# Patient Record
Sex: FEMALE | Race: WHITE | NOT HISPANIC OR LATINO | Employment: FULL TIME | URBAN - METROPOLITAN AREA
[De-identification: names, ages, dates, MRNs, and addresses within clinical notes are randomized per-mention and may not be internally consistent; named-entity substitution may affect disease eponyms.]

---

## 2017-01-30 RX ORDER — METFORMIN HYDROCHLORIDE 500 MG/1
TABLET ORAL
Qty: 60 TABLET | Refills: 6 | Status: SHIPPED | OUTPATIENT
Start: 2017-01-30 | End: 2017-04-07 | Stop reason: DRUGHIGH

## 2017-03-24 ENCOUNTER — LAB VISIT (OUTPATIENT)
Dept: LAB | Facility: HOSPITAL | Age: 57
End: 2017-03-24
Attending: INTERNAL MEDICINE
Payer: COMMERCIAL

## 2017-03-24 DIAGNOSIS — E11.9 TYPE 2 DIABETES MELLITUS WITHOUT COMPLICATION: ICD-10-CM

## 2017-03-24 PROCEDURE — 83036 HEMOGLOBIN GLYCOSYLATED A1C: CPT

## 2017-03-24 PROCEDURE — 36415 COLL VENOUS BLD VENIPUNCTURE: CPT | Mod: PO

## 2017-03-25 LAB
ESTIMATED AVG GLUCOSE: 137 MG/DL
HBA1C MFR BLD HPLC: 6.4 %

## 2017-04-06 ENCOUNTER — TELEPHONE (OUTPATIENT)
Dept: INTERNAL MEDICINE | Facility: CLINIC | Age: 57
End: 2017-04-06

## 2017-04-06 RX ORDER — BUPROPION HYDROCHLORIDE 150 MG/1
150 TABLET, EXTENDED RELEASE ORAL 2 TIMES DAILY
Qty: 60 TABLET | Refills: 6 | Status: SHIPPED | OUTPATIENT
Start: 2017-04-06 | End: 2017-04-07 | Stop reason: SDUPTHER

## 2017-04-07 ENCOUNTER — OFFICE VISIT (OUTPATIENT)
Dept: INTERNAL MEDICINE | Facility: CLINIC | Age: 57
End: 2017-04-07
Payer: COMMERCIAL

## 2017-04-07 VITALS
TEMPERATURE: 98 F | RESPIRATION RATE: 18 BRPM | BODY MASS INDEX: 55.32 KG/M2 | HEART RATE: 63 BPM | HEIGHT: 61 IN | WEIGHT: 293 LBS | DIASTOLIC BLOOD PRESSURE: 74 MMHG | SYSTOLIC BLOOD PRESSURE: 130 MMHG

## 2017-04-07 DIAGNOSIS — R06.02 SOB (SHORTNESS OF BREATH): ICD-10-CM

## 2017-04-07 DIAGNOSIS — E66.01 MORBID OBESITY WITH BMI OF 60.0-69.9, ADULT: ICD-10-CM

## 2017-04-07 DIAGNOSIS — R60.0 BILATERAL EDEMA OF LOWER EXTREMITY: ICD-10-CM

## 2017-04-07 DIAGNOSIS — Z00.00 ANNUAL PHYSICAL EXAM: Primary | ICD-10-CM

## 2017-04-07 DIAGNOSIS — E11.9 CONTROLLED TYPE 2 DIABETES MELLITUS WITHOUT COMPLICATION, WITHOUT LONG-TERM CURRENT USE OF INSULIN: ICD-10-CM

## 2017-04-07 DIAGNOSIS — R53.83 FATIGUE, UNSPECIFIED TYPE: ICD-10-CM

## 2017-04-07 DIAGNOSIS — M77.8 RIGHT SHOULDER TENDONITIS: ICD-10-CM

## 2017-04-07 PROCEDURE — 3078F DIAST BP <80 MM HG: CPT | Mod: S$GLB,,, | Performed by: INTERNAL MEDICINE

## 2017-04-07 PROCEDURE — 99999 PR PBB SHADOW E&M-EST. PATIENT-LVL III: CPT | Mod: PBBFAC,,, | Performed by: INTERNAL MEDICINE

## 2017-04-07 PROCEDURE — 99396 PREV VISIT EST AGE 40-64: CPT | Mod: S$GLB,,, | Performed by: INTERNAL MEDICINE

## 2017-04-07 PROCEDURE — 96372 THER/PROPH/DIAG INJ SC/IM: CPT | Mod: S$GLB,,, | Performed by: INTERNAL MEDICINE

## 2017-04-07 PROCEDURE — 3075F SYST BP GE 130 - 139MM HG: CPT | Mod: S$GLB,,, | Performed by: INTERNAL MEDICINE

## 2017-04-07 RX ORDER — FUROSEMIDE 20 MG/1
20 TABLET ORAL DAILY
Qty: 30 TABLET | Refills: 6 | Status: SHIPPED | OUTPATIENT
Start: 2017-04-07 | End: 2018-04-18 | Stop reason: SDUPTHER

## 2017-04-07 RX ORDER — TRIAMCINOLONE ACETONIDE 40 MG/ML
40 INJECTION, SUSPENSION INTRA-ARTICULAR; INTRAMUSCULAR
Status: COMPLETED | OUTPATIENT
Start: 2017-04-07 | End: 2017-04-07

## 2017-04-07 RX ORDER — BUPROPION HYDROCHLORIDE 150 MG/1
150 TABLET, EXTENDED RELEASE ORAL 2 TIMES DAILY
Qty: 60 TABLET | Refills: 6 | Status: SHIPPED | OUTPATIENT
Start: 2017-04-07 | End: 2018-04-18 | Stop reason: SDUPTHER

## 2017-04-07 RX ORDER — METFORMIN HYDROCHLORIDE 750 MG/1
750 TABLET, EXTENDED RELEASE ORAL
Qty: 30 TABLET | Refills: 11 | Status: SHIPPED | OUTPATIENT
Start: 2017-04-07 | End: 2018-04-18 | Stop reason: SDUPTHER

## 2017-04-07 RX ORDER — LOSARTAN POTASSIUM 25 MG/1
25 TABLET ORAL DAILY
Qty: 30 TABLET | Refills: 3 | Status: SHIPPED | OUTPATIENT
Start: 2017-04-07 | End: 2018-03-12 | Stop reason: SDUPTHER

## 2017-04-07 RX ORDER — MELOXICAM 15 MG/1
15 TABLET ORAL DAILY
Qty: 14 TABLET | Refills: 0 | Status: SHIPPED | OUTPATIENT
Start: 2017-04-07 | End: 2017-05-07

## 2017-04-07 RX ADMIN — TRIAMCINOLONE ACETONIDE 40 MG: 40 INJECTION, SUSPENSION INTRA-ARTICULAR; INTRAMUSCULAR at 03:04

## 2017-04-07 NOTE — PROGRESS NOTES
Subjective:       Patient ID: Tawny Valerio is a 56 y.o. female.    Chief Complaint: Annual Exam (medication refills ) and Shoulder Pain (right shoulder pain about 6 wks )    Patient is a 56 y.o.female who presents today for annual. Last A1c 6.4    Labs: due now  Vaccines: Influenza (yearly); Tetanus (done)   Eye exam: due now  Mammogram: wants to do june  Gyn exam: will do in june  Colonoscopy: will do; pt declines to schedule now  Diet: atkins        Right shoulder: started 5-6 weeks ago, started exercising and then shoulder started hurting. She tried motrin, icy hot, heating pad and it started feeling better. When she went to put her bra on this morning, it starated hurting again. [place] it pop this week.     Review of Systems   Constitutional: Negative for appetite change, chills, diaphoresis, fatigue and fever.   HENT: Negative for congestion, dental problem, ear discharge, ear pain, hearing loss, postnasal drip, sinus pressure and sore throat.    Eyes: Negative for discharge, redness and itching.   Respiratory: Negative for cough, chest tightness, shortness of breath and wheezing.    Cardiovascular: Negative for chest pain, palpitations and leg swelling.   Gastrointestinal: Negative for abdominal pain, constipation, diarrhea, nausea and vomiting.   Endocrine: Negative for cold intolerance and heat intolerance.   Genitourinary: Negative for difficulty urinating, frequency, hematuria and urgency.   Musculoskeletal: Positive for arthralgias. Negative for back pain, gait problem, myalgias and neck pain.   Skin: Negative for color change and rash.   Neurological: Negative for dizziness, syncope and headaches.   Hematological: Negative for adenopathy.   Psychiatric/Behavioral: Negative for behavioral problems and sleep disturbance. The patient is not nervous/anxious.        Objective:      Physical Exam   Constitutional: She is oriented to person, place, and time. She appears well-developed and well-nourished.  No distress.   HENT:   Head: Normocephalic and atraumatic.   Right Ear: External ear normal.   Left Ear: External ear normal.   Eyes: Conjunctivae and EOM are normal. Pupils are equal, round, and reactive to light. Right eye exhibits no discharge. Left eye exhibits no discharge. No scleral icterus.   Neck: Normal range of motion. Neck supple. No JVD present. No thyromegaly present.   Cardiovascular: Normal rate, regular rhythm, normal heart sounds and intact distal pulses.  Exam reveals no gallop and no friction rub.    No murmur heard.  Pulmonary/Chest: Effort normal and breath sounds normal. No stridor. No respiratory distress. She has no wheezes. She has no rales. She exhibits no tenderness.   Abdominal: Soft. Bowel sounds are normal. She exhibits no distension. There is no tenderness. There is no rebound.   Musculoskeletal: She exhibits no edema.        Right shoulder: She exhibits decreased range of motion and tenderness.   Lymphadenopathy:     She has no cervical adenopathy.   Neurological: She is alert and oriented to person, place, and time.   Skin: Skin is warm. No rash noted. She is not diaphoretic. No erythema.   Psychiatric: She has a normal mood and affect. Her behavior is normal.   Nursing note and vitals reviewed.      Assessment and Plan:       1. Annual physical exam  - labs now  - declines to schedule mammo, gyn or colonoscopy now; will call clinic in June to schedule everything  - CBC auto differential; Future  - Comprehensive metabolic panel; Future  - TSH; Future  - T4, free; Future  - Lipid panel; Future  - Hemoglobin A1c; Future  - Urinalysis; Future  - Microalbumin/creatinine urine ratio; Future  - Vitamin B12; Future  - Vitamin D; Future  - Follicle stimulating hormone; Future  - Estradiol; Future  - Luteinizing hormone; Future    2. Fatigue, unspecified type  - CBC auto differential; Future  - Comprehensive metabolic panel; Future  - TSH; Future  - T4, free; Future  - Lipid panel; Future  -  Hemoglobin A1c; Future  - Urinalysis; Future  - Microalbumin/creatinine urine ratio; Future  - Vitamin B12; Future  - Follicle stimulating hormone; Future  - Estradiol; Future  - Luteinizing hormone; Future    3. Controlled type 2 diabetes mellitus without complication, without long-term current use of insulin  - change to metformin  mg daily  - CBC auto differential; Future  - Comprehensive metabolic panel; Future  - TSH; Future  - T4, free; Future  - Lipid panel; Future  - Hemoglobin A1c; Future  - Urinalysis; Future  - Microalbumin/creatinine urine ratio; Future  - Vitamin B12; Future  - furosemide (LASIX) 20 MG tablet; Take 1 tablet (20 mg total) by mouth once daily.  Dispense: 30 tablet; Refill: 6  - Follicle stimulating hormone; Future  - Estradiol; Future  - Luteinizing hormone; Future    4.  Morbid obesity with BMI of 60.0-69.9, adult  - Patient educated on importance of diet and exercise.  Recommended 30-45 minutes of exercise five days a week.  In addition, counseled patient on importance of low fat diet.  Limit carbohydrate intake.  Increase protein intake and vegetables.   - furosemide (LASIX) 20 MG tablet; Take 1 tablet (20 mg total) by mouth once daily.  Dispense: 30 tablet; Refill: 6  - Follicle stimulating hormone; Future  - Estradiol; Future  - Luteinizing hormone; Future    6. Bilateral edema of lower extremity  - furosemide (LASIX) 20 MG tablet; Take 1 tablet (20 mg total) by mouth once daily.  Dispense: 30 tablet; Refill: 6  - Follicle stimulating hormone; Future  - Estradiol; Future  - Luteinizing hormone; Future    7. Right shoulder tendonitis  - meloxicam (MOBIC) 15 MG tablet; Take 1 tablet (15 mg total) by mouth once daily.  Dispense: 14 tablet; Refill: 0  - triamcinolone acetonide injection 40 mg; Inject 1 mL (40 mg total) into the muscle one time.        No Follow-up on file.

## 2017-04-12 ENCOUNTER — PATIENT MESSAGE (OUTPATIENT)
Dept: INTERNAL MEDICINE | Facility: CLINIC | Age: 57
End: 2017-04-12

## 2017-04-28 ENCOUNTER — PATIENT MESSAGE (OUTPATIENT)
Dept: INTERNAL MEDICINE | Facility: CLINIC | Age: 57
End: 2017-04-28

## 2017-05-04 ENCOUNTER — LAB VISIT (OUTPATIENT)
Dept: LAB | Facility: HOSPITAL | Age: 57
End: 2017-05-04
Attending: INTERNAL MEDICINE
Payer: COMMERCIAL

## 2017-05-04 DIAGNOSIS — R06.02 SOB (SHORTNESS OF BREATH): ICD-10-CM

## 2017-05-04 DIAGNOSIS — E11.9 CONTROLLED TYPE 2 DIABETES MELLITUS WITHOUT COMPLICATION, WITHOUT LONG-TERM CURRENT USE OF INSULIN: ICD-10-CM

## 2017-05-04 DIAGNOSIS — Z00.00 ANNUAL PHYSICAL EXAM: ICD-10-CM

## 2017-05-04 DIAGNOSIS — R60.0 BILATERAL EDEMA OF LOWER EXTREMITY: ICD-10-CM

## 2017-05-04 DIAGNOSIS — M77.8 RIGHT SHOULDER TENDONITIS: ICD-10-CM

## 2017-05-04 DIAGNOSIS — E66.01 MORBID OBESITY WITH BMI OF 60.0-69.9, ADULT: ICD-10-CM

## 2017-05-04 DIAGNOSIS — R53.83 FATIGUE, UNSPECIFIED TYPE: ICD-10-CM

## 2017-05-04 LAB
25(OH)D3+25(OH)D2 SERPL-MCNC: 13 NG/ML
ALBUMIN SERPL BCP-MCNC: 3.2 G/DL
ALP SERPL-CCNC: 114 U/L
ALT SERPL W/O P-5'-P-CCNC: 19 U/L
ANION GAP SERPL CALC-SCNC: 6 MMOL/L
AST SERPL-CCNC: 12 U/L
BASOPHILS # BLD AUTO: 0.02 K/UL
BASOPHILS NFR BLD: 0.2 %
BILIRUB SERPL-MCNC: 0.3 MG/DL
BUN SERPL-MCNC: 16 MG/DL
CALCIUM SERPL-MCNC: 9.4 MG/DL
CHLORIDE SERPL-SCNC: 105 MMOL/L
CHOLEST/HDLC SERPL: 3.1 {RATIO}
CO2 SERPL-SCNC: 30 MMOL/L
CREAT SERPL-MCNC: 0.8 MG/DL
DIFFERENTIAL METHOD: ABNORMAL
EOSINOPHIL # BLD AUTO: 0.2 K/UL
EOSINOPHIL NFR BLD: 1.9 %
ERYTHROCYTE [DISTWIDTH] IN BLOOD BY AUTOMATED COUNT: 15.5 %
EST. GFR  (AFRICAN AMERICAN): >60 ML/MIN/1.73 M^2
EST. GFR  (NON AFRICAN AMERICAN): >60 ML/MIN/1.73 M^2
ESTIMATED AVG GLUCOSE: 137 MG/DL
ESTRADIOL SERPL-MCNC: 23 PG/ML
FSH SERPL-ACNC: 22.5 MIU/ML
GLUCOSE SERPL-MCNC: 105 MG/DL
HBA1C MFR BLD HPLC: 6.4 %
HCT VFR BLD AUTO: 38.8 %
HDL/CHOLESTEROL RATIO: 32.3 %
HDLC SERPL-MCNC: 201 MG/DL
HDLC SERPL-MCNC: 65 MG/DL
HGB BLD-MCNC: 12.2 G/DL
LDLC SERPL CALC-MCNC: 124.6 MG/DL
LH SERPL-ACNC: 13.3 MIU/ML
LYMPHOCYTES # BLD AUTO: 2.4 K/UL
LYMPHOCYTES NFR BLD: 25.5 %
MCH RBC QN AUTO: 26.8 PG
MCHC RBC AUTO-ENTMCNC: 31.4 %
MCV RBC AUTO: 85 FL
MONOCYTES # BLD AUTO: 0.5 K/UL
MONOCYTES NFR BLD: 4.8 %
NEUTROPHILS # BLD AUTO: 6.3 K/UL
NEUTROPHILS NFR BLD: 67.4 %
NONHDLC SERPL-MCNC: 136 MG/DL
PLATELET # BLD AUTO: 290 K/UL
PMV BLD AUTO: 9.8 FL
POTASSIUM SERPL-SCNC: 4.2 MMOL/L
PROT SERPL-MCNC: 8 G/DL
RBC # BLD AUTO: 4.55 M/UL
SODIUM SERPL-SCNC: 141 MMOL/L
T4 FREE SERPL-MCNC: 1 NG/DL
TRIGL SERPL-MCNC: 57 MG/DL
TSH SERPL DL<=0.005 MIU/L-ACNC: 1.07 UIU/ML
VIT B12 SERPL-MCNC: 1040 PG/ML
WBC # BLD AUTO: 9.35 K/UL

## 2017-05-04 PROCEDURE — 82670 ASSAY OF TOTAL ESTRADIOL: CPT

## 2017-05-04 PROCEDURE — 80053 COMPREHEN METABOLIC PANEL: CPT

## 2017-05-04 PROCEDURE — 83001 ASSAY OF GONADOTROPIN (FSH): CPT

## 2017-05-04 PROCEDURE — 82607 VITAMIN B-12: CPT

## 2017-05-04 PROCEDURE — 84439 ASSAY OF FREE THYROXINE: CPT

## 2017-05-04 PROCEDURE — 85025 COMPLETE CBC W/AUTO DIFF WBC: CPT

## 2017-05-04 PROCEDURE — 36415 COLL VENOUS BLD VENIPUNCTURE: CPT | Mod: PO

## 2017-05-04 PROCEDURE — 83036 HEMOGLOBIN GLYCOSYLATED A1C: CPT

## 2017-05-04 PROCEDURE — 84443 ASSAY THYROID STIM HORMONE: CPT

## 2017-05-04 PROCEDURE — 82306 VITAMIN D 25 HYDROXY: CPT

## 2017-05-04 PROCEDURE — 83002 ASSAY OF GONADOTROPIN (LH): CPT

## 2017-05-04 PROCEDURE — 80061 LIPID PANEL: CPT

## 2017-05-05 ENCOUNTER — PATIENT MESSAGE (OUTPATIENT)
Dept: INTERNAL MEDICINE | Facility: CLINIC | Age: 57
End: 2017-05-05

## 2017-05-05 ENCOUNTER — TELEPHONE (OUTPATIENT)
Dept: INTERNAL MEDICINE | Facility: CLINIC | Age: 57
End: 2017-05-05

## 2017-05-05 DIAGNOSIS — E55.9 VITAMIN D DEFICIENCY: Primary | ICD-10-CM

## 2017-05-05 NOTE — TELEPHONE ENCOUNTER
Please notify pt of results:  - blood counts are normal; no signs of anemia  - electrolytes, kidney, liver and glucose are normal  - thyroid is normal  - cholesterol is at goal  - diabetic marker is 6.4; stable from previous; good control  - b12 is normal but vitamin D is very low at 13; I do recommend vit D 50,000 units once a week x 3 months, if she is agreeable, I will send to her pharmacy. This is likely the cause of her fatigue  - female hormones place her in a mickey ( around) menopausal range  - urine is normal

## 2017-05-11 RX ORDER — ERGOCALCIFEROL 1.25 MG/1
50000 CAPSULE ORAL WEEKLY
Qty: 12 CAPSULE | Refills: 0 | Status: SHIPPED | OUTPATIENT
Start: 2017-05-11 | End: 2017-12-13 | Stop reason: SDUPTHER

## 2017-05-11 NOTE — TELEPHONE ENCOUNTER
----- Message from Rosa M Enriquez sent at 5/11/2017 12:16 PM CDT -----  Contact: pt [phone]  Patient is returning a phone call.  Who left a message for the patient: Rosanna  Does patient know what this is regarding:  A message was left  Comments:

## 2017-05-11 NOTE — TELEPHONE ENCOUNTER
Spoke to pt and informed of lab results, pt agreeable to vitamin D, please send to [facility] in [place].

## 2017-05-22 ENCOUNTER — OFFICE VISIT (OUTPATIENT)
Dept: SLEEP MEDICINE | Facility: CLINIC | Age: 57
End: 2017-05-22
Payer: COMMERCIAL

## 2017-05-22 VITALS
WEIGHT: 293 LBS | HEART RATE: 64 BPM | DIASTOLIC BLOOD PRESSURE: 73 MMHG | BODY MASS INDEX: 59.57 KG/M2 | SYSTOLIC BLOOD PRESSURE: 126 MMHG

## 2017-05-22 DIAGNOSIS — G47.33 OSA AND COPD OVERLAP SYNDROME: Primary | ICD-10-CM

## 2017-05-22 DIAGNOSIS — J44.9 OSA AND COPD OVERLAP SYNDROME: Primary | ICD-10-CM

## 2017-05-22 PROCEDURE — 99999 PR PBB SHADOW E&M-EST. PATIENT-LVL III: CPT | Mod: PBBFAC,,, | Performed by: PSYCHIATRY & NEUROLOGY

## 2017-05-22 PROCEDURE — 99213 OFFICE O/P EST LOW 20 MIN: CPT | Mod: S$GLB,,, | Performed by: PSYCHIATRY & NEUROLOGY

## 2017-05-22 NOTE — PROGRESS NOTES
Tawny Valerio  was seen at the request of  No ref. provider found for sleep evaluation.    06/29/2016 INITIAL HISTORY OF PRESENT ILLNESS:  Tawny Valerio is a 56 y.o. female is here to be evaluated for a sleep disorder.       CHIEF COMPLAINT:      The patient's complaints include excessive daytime sleepiness, excessive daytime fatigue, snoring,  gasping for air in sleep and interrupted sleep since  5 years.    She has to sometimes pull over and sleep at the shoulder of the road.    Recently diagnosed with DM.    Started on Furosemide for LE edema.    Echo was done recently - NL.    Reports occasional  dry mouth and sore throat  Denies nasal congestion   Reports  morning headaches  Reports  interrupted sleep  Reports occasional frequent leg movements  Denies symptoms concerning for parasomnia; reports vivid dreams.     The ESS ([place] Sleepiness Score) taken on initial visit is 20 /24    The patient never had tonsillectomy, adenoidectomy or UPPP.    INTERVAL HISTORY:    05/22/2017:  The patient has not presented any new complaints since the previous visit.   Comes to discuss PSG -HST.   Mild-moderate ASHLI. Still has severe sleepiness and gasping for air and sleepiness. ESS 21/24.        SLEEP ROUTINE AND LIFESTYLE 05/22/2017 :    [place]    Bed partner: + who has been using CPAP    Time to bed: 12-3 AM  Sleep onset latency:  30 min  Disruptions or awakenings: 0-2  Time to fall back into sleep: 0-5 min  Wakeup time: 7:30 AM   Perceived sleep quality: 2-3/5  Perceived total sleep time:  4-7  hours.  Daytime naps: weekly - 1; weekend - 1-2; better since she was diagnosed with DM and started on Metformin  Weekend sleep routine: same  Exercise routine: no  Caffeine: no coffee, diet cokes - 2-3 cans a day.     PREVIOUS SLEEP STUDIES:     PSG HST 5/27/62: Significant Obstructive sleep apnea (ASHLI) with AHI (apnea hypopnea Index) of 11 (RDI 18) and SaO2 of 84.5% (weight  324  lbs).      DME:       PAST MEDICAL HISTORY:    Active Ambulatory Problems     Diagnosis Date Noted    Diabetes type 2, controlled 2016    Vitamin D deficiency 2016    SOB (shortness of breath) 06/15/2016    Morbid obesity with BMI of 60.0-69.9, adult 06/15/2016    Edema leg 06/15/2016    ASHLI (obstructive sleep apnea)      Resolved Ambulatory Problems     Diagnosis Date Noted    No Resolved Ambulatory Problems     Past Medical History:   Diagnosis Date    Anxiety     Diabetes type 2, controlled 2016                PAST SURGICAL HISTORY:    Past Surgical History:   Procedure Laterality Date     SECTION      lipoma removed           FAMILY HISTORY:                Family History   Problem Relation Age of Onset    No Known Problems Mother     Diabetes Father     Heart disease Sister     Heart disease Brother     Heart disease Maternal Grandmother     Heart disease Maternal Grandfather     Breast cancer Paternal Grandmother     Heart disease Paternal Grandfather        SOCIAL HISTORY:          Tobacco:   History   Smoking Status    Never Smoker   Smokeless Tobacco    Not on file       alcohol use:    History   Alcohol Use No                   ALLERGIES:    Allergies   Allergen Reactions    Celebrex [Celecoxib] Anaphylaxis    Codeine Hives    Lidocaine Hives    Vicodin [Hydrocodone-Acetaminophen] Hives and Swelling       CURRENT MEDICATIONS:    Current Outpatient Prescriptions   Medication Sig Dispense Refill    blood sugar diagnostic (CONTOUR TEST STRIPS) Strp Check fasting glucose daily 100 each 3    buPROPion (WELLBUTRIN SR) 150 MG TBSR 12 hr tablet Take 1 tablet (150 mg total) by mouth 2 (two) times daily. 60 tablet 6    ergocalciferol (ERGOCALCIFEROL) 50,000 unit Cap Take 1 capsule (50,000 Units total) by mouth once a week. 12 capsule 0    lancets Misc Check fasting glucose daily 100 each 3    losartan (COZAAR) 25 MG tablet Take 1 tablet (25 mg total) by mouth  once daily. 30 tablet 3    metformin (GLUCOPHAGE-XR) 750 MG 24 hr tablet Take 1 tablet (750 mg total) by mouth daily with breakfast. 30 tablet 11    blood-glucose meter kit Use as instructed 1 each 0    furosemide (LASIX) 20 MG tablet Take 1 tablet (20 mg total) by mouth once daily. 30 tablet 6     No current facility-administered medications for this visit.                       REVIEW OF SYSTEMS:   Sleep related symptoms as per HPI    reports weight gain  Reports dyspnea daytime - even when talking sometimes, when walking a block.  Reports palpitations  Reports acid reflux   Denies polyuria 0  Reports  mood diturbance  Denies  anemia  Denies  muscle pain  Denies  Gait imbalance    Otherwise, a balance of 10 systems reviewed is negative.    PHYSICAL EXAM:  /73 (BP Location: Left arm, Patient Position: Sitting, BP Method: Automatic)   Pulse 64   Wt (!) 143 kg (315 lb 4.1 oz)   BMI 59.57 kg/m²   GENERAL: Normal development, well groomed.  HEENT:   HEENT:  Conjunctivae are non-erythematous; Pupils equal, round, and reactive to light; Nose is symmetrical; Nasal mucosa is pink and moist; Septum is midline; Inferior turbinates are normal; Nasal airflow is normal; Posterior pharynx is pink; Modified Mallampati:IV; Posterior palate is low; Tonsils not visualized; Uvula is normal and pink;Tongue is normal; Dentition is fair; No TMJ tenderness; Jaw opening and protrusion without click and without discomfort.  NECK: Supple. Neck circumference is 18 inches. No thyromegaly. No palpable nodes.     SKIN: On face and neck: No abrasions, no rashes, no lesions.  No subcutaneous nodules are palpable.  RESPIRATORY: Chest is clear to auscultation.  Normal chest expansion and non-labored breathing at rest.  CARDIOVASCULAR: Normal S1, S2.  No murmurs, gallops or rubs. No carotid bruits bilaterally.  No edema. No clubbing. No cyanosis.    NEURO: Oriented to time, place and person. Normal attention span and concentration. Gait  "normal.    PSYCH: Affect is full. Mood is normal  MUSCULOSKELETAL: Moves 4 extremities. Gait normal.         Using [facility]:       ASSESSMENT:    1. Moderate ASHLI by HST The patient symptomatically has  excessive daytime sleepiness, snoring,  witnessed breathing pauses, excessive daytime fatigue and interrupted sleep  with exam findings of "a crowded oral airway and elevated body mass index. The patient has medical co-morbidities of diabetes,  which can be worsened by ASHLI. This warrants treatment          PLAN:      Treatment: prescription  for CPAP 6-18 cm with the mask of a patient's choice was given to the patient.    Education: During our discussion today, we talked about the etiology of obstructive sleep apnea as well as the potential ramifications of untreated sleep apnea, which could include daytime sleepiness, hypertension, heart disease and/or stroke.      We discussed potential treatment options, which could include weight loss, body positioning, continuous positive airway pressure (CPAP), or referral for surgical consideration. The patient preferred CPAP option.     Discussed purpose of PAP therapy, health benefits of CPAP, as well as the potential ramifications of untreated sleep apnea, which could include daytime sleepiness, hypertension, heart disease and/or stroke. An AHI of 15 is associated with increased risk CVD.     The patient should avoid ETOH and sedatives at night, as it tends to aggravate ASHLI. Regular replacement of CPAP mask, tubing and filter was recommended.    Precautions: The patient was advised to abstain from driving should he feel sleepy or drowsy.    Follow up: MD/NP after 1 month of PAP use.    Thank you for allowing me the opportunity to participate in the care of your patient.    "

## 2017-05-24 ENCOUNTER — PATIENT MESSAGE (OUTPATIENT)
Dept: INTERNAL MEDICINE | Facility: CLINIC | Age: 57
End: 2017-05-24

## 2017-05-24 RX ORDER — MELOXICAM 15 MG/1
15 TABLET ORAL DAILY
Qty: 30 TABLET | Refills: 0 | Status: SHIPPED | OUTPATIENT
Start: 2017-05-24 | End: 2017-06-23

## 2017-06-09 DIAGNOSIS — Z12.31 OTHER SCREENING MAMMOGRAM: ICD-10-CM

## 2017-08-25 DIAGNOSIS — Z12.11 COLON CANCER SCREENING: ICD-10-CM

## 2017-10-31 DIAGNOSIS — E11.9 DIABETES TYPE 2, CONTROLLED: ICD-10-CM

## 2017-10-31 RX ORDER — BLOOD SUGAR DIAGNOSTIC
STRIP MISCELLANEOUS
Qty: 100 STRIP | Refills: 0 | Status: SHIPPED | OUTPATIENT
Start: 2017-10-31 | End: 2019-03-15

## 2017-11-17 ENCOUNTER — OFFICE VISIT (OUTPATIENT)
Dept: INTERNAL MEDICINE | Facility: CLINIC | Age: 57
End: 2017-11-17
Payer: COMMERCIAL

## 2017-11-17 ENCOUNTER — LAB VISIT (OUTPATIENT)
Dept: LAB | Facility: HOSPITAL | Age: 57
End: 2017-11-17
Attending: INTERNAL MEDICINE
Payer: COMMERCIAL

## 2017-11-17 VITALS
HEIGHT: 61 IN | WEIGHT: 293 LBS | TEMPERATURE: 98 F | DIASTOLIC BLOOD PRESSURE: 70 MMHG | SYSTOLIC BLOOD PRESSURE: 140 MMHG | RESPIRATION RATE: 16 BRPM | HEART RATE: 84 BPM | BODY MASS INDEX: 55.32 KG/M2

## 2017-11-17 DIAGNOSIS — J20.9 ACUTE BRONCHITIS, UNSPECIFIED ORGANISM: ICD-10-CM

## 2017-11-17 DIAGNOSIS — E55.9 VITAMIN D DEFICIENCY: ICD-10-CM

## 2017-11-17 DIAGNOSIS — E11.9 CONTROLLED TYPE 2 DIABETES MELLITUS WITHOUT COMPLICATION, WITHOUT LONG-TERM CURRENT USE OF INSULIN: Primary | ICD-10-CM

## 2017-11-17 DIAGNOSIS — E11.9 CONTROLLED TYPE 2 DIABETES MELLITUS WITHOUT COMPLICATION, WITHOUT LONG-TERM CURRENT USE OF INSULIN: ICD-10-CM

## 2017-11-17 LAB
ALBUMIN SERPL BCP-MCNC: 3 G/DL
ALP SERPL-CCNC: 116 U/L
ALT SERPL W/O P-5'-P-CCNC: 18 U/L
ANION GAP SERPL CALC-SCNC: 8 MMOL/L
AST SERPL-CCNC: 14 U/L
BILIRUB SERPL-MCNC: 0.4 MG/DL
BUN SERPL-MCNC: 16 MG/DL
CALCIUM SERPL-MCNC: 9.9 MG/DL
CHLORIDE SERPL-SCNC: 103 MMOL/L
CHOLEST SERPL-MCNC: 191 MG/DL
CHOLEST/HDLC SERPL: 3.2 {RATIO}
CO2 SERPL-SCNC: 30 MMOL/L
CREAT SERPL-MCNC: 0.9 MG/DL
EST. GFR  (AFRICAN AMERICAN): >60 ML/MIN/1.73 M^2
EST. GFR  (NON AFRICAN AMERICAN): >60 ML/MIN/1.73 M^2
ESTIMATED AVG GLUCOSE: 143 MG/DL
GLUCOSE SERPL-MCNC: 174 MG/DL
HBA1C MFR BLD HPLC: 6.6 %
HCV AB SERPL QL IA: NEGATIVE
HDLC SERPL-MCNC: 59 MG/DL
HDLC SERPL: 30.9 %
LDLC SERPL CALC-MCNC: 117 MG/DL
NONHDLC SERPL-MCNC: 132 MG/DL
POTASSIUM SERPL-SCNC: 4.6 MMOL/L
PROT SERPL-MCNC: 7.9 G/DL
SODIUM SERPL-SCNC: 141 MMOL/L
TRIGL SERPL-MCNC: 75 MG/DL

## 2017-11-17 PROCEDURE — 99999 PR PBB SHADOW E&M-EST. PATIENT-LVL III: CPT | Mod: PBBFAC,,, | Performed by: INTERNAL MEDICINE

## 2017-11-17 PROCEDURE — 86803 HEPATITIS C AB TEST: CPT

## 2017-11-17 PROCEDURE — 80053 COMPREHEN METABOLIC PANEL: CPT

## 2017-11-17 PROCEDURE — 99214 OFFICE O/P EST MOD 30 MIN: CPT | Mod: 25,S$GLB,, | Performed by: INTERNAL MEDICINE

## 2017-11-17 PROCEDURE — 96372 THER/PROPH/DIAG INJ SC/IM: CPT | Mod: S$GLB,,, | Performed by: INTERNAL MEDICINE

## 2017-11-17 PROCEDURE — 83036 HEMOGLOBIN GLYCOSYLATED A1C: CPT

## 2017-11-17 PROCEDURE — 80061 LIPID PANEL: CPT

## 2017-11-17 PROCEDURE — 36415 COLL VENOUS BLD VENIPUNCTURE: CPT | Mod: PO

## 2017-11-17 RX ORDER — DOXYCYCLINE HYCLATE 100 MG
100 TABLET ORAL 2 TIMES DAILY
Qty: 14 TABLET | Refills: 0 | Status: SHIPPED | OUTPATIENT
Start: 2017-11-17 | End: 2017-11-24

## 2017-11-17 RX ORDER — TRIAMCINOLONE ACETONIDE 40 MG/ML
40 INJECTION, SUSPENSION INTRA-ARTICULAR; INTRAMUSCULAR
Status: COMPLETED | OUTPATIENT
Start: 2017-11-17 | End: 2017-11-17

## 2017-11-17 RX ADMIN — TRIAMCINOLONE ACETONIDE 40 MG: 40 INJECTION, SUSPENSION INTRA-ARTICULAR; INTRAMUSCULAR at 09:11

## 2017-11-17 NOTE — PROGRESS NOTES
Subjective:       Patient ID: Tawny Valerio is a 57 y.o. female.    Chief Complaint: Follow-up    Patient is a 57 y.o.female who presents today for follow up.    Labs: due now  Vaccines: Influenza (yearly); Tetanus (done)   Eye exam: april  Mammogram: declines  Gyn exam: declines  Colonoscopy: declines  Diet: atkins      Sister has pneumonia. Pt complains of sinus congestion and cough with green mucus.  Review of Systems   Constitutional: Negative for appetite change, chills, diaphoresis, fatigue and fever.   HENT: Negative for congestion, dental problem, ear discharge, ear pain, hearing loss, postnasal drip, sinus pressure and sore throat.    Eyes: Negative for discharge, redness and itching.   Respiratory: Negative for cough, chest tightness, shortness of breath and wheezing.    Cardiovascular: Negative for chest pain, palpitations and leg swelling.   Gastrointestinal: Negative for abdominal pain, constipation, diarrhea, nausea and vomiting.   Endocrine: Negative for cold intolerance and heat intolerance.   Genitourinary: Negative for difficulty urinating, frequency, hematuria and urgency.   Musculoskeletal: Negative for arthralgias, back pain, gait problem, myalgias and neck pain.   Skin: Negative for color change and rash.   Neurological: Negative for dizziness, syncope and headaches.   Hematological: Negative for adenopathy.   Psychiatric/Behavioral: Negative for behavioral problems and sleep disturbance. The patient is not nervous/anxious.        Objective:      Physical Exam   Constitutional: She is oriented to person, place, and time. She appears well-developed and well-nourished. No distress.   HENT:   Head: Normocephalic and atraumatic.   Right Ear: Tympanic membrane and external ear normal.   Left Ear: Tympanic membrane and external ear normal.   Nose: Mucosal edema and rhinorrhea present.   Mouth/Throat: Uvula is midline, oropharynx is clear and moist and mucous membranes are normal. No  oropharyngeal exudate, posterior oropharyngeal edema, posterior oropharyngeal erythema or tonsillar abscesses.   Eyes: Conjunctivae and EOM are normal. Pupils are equal, round, and reactive to light. Right eye exhibits no discharge. Left eye exhibits no discharge. No scleral icterus.   Neck: Normal range of motion. Neck supple. No JVD present. No thyromegaly present.   Cardiovascular: Normal rate, regular rhythm, normal heart sounds and intact distal pulses.  Exam reveals no gallop and no friction rub.    No murmur heard.  Pulses:       Dorsalis pedis pulses are 2+ on the right side, and 2+ on the left side.        Posterior tibial pulses are 2+ on the right side, and 2+ on the left side.   Pulmonary/Chest: Effort normal and breath sounds normal. No stridor. No respiratory distress. She has no wheezes. She has no rales. She exhibits no tenderness.   Abdominal: Soft. Bowel sounds are normal. She exhibits no distension. There is no tenderness. There is no rebound.   Musculoskeletal: Normal range of motion. She exhibits no edema or tenderness.        Right foot: There is normal range of motion and no deformity.        Left foot: There is normal range of motion and no deformity.   Feet:   Right Foot:   Protective Sensation: 3 sites tested. 3 sites sensed.   Skin Integrity: Negative for ulcer, blister, skin breakdown, erythema, warmth, callus or dry skin.   Left Foot:   Protective Sensation: 3 sites tested. 3 sites sensed.   Skin Integrity: Negative for ulcer, blister, skin breakdown, erythema, warmth, callus or dry skin.   Lymphadenopathy:     She has no cervical adenopathy.   Neurological: She is alert and oriented to person, place, and time. No cranial nerve deficit.   Skin: Skin is warm and dry. No rash noted. She is not diaphoretic. No erythema.   Psychiatric: She has a normal mood and affect. Her behavior is normal.   Nursing note and vitals reviewed.      Assessment and Plan:       1. Controlled type 2 diabetes  mellitus without complication, without long-term current use of insulin  - Comprehensive metabolic panel; Future  - Lipid panel; Future  - Hemoglobin A1c; Future  - Hepatitis C antibody; Future    2. Vitamin D deficiency  - Comprehensive metabolic panel; Future  - Lipid panel; Future  - Hemoglobin A1c; Future    3. Acute bronchitis, unspecified organism    - triamcinolone acetonide injection 40 mg; Inject 1 mL (40 mg total) into the muscle one time.  - doxycycline (VIBRA-TABS) 100 MG tablet; Take 1 tablet (100 mg total) by mouth 2 (two) times daily.  Dispense: 14 tablet; Refill: 0          No Follow-up on file.

## 2017-12-13 DIAGNOSIS — E55.9 VITAMIN D DEFICIENCY: ICD-10-CM

## 2017-12-13 RX ORDER — ERGOCALCIFEROL 1.25 MG/1
50000 CAPSULE ORAL WEEKLY
Qty: 12 CAPSULE | Refills: 0 | Status: SHIPPED | OUTPATIENT
Start: 2017-12-13 | End: 2018-03-10 | Stop reason: SDUPTHER

## 2017-12-28 ENCOUNTER — PATIENT MESSAGE (OUTPATIENT)
Dept: INTERNAL MEDICINE | Facility: CLINIC | Age: 57
End: 2017-12-28

## 2017-12-28 RX ORDER — DOXYCYCLINE HYCLATE 100 MG
100 TABLET ORAL 2 TIMES DAILY
Qty: 14 TABLET | Refills: 0 | Status: SHIPPED | OUTPATIENT
Start: 2017-12-28 | End: 2018-01-04

## 2018-01-08 ENCOUNTER — PATIENT MESSAGE (OUTPATIENT)
Dept: INTERNAL MEDICINE | Facility: CLINIC | Age: 58
End: 2018-01-08

## 2018-01-08 RX ORDER — MELOXICAM 15 MG/1
15 TABLET ORAL DAILY
Qty: 30 TABLET | Refills: 6 | Status: SHIPPED | OUTPATIENT
Start: 2018-01-08 | End: 2019-02-18

## 2018-03-10 DIAGNOSIS — E55.9 VITAMIN D DEFICIENCY: ICD-10-CM

## 2018-03-11 RX ORDER — ERGOCALCIFEROL 1.25 MG/1
50000 CAPSULE ORAL WEEKLY
Qty: 12 CAPSULE | Refills: 0 | Status: SHIPPED | OUTPATIENT
Start: 2018-03-11 | End: 2018-05-24 | Stop reason: SDUPTHER

## 2018-03-12 RX ORDER — LOSARTAN POTASSIUM 25 MG/1
25 TABLET ORAL DAILY
Qty: 30 TABLET | Refills: 3 | Status: SHIPPED | OUTPATIENT
Start: 2018-03-12 | End: 2018-04-18 | Stop reason: SDUPTHER

## 2018-04-18 DIAGNOSIS — R60.0 BILATERAL EDEMA OF LOWER EXTREMITY: ICD-10-CM

## 2018-04-18 DIAGNOSIS — E11.9 CONTROLLED TYPE 2 DIABETES MELLITUS WITHOUT COMPLICATION, WITHOUT LONG-TERM CURRENT USE OF INSULIN: ICD-10-CM

## 2018-04-18 DIAGNOSIS — R06.02 SOB (SHORTNESS OF BREATH): ICD-10-CM

## 2018-04-18 DIAGNOSIS — E66.01 MORBID OBESITY WITH BMI OF 60.0-69.9, ADULT: ICD-10-CM

## 2018-04-19 ENCOUNTER — TELEPHONE (OUTPATIENT)
Dept: INTERNAL MEDICINE | Facility: CLINIC | Age: 58
End: 2018-04-19

## 2018-04-19 DIAGNOSIS — E11.9 CONTROLLED TYPE 2 DIABETES MELLITUS WITHOUT COMPLICATION, WITHOUT LONG-TERM CURRENT USE OF INSULIN: ICD-10-CM

## 2018-04-19 DIAGNOSIS — R60.0 BILATERAL EDEMA OF LOWER EXTREMITY: ICD-10-CM

## 2018-04-19 DIAGNOSIS — R06.02 SOB (SHORTNESS OF BREATH): ICD-10-CM

## 2018-04-19 DIAGNOSIS — E66.01 MORBID OBESITY WITH BMI OF 60.0-69.9, ADULT: ICD-10-CM

## 2018-04-19 RX ORDER — BUPROPION HYDROCHLORIDE 150 MG/1
150 TABLET, EXTENDED RELEASE ORAL 2 TIMES DAILY
Qty: 60 TABLET | Refills: 0 | Status: SHIPPED | OUTPATIENT
Start: 2018-04-19 | End: 2018-04-19 | Stop reason: SDUPTHER

## 2018-04-19 RX ORDER — LOSARTAN POTASSIUM 25 MG/1
25 TABLET ORAL DAILY
Qty: 30 TABLET | Refills: 0 | Status: SHIPPED | OUTPATIENT
Start: 2018-04-19 | End: 2018-12-02 | Stop reason: SDUPTHER

## 2018-04-19 RX ORDER — FUROSEMIDE 20 MG/1
20 TABLET ORAL DAILY
Qty: 30 TABLET | Refills: 0 | Status: SHIPPED | OUTPATIENT
Start: 2018-04-19 | End: 2018-04-19 | Stop reason: SDUPTHER

## 2018-04-19 RX ORDER — LOSARTAN POTASSIUM 25 MG/1
25 TABLET ORAL DAILY
Qty: 30 TABLET | Refills: 0 | Status: SHIPPED | OUTPATIENT
Start: 2018-04-19 | End: 2018-04-19

## 2018-04-19 RX ORDER — BUPROPION HYDROCHLORIDE 150 MG/1
150 TABLET, EXTENDED RELEASE ORAL 2 TIMES DAILY
Qty: 60 TABLET | Refills: 0 | Status: SHIPPED | OUTPATIENT
Start: 2018-04-19 | End: 2018-04-19

## 2018-04-19 RX ORDER — FUROSEMIDE 20 MG/1
20 TABLET ORAL DAILY
Qty: 30 TABLET | Refills: 0 | Status: SHIPPED | OUTPATIENT
Start: 2018-04-19 | End: 2018-05-16

## 2018-04-19 RX ORDER — METFORMIN HYDROCHLORIDE 750 MG/1
750 TABLET, EXTENDED RELEASE ORAL
Qty: 30 TABLET | Refills: 0 | Status: SHIPPED | OUTPATIENT
Start: 2018-04-19 | End: 2018-04-19 | Stop reason: SDUPTHER

## 2018-04-19 RX ORDER — BUPROPION HYDROCHLORIDE 150 MG/1
150 TABLET, EXTENDED RELEASE ORAL 2 TIMES DAILY
Qty: 60 TABLET | Refills: 0 | Status: SHIPPED | OUTPATIENT
Start: 2018-04-19 | End: 2018-05-16

## 2018-04-19 RX ORDER — LOSARTAN POTASSIUM 25 MG/1
25 TABLET ORAL DAILY
Qty: 30 TABLET | Refills: 0 | Status: SHIPPED | OUTPATIENT
Start: 2018-04-19 | End: 2018-04-19 | Stop reason: SDUPTHER

## 2018-04-19 RX ORDER — FUROSEMIDE 20 MG/1
20 TABLET ORAL DAILY
Qty: 30 TABLET | Refills: 0 | Status: SHIPPED | OUTPATIENT
Start: 2018-04-19 | End: 2018-04-19

## 2018-04-19 RX ORDER — METFORMIN HYDROCHLORIDE 750 MG/1
750 TABLET, EXTENDED RELEASE ORAL
Qty: 30 TABLET | Refills: 0 | Status: SHIPPED | OUTPATIENT
Start: 2018-04-19 | End: 2018-04-19

## 2018-04-19 RX ORDER — METFORMIN HYDROCHLORIDE 750 MG/1
750 TABLET, EXTENDED RELEASE ORAL
Qty: 30 TABLET | Refills: 0 | Status: SHIPPED | OUTPATIENT
Start: 2018-04-19 | End: 2018-05-16

## 2018-05-14 PROBLEM — I10 ESSENTIAL HYPERTENSION: Status: ACTIVE | Noted: 2018-05-14

## 2018-05-16 DIAGNOSIS — R60.0 BILATERAL EDEMA OF LOWER EXTREMITY: ICD-10-CM

## 2018-05-16 DIAGNOSIS — R06.02 SOB (SHORTNESS OF BREATH): ICD-10-CM

## 2018-05-16 DIAGNOSIS — E11.9 CONTROLLED TYPE 2 DIABETES MELLITUS WITHOUT COMPLICATION, WITHOUT LONG-TERM CURRENT USE OF INSULIN: ICD-10-CM

## 2018-05-16 DIAGNOSIS — E66.01 MORBID OBESITY WITH BMI OF 60.0-69.9, ADULT: ICD-10-CM

## 2018-05-16 RX ORDER — METFORMIN HYDROCHLORIDE 750 MG/1
750 TABLET, EXTENDED RELEASE ORAL
Qty: 30 TABLET | Refills: 0 | Status: SHIPPED | OUTPATIENT
Start: 2018-05-16 | End: 2018-05-21 | Stop reason: SDUPTHER

## 2018-05-16 RX ORDER — FUROSEMIDE 20 MG/1
20 TABLET ORAL DAILY
Qty: 30 TABLET | Refills: 0 | Status: SHIPPED | OUTPATIENT
Start: 2018-05-16 | End: 2018-08-27 | Stop reason: SDUPTHER

## 2018-05-16 RX ORDER — BUPROPION HYDROCHLORIDE 150 MG/1
150 TABLET, EXTENDED RELEASE ORAL 2 TIMES DAILY
Qty: 60 TABLET | Refills: 0 | Status: SHIPPED | OUTPATIENT
Start: 2018-05-16 | End: 2018-09-04 | Stop reason: SDUPTHER

## 2018-05-21 RX ORDER — METFORMIN HYDROCHLORIDE 750 MG/1
750 TABLET, EXTENDED RELEASE ORAL
Qty: 30 TABLET | Refills: 0 | Status: SHIPPED | OUTPATIENT
Start: 2018-05-21 | End: 2018-07-28 | Stop reason: SDUPTHER

## 2018-05-24 ENCOUNTER — LAB VISIT (OUTPATIENT)
Dept: LAB | Facility: HOSPITAL | Age: 58
End: 2018-05-24
Attending: INTERNAL MEDICINE
Payer: COMMERCIAL

## 2018-05-24 ENCOUNTER — OFFICE VISIT (OUTPATIENT)
Dept: INTERNAL MEDICINE | Facility: CLINIC | Age: 58
End: 2018-05-24
Payer: COMMERCIAL

## 2018-05-24 VITALS
HEART RATE: 68 BPM | RESPIRATION RATE: 16 BRPM | WEIGHT: 293 LBS | HEIGHT: 61 IN | DIASTOLIC BLOOD PRESSURE: 70 MMHG | TEMPERATURE: 98 F | SYSTOLIC BLOOD PRESSURE: 138 MMHG | BODY MASS INDEX: 55.32 KG/M2

## 2018-05-24 DIAGNOSIS — E55.9 VITAMIN D INSUFFICIENCY: ICD-10-CM

## 2018-05-24 DIAGNOSIS — I10 ESSENTIAL HYPERTENSION: ICD-10-CM

## 2018-05-24 DIAGNOSIS — R60.0 EDEMA LEG: ICD-10-CM

## 2018-05-24 DIAGNOSIS — E11.9 CONTROLLED TYPE 2 DIABETES MELLITUS WITHOUT COMPLICATION, WITHOUT LONG-TERM CURRENT USE OF INSULIN: Primary | ICD-10-CM

## 2018-05-24 DIAGNOSIS — N95.1 PERIMENOPAUSAL: ICD-10-CM

## 2018-05-24 DIAGNOSIS — E11.9 CONTROLLED TYPE 2 DIABETES MELLITUS WITHOUT COMPLICATION, WITHOUT LONG-TERM CURRENT USE OF INSULIN: ICD-10-CM

## 2018-05-24 LAB
25(OH)D3+25(OH)D2 SERPL-MCNC: 15 NG/ML
ALBUMIN SERPL BCP-MCNC: 3.3 G/DL
ALP SERPL-CCNC: 117 U/L
ALT SERPL W/O P-5'-P-CCNC: 17 U/L
ANION GAP SERPL CALC-SCNC: 8 MMOL/L
AST SERPL-CCNC: 13 U/L
BILIRUB SERPL-MCNC: 0.5 MG/DL
BUN SERPL-MCNC: 14 MG/DL
CALCIUM SERPL-MCNC: 10.1 MG/DL
CHLORIDE SERPL-SCNC: 104 MMOL/L
CO2 SERPL-SCNC: 29 MMOL/L
CREAT SERPL-MCNC: 0.8 MG/DL
EST. GFR  (AFRICAN AMERICAN): >60 ML/MIN/1.73 M^2
EST. GFR  (NON AFRICAN AMERICAN): >60 ML/MIN/1.73 M^2
ESTIMATED AVG GLUCOSE: 140 MG/DL
FSH SERPL-ACNC: 15 MIU/ML
GLUCOSE SERPL-MCNC: 96 MG/DL
HBA1C MFR BLD HPLC: 6.5 %
POTASSIUM SERPL-SCNC: 4.2 MMOL/L
PROT SERPL-MCNC: 8 G/DL
SODIUM SERPL-SCNC: 141 MMOL/L

## 2018-05-24 PROCEDURE — 99214 OFFICE O/P EST MOD 30 MIN: CPT | Mod: S$GLB,,, | Performed by: INTERNAL MEDICINE

## 2018-05-24 PROCEDURE — 3078F DIAST BP <80 MM HG: CPT | Mod: CPTII,S$GLB,, | Performed by: INTERNAL MEDICINE

## 2018-05-24 PROCEDURE — 83036 HEMOGLOBIN GLYCOSYLATED A1C: CPT

## 2018-05-24 PROCEDURE — 80053 COMPREHEN METABOLIC PANEL: CPT

## 2018-05-24 PROCEDURE — 3044F HG A1C LEVEL LT 7.0%: CPT | Mod: CPTII,S$GLB,, | Performed by: INTERNAL MEDICINE

## 2018-05-24 PROCEDURE — 82306 VITAMIN D 25 HYDROXY: CPT

## 2018-05-24 PROCEDURE — 3075F SYST BP GE 130 - 139MM HG: CPT | Mod: CPTII,S$GLB,, | Performed by: INTERNAL MEDICINE

## 2018-05-24 PROCEDURE — 99999 PR PBB SHADOW E&M-EST. PATIENT-LVL III: CPT | Mod: PBBFAC,,, | Performed by: INTERNAL MEDICINE

## 2018-05-24 PROCEDURE — 83001 ASSAY OF GONADOTROPIN (FSH): CPT

## 2018-05-24 PROCEDURE — 3008F BODY MASS INDEX DOCD: CPT | Mod: CPTII,S$GLB,, | Performed by: INTERNAL MEDICINE

## 2018-05-24 PROCEDURE — 36415 COLL VENOUS BLD VENIPUNCTURE: CPT | Mod: PO

## 2018-05-24 RX ORDER — HYDROCHLOROTHIAZIDE 25 MG/1
25 TABLET ORAL DAILY
Qty: 30 TABLET | Refills: 11 | Status: SHIPPED | OUTPATIENT
Start: 2018-05-24 | End: 2019-06-15 | Stop reason: SDUPTHER

## 2018-05-24 RX ORDER — ERGOCALCIFEROL 1.25 MG/1
50000 CAPSULE ORAL WEEKLY
Qty: 12 CAPSULE | Refills: 0 | Status: SHIPPED | OUTPATIENT
Start: 2018-05-24 | End: 2018-06-02 | Stop reason: SDUPTHER

## 2018-05-24 NOTE — PROGRESS NOTES
Subjective:       Patient ID: Tawny Valerio is a 57 y.o. female.    Chief Complaint: Edema (legs and ankles, sometimes better in am. , sits at work.) and wants aic testing    Diabetes   She has type 2 diabetes mellitus. No MedicAlert identification noted. The initial diagnosis of diabetes was made 2 years ago. Hypoglycemia symptoms include dizziness, headaches and sleepiness. Pertinent negatives for hypoglycemia include no confusion, hunger, mood changes, nervousness/anxiousness, pallor, seizures, speech difficulty, sweats or tremors. Associated symptoms include blurred vision, foot paresthesias and visual change. Pertinent negatives for diabetes include no chest pain, no foot ulcerations, no polydipsia, no polyphagia, no polyuria, no weakness and no weight loss. Pertinent negatives for hypoglycemia complications include no blackouts, no hospitalization, no nocturnal hypoglycemia, no required assistance and no required glucagon injection. Symptoms are stable. Diabetic complications include PVD. Pertinent negatives for diabetic complications include no autonomic neuropathy, CVA, heart disease, impotence, nephropathy, peripheral neuropathy or retinopathy. Risk factors for coronary artery disease include family history, hypertension, obesity, sedentary lifestyle and diabetes mellitus. Current diabetic treatment includes diet and oral agent (monotherapy). She is compliant with treatment most of the time. Her weight is stable. She is following a diabetic and generally healthy diet. Meal planning includes avoidance of concentrated sweets and carbohydrate counting. She has not had a previous visit with a dietitian. She participates in exercise intermittently. There is no compliance with monitoring of blood glucose. She does not see a podiatrist.Eye exam is not current.     BLE swelling - she takes lasix 20mg prn (about every other day). She bought compression socks otc recently. No dyspnea. No pain, erythema, or  "warmth in legs.     Vit d insuff: she is currently out of ergocalciferol and is requesting refill    Perimenopausal: she is requesting to have her FSH checked. Her last period was in December. She follows with outside gynecologist.     Review of Systems   Constitutional: Negative for fever, unexpected weight change and weight loss.   Eyes: Positive for blurred vision.   Respiratory: Negative for chest tightness and shortness of breath.    Cardiovascular: Positive for leg swelling. Negative for chest pain.   Gastrointestinal: Negative for abdominal pain, diarrhea, nausea and vomiting.   Endocrine: Negative for polydipsia, polyphagia and polyuria.   Genitourinary: Negative for difficulty urinating and impotence.   Musculoskeletal: Negative for arthralgias.   Skin: Negative for pallor.   Neurological: Positive for dizziness and headaches. Negative for tremors, seizures, speech difficulty and weakness.   Hematological: Negative for adenopathy.   Psychiatric/Behavioral: Negative for confusion. The patient is not nervous/anxious.        Objective:        Vitals:    05/24/18 1045   BP: 138/70   BP Location: Left arm   Patient Position: Sitting   BP Method: Large (Manual)   Pulse: 68   Resp: 16   Temp: 98.3 °F (36.8 °C)   TempSrc: Oral   Weight: (!) 147 kg (324 lb 1.2 oz)   Height: 5' 1" (1.549 m)       Body mass index is 61.23 kg/m².    Physical Exam    Assessment:     1. Controlled type 2 diabetes mellitus without complication, without long-term current use of insulin    2. Edema leg    3. Vitamin D insufficiency    4. Perimenopausal    5. Essential hypertension           Plan:         1. Controlled type 2 diabetes mellitus without complication, without long-term current use of insulin  - continue metformin  - Hemoglobin A1c; Future  - Microalbumin/creatinine urine ratio; Future  - Urinalysis; Future    2. Edema leg  - elevate legs, wear compression socks, low salt diet  - no signs or symptoms of DVT  - Urinalysis; " Future  - Comprehensive metabolic panel; Future  - hydroCHLOROthiazide (HYDRODIURIL) 25 MG tablet; Take 1 tablet (25 mg total) by mouth once daily.  Dispense: 30 tablet; Refill: 11  - COMPRESSION STOCKINGS    3. Vitamin D insufficiency  - Vitamin D; Future  - ergocalciferol (VITAMIN D2) 50,000 unit Cap; Take 1 capsule (50,000 Units total) by mouth once a week.  Dispense: 12 capsule; Refill: 0    4. Perimenopausal  - Follicle stimulating hormone; Future       5. Essential hypertension  - continue losartan  - hydroCHLOROthiazide (HYDRODIURIL) 25 MG tablet; Take 1 tablet (25 mg total) by mouth once daily.  Dispense: 30 tablet; Refill: 11

## 2018-05-25 ENCOUNTER — PATIENT MESSAGE (OUTPATIENT)
Dept: INTERNAL MEDICINE | Facility: CLINIC | Age: 58
End: 2018-05-25

## 2018-06-02 DIAGNOSIS — E55.9 VITAMIN D DEFICIENCY: ICD-10-CM

## 2018-06-02 RX ORDER — ERGOCALCIFEROL 1.25 MG/1
50000 CAPSULE ORAL WEEKLY
Qty: 12 CAPSULE | Refills: 0 | Status: SHIPPED | OUTPATIENT
Start: 2018-06-02 | End: 2018-08-19

## 2018-06-14 ENCOUNTER — PATIENT MESSAGE (OUTPATIENT)
Dept: INTERNAL MEDICINE | Facility: CLINIC | Age: 58
End: 2018-06-14

## 2018-06-15 ENCOUNTER — CLINICAL SUPPORT (OUTPATIENT)
Dept: CARDIOLOGY | Facility: CLINIC | Age: 58
End: 2018-06-15
Attending: INTERNAL MEDICINE
Payer: COMMERCIAL

## 2018-06-15 ENCOUNTER — OFFICE VISIT (OUTPATIENT)
Dept: INTERNAL MEDICINE | Facility: CLINIC | Age: 58
End: 2018-06-15
Payer: COMMERCIAL

## 2018-06-15 VITALS
TEMPERATURE: 98 F | RESPIRATION RATE: 18 BRPM | HEART RATE: 83 BPM | SYSTOLIC BLOOD PRESSURE: 142 MMHG | BODY MASS INDEX: 55.32 KG/M2 | HEIGHT: 61 IN | OXYGEN SATURATION: 94 % | DIASTOLIC BLOOD PRESSURE: 86 MMHG | WEIGHT: 293 LBS

## 2018-06-15 DIAGNOSIS — M79.89 PAIN AND SWELLING OF LEFT LOWER EXTREMITY: ICD-10-CM

## 2018-06-15 DIAGNOSIS — M79.605 PAIN AND SWELLING OF LEFT LOWER EXTREMITY: ICD-10-CM

## 2018-06-15 DIAGNOSIS — L03.116 CELLULITIS OF LEFT LOWER EXTREMITY: Primary | ICD-10-CM

## 2018-06-15 PROCEDURE — 99999 PR PBB SHADOW E&M-EST. PATIENT-LVL III: CPT | Mod: PBBFAC,,, | Performed by: INTERNAL MEDICINE

## 2018-06-15 PROCEDURE — 3008F BODY MASS INDEX DOCD: CPT | Mod: CPTII,S$GLB,, | Performed by: INTERNAL MEDICINE

## 2018-06-15 PROCEDURE — 93971 EXTREMITY STUDY: CPT | Mod: S$GLB,,, | Performed by: INTERNAL MEDICINE

## 2018-06-15 PROCEDURE — 3077F SYST BP >= 140 MM HG: CPT | Mod: CPTII,S$GLB,, | Performed by: INTERNAL MEDICINE

## 2018-06-15 PROCEDURE — 99214 OFFICE O/P EST MOD 30 MIN: CPT | Mod: S$GLB,,, | Performed by: INTERNAL MEDICINE

## 2018-06-15 PROCEDURE — 3079F DIAST BP 80-89 MM HG: CPT | Mod: CPTII,S$GLB,, | Performed by: INTERNAL MEDICINE

## 2018-06-15 RX ORDER — SULFAMETHOXAZOLE AND TRIMETHOPRIM 800; 160 MG/1; MG/1
1 TABLET ORAL 2 TIMES DAILY
Qty: 14 TABLET | Refills: 0 | Status: SHIPPED | OUTPATIENT
Start: 2018-06-15 | End: 2018-06-22

## 2018-06-15 NOTE — PROGRESS NOTES
"Subjective:       Patient ID: Tawny Valerio is a 58 y.o. female.    Chief Complaint: Recurrent Skin Infections (bottom Left Leg )    HPI    She presents for evaluation of LLE swelling and erythema. This occurred once previously, about one year ago. She first noticed this on Wednesday after wearing otc compression socks. She reports chills yesterday that resolve with motrin. No drainage from the site. It is painful.     Review of Systems   Constitutional: Positive for chills. Negative for activity change and unexpected weight change.   HENT: Negative for hearing loss, rhinorrhea and trouble swallowing.    Eyes: Negative for discharge and visual disturbance.   Respiratory: Negative for chest tightness and wheezing.    Cardiovascular: Positive for leg swelling. Negative for chest pain and palpitations.   Gastrointestinal: Negative for blood in stool, constipation, diarrhea and vomiting.   Endocrine: Negative for polydipsia and polyuria.   Genitourinary: Negative for difficulty urinating, dysuria, hematuria and menstrual problem.   Musculoskeletal: Negative for arthralgias, joint swelling and neck pain.   Skin: Positive for color change and rash.   Neurological: Negative for weakness and headaches.   Psychiatric/Behavioral: Negative for confusion and dysphoric mood.       Objective:        Vitals:    06/15/18 0900   BP: (!) 142/86   BP Location: Left arm   Patient Position: Sitting   BP Method: Medium (Manual)   Pulse: 83   Resp: 18   Temp: 98.1 °F (36.7 °C)   TempSrc: Oral   SpO2: (!) 94%   Weight: (!) 143.3 kg (315 lb 14.7 oz)   Height: 5' 1" (1.549 m)       Body mass index is 59.69 kg/m².    Physical Exam   Constitutional: She is oriented to person, place, and time. She appears well-developed and well-nourished. No distress.   HENT:   Head: Normocephalic and atraumatic.   Nose: Nose normal.   Eyes: Conjunctivae and EOM are normal. Right eye exhibits no discharge. Left eye exhibits no discharge.   Neck: Normal " range of motion. Neck supple.   Cardiovascular: Normal rate, regular rhythm, normal heart sounds and intact distal pulses.    Pulmonary/Chest: Effort normal and breath sounds normal.   Musculoskeletal: She exhibits edema.   Neurological: She is alert and oriented to person, place, and time.   Skin: Skin is warm and dry. She is not diaphoretic. There is erythema.   Distal LLE erythematous, warm. No abscess palpated. It is tender. No fissures between toes.    Psychiatric: She has a normal mood and affect. Her behavior is normal. Thought content normal.       Assessment:     1. Cellulitis of left lower extremity    2. Pain and swelling of left lower extremity           Plan:         1. Cellulitis of left lower extremity  - elevate leg, apply ice prn  - sulfamethoxazole-trimethoprim 800-160mg (BACTRIM DS) 800-160 mg Tab; Take 1 tablet by mouth 2 (two) times daily.  Dispense: 14 tablet; Refill: 0  - Patient was instructed to notify clinic if symptoms change, worsen or do not improve.    2. Pain and swelling of left lower extremity  - if no evidence of DVT, recommend prescription compression socks (sent to DME at last visit)  - Cardiology Lab US Lower Extremity Veins Left; Future

## 2018-06-15 NOTE — PATIENT INSTRUCTIONS
Cellulitis  Cellulitis is an infection of the deep layers of skin. A break in the skin, such as a cut or scratch, can let bacteria under the skin. If the bacteria get to deep layers of the skin, it can be serious. If not treated, cellulitis can get into the bloodstream and lymph nodes. The infection can then spread throughout the body. This causes serious illness.  Cellulitis causes the affected skin to become red, swollen, warm, and sore. The reddened areas have a visible border. An open sore may leak fluid (pus). You may have a fever, chills, and pain.  Cellulitis is treated with antibiotics taken for 7 to 10 days. An open sore may be cleaned and covered with cool wet gauze. Symptoms should get better 1 to 2 days after treatment is started. Make sure to take all the antibiotics for the full number of days until they are gone. Keep taking the medicine even if your symptoms go away.  Home care  Follow these tips:  · Limit the use of the part of your body with cellulitis.   · If the infection is on your leg, keep your leg raised while sitting. This will help to reduce swelling.  · Take all of the antibiotic medicine exactly as directed until it is gone. Do not miss any doses, especially during the first 7 days. Dont stop taking the medicine when your symptoms get better.  · Keep the affected area clean and dry.  · Wash your hands with soap and warm water before and after touching your skin. Anyone else who touches your skin should also wash his or her hands. Don't share towels.  Follow-up care  Follow up with your healthcare provider, or as advised. If your infection does not go away on the first antibiotic, your healthcare provider will prescribe a different one.  When to seek medical advice  Call your healthcare provider right away if any of these occur:  · Red areas that spread  · Swelling or pain that gets worse  · Fluid leaking from the skin (pus)  · Fever higher of 100.4º F (38.0º C) or higher after 2 days  on antibiotics  Date Last Reviewed: 9/1/2016  © 4483-4877 [employer]. [address] [place] [address], [place] [address]. All rights reserved. This information is not intended as a substitute for professional medical care. Always follow your healthcare professional's instructions.

## 2018-07-28 RX ORDER — METFORMIN HYDROCHLORIDE 750 MG/1
750 TABLET, EXTENDED RELEASE ORAL
Qty: 30 TABLET | Refills: 3 | Status: SHIPPED | OUTPATIENT
Start: 2018-07-28 | End: 2018-08-18 | Stop reason: SDUPTHER

## 2018-08-03 RX ORDER — METFORMIN HYDROCHLORIDE 750 MG/1
750 TABLET, EXTENDED RELEASE ORAL
Qty: 30 TABLET | Refills: 0 | OUTPATIENT
Start: 2018-08-03 | End: 2019-08-03

## 2018-08-13 DIAGNOSIS — E55.9 VITAMIN D INSUFFICIENCY: ICD-10-CM

## 2018-08-13 RX ORDER — ERGOCALCIFEROL 1.25 MG/1
CAPSULE ORAL
Qty: 12 CAPSULE | Refills: 0 | OUTPATIENT
Start: 2018-08-13

## 2018-08-18 DIAGNOSIS — E55.9 VITAMIN D INSUFFICIENCY: ICD-10-CM

## 2018-08-20 RX ORDER — ERGOCALCIFEROL 1.25 MG/1
CAPSULE ORAL
Qty: 12 CAPSULE | Refills: 0 | OUTPATIENT
Start: 2018-08-20

## 2018-08-20 RX ORDER — METFORMIN HYDROCHLORIDE 750 MG/1
750 TABLET, EXTENDED RELEASE ORAL
Qty: 30 TABLET | Refills: 4 | Status: SHIPPED | OUTPATIENT
Start: 2018-08-20 | End: 2019-08-19 | Stop reason: CLARIF

## 2018-08-27 DIAGNOSIS — E11.9 CONTROLLED TYPE 2 DIABETES MELLITUS WITHOUT COMPLICATION, WITHOUT LONG-TERM CURRENT USE OF INSULIN: ICD-10-CM

## 2018-08-27 DIAGNOSIS — E66.01 MORBID OBESITY WITH BMI OF 60.0-69.9, ADULT: ICD-10-CM

## 2018-08-27 DIAGNOSIS — R06.02 SOB (SHORTNESS OF BREATH): ICD-10-CM

## 2018-08-27 DIAGNOSIS — R60.0 BILATERAL EDEMA OF LOWER EXTREMITY: ICD-10-CM

## 2018-08-27 RX ORDER — FUROSEMIDE 20 MG/1
20 TABLET ORAL DAILY
Qty: 30 TABLET | Refills: 11 | Status: SHIPPED | OUTPATIENT
Start: 2018-08-27 | End: 2019-09-24 | Stop reason: SDUPTHER

## 2018-09-03 DIAGNOSIS — E55.9 VITAMIN D INSUFFICIENCY: ICD-10-CM

## 2018-09-04 RX ORDER — ERGOCALCIFEROL 1.25 MG/1
CAPSULE ORAL
Qty: 12 CAPSULE | Refills: 0 | Status: SHIPPED | OUTPATIENT
Start: 2018-09-04 | End: 2019-01-29 | Stop reason: SDUPTHER

## 2018-09-04 RX ORDER — BUPROPION HYDROCHLORIDE 150 MG/1
150 TABLET, EXTENDED RELEASE ORAL 2 TIMES DAILY
Qty: 60 TABLET | Refills: 0 | Status: SHIPPED | OUTPATIENT
Start: 2018-09-04 | End: 2019-01-11 | Stop reason: SDUPTHER

## 2018-09-11 RX ORDER — BUPROPION HYDROCHLORIDE 150 MG/1
150 TABLET, EXTENDED RELEASE ORAL 2 TIMES DAILY
Qty: 60 TABLET | Refills: 6 | Status: SHIPPED | OUTPATIENT
Start: 2018-09-11 | End: 2019-10-11 | Stop reason: SDUPTHER

## 2018-10-10 ENCOUNTER — TELEPHONE (OUTPATIENT)
Dept: OPTOMETRY | Facility: CLINIC | Age: 58
End: 2018-10-10

## 2018-10-10 NOTE — TELEPHONE ENCOUNTER
[employer] as of 10/12/18:    Member: SHAILESH WEBB  YOB: 1960  Subscriber ID: [id number]  Product Name: 10E2E  Plan Code: 102  Please Note: Member must be eligible at date of service to receive benefit.  In Network Coverage Frequency  Category Benefit Eligibility Frequency  Exam Available 1 every 24 month(s)   Dilated Fundus Exam: Patient History Currently Unavailable  In Network Coverage  [facility] [facility] Patient Responsibility  (includes applicable copay)  Professional Services  Exam $20.00

## 2018-12-03 RX ORDER — LOSARTAN POTASSIUM 25 MG/1
25 TABLET ORAL DAILY
Qty: 30 TABLET | Refills: 5 | Status: SHIPPED | OUTPATIENT
Start: 2018-12-03 | End: 2019-02-04 | Stop reason: SDUPTHER

## 2018-12-28 ENCOUNTER — PATIENT MESSAGE (OUTPATIENT)
Dept: INTERNAL MEDICINE | Facility: CLINIC | Age: 58
End: 2018-12-28

## 2019-01-06 ENCOUNTER — PATIENT MESSAGE (OUTPATIENT)
Dept: INTERNAL MEDICINE | Facility: CLINIC | Age: 59
End: 2019-01-06

## 2019-01-09 NOTE — PROGRESS NOTES
Subjective:       Patient ID: Tawny Valerio is a 58 y.o. female.    Chief Complaint: Diabetes; Chills; and Chest Congestion (green mucus)    Patient is a 58 y.o.female with morbid obesity and diabetes type 2  who presents today for follow up    Cough: ongoing for two weeks; started with sore throat, postnasal drip and now has chills, green sputum from cough and nose.    Labs: due now  Vaccines: Influenza (yearly); Tetanus (done)   Eye exam: april  Mammogram: declines  Gyn exam: dr cheng  Colonoscopy: declines  Diet: atkins     Review of Systems   Constitutional: Negative for appetite change, chills, diaphoresis, fatigue and fever.   HENT: Positive for congestion. Negative for dental problem, ear discharge, ear pain, hearing loss, postnasal drip, sinus pressure and sore throat.    Eyes: Negative for discharge, redness and itching.   Respiratory: Positive for cough. Negative for chest tightness, shortness of breath and wheezing.    Cardiovascular: Negative for chest pain, palpitations and leg swelling.   Gastrointestinal: Negative for abdominal pain, constipation, diarrhea, nausea and vomiting.   Endocrine: Negative for cold intolerance and heat intolerance.   Genitourinary: Negative for difficulty urinating, frequency, hematuria and urgency.   Musculoskeletal: Negative for arthralgias, back pain, gait problem, myalgias and neck pain.   Skin: Negative for color change and rash.   Neurological: Negative for dizziness, syncope and headaches.   Hematological: Negative for adenopathy.   Psychiatric/Behavioral: Negative for behavioral problems and sleep disturbance. The patient is not nervous/anxious.        Objective:      Physical Exam   Constitutional: She is oriented to person, place, and time. She appears well-developed and well-nourished. No distress.   HENT:   Head: Normocephalic and atraumatic.   Right Ear: External ear normal. There is swelling.   Left Ear: External ear normal. There is swelling.   Nose:  Mucosal edema and rhinorrhea present.   Mouth/Throat: Uvula is midline, oropharynx is clear and moist and mucous membranes are normal. No oropharyngeal exudate, posterior oropharyngeal edema, posterior oropharyngeal erythema or tonsillar abscesses.   Eyes: Conjunctivae and EOM are normal. Pupils are equal, round, and reactive to light. Right eye exhibits no discharge. Left eye exhibits no discharge. No scleral icterus.   Neck: Normal range of motion. Neck supple. No JVD present. No thyromegaly present.   Cardiovascular: Normal rate, regular rhythm, normal heart sounds and intact distal pulses. Exam reveals no gallop and no friction rub.   No murmur heard.  Pulmonary/Chest: Effort normal and breath sounds normal. No stridor. No respiratory distress. She has no wheezes. She has no rales. She exhibits no tenderness.   Abdominal: Soft. Bowel sounds are normal. She exhibits no distension. There is no tenderness. There is no rebound.   Musculoskeletal: Normal range of motion. She exhibits no edema or tenderness.   Lymphadenopathy:     She has no cervical adenopathy.   Neurological: She is alert and oriented to person, place, and time. No cranial nerve deficit.   Skin: Skin is warm and dry. No rash noted. She is not diaphoretic. No erythema.   Psychiatric: She has a normal mood and affect. Her behavior is normal.   Nursing note and vitals reviewed.      Assessment and Plan:       1. Controlled type 2 diabetes mellitus without complication, without long-term current use of insulin  - monitoring; continue meds  - CBC auto differential; Future  - Hemoglobin A1c; Future  - Lipid panel; Future  - Comprehensive metabolic panel; Future  - TSH; Future  - Urinalysis; Future  - Vitamin D; Future  - Microalbumin/creatinine urine ratio; Future    2. Essential hypertension    - CBC auto differential; Future  - Hemoglobin A1c; Future  - Lipid panel; Future  - Comprehensive metabolic panel; Future  - TSH; Future  - Urinalysis; Future  -  Vitamin D; Future  - Microalbumin/creatinine urine ratio; Future    3. Vitamin D insufficiency    - CBC auto differential; Future  - Hemoglobin A1c; Future  - Lipid panel; Future  - Comprehensive metabolic panel; Future  - TSH; Future  - Urinalysis; Future  - Vitamin D; Future  - Microalbumin/creatinine urine ratio; Future    4. Menopausal state    - Follicle stimulating hormone; Future  - ESTRADIOL; Future  - Luteinizing hormone; Future    5. Morbid obesity with BMI of 60.0-69.9, adult  - monitoring; Patient educated on importance of diet and exercise.  Recommended 30-45 minutes of exercise five days a week.  In addition, counseled patient on importance of low fat diet.  Limit carbohydrate intake.  Increase protein intake and vegetables.     6. Cough  - trial of augmentin  - kenalog 40 mg IM x 1  - tessalon perles prn          No Follow-up on file.

## 2019-01-11 ENCOUNTER — OFFICE VISIT (OUTPATIENT)
Dept: INTERNAL MEDICINE | Facility: CLINIC | Age: 59
End: 2019-01-11
Payer: COMMERCIAL

## 2019-01-11 VITALS
HEIGHT: 61 IN | DIASTOLIC BLOOD PRESSURE: 70 MMHG | SYSTOLIC BLOOD PRESSURE: 116 MMHG | TEMPERATURE: 99 F | BODY MASS INDEX: 55.32 KG/M2 | WEIGHT: 293 LBS | RESPIRATION RATE: 16 BRPM

## 2019-01-11 DIAGNOSIS — E11.9 CONTROLLED TYPE 2 DIABETES MELLITUS WITHOUT COMPLICATION, WITHOUT LONG-TERM CURRENT USE OF INSULIN: Primary | ICD-10-CM

## 2019-01-11 DIAGNOSIS — N95.1 MENOPAUSAL STATE: ICD-10-CM

## 2019-01-11 DIAGNOSIS — R05.9 COUGH: ICD-10-CM

## 2019-01-11 DIAGNOSIS — E55.9 VITAMIN D INSUFFICIENCY: ICD-10-CM

## 2019-01-11 DIAGNOSIS — I10 ESSENTIAL HYPERTENSION: ICD-10-CM

## 2019-01-11 DIAGNOSIS — E66.01 MORBID OBESITY WITH BMI OF 60.0-69.9, ADULT: ICD-10-CM

## 2019-01-11 PROCEDURE — 3078F DIAST BP <80 MM HG: CPT | Mod: CPTII,S$GLB,, | Performed by: INTERNAL MEDICINE

## 2019-01-11 PROCEDURE — 96372 PR INJECTION,THERAP/PROPH/DIAG2ST, IM OR SUBCUT: ICD-10-PCS | Mod: S$GLB,,, | Performed by: INTERNAL MEDICINE

## 2019-01-11 PROCEDURE — 99214 OFFICE O/P EST MOD 30 MIN: CPT | Mod: 25,S$GLB,, | Performed by: INTERNAL MEDICINE

## 2019-01-11 PROCEDURE — 3078F PR MOST RECENT DIASTOLIC BLOOD PRESSURE < 80 MM HG: ICD-10-PCS | Mod: CPTII,S$GLB,, | Performed by: INTERNAL MEDICINE

## 2019-01-11 PROCEDURE — 3044F HG A1C LEVEL LT 7.0%: CPT | Mod: CPTII,S$GLB,, | Performed by: INTERNAL MEDICINE

## 2019-01-11 PROCEDURE — 96372 THER/PROPH/DIAG INJ SC/IM: CPT | Mod: S$GLB,,, | Performed by: INTERNAL MEDICINE

## 2019-01-11 PROCEDURE — 3074F SYST BP LT 130 MM HG: CPT | Mod: CPTII,S$GLB,, | Performed by: INTERNAL MEDICINE

## 2019-01-11 PROCEDURE — 99999 PR PBB SHADOW E&M-EST. PATIENT-LVL III: ICD-10-PCS | Mod: PBBFAC,,, | Performed by: INTERNAL MEDICINE

## 2019-01-11 PROCEDURE — 3008F BODY MASS INDEX DOCD: CPT | Mod: CPTII,S$GLB,, | Performed by: INTERNAL MEDICINE

## 2019-01-11 PROCEDURE — 3044F PR MOST RECENT HEMOGLOBIN A1C LEVEL <7.0%: ICD-10-PCS | Mod: CPTII,S$GLB,, | Performed by: INTERNAL MEDICINE

## 2019-01-11 PROCEDURE — 3008F PR BODY MASS INDEX (BMI) DOCUMENTED: ICD-10-PCS | Mod: CPTII,S$GLB,, | Performed by: INTERNAL MEDICINE

## 2019-01-11 PROCEDURE — 99999 PR PBB SHADOW E&M-EST. PATIENT-LVL III: CPT | Mod: PBBFAC,,, | Performed by: INTERNAL MEDICINE

## 2019-01-11 PROCEDURE — 3074F PR MOST RECENT SYSTOLIC BLOOD PRESSURE < 130 MM HG: ICD-10-PCS | Mod: CPTII,S$GLB,, | Performed by: INTERNAL MEDICINE

## 2019-01-11 PROCEDURE — 99214 PR OFFICE/OUTPT VISIT, EST, LEVL IV, 30-39 MIN: ICD-10-PCS | Mod: 25,S$GLB,, | Performed by: INTERNAL MEDICINE

## 2019-01-11 RX ORDER — AZELASTINE 1 MG/ML
1 SPRAY, METERED NASAL 2 TIMES DAILY
Qty: 30 ML | Refills: 0 | Status: SHIPPED | OUTPATIENT
Start: 2019-01-11 | End: 2019-02-21 | Stop reason: SDUPTHER

## 2019-01-11 RX ORDER — AMOXICILLIN AND CLAVULANATE POTASSIUM 875; 125 MG/1; MG/1
1 TABLET, FILM COATED ORAL 2 TIMES DAILY
Qty: 14 TABLET | Refills: 0 | Status: SHIPPED | OUTPATIENT
Start: 2019-01-11 | End: 2019-01-18

## 2019-01-11 RX ORDER — TRIAMCINOLONE ACETONIDE 40 MG/ML
40 INJECTION, SUSPENSION INTRA-ARTICULAR; INTRAMUSCULAR
Status: COMPLETED | OUTPATIENT
Start: 2019-01-11 | End: 2019-01-11

## 2019-01-11 RX ORDER — BENZONATATE 200 MG/1
200 CAPSULE ORAL 2 TIMES DAILY PRN
Qty: 30 CAPSULE | Refills: 0 | Status: SHIPPED | OUTPATIENT
Start: 2019-01-11 | End: 2019-02-14 | Stop reason: SDUPTHER

## 2019-01-11 RX ADMIN — TRIAMCINOLONE ACETONIDE 40 MG: 40 INJECTION, SUSPENSION INTRA-ARTICULAR; INTRAMUSCULAR at 10:01

## 2019-01-21 DIAGNOSIS — Z12.11 COLON CANCER SCREENING: ICD-10-CM

## 2019-01-22 ENCOUNTER — LAB VISIT (OUTPATIENT)
Dept: LAB | Facility: HOSPITAL | Age: 59
End: 2019-01-22
Attending: INTERNAL MEDICINE
Payer: COMMERCIAL

## 2019-01-22 DIAGNOSIS — E55.9 VITAMIN D INSUFFICIENCY: ICD-10-CM

## 2019-01-22 DIAGNOSIS — E11.9 CONTROLLED TYPE 2 DIABETES MELLITUS WITHOUT COMPLICATION, WITHOUT LONG-TERM CURRENT USE OF INSULIN: ICD-10-CM

## 2019-01-22 DIAGNOSIS — N95.1 MENOPAUSAL STATE: ICD-10-CM

## 2019-01-22 DIAGNOSIS — I10 ESSENTIAL HYPERTENSION: ICD-10-CM

## 2019-01-22 LAB
25(OH)D3+25(OH)D2 SERPL-MCNC: 15 NG/ML
ALBUMIN SERPL BCP-MCNC: 3.4 G/DL
ALP SERPL-CCNC: 112 U/L
ALT SERPL W/O P-5'-P-CCNC: 22 U/L
ANION GAP SERPL CALC-SCNC: 11 MMOL/L
AST SERPL-CCNC: 14 U/L
BASOPHILS # BLD AUTO: 0.05 K/UL
BASOPHILS NFR BLD: 0.5 %
BILIRUB SERPL-MCNC: 0.5 MG/DL
BUN SERPL-MCNC: 21 MG/DL
CALCIUM SERPL-MCNC: 10.1 MG/DL
CHLORIDE SERPL-SCNC: 102 MMOL/L
CHOLEST SERPL-MCNC: 187 MG/DL
CHOLEST/HDLC SERPL: 3 {RATIO}
CO2 SERPL-SCNC: 28 MMOL/L
CREAT SERPL-MCNC: 0.9 MG/DL
DIFFERENTIAL METHOD: ABNORMAL
EOSINOPHIL # BLD AUTO: 0.2 K/UL
EOSINOPHIL NFR BLD: 2.5 %
ERYTHROCYTE [DISTWIDTH] IN BLOOD BY AUTOMATED COUNT: 16.2 %
EST. GFR  (AFRICAN AMERICAN): >60 ML/MIN/1.73 M^2
EST. GFR  (NON AFRICAN AMERICAN): >60 ML/MIN/1.73 M^2
ESTIMATED AVG GLUCOSE: 140 MG/DL
ESTRADIOL SERPL-MCNC: 34 PG/ML
FSH SERPL-ACNC: 19.1 MIU/ML
GLUCOSE SERPL-MCNC: 121 MG/DL
HBA1C MFR BLD HPLC: 6.5 %
HCT VFR BLD AUTO: 42.8 %
HDLC SERPL-MCNC: 62 MG/DL
HDLC SERPL: 33.2 %
HGB BLD-MCNC: 13.2 G/DL
IMM GRANULOCYTES # BLD AUTO: 0.04 K/UL
IMM GRANULOCYTES NFR BLD AUTO: 0.4 %
LDLC SERPL CALC-MCNC: 111.4 MG/DL
LH SERPL-ACNC: 12.4 MIU/ML
LYMPHOCYTES # BLD AUTO: 2.4 K/UL
LYMPHOCYTES NFR BLD: 26.2 %
MCH RBC QN AUTO: 25.7 PG
MCHC RBC AUTO-ENTMCNC: 30.8 G/DL
MCV RBC AUTO: 83 FL
MONOCYTES # BLD AUTO: 0.7 K/UL
MONOCYTES NFR BLD: 7.5 %
NEUTROPHILS # BLD AUTO: 5.8 K/UL
NEUTROPHILS NFR BLD: 62.9 %
NONHDLC SERPL-MCNC: 125 MG/DL
NRBC BLD-RTO: 0 /100 WBC
PLATELET # BLD AUTO: 337 K/UL
PMV BLD AUTO: 10.4 FL
POTASSIUM SERPL-SCNC: 3.7 MMOL/L
PROT SERPL-MCNC: 8.4 G/DL
RBC # BLD AUTO: 5.13 M/UL
SODIUM SERPL-SCNC: 141 MMOL/L
TRIGL SERPL-MCNC: 68 MG/DL
TSH SERPL DL<=0.005 MIU/L-ACNC: 1.28 UIU/ML
WBC # BLD AUTO: 9.3 K/UL

## 2019-01-22 PROCEDURE — 82306 VITAMIN D 25 HYDROXY: CPT

## 2019-01-22 PROCEDURE — 83036 HEMOGLOBIN GLYCOSYLATED A1C: CPT

## 2019-01-22 PROCEDURE — 80061 LIPID PANEL: CPT

## 2019-01-22 PROCEDURE — 83002 ASSAY OF GONADOTROPIN (LH): CPT

## 2019-01-22 PROCEDURE — 83001 ASSAY OF GONADOTROPIN (FSH): CPT

## 2019-01-22 PROCEDURE — 82670 ASSAY OF TOTAL ESTRADIOL: CPT

## 2019-01-22 PROCEDURE — 85025 COMPLETE CBC W/AUTO DIFF WBC: CPT

## 2019-01-22 PROCEDURE — 80053 COMPREHEN METABOLIC PANEL: CPT

## 2019-01-22 PROCEDURE — 36415 COLL VENOUS BLD VENIPUNCTURE: CPT | Mod: PO

## 2019-01-22 PROCEDURE — 84443 ASSAY THYROID STIM HORMONE: CPT

## 2019-01-23 ENCOUNTER — PATIENT MESSAGE (OUTPATIENT)
Dept: INTERNAL MEDICINE | Facility: CLINIC | Age: 59
End: 2019-01-23

## 2019-01-28 ENCOUNTER — PATIENT MESSAGE (OUTPATIENT)
Dept: BARIATRICS | Facility: CLINIC | Age: 59
End: 2019-01-28

## 2019-01-29 ENCOUNTER — PATIENT MESSAGE (OUTPATIENT)
Dept: INTERNAL MEDICINE | Facility: CLINIC | Age: 59
End: 2019-01-29

## 2019-01-29 DIAGNOSIS — E55.9 VITAMIN D INSUFFICIENCY: ICD-10-CM

## 2019-01-29 RX ORDER — ERGOCALCIFEROL 1.25 MG/1
50000 CAPSULE ORAL
Qty: 12 CAPSULE | Refills: 0 | Status: SHIPPED | OUTPATIENT
Start: 2019-01-29 | End: 2019-02-18

## 2019-01-31 LAB — HPV OTHER HR TYPES: NEGATIVE

## 2019-02-02 ENCOUNTER — TELEPHONE (OUTPATIENT)
Dept: INTERNAL MEDICINE | Facility: CLINIC | Age: 59
End: 2019-02-02

## 2019-02-04 RX ORDER — LOSARTAN POTASSIUM 25 MG/1
25 TABLET ORAL DAILY
Qty: 30 TABLET | Refills: 0 | Status: SHIPPED | OUTPATIENT
Start: 2019-02-04 | End: 2019-03-15 | Stop reason: SDUPTHER

## 2019-02-04 NOTE — TELEPHONE ENCOUNTER
----- Message from Melissa Boston sent at 2/4/2019  3:56 PM CST -----  Contact: Patient [phone]  Type: Returning a call    Who left a message?Rosanna Sanchez LPN     When did the practice call?Today    Comments:Please call back      Thanks

## 2019-02-14 RX ORDER — BENZONATATE 200 MG/1
CAPSULE ORAL
Qty: 30 CAPSULE | Refills: 0 | Status: SHIPPED | OUTPATIENT
Start: 2019-02-14 | End: 2019-07-05 | Stop reason: CLARIF

## 2019-02-18 ENCOUNTER — OFFICE VISIT (OUTPATIENT)
Dept: INTERNAL MEDICINE | Facility: CLINIC | Age: 59
End: 2019-02-18
Payer: COMMERCIAL

## 2019-02-18 VITALS
BODY MASS INDEX: 55.32 KG/M2 | RESPIRATION RATE: 16 BRPM | HEART RATE: 65 BPM | TEMPERATURE: 98 F | SYSTOLIC BLOOD PRESSURE: 118 MMHG | WEIGHT: 293 LBS | DIASTOLIC BLOOD PRESSURE: 68 MMHG | HEIGHT: 61 IN

## 2019-02-18 DIAGNOSIS — H66.006 RECURRENT ACUTE SUPPURATIVE OTITIS MEDIA WITHOUT SPONTANEOUS RUPTURE OF TYMPANIC MEMBRANE OF BOTH SIDES: ICD-10-CM

## 2019-02-18 DIAGNOSIS — Z01.818 PREOP EXAMINATION: Primary | ICD-10-CM

## 2019-02-18 PROCEDURE — 3078F PR MOST RECENT DIASTOLIC BLOOD PRESSURE < 80 MM HG: ICD-10-PCS | Mod: CPTII,S$GLB,, | Performed by: INTERNAL MEDICINE

## 2019-02-18 PROCEDURE — 99214 PR OFFICE/OUTPT VISIT, EST, LEVL IV, 30-39 MIN: ICD-10-PCS | Mod: S$GLB,,, | Performed by: INTERNAL MEDICINE

## 2019-02-18 PROCEDURE — 3008F BODY MASS INDEX DOCD: CPT | Mod: CPTII,S$GLB,, | Performed by: INTERNAL MEDICINE

## 2019-02-18 PROCEDURE — 99999 PR PBB SHADOW E&M-EST. PATIENT-LVL III: CPT | Mod: PBBFAC,,, | Performed by: INTERNAL MEDICINE

## 2019-02-18 PROCEDURE — 93000 ELECTROCARDIOGRAM COMPLETE: CPT | Mod: S$GLB,,, | Performed by: INTERNAL MEDICINE

## 2019-02-18 PROCEDURE — 99214 OFFICE O/P EST MOD 30 MIN: CPT | Mod: S$GLB,,, | Performed by: INTERNAL MEDICINE

## 2019-02-18 PROCEDURE — 93000 EKG 12-LEAD: ICD-10-PCS | Mod: S$GLB,,, | Performed by: INTERNAL MEDICINE

## 2019-02-18 PROCEDURE — 3074F PR MOST RECENT SYSTOLIC BLOOD PRESSURE < 130 MM HG: ICD-10-PCS | Mod: CPTII,S$GLB,, | Performed by: INTERNAL MEDICINE

## 2019-02-18 PROCEDURE — 99999 PR PBB SHADOW E&M-EST. PATIENT-LVL III: ICD-10-PCS | Mod: PBBFAC,,, | Performed by: INTERNAL MEDICINE

## 2019-02-18 PROCEDURE — 3078F DIAST BP <80 MM HG: CPT | Mod: CPTII,S$GLB,, | Performed by: INTERNAL MEDICINE

## 2019-02-18 PROCEDURE — 3074F SYST BP LT 130 MM HG: CPT | Mod: CPTII,S$GLB,, | Performed by: INTERNAL MEDICINE

## 2019-02-18 PROCEDURE — 3008F PR BODY MASS INDEX (BMI) DOCUMENTED: ICD-10-PCS | Mod: CPTII,S$GLB,, | Performed by: INTERNAL MEDICINE

## 2019-02-18 RX ORDER — AZITHROMYCIN 250 MG/1
TABLET, FILM COATED ORAL
Qty: 6 TABLET | Refills: 0 | Status: SHIPPED | OUTPATIENT
Start: 2019-02-18 | End: 2019-02-23

## 2019-02-18 NOTE — PATIENT INSTRUCTIONS
Take an antihistamine daily (allegra/ zyrtec/ xyzal/ or claritin).   Use a nasal steroid spray such as Flonase (fluticasone) or Nasacort (triamcinolone) daily. This will help with congestion, sinus pressure, and ear pressure/fluid.  Generic medications are okay.  Continue this regimen for at least 2 weeks. Expect symptoms to last 7-10 days.   Nasal saline rinse or spray (Simply Saline/ Ocean spray/ Nasal Mist) will help with congestion. Only use sterile water if using a neti-pot. If using the spray, lean head back and spray each nostril for 2-3 seconds - you should taste salt water in back of throat.   You can take a decongestant such as sudafed to help with sinus pressure and congestion. Avoid decongestant medications if you have heart disease, abnormal heart rhythm, or uncontrolled high blood pressure. If you have well controlled high blood pressure, make sure to monitor your blood pressure at home while taking this medication.  Dextromethorphan (DM, delsym) will help to suppress coughing. Do night drive if drowsy.  You can add guaifenesin (mucinex) to help breath up thick sputum- make sure to drink plenty of water in order to help thin the mucus.   Warm salt water gargles to alleviate sore throat or lozenges or chloraseptic spray.  NSAIDs (aleve, advil) and tylenol can help with pain, aches and fever.  Make sure to stay well hydrated.   To avoid spreading symptoms: wash hands or use hand  frequently. Cover face when coughing. Do not share utensils, etc.

## 2019-02-18 NOTE — PROGRESS NOTES
Subjective:       Patient ID: Tawny Valerio is a 58 y.o. female.    Chief Complaint: Pre-op Exam; Cough; and Nasal Congestion    HPI    58 y.o. female here for pre-op evaluation of D&C by Dr. Jennifer Rosales with [facility] planned for 3/1/19.       Surgical Risk Assessment     Active cardiac issues:  Active decompensated heart failure? No   Unstable angina?  No   Significant uncontrolled arrhythmias? No   Severe valvular heart disease-Aortic or Mitral Stenosis? No   Recent MI or coronary revascularization < 30 days? No     Clinical risk factors predicting perioperative major adverse cardiac events per RCRI  High risk surgery (suprainguinal vascular, intraperitoneal, or intrathoracic surgery)? No   History of CAD/ischemic heart disease? No   History of cerebrovascular disease (CVA or TIA)? No   History of compensated heart failure? No   Type 2 diabetes requiring insulin? No   Serum Creatinine > 2? No   Total cardiac risk factors 0     0 predictors = 0.4%, 1 predictor = 0.9%, 2 predictors = 6.6%, ?3 predictors = >11%    According to the revised cardiac risk index, the risk of mickey-procedural major cardiac complications (cardiac death, nonfatal MI, nonfatal cardiac arrest, postoperative cardiogenic pulmonary edema, complete heart block) is: 0.4%     If patient has a low risk of MACE (<1%), proceed to surgery. If the patient is at elevated risk of MACE, then determine functional capacity (pt reported activity or DASI model).     If the patient has moderate, good, or excellent functional capacity (?4 METs), then proceed to surgery without further evaluation. If patient has poor or unknown functional capacity, will further testing impact decision making or perioperative care? If yes, then pharmacological stress testing is appropriate. In those patients with unknown functional capacity, exercise stress testing may be reasonable to perform.     Patient's functional mets: >4 METs    < 4 METs -unable to walk > 2 blocks on level  "ground without stopping due to symptoms  - eating, dressing, toileting, walking indoors, light housework. POOR   > 4 METs -climbing > 1 flight of stairs without stopping  -walking up hill > 1-2 blocks  -scrubbing floors  -moving furniture  - golf, bowling, dancing or tennis  -running short distance MODERATE to EXCELLENT     OR   [email or web address]      Review of Systems   Constitutional: Negative for activity change, fever (now resolved) and unexpected weight change.   HENT: Positive for congestion, ear discharge, postnasal drip and rhinorrhea. Negative for hearing loss and trouble swallowing.    Eyes: Negative for discharge and visual disturbance.   Respiratory: Negative for chest tightness and wheezing.    Cardiovascular: Negative for chest pain and palpitations.   Gastrointestinal: Negative for blood in stool, constipation, diarrhea and vomiting.   Endocrine: Negative for polydipsia and polyuria.   Genitourinary: Positive for hematuria and menstrual problem. Negative for difficulty urinating and dysuria.   Musculoskeletal: Negative for arthralgias, joint swelling and neck pain.   Neurological: Negative for weakness and headaches.   Psychiatric/Behavioral: Negative for confusion and dysphoric mood.       Objective:        Vitals:    02/18/19 0954   BP: 118/68   BP Location: Right arm   Patient Position: Sitting   BP Method: X-Large (Manual)   Pulse: 65   Resp: 16   Temp: 97.5 °F (36.4 °C)   TempSrc: Oral   Weight: 134.6 kg (296 lb 11.8 oz)   Height: 5' 1" (1.549 m)       Body mass index is 56.07 kg/m².    Physical Exam   Constitutional: She is oriented to person, place, and time. She appears well-developed and well-nourished. No distress.   HENT:   Head: Normocephalic and atraumatic.   Right Ear: A middle ear effusion is present.   Left Ear: A middle ear effusion is present.   Nose: Nose normal.   Mouth/Throat: Posterior oropharyngeal erythema present.   Eyes: Conjunctivae and " EOM are normal. Right eye exhibits no discharge. Left eye exhibits no discharge.   Neck: Normal range of motion. Neck supple.   Cardiovascular: Normal rate, regular rhythm, normal heart sounds and intact distal pulses.   Pulmonary/Chest: Effort normal and breath sounds normal.   Abdominal: Soft. Bowel sounds are normal.   Neurological: She is alert and oriented to person, place, and time.   Skin: Skin is warm and dry. She is not diaphoretic. No erythema.   Psychiatric: She has a normal mood and affect. Her behavior is normal. Thought content normal.       Assessment:     1. Preop examination    2. Recurrent acute suppurative otitis media without spontaneous rupture of tympanic membrane of both sides           Plan:         1. Preop examination  - labs recently utd w/ PCP  - No contraindications to anesthesia or surgery at this time. Proceed to planned surgery.  - strongly encouraged CPAP compliance mickey-operatively  - EKG 12-lead; Future    2. Recurrent acute suppurative otitis media without spontaneous rupture of tympanic membrane of both sides  - continue astelin  - start flonase  - azithromycin (Z-PHI) 250 MG tablet; Take 2 tablets by mouth on day 1; Take 1 tablet by mouth on days 2-5  Dispense: 6 tablet; Refill: 0           Patient note was created using MModal Dictation.  Any errors in syntax or even information may not have been identified and edited on initial review prior to signing this note.

## 2019-02-21 RX ORDER — AZELASTINE 1 MG/ML
SPRAY, METERED NASAL
Qty: 30 ML | Refills: 0 | Status: SHIPPED | OUTPATIENT
Start: 2019-02-21 | End: 2020-11-13 | Stop reason: SDUPTHER

## 2019-03-07 ENCOUNTER — TELEPHONE (OUTPATIENT)
Dept: INTERNAL MEDICINE | Facility: CLINIC | Age: 59
End: 2019-03-07

## 2019-03-07 ENCOUNTER — PATIENT MESSAGE (OUTPATIENT)
Dept: INTERNAL MEDICINE | Facility: CLINIC | Age: 59
End: 2019-03-07

## 2019-03-07 DIAGNOSIS — N85.9 ENDOMETRIAL DISORDER: Primary | ICD-10-CM

## 2019-03-08 ENCOUNTER — TELEPHONE (OUTPATIENT)
Dept: GYNECOLOGIC ONCOLOGY | Facility: CLINIC | Age: 59
End: 2019-03-08

## 2019-03-08 ENCOUNTER — TELEPHONE (OUTPATIENT)
Dept: INTERNAL MEDICINE | Facility: CLINIC | Age: 59
End: 2019-03-08

## 2019-03-08 ENCOUNTER — PATIENT MESSAGE (OUTPATIENT)
Dept: INTERNAL MEDICINE | Facility: CLINIC | Age: 59
End: 2019-03-08

## 2019-03-08 DIAGNOSIS — N85.9 ENDOMETRIAL DISORDER: Primary | ICD-10-CM

## 2019-03-08 NOTE — TELEPHONE ENCOUNTER
Gyn referral entered; please schedule      Per outside gyn note: pt has had multiple endometrial biopsies that are benign ; however this is recurrent and gyn states she has 30-40% risk of endometrial cancer and is recommending hysterectomy. We will upload her biopsies to the portal.

## 2019-03-08 NOTE — TELEPHONE ENCOUNTER
----- Message from Tangela Araya RN sent at 3/8/2019 11:20 AM CST -----  Referral placed for Gyn/Onc. Will fwd to that dept.    Tangela ESTRADA  ----- Message -----  From: Rosanna Roland  Sent: 3/8/2019   9:42 AM  To: [facility]      has put in a referral for patient to be scheduled with Hematology. Please call patient to schedule.  Endometrial disorder [N94.9]

## 2019-03-08 NOTE — TELEPHONE ENCOUNTER
----- Message from Tangela Araya RN sent at 3/8/2019 11:34 AM CST -----  Good morning,    We received a referral for Gyn Onc, but they have replied that she should be seen by a gynecologist and be referred to Gyn Onc once a biopsy is confirmed that shows cancer. Let me know if you need any additional assistance.    Thanks,  Tangela ESTRADA  ----- Message -----  From: Rosanna Roland  Sent: 3/8/2019   9:42 AM  To: [facility]      has put in a referral for patient to be scheduled with Hematology. Please call patient to schedule.  Endometrial disorder [N94.9]

## 2019-03-08 NOTE — TELEPHONE ENCOUNTER
Advised nurse navigator per Dr. Almeida, for regular vaginal spotting pt may see regular GYN. TA/MA

## 2019-03-11 ENCOUNTER — TELEPHONE (OUTPATIENT)
Dept: GYNECOLOGIC ONCOLOGY | Facility: CLINIC | Age: 59
End: 2019-03-11

## 2019-03-11 NOTE — TELEPHONE ENCOUNTER
Called pt in reference to fax being received. Pt states that her doctor faxed records on Friday 03/08/19 at aprrox 4:30pm. Informed pt that Dr Almeida is in surgery and not sure if records were received and would check into it.  Pt said okay thanks.

## 2019-03-12 ENCOUNTER — DOCUMENTATION ONLY (OUTPATIENT)
Dept: GYNECOLOGIC ONCOLOGY | Facility: CLINIC | Age: 59
End: 2019-03-12

## 2019-03-12 ENCOUNTER — TELEPHONE (OUTPATIENT)
Dept: GYNECOLOGIC ONCOLOGY | Facility: CLINIC | Age: 59
End: 2019-03-12

## 2019-03-12 NOTE — PROGRESS NOTES
58yr old referred from Dr. Rosales for recurrent endometrial polyps with concern for underlying cancer.     7/2016, hysteroscopy/polypectomy w D&C with focal patterns of atypical glandular hyperplasia. She was lost to follow up.    3/1/2019 repeat hysteroscopy/D&C with numerous polyps but pathology was simple cystic hyperplasia.     Last pap smear, 1/31/2019, [facility]

## 2019-03-12 NOTE — TELEPHONE ENCOUNTER
----- Message from Kumar Johnson sent at 3/12/2019  9:23 AM CDT -----  Call back to confirm appt.  On the 15th

## 2019-03-14 ENCOUNTER — TELEPHONE (OUTPATIENT)
Dept: GYNECOLOGIC ONCOLOGY | Facility: CLINIC | Age: 59
End: 2019-03-14

## 2019-03-14 PROBLEM — N84.0 UTERINE POLYP: Status: ACTIVE | Noted: 2019-03-14

## 2019-03-15 ENCOUNTER — INITIAL CONSULT (OUTPATIENT)
Dept: GYNECOLOGIC ONCOLOGY | Facility: CLINIC | Age: 59
End: 2019-03-15
Payer: COMMERCIAL

## 2019-03-15 VITALS
BODY MASS INDEX: 55.32 KG/M2 | HEIGHT: 61 IN | WEIGHT: 293 LBS | DIASTOLIC BLOOD PRESSURE: 58 MMHG | SYSTOLIC BLOOD PRESSURE: 127 MMHG | HEART RATE: 69 BPM

## 2019-03-15 DIAGNOSIS — N85.01 SIMPLE ENDOMETRIAL HYPERPLASIA: ICD-10-CM

## 2019-03-15 DIAGNOSIS — N84.0 UTERINE POLYP: ICD-10-CM

## 2019-03-15 PROCEDURE — 3008F BODY MASS INDEX DOCD: CPT | Mod: S$GLB,,, | Performed by: OBSTETRICS & GYNECOLOGY

## 2019-03-15 PROCEDURE — 99999 PR PBB SHADOW E&M-EST. PATIENT-LVL III: ICD-10-PCS | Mod: PBBFAC,,, | Performed by: OBSTETRICS & GYNECOLOGY

## 2019-03-15 PROCEDURE — 3074F SYST BP LT 130 MM HG: CPT | Mod: S$GLB,,, | Performed by: OBSTETRICS & GYNECOLOGY

## 2019-03-15 PROCEDURE — 3008F PR BODY MASS INDEX (BMI) DOCUMENTED: ICD-10-PCS | Mod: S$GLB,,, | Performed by: OBSTETRICS & GYNECOLOGY

## 2019-03-15 PROCEDURE — 3078F DIAST BP <80 MM HG: CPT | Mod: S$GLB,,, | Performed by: OBSTETRICS & GYNECOLOGY

## 2019-03-15 PROCEDURE — 3078F PR MOST RECENT DIASTOLIC BLOOD PRESSURE < 80 MM HG: ICD-10-PCS | Mod: S$GLB,,, | Performed by: OBSTETRICS & GYNECOLOGY

## 2019-03-15 PROCEDURE — 99204 OFFICE O/P NEW MOD 45 MIN: CPT | Mod: S$GLB,,, | Performed by: OBSTETRICS & GYNECOLOGY

## 2019-03-15 PROCEDURE — 99204 PR OFFICE/OUTPT VISIT, NEW, LEVL IV, 45-59 MIN: ICD-10-PCS | Mod: S$GLB,,, | Performed by: OBSTETRICS & GYNECOLOGY

## 2019-03-15 PROCEDURE — 3074F PR MOST RECENT SYSTOLIC BLOOD PRESSURE < 130 MM HG: ICD-10-PCS | Mod: S$GLB,,, | Performed by: OBSTETRICS & GYNECOLOGY

## 2019-03-15 PROCEDURE — 99999 PR PBB SHADOW E&M-EST. PATIENT-LVL III: CPT | Mod: PBBFAC,,, | Performed by: OBSTETRICS & GYNECOLOGY

## 2019-03-15 RX ORDER — METFORMIN HYDROCHLORIDE 500 MG/1
500 TABLET ORAL
COMMUNITY
Start: 2019-02-14 | End: 2019-03-15

## 2019-03-15 RX ORDER — LOSARTAN POTASSIUM 25 MG/1
25 TABLET ORAL NIGHTLY
COMMUNITY
Start: 2019-02-14 | End: 2020-02-27

## 2019-03-15 RX ORDER — BENZONATATE 200 MG/1
CAPSULE ORAL
COMMUNITY
Start: 2019-01-11 | End: 2019-03-15 | Stop reason: SDUPTHER

## 2019-03-15 RX ORDER — MELOXICAM 15 MG/1
15 TABLET ORAL DAILY PRN
COMMUNITY
Start: 2019-02-14 | End: 2020-02-14

## 2019-03-15 RX ORDER — AZELASTINE 1 MG/ML
1 SPRAY, METERED NASAL
COMMUNITY
Start: 2019-02-14 | End: 2019-03-15 | Stop reason: SDUPTHER

## 2019-03-15 RX ORDER — FUROSEMIDE 20 MG/1
20 TABLET ORAL
COMMUNITY
Start: 2018-08-27 | End: 2019-03-15 | Stop reason: SDUPTHER

## 2019-03-15 RX ORDER — HYDROCHLOROTHIAZIDE 12.5 MG/1
TABLET ORAL
COMMUNITY
Start: 2019-02-14 | End: 2019-03-15

## 2019-03-15 RX ORDER — BUPROPION HYDROCHLORIDE 150 MG/1
150 TABLET, EXTENDED RELEASE ORAL
COMMUNITY
Start: 2019-02-14 | End: 2019-03-15 | Stop reason: SDUPTHER

## 2019-03-15 NOTE — LETTER
March 15, 2019      Zahra Kelley, DO  [address] [facility] [place] [address]           [facility] - GYN Oncology  [address]  [place] [address]  Phone: [phone]          Patient: Tawny Valerio   MR Number: [id number]   YOB: 1960   Date of Visit: 3/15/2019       Dear Dr. Zahra Kelley:    Thank you for referring Tawny Valerio to me for evaluation. Attached you will find relevant portions of my assessment and plan of care.    If you have questions, please do not hesitate to call me. I look forward to following Tawny Valerio along with you.    Sincerely,    Aneudy Almeida MD    Enclosure  CC:  No Recipients    If you would like to receive this communication electronically, please contact [email or web address] or [phone] to request more information on [email or web address] Link access.    For providers and/or their staff who would like to refer a patient to [facility], please contact us through our one-stop-shop provider referral line, [facility] , at [phone].    If you feel you have received this communication in error or would no longer like to receive these types of communications, please e-mail [email or web address]

## 2019-04-02 DIAGNOSIS — Z12.31 ENCOUNTER FOR SCREENING MAMMOGRAM FOR MALIGNANT NEOPLASM OF BREAST: ICD-10-CM

## 2019-04-02 DIAGNOSIS — E11.8 CONTROLLED DIABETES MELLITUS TYPE 2 WITH COMPLICATIONS, UNSPECIFIED WHETHER LONG TERM INSULIN USE: Primary | ICD-10-CM

## 2019-04-04 ENCOUNTER — OFFICE VISIT (OUTPATIENT)
Dept: OBSTETRICS AND GYNECOLOGY | Facility: CLINIC | Age: 59
End: 2019-04-04
Attending: OBSTETRICS & GYNECOLOGY
Payer: COMMERCIAL

## 2019-04-04 VITALS
WEIGHT: 293 LBS | HEIGHT: 60 IN | SYSTOLIC BLOOD PRESSURE: 124 MMHG | DIASTOLIC BLOOD PRESSURE: 78 MMHG | BODY MASS INDEX: 57.52 KG/M2

## 2019-04-04 DIAGNOSIS — N84.0 UTERINE POLYP: Primary | ICD-10-CM

## 2019-04-04 DIAGNOSIS — E66.01 MORBID OBESITY WITH BMI OF 60.0-69.9, ADULT: ICD-10-CM

## 2019-04-04 DIAGNOSIS — N85.01 SIMPLE ENDOMETRIAL HYPERPLASIA: ICD-10-CM

## 2019-04-04 PROCEDURE — 99999 PR PBB SHADOW E&M-EST. PATIENT-LVL III: ICD-10-PCS | Mod: PBBFAC,,, | Performed by: OBSTETRICS & GYNECOLOGY

## 2019-04-04 PROCEDURE — 3074F PR MOST RECENT SYSTOLIC BLOOD PRESSURE < 130 MM HG: ICD-10-PCS | Mod: CPTII,S$GLB,, | Performed by: OBSTETRICS & GYNECOLOGY

## 2019-04-04 PROCEDURE — 3074F SYST BP LT 130 MM HG: CPT | Mod: CPTII,S$GLB,, | Performed by: OBSTETRICS & GYNECOLOGY

## 2019-04-04 PROCEDURE — 99212 PR OFFICE/OUTPT VISIT, EST, LEVL II, 10-19 MIN: ICD-10-PCS | Mod: S$GLB,,, | Performed by: OBSTETRICS & GYNECOLOGY

## 2019-04-04 PROCEDURE — 99999 PR PBB SHADOW E&M-EST. PATIENT-LVL III: CPT | Mod: PBBFAC,,, | Performed by: OBSTETRICS & GYNECOLOGY

## 2019-04-04 PROCEDURE — 3078F DIAST BP <80 MM HG: CPT | Mod: CPTII,S$GLB,, | Performed by: OBSTETRICS & GYNECOLOGY

## 2019-04-04 PROCEDURE — 3078F PR MOST RECENT DIASTOLIC BLOOD PRESSURE < 80 MM HG: ICD-10-PCS | Mod: CPTII,S$GLB,, | Performed by: OBSTETRICS & GYNECOLOGY

## 2019-04-04 PROCEDURE — 99212 OFFICE O/P EST SF 10 MIN: CPT | Mod: S$GLB,,, | Performed by: OBSTETRICS & GYNECOLOGY

## 2019-04-04 PROCEDURE — 3008F PR BODY MASS INDEX (BMI) DOCUMENTED: ICD-10-PCS | Mod: CPTII,S$GLB,, | Performed by: OBSTETRICS & GYNECOLOGY

## 2019-04-04 PROCEDURE — 3008F BODY MASS INDEX DOCD: CPT | Mod: CPTII,S$GLB,, | Performed by: OBSTETRICS & GYNECOLOGY

## 2019-04-04 RX ORDER — ASPIRIN 325 MG
TABLET, DELAYED RELEASE (ENTERIC COATED) ORAL
COMMUNITY
End: 2019-09-19

## 2019-04-04 RX ORDER — ALPRAZOLAM 0.5 MG
TABLET ORAL
COMMUNITY
End: 2019-07-31 | Stop reason: ALTCHOICE

## 2019-04-04 NOTE — LETTER
April 4, 2019      Jennifer Rosales MD  [address]  [address]  [facility] [address]           [facility] [id number]  [address] [facility]  [address]  [facility] [address]  Phone: [phone]  Fax: [phone]          Patient: Tawny Valerio   MR Number: [id number]   YOB: 1960   Date of Visit: 4/4/2019       Dear Dr. Jennifer Rosales:    Thank you for referring Tawny Valerio to me for evaluation. Attached you will find relevant portions of my assessment and plan of care.    If you have questions, please do not hesitate to call me. I look forward to following Tawny Valerio along with you.    Sincerely,    Bobby Frazier Jr., MD    Enclosure  CC:  No Recipients    If you would like to receive this communication electronically, please contact [email or web address] or [phone] to request more information on [employer] Link access.    For providers and/or their staff who would like to refer a patient to [facility], please contact us through our one-stop-shop provider referral line, [facility], at [phone].    If you feel you have received this communication in error or would no longer like to receive these types of communications, please e-mail [email or web address]

## 2019-04-04 NOTE — PROGRESS NOTES
PT HERE REFERRED FROM DR BARCENAS FOR D&C H-SCOPE WITH POYPECTOMY.  HAD D&C H-SCOPE AT OUTSIDE FACILITY AND HAD 4 POLYPS WITH ONLY 2 REMOVED.  NO BLEEDING NOW. JUST CONCERNED ABOUT PATH.  WANTS TO HAVE PROCEDURE IN June.    DR BARCENAS  58yr old referred from Dr. Rosales for recurrent endometrial polyps with concern for underlying cancer.   7/2016, hysteroscopy/polypectomy w D&C with focal patterns of atypical glandular hyperplasia. She was lost to follow up.  Continued with intermittent spotting every 6 months.   Dec 2018: had heavier bleeding and saw Dr. Rosales.  3/1/2019 repeat hysteroscopy/D&C with numerous polyps but pathology was simple cystic hyperplasia.   Last pap smear, 1/31/2019, NILM    ROS:  GENERAL: No fever, chills, fatigability or weight loss.  VULVAR: No pain, no lesions and no itching.  VAGINAL: No relaxation, no itching, no discharge, no abnormal bleeding and no lesions.  ABDOMEN: No abdominal pain. Denies nausea. Denies vomiting. No diarrhea. No constipation  BREAST: Denies pain. No lumps. No discharge.  URINARY: No incontinence, no nocturia, no frequency and no dysuria.  CARDIOVASCULAR: No chest pain. No shortness of breath. No leg cramps.  NEUROLOGICAL: No headaches. No vision changes.  The remainder of the review of systems was negative.    PE:  General Appearance: obese And Well developed. Well nourished. In no acute distress.    PROCEDURES:    PLAN:     DIAGNOSIS:  1. Uterine polyp    2. Simple endometrial hyperplasia    3. Morbid obesity with BMI of 60.0-69.9, adult        MEDICATIONS & ORDERS:     20 MIN D/W PT ON TX OPTIONS AND SURGERY    FOLLOW-UP: With me D&C H-SCOPE WITH POLYP REMOVAL.      Answers for HPI/ROS submitted by the patient on 4/3/2019   Gynecologic exam  vaginal bleeding: Yes  Chronicity: recurrent  Onset: more than 1 month ago  Frequency: intermittently  Progression since onset: unchanged  Pain severity: no pain  Affected side: both  Pregnant now?: No  abdominal pain: No  anorexia:  No  back pain: No  chills: No  constipation: No  diarrhea: No  discolored urine: No  dysuria: No  fever: No  flank pain: No  frequency: No  headaches: No  hematuria: Yes  nausea: No  painful intercourse: No  rash: No  urgency: No  vomiting: No  Please select the characteristics of your discharge: : bloody, brown, clear, scant  Vaginal bleeding: spotting  Passing clots?: No  Passing tissue?: No  Aggravated by: activity  treatments tried: nothing  Sexual activity: sexually active  Partner with STD symptoms: no  Birth control: abstinence  Menstrual history: irregular  STD: No  abdominal surgery: No   section: Yes  Ectopic pregnancy: No  Endometriosis: No  herpes simplex: No  gynecological surgery: Yes  menorrhagia: No  metrorrhagia: No  miscarriage: No  ovarian cysts: No  perineal abscess: No  PID: No  terminated pregnancy: No  vaginosis: No

## 2019-04-05 ENCOUNTER — TELEPHONE (OUTPATIENT)
Dept: OBSTETRICS AND GYNECOLOGY | Facility: CLINIC | Age: 59
End: 2019-04-05

## 2019-04-05 NOTE — TELEPHONE ENCOUNTER
----- Message from Bobby Frazier Jr., MD sent at 4/4/2019  3:23 PM CDT -----  PT WANTS TO SCHEDULE D&C H-SCOPE AND POLYPECTOMY IN June. PLEASE CALL HER

## 2019-04-05 NOTE — TELEPHONE ENCOUNTER
Spoke with pt. Pt is unsure of the exact date she can schedule surgery. She needs to talk to her work first. Gave pt June, July and august surgery dates and she will call back to confirm a date. No further questions or concerns.

## 2019-04-15 ENCOUNTER — TELEPHONE (OUTPATIENT)
Dept: OBSTETRICS AND GYNECOLOGY | Facility: CLINIC | Age: 59
End: 2019-04-15

## 2019-04-15 DIAGNOSIS — N84.0 UTERINE POLYP: Primary | ICD-10-CM

## 2019-04-15 NOTE — TELEPHONE ENCOUNTER
----- Message from Gilma Valencia LPN sent at 4/5/2019  3:57 PM CDT -----  Will call back about surgery date

## 2019-05-28 ENCOUNTER — TELEPHONE (OUTPATIENT)
Dept: OBSTETRICS AND GYNECOLOGY | Facility: CLINIC | Age: 59
End: 2019-05-28

## 2019-05-28 NOTE — TELEPHONE ENCOUNTER
Spoke with pt. Advised pt that we need to schedule her pre op appts for surgery on 7/16/19. Pt stated she would need to call me back to schedule as she is not around her calendar at this moment. No further questions or concerns noted.

## 2019-05-31 ENCOUNTER — PATIENT MESSAGE (OUTPATIENT)
Dept: OBSTETRICS AND GYNECOLOGY | Facility: CLINIC | Age: 59
End: 2019-05-31

## 2019-06-15 DIAGNOSIS — I10 ESSENTIAL HYPERTENSION: ICD-10-CM

## 2019-06-15 DIAGNOSIS — R60.0 EDEMA LEG: ICD-10-CM

## 2019-06-16 RX ORDER — HYDROCHLOROTHIAZIDE 25 MG/1
TABLET ORAL
Qty: 30 TABLET | Refills: 6 | Status: SHIPPED | OUTPATIENT
Start: 2019-06-16 | End: 2021-11-15

## 2019-07-02 ENCOUNTER — PATIENT OUTREACH (OUTPATIENT)
Dept: ADMINISTRATIVE | Facility: OTHER | Age: 59
End: 2019-07-02

## 2019-07-03 DIAGNOSIS — Z01.00 DIABETIC EYE EXAM: Primary | ICD-10-CM

## 2019-07-03 DIAGNOSIS — E11.9 DIABETIC EYE EXAM: Primary | ICD-10-CM

## 2019-07-05 ENCOUNTER — ANESTHESIA EVENT (OUTPATIENT)
Dept: SURGERY | Facility: OTHER | Age: 59
End: 2019-07-05
Payer: COMMERCIAL

## 2019-07-05 ENCOUNTER — HOSPITAL ENCOUNTER (OUTPATIENT)
Dept: PREADMISSION TESTING | Facility: OTHER | Age: 59
Discharge: HOME | End: 2019-07-05
Attending: OBSTETRICS & GYNECOLOGY
Payer: COMMERCIAL

## 2019-07-05 ENCOUNTER — OFFICE VISIT (OUTPATIENT)
Dept: OBSTETRICS AND GYNECOLOGY | Facility: CLINIC | Age: 59
End: 2019-07-05
Payer: COMMERCIAL

## 2019-07-05 VITALS
BODY MASS INDEX: 57.52 KG/M2 | TEMPERATURE: 98 F | OXYGEN SATURATION: 96 % | HEIGHT: 60 IN | SYSTOLIC BLOOD PRESSURE: 142 MMHG | HEART RATE: 70 BPM | WEIGHT: 293 LBS | DIASTOLIC BLOOD PRESSURE: 67 MMHG

## 2019-07-05 VITALS
WEIGHT: 293 LBS | BODY MASS INDEX: 57.52 KG/M2 | SYSTOLIC BLOOD PRESSURE: 122 MMHG | DIASTOLIC BLOOD PRESSURE: 80 MMHG | HEIGHT: 60 IN

## 2019-07-05 DIAGNOSIS — E66.01 MORBID OBESITY WITH BMI OF 60.0-69.9, ADULT: ICD-10-CM

## 2019-07-05 DIAGNOSIS — N85.01 SIMPLE ENDOMETRIAL HYPERPLASIA: ICD-10-CM

## 2019-07-05 DIAGNOSIS — Z01.818 PREOPERATIVE TESTING: Primary | ICD-10-CM

## 2019-07-05 DIAGNOSIS — N84.0 UTERINE POLYP: Primary | ICD-10-CM

## 2019-07-05 LAB
ANION GAP SERPL CALC-SCNC: 9 MMOL/L (ref 8–16)
BASOPHILS # BLD AUTO: 0.03 K/UL (ref 0–0.2)
BASOPHILS NFR BLD: 0.3 % (ref 0–1.9)
BUN SERPL-MCNC: 14 MG/DL (ref 6–20)
CALCIUM SERPL-MCNC: 9.7 MG/DL (ref 8.7–10.5)
CHLORIDE SERPL-SCNC: 100 MMOL/L (ref 95–110)
CO2 SERPL-SCNC: 31 MMOL/L (ref 23–29)
CREAT SERPL-MCNC: 0.8 MG/DL (ref 0.5–1.4)
DIFFERENTIAL METHOD: ABNORMAL
EOSINOPHIL # BLD AUTO: 0.2 K/UL (ref 0–0.5)
EOSINOPHIL NFR BLD: 2.3 % (ref 0–8)
ERYTHROCYTE [DISTWIDTH] IN BLOOD BY AUTOMATED COUNT: 15.6 % (ref 11.5–14.5)
EST. GFR  (AFRICAN AMERICAN): >60 ML/MIN/1.73 M^2
EST. GFR  (NON AFRICAN AMERICAN): >60 ML/MIN/1.73 M^2
GLUCOSE SERPL-MCNC: 127 MG/DL (ref 70–110)
HCT VFR BLD AUTO: 40.3 % (ref 37–48.5)
HGB BLD-MCNC: 12.6 G/DL (ref 12–16)
LYMPHOCYTES # BLD AUTO: 2.7 K/UL (ref 1–4.8)
LYMPHOCYTES NFR BLD: 29.5 % (ref 18–48)
MCH RBC QN AUTO: 26.3 PG (ref 27–31)
MCHC RBC AUTO-ENTMCNC: 31.3 G/DL (ref 32–36)
MCV RBC AUTO: 84 FL (ref 82–98)
MONOCYTES # BLD AUTO: 0.6 K/UL (ref 0.3–1)
MONOCYTES NFR BLD: 6.2 % (ref 4–15)
NEUTROPHILS # BLD AUTO: 5.6 K/UL (ref 1.8–7.7)
NEUTROPHILS NFR BLD: 61.7 % (ref 38–73)
PLATELET # BLD AUTO: 323 K/UL (ref 150–350)
PMV BLD AUTO: 10.1 FL (ref 9.2–12.9)
POTASSIUM SERPL-SCNC: 3.6 MMOL/L (ref 3.5–5.1)
RBC # BLD AUTO: 4.79 M/UL (ref 4–5.4)
SODIUM SERPL-SCNC: 140 MMOL/L (ref 136–145)
WBC # BLD AUTO: 9.03 K/UL (ref 3.9–12.7)

## 2019-07-05 PROCEDURE — 99499 NO LOS: ICD-10-PCS | Mod: S$GLB,,, | Performed by: OBSTETRICS & GYNECOLOGY

## 2019-07-05 PROCEDURE — 99999 PR PBB SHADOW E&M-EST. PATIENT-LVL III: ICD-10-PCS | Mod: PBBFAC,,, | Performed by: OBSTETRICS & GYNECOLOGY

## 2019-07-05 PROCEDURE — 99999 PR PBB SHADOW E&M-EST. PATIENT-LVL III: CPT | Mod: PBBFAC,,, | Performed by: OBSTETRICS & GYNECOLOGY

## 2019-07-05 PROCEDURE — 99499 UNLISTED E&M SERVICE: CPT | Mod: S$GLB,,, | Performed by: OBSTETRICS & GYNECOLOGY

## 2019-07-05 PROCEDURE — 36415 COLL VENOUS BLD VENIPUNCTURE: CPT

## 2019-07-05 PROCEDURE — 85025 COMPLETE CBC W/AUTO DIFF WBC: CPT

## 2019-07-05 PROCEDURE — 80048 BASIC METABOLIC PNL TOTAL CA: CPT

## 2019-07-05 RX ORDER — PREGABALIN 75 MG/1
75 CAPSULE ORAL
Status: CANCELLED | OUTPATIENT
Start: 2019-07-05 | End: 2019-07-05

## 2019-07-05 RX ORDER — LIDOCAINE HYDROCHLORIDE 10 MG/ML
0.5 INJECTION, SOLUTION EPIDURAL; INFILTRATION; INTRACAUDAL; PERINEURAL ONCE
Status: CANCELLED | OUTPATIENT
Start: 2019-07-05 | End: 2019-07-05

## 2019-07-05 RX ORDER — SODIUM CHLORIDE, SODIUM LACTATE, POTASSIUM CHLORIDE, CALCIUM CHLORIDE 600; 310; 30; 20 MG/100ML; MG/100ML; MG/100ML; MG/100ML
INJECTION, SOLUTION INTRAVENOUS CONTINUOUS
Status: CANCELLED | OUTPATIENT
Start: 2019-07-05

## 2019-07-05 RX ORDER — MISOPROSTOL 200 UG/1
TABLET ORAL
Qty: 4 TABLET | Refills: 0 | Status: SHIPPED | OUTPATIENT
Start: 2019-07-05 | End: 2019-07-31 | Stop reason: ALTCHOICE

## 2019-07-05 RX ORDER — ACETAMINOPHEN 500 MG
1000 TABLET ORAL
Status: CANCELLED | OUTPATIENT
Start: 2019-07-05 | End: 2019-07-05

## 2019-07-05 NOTE — PROGRESS NOTES
-FINAL PATHOLOGIC DIAGNOSIS  1. Endometrium, curettage:  Endometrioid adenocarcinoma, FIGO grade 1, arising within fragments of endometrial polyp.  2. Polyp, curettage:  Endometrioid adenocarcinoma, FIGO grade 1, arising within fragments of endometrial polyp.  Comment: Well-differentiated adenocarcinoma is identified arising within an endometrial polyp in both parts . It is  unclear whether background non polypoid endometrium is involved, but no definitive stromal invasion is seen in the  sample material. There is no evidence of lymphovascular invasion.    PT HERE REFERRED FROM DR BARCENAS FOR D&C H-SCOPE WITH POYPECTOMY.  HAD D&C H-SCOPE AT OUTSIDE FACILITY AND HAD 4 POLYPS WITH ONLY 2 REMOVED.  NO BLEEDING NOW. JUST CONCERNED ABOUT PATH.  WANTS TO HAVE PROCEDURE IN June.     DR BARCENAS  58yr old referred from Dr. Rosales for recurrent endometrial polyps with concern for underlying cancer.   7/2016, hysteroscopy/polypectomy w D&C with focal patterns of atypical glandular hyperplasia. She was lost to follow up.  Continued with intermittent spotting every 6 months.   Dec 2018: had heavier bleeding and saw Dr. Rosales.  3/1/2019 repeat hysteroscopy/D&C with numerous polyps but pathology was simple cystic hyperplasia.   Last pap smear, 1/31/2019, NILM     ROS:  GENERAL: No fever, chills, fatigability or weight loss.  VULVAR: No pain, no lesions and no itching.  VAGINAL: No relaxation, no itching, no discharge, no abnormal bleeding and no lesions.  ABDOMEN: No abdominal pain. Denies nausea. Denies vomiting. No diarrhea. No constipation  BREAST: Denies pain. No lumps. No discharge.  URINARY: No incontinence, no nocturia, no frequency and no dysuria.  CARDIOVASCULAR: No chest pain. No shortness of breath. No leg cramps.  NEUROLOGICAL: No headaches. No vision changes.  The remainder of the review of systems was negative.      PE:  General Appearance: obese And Well developed. Well nourished. In no acute distress.  Vulva: Lesions:  No.  Urethral Meatus: Normal size. Normal location. No lesions. No prolapse.  Urethra: No masses. No tenderness. No prolapse. No scarring.  Bladder: No masses. No tenderness.  Vagina: Mucosa NI:yes discharge no, atrophy no, cystocele no or rectocele no.  Cervix: Lesion: no  Stenotic: no Cervical motion tenderness: no  Uterus: Uterus size: 7 weeks. Support good. Uterus size: Normal  Adnexa: Masses: No Tenderness: No CDS Nodularity: No  Abdomen: obese No masses. No tenderness.  CHEST: CTA B  HEART: RRR  EXT: NO EDEMA      PROCEDURES:    PLAN:     DIAGNOSIS:  1. Uterine polyp    2. Simple endometrial hyperplasia    3. Morbid obesity with BMI of 60.0-69.9, adult        MEDICATIONS & ORDERS:        20 MIN D/W PT ON RISKS D&C H-SCOPE WITH POLYP REMOVAL.  NEEDS POST OP PROGESTERONE

## 2019-07-05 NOTE — H&P (VIEW-ONLY)
PT HERE REFERRED FROM DR BARCENAS FOR D&C H-SCOPE WITH POYPECTOMY.  HAD D&C H-SCOPE AT OUTSIDE FACILITY AND HAD 4 POLYPS WITH ONLY 2 REMOVED.  NO BLEEDING NOW. JUST CONCERNED ABOUT PATH.  WANTS TO HAVE PROCEDURE IN June.     DR BARCENAS  58yr old referred from Dr. Rosales for recurrent endometrial polyps with concern for underlying cancer.   7/2016, hysteroscopy/polypectomy w D&C with focal patterns of atypical glandular hyperplasia. She was lost to follow up.  Continued with intermittent spotting every 6 months.   Dec 2018: had heavier bleeding and saw Dr. Rosales.  3/1/2019 repeat hysteroscopy/D&C with numerous polyps but pathology was simple cystic hyperplasia.   Last pap smear, 1/31/2019, NILM     ROS:  GENERAL: No fever, chills, fatigability or weight loss.  VULVAR: No pain, no lesions and no itching.  VAGINAL: No relaxation, no itching, no discharge, no abnormal bleeding and no lesions.  ABDOMEN: No abdominal pain. Denies nausea. Denies vomiting. No diarrhea. No constipation  BREAST: Denies pain. No lumps. No discharge.  URINARY: No incontinence, no nocturia, no frequency and no dysuria.  CARDIOVASCULAR: No chest pain. No shortness of breath. No leg cramps.  NEUROLOGICAL: No headaches. No vision changes.  The remainder of the review of systems was negative.      PE:  General Appearance: obese And Well developed. Well nourished. In no acute distress.  Vulva: Lesions: No.  Urethral Meatus: Normal size. Normal location. No lesions. No prolapse.  Urethra: No masses. No tenderness. No prolapse. No scarring.  Bladder: No masses. No tenderness.  Vagina: Mucosa NI:yes discharge no, atrophy no, cystocele no or rectocele no.  Cervix: Lesion: no  Stenotic: no Cervical motion tenderness: no  Uterus: Uterus size: 7 weeks. Support good. Uterus size: Normal  Adnexa: Masses: No Tenderness: No CDS Nodularity: No  Abdomen: obese No masses. No tenderness.  CHEST: CTA B  HEART: RRR  EXT: NO EDEMA      PROCEDURES:    PLAN:     DIAGNOSIS:  1.  Uterine polyp    2. Simple endometrial hyperplasia    3. Morbid obesity with BMI of 60.0-69.9, adult        MEDICATIONS & ORDERS:        20 MIN D/W PT ON RISKS D&C H-SCOPE WITH POLYP REMOVAL.  NEEDS POST OP PROGESTERONE

## 2019-07-05 NOTE — DISCHARGE INSTRUCTIONS
PRE-ADMIT TESTING -  [phone]    [address]  [place] [facility]          Your surgery has been scheduled at [facility]. We are pleased to have the opportunity to serve you. For Further Information please call [phone].    On the day of surgery please report to the Information Desk on the 1st floor.    · CONTACT YOUR PHYSICIAN'S OFFICE THE DAY PRIOR TO YOUR SURGERY TO OBTAIN YOUR ARRIVAL TIME.     · The evening before surgery do not eat anything after 9 p.m. ( this includes hard candy, chewing gum and mints).  You may only have GATORADE, POWERADE AND WATER  from 9 p.m. until you leave your home.   DO NOT DRINK ANY LIQUIDS ON THE WAY TO THE HOSPITAL.      SPECIAL MEDICATION INSTRUCTIONS: TAKE medications checked off by the Anesthesiologist on your Medication List.    Angiogram Patients: Take medications as instructed by your physician, including aspirin.     Surgery Patients:    If you take ASPIRIN - Your PHYSICIAN/SURGEON will need to inform you IF/OR when you need to stop taking aspirin prior to your surgery.     Do Not take any medications containing IBUPROFEN.  Do Not Wear any make-up or dark nail polish   (especially eye make-up) to surgery. If you come to surgery with makeup on you will be required to remove the makeup or nail polish.    Do not shave your surgical area at least 5 days prior to your surgery. The surgical prep will be performed at the hospital according to Infection Control regulations.    Leave all valuables at home.   Do Not wear any jewelry or watches, including any metal in body piercings. Jewelry must be removed prior to coming to the hospital.  There is a possibility that rings that are unable to be removed may be cut off if they are on the surgical extremity.    Contact Lens must be removed before surgery. Either do not wear the contact lens or bring a case and solution for storage.  Please bring a container for eyeglasses or dentures as required.  Bring  any paperwork your physician has provided, such as consent forms,  history and physicals, doctor's orders, etc.   Bring comfortable clothes that are loose fitting to wear upon discharge. Take into consideration the type of surgery being performed.  Maintain your diet as advised per your physician the day prior to surgery.      Adequate rest the night before surgery is advised.   Park in the Parking lot behind the hospital or in the [facility] across the street from the parking lot. Parking is complimentary.  If you will be discharged the same day as your procedure, please arrange for a responsible adult to drive you home or to accompany you if traveling by taxi.   YOU WILL NOT BE PERMITTED TO DRIVE OR TO LEAVE THE HOSPITAL ALONE AFTER SURGERY.   It is strongly recommended that you arrange for someone to remain with you for the first 24 hrs following your surgery.    The Surgeon will speak to your family/visitor after your surgery regarding the outcome of your surgery and post op care.  The Surgeon may speak to you after your surgery, but there is a possibility you may not remember the details.  Please check with your family members regarding the conversation with the Surgeon.      Thank you for your cooperation.  The Staff of [facility].                Bathing Instructions with Hibiclens     Shower the evening before and morning of your procedure with Hibiclens:   Wash your face with water and your regular face wash/soap   Apply Hibiclens directly on your skin or on a wet washcloth and wash gently. When showering: Move away from the shower stream when applying Hibiclens to avoid rinsing off too soon.   Rinse thoroughly with warm water   Do not dilute Hibiclens         Dry off as usual, do not use any deodorant, powder, body lotions, perfume, after shave or cologne.

## 2019-07-05 NOTE — ANESTHESIA PREPROCEDURE EVALUATION
07/05/2019  Tawny Valerio is a 59 y.o., female.    Anesthesia Evaluation    I have reviewed the Patient Summary Reports.    I have reviewed the Nursing Notes.   I have reviewed the Medications.     Review of Systems  Anesthesia Hx:  No problems with previous Anesthesia  History of prior surgery of interest to airway management or planning: Previous anesthesia: General March 2019 [facility] with general anesthesia.  Denies Family Hx of Anesthesia complications.   Denies Personal Hx of Anesthesia complications.   Social:  Non-Smoker    Hematology/Oncology:  Hematology Normal   Oncology Normal     EENT/Dental:EENT/Dental Normal   Cardiovascular:   Hypertension, well controlled    Pulmonary:   Sleep Apnea    Renal/:  Renal/ Normal     Musculoskeletal:   Arthritis     Neurological:  Neurology Normal    Endocrine:   Diabetes, type 2    Dermatological:  Skin Normal    Psych:  Psychiatric Normal           Physical Exam  General:  Morbid Obesity    Airway/Jaw/Neck:  Airway Findings: Mouth Opening: Normal Tongue: Normal  General Airway Assessment: Adult  Mallampati: II      Dental:  Dental Findings: Molar capsMany missing rear teeth, none loose          Mental Status:  Mental Status Findings:  Cooperative, Alert and Oriented         Anesthesia Plan  Type of Anesthesia, risks & benefits discussed:  Anesthesia Type:  general  Patient's Preference:   Intra-op Monitoring Plan: standard ASA monitors  Intra-op Monitoring Plan Comments:   Post Op Pain Control Plan: per primary service following discharge from PACU and multimodal analgesia  Post Op Pain Control Plan Comments:   Induction:   IV  Beta Blocker:         Informed Consent: Patient understands risks and agrees with Anesthesia plan.  Questions answered. Anesthesia consent signed with patient.  ASA Score: 3     Day of Surgery Review of History & Physical:     H&P update referred to the surgeon.     Anesthesia Plan Notes: Labs today    Day of surgery update: labs WNL        Ready For Surgery From Anesthesia Perspective.

## 2019-07-08 ENCOUNTER — PATIENT OUTREACH (OUTPATIENT)
Dept: ADMINISTRATIVE | Facility: HOSPITAL | Age: 59
End: 2019-07-08

## 2019-07-08 ENCOUNTER — TELEPHONE (OUTPATIENT)
Dept: INTERNAL MEDICINE | Facility: CLINIC | Age: 59
End: 2019-07-08

## 2019-07-08 NOTE — TELEPHONE ENCOUNTER
----- Message from Smiley Otero sent at 7/5/2019  4:31 PM CDT -----  Contact: Pt self Mobile/Home [phone]   Patient is returning a phone call.  Who left a message for the patient:   Does patient know what this is regarding:    Comments: Patient said she received a call on today and she would like a call back please.

## 2019-07-09 ENCOUNTER — PATIENT MESSAGE (OUTPATIENT)
Dept: ADMINISTRATIVE | Facility: HOSPITAL | Age: 59
End: 2019-07-09

## 2019-07-15 ENCOUNTER — TELEPHONE (OUTPATIENT)
Dept: OBSTETRICS AND GYNECOLOGY | Facility: CLINIC | Age: 59
End: 2019-07-15

## 2019-07-15 NOTE — TELEPHONE ENCOUNTER
----- Message from Cher Bartlett sent at 7/15/2019 10:01 AM CDT -----  Contact: pt  Name of Who is Calling: Tawny      What is the request in detail: requesting a call back for her surgery arrival time as well further instructions for tomorrow. Please call to advise      Can the clinic reply by [facility]: no    Phone number- [phone]

## 2019-07-15 NOTE — TELEPHONE ENCOUNTER
Attempted to call pt. No answer. Message left to call clinic back. Pt needs to arrive for surgery at 7am.

## 2019-07-16 ENCOUNTER — HOSPITAL ENCOUNTER (OUTPATIENT)
Facility: OTHER | Age: 59
Discharge: HOME | End: 2019-07-16
Attending: OBSTETRICS & GYNECOLOGY | Admitting: OBSTETRICS & GYNECOLOGY
Payer: COMMERCIAL

## 2019-07-16 ENCOUNTER — ANESTHESIA (OUTPATIENT)
Dept: SURGERY | Facility: OTHER | Age: 59
End: 2019-07-16
Payer: COMMERCIAL

## 2019-07-16 VITALS
BODY MASS INDEX: 57.52 KG/M2 | SYSTOLIC BLOOD PRESSURE: 131 MMHG | TEMPERATURE: 98 F | HEIGHT: 60 IN | HEART RATE: 80 BPM | DIASTOLIC BLOOD PRESSURE: 61 MMHG | WEIGHT: 293 LBS | OXYGEN SATURATION: 96 % | RESPIRATION RATE: 16 BRPM

## 2019-07-16 DIAGNOSIS — N84.0 UTERINE POLYP: Primary | ICD-10-CM

## 2019-07-16 LAB
B-HCG UR QL: NEGATIVE
CTP QC/QA: YES
POCT GLUCOSE: 121 MG/DL (ref 70–110)
POCT GLUCOSE: 147 MG/DL (ref 70–110)

## 2019-07-16 PROCEDURE — 88305 TISSUE SPECIMEN TO PATHOLOGY - SURGERY: ICD-10-PCS | Mod: 26,,, | Performed by: PATHOLOGY

## 2019-07-16 PROCEDURE — 63600175 PHARM REV CODE 636 W HCPCS: Performed by: ANESTHESIOLOGY

## 2019-07-16 PROCEDURE — 82962 GLUCOSE BLOOD TEST: CPT | Performed by: OBSTETRICS & GYNECOLOGY

## 2019-07-16 PROCEDURE — 71000033 HC RECOVERY, INTIAL HOUR: Performed by: OBSTETRICS & GYNECOLOGY

## 2019-07-16 PROCEDURE — 27201423 OPTIME MED/SURG SUP & DEVICES STERILE SUPPLY: Performed by: OBSTETRICS & GYNECOLOGY

## 2019-07-16 PROCEDURE — 25000003 PHARM REV CODE 250: Performed by: ANESTHESIOLOGY

## 2019-07-16 PROCEDURE — 88305 TISSUE EXAM BY PATHOLOGIST: CPT | Mod: 26,,, | Performed by: PATHOLOGY

## 2019-07-16 PROCEDURE — 88305 TISSUE EXAM BY PATHOLOGIST: CPT | Performed by: PATHOLOGY

## 2019-07-16 PROCEDURE — 37000009 HC ANESTHESIA EA ADD 15 MINS: Performed by: OBSTETRICS & GYNECOLOGY

## 2019-07-16 PROCEDURE — 58558 HYSTEROSCOPY BIOPSY: CPT | Mod: ,,, | Performed by: OBSTETRICS & GYNECOLOGY

## 2019-07-16 PROCEDURE — C1782 MORCELLATOR: HCPCS | Performed by: OBSTETRICS & GYNECOLOGY

## 2019-07-16 PROCEDURE — 71000039 HC RECOVERY, EACH ADD'L HOUR: Performed by: OBSTETRICS & GYNECOLOGY

## 2019-07-16 PROCEDURE — 25000003 PHARM REV CODE 250: Performed by: NURSE ANESTHETIST, CERTIFIED REGISTERED

## 2019-07-16 PROCEDURE — 71000015 HC POSTOP RECOV 1ST HR: Performed by: OBSTETRICS & GYNECOLOGY

## 2019-07-16 PROCEDURE — 63600175 PHARM REV CODE 636 W HCPCS: Performed by: NURSE ANESTHETIST, CERTIFIED REGISTERED

## 2019-07-16 PROCEDURE — 36000706: Performed by: OBSTETRICS & GYNECOLOGY

## 2019-07-16 PROCEDURE — 37000008 HC ANESTHESIA 1ST 15 MINUTES: Performed by: OBSTETRICS & GYNECOLOGY

## 2019-07-16 PROCEDURE — 58558 PR HYSTEROSCOPY,W/ENDO BX: ICD-10-PCS | Mod: ,,, | Performed by: OBSTETRICS & GYNECOLOGY

## 2019-07-16 PROCEDURE — 71000016 HC POSTOP RECOV ADDL HR: Performed by: OBSTETRICS & GYNECOLOGY

## 2019-07-16 PROCEDURE — 81025 URINE PREGNANCY TEST: CPT | Performed by: ANESTHESIOLOGY

## 2019-07-16 PROCEDURE — 25000003 PHARM REV CODE 250: Performed by: OBSTETRICS & GYNECOLOGY

## 2019-07-16 PROCEDURE — 36000707: Performed by: OBSTETRICS & GYNECOLOGY

## 2019-07-16 RX ORDER — SUCCINYLCHOLINE CHLORIDE 20 MG/ML
INJECTION INTRAMUSCULAR; INTRAVENOUS
Status: DISCONTINUED | OUTPATIENT
Start: 2019-07-16 | End: 2019-07-16

## 2019-07-16 RX ORDER — ONDANSETRON 2 MG/ML
INJECTION INTRAMUSCULAR; INTRAVENOUS
Status: DISCONTINUED | OUTPATIENT
Start: 2019-07-16 | End: 2019-07-16

## 2019-07-16 RX ORDER — SODIUM CHLORIDE 0.9 % (FLUSH) 0.9 %
3 SYRINGE (ML) INJECTION
Status: DISCONTINUED | OUTPATIENT
Start: 2019-07-16 | End: 2019-07-16 | Stop reason: HOSPADM

## 2019-07-16 RX ORDER — OXYCODONE HYDROCHLORIDE 5 MG/1
5 TABLET ORAL
Status: DISCONTINUED | OUTPATIENT
Start: 2019-07-16 | End: 2019-07-16 | Stop reason: HOSPADM

## 2019-07-16 RX ORDER — ROCURONIUM BROMIDE 10 MG/ML
INJECTION, SOLUTION INTRAVENOUS
Status: DISCONTINUED | OUTPATIENT
Start: 2019-07-16 | End: 2019-07-16

## 2019-07-16 RX ORDER — LIDOCAINE HCL/PF 100 MG/5ML
SYRINGE (ML) INTRAVENOUS
Status: DISCONTINUED | OUTPATIENT
Start: 2019-07-16 | End: 2019-07-16

## 2019-07-16 RX ORDER — DIPHENHYDRAMINE HYDROCHLORIDE 50 MG/ML
12.5 INJECTION INTRAMUSCULAR; INTRAVENOUS EVERY 30 MIN PRN
Status: DISCONTINUED | OUTPATIENT
Start: 2019-07-16 | End: 2019-07-16 | Stop reason: HOSPADM

## 2019-07-16 RX ORDER — ACETAMINOPHEN 500 MG
1000 TABLET ORAL
Status: COMPLETED | OUTPATIENT
Start: 2019-07-16 | End: 2019-07-16

## 2019-07-16 RX ORDER — MIDAZOLAM HYDROCHLORIDE 1 MG/ML
INJECTION INTRAMUSCULAR; INTRAVENOUS
Status: DISCONTINUED | OUTPATIENT
Start: 2019-07-16 | End: 2019-07-16

## 2019-07-16 RX ORDER — ONDANSETRON 2 MG/ML
4 INJECTION INTRAMUSCULAR; INTRAVENOUS DAILY PRN
Status: DISCONTINUED | OUTPATIENT
Start: 2019-07-16 | End: 2019-07-16 | Stop reason: HOSPADM

## 2019-07-16 RX ORDER — SODIUM CHLORIDE, SODIUM LACTATE, POTASSIUM CHLORIDE, CALCIUM CHLORIDE 600; 310; 30; 20 MG/100ML; MG/100ML; MG/100ML; MG/100ML
INJECTION, SOLUTION INTRAVENOUS CONTINUOUS
Status: DISCONTINUED | OUTPATIENT
Start: 2019-07-16 | End: 2019-07-16 | Stop reason: HOSPADM

## 2019-07-16 RX ORDER — MEPERIDINE HYDROCHLORIDE 25 MG/ML
12.5 INJECTION INTRAMUSCULAR; INTRAVENOUS; SUBCUTANEOUS ONCE AS NEEDED
Status: DISCONTINUED | OUTPATIENT
Start: 2019-07-16 | End: 2019-07-16 | Stop reason: HOSPADM

## 2019-07-16 RX ORDER — PREGABALIN 75 MG/1
75 CAPSULE ORAL
Status: COMPLETED | OUTPATIENT
Start: 2019-07-16 | End: 2019-07-16

## 2019-07-16 RX ORDER — LIDOCAINE HYDROCHLORIDE 10 MG/ML
0.5 INJECTION, SOLUTION EPIDURAL; INFILTRATION; INTRACAUDAL; PERINEURAL ONCE
Status: DISCONTINUED | OUTPATIENT
Start: 2019-07-16 | End: 2019-07-16 | Stop reason: HOSPADM

## 2019-07-16 RX ORDER — OXYCODONE AND ACETAMINOPHEN 5; 325 MG/1; MG/1
1 TABLET ORAL EVERY 6 HOURS PRN
Qty: 10 TABLET | Refills: 0 | Status: ON HOLD | OUTPATIENT
Start: 2019-07-16 | End: 2019-08-26 | Stop reason: SDUPTHER

## 2019-07-16 RX ORDER — NEOSTIGMINE METHYLSULFATE 1 MG/ML
INJECTION, SOLUTION INTRAVENOUS
Status: DISCONTINUED | OUTPATIENT
Start: 2019-07-16 | End: 2019-07-16

## 2019-07-16 RX ORDER — ACETAMINOPHEN 325 MG/1
650 TABLET ORAL EVERY 6 HOURS PRN
Qty: 30 TABLET | Refills: 1 | Status: SHIPPED | OUTPATIENT
Start: 2019-07-16

## 2019-07-16 RX ORDER — GLYCOPYRROLATE 0.2 MG/ML
INJECTION INTRAMUSCULAR; INTRAVENOUS
Status: DISCONTINUED | OUTPATIENT
Start: 2019-07-16 | End: 2019-07-16

## 2019-07-16 RX ORDER — MEDROXYPROGESTERONE ACETATE 10 MG/1
10 TABLET ORAL DAILY
Qty: 30 TABLET | Refills: 3 | Status: ON HOLD | OUTPATIENT
Start: 2019-07-16 | End: 2019-08-26

## 2019-07-16 RX ORDER — HYDROMORPHONE HYDROCHLORIDE 2 MG/ML
0.4 INJECTION, SOLUTION INTRAMUSCULAR; INTRAVENOUS; SUBCUTANEOUS EVERY 5 MIN PRN
Status: DISCONTINUED | OUTPATIENT
Start: 2019-07-16 | End: 2019-07-16 | Stop reason: HOSPADM

## 2019-07-16 RX ORDER — FENTANYL CITRATE 50 UG/ML
INJECTION, SOLUTION INTRAMUSCULAR; INTRAVENOUS
Status: DISCONTINUED | OUTPATIENT
Start: 2019-07-16 | End: 2019-07-16

## 2019-07-16 RX ORDER — PROPOFOL 10 MG/ML
VIAL (ML) INTRAVENOUS
Status: DISCONTINUED | OUTPATIENT
Start: 2019-07-16 | End: 2019-07-16

## 2019-07-16 RX ADMIN — SODIUM CHLORIDE, SODIUM LACTATE, POTASSIUM CHLORIDE, AND CALCIUM CHLORIDE: 600; 310; 30; 20 INJECTION, SOLUTION INTRAVENOUS at 08:07

## 2019-07-16 RX ADMIN — PROMETHAZINE HYDROCHLORIDE 6.25 MG: 25 INJECTION INTRAMUSCULAR; INTRAVENOUS at 10:07

## 2019-07-16 RX ADMIN — FENTANYL CITRATE 50 MCG: 50 INJECTION, SOLUTION INTRAMUSCULAR; INTRAVENOUS at 08:07

## 2019-07-16 RX ADMIN — ROCURONIUM BROMIDE 10 MG: 10 INJECTION, SOLUTION INTRAVENOUS at 09:07

## 2019-07-16 RX ADMIN — ACETAMINOPHEN 1000 MG: 500 TABLET, FILM COATED ORAL at 08:07

## 2019-07-16 RX ADMIN — FENTANYL CITRATE 50 MCG: 50 INJECTION, SOLUTION INTRAMUSCULAR; INTRAVENOUS at 09:07

## 2019-07-16 RX ADMIN — PROPOFOL 200 MG: 10 INJECTION, EMULSION INTRAVENOUS at 08:07

## 2019-07-16 RX ADMIN — ROCURONIUM BROMIDE 5 MG: 10 INJECTION, SOLUTION INTRAVENOUS at 08:07

## 2019-07-16 RX ADMIN — PREGABALIN 75 MG: 75 CAPSULE ORAL at 08:07

## 2019-07-16 RX ADMIN — MIDAZOLAM HYDROCHLORIDE 2 MG: 1 INJECTION, SOLUTION INTRAMUSCULAR; INTRAVENOUS at 08:07

## 2019-07-16 RX ADMIN — NEOSTIGMINE METHYLSULFATE 5 MG: 1 INJECTION INTRAVENOUS at 09:07

## 2019-07-16 RX ADMIN — SUCCINYLCHOLINE CHLORIDE 140 MG: 20 INJECTION, SOLUTION INTRAMUSCULAR; INTRAVENOUS at 08:07

## 2019-07-16 RX ADMIN — ONDANSETRON 4 MG: 2 INJECTION INTRAMUSCULAR; INTRAVENOUS at 09:07

## 2019-07-16 RX ADMIN — GLYCOPYRROLATE 0.6 MG: 0.2 INJECTION, SOLUTION INTRAMUSCULAR; INTRAVENOUS at 09:07

## 2019-07-16 RX ADMIN — LIDOCAINE HYDROCHLORIDE 50 MG: 20 INJECTION, SOLUTION INTRAVENOUS at 08:07

## 2019-07-16 NOTE — OR NURSING
The patient was transferred to room 20 by stretcher, the patient tolerated the transfer without any distress noted. Bedside handoff given to NIRANJAN August. The patient's spouse at the bedside.

## 2019-07-16 NOTE — OR NURSING
The patient had a bowel movement on the stretcher. I cleaned the patient and replaced her gown and sheets. The patient is lying on the stretcher in a light sleep awakens to voice, VSS, afebrile.

## 2019-07-16 NOTE — PLAN OF CARE
Tawny NATALIE Valerio has met all discharge criteria from Phase II. Vital Signs are stable, ambulating  without difficulty.Pain is now under control and tolerable for the pt. Pain score 3/10 at this time.  Discharge instructions given, patient verbalized understanding. Discharged from facility via wheelchair in stable condition.

## 2019-07-16 NOTE — INTERVAL H&P NOTE
The patient has been examined and the H&P has been reviewed:    I concur with the findings and no changes have occurred since H&P was written.    Anesthesia/Surgery risks, benefits and alternative options discussed and understood by patient/family.    Makayla Kamara M.D.   OB/GYN  PGY-1        Active Hospital Problems    Diagnosis  POA    Uterine polyp [N84.0]  Yes      Resolved Hospital Problems   No resolved problems to display.         Bobby Frazier MD

## 2019-07-16 NOTE — TRANSFER OF CARE
Anesthesia Transfer of Care Note    Patient: Tawny Valerio    Procedure(s) Performed: Procedure(s) (LRB):  HYSTEROSCOPY, WITH DILATION AND CURETTAGE OF UTERUS (N/A)  POLYPECTOMY (N/A)    Patient location: PACU    Anesthesia Type: general    Transport from OR: Transported from OR on 2-3 L/min O2 by NC with adequate spontaneous ventilation    Post pain: adequate analgesia    Post assessment: no apparent anesthetic complications    Post vital signs: stable    Level of consciousness: awake    Nausea/Vomiting: no nausea/vomiting    Complications: none          Last vitals:   Visit Vitals  /76 (BP Location: Right arm, Patient Position: Sitting)   Pulse 78   Temp 37 °C (98.6 °F) (Oral)   Resp 18   Ht 5' (1.524 m)   Wt (!) 136.5 kg (300 lb 14.9 oz)   LMP 01/15/2019 (Approximate)   SpO2 98%   Breastfeeding? No   BMI 58.77 kg/m²

## 2019-07-16 NOTE — BRIEF OP NOTE
[facility], [place], operated by [facility]  Brief Operative Note     SUMMARY     Surgery Date: 7/16/2019     Surgeon(s) and Role:     * Bobby Frazier Jr., MD - Primary    Assisting Surgeon: Makayla Kamara MD- PGY-1    Pre-op Diagnosis:  Uterine polyp [N84.0]    Post-op Diagnosis:  Post-Op Diagnosis Codes:     * Uterine polyp [N84.0]    Procedure(s) (LRB):  HYSTEROSCOPY, WITH DILATION AND CURETTAGE OF UTERUS (N/A)  POLYPECTOMY (N/A)    Anesthesia: General    Description of the findings of the procedure:   1. Normal vulva  2. Normal appearing vagina and cervix  3. Single-toothed tenaculum applied to the anterior lip of the cervix. The cervix did NOT pull.   4. Uterus sounded to 8 cm, dilated to 18mm to easily accommodate a 5 mm hysteroscope  5. Hysteroscopically, thin fluffy endometrial tissue present with polyp noted on posterior aspect of endometrium  6. Scope removed, sharp curette applied evenly to all four surfaces of the uterine cavity . Scrapings sent to pathology  7. Truclear soft tissue polyp remover used to removed remaining polyp tissue. Specimen sent to path  8. Tenaculum removed; tenaculum puncture sites hemostatic at the conclusion of the procedure    Findings/Key Components: as above    Estimated Blood Loss: minimal, <10cc       Specimens:   Specimen (12h ago, onward)    Start     Ordered    07/16/19 0926  Specimen to Pathology - Surgery  Once     Comments:  Pre-op Diagnosis: Uterine polyp [N84.0]Procedure(s):HYSTEROSCOPY, WITH DILATION AND CURETTAGE OF UTERUSPOLYPECTOMY Number of specimens: 2Name of specimens: 1. Endometrial Curretage 2. Polyp     Start Status     07/16/19 0926 Collected (07/16/19 1003) Order ID: [id number]       07/16/19 1003          Discharge Note    SUMMARY     Admit Date: 7/16/2019    Discharge Date and Time:  07/16/2019 10:21 AM    Hospital Course (synopsis of major diagnoses, care, treatment, and services provided during the course of the hospital stay): Patient presented for scheduled  procedure. Patient was passed back to OR for scheduled procedure. Please see OP note for further details. Tolerated procedure well and patient was taken to recovery in a stable condition. Prior to discharge patient was able to void, ambulate, tolerate PO and pain was well controlled with PO meds. Patient was given routine post-op instructions for which patient voiced understanding. Patient was subsequently discharged home.     Final Diagnosis: Post-Op Diagnosis Codes:     * Uterine polyp [N84.0]    Disposition: Home or Self Care    Follow Up/Patient Instructions:     Medications:  Reconciled Home Medications:      Medication List      START taking these medications    acetaminophen 325 MG tablet  Commonly known as:  TYLENOL  Take 2 tablets (650 mg total) by mouth every 6 (six) hours as needed for Pain.     medroxyPROGESTERone 10 MG tablet  Commonly known as:  PROVERA  Take 1 tablet (10 mg total) by mouth once daily.     oxyCODONE-acetaminophen 5-325 mg per tablet  Commonly known as:  PERCOCET  Take 1 tablet by mouth every 6 (six) hours as needed for Pain.        CONTINUE taking these medications    * ASTELIN NASL     * azelastine 137 mcg (0.1 %) nasal spray  Commonly known as:  ASTELIN  USE 1 SPRAY IN EACH NOSTRIL TWICE A DAY     buPROPion 150 MG TBSR 12 hr tablet  Commonly known as:  WELLBUTRIN SR  TAKE 1 TABLET (150 MG TOTAL) BY MOUTH 2 (TWO) TIMES DAILY.     cholecalciferol (vitamin D3) 50,000 unit capsule  1 cap(s)     furosemide 20 MG tablet  Commonly known as:  LASIX  TAKE 1 TABLET (20 MG TOTAL) BY MOUTH ONCE DAILY.     hydroCHLOROthiazide 25 MG tablet  Commonly known as:  HYDRODIURIL  TAKE 1 TABLET BY MOUTH EVERY DAY     lancets Misc  Check fasting glucose daily     losartan 25 MG tablet  Commonly known as:  COZAAR  Take 25 mg by mouth once daily.     meloxicam 15 MG tablet  Commonly known as:  MOBIC  Take 15 mg by mouth daily as needed.     metFORMIN 750 MG 24 hr tablet  Commonly known as:   GLUCOPHAGE-XR  TAKE 1 TABLET (750 MG TOTAL) BY MOUTH DAILY WITH BREAKFAST.     miSOPROStol 200 MCG Tab  Commonly known as:  CYTOTEC  take 12, 9, 5, 1 hour before procedure     XANAX 0.5 MG tablet  Generic drug:  ALPRAZolam  1 tab(s)         * This list has 2 medication(s) that are the same as other medications prescribed for you. Read the directions carefully, and ask your doctor or other care provider to review them with you.              Discharge Procedure Orders   Diet Adult Regular     Notify your health care provider if you experience any of the following:  increased confusion or weakness     Notify your health care provider if you experience any of the following:  persistent dizziness, light-headedness, or visual disturbances     Notify your health care provider if you experience any of the following:  worsening rash     Notify your health care provider if you experience any of the following:  severe persistent headache     Notify your health care provider if you experience any of the following:  difficulty breathing or increased cough     Notify your health care provider if you experience any of the following:  redness, tenderness, or signs of infection (pain, swelling, redness, odor or green/yellow discharge around incision site)     Notify your health care provider if you experience any of the following:  severe uncontrolled pain     Notify your health care provider if you experience any of the following:  persistent nausea and vomiting or diarrhea     Notify your health care provider if you experience any of the following:  temperature >100.4     Activity as tolerated     Follow-up Information     Bobby Frazier Jr, MD In 6 weeks.    Specialties:  Obstetrics, Obstetrics and Gynecology  Why:  For routine post-op  Contact information:  [address] [address]  [phone]                 Makayla Kamara M.D.   OB/GYN  PGY-1    Bobby Frazier MD

## 2019-07-16 NOTE — PLAN OF CARE
Patient states that she has impetigo lesions to abdomen.  4-5 open sores noted on assessment. OR desk called to make aware.  OR desk states ok to proceed as long as no vulvar involvement.

## 2019-07-16 NOTE — DISCHARGE INSTRUCTIONS

## 2019-07-16 NOTE — OP NOTE
OPERATIVE NOTE    DATE OF PROCEDURE: 07/16/2019     SURGEON: Bobby Frazier MD     ASSISTANT: Makayla Kamara MD    PREOPERATIVE DIAGNOSIS: Uterine Polyp    POSTOPERATIVE DIAGNOSIS: Uterine Polyp    PROCEDURE: Hysteroscopy, dilation & curettage, Polypectomy    ANESTHESIA: General    FINDINGS:   1. Normal vulva  2. Normal appearing vagina and cervix  3. Single-toothed tenaculum applied to the anterior lip of the cervix. The cervix did NOT pull.   4. Uterus sounded to 8 cm, dilated to 18mm to easily accommodate a 5 mm hysteroscope  5. Hysteroscopically, thin fluffy endometrial tissue present with polyp noted on posterior aspect of endometrium  6. Scope removed, sharp curette applied evenly to all four surfaces of the uterine cavity . Scrapings sent to pathology  7. Truclear soft tissue polyp remover used to removed remaining polyp tissue. Specimen sent to path  8. Tenaculum removed; tenaculum puncture sites hemostatic at the conclusion of the procedure    ESTIMATED BLOOD LOSS: minimal <10 cc    URINE OUTPUT: 30 cc    IV FLUIDS: 400 cc     INDICATIONS: Uterine Polyp     PROCEDURE IN DETAIL: After proper consents were explained and obtained, the patient was taken to the operating room where anesthesia was obtained without difficulty. She was then placed in dorsal lithotomy position in yellowfin stirrups. The patient was then prepped and draped in normal sterile fashion. A weighted speculum was placed into the vagina. Right angle used to visualize the cervix, which was grasped at the anterior lip with the single tooth tenaculum. The uterus sounded to 8 cm. The cervix was serially dilated to #18 mm dilator. A 5mm hysteroscope was introduced, and thin endometrial tissue with a posterior polyp was visualized. A sharp curette was applied to each surface of the uterine cavity until they felt uniformly gritty in texture. The curette was removed and the scrapings were collected and sent to pathology. The hysteroscope was  re-introduced and a myosure soft tissue polyp remover was inserted through the hysteroscope and used to remove remaining polypoid tissue. COMPLETE REMOVAL WAS ACHIEVED. The tenaculum was removed from the cervix and pressure was held at the puncture sites with a sponge stick until good hemostasis was noted. The patient tolerated the procedure well. All counts were correct x2. The patient was taken to Recovery area in stable condition.    Makayla Kamara M.D.   OB/GYN  PGY-1    Bobby Frazier MD

## 2019-07-16 NOTE — ANESTHESIA POSTPROCEDURE EVALUATION
Anesthesia Post Evaluation    Patient: Tawny Valerio    Procedure(s) Performed: Procedure(s) (LRB):  HYSTEROSCOPY, WITH DILATION AND CURETTAGE OF UTERUS (N/A)  POLYPECTOMY (N/A)    Final Anesthesia Type: general  Patient location during evaluation: PACU  Patient participation: Yes- Able to Participate  Level of consciousness: awake and alert  Post-procedure vital signs: reviewed and stable  Pain management: adequate  Airway patency: patent  PONV status at discharge: No PONV  Anesthetic complications: no      Cardiovascular status: blood pressure returned to baseline and stable  Respiratory status: unassisted, spontaneous ventilation and room air  Hydration status: euvolemic  Follow-up not needed.  Comments: Panic attack during pre-oxygenation, required versed and reassurance          Vitals Value Taken Time   /70 7/16/2019 10:47 AM   Temp 37.1 °C (98.8 °F) 7/16/2019 10:00 AM   Pulse 76 7/16/2019 10:53 AM   Resp 18 7/16/2019 10:45 AM   SpO2 97 % 7/16/2019 10:53 AM   Vitals shown include unvalidated device data.      No case tracking events are documented in the log.      Pain/Segun Score: Pain Rating Prior to Med Admin: 7 (7/16/2019 10:00 AM)  Pain Rating Post Med Admin: 2 (7/16/2019 10:30 AM)  Segun Score: 9 (7/16/2019 10:30 AM)

## 2019-07-18 ENCOUNTER — TELEPHONE (OUTPATIENT)
Dept: INTERNAL MEDICINE | Facility: CLINIC | Age: 59
End: 2019-07-18

## 2019-07-18 DIAGNOSIS — I10 ESSENTIAL HYPERTENSION: ICD-10-CM

## 2019-07-18 DIAGNOSIS — E11.9 CONTROLLED TYPE 2 DIABETES MELLITUS WITHOUT COMPLICATION, WITHOUT LONG-TERM CURRENT USE OF INSULIN: ICD-10-CM

## 2019-07-18 DIAGNOSIS — E55.9 VITAMIN D INSUFFICIENCY: Primary | ICD-10-CM

## 2019-07-18 NOTE — TELEPHONE ENCOUNTER
6 mos labs entered and linked to lab appt.    Left msg w/ lab appt date/time and said change if need with phone staff.

## 2019-07-19 ENCOUNTER — TELEPHONE (OUTPATIENT)
Dept: OBSTETRICS AND GYNECOLOGY | Facility: CLINIC | Age: 59
End: 2019-07-19

## 2019-07-19 NOTE — TELEPHONE ENCOUNTER
----- Message from Flor Brown sent at 7/19/2019  2:06 PM CDT -----  Contact: Patient  Patient called for pathology results following D&C. The patient can be reached at [phone].

## 2019-07-22 ENCOUNTER — PATIENT MESSAGE (OUTPATIENT)
Dept: OBSTETRICS AND GYNECOLOGY | Facility: CLINIC | Age: 59
End: 2019-07-22

## 2019-07-22 ENCOUNTER — TELEPHONE (OUTPATIENT)
Dept: OBSTETRICS AND GYNECOLOGY | Facility: CLINIC | Age: 59
End: 2019-07-22

## 2019-07-22 NOTE — TELEPHONE ENCOUNTER
----- Message from Gabyluis Jerome sent at 7/22/2019 10:12 AM CDT -----  Contact: SHAILESH WEBB [[id number]]  Type:  Patient Returning Call    Who Called: Linus Amaya MA      Who Left Message for Patient: Linus Amaya MA      Does the patient know what this is regarding? results    Best Call Back Number:[phone]    Additional Information:

## 2019-07-23 ENCOUNTER — TELEPHONE (OUTPATIENT)
Dept: OBSTETRICS AND GYNECOLOGY | Facility: CLINIC | Age: 59
End: 2019-07-23

## 2019-07-23 NOTE — TELEPHONE ENCOUNTER
Pt calling to go over results from surgery. Advised pt Dr Frazier was in surgery and will send him a message to call pt. Pt verbalized understanding

## 2019-07-23 NOTE — TELEPHONE ENCOUNTER
----- Message from Geovanna Mcfarland sent at 7/23/2019 10:02 AM CDT -----  Contact: pt  Type: Patient Call Back    Who called:pt    What is the request in detail:pt needs her test results. She has called a few times with no results. Call pt    Can the clinic reply by [facility]?    Would the patient rather a call back or a response via [facility]? Call back    Best call back number:[phone]    Additional Information:

## 2019-07-23 NOTE — TELEPHONE ENCOUNTER
----- Message from Lacy Zurita sent at 7/23/2019  1:32 PM CDT -----  Contact: SHAILESH WEBB   Name of Who is Calling: SHAILESH WEBB       What is the request in detail: Patient is requesting a call back regarding her test results.      Can the clinic reply by [facility]: no      What Number to Call Back if not in [facility]: [phone]

## 2019-07-24 ENCOUNTER — PATIENT MESSAGE (OUTPATIENT)
Dept: OBSTETRICS AND GYNECOLOGY | Facility: CLINIC | Age: 59
End: 2019-07-24

## 2019-07-24 DIAGNOSIS — C54.1 ENDOMETRIAL CANCER: Primary | ICD-10-CM

## 2019-07-24 NOTE — PROGRESS NOTES
Referring physician: Bobby Frazier MD/ Jennifer Rosales MD  Reason for referral: endometrial cancer         7/2016, hysteroscopy/polypectomy w D&C with focal patterns of atypical glandular hyperplasia. She was lost to follow up.  Continued with intermittent spotting every 6 months.      Dec 2018: had heavier bleeding and saw Dr. Rosales.     3/1/2019 repeat hysteroscopy/D&C with Dr. Rosales with numerous polyps but pathology was simple cystic hyperplasia.      March 2019:  Patient referred to me for further management.    July 2019:  Hysteroscopic D&C with Dr. Bobby Frazier.  Uterus sounded 8 cm.  Polyp noted on posterior aspect of endometrium.  Curettage was performed. Phill-Cut soft tissue polyp remover was used.  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

## 2019-07-25 NOTE — PROGRESS NOTES
Subjective:       Patient ID: Tawny Valerio is a 59 y.o. female.    Chief Complaint: Follow-up (6 month)    Patient is a 59 y.o.female who presents today for follow up    Labs: reviewed  Vaccines: Influenza (yearly); Tetanus (done)   Eye exam: april  Mammogram: declines  Gyn exam: dr cheng  Colonoscopy: declines  Diet: atkins    Of note, she recently tried lyrica for neuropathy and it was effective. She would like to start this med. She does get numbness in her fingertips and feet at times. She has shooting pains at times in her body.     Going to see dr. Almeida tomorrow; she had a d&c and was found to have low grade cancer.   Review of Systems   Constitutional: Positive for fatigue. Negative for appetite change, chills, diaphoresis and fever.   HENT: Negative for congestion, ear discharge, ear pain, postnasal drip, tinnitus, trouble swallowing and voice change.    Eyes: Negative for discharge, redness and itching.   Respiratory: Negative for cough, chest tightness, shortness of breath and wheezing.    Cardiovascular: Negative for chest pain, palpitations and leg swelling.   Gastrointestinal: Negative for abdominal pain, constipation, diarrhea, nausea and vomiting.   Endocrine: Positive for polydipsia, polyphagia and polyuria. Negative for cold intolerance and heat intolerance.   Genitourinary: Negative for difficulty urinating, flank pain, hematuria and urgency.   Musculoskeletal: Negative for arthralgias, gait problem, myalgias and neck stiffness.   Skin: Negative for color change, pallor and rash.   Neurological: Negative for dizziness, tremors, seizures, syncope, speech difficulty, weakness and headaches.   Hematological: Negative for adenopathy.   Psychiatric/Behavioral: Negative for agitation, behavioral problems, confusion and sleep disturbance. The patient is not nervous/anxious.        Objective:      Physical Exam   Constitutional: She is oriented to person, place, and time. She appears well-developed  and well-nourished. No distress.   HENT:   Head: Normocephalic and atraumatic.   Right Ear: External ear normal.   Left Ear: External ear normal.   Nose: Nose normal.   Mouth/Throat: Oropharynx is clear and moist. No oropharyngeal exudate.   Eyes: Pupils are equal, round, and reactive to light. Conjunctivae and EOM are normal. Right eye exhibits no discharge. Left eye exhibits no discharge. No scleral icterus.   Neck: Neck supple. No JVD present. No tracheal deviation present. No thyromegaly present.   Cardiovascular: Normal rate, normal heart sounds and intact distal pulses. Exam reveals no gallop and no friction rub.   No murmur heard.  Pulmonary/Chest: Effort normal and breath sounds normal. No stridor. No respiratory distress. She has no wheezes. She has no rales. She exhibits no tenderness.   Abdominal: Soft. Bowel sounds are normal. She exhibits no distension. There is no tenderness. There is no rebound.   Musculoskeletal: She exhibits no edema or tenderness.   Lymphadenopathy:     She has no cervical adenopathy.   Neurological: She is alert and oriented to person, place, and time.   Skin: Skin is warm and dry. No rash noted. She is not diaphoretic. No erythema.   Psychiatric: She has a normal mood and affect. Her behavior is normal.   Nursing note and vitals reviewed.      Assessment and Plan:       1. Essential hypertension  - stable; labs reviewed    2. Controlled type 2 diabetes mellitus without complication, without long-term current use of insulin  - stable on metformin    3. Morbid obesity with BMI of 60.0-69.9, adult  Patient educated on importance of diet and exercise.  Recommended 30-45 minutes of exercise five days a week.  In addition, counseled patient on importance of low fat diet.  Limit carbohydrate intake.  Increase protein intake and vegetables.   Pt starting premier protein  - Ambulatory Referral to Bariatric Medicine    4. Vitamin D insufficiency      5. Endometrial cancer    - ;  Future    6. Visit for screening mammogram    - Mammo Digital Screening Bilat without CA; Future    7. Screen for colon cancer  - declines colonoscopy for now  - Fecal Immunochemical Test (iFOBT); Future          No follow-ups on file.

## 2019-07-26 ENCOUNTER — LAB VISIT (OUTPATIENT)
Dept: LAB | Facility: HOSPITAL | Age: 59
End: 2019-07-26
Attending: INTERNAL MEDICINE
Payer: COMMERCIAL

## 2019-07-26 DIAGNOSIS — E11.9 CONTROLLED TYPE 2 DIABETES MELLITUS WITHOUT COMPLICATION, WITHOUT LONG-TERM CURRENT USE OF INSULIN: ICD-10-CM

## 2019-07-26 DIAGNOSIS — I10 ESSENTIAL HYPERTENSION: ICD-10-CM

## 2019-07-26 LAB
ALBUMIN SERPL BCP-MCNC: 3.1 G/DL (ref 3.5–5.2)
ALP SERPL-CCNC: 111 U/L (ref 55–135)
ALT SERPL W/O P-5'-P-CCNC: 15 U/L (ref 10–44)
ANION GAP SERPL CALC-SCNC: 8 MMOL/L (ref 8–16)
AST SERPL-CCNC: 14 U/L (ref 10–40)
BILIRUB SERPL-MCNC: 0.4 MG/DL (ref 0.1–1)
BUN SERPL-MCNC: 17 MG/DL (ref 6–20)
CALCIUM SERPL-MCNC: 9.9 MG/DL (ref 8.7–10.5)
CHLORIDE SERPL-SCNC: 100 MMOL/L (ref 95–110)
CHOLEST SERPL-MCNC: 187 MG/DL (ref 120–199)
CHOLEST/HDLC SERPL: 3.3 {RATIO} (ref 2–5)
CO2 SERPL-SCNC: 33 MMOL/L (ref 23–29)
CREAT SERPL-MCNC: 0.8 MG/DL (ref 0.5–1.4)
EST. GFR  (AFRICAN AMERICAN): >60 ML/MIN/1.73 M^2
EST. GFR  (NON AFRICAN AMERICAN): >60 ML/MIN/1.73 M^2
ESTIMATED AVG GLUCOSE: 146 MG/DL (ref 68–131)
GLUCOSE SERPL-MCNC: 131 MG/DL (ref 70–110)
HBA1C MFR BLD HPLC: 6.7 % (ref 4–5.6)
HDLC SERPL-MCNC: 57 MG/DL (ref 40–75)
HDLC SERPL: 30.5 % (ref 20–50)
LDLC SERPL CALC-MCNC: 117.4 MG/DL (ref 63–159)
NONHDLC SERPL-MCNC: 130 MG/DL
POTASSIUM SERPL-SCNC: 3.6 MMOL/L (ref 3.5–5.1)
PROT SERPL-MCNC: 7.7 G/DL (ref 6–8.4)
SODIUM SERPL-SCNC: 141 MMOL/L (ref 136–145)
TRIGL SERPL-MCNC: 63 MG/DL (ref 30–150)

## 2019-07-26 PROCEDURE — 80053 COMPREHEN METABOLIC PANEL: CPT

## 2019-07-26 PROCEDURE — 36415 COLL VENOUS BLD VENIPUNCTURE: CPT | Mod: PO

## 2019-07-26 PROCEDURE — 83036 HEMOGLOBIN GLYCOSYLATED A1C: CPT

## 2019-07-26 PROCEDURE — 80061 LIPID PANEL: CPT

## 2019-07-30 ENCOUNTER — TELEPHONE (OUTPATIENT)
Dept: GYNECOLOGIC ONCOLOGY | Facility: CLINIC | Age: 59
End: 2019-07-30

## 2019-07-30 ENCOUNTER — LAB VISIT (OUTPATIENT)
Dept: LAB | Facility: HOSPITAL | Age: 59
End: 2019-07-30
Attending: INTERNAL MEDICINE
Payer: COMMERCIAL

## 2019-07-30 ENCOUNTER — OFFICE VISIT (OUTPATIENT)
Dept: INTERNAL MEDICINE | Facility: CLINIC | Age: 59
End: 2019-07-30
Payer: COMMERCIAL

## 2019-07-30 VITALS
SYSTOLIC BLOOD PRESSURE: 130 MMHG | WEIGHT: 293 LBS | TEMPERATURE: 98 F | HEIGHT: 60 IN | OXYGEN SATURATION: 98 % | HEART RATE: 64 BPM | BODY MASS INDEX: 57.52 KG/M2 | DIASTOLIC BLOOD PRESSURE: 78 MMHG

## 2019-07-30 DIAGNOSIS — E66.01 MORBID OBESITY WITH BMI OF 60.0-69.9, ADULT: ICD-10-CM

## 2019-07-30 DIAGNOSIS — I10 ESSENTIAL HYPERTENSION: Primary | ICD-10-CM

## 2019-07-30 DIAGNOSIS — C54.1 ENDOMETRIAL CANCER: ICD-10-CM

## 2019-07-30 DIAGNOSIS — E11.9 CONTROLLED TYPE 2 DIABETES MELLITUS WITHOUT COMPLICATION, WITHOUT LONG-TERM CURRENT USE OF INSULIN: ICD-10-CM

## 2019-07-30 DIAGNOSIS — E55.9 VITAMIN D INSUFFICIENCY: ICD-10-CM

## 2019-07-30 DIAGNOSIS — Z12.31 VISIT FOR SCREENING MAMMOGRAM: ICD-10-CM

## 2019-07-30 DIAGNOSIS — Z12.11 SCREEN FOR COLON CANCER: ICD-10-CM

## 2019-07-30 LAB — CANCER AG125 SERPL-ACNC: 19 U/ML (ref 0–30)

## 2019-07-30 PROCEDURE — 36415 COLL VENOUS BLD VENIPUNCTURE: CPT | Mod: PO

## 2019-07-30 PROCEDURE — 86304 IMMUNOASSAY TUMOR CA 125: CPT

## 2019-07-30 PROCEDURE — 3044F HG A1C LEVEL LT 7.0%: CPT | Mod: CPTII,S$GLB,, | Performed by: INTERNAL MEDICINE

## 2019-07-30 PROCEDURE — 3008F BODY MASS INDEX DOCD: CPT | Mod: CPTII,S$GLB,, | Performed by: INTERNAL MEDICINE

## 2019-07-30 PROCEDURE — 99214 PR OFFICE/OUTPT VISIT, EST, LEVL IV, 30-39 MIN: ICD-10-PCS | Mod: S$GLB,,, | Performed by: INTERNAL MEDICINE

## 2019-07-30 PROCEDURE — 3078F PR MOST RECENT DIASTOLIC BLOOD PRESSURE < 80 MM HG: ICD-10-PCS | Mod: CPTII,S$GLB,, | Performed by: INTERNAL MEDICINE

## 2019-07-30 PROCEDURE — 3008F PR BODY MASS INDEX (BMI) DOCUMENTED: ICD-10-PCS | Mod: CPTII,S$GLB,, | Performed by: INTERNAL MEDICINE

## 2019-07-30 PROCEDURE — 3075F PR MOST RECENT SYSTOLIC BLOOD PRESS GE 130-139MM HG: ICD-10-PCS | Mod: CPTII,S$GLB,, | Performed by: INTERNAL MEDICINE

## 2019-07-30 PROCEDURE — 3044F PR MOST RECENT HEMOGLOBIN A1C LEVEL <7.0%: ICD-10-PCS | Mod: CPTII,S$GLB,, | Performed by: INTERNAL MEDICINE

## 2019-07-30 PROCEDURE — 99999 PR PBB SHADOW E&M-EST. PATIENT-LVL IV: CPT | Mod: PBBFAC,,, | Performed by: INTERNAL MEDICINE

## 2019-07-30 PROCEDURE — 3078F DIAST BP <80 MM HG: CPT | Mod: CPTII,S$GLB,, | Performed by: INTERNAL MEDICINE

## 2019-07-30 PROCEDURE — 3075F SYST BP GE 130 - 139MM HG: CPT | Mod: CPTII,S$GLB,, | Performed by: INTERNAL MEDICINE

## 2019-07-30 PROCEDURE — 99214 OFFICE O/P EST MOD 30 MIN: CPT | Mod: S$GLB,,, | Performed by: INTERNAL MEDICINE

## 2019-07-30 PROCEDURE — 99999 PR PBB SHADOW E&M-EST. PATIENT-LVL IV: ICD-10-PCS | Mod: PBBFAC,,, | Performed by: INTERNAL MEDICINE

## 2019-07-30 RX ORDER — PREGABALIN 75 MG/1
75 CAPSULE ORAL 2 TIMES DAILY
Qty: 60 CAPSULE | Refills: 0 | Status: SHIPPED | OUTPATIENT
Start: 2019-07-30 | End: 2019-08-02

## 2019-07-31 ENCOUNTER — HOSPITAL ENCOUNTER (OUTPATIENT)
Dept: CARDIOLOGY | Facility: CLINIC | Age: 59
Discharge: HOME | End: 2019-07-31
Payer: COMMERCIAL

## 2019-07-31 ENCOUNTER — PATIENT MESSAGE (OUTPATIENT)
Dept: INTERNAL MEDICINE | Facility: CLINIC | Age: 59
End: 2019-07-31

## 2019-07-31 ENCOUNTER — PATIENT OUTREACH (OUTPATIENT)
Dept: ADMINISTRATIVE | Facility: HOSPITAL | Age: 59
End: 2019-07-31

## 2019-07-31 ENCOUNTER — OFFICE VISIT (OUTPATIENT)
Dept: GYNECOLOGIC ONCOLOGY | Facility: CLINIC | Age: 59
End: 2019-07-31
Payer: COMMERCIAL

## 2019-07-31 VITALS
DIASTOLIC BLOOD PRESSURE: 61 MMHG | BODY MASS INDEX: 57.52 KG/M2 | HEIGHT: 60 IN | SYSTOLIC BLOOD PRESSURE: 132 MMHG | HEART RATE: 87 BPM | WEIGHT: 293 LBS

## 2019-07-31 DIAGNOSIS — C54.1 ENDOMETRIAL CA: ICD-10-CM

## 2019-07-31 DIAGNOSIS — C54.1 ENDOMETRIAL CA: Primary | ICD-10-CM

## 2019-07-31 PROCEDURE — 3008F PR BODY MASS INDEX (BMI) DOCUMENTED: ICD-10-PCS | Mod: CPTII,S$GLB,, | Performed by: OBSTETRICS & GYNECOLOGY

## 2019-07-31 PROCEDURE — 99999 PR PBB SHADOW E&M-EST. PATIENT-LVL IV: ICD-10-PCS | Mod: PBBFAC,,, | Performed by: OBSTETRICS & GYNECOLOGY

## 2019-07-31 PROCEDURE — 3078F DIAST BP <80 MM HG: CPT | Mod: CPTII,S$GLB,, | Performed by: OBSTETRICS & GYNECOLOGY

## 2019-07-31 PROCEDURE — 99215 OFFICE O/P EST HI 40 MIN: CPT | Mod: S$GLB,,, | Performed by: OBSTETRICS & GYNECOLOGY

## 2019-07-31 PROCEDURE — 3075F PR MOST RECENT SYSTOLIC BLOOD PRESS GE 130-139MM HG: ICD-10-PCS | Mod: CPTII,S$GLB,, | Performed by: OBSTETRICS & GYNECOLOGY

## 2019-07-31 PROCEDURE — 99215 PR OFFICE/OUTPT VISIT, EST, LEVL V, 40-54 MIN: ICD-10-PCS | Mod: S$GLB,,, | Performed by: OBSTETRICS & GYNECOLOGY

## 2019-07-31 PROCEDURE — 3008F BODY MASS INDEX DOCD: CPT | Mod: CPTII,S$GLB,, | Performed by: OBSTETRICS & GYNECOLOGY

## 2019-07-31 PROCEDURE — 93005 ELECTROCARDIOGRAM TRACING: CPT | Mod: S$GLB,,, | Performed by: OBSTETRICS & GYNECOLOGY

## 2019-07-31 PROCEDURE — 93010 EKG 12-LEAD: ICD-10-PCS | Mod: S$GLB,,, | Performed by: INTERNAL MEDICINE

## 2019-07-31 PROCEDURE — 3075F SYST BP GE 130 - 139MM HG: CPT | Mod: CPTII,S$GLB,, | Performed by: OBSTETRICS & GYNECOLOGY

## 2019-07-31 PROCEDURE — 93010 ELECTROCARDIOGRAM REPORT: CPT | Mod: S$GLB,,, | Performed by: INTERNAL MEDICINE

## 2019-07-31 PROCEDURE — 99999 PR PBB SHADOW E&M-EST. PATIENT-LVL IV: CPT | Mod: PBBFAC,,, | Performed by: OBSTETRICS & GYNECOLOGY

## 2019-07-31 PROCEDURE — 93005 EKG 12-LEAD: ICD-10-PCS | Mod: S$GLB,,, | Performed by: OBSTETRICS & GYNECOLOGY

## 2019-07-31 PROCEDURE — 3078F PR MOST RECENT DIASTOLIC BLOOD PRESSURE < 80 MM HG: ICD-10-PCS | Mod: CPTII,S$GLB,, | Performed by: OBSTETRICS & GYNECOLOGY

## 2019-07-31 NOTE — H&P (VIEW-ONLY)
Subjective:       Patient ID: Tawny Valerio is a 59 y.o. female.    Chief Complaint: Follow-up (D&C )    HPI     Referring physician: Bobby Frazier MD/ Jennifer Rosales MD  Reason for referral: endometrial cancer            2016, hysteroscopy/polypectomy w D&C with focal patterns of atypical glandular hyperplasia. She was lost to follow up.  Continued with intermittent spotting every 6 months.      Dec 2018: had heavier bleeding and saw Dr. Rosales.     3/1/2019 repeat hysteroscopy/D&C with Dr. Rosales with numerous polyps but pathology was simple cystic hyperplasia.      2019:  Patient referred to me for further management.     2019:  Hysteroscopic D&C with Dr. Bobby Frazier.  Uterus sounded 8 cm.  Polyp noted on posterior aspect of endometrium.  Curettage was performed. Phill-Cut soft tissue polyp remover was used.  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    Past Medical History:   Diagnosis Date    Anxiety     Diabetes type 2, controlled 2016    Endometrial ca 2019    Essential hypertension 2018    Simple endometrial hyperplasia 3/15/2019    Uterine polyp 3/14/2019     Past Surgical History:   Procedure Laterality Date     SECTION      x 1    DILATION AND CURETTAGE OF UTERUS  2019    HYSTEROSCOPY, WITH DILATION AND CURETTAGE OF UTERUS N/A 2019    [facility]    lipoma removed      POLYPECTOMY N/A 2019    Performed by [facility] at [facility]     Family History   Problem Relation Age of Onset    No Known Problems Mother     Diabetes Father     Heart disease Sister     Heart disease Brother     Heart disease Maternal Grandmother     Heart disease Maternal Grandfather     Heart disease Paternal Grandfather     Breast cancer Other     Ovarian cancer Neg Hx     Uterine cancer Neg Hx     Colon cancer Neg Hx      Social History     Tobacco Use    Smoking status: Never Smoker     Smokeless tobacco: Never Used   Substance Use Topics    Alcohol use: Yes     Frequency: Monthly or less     Drinks per session: 1 or 2     Binge frequency: Never     Comment: approx one glass wine/month    Drug use: No     Review of patient's allergies indicates:   Allergen Reactions    Celebrex [celecoxib] Anaphylaxis    Codeine Hives     Does not know what she can take -usually just takes tylenol or ibuprofen    Lidocaine Hives    Vicodin [hydrocodone-acetaminophen] Hives and Swelling       Current Outpatient Medications:     acetaminophen (TYLENOL) 325 MG tablet, Take 2 tablets (650 mg total) by mouth every 6 (six) hours as needed for Pain., Disp: 30 tablet, Rfl: 1    azelastine (ASTELIN) 137 mcg (0.1 %) nasal spray, USE 1 SPRAY IN EACH NOSTRIL TWICE A DAY, Disp: 30 mL, Rfl: 0    buPROPion (WELLBUTRIN SR) 150 MG TBSR 12 hr tablet, TAKE 1 TABLET (150 MG TOTAL) BY MOUTH 2 (TWO) TIMES DAILY., Disp: 60 tablet, Rfl: 6    cholecalciferol, vitamin D3, 50,000 unit capsule, 1 cap(s) weekly, Disp: , Rfl:     furosemide (LASIX) 20 MG tablet, TAKE 1 TABLET (20 MG TOTAL) BY MOUTH ONCE DAILY., Disp: 30 tablet, Rfl: 11    hydroCHLOROthiazide (HYDRODIURIL) 25 MG tablet, TAKE 1 TABLET BY MOUTH EVERY DAY, Disp: 30 tablet, Rfl: 6    lancets Misc, Check fasting glucose daily, Disp: 100 each, Rfl: 3    losartan (COZAAR) 25 MG tablet, Take 25 mg by mouth once daily. , Disp: , Rfl:     metFORMIN (GLUCOPHAGE-XR) 750 MG 24 hr tablet, TAKE 1 TABLET (750 MG TOTAL) BY MOUTH DAILY WITH BREAKFAST., Disp: 30 tablet, Rfl: 4    oxyCODONE-acetaminophen (PERCOCET) 5-325 mg per tablet, Take 1 tablet by mouth every 6 (six) hours as needed for Pain., Disp: 10 tablet, Rfl: 0    gabapentin (NEURONTIN) 300 MG capsule, Take 1 capsule (300 mg total) by mouth every evening., Disp: 90 capsule, Rfl: 1    medroxyPROGESTERone (PROVERA) 10 MG tablet, Take 1 tablet (10 mg total) by mouth once daily., Disp: 30 tablet, Rfl: 3    meloxicam  (MOBIC) 15 MG tablet, Take 15 mg by mouth daily as needed. , Disp: , Rfl:   No current facility-administered medications for this visit.     Review of Systems   Constitutional: Negative for chills, fatigue and fever.   Respiratory: Positive for shortness of breath (ode). Negative for cough and wheezing.    Cardiovascular: Negative for chest pain, palpitations and leg swelling.   Gastrointestinal: Negative for abdominal pain, constipation, diarrhea, nausea and vomiting.   Genitourinary: Negative for difficulty urinating, dysuria, frequency, genital sores, hematuria, urgency, vaginal bleeding, vaginal discharge and vaginal pain.   Neurological: Negative for weakness.   Hematological: Negative for adenopathy. Does not bruise/bleed easily.   Psychiatric/Behavioral: The patient is not nervous/anxious.        Objective:   /61   Pulse 87   Ht 5' (1.524 m)   Wt 135.8 kg (299 lb 6.2 oz)   BMI 58.47 kg/m²      Physical Exam   Constitutional: She is oriented to person, place, and time. She appears well-developed and well-nourished.   HENT:   Head: Normocephalic and atraumatic.   Genitourinary:   Genitourinary Comments: Pelvic exam was not performed. When I saw the patient previously and tried to do a pelvic examination it was unsatisfactory.  She had a recent exam with Dr. Frazier at time of hysteroscopic D&C.   Neurological: She is alert and oriented to person, place, and time.   Skin: Skin is warm and dry.   Psychiatric: She has a normal mood and affect. Her behavior is normal. Judgment and thought content normal.       Assessment:       1. Endometrial ca        Plan:   Endometrial ca  Consents were obtained for robotic assisted laparoscopic hysterectomy bilateral salpingo-oophorectomy.    I explained to the patient and her  that lymphadenectomy is not going to be feasible given her body habitus.    Patient may also need mini-laparotomy for removal of the uterus given narrow vaginal canal.    Preop orders  placed.     -     CT Abdomen Pelvis With Contrast; Future; Expected date: 07/31/2019  -     Basic metabolic panel; Future; Expected date: 07/31/2019  -     CBC auto differential; Future; Expected date: 07/31/2019  -     EKG 12-lead; Future

## 2019-07-31 NOTE — LETTER
August 2, 2019      Bobby Frazier Jr., MD  [address] [facility] [address] [address]  [address]  [place] [address]           [facility] GYN Oncology  [address]  [place] [address]  Phone: [phone]          Patient: Tawny Valerio   MR Number: [id number]   YOB: 1960   Date of Visit: 7/31/2019       Dear Dr. Bobby Frazier Jr.:    Thank you for referring Tawny Valerio to me for evaluation. Attached you will find relevant portions of my assessment and plan of care.    If you have questions, please do not hesitate to call me. I look forward to following Tawny Valerio along with you.    Sincerely,    Aneudy Almeida MD    Enclosure  CC:  Jennifer Rosales MD    If you would like to receive this communication electronically, please contact [email or web address] or [phone] to request more information on [employer] access.    For providers and/or their staff who would like to refer a patient to [facility], please contact us through our one-stop-shop provider referral line, [facility], at [phone].    If you feel you have received this communication in error or would no longer like to receive these types of communications, please e-mail [email or web address]

## 2019-08-01 ENCOUNTER — TELEPHONE (OUTPATIENT)
Dept: GYNECOLOGIC ONCOLOGY | Facility: CLINIC | Age: 59
End: 2019-08-01

## 2019-08-01 DIAGNOSIS — C54.1 ENDOMETRIAL CANCER: Primary | ICD-10-CM

## 2019-08-02 ENCOUNTER — PATIENT OUTREACH (OUTPATIENT)
Dept: ADMINISTRATIVE | Facility: HOSPITAL | Age: 59
End: 2019-08-02

## 2019-08-02 ENCOUNTER — PATIENT MESSAGE (OUTPATIENT)
Dept: INTERNAL MEDICINE | Facility: CLINIC | Age: 59
End: 2019-08-02

## 2019-08-02 ENCOUNTER — HOSPITAL ENCOUNTER (OUTPATIENT)
Dept: RADIOLOGY | Facility: HOSPITAL | Age: 59
Discharge: HOME | End: 2019-08-02
Attending: OBSTETRICS & GYNECOLOGY
Payer: COMMERCIAL

## 2019-08-02 DIAGNOSIS — C54.1 ENDOMETRIAL CA: ICD-10-CM

## 2019-08-02 PROCEDURE — 25500020 PHARM REV CODE 255: Performed by: OBSTETRICS & GYNECOLOGY

## 2019-08-02 PROCEDURE — 74177 CT ABDOMEN PELVIS WITH CONTRAST: ICD-10-PCS | Mod: 26,,, | Performed by: RADIOLOGY

## 2019-08-02 PROCEDURE — 74177 CT ABD & PELVIS W/CONTRAST: CPT | Mod: TC

## 2019-08-02 PROCEDURE — 74177 CT ABD & PELVIS W/CONTRAST: CPT | Mod: 26,,, | Performed by: RADIOLOGY

## 2019-08-02 RX ORDER — LIDOCAINE HYDROCHLORIDE 10 MG/ML
1 INJECTION, SOLUTION EPIDURAL; INFILTRATION; INTRACAUDAL; PERINEURAL ONCE
Status: CANCELLED | OUTPATIENT
Start: 2019-08-02 | End: 2019-08-02

## 2019-08-02 RX ORDER — SODIUM CHLORIDE 0.9 % (FLUSH) 0.9 %
10 SYRINGE (ML) INJECTION
Status: CANCELLED | OUTPATIENT
Start: 2019-08-02

## 2019-08-02 RX ORDER — MUPIROCIN 20 MG/G
OINTMENT TOPICAL
Status: CANCELLED | OUTPATIENT
Start: 2019-08-02

## 2019-08-02 RX ORDER — GABAPENTIN 300 MG/1
300 CAPSULE ORAL NIGHTLY
Qty: 90 CAPSULE | Refills: 1 | Status: SHIPPED | OUTPATIENT
Start: 2019-08-02 | End: 2020-01-30

## 2019-08-02 RX ADMIN — IOHEXOL 100 ML: 350 INJECTION, SOLUTION INTRAVENOUS at 03:08

## 2019-08-02 NOTE — LETTER
AUTHORIZATION FOR RELEASE OF   CONFIDENTIAL INFORMATION    Dear Dr. Rosales,    We are seeing Tawny Valerio, date of birth 1960, in the clinic at [facility]. Zahra Kelley DO is the patient's PCP. Tawny Valerio has an outstanding lab/procedure at the time we reviewed her chart. In order to help keep her health information updated, she has authorized us to request the following medical record(s):        (  )  MAMMOGRAM                                      (  )  COLONOSCOPY      (X)  PAP SMEAR                                          (  )  OUTSIDE LAB RESULTS     (  )  DEXA SCAN                                          (  )  EYE EXAM            (  )  FOOT EXAM                                          (  )  ENTIRE RECORD     (  )  OUTSIDE IMMUNIZATIONS                 (  )  _______________         Please fax records to [facility], Angele S Lafleur, DO, [phone]     If you have any questions, please contact STEPHANI Garrido at [phone]        Patient Name: Tawny Valerio  : 1960  Patient Phone #: [phone]

## 2019-08-07 ENCOUNTER — PATIENT MESSAGE (OUTPATIENT)
Dept: SURGERY | Facility: HOSPITAL | Age: 59
End: 2019-08-07

## 2019-08-08 NOTE — PROGRESS NOTES
Subjective:       Patient ID: Tawny Valerio is a 59 y.o. female.    Chief Complaint: Pre-op Exam (8/26/19; Hysterectomy)    Patient is a 59 y.o.female who presents today for preop    She is scheduled for robotic salpingo-oophorectomy on 8/26.     Denies heart failure, denies arrhythmias, denies valvular heart disease.    RCRI criteria: denies IDDM, denies CAD, denies CVA, denies chronic renal insufficiency, denies heart failure, denies high risk surgery    Functional status: good    Bleeding risk - history of bleeding with prior surgeries - none, family history of bleeding disorders - none    History of prior anesthetic complications none    Pt concerned about abnormal EKG findings    U/S performed 3 years ago was unremarkable and similar patterns were seen on previous EKG dating back 5 years  Review of Systems   Constitutional: Negative for activity change, appetite change, chills, diaphoresis, fever and unexpected weight change.   HENT: Negative for congestion, ear discharge, ear pain, hearing loss, postnasal drip, rhinorrhea, tinnitus, trouble swallowing and voice change.    Eyes: Negative for discharge, redness, itching and visual disturbance.   Respiratory: Negative for cough, chest tightness, shortness of breath and wheezing.    Cardiovascular: Positive for palpitations. Negative for chest pain and leg swelling.   Gastrointestinal: Negative for abdominal pain, blood in stool, constipation, diarrhea, nausea and vomiting.   Endocrine: Negative for cold intolerance, heat intolerance, polydipsia and polyuria.   Genitourinary: Negative for difficulty urinating, dysuria, flank pain, hematuria, menstrual problem and urgency.   Musculoskeletal: Negative for arthralgias, gait problem, joint swelling, myalgias, neck pain and neck stiffness.   Skin: Negative for color change and rash.   Neurological: Negative for dizziness, seizures, syncope, weakness and headaches.   Hematological: Negative for adenopathy.    Psychiatric/Behavioral: Negative for agitation, behavioral problems, confusion, dysphoric mood and sleep disturbance.       Objective:      Physical Exam   Constitutional: She is oriented to person, place, and time. She appears well-developed and well-nourished. No distress.   HENT:   Head: Normocephalic and atraumatic.   Right Ear: External ear normal.   Left Ear: External ear normal.   Nose: Nose normal.   Mouth/Throat: Oropharynx is clear and moist. No oropharyngeal exudate.   Eyes: Pupils are equal, round, and reactive to light. Conjunctivae and EOM are normal. Right eye exhibits no discharge. Left eye exhibits no discharge. No scleral icterus.   Neck: Neck supple. No JVD present. No tracheal deviation present. No thyromegaly present.   Cardiovascular: Normal rate, normal heart sounds and intact distal pulses. Exam reveals no gallop and no friction rub.   No murmur heard.  Pulmonary/Chest: Effort normal and breath sounds normal. No stridor. No respiratory distress. She has no wheezes. She has no rales. She exhibits no tenderness.   Abdominal: Soft. Bowel sounds are normal. She exhibits no distension. There is no tenderness. There is no rebound.   Musculoskeletal: She exhibits no edema or tenderness.   Lymphadenopathy:     She has no cervical adenopathy.   Neurological: She is alert and oriented to person, place, and time.   Skin: Skin is warm and dry. No rash noted. She is not diaphoretic. No erythema.   Psychiatric: She has a normal mood and affect. Her behavior is normal.   Nursing note and vitals reviewed.      Assessment and Plan:       1. Pre-op evaluation  - CBC auto differential; Future  - Comprehensive metabolic panel; Future  - EKG 12-lead  - Urine culture; Future  - X-Ray Chest PA And Lateral; Future  - Echocardiogram stress test; Future  No contraindications to anesthesia or surgery at this time.    2. Essential hypertension  - stable  - CBC auto differential; Future  - Comprehensive metabolic  panel; Future  - EKG 12-lead  - Urine culture; Future  - X-Ray Chest PA And Lateral; Future    3. Controlled type 2 diabetes mellitus without complication, without long-term current use of insulin  - stable on current meds  - CBC auto differential; Future  - Comprehensive metabolic panel; Future  - EKG 12-lead  - Urine culture; Future  - X-Ray Chest PA And Lateral; Future          No follow-ups on file.

## 2019-08-11 RX ORDER — METFORMIN HYDROCHLORIDE 750 MG/1
750 TABLET, EXTENDED RELEASE ORAL
Qty: 30 TABLET | Refills: 6 | Status: SHIPPED | OUTPATIENT
Start: 2019-08-11 | End: 2019-10-08

## 2019-08-12 ENCOUNTER — ANESTHESIA EVENT (OUTPATIENT)
Dept: SURGERY | Facility: HOSPITAL | Age: 59
End: 2019-08-12
Payer: COMMERCIAL

## 2019-08-12 ENCOUNTER — PATIENT OUTREACH (OUTPATIENT)
Dept: ADMINISTRATIVE | Facility: HOSPITAL | Age: 59
End: 2019-08-12

## 2019-08-12 ENCOUNTER — TELEPHONE (OUTPATIENT)
Dept: PREADMISSION TESTING | Facility: HOSPITAL | Age: 59
End: 2019-08-12

## 2019-08-12 DIAGNOSIS — Z01.818 PRE-OP TESTING: Primary | ICD-10-CM

## 2019-08-12 NOTE — TELEPHONE ENCOUNTER
----- Message from Ana Maria Hardin RN sent at 8/12/2019 10:44 AM CDT -----  Needs Type & Screen on 8/19

## 2019-08-12 NOTE — PRE ADMISSION SCREENING
Anesthesia Assessment: Preoperative EQUATION    Planned Procedure: Procedure(s) (LRB):  XI ROBOTIC SALPINGO-OOPHORECTOMY (Bilateral)  XI ROBOTIC HYSTERECTOMY (N/A)  Requested Anesthesia Type:General  Surgeon: Aneudy Almeida MD  Service: OB/GYN  Known or anticipated Date of Surgery:8/26/2019    Surgeon notes: reviewed    Electronic QUestionnaire Assessment completed via nurse interview with patient.        No aq        Triage considerations:         Previous anesthesia records:GETA  Airway Method of Intubation: Ji Inserted by: CRNA Airway Device: Endotracheal Tube-Hi/Lo Mask Ventilation: Easy Intubated: Postinduction Blade: Teri #3 Airway Device Size: 7.5 Style: Cuffed Cuff Inflation: Minimal occlusive pressure Inflation Amount (mL): 5 Placement Verified By: Auscultation;Capnometry Grade: Grade II Complicating Factors: Small mouth;Obesity;Short neck Findings Post-Intubation: Positive EtCO2;Bilateral breath sounds Depth of Insertion (cm): 21 Secured at: Lips Complications: -- , patient VERY VERY anxious prior to induction, says she was having a panic attack, dr crawford reassured patient all vitals were fine. patient wanted to hold mask during induction  Breath Sounds: Equal Bilateral Insertion attempts (enter comment if more than 2 attempts): 1 Removal Date: 07/16/19 Removal Time: 0957   Airway Method of Intubation: Ji Inserted by: CRNA Airway Device: Endotracheal Tube-Hi/Lo Airway Device Size: 7.5 Style: Cuffed Depth of Insertion (cm): 21 Inflation Amount (mL): 5 Placement Verified By: Auscultation;Capnometry Breath Sounds: Equal Bilateral Insertion attempts (enter comment if more than 2 attempts): 1 Removal Date: 07/16/19 Removal Time: 0957       Last PCP note: within [facility] Dr. La Fleur (appt. 8/14)  Subspecialty notes: Cardiology: General, gyn/ oncology/ sleep medicine 2017    Other important co-morbidities:   Endometrial cancer  Essential hypertension  Diabetes type 2  Anxiety  Chart reflects -  mild - moderate ASHLI (note 2017)     Tests already available:  Available tests,  within 3 months , within [facility] .            Instructions given. (See in Nurse's note)    Optimization:  Anesthesia Preop Clinic Assessment  Indicated    Medical Opinion Indicated          Plan:    Testing:  T&S   Pre-anesthesia  visit       Visit focus: acute or chronic anxiety concerns, pt requests to see anesthesia     Consultation:Patient's PCP for a statement of optimization      Patient  has previously scheduled Medical Appointment:    Navigation: Tests Scheduled.              Consults scheduled.             Results will be tracked by Preop Clinic.

## 2019-08-12 NOTE — PRE-PROCEDURE INSTRUCTIONS
Spoke to pt reviewed meds, is aware of appointment 8/19 POC - wants to see anesthesiologist. (anxious)  Pt seeing PCP Dr. Blakely 8/14-- pre-op EKG was abnormal  Ordering T&S

## 2019-08-12 NOTE — ANESTHESIA PREPROCEDURE EVALUATION
"[facility]  Anesthesia Pre-Operative Evaluation         Patient Name: Tawny Valerio  YOB: 1960  MRN: [id number]    SUBJECTIVE:     Pre-operative evaluation for Procedure(s) (LRB):  XI ROBOTIC SALPINGO-OOPHORECTOMY (Bilateral)  XI ROBOTIC HYSTERECTOMY (N/A)     08/23/2019    Tawny Valerio is a 59 y.o. female w/ a significant PMHx of DM2, morbid obesity, ASHLI, HTN, anxiety, and grade 1 endometrial cancer.    Patient had a panic attack prior to D&C on 7/16/19 and required extra Versed and reassurance. States she was also given Phenergan postop to help her "relax." D&C completed without further incident.    Patient now presents for the above procedure(s).      LDA: None documented.    Prev airway:   Method of Intubation: Ji Inserted by: CRNA Airway Device: Endotracheal Tube-Hi/Lo Mask Ventilation: Easy Intubated: Postinduction Blade: Teri #3 Airway Device Size: 7.5 Style: Cuffed Cuff Inflation: Minimal occlusive pressure Inflation Amount (mL): 5 Placement Verified By: Auscultation;Capnometry Grade: Grade II Complicating Factors: Small mouth;Obesity;Short neck Findings Post-Intubation: Positive EtCO2;Bilateral breath sounds Depth of Insertion (cm): 21 Secured at: Lips Complications: -- , patient VERY VERY anxious prior to induction, says she was having a panic attack, dr crawford reassured patient all vitals were fine. patient wanted to hold mask during induction  Breath Sounds: Equal Bilateral Insertion attempts (enter comment if more than 2 attempts): 1 Removal Date: 07/16/19 Removal Time: 0957    Drips: None documented.      Patient Active Problem List   Diagnosis    Diabetes type 2, controlled    Vitamin D insufficiency    SOB (shortness of breath)    Morbid obesity with BMI of 60.0-69.9, adult    Edema leg    ASHLI (obstructive sleep apnea)    Essential hypertension    Uterine polyp    Simple endometrial hyperplasia    Endometrial ca       Review of patient's " allergies indicates:   Allergen Reactions    Celebrex [celecoxib] Anaphylaxis    Codeine Hives     Does not know what she can take -usually just takes tylenol or ibuprofen    Lidocaine Hives    Vicodin [hydrocodone-acetaminophen] Hives and Swelling       Current Inpatient Medications:      No current facility-administered medications on file prior to encounter.      Current Outpatient Medications on File Prior to Encounter   Medication Sig Dispense Refill    acetaminophen (TYLENOL) 325 MG tablet Take 2 tablets (650 mg total) by mouth every 6 (six) hours as needed for Pain. 30 tablet 1    azelastine (ASTELIN) 137 mcg (0.1 %) nasal spray USE 1 SPRAY IN EACH NOSTRIL TWICE A DAY 30 mL 0    buPROPion (WELLBUTRIN SR) 150 MG TBSR 12 hr tablet TAKE 1 TABLET (150 MG TOTAL) BY MOUTH 2 (TWO) TIMES DAILY. 60 tablet 6    furosemide (LASIX) 20 MG tablet TAKE 1 TABLET (20 MG TOTAL) BY MOUTH ONCE DAILY. 30 tablet 11    gabapentin (NEURONTIN) 300 MG capsule Take 1 capsule (300 mg total) by mouth every evening. 90 capsule 1    hydroCHLOROthiazide (HYDRODIURIL) 25 MG tablet TAKE 1 TABLET BY MOUTH EVERY DAY 30 tablet 6    losartan (COZAAR) 25 MG tablet Take 25 mg by mouth every evening.       cholecalciferol, vitamin D3, 50,000 unit capsule 1 cap(s) weekly      lancets Misc Check fasting glucose daily 100 each 3    medroxyPROGESTERone (PROVERA) 10 MG tablet Take 1 tablet (10 mg total) by mouth once daily. 30 tablet 3    meloxicam (MOBIC) 15 MG tablet Take 15 mg by mouth daily as needed.       oxyCODONE-acetaminophen (PERCOCET) 5-325 mg per tablet Take 1 tablet by mouth every 6 (six) hours as needed for Pain. 10 tablet 0       Past Surgical History:   Procedure Laterality Date     SECTION      x 1    DILATION AND CURETTAGE OF UTERUS  2019    HYSTEROSCOPY, WITH DILATION AND CURETTAGE OF UTERUS N/A 2019    Performed by Bobby Frazier Jr., MD at [facility] [place], operated by [facility] OR    lipoma removed      POLYPECTOMY N/A  7/16/2019    Per[facility] by [facility]ier Jr[facility] at [facility] OR       Social History     Socioeconomic History    Marital status:      Spouse name: Not on file    Number of children: Not on file    Years of education: Not on file    Highest education level: Not on file   Occupational History     Employer: [employer]     Social Needs    Financial resource strain: Not hard at all    Food insecurity:     Worry: Never true     Inability: Never true    Transportation needs:     Medical: No     Non-medical: No   Tobacco Use    Smoking status: Never Smoker    Smokeless tobacco: Never Used   Substance and Sexual Activity    Alcohol use: Yes     Frequency: Monthly or less     Drinks per session: 1 or 2     Binge frequency: Never     Comment: approx one glass wine/month    Drug use: No    Sexual activity: Yes     Partners: Male   Lifestyle    Physical activity:     Days per week: 3 days     Minutes per session: 60 min    Stress: To some extent   Relationships    Social connections:     Talks on phone: More than three times a week     Gets together: Twice a week     Attends [employer] service: Not on file     Active member of club or organization: No     Attends meetings of clubs or organizations: Never     Relationship status:    Other Topics Concern    Not on file   Social History Narrative    Not on file       OBJECTIVE:     Vital Signs Range (Last 24H):         Significant Labs:  Lab Results   Component Value Date    WBC 10.82 08/14/2019    HGB 13.0 08/14/2019    HCT 41.6 08/14/2019     (H) 08/14/2019    CHOL 187 07/26/2019    TRIG 63 07/26/2019    HDL 57 07/26/2019    ALT 19 08/14/2019    AST 14 08/14/2019     08/14/2019    K 4.2 08/14/2019     08/14/2019    CREATININE 0.8 08/14/2019    BUN 18 08/14/2019    CO2 30 (H) 08/14/2019    TSH 1.280 01/22/2019    HGBA1C 6.7 (H) 07/26/2019       Diagnostic Studies: No relevant studies.    EKG:   Results for orders placed or  performed in visit on 08/14/19   EKG 12-lead    Collection Time: 08/14/19 10:38 AM    Narrative    Test Reason : Z01.818    Vent. Rate : 065 BPM     Atrial Rate : 065 BPM     P-R Int : 160 ms          QRS Dur : 116 ms      QT Int : 408 ms       P-R-T Axes : 014 -37 039 degrees     QTc Int : 424 ms    Normal sinus rhythm  Left axis deviation  LVH with QRS widening  Abnormal ECG  When compared with ECG of 31-JUL-2019 12:38,  Vent. rate has decreased BY  33 BPM  Confirmed by Ann Marie Ahuja MD ([id number]) on 8/14/2019 2:30:20 PM    Referred By: [id number] MCKINLEY DE LOS SANTOS DO           Confirmed By:Ann Marie Ahuja MD       ECHOCARDIOGRAM:  STRESS:  Results for orders placed or performed in visit on 08/15/19   Echocardiogram stress test   Result Value Ref Range    Ascending aorta 3.30 cm    STJ 2.78 cm    AV mean gradient 9 mmHg    Ao peak narendra 1.91 m/s    Ao VTI 41.69 cm    IVRT 0.09 msec    IVS 0.84 0.6 - 1.1 cm    LA size 3.97 cm    Left Atrium Major Axis 5.20 cm    Left Atrium Minor Axis 5.32 cm    LVIDD 5.28 3.5 - 6.0 cm    LVIDS 3.02 2.1 - 4.0 cm    LVOT diameter 2.05 cm    LVOT peak VTI 25.45 cm    PW 0.94 0.6 - 1.1 cm    MV Peak A Narendra 0.88 m/s    E wave decelartion time 223.42 msec    MV Peak E Narendra 0.92 m/s    PV Peak D Narendra 0.35 m/s    PV Peak S Narendra 0.47 m/s    RA Major Axis 5.05 cm    RA Width 2.82 cm    RVDD 2.82 cm    Sinus 2.92 cm    TAPSE 1.88 cm    TDI LATERAL 0.08 m/s    TDI SEPTAL 0.08 m/s    LA WIDTH 3.86 cm    LV Diastolic Volume 134.32 mL    LV Systolic Volume 35.63 mL    LVOT peak narendra 1.17 m/s    LV LATERAL E/E' RATIO 11.50 m/s    LV SEPTAL E/E' RATIO 11.50 m/s    FS 43 %    LA volume 68.51 cm3    LV mass 170.91 g    Left Ventricle Relative Wall Thickness 0.36 cm    AV valve area 2.01 cm2    AV Velocity Ratio 0.61     AV index (prosthetic) 0.61     E/A ratio 1.05     Mean e' 0.08 m/s    Pulm vein S/D ratio 1.34     LVOT area 3.3 cm2    LVOT stroke volume 83.96 cm3    AV peak gradient 15 mmHg    E/E' ratio 11.50 m/s     BSA 2.42 m2    Systolic blood pressure 131 mmHg    Diastolic blood pressure 75 mmHg    HR at rest 78 bpm    RPP 10,218     Peak  bpm    Peak Systolic  mmHg    Peak Diatolic BP 73 mmHg    Peak RPP 25,403     Estimated METs 6     Max Predicted      85% Max Predicted      % Max HR Achieved 85     1 Minute Recovery  bpm    [facility] CV CPX PATIENT IS MALE 0     [facility] CV CPX PATIENT IS FEMALE 1     Exercise duration (sec) 59 seconds    Exercise duration (min) 3 minutes    LV Systolic Volume Index 15.9 mL/m2    LV Diastolic Volume Index 59.85 mL/m2    LA Volume Index 30.5 mL/m2    LV Mass Index 76 g/m2    Right Atrial Pressure (from IVC) 3 mmHg    Narrative    · The stress echo portion of this study is negative for myocardial   ischemia. Target heart rate was achieved.  · Normal left ventricular systolic function. The estimated ejection   fraction is 60%  · No wall motion abnormalities.  · Normal LV diastolic function.  · Normal right ventricular systolic function.  · Normal central venous pressure (3 mm Hg).  · The EKG portion of this study is negative for myocardial ischemia.  · The test was stopped because the patient experienced shortness of   breath.  · There were no arrhythmias during stress.  · Overall, the patient's exercise capacity was below average. Achieved 6   METs.  · Hypertensive response to exercise with peak /73 at peak stress.             Anesthesia Assessment: Preoperative EQUATION     Planned Procedure: Procedure(s) (LRB):  XI ROBOTIC SALPINGO-OOPHORECTOMY (Bilateral)  XI ROBOTIC HYSTERECTOMY (N/A)  Requested Anesthesia Type:General  Surgeon: Aneudy Almeida MD  Service: OB/GYN  Known or anticipated Date of Surgery:8/26/2019     Surgeon notes: reviewed     Electronic QUestionnaire Assessment completed via nurse interview with patient.         No aq           Triage considerations:      Last PCP note: within [facility] Dr. La Fleur (appt. 8/14)  Subspecialty notes: Cardiology:  "General, gyn/ oncology/ sleep medicine 2017     Other important co-morbidities:   Endometrial cancer  Essential hypertension  Diabetes type 2  Anxiety  Chart reflects - mild - moderate ASHLI (note 2017)     Tests already available:  Available tests,  within 3 months , within [facility] .                            Instructions given. (See in Nurse's note)     Optimization:  Anesthesia Preop Clinic Assessment  Indicated    Medical Opinion Indicated                                        Plan:    Testing:  T&S   Pre-anesthesia  visit                                        Visit focus: acute or chronic anxiety concerns, pt requests to see anesthesia                           Consultation:Patient's PCP for a statement of optimization                            Patient  has previously scheduled Medical Appointment:     Navigation: Tests Scheduled.                         Consults scheduled.                        Results will be tracked by Preop Clinic.    Anesthesia Evaluation         Review of Systems  Anesthesia Hx:  Hx of Anesthetic complications 7/16/19 had PANIC ATTACK prior to D & C-required extra Versed and reassurance. States she was also given Phenergan postop to help her "relax". History of prior surgery of interest to airway management or planning: Previous anesthesia: General 7/16/19 D & C with general anesthesia. Procedure performed at an [facility]. Denies Family Hx of Anesthesia complications.   Denies Personal Hx of Anesthesia complications.   Social:  Non-Smoker    Hematology/Oncology:  Hematology Normal      Current/Recent Cancer. (endometrial)   Cardiovascular:   Hypertension  Denies Angina. EF 60% stress test 8/15/19 Functional Capacity 4 METS  Hypertension , Recent typical clinic B/P of 140/70    Pulmonary:   Shortness of breath Denies Recent URI. Sleep Apnea  Pulmonary Symptoms:  are shortness of breath with activity.  Obstructive Sleep Apnea (AHSLI), CPAP used.   Renal/:  Renal/ Normal   "   Hepatic/GI:  Hepatic/GI Normal    Musculoskeletal:  Spine Disorders: Degenerative Joint Disease  Cervical Spine Disorder, Cervical Disc Disease    Neurological:   Peripheral Neuropathy    Endocrine:  Diabetes, Type 2 Diabetes , controlled by oral hypoglycemics, diet. , most recent HgA1c value was 6.7 on 7/2019.  Metabolic Disorders, Morbid Obesity / BMI > 40  Psych:  Anxiety Disorder and Panic Attacks (prior to 7/16/19 D & C).          Physical Exam  General:  Well nourished, Obesity    Airway/Jaw/Neck:  Airway Findings: Mouth Opening: Small, but > 3cm Tongue: Normal  Jaw/Neck Findings:  Neck ROM: Normal ROM  Neck Findings:  Girth Increased, Short Neck      Dental:  Dental Findings: (left bottom loose/broken tooth x1)   Chest/Lungs:  Chest/Lungs Findings: Clear to auscultation     Heart/Vascular:  Heart Findings: Rate: Normal  Rhythm: Regular Rhythm  Sounds: Normal        Mental Status:  Mental Status Findings:  Cooperative, Alert and Oriented         Anesthesia Plan  Type of Anesthesia, risks & benefits discussed:  Anesthesia Type:  general  Patient's Preference:   Intra-op Monitoring Plan:   Intra-op Monitoring Plan Comments:   Post Op Pain Control Plan:   Post Op Pain Control Plan Comments:   Induction:   IV  Beta Blocker:  Patient is not currently on a Beta-Blocker (No further documentation required).       Informed Consent: Patient understands risks and agrees with Anesthesia plan.  Questions answered. Anesthesia consent signed with patient.  ASA Score: 2     Day of Surgery Review of History & Physical:    H&P update referred to the surgeon.         Ready For Surgery From Anesthesia Perspective.     8/19/19 Preop visit completed, pt very anxious and encouraged to call her PCP for possible prescription for antianxiety med prior to surgery. Lab (T & S) completed. PCP Dr Kelley clearance noted 8/14/19 in Epic.

## 2019-08-13 ENCOUNTER — TELEPHONE (OUTPATIENT)
Dept: GYNECOLOGIC ONCOLOGY | Facility: CLINIC | Age: 59
End: 2019-08-13

## 2019-08-13 NOTE — TELEPHONE ENCOUNTER
----- Message from Janki Marble sent at 8/13/2019  8:34 AM CDT -----  Contact: SHAILESH WEBB [[id number]]    Name of Who is Calling: SHAILESH WEBB [[id number]]       What is the request in detail: Patient is requesting a call from staff in regards to getting her paperwork sign for work, she's having surgery on the 26 of Aug  .....Please contact to further discuss and advise.     Can the clinic reply by [facility]: Yes     What Number to Call Back if not [facility]:  [phone]

## 2019-08-13 NOTE — TELEPHONE ENCOUNTER
Spoke with patient regarding her FMLA paperwork inform patient that Disability office handles all paperwork and it takes 5 to 7 business days for paperwork to be completed. Number to disability office given to patient. Patient voiced understanding. ARTURO

## 2019-08-14 ENCOUNTER — OFFICE VISIT (OUTPATIENT)
Dept: INTERNAL MEDICINE | Facility: CLINIC | Age: 59
End: 2019-08-14
Payer: COMMERCIAL

## 2019-08-14 ENCOUNTER — HOSPITAL ENCOUNTER (OUTPATIENT)
Dept: RADIOLOGY | Facility: HOSPITAL | Age: 59
Discharge: HOME | End: 2019-08-14
Attending: INTERNAL MEDICINE
Payer: COMMERCIAL

## 2019-08-14 VITALS
SYSTOLIC BLOOD PRESSURE: 116 MMHG | TEMPERATURE: 98 F | DIASTOLIC BLOOD PRESSURE: 62 MMHG | HEART RATE: 64 BPM | WEIGHT: 293 LBS | BODY MASS INDEX: 55.32 KG/M2 | RESPIRATION RATE: 16 BRPM | HEIGHT: 61 IN

## 2019-08-14 DIAGNOSIS — Z01.818 PRE-OP EVALUATION: Primary | ICD-10-CM

## 2019-08-14 DIAGNOSIS — Z01.818 PREOP EXAM FOR INTERNAL MEDICINE: ICD-10-CM

## 2019-08-14 DIAGNOSIS — E11.9 CONTROLLED TYPE 2 DIABETES MELLITUS WITHOUT COMPLICATION, WITHOUT LONG-TERM CURRENT USE OF INSULIN: ICD-10-CM

## 2019-08-14 DIAGNOSIS — I10 ESSENTIAL HYPERTENSION: ICD-10-CM

## 2019-08-14 DIAGNOSIS — Z01.818 PRE-OP EVALUATION: ICD-10-CM

## 2019-08-14 PROCEDURE — 3044F HG A1C LEVEL LT 7.0%: CPT | Mod: CPTII,S$GLB,, | Performed by: INTERNAL MEDICINE

## 2019-08-14 PROCEDURE — 93010 ELECTROCARDIOGRAM REPORT: CPT | Mod: S$GLB,,, | Performed by: INTERNAL MEDICINE

## 2019-08-14 PROCEDURE — 71046 XR CHEST PA AND LATERAL: ICD-10-PCS | Mod: 26,,, | Performed by: RADIOLOGY

## 2019-08-14 PROCEDURE — 93010 EKG 12-LEAD: ICD-10-PCS | Mod: S$GLB,,, | Performed by: INTERNAL MEDICINE

## 2019-08-14 PROCEDURE — 99214 PR OFFICE/OUTPT VISIT, EST, LEVL IV, 30-39 MIN: ICD-10-PCS | Mod: S$GLB,,, | Performed by: INTERNAL MEDICINE

## 2019-08-14 PROCEDURE — 3078F PR MOST RECENT DIASTOLIC BLOOD PRESSURE < 80 MM HG: ICD-10-PCS | Mod: CPTII,S$GLB,, | Performed by: INTERNAL MEDICINE

## 2019-08-14 PROCEDURE — 99999 PR PBB SHADOW E&M-EST. PATIENT-LVL III: ICD-10-PCS | Mod: PBBFAC,,, | Performed by: INTERNAL MEDICINE

## 2019-08-14 PROCEDURE — 93005 ELECTROCARDIOGRAM TRACING: CPT | Mod: S$GLB,,, | Performed by: INTERNAL MEDICINE

## 2019-08-14 PROCEDURE — 3074F SYST BP LT 130 MM HG: CPT | Mod: CPTII,S$GLB,, | Performed by: INTERNAL MEDICINE

## 2019-08-14 PROCEDURE — 71046 X-RAY EXAM CHEST 2 VIEWS: CPT | Mod: TC,PO

## 2019-08-14 PROCEDURE — 99999 PR PBB SHADOW E&M-EST. PATIENT-LVL III: CPT | Mod: PBBFAC,,, | Performed by: INTERNAL MEDICINE

## 2019-08-14 PROCEDURE — 93005 EKG 12-LEAD: ICD-10-PCS | Mod: S$GLB,,, | Performed by: INTERNAL MEDICINE

## 2019-08-14 PROCEDURE — 3044F PR MOST RECENT HEMOGLOBIN A1C LEVEL <7.0%: ICD-10-PCS | Mod: CPTII,S$GLB,, | Performed by: INTERNAL MEDICINE

## 2019-08-14 PROCEDURE — 3078F DIAST BP <80 MM HG: CPT | Mod: CPTII,S$GLB,, | Performed by: INTERNAL MEDICINE

## 2019-08-14 PROCEDURE — 3008F PR BODY MASS INDEX (BMI) DOCUMENTED: ICD-10-PCS | Mod: CPTII,S$GLB,, | Performed by: INTERNAL MEDICINE

## 2019-08-14 PROCEDURE — 3074F PR MOST RECENT SYSTOLIC BLOOD PRESSURE < 130 MM HG: ICD-10-PCS | Mod: CPTII,S$GLB,, | Performed by: INTERNAL MEDICINE

## 2019-08-14 PROCEDURE — 99214 OFFICE O/P EST MOD 30 MIN: CPT | Mod: S$GLB,,, | Performed by: INTERNAL MEDICINE

## 2019-08-14 PROCEDURE — 3008F BODY MASS INDEX DOCD: CPT | Mod: CPTII,S$GLB,, | Performed by: INTERNAL MEDICINE

## 2019-08-14 PROCEDURE — 71046 X-RAY EXAM CHEST 2 VIEWS: CPT | Mod: 26,,, | Performed by: RADIOLOGY

## 2019-08-14 NOTE — MEDICAL/APP STUDENT
Subjective:       Patient ID: Tawny Valerio is a 59 y.o. female.    Chief Complaint: Pre-op Exam (8/26/19; Hysterectomy)    HPI Ms Valerio is a 59 y.o. Lady with T2DM, anxiety, HTN and a recent diagnosis of endometrial cancer. She received an EKG in preparation for a hysterectomy scheduled for 8/26 which showed LVH that prompted her to make this appointment to review her cardiac health prior to surgery. She reports occasional chest pain that is associated with burping and acid in the back of her throat and relieved by GasX, SOB with exertion that has improved with recent weight loss and CPAP use, palpitations and occasional leg swelling. She denies any diaphoresis, light-headedness or claudication. U/S performed 3 years ago was unremarkable and similar patterns were seen on previous EKG dating back 5 years.      Review of Systems   Constitutional: Negative for chills, diaphoresis, fever and unexpected weight change.   HENT: Negative.    Eyes: Negative.    Respiratory: Positive for shortness of breath. Negative for cough and chest tightness.    Cardiovascular: Positive for chest pain, palpitations and leg swelling.   Endocrine: Negative.    Genitourinary: Negative.    Musculoskeletal: Negative.    Allergic/Immunologic: Negative.    Neurological: Negative for light-headedness.   Hematological: Negative.    Psychiatric/Behavioral: Negative.        Objective:      Physical Exam   Constitutional: She is oriented to person, place, and time. She appears well-developed and well-nourished. No distress.   HENT:   Head: Normocephalic and atraumatic.   Right Ear: External ear normal.   Left Ear: External ear normal.   Nose: Nose normal.   Mouth/Throat: Oropharynx is clear and moist. No oropharyngeal exudate.   Eyes: Pupils are equal, round, and reactive to light. Conjunctivae and EOM are normal. Right eye exhibits no discharge. Left eye exhibits no discharge. No scleral icterus.   Neck: Normal range of motion.    Cardiovascular: Normal rate, regular rhythm, normal heart sounds and intact distal pulses. Exam reveals no gallop and no friction rub.   No murmur heard.  Pulmonary/Chest: Breath sounds normal. No stridor. No respiratory distress. She has no wheezes. She has no rales.   Abdominal: Soft. Bowel sounds are normal. She exhibits no distension and no mass. There is no tenderness. There is no rebound and no guarding.   Musculoskeletal: Normal range of motion. She exhibits edema (ankle bilaterally ). She exhibits no tenderness or deformity.   Neurological: She is alert and oriented to person, place, and time. No cranial nerve deficit.   Skin: Skin is warm and dry. She is not diaphoretic.   Psychiatric: She has a normal mood and affect. Her behavior is normal.       Assessment:       1. Pre-op evaluation    2. Essential hypertension    3. Controlled type 2 diabetes mellitus without complication, without long-term current use of insulin    4. Preop exam for internal medicine        Plan:       Pre-Op Evaluation  - Echo, EKG and stress testing to rule out any cardiac abnormalities  - CBC, CXR, urinalysis and urine cultures to follow up on elevated WBC on 7/31 and rule out infection prior to hysterectomy

## 2019-08-15 ENCOUNTER — CLINICAL SUPPORT (OUTPATIENT)
Dept: CARDIOLOGY | Facility: CLINIC | Age: 59
End: 2019-08-15
Attending: INTERNAL MEDICINE
Payer: COMMERCIAL

## 2019-08-15 VITALS — BODY MASS INDEX: 55.32 KG/M2 | WEIGHT: 293 LBS | HEIGHT: 61 IN

## 2019-08-15 DIAGNOSIS — Z01.818 PRE-OP EVALUATION: ICD-10-CM

## 2019-08-15 LAB
ASCENDING AORTA: 3.3 CM
AV INDEX (PROSTH): 0.61
AV MEAN GRADIENT: 9 MMHG
AV PEAK GRADIENT: 15 MMHG
AV VALVE AREA: 2.01 CM2
AV VELOCITY RATIO: 0.61
BSA FOR ECHO PROCEDURE: 2.42 M2
CV ECHO LV RWT: 0.36 CM
CV STRESS BASE HR: 78 BPM
DIASTOLIC BLOOD PRESSURE: 75 MMHG
DOP CALC AO PEAK VEL: 1.91 M/S
DOP CALC AO VTI: 41.69 CM
DOP CALC LVOT AREA: 3.3 CM2
DOP CALC LVOT DIAMETER: 2.05 CM
DOP CALC LVOT PEAK VEL: 1.17 M/S
DOP CALC LVOT STROKE VOLUME: 83.96 CM3
DOP CALCLVOT PEAK VEL VTI: 25.45 CM
E WAVE DECELERATION TIME: 223.42 MSEC
E/A RATIO: 1.05
E/E' RATIO: 11.5 M/S
ECHO LV POSTERIOR WALL: 0.94 CM (ref 0.6–1.1)
FRACTIONAL SHORTENING: 43 % (ref 28–44)
INTERVENTRICULAR SEPTUM: 0.84 CM (ref 0.6–1.1)
IVRT: 0.09 MSEC
LA MAJOR: 5.2 CM
LA MINOR: 5.32 CM
LA WIDTH: 3.86 CM
LEFT ATRIUM SIZE: 3.97 CM
LEFT ATRIUM VOLUME INDEX: 30.5 ML/M2
LEFT ATRIUM VOLUME: 68.51 CM3
LEFT INTERNAL DIMENSION IN SYSTOLE: 3.02 CM (ref 2.1–4)
LEFT VENTRICLE DIASTOLIC VOLUME INDEX: 59.85 ML/M2
LEFT VENTRICLE DIASTOLIC VOLUME: 134.32 ML
LEFT VENTRICLE MASS INDEX: 76 G/M2
LEFT VENTRICLE SYSTOLIC VOLUME INDEX: 15.9 ML/M2
LEFT VENTRICLE SYSTOLIC VOLUME: 35.63 ML
LEFT VENTRICULAR INTERNAL DIMENSION IN DIASTOLE: 5.28 CM (ref 3.5–6)
LEFT VENTRICULAR MASS: 170.91 G
LV LATERAL E/E' RATIO: 11.5 M/S
LV SEPTAL E/E' RATIO: 11.5 M/S
MV PEAK A VEL: 0.88 M/S
MV PEAK E VEL: 0.92 M/S
OHS CV CPX 1 MINUTE RECOVERY HEART RATE: 112 BPM
OHS CV CPX 85 PERCENT MAX PREDICTED HEART RATE MALE: 132
OHS CV CPX ESTIMATED METS: 6
OHS CV CPX MAX PREDICTED HEART RATE: 156
OHS CV CPX PATIENT IS FEMALE: 1
OHS CV CPX PATIENT IS MALE: 0
OHS CV CPX PEAK DIASTOLIC BLOOD PRESSURE: 73 MMHG
OHS CV CPX PEAK HEAR RATE: 133 BPM
OHS CV CPX PEAK RATE PRESSURE PRODUCT: NORMAL
OHS CV CPX PEAK SYSTOLIC BLOOD PRESSURE: 191 MMHG
OHS CV CPX PERCENT MAX PREDICTED HEART RATE ACHIEVED: 85
OHS CV CPX RATE PRESSURE PRODUCT PRESENTING: NORMAL
PULM VEIN S/D RATIO: 1.34
PV PEAK D VEL: 0.35 M/S
PV PEAK S VEL: 0.47 M/S
RA MAJOR: 5.05 CM
RA PRESSURE: 3 MMHG
RA WIDTH: 2.82 CM
RIGHT VENTRICULAR END-DIASTOLIC DIMENSION: 2.82 CM
SINUS: 2.92 CM
STJ: 2.78 CM
STRESS ECHO POST EXERCISE DUR MIN: 3 MINUTES
STRESS ECHO POST EXERCISE DUR SEC: 59 SECONDS
SYSTOLIC BLOOD PRESSURE: 131 MMHG
TDI LATERAL: 0.08 M/S
TDI SEPTAL: 0.08 M/S
TDI: 0.08 M/S
TRICUSPID ANNULAR PLANE SYSTOLIC EXCURSION: 1.88 CM

## 2019-08-15 PROCEDURE — 93351 ECHOCARDIOGRAM STRESS TEST (CUPID ONLY): ICD-10-PCS | Mod: S$GLB,,, | Performed by: INTERNAL MEDICINE

## 2019-08-15 PROCEDURE — 93351 STRESS TTE COMPLETE: CPT | Mod: S$GLB,,, | Performed by: INTERNAL MEDICINE

## 2019-08-15 PROCEDURE — 99999 PR PBB SHADOW E&M-EST. PATIENT-LVL I: CPT | Mod: PBBFAC,,,

## 2019-08-15 PROCEDURE — 99999 PR PBB SHADOW E&M-EST. PATIENT-LVL I: ICD-10-PCS | Mod: PBBFAC,,,

## 2019-08-16 ENCOUNTER — PATIENT MESSAGE (OUTPATIENT)
Dept: INTERNAL MEDICINE | Facility: CLINIC | Age: 59
End: 2019-08-16

## 2019-08-16 NOTE — DISCHARGE INSTRUCTIONS
Your surgery has been scheduled for:__________________________________________    You should report to:  ____[facility], located on the [place] side of the first floor of the           [facility] ([phone])  ____[facility], located on the [facility] side of the            Second floor of the [facility] ([phone])  ____3rd Floor [facility] located on the [facility] side of the [facility] [phone]  Please Note   - Tell your doctor if you take Aspirin, products containing Aspirin, herbal medications  or blood thinners, such as Coumadin, Ticlid, or Plavix.  (Consult your provider regarding holding or stopping before surgery).  - Arrange for someone to drive you home following surgery.  You will not be allowed to leave the surgical facility alone or drive yourself home following sedation and anesthesia.  Before Surgery  - Stop taking all herbal medications 14days prior to surgery  - No Motrin/Advil (Ibuprofen) 7 days before surgery  - No Aleve (Naproxen) 7 days before surgery  - No Goody's/BC powder 7 days before surgery  - Stop Taking Asprin, products containing Asprin _____days before surgery  - Stop taking blood thinners_______days before surgery  - Refrain from drinking alcoholic beverages for 24hours before and after surgery  - Stop or limit smoking _________days before surgery  - You may take Tylenol for pain  Night before Surgery  - Take a shower or bath (shower is recommended).  Bathe with Hibiclens soap or an antibacterial soap from the neck down.  If not supplied by your surgeon, hibiclens soap will need to be purchased over the counter in pharmacy.  Rinse soap off thoroughly.  - Shampoo your hair with your regular shampoo                           Food and Beverage Instructions  1. Stop ALL solid food, gum, candy (including vitamins) 8 hours before surgery/procedure time.  2. The patient should be  ENCOURAGED to drink carbohydrate-rich clear liquids (sports drinks, clear juices) until 2 hours prior to surgery/procedure time.  3. CLEAR liquids include only water, black coffee NO creamer, clear oral rehydration drinks, clear sports drinks or clear fruit juices (no orange juice, no pulpy juices, no apple cider). Advise patients if they can read newsprint through the liquid, it qualifies as clear liquid.   4. IF IN DOUBT, drink water instead.   5. NOTHING  TO DRINK 2 hours before to arrival for surgery/procedure time. If you are told to take medication on the morning of surgery, it may be taken with a sip of water.     FOLLOW YOUR SURGEON'S INSTRUCTIONS REGARDING FOOD AND BEVERAGES IF DIFFERENT THAN ABOVE INSTRUCTIONS.    The Day of Surgery  - Take another bath or shower with hibiclens or any antibacterial soap, to reduce the chance of infection.  - Take heart and blood pressure medications with a small sip of water, as advised by the perioperative team.  - Do not take fluid pills  - You may brush your teeth and rinse your mouth, but do not swall any additional water.   - Do not apply perfumes, powder, body lotions or deodorant on the day of surgery.  - Nail polish should be removed.  - Do not wear makeup or moisturizer  - Wear comfortable clothes, such as a button front shirt and loose fitting pants.  - Leave all jewelry, including body piercings, and valuables at home.    - Bring any devices you will neeed after surgery such as crutches or canes.  - If you have sleep apnea, please bring your CPAP machine  In the event that your physical condition changes including the onset of a cold or respiratory illness, or if you have to delay or cancel your surgery, please notify your surgeon.    Anesthesia: General Anesthesia     You are watched continuously during your procedure by your anesthesia provider.     Youre due to have surgery. During surgery, youll be given medicine called anesthesia or anesthetic. This will  keep you comfortable and pain-free. Your anesthesia provider will use general anesthesia.  What is general anesthesia?  General anesthesia puts you into a state like deep sleep. It goes into the bloodstream (IV anesthetics), into the lungs (gas anesthetics), or both. You feel nothing during the procedure. You will not remember it. During the procedure, the anesthesia provider monitors you continuously. He or she checks your heart rate and rhythm, blood pressure, breathing, and blood oxygen.  · IV anesthetics. IV anesthetics are given through an IV line in your arm. Theyre often given first. This is so you are asleep before a gas anesthetic is started. Some kinds of IV anesthetics relieve pain. Others relax you. Your doctor will decide which kind is best in your case.  · Gas anesthetics. Gas anesthetics are breathed into the lungs. They are often used to keep you asleep. They can be given through a facemask or a tube placed in your larynx or trachea (breathing tube).  ? If you have a facemask, your anesthesia provider will most likely place it over your nose and mouth while youre still awake. Youll breathe oxygen through the mask as your IV anesthetic is started. Gas anesthetic may be added through the mask.  ? If you have a tube in the larynx or trachea, it will be inserted into your throat after youre asleep.  Anesthesia tools and medicines  You will likely have:  · IV anesthetics. These are put into an IV line into your bloodstream.  · Gas anesthetics. You breathe these anesthetics into your lungs, where they pass into your bloodstream.  · Pulse oximeter. This is a small clip that is attached to the end of your finger. This measures your blood oxygen level.  · Electrocardiography leads (electrodes). These are small sticky pads that are placed on your chest. They record your heart rate and rhythm.  · Blood pressure cuff. This reads your blood pressure.  Risks and possible complications  General anesthesia has  some risks. These include:  · Breathing problems  · Nausea and vomiting  · Sore throat or hoarseness (usually temporary)  · Allergic reaction to the anesthetic  · Irregular heartbeat (rare)  · Cardiac arrest (rare)   Anesthesia safety  · Follow all instructions you are given for how long not to eat or drink before your procedure.  · Be sure your doctor knows what medicines and drugs you take. This includes over-the-counter medicines, herbs, supplements, alcohol or other drugs. You will be asked when those were last taken.  · Have an adult family member or friend drive you home after the procedure.  · For the first 24 hours after your surgery:  ? Do not drive or use heavy equipment.  ? Do not make important decisions or sign legal documents. If important decisions or signing legal documents is necessary during the first 24 hours after surgery, have a trusted family member or spouse act on your behalf.  ? Avoid alcohol.  ? Have a responsible adult stay with you. He or she can watch for problems and help keep you safe.  Date Last Reviewed: 12/1/2016 © 2000-2017 [employer]. [address] [place] [address], [place] [address]. All rights reserved. This information is not intended as a substitute for professional medical care. Always follow your healthcare professional's instructions

## 2019-08-19 ENCOUNTER — PATIENT MESSAGE (OUTPATIENT)
Dept: INTERNAL MEDICINE | Facility: CLINIC | Age: 59
End: 2019-08-19

## 2019-08-19 ENCOUNTER — HOSPITAL ENCOUNTER (OUTPATIENT)
Dept: PREADMISSION TESTING | Facility: HOSPITAL | Age: 59
Discharge: HOME | End: 2019-08-19
Attending: ANESTHESIOLOGY
Payer: COMMERCIAL

## 2019-08-19 VITALS
TEMPERATURE: 98 F | WEIGHT: 293 LBS | SYSTOLIC BLOOD PRESSURE: 140 MMHG | HEART RATE: 73 BPM | HEIGHT: 60 IN | OXYGEN SATURATION: 98 % | RESPIRATION RATE: 18 BRPM | BODY MASS INDEX: 57.52 KG/M2 | DIASTOLIC BLOOD PRESSURE: 70 MMHG

## 2019-08-19 RX ORDER — DIAZEPAM 5 MG/1
TABLET ORAL
Qty: 1 TABLET | Refills: 0 | Status: SHIPPED | OUTPATIENT
Start: 2019-08-19 | End: 2019-09-19

## 2019-08-22 ENCOUNTER — PATIENT MESSAGE (OUTPATIENT)
Dept: SURGERY | Facility: HOSPITAL | Age: 59
End: 2019-08-22

## 2019-08-22 ENCOUNTER — TELEPHONE (OUTPATIENT)
Dept: GYNECOLOGIC ONCOLOGY | Facility: CLINIC | Age: 59
End: 2019-08-22

## 2019-08-22 NOTE — TELEPHONE ENCOUNTER
Left message the patient was on Provera to minimize bleeding.    She has an upcoming robotic assisted laparoscopic hysterectomy and BSO on August 26, 2019.    ----- Message from Ana Maria Hardin RN sent at 8/12/2019 10:39 AM CDT -----  Pt. Has provera on med profile but states was never told about it . Please call pt and let her know about it. Doesn't know what it is and why it is on med card.

## 2019-08-23 ENCOUNTER — TELEPHONE (OUTPATIENT)
Dept: GYNECOLOGIC ONCOLOGY | Facility: CLINIC | Age: 59
End: 2019-08-23

## 2019-08-23 NOTE — TELEPHONE ENCOUNTER
Reminder call to patient regarding surgery 8/26 with arrival time of 8:30 am. Patient voiced understanding. KJ

## 2019-08-25 ENCOUNTER — NURSE TRIAGE (OUTPATIENT)
Dept: ADMINISTRATIVE | Facility: CLINIC | Age: 59
End: 2019-08-25

## 2019-08-25 NOTE — TELEPHONE ENCOUNTER
Pt states she fell this past week and is scheduled for a hysterectomy, she wanted to make sure this would not stop her surgery, she denies breaking anything and does not have a fever, advised her there is nothing alarming about what she said but this would be up to her surgeon, caller agreed    Reason for Disposition   General information question, no triage required and triager able to answer question    Protocols used: INFORMATION ONLY CALL-A-AH

## 2019-08-26 ENCOUNTER — ANESTHESIA (OUTPATIENT)
Dept: SURGERY | Facility: HOSPITAL | Age: 59
End: 2019-08-26
Payer: COMMERCIAL

## 2019-08-26 ENCOUNTER — HOSPITAL ENCOUNTER (OUTPATIENT)
Facility: HOSPITAL | Age: 59
LOS: 1 days | Discharge: HOME | End: 2019-08-27
Attending: OBSTETRICS & GYNECOLOGY | Admitting: OBSTETRICS & GYNECOLOGY
Payer: COMMERCIAL

## 2019-08-26 DIAGNOSIS — C54.1 ENDOMETRIAL CA: Primary | ICD-10-CM

## 2019-08-26 DIAGNOSIS — Z90.710 HISTORY OF ROBOT-ASSISTED LAPAROSCOPIC HYSTERECTOMY: ICD-10-CM

## 2019-08-26 LAB
POCT GLUCOSE: 124 MG/DL (ref 70–110)
POCT GLUCOSE: 146 MG/DL (ref 70–110)
POCT GLUCOSE: 246 MG/DL (ref 70–110)

## 2019-08-26 PROCEDURE — 37000009 HC ANESTHESIA EA ADD 15 MINS: Performed by: OBSTETRICS & GYNECOLOGY

## 2019-08-26 PROCEDURE — 63600175 PHARM REV CODE 636 W HCPCS: Performed by: STUDENT IN AN ORGANIZED HEALTH CARE EDUCATION/TRAINING PROGRAM

## 2019-08-26 PROCEDURE — 27201423 OPTIME MED/SURG SUP & DEVICES STERILE SUPPLY: Performed by: OBSTETRICS & GYNECOLOGY

## 2019-08-26 PROCEDURE — 63600175 PHARM REV CODE 636 W HCPCS: Performed by: ANESTHESIOLOGY

## 2019-08-26 PROCEDURE — 86920 COMPATIBILITY TEST SPIN: CPT

## 2019-08-26 PROCEDURE — 25000003 PHARM REV CODE 250: Performed by: STUDENT IN AN ORGANIZED HEALTH CARE EDUCATION/TRAINING PROGRAM

## 2019-08-26 PROCEDURE — 37000008 HC ANESTHESIA 1ST 15 MINUTES: Performed by: OBSTETRICS & GYNECOLOGY

## 2019-08-26 PROCEDURE — 36000712 HC OR TIME LEV V 1ST 15 MIN: Performed by: OBSTETRICS & GYNECOLOGY

## 2019-08-26 PROCEDURE — 88309 TISSUE EXAM BY PATHOLOGIST: CPT | Mod: 26,,, | Performed by: PATHOLOGY

## 2019-08-26 PROCEDURE — 25000003 PHARM REV CODE 250: Performed by: ANESTHESIOLOGY

## 2019-08-26 PROCEDURE — 25000003 PHARM REV CODE 250: Performed by: OBSTETRICS & GYNECOLOGY

## 2019-08-26 PROCEDURE — 82962 GLUCOSE BLOOD TEST: CPT | Performed by: OBSTETRICS & GYNECOLOGY

## 2019-08-26 PROCEDURE — 88305 CYTOLOGY SPECIMEN- MEDICAL CYTOLOGY (FLUID/WASH/BRUSH): ICD-10-PCS | Mod: 26,,, | Performed by: PATHOLOGY

## 2019-08-26 PROCEDURE — 25000003 PHARM REV CODE 250

## 2019-08-26 PROCEDURE — 58571 TLH W/T/O 250 G OR LESS: CPT | Mod: AS,,, | Performed by: NURSE PRACTITIONER

## 2019-08-26 PROCEDURE — 58571 TLH W/T/O 250 G OR LESS: CPT | Mod: ,,, | Performed by: OBSTETRICS & GYNECOLOGY

## 2019-08-26 PROCEDURE — 63600175 PHARM REV CODE 636 W HCPCS: Performed by: OBSTETRICS & GYNECOLOGY

## 2019-08-26 PROCEDURE — 27201040 HC RC 50 FILTER

## 2019-08-26 PROCEDURE — 58571 PR LAPAROSCOPY W TOT HYSTERECTUTERUS <=250 GRAM  W TUBE/OVARY: ICD-10-PCS | Mod: AS,,, | Performed by: NURSE PRACTITIONER

## 2019-08-26 PROCEDURE — D9220A PRA ANESTHESIA: Mod: ,,, | Performed by: ANESTHESIOLOGY

## 2019-08-26 PROCEDURE — D9220A PRA ANESTHESIA: ICD-10-PCS | Mod: ,,, | Performed by: ANESTHESIOLOGY

## 2019-08-26 PROCEDURE — 88309 TISSUE EXAM BY PATHOLOGIST: CPT | Performed by: PATHOLOGY

## 2019-08-26 PROCEDURE — 88305 TISSUE EXAM BY PATHOLOGIST: CPT | Performed by: PATHOLOGY

## 2019-08-26 PROCEDURE — 88112 CYTOLOGY SPECIMEN- MEDICAL CYTOLOGY (FLUID/WASH/BRUSH): ICD-10-PCS | Mod: 26,,, | Performed by: PATHOLOGY

## 2019-08-26 PROCEDURE — 88112 CYTOPATH CELL ENHANCE TECH: CPT | Mod: 26,,, | Performed by: PATHOLOGY

## 2019-08-26 PROCEDURE — 88309 TISSUE SPECIMEN TO PATHOLOGY - SURGERY: ICD-10-PCS | Mod: 26,,, | Performed by: PATHOLOGY

## 2019-08-26 PROCEDURE — 94761 N-INVAS EAR/PLS OXIMETRY MLT: CPT

## 2019-08-26 PROCEDURE — 71000033 HC RECOVERY, INTIAL HOUR: Performed by: OBSTETRICS & GYNECOLOGY

## 2019-08-26 PROCEDURE — 94799 UNLISTED PULMONARY SVC/PX: CPT

## 2019-08-26 PROCEDURE — 63600175 PHARM REV CODE 636 W HCPCS

## 2019-08-26 PROCEDURE — 58571 PR LAPAROSCOPY W TOT HYSTERECTUTERUS <=250 GRAM  W TUBE/OVARY: ICD-10-PCS | Mod: ,,, | Performed by: OBSTETRICS & GYNECOLOGY

## 2019-08-26 PROCEDURE — 36000713 HC OR TIME LEV V EA ADD 15 MIN: Performed by: OBSTETRICS & GYNECOLOGY

## 2019-08-26 PROCEDURE — 71000039 HC RECOVERY, EACH ADD'L HOUR: Performed by: OBSTETRICS & GYNECOLOGY

## 2019-08-26 RX ORDER — OXYCODONE HYDROCHLORIDE 5 MG/1
TABLET ORAL
Status: COMPLETED
Start: 2019-08-26 | End: 2019-08-26

## 2019-08-26 RX ORDER — GABAPENTIN 300 MG/1
300 CAPSULE ORAL NIGHTLY
Status: DISCONTINUED | OUTPATIENT
Start: 2019-08-26 | End: 2019-08-27 | Stop reason: HOSPADM

## 2019-08-26 RX ORDER — GLUCAGON 1 MG
1 KIT INJECTION
Status: DISCONTINUED | OUTPATIENT
Start: 2019-08-26 | End: 2019-08-27 | Stop reason: HOSPADM

## 2019-08-26 RX ORDER — OXYCODONE AND ACETAMINOPHEN 5; 325 MG/1; MG/1
1 TABLET ORAL EVERY 4 HOURS PRN
Qty: 30 TABLET | Refills: 0 | Status: SHIPPED | OUTPATIENT
Start: 2019-08-26 | End: 2019-10-08

## 2019-08-26 RX ORDER — DOCUSATE SODIUM 100 MG/1
100 CAPSULE, LIQUID FILLED ORAL 2 TIMES DAILY
Status: DISCONTINUED | OUTPATIENT
Start: 2019-08-26 | End: 2019-08-27 | Stop reason: HOSPADM

## 2019-08-26 RX ORDER — DEXAMETHASONE SODIUM PHOSPHATE 4 MG/ML
INJECTION, SOLUTION INTRA-ARTICULAR; INTRALESIONAL; INTRAMUSCULAR; INTRAVENOUS; SOFT TISSUE
Status: DISCONTINUED | OUTPATIENT
Start: 2019-08-26 | End: 2019-08-26

## 2019-08-26 RX ORDER — ROCURONIUM BROMIDE 10 MG/ML
INJECTION, SOLUTION INTRAVENOUS
Status: DISCONTINUED | OUTPATIENT
Start: 2019-08-26 | End: 2019-08-26

## 2019-08-26 RX ORDER — SODIUM CHLORIDE 9 MG/ML
INJECTION, SOLUTION INTRAVENOUS CONTINUOUS
Status: DISCONTINUED | OUTPATIENT
Start: 2019-08-26 | End: 2019-08-26

## 2019-08-26 RX ORDER — FENTANYL CITRATE 50 UG/ML
INJECTION, SOLUTION INTRAMUSCULAR; INTRAVENOUS
Status: DISCONTINUED | OUTPATIENT
Start: 2019-08-26 | End: 2019-08-26

## 2019-08-26 RX ORDER — LORAZEPAM 2 MG/ML
0.25 INJECTION INTRAMUSCULAR ONCE AS NEEDED
Status: DISCONTINUED | OUTPATIENT
Start: 2019-08-26 | End: 2019-08-26 | Stop reason: HOSPADM

## 2019-08-26 RX ORDER — INSULIN ASPART 100 [IU]/ML
0-5 INJECTION, SOLUTION INTRAVENOUS; SUBCUTANEOUS
Status: DISCONTINUED | OUTPATIENT
Start: 2019-08-26 | End: 2019-08-27 | Stop reason: HOSPADM

## 2019-08-26 RX ORDER — IBUPROFEN 200 MG
24 TABLET ORAL
Status: DISCONTINUED | OUTPATIENT
Start: 2019-08-26 | End: 2019-08-27 | Stop reason: HOSPADM

## 2019-08-26 RX ORDER — IBUPROFEN 200 MG
16 TABLET ORAL
Status: DISCONTINUED | OUTPATIENT
Start: 2019-08-26 | End: 2019-08-27 | Stop reason: HOSPADM

## 2019-08-26 RX ORDER — ONDANSETRON 8 MG/1
8 TABLET, ORALLY DISINTEGRATING ORAL EVERY 8 HOURS PRN
Status: DISCONTINUED | OUTPATIENT
Start: 2019-08-26 | End: 2019-08-27 | Stop reason: HOSPADM

## 2019-08-26 RX ORDER — DIPHENHYDRAMINE HCL 25 MG
25 CAPSULE ORAL EVERY 6 HOURS PRN
Status: DISCONTINUED | OUTPATIENT
Start: 2019-08-26 | End: 2019-08-27 | Stop reason: HOSPADM

## 2019-08-26 RX ORDER — MEPERIDINE HYDROCHLORIDE 50 MG/ML
12.5 INJECTION INTRAMUSCULAR; INTRAVENOUS; SUBCUTANEOUS ONCE AS NEEDED
Status: DISCONTINUED | OUTPATIENT
Start: 2019-08-26 | End: 2019-08-26 | Stop reason: HOSPADM

## 2019-08-26 RX ORDER — MIDAZOLAM HYDROCHLORIDE 1 MG/ML
INJECTION, SOLUTION INTRAMUSCULAR; INTRAVENOUS
Status: DISCONTINUED | OUTPATIENT
Start: 2019-08-26 | End: 2019-08-26

## 2019-08-26 RX ORDER — CEFAZOLIN SODIUM 1 G/3ML
INJECTION, POWDER, FOR SOLUTION INTRAMUSCULAR; INTRAVENOUS
Status: DISCONTINUED | OUTPATIENT
Start: 2019-08-26 | End: 2019-08-26

## 2019-08-26 RX ORDER — LORAZEPAM 0.5 MG/1
1 TABLET ORAL ONCE AS NEEDED
Status: COMPLETED | OUTPATIENT
Start: 2019-08-26 | End: 2019-08-26

## 2019-08-26 RX ORDER — LIDOCAINE HYDROCHLORIDE 10 MG/ML
1 INJECTION, SOLUTION EPIDURAL; INFILTRATION; INTRACAUDAL; PERINEURAL ONCE
Status: DISCONTINUED | OUTPATIENT
Start: 2019-08-26 | End: 2019-08-26

## 2019-08-26 RX ORDER — EPHEDRINE SULFATE 50 MG/ML
INJECTION, SOLUTION INTRAVENOUS
Status: DISCONTINUED | OUTPATIENT
Start: 2019-08-26 | End: 2019-08-26

## 2019-08-26 RX ORDER — SODIUM CHLORIDE, SODIUM LACTATE, POTASSIUM CHLORIDE, CALCIUM CHLORIDE 600; 310; 30; 20 MG/100ML; MG/100ML; MG/100ML; MG/100ML
INJECTION, SOLUTION INTRAVENOUS CONTINUOUS
Status: DISCONTINUED | OUTPATIENT
Start: 2019-08-26 | End: 2019-08-27 | Stop reason: HOSPADM

## 2019-08-26 RX ORDER — GLYCOPYRROLATE 0.2 MG/ML
INJECTION INTRAMUSCULAR; INTRAVENOUS
Status: DISCONTINUED | OUTPATIENT
Start: 2019-08-26 | End: 2019-08-26

## 2019-08-26 RX ORDER — ACETAMINOPHEN 10 MG/ML
INJECTION, SOLUTION INTRAVENOUS
Status: DISPENSED
Start: 2019-08-26 | End: 2019-08-27

## 2019-08-26 RX ORDER — LIDOCAINE HCL/PF 100 MG/5ML
SYRINGE (ML) INTRAVENOUS
Status: DISCONTINUED | OUTPATIENT
Start: 2019-08-26 | End: 2019-08-26

## 2019-08-26 RX ORDER — SIMETHICONE 80 MG
1 TABLET,CHEWABLE ORAL 3 TIMES DAILY
Status: DISCONTINUED | OUTPATIENT
Start: 2019-08-26 | End: 2019-08-27 | Stop reason: HOSPADM

## 2019-08-26 RX ORDER — ACETAMINOPHEN 500 MG
1000 TABLET ORAL EVERY 8 HOURS
Status: DISCONTINUED | OUTPATIENT
Start: 2019-08-26 | End: 2019-08-27 | Stop reason: HOSPADM

## 2019-08-26 RX ORDER — SODIUM CHLORIDE 0.9 % (FLUSH) 0.9 %
10 SYRINGE (ML) INJECTION
Status: DISCONTINUED | OUTPATIENT
Start: 2019-08-26 | End: 2019-08-26

## 2019-08-26 RX ORDER — NEOSTIGMINE METHYLSULFATE 1 MG/ML
INJECTION, SOLUTION INTRAVENOUS
Status: DISCONTINUED | OUTPATIENT
Start: 2019-08-26 | End: 2019-08-26

## 2019-08-26 RX ORDER — BUPROPION HYDROCHLORIDE 150 MG/1
150 TABLET ORAL DAILY
Status: DISCONTINUED | OUTPATIENT
Start: 2019-08-26 | End: 2019-08-27

## 2019-08-26 RX ORDER — OXYCODONE HYDROCHLORIDE 10 MG/1
10 TABLET ORAL EVERY 4 HOURS PRN
Status: DISCONTINUED | OUTPATIENT
Start: 2019-08-26 | End: 2019-08-27 | Stop reason: HOSPADM

## 2019-08-26 RX ORDER — LORAZEPAM 0.5 MG/1
TABLET ORAL
Status: COMPLETED
Start: 2019-08-26 | End: 2019-08-26

## 2019-08-26 RX ORDER — PROPOFOL 10 MG/ML
VIAL (ML) INTRAVENOUS
Status: DISCONTINUED | OUTPATIENT
Start: 2019-08-26 | End: 2019-08-26

## 2019-08-26 RX ORDER — SCOLOPAMINE TRANSDERMAL SYSTEM 1 MG/1
1 PATCH, EXTENDED RELEASE TRANSDERMAL
Status: DISCONTINUED | OUTPATIENT
Start: 2019-08-26 | End: 2019-08-26

## 2019-08-26 RX ORDER — HYDROMORPHONE HYDROCHLORIDE 1 MG/ML
0.5 INJECTION, SOLUTION INTRAMUSCULAR; INTRAVENOUS; SUBCUTANEOUS
Status: DISCONTINUED | OUTPATIENT
Start: 2019-08-26 | End: 2019-08-27 | Stop reason: HOSPADM

## 2019-08-26 RX ORDER — ACETAMINOPHEN 500 MG
1000 TABLET ORAL
Status: COMPLETED | OUTPATIENT
Start: 2019-08-26 | End: 2019-08-26

## 2019-08-26 RX ORDER — PHENYLEPHRINE HYDROCHLORIDE 10 MG/ML
INJECTION INTRAVENOUS
Status: DISCONTINUED | OUTPATIENT
Start: 2019-08-26 | End: 2019-08-26

## 2019-08-26 RX ORDER — MUPIROCIN 20 MG/G
OINTMENT TOPICAL
Status: DISCONTINUED | OUTPATIENT
Start: 2019-08-26 | End: 2019-08-26

## 2019-08-26 RX ORDER — KETAMINE HYDROCHLORIDE 10 MG/ML
INJECTION, SOLUTION INTRAMUSCULAR; INTRAVENOUS
Status: DISCONTINUED | OUTPATIENT
Start: 2019-08-26 | End: 2019-08-26

## 2019-08-26 RX ORDER — ONDANSETRON 2 MG/ML
INJECTION INTRAMUSCULAR; INTRAVENOUS
Status: DISCONTINUED | OUTPATIENT
Start: 2019-08-26 | End: 2019-08-26

## 2019-08-26 RX ORDER — IBUPROFEN 600 MG/1
600 TABLET ORAL 3 TIMES DAILY
Qty: 30 TABLET | Refills: 1 | Status: SHIPPED | OUTPATIENT
Start: 2019-08-26 | End: 2023-06-12

## 2019-08-26 RX ORDER — DIPHENHYDRAMINE HYDROCHLORIDE 50 MG/ML
25 INJECTION INTRAMUSCULAR; INTRAVENOUS EVERY 6 HOURS PRN
Status: DISCONTINUED | OUTPATIENT
Start: 2019-08-26 | End: 2019-08-27 | Stop reason: HOSPADM

## 2019-08-26 RX ORDER — FENTANYL CITRATE 50 UG/ML
50 INJECTION, SOLUTION INTRAMUSCULAR; INTRAVENOUS
Status: COMPLETED | OUTPATIENT
Start: 2019-08-26 | End: 2019-08-26

## 2019-08-26 RX ORDER — OXYCODONE HYDROCHLORIDE 5 MG/1
5 TABLET ORAL EVERY 4 HOURS PRN
Status: DISCONTINUED | OUTPATIENT
Start: 2019-08-26 | End: 2019-08-27 | Stop reason: HOSPADM

## 2019-08-26 RX ADMIN — ACETAMINOPHEN 1000 MG: 500 TABLET ORAL at 09:08

## 2019-08-26 RX ADMIN — PROPOFOL 200 MG: 10 INJECTION, EMULSION INTRAVENOUS at 10:08

## 2019-08-26 RX ADMIN — PHENYLEPHRINE HYDROCHLORIDE 100 MCG: 10 INJECTION INTRAVENOUS at 11:08

## 2019-08-26 RX ADMIN — EPHEDRINE SULFATE 10 MG: 50 INJECTION INTRAVENOUS at 11:08

## 2019-08-26 RX ADMIN — MIDAZOLAM HYDROCHLORIDE 2 MG: 1 INJECTION, SOLUTION INTRAMUSCULAR; INTRAVENOUS at 10:08

## 2019-08-26 RX ADMIN — DEXAMETHASONE SODIUM PHOSPHATE 8 MG: 4 INJECTION, SOLUTION INTRAMUSCULAR; INTRAVENOUS at 10:08

## 2019-08-26 RX ADMIN — SODIUM CHLORIDE: 0.9 INJECTION, SOLUTION INTRAVENOUS at 09:08

## 2019-08-26 RX ADMIN — ROCURONIUM BROMIDE 20 MG: 10 INJECTION, SOLUTION INTRAVENOUS at 11:08

## 2019-08-26 RX ADMIN — SODIUM CHLORIDE, SODIUM LACTATE, POTASSIUM CHLORIDE, AND CALCIUM CHLORIDE: .6; .31; .03; .02 INJECTION, SOLUTION INTRAVENOUS at 01:08

## 2019-08-26 RX ADMIN — KETAMINE HYDROCHLORIDE 30 MG: 10 INJECTION, SOLUTION INTRAMUSCULAR; INTRAVENOUS at 10:08

## 2019-08-26 RX ADMIN — OXYCODONE HYDROCHLORIDE 10 MG: 10 TABLET ORAL at 01:08

## 2019-08-26 RX ADMIN — FENTANYL CITRATE 25 MCG: 50 INJECTION, SOLUTION INTRAMUSCULAR; INTRAVENOUS at 12:08

## 2019-08-26 RX ADMIN — NEOSTIGMINE METHYLSULFATE 5 MG: 1 INJECTION INTRAVENOUS at 12:08

## 2019-08-26 RX ADMIN — ACETAMINOPHEN 1000 MG: 500 TABLET ORAL at 03:08

## 2019-08-26 RX ADMIN — FENTANYL CITRATE 50 MCG: 50 INJECTION INTRAMUSCULAR; INTRAVENOUS at 01:08

## 2019-08-26 RX ADMIN — LORAZEPAM 1 MG: 0.5 TABLET ORAL at 01:08

## 2019-08-26 RX ADMIN — DOCUSATE SODIUM 100 MG: 100 CAPSULE, LIQUID FILLED ORAL at 09:08

## 2019-08-26 RX ADMIN — CEFAZOLIN SODIUM 30 ML: 2 SOLUTION INTRAVENOUS at 10:08

## 2019-08-26 RX ADMIN — KETAMINE HYDROCHLORIDE 10 MG: 10 INJECTION, SOLUTION INTRAMUSCULAR; INTRAVENOUS at 12:08

## 2019-08-26 RX ADMIN — FENTANYL CITRATE 100 MCG: 50 INJECTION, SOLUTION INTRAMUSCULAR; INTRAVENOUS at 10:08

## 2019-08-26 RX ADMIN — SODIUM CHLORIDE, SODIUM GLUCONATE, SODIUM ACETATE, POTASSIUM CHLORIDE, MAGNESIUM CHLORIDE, SODIUM PHOSPHATE, DIBASIC, AND POTASSIUM PHOSPHATE: .53; .5; .37; .037; .03; .012; .00082 INJECTION, SOLUTION INTRAVENOUS at 11:08

## 2019-08-26 RX ADMIN — ONDANSETRON 4 MG: 2 INJECTION INTRAMUSCULAR; INTRAVENOUS at 12:08

## 2019-08-26 RX ADMIN — PHENYLEPHRINE HYDROCHLORIDE 100 MCG: 10 INJECTION INTRAVENOUS at 12:08

## 2019-08-26 RX ADMIN — GLYCOPYRROLATE 0.6 MG: 0.2 INJECTION, SOLUTION INTRAMUSCULAR; INTRAVENOUS at 12:08

## 2019-08-26 RX ADMIN — ROCURONIUM BROMIDE 50 MG: 10 INJECTION, SOLUTION INTRAVENOUS at 10:08

## 2019-08-26 RX ADMIN — GABAPENTIN 300 MG: 300 CAPSULE ORAL at 09:08

## 2019-08-26 RX ADMIN — PHENYLEPHRINE HYDROCHLORIDE 200 MCG: 10 INJECTION INTRAVENOUS at 12:08

## 2019-08-26 RX ADMIN — SODIUM CHLORIDE, SODIUM LACTATE, POTASSIUM CHLORIDE, AND CALCIUM CHLORIDE: .6; .31; .03; .02 INJECTION, SOLUTION INTRAVENOUS at 10:08

## 2019-08-26 RX ADMIN — PHENYLEPHRINE HYDROCHLORIDE 100 MCG: 10 INJECTION INTRAVENOUS at 10:08

## 2019-08-26 RX ADMIN — OXYCODONE HYDROCHLORIDE 5 MG: 5 TABLET ORAL at 06:08

## 2019-08-26 RX ADMIN — OXYCODONE HYDROCHLORIDE 10 MG: 10 TABLET ORAL at 09:08

## 2019-08-26 RX ADMIN — LIDOCAINE HYDROCHLORIDE 100 MG: 20 INJECTION, SOLUTION INTRAVENOUS at 10:08

## 2019-08-26 RX ADMIN — INSULIN ASPART 1 UNITS: 100 INJECTION, SOLUTION INTRAVENOUS; SUBCUTANEOUS at 10:08

## 2019-08-26 RX ADMIN — MUPIROCIN: 20 OINTMENT TOPICAL at 09:08

## 2019-08-26 RX ADMIN — SCOPALAMINE 1 PATCH: 1 PATCH, EXTENDED RELEASE TRANSDERMAL at 10:08

## 2019-08-26 RX ADMIN — SIMETHICONE CHEW TAB 80 MG 80 MG: 80 TABLET ORAL at 09:08

## 2019-08-26 RX ADMIN — ROCURONIUM BROMIDE 30 MG: 10 INJECTION, SOLUTION INTRAVENOUS at 11:08

## 2019-08-26 RX ADMIN — OXYCODONE HYDROCHLORIDE 10 MG: 5 TABLET ORAL at 01:08

## 2019-08-26 NOTE — PLAN OF CARE
Robot time out completed with pre incision time out.  All DaVinci instruments inspected before case by Jaclyn Lucio.  All instruments appear to be intact.    All DaVinci instruments inspected after case by Mine Barnes.  All Instruments appear intact.  Lives checked by Laura Garza

## 2019-08-26 NOTE — SUBJECTIVE & OBJECTIVE
Interval History: DOS. Doing well post-operatively. Pain well controlled. Zee draining clear, yellow urine. Tolerating sips.        Medications:  Continuous Infusions:   lactated ringers 125 mL/hr at 08/26/19 1323     Scheduled Meds:   acetaminophen        acetaminophen  1,000 mg Oral Q8H    buPROPion  150 mg Oral Daily    docusate sodium  100 mg Oral BID    gabapentin  300 mg Oral QHS    simethicone  1 tablet Oral TID     PRN Meds:Dextrose 10% Bolus, Dextrose 10% Bolus, diphenhydrAMINE, diphenhydrAMINE, glucagon (human recombinant), glucose, glucose, HYDROmorphone, insulin aspart U-100, lorazepam, meperidine, ondansetron, oxyCODONE, oxyCODONE, promethazine (PHENERGAN) IVPB     Review of patient's allergies indicates:   Allergen Reactions    Celebrex [celecoxib] Anaphylaxis    Codeine Hives     Does not know what she can take -usually just takes tylenol or ibuprofen    Lidocaine Hives    Vicodin [hydrocodone-acetaminophen] Hives and Swelling     Objective:     Vital Signs (24h Range):  Temp:  [97.9 °F (36.6 °C)-98.3 °F (36.8 °C)] 98.3 °F (36.8 °C)  Pulse:  [70-92] 81  Resp:  [15-24] 23  SpO2:  [92 %-100 %] 99 %  BP: (119-148)/(55-69) 134/68     Date 08/26/19 0700 - 08/27/19 0659   Shift 2477-8796 8978-9540 3074-0213 24 Hour Total   INTAKE   I.V.(mL/kg) 1000(7.3)   1000(7.3)   Shift Total(mL/kg) 1000(7.3)   1000(7.3)   OUTPUT   Urine(mL/kg/hr) 400(0.4)   400   Blood 150   150   Shift Total(mL/kg) 550(4)   550(4)   Weight (kg) 136.1 136.1 136.1 136.1     Lines/Drains/Airways     Drain                 Urethral Catheter 08/26/19 1048 Non-latex;Straight-tip 16 Fr. less than 1 day          Peripheral Intravenous Line                 Peripheral IV - Single Lumen Right;Left Antecubital -- days         Peripheral IV - Single Lumen 08/26/19 0915 18 G Left Forearm less than 1 day              Physical Exam   Constitutional: She is oriented to person, place, and time. She appears well-developed and well-nourished.    HENT:   Head: Normocephalic and atraumatic.   Eyes: Pupils are equal, round, and reactive to light. EOM are normal.   Neck: Normal range of motion. Neck supple.   Cardiovascular: Normal rate and regular rhythm.   Pulmonary/Chest: Effort normal.   Abdominal: Soft. She exhibits distension. She exhibits no mass. There is tenderness. There is no guarding.   LSC incisions c/d/i with steri strips in place. Mild, appropriate distention and tenderness to palpation.    Genitourinary:   Genitourinary Comments: Deferred, chiang draining clear yellow urine   Musculoskeletal: Normal range of motion.   Neurological: She is alert and oriented to person, place, and time.   Skin: Skin is warm and dry.   Psychiatric: She has a normal mood and affect. Her behavior is normal. Judgment and thought content normal.   Nursing note and vitals reviewed.      Significant Labs:  Preop CBC 10.8/13/41.6/354    Significant Diagnostics:  Pathology pending

## 2019-08-26 NOTE — ANESTHESIA POSTPROCEDURE EVALUATION
Anesthesia Post Evaluation    Patient: Tawny Valerio    Procedure(s) Performed: Procedure(s) (LRB):  XI ROBOTIC SALPINGO-OOPHORECTOMY (Bilateral)  XI ROBOTIC HYSTERECTOMY (N/A)  XI ROBOTIC LYSIS, ADHESIONS (N/A)    Final Anesthesia Type: general  Patient location during evaluation: PACU  Patient participation: Yes- Able to Participate  Level of consciousness: sedated (arousable)  Post-procedure vital signs: reviewed and stable  Pain management: adequate  Airway patency: patent  PONV status at discharge: No PONV  Anesthetic complications: no      Cardiovascular status: blood pressure returned to baseline  Respiratory status: spontaneous ventilation and room air  Hydration status: euvolemic  Follow-up not needed.          Vitals Value Taken Time   /70 8/26/2019  5:04 PM   Temp 36.8 °C (98.3 °F) 8/26/2019  2:00 PM   Pulse 80 8/26/2019  5:54 PM   Resp 15 8/26/2019  5:54 PM   SpO2 98 % 8/26/2019  5:54 PM   Vitals shown include unvalidated device data.      No case tracking events are documented in the log.      Pain/Segun Score: Pain Rating Prior to Med Admin: 4 (8/26/2019  3:00 PM)  Segun Score: 9 (8/26/2019  2:00 PM)

## 2019-08-26 NOTE — OPERATIVE NOTE ADDENDUM
Certification of Assistant at Surgery       Surgery Date: 8/26/2019     Participating Surgeons:  Surgeon(s) and Role:     * Aneudy Almeida MD - Primary     * Ghada Broderick MD - Resident - Assisting    Procedures:  Procedure(s) (LRB):  XI ROBOTIC SALPINGO-OOPHORECTOMY (Bilateral)  XI ROBOTIC HYSTERECTOMY (N/A)  XI ROBOTIC LYSIS, ADHESIONS (N/A)    Assistant Surgeon's Certification of Necessity:  I understand that section 1842 (b) (6) (d) of the Social Security Act generally prohibits Medicare Part B reasonable charge payment for the services of assistants at surgery in teaching hospitals when qualified residents are available to furnish such services. I certify that the services for which payment is claimed were medically necessary, and that no qualified resident was available to perform the services. I further understand that these services are subject to post-payment review by the Medicare carrier.      Diane Polk NP    08/26/2019  1:06 PM

## 2019-08-26 NOTE — PROGRESS NOTES
DR Lafleur returned page; notified of sores on abdomen; resident at bs at 0935 assessing abdomen. No new orders.

## 2019-08-26 NOTE — BRIEF OP NOTE
[facility]  Brief Operative Note    SUMMARY     Surgery Date: 8/26/2019     Surgeon(s) and Role:     * Aneudy Almeida MD - Primary  First Assistant: SARAHI Greene (no qualified resident for her part)       * Ghada Broderick MD - Resident - Assisting on console  Cierra Muñoz MD- resident assisting.        Pre-op Diagnosis:  Endometrial cancer [C54.1]    Post-op Diagnosis:  Post-Op Diagnosis Codes:     * Endometrial cancer [C54.1]    Procedure(s) (LRB):  XI ROBOTIC SALPINGO-OOPHORECTOMY (Bilateral)  XI ROBOTIC HYSTERECTOMY (N/A)  XI ROBOTIC LYSIS, ADHESIONS (N/A)    Anesthesia: General    Description of Procedure:  Removal of uterus, cervix, bilateral tubes and ovaries.       Description of the findings of the procedure: omental adhesions to anterior abdominal wall.     Estimated Blood Loss: 150 mL         Specimens:   Specimen (12h ago, onward)    Start     Ordered    08/26/19 1218  Specimen to Pathology - Surgery  Once     Comments:  Pre-op Diagnosis: Endometrial cancer [C54.1]Procedure(s):XI ROBOTIC SALPINGO-OOPHORECTOMYXI ROBOTIC HYSTERECTOMYXI ROBOTIC LYSIS, ADHESIONS Number of specimens: 1Name of specimens: 1. Uterus, cervix, bilateral tubes and ovaries - permanent     Start Status     08/26/19 1218 Collected (08/26/19 1240) Order ID: [id number]       08/26/19 1240

## 2019-08-26 NOTE — PLAN OF CARE
Pre op assessment complete. Pt needs: update to H&P. Extension sent to blood bank.     Li with resp notified: pt has IS order.     Will also notify resident with Dr Almeida regarding sore like rash on abdomen.

## 2019-08-26 NOTE — INTERVAL H&P NOTE
The patient has been examined and the H&P has been reviewed:    I concur with the findings and no changes have occurred since H&P was written.    Anesthesia/Surgery risks, benefits and alternative options discussed and understood by patient/family.    Patient has no complaints.   Operative plan reviewed  To OR for planned procedures    Active Hospital Problems    Diagnosis  POA    Endometrial ca [C54.1]  Yes      Resolved Hospital Problems   No resolved problems to display.     Ghada Broderick MD, PhD  OBGYN, PGY-4

## 2019-08-26 NOTE — TRANSFER OF CARE
"Anesthesia Transfer of Care Note    Patient: Tawny Valerio    Procedure(s) Performed: Procedure(s) (LRB):  XI ROBOTIC SALPINGO-OOPHORECTOMY (Bilateral)  XI ROBOTIC HYSTERECTOMY (N/A)  XI ROBOTIC LYSIS, ADHESIONS (N/A)    Patient location: PACU    Anesthesia Type: general    Transport from OR: Transported from OR on 6-10 L/min O2 by face mask with adequate spontaneous ventilation    Post pain: adequate analgesia    Post assessment: no apparent anesthetic complications    Post vital signs: stable    Level of consciousness: awake and alert    Nausea/Vomiting: no nausea/vomiting    Complications: none    Transfer of care protocol was followed      Last vitals:   Visit Vitals  /62 (BP Location: Right arm, Patient Position: Lying)   Pulse 80   Temp 36.6 °C (97.9 °F) (Temporal)   Resp 18   Ht 5' 1" (1.549 m)   Wt 136.1 kg (300 lb)   SpO2 99%   Breastfeeding? No   BMI 56.68 kg/m²     "

## 2019-08-26 NOTE — ASSESSMENT & PLAN NOTE
- Procedure complications: none  - EBL: 150 cc  - IVF @ 125 cc/h; d/c when tolerating PO  - Diet: regular  - Pain control: ibuprofen 600 mg, percocet 5/10 mg, 0.5 mg dilaudid BTP  - In/Out: Zee in place draining clear yellow urine; d/c Zee in AM POD #1  - Bowel function: -flatus/-BM as expected on DOS  - PRN: simethicone, zofran, phenergan, benadryl  - Heme: Preop H/h 13/41; AM CBC ordered  - PPX: ambulation encouraged, IS encouraged, no indicated anticoagulation, TEDs/SCDs in place    Disposition: Will plan to evaluate the patient as she meets her post-operative milestones. Will tentatively plan for discharge to home POD #1.

## 2019-08-26 NOTE — HOSPITAL COURSE
08/26/2019 DOS. Doing well post-operatively. Pain well controlled. Zee draining clear, yellow urine. Tolerating sips.

## 2019-08-26 NOTE — OP NOTE
[facility]  Surgery Department  Operative Note    SUMMARY     Patient: Tawny Valerio    Medical Record: [id number]    Date of Procedure: 8/26/2019     Surgeon: Surgeon(s) and Role:     * Aneudy Almeida MD - Primary     * Ghada Broderick MD - Resident - Assisting        Pre-Operative Diagnosis: Endometrial cancer [C54.1]    Post-Operative Diagnosis: Post-Op Diagnosis Codes:     * Endometrial cancer [C54.1]    Procedure: Procedure(s) (LRB):  XI ROBOTIC SALPINGO-OOPHORECTOMY (Bilateral)  XI ROBOTIC HYSTERECTOMY (N/A)  XI ROBOTIC LYSIS, ADHESIONS (N/A)    EBL: 150 ml     Total Fluid: 1300 ml    Urine Output: 400 ml    Drains: none    Operative History: grade 1 endometrial cancer on recent D&C and polypectomy     Operative Findings: uterus sounded to 9 cm. Not enlarged.     Procedure in Detail: The patient was brought to the Operating Room after induction of general anesthesia.  The arms were secured to the patient's side. A chest strap was placed.  The patient was placed in steep Trendelenburg without any movements.     The legs were placed in Flip stirrups.  The vulva and vagina were then prepped with Betadine scrub and solution.  The abdomen was prepped with ChloraPrep and the patient was sterilely draped.     After timeout identifying patient and procedure, attention was then directed to the peritoneum.  The cervix was exposed with two right angle retractors.  Cervix was grasped with single-tooth tenaculum.  Uterus and cervix sounded 9 cm  . The cervix was easily dilated to a 12 Hanks dilator.     We then placed a 0 Vicryl stitch at 6 o'clock and 12 o'clock.  A medium VCare uterine manipulator was inserted into the endometrial cavity.  Cervical cup was advanced over cervical sutures.  Vaginal occluder was advanced and the entire system was locked in place.     We changed gloves and attention was now directed to the abdomen.     Pneumoperitoneum was obtained with second pass of the  Veress needle.  Opening  pressure was -1.  The abdomen was insufflated to 15 mmHg.  An incision was made approximately 22 cm above the pubic symphysis.  The Xi trocar was introduced followed by the camera.  We had entered into the peritoneal cavity without injury.     We then placed 2 robotic arm trocar(s) on the left.  One approximately 10 cm lateral and slightly below the camera port.  The other just above the iliac crest.  A 3rd robotic arm was then placed on the right side 10 cm lateral and slightly below the camera port.  An 8 mm AirSeal trocar was placed in the right upper quadrant.  These four trocars were placed under direct visualization.     The patient was then placed in steep Trendelenburg and the robot was docked.    Omental adhesions were taken down with the use of the robot. No bowel was involved.      I began on the left hand side.  The left round ligament was transected with monopolar scissors.  The anterior leaf of the broad ligament was opened.  The infundibulopelvic ligament was identified.  The left ureter was identified.  We then began the salpingo-oophorectomy by identifying infundibulopelvic ligament which was cauterized by 3 and cut.        The anterior cul-de-sac peritoneum was opened.  The bladder was dissected downward off of the cervix.  The uterine vessels were cauterized and cut.        Attention was now directed to the right hand side. The right round ligament was transected with monopolar scissors.  The anterior leaf of the broad ligament was opened.  The infundibulopelvic ligament was identified.  The right ureter was identified.  We then began the salpingo-oophorectomy by identifying infundibulopelvic ligament which was cauterized by 3 and cut.     Attention was now directed back anteriorly.  The bladder had been dissected downward off of the cervix.  A colpotomy was made at the cervical cup and followed circumferentially around the cervix.  Uterus, cervix, and bilateral tubes and  ovaries were removed through the vagina.     The vaginal cuff was dry.    At this point, the vagina was then closed with interrupted 0 Vicryl sutures.  A Ric stitch was placed on each side of each angle of the vagina and the intervening vagina was closed with interrupted 0 Vicryl suture.     The pelvis was irrigated.  There was no evidence of any bleeding.  The abdomen was desufflated and reinsufflated with no evidence for bleeding.     Hemoblast was now sprayed into the pelvis.     The robotic instruments were removed.  The robot was undocked.  The skin incisions were then closed with a running 4-0 Monocryl suture in a subcuticular closure.     The patient was then awakened and taken to Recovery Room in stable condition.    Lap, needle, sponge, and instrument count was correct.

## 2019-08-26 NOTE — HPI
Tawny NATALIE Valerio is a 59 y.o. female here for scheduled RALH/BSO for grade 1 endometrial adenocarcinoma.

## 2019-08-26 NOTE — PROGRESS NOTES
[facility]  Gynecologic Oncology  Progress Note    Subjective:     Post-Op Info:  Procedure(s) (LRB):  XI ROBOTIC SALPINGO-OOPHORECTOMY (Bilateral)  XI ROBOTIC HYSTERECTOMY (N/A)  XI ROBOTIC LYSIS, ADHESIONS (N/A)   Day of Surgery  Hospital Day: 1     Interval History: DOS. Doing well post-operatively. Pain well controlled. Zee draining clear, yellow urine. Tolerating sips.        Medications:  Continuous Infusions:   lactated ringers 125 mL/hr at 08/26/19 1323     Scheduled Meds:   acetaminophen        acetaminophen  1,000 mg Oral Q8H    buPROPion  150 mg Oral Daily    docusate sodium  100 mg Oral BID    gabapentin  300 mg Oral QHS    simethicone  1 tablet Oral TID     PRN Meds:Dextrose 10% Bolus, Dextrose 10% Bolus, diphenhydrAMINE, diphenhydrAMINE, glucagon (human recombinant), glucose, glucose, HYDROmorphone, insulin aspart U-100, lorazepam, meperidine, ondansetron, oxyCODONE, oxyCODONE, promethazine (PHENERGAN) IVPB     Review of patient's allergies indicates:   Allergen Reactions    Celebrex [celecoxib] Anaphylaxis    Codeine Hives     Does not know what she can take -usually just takes tylenol or ibuprofen    Lidocaine Hives    Vicodin [hydrocodone-acetaminophen] Hives and Swelling     Objective:     Vital Signs (24h Range):  Temp:  [97.9 °F (36.6 °C)-98.3 °F (36.8 °C)] 98.3 °F (36.8 °C)  Pulse:  [70-92] 81  Resp:  [15-24] 23  SpO2:  [92 %-100 %] 99 %  BP: (119-148)/(55-69) 134/68     Date 08/26/19 0700 - 08/27/19 0659   Shift 6257-1021 9652-4134 5184-0339 24 Hour Total   INTAKE   I.V.(mL/kg) 1000(7.3)   1000(7.3)   Shift Total(mL/kg) 1000(7.3)   1000(7.3)   OUTPUT   Urine(mL/kg/hr) 400(0.4)   400   Blood 150   150   Shift Total(mL/kg) 550(4)   550(4)   Weight (kg) 136.1 136.1 136.1 136.1     Lines/Drains/Airways     Drain                 Urethral Catheter 08/26/19 1048 Non-latex;Straight-tip 16 Fr. less than 1 day          Peripheral Intravenous Line                  Peripheral IV - Single Lumen Right;Left Antecubital -- days         Peripheral IV - Single Lumen 08/26/19 0915 18 G Left Forearm less than 1 day              Physical Exam   Constitutional: She is oriented to person, place, and time. She appears well-developed and well-nourished.   HENT:   Head: Normocephalic and atraumatic.   Eyes: Pupils are equal, round, and reactive to light. EOM are normal.   Neck: Normal range of motion. Neck supple.   Cardiovascular: Normal rate and regular rhythm.   Pulmonary/Chest: Effort normal.   Abdominal: Soft. She exhibits distension. She exhibits no mass. There is tenderness. There is no guarding.   LSC incisions c/d/i with steri strips in place. Mild, appropriate distention and tenderness to palpation.    Genitourinary:   Genitourinary Comments: Deferred, chiang draining clear yellow urine   Musculoskeletal: Normal range of motion.   Neurological: She is alert and oriented to person, place, and time.   Skin: Skin is warm and dry.   Psychiatric: She has a normal mood and affect. Her behavior is normal. Judgment and thought content normal.   Nursing note and vitals reviewed.      Significant Labs:  Preop CBC 10.8/13/41.6/354    Significant Diagnostics:  Pathology pending    Assessment/Plan:     * Endometrial ca  - s/p listed procedures    s/p RALH/BSO 8/26/2019  - Procedure complications: none  - EBL: 150 cc  - IVF @ 125 cc/h; d/c when tolerating PO  - Diet: regular  - Pain control: ibuprofen 600 mg, percocet 5/10 mg, 0.5 mg dilaudid BTP  - In/Out: Chiang in place draining clear yellow urine; d/c Chiang in AM POD #1  - Bowel function: -flatus/-BM as expected on DOS  - PRN: simethicone, zofran, phenergan, benadryl  - Heme: Preop H/h 13/41; AM CBC ordered  - PPX: ambulation encouraged, IS encouraged, no indicated anticoagulation, TEDs/SCDs in place    Disposition: Will plan to evaluate the patient as she meets her post-operative milestones. Will tentatively plan for discharge to home POD  #1.       Essential hypertension  - BP: (119-148)/(55-69) 134/68   - holding home HCTZ, lasix, losartan    ASHLI (obstructive sleep apnea)  - no acute needs    Morbid obesity with BMI of 60.0-69.9, adult  - BMI 58  - no acute needs    Diabetes type 2, controlled  - BG with meals  - 124 prior to procedure  - a1c 6.7  - SSI         Ghada Broderick MD  Otorhinolaryngology-Head & Neck Surgery  [facility]

## 2019-08-27 VITALS
BODY MASS INDEX: 55.32 KG/M2 | SYSTOLIC BLOOD PRESSURE: 105 MMHG | DIASTOLIC BLOOD PRESSURE: 59 MMHG | HEIGHT: 61 IN | HEART RATE: 60 BPM | OXYGEN SATURATION: 96 % | WEIGHT: 293 LBS | TEMPERATURE: 97 F | RESPIRATION RATE: 20 BRPM

## 2019-08-27 LAB
ANION GAP SERPL CALC-SCNC: 11 MMOL/L (ref 8–16)
BASOPHILS # BLD AUTO: 0.03 K/UL (ref 0–0.2)
BASOPHILS NFR BLD: 0.2 % (ref 0–1.9)
BUN SERPL-MCNC: 12 MG/DL (ref 6–20)
CALCIUM SERPL-MCNC: 8.9 MG/DL (ref 8.7–10.5)
CHLORIDE SERPL-SCNC: 104 MMOL/L (ref 95–110)
CO2 SERPL-SCNC: 25 MMOL/L (ref 23–29)
CREAT SERPL-MCNC: 0.7 MG/DL (ref 0.5–1.4)
DIFFERENTIAL METHOD: ABNORMAL
EOSINOPHIL # BLD AUTO: 0 K/UL (ref 0–0.5)
EOSINOPHIL NFR BLD: 0.1 % (ref 0–8)
ERYTHROCYTE [DISTWIDTH] IN BLOOD BY AUTOMATED COUNT: 15.8 % (ref 11.5–14.5)
EST. GFR  (AFRICAN AMERICAN): >60 ML/MIN/1.73 M^2
EST. GFR  (NON AFRICAN AMERICAN): >60 ML/MIN/1.73 M^2
GLUCOSE SERPL-MCNC: 121 MG/DL (ref 70–110)
HCT VFR BLD AUTO: 33.8 % (ref 37–48.5)
HGB BLD-MCNC: 10 G/DL (ref 12–16)
IMM GRANULOCYTES # BLD AUTO: 0.05 K/UL (ref 0–0.04)
IMM GRANULOCYTES NFR BLD AUTO: 0.4 % (ref 0–0.5)
LYMPHOCYTES # BLD AUTO: 1.8 K/UL (ref 1–4.8)
LYMPHOCYTES NFR BLD: 13.8 % (ref 18–48)
MCH RBC QN AUTO: 26 PG (ref 27–31)
MCHC RBC AUTO-ENTMCNC: 29.6 G/DL (ref 32–36)
MCV RBC AUTO: 88 FL (ref 82–98)
MONOCYTES # BLD AUTO: 0.9 K/UL (ref 0.3–1)
MONOCYTES NFR BLD: 6.5 % (ref 4–15)
NEUTROPHILS # BLD AUTO: 10.4 K/UL (ref 1.8–7.7)
NEUTROPHILS NFR BLD: 79 % (ref 38–73)
NRBC BLD-RTO: 0 /100 WBC
PLATELET # BLD AUTO: 268 K/UL (ref 150–350)
PMV BLD AUTO: 10.2 FL (ref 9.2–12.9)
POTASSIUM SERPL-SCNC: 4 MMOL/L (ref 3.5–5.1)
RBC # BLD AUTO: 3.85 M/UL (ref 4–5.4)
SODIUM SERPL-SCNC: 140 MMOL/L (ref 136–145)
WBC # BLD AUTO: 13.16 K/UL (ref 3.9–12.7)

## 2019-08-27 PROCEDURE — 80048 BASIC METABOLIC PNL TOTAL CA: CPT

## 2019-08-27 PROCEDURE — 85025 COMPLETE CBC W/AUTO DIFF WBC: CPT

## 2019-08-27 PROCEDURE — 25000003 PHARM REV CODE 250: Performed by: STUDENT IN AN ORGANIZED HEALTH CARE EDUCATION/TRAINING PROGRAM

## 2019-08-27 PROCEDURE — 36415 COLL VENOUS BLD VENIPUNCTURE: CPT

## 2019-08-27 RX ORDER — BUPROPION HYDROCHLORIDE 150 MG/1
300 TABLET ORAL DAILY
Status: DISCONTINUED | OUTPATIENT
Start: 2019-08-28 | End: 2019-08-27 | Stop reason: HOSPADM

## 2019-08-27 RX ADMIN — OXYCODONE HYDROCHLORIDE 10 MG: 10 TABLET ORAL at 02:08

## 2019-08-27 RX ADMIN — ACETAMINOPHEN 1000 MG: 500 TABLET ORAL at 05:08

## 2019-08-27 RX ADMIN — OXYCODONE HYDROCHLORIDE 10 MG: 10 TABLET ORAL at 06:08

## 2019-08-27 NOTE — HOSPITAL COURSE
8/26/2019. DOS. Doing well post-operatively. Pain well controlled. Zee draining clear, yellow urine. Tolerating sips.  08/27/2019 POD #1. Doing well. Tolerating PO. Ambulating in the room. Zee removed this AM, awaiting spontaneous void. Pain is well controlled on minimal medications. Meeting milestones appropriately. Once voiding spontaneously, will plan to d/c to home later today, POD #1. Patient to RTC in 4-6 weeks for routine postoperative care.

## 2019-08-27 NOTE — NURSING
Pt  states he will  RX medication from in house output pharmacy. Pt awake, alert, nad. PT states she is aware that MD informed her of no sexual activity/pelvic rest for 6 weeks.

## 2019-08-27 NOTE — DISCHARGE SUMMARY
[facility]  Gynecologic Oncology  Discharge Summary    Patient Name: Tawny Valerio  MRN: [id number]  Admission Date: 8/26/2019  Hospital Length of Stay: 1 days  Discharge Date and Time:  08/27/2019 10:07 AM  Attending Physician: Aneudy Almeida MD   Discharging Provider: Ghada Broderick MD  Primary Care Provider: Zahra Kelley DO    HPI:   Tawny Valerio is a 59 y.o. here for RALH/BSO for grade 1 endometrial adenocarcinoma.    Hospital Course:  8/26/2019. DOS. Doing well post-operatively. Pain well controlled. Zee draining clear, yellow urine. Tolerating sips.  08/27/2019 POD #1. Doing well. Tolerating PO. Ambulating in the room. Zee removed this AM, awaiting spontaneous void. Pain is well controlled on minimal medications. Meeting milestones appropriately. Once voiding spontaneously, will plan to d/c to home later today, POD #1. Patient to RTC in 4-6 weeks for routine postoperative care.     Procedure(s) (LRB):  XI ROBOTIC SALPINGO-OOPHORECTOMY (Bilateral)  XI ROBOTIC HYSTERECTOMY (N/A)  XI ROBOTIC LYSIS, ADHESIONS (N/A)         Significant Diagnostic Studies: Labs:   CBC   Recent Labs   Lab 08/27/19  0427   WBC 13.16*   HGB 10.0*   HCT 33.8*          Pending Diagnostic Studies:     Procedure Component Value Units Date/Time    Cytology Specimen-Medical Cytology (Fluid/Wash/Brush) [[id number]] Collected:  08/26/19 1126    Order Status:  Sent Lab Status:  In process Updated:  08/26/19 1142    Specimen:  Peritoneal/abdominal/pelvic wash         Final Active Diagnoses:    Diagnosis Date Noted POA    PRINCIPAL PROBLEM:  Endometrial ca [C54.1] 07/24/2019 Yes    s/p RALH/BSO 8/26/2019 [Z90.710] 08/26/2019 Not Applicable    Essential hypertension [I10] 05/14/2018 Yes    ASHLI (obstructive sleep apnea) [G47.33]  Yes    Morbid obesity with BMI of 60.0-69.9, adult [E66.01, Z68.44] 06/15/2016 Not Applicable    Diabetes type 2, controlled [E11.9] 04/22/2016 Yes      Problems Resolved  During this Admission:        Does this patient meet criteria for extended DVT prophylaxis? No, because not indicated    Discharged Condition: good    Disposition: Home or Self Care    Follow Up:  Follow-up Information     Aneudy Almeida MD.    Specialty:  Gynecologic Oncology  Why:  Follow-Up Appointment as scheduled by the clinic  Contact information:  [address]  [facility] [address]  [phone]                 Patient Instructions:      Diet diabetic     Notify your health care provider if you experience any of the following:  increased confusion or weakness     Notify your health care provider if you experience any of the following:  persistent dizziness, light-headedness, or visual disturbances     Notify your health care provider if you experience any of the following:  worsening rash     Notify your health care provider if you experience any of the following:  severe persistent headache     Notify your health care provider if you experience any of the following:  difficulty breathing or increased cough     Notify your health care provider if you experience any of the following:  redness, tenderness, or signs of infection (pain, swelling, redness, odor or green/yellow discharge around incision site)     Notify your health care provider if you experience any of the following:  severe uncontrolled pain     Notify your health care provider if you experience any of the following:  persistent nausea and vomiting or diarrhea      Remove dressing in 72 hours     Activity as tolerated     Medications:  Reconciled Home Medications:      Medication List      START taking these medications    ibuprofen 600 MG tablet  Commonly known as:  ADVIL,MOTRIN  Take 1 tablet (600 mg total) by mouth 3 (three) times daily.        CHANGE how you take these medications    oxyCODONE-acetaminophen 5-325 mg per tablet  Commonly known as:  PERCOCET  Take 1 tablet by mouth every 4 (four) hours as needed for Pain.  What  changed:  when to take this        CONTINUE taking these medications    acetaminophen 325 MG tablet  Commonly known as:  TYLENOL  Take 2 tablets (650 mg total) by mouth every 6 (six) hours as needed for Pain.     azelastine 137 mcg (0.1 %) nasal spray  Commonly known as:  ASTELIN  USE 1 SPRAY IN EACH NOSTRIL TWICE A DAY     buPROPion 150 MG TBSR 12 hr tablet  Commonly known as:  WELLBUTRIN SR  TAKE 1 TABLET (150 MG TOTAL) BY MOUTH 2 (TWO) TIMES DAILY.     cholecalciferol (vitamin D3) 50,000 unit capsule  1 cap(s) weekly     diazePAM 5 MG tablet  Commonly known as:  VALIUM  Take one tab po thirty minutes prior to surgery     furosemide 20 MG tablet  Commonly known as:  LASIX  TAKE 1 TABLET (20 MG TOTAL) BY MOUTH ONCE DAILY.     gabapentin 300 MG capsule  Commonly known as:  NEURONTIN  Take 1 capsule (300 mg total) by mouth every evening.     hydroCHLOROthiazide 25 MG tablet  Commonly known as:  HYDRODIURIL  TAKE 1 TABLET BY MOUTH EVERY DAY     lancets Misc  Check fasting glucose daily     losartan 25 MG tablet  Commonly known as:  COZAAR  Take 25 mg by mouth every evening.     meloxicam 15 MG tablet  Commonly known as:  MOBIC  Take 15 mg by mouth daily as needed.     metFORMIN 750 MG 24 hr tablet  Commonly known as:  GLUCOPHAGE-XR  TAKE 1 TABLET (750 MG TOTAL) BY MOUTH DAILY WITH BREAKFAST.        STOP taking these medications    medroxyPROGESTERone 10 MG tablet  Commonly known as:  PROVERA            Ghada Broderick MD  Gynecologic Oncology  [facility]

## 2019-08-27 NOTE — PROGRESS NOTES
[facility]  Gynecologic Oncology  Progress Note      Patient Name: Tawny Valerio  MRN: [id number]  Admission Date: 8/26/2019  Hospital Length of Stay: 1 days  Attending Provider: Aneudy Almeida MD  Primary Care Provider: Zahra Kelley DO  Principal Problem: Endometrial ca    Follow-up For: Procedure(s) (LRB):  XI ROBOTIC SALPINGO-OOPHORECTOMY (Bilateral)  XI ROBOTIC HYSTERECTOMY (N/A)  XI ROBOTIC LYSIS, ADHESIONS (N/A)  Post-Operative Day: 1 Day Post-Op  Subjective:      History of Present Illness:  Tawny Valerio is a 59 y.o. here for RALH/BSO for grade 1 endometrial adenocarcinoma.    Hospital Course:  8/26/2019. DOS. Doing well post-operatively. Pain well controlled. Zee draining clear, yellow urine. Tolerating sips.  08/27/2019 POD #1. Doing well. Tolerating PO. Ambulating in the room. Zee removed this AM, awaiting spontaneous void. Pain is well controlled on minimal medications. Meeting milestones appropriately. Once voiding spontaneously, will plan to d/c to home later today, POD #1. Patient to RTC in 4-6 weeks for routine postoperative care.     Interval History:   POD #1. Doing well. Tolerating PO. Ambulating in the room. Zee removed this AM, awaiting spontaneous void. Pain is well controlled on minimal medications. Meeting milestones appropriately. Once voiding spontaneously, will plan to d/c to home later today, POD #1. Patient to RTC in 4-6 weeks for routine postoperative care.     Scheduled Meds:   acetaminophen  1,000 mg Oral Q8H    buPROPion  150 mg Oral Daily    docusate sodium  100 mg Oral BID    gabapentin  300 mg Oral QHS    simethicone  1 tablet Oral TID     Continuous Infusions:   lactated ringers 125 mL/hr at 08/26/19 2203     PRN Meds:Dextrose 10% Bolus, Dextrose 10% Bolus, diphenhydrAMINE, diphenhydrAMINE, glucagon (human recombinant), glucose, glucose, HYDROmorphone, insulin aspart U-100, ondansetron, oxyCODONE, oxyCODONE, promethazine (PHENERGAN)  IVPB    Review of patient's allergies indicates:   Allergen Reactions    Celebrex [celecoxib] Anaphylaxis    Codeine Hives     Does not know what she can take -usually just takes tylenol or ibuprofen    Lidocaine Hives    Vicodin [hydrocodone-acetaminophen] Hives and Swelling       Objective:     Vital Signs (Most Recent):  Temp: 97 °F (36.1 °C) (08/27/19 0544)  Pulse: 65 (08/27/19 0544)  Resp: 18 (08/27/19 0544)  BP: 113/61 (08/27/19 0544)  SpO2: (!) 94 % (08/27/19 0544) Vital Signs (24h Range):  Temp:  [97 °F (36.1 °C)-98.7 °F (37.1 °C)] 97 °F (36.1 °C)  Pulse:  [65-92] 65  Resp:  [15-24] 18  SpO2:  [92 %-100 %] 94 %  BP: (101-167)/(54-91) 113/61     Weight: 136.1 kg (300 lb)  Body mass index is 56.68 kg/m².    Intake/Output - Last 3 Shifts       08/25 0700 - 08/26 0659 08/26 0700 - 08/27 0659    P.O.  1100    I.V. (mL/kg)  1000 (7.3)    Total Intake(mL/kg)  2100 (15.4)    Urine (mL/kg/hr)  2000    Blood  150    Total Output  2150    Net  -50              Physical Exam:   Constitutional: She is oriented to person, place, and time. She appears well-developed and well-nourished.    HENT:   Head: Normocephalic and atraumatic.    Eyes: Pupils are equal, round, and reactive to light. EOM are normal.    Neck: Normal range of motion. Neck supple.    Cardiovascular: Normal rate, regular rhythm and normal heart sounds.     Pulmonary/Chest: Effort normal and breath sounds normal. No respiratory distress.        Abdominal: Soft. Bowel sounds are normal. She exhibits distension and abdominal incision. There is tenderness (mild, appropriate). There is no rebound and no guarding.   LSC incisions are c/d/i with steri strips in place. Minimal distention, bowel sounds present throughout     Genitourinary:   Genitourinary Comments: Deferred           Musculoskeletal: Normal range of motion.       Neurological: She is alert and oriented to person, place, and time.    Skin: Skin is warm and dry. No rash noted.    Psychiatric: She  has a normal mood and affect. Her behavior is normal. Judgment and thought content normal.     Lines/Drains/Airways     Peripheral Intravenous Line                 Peripheral IV - Single Lumen Right;Left Antecubital -- days         Peripheral IV - Single Lumen 08/26/19 0915 18 G Left Forearm less than 1 day                Laboratory:  Recent Labs   Lab 08/27/19  0427   WBC 13.16*   HGB 10.0*   HCT 33.8*   MCV 88           Diagnostic Results:  Pathology pending    Assessment/Plan:     * Endometrial ca  - s/p listed procedures    s/p RALH/BSO 8/26/2019  - Procedure complications: none  - EBL: 150 cc  - Diet: regular, tolerating  - Pain control: ibuprofen 600 mg, percocet 5/10 mg, 0.5 mg dilaudid BTP  - In/Out: Chiang in place draining clear yellow urine; d/c Chiang in AM POD #1  - Bowel function: +flatus/-BM as expected on DOS  - PRN: simethicone, zofran, phenergan, benadryl  - Heme: Preop H/h 13/41; AM CBC 13.2/10/33.8/268  - PPX: ambulation encouraged, IS encouraged, no indicated anticoagulation, TEDs/SCDs in place     Disposition: Meeting post-operative milestones appropraitely. Awaiting spontaneous void. Will plan for discharge to home later today, POD #1. RTC 4-6 weeks for routine postoperative care. Discharge instructions were given this AM.     Essential hypertension  - BP: (101-167)/(54-91) 113/61   - holding home lasix, HCTZ, losartan    ASHLI (obstructive sleep apnea)  - no acute needs    Morbid obesity with BMI of 60.0-69.9, adult  - no acute needs    Diabetes type 2, controlled  - BG with meals  - 124 prior to procedure  - a1c 6.7  - SSI  - BMP normal today, glucose 121      VTE Risk Mitigation (From admission, onward)        Ordered     IP VTE HIGH RISK PATIENT  Once      08/26/19 1309     Place LOUIE hose  Until discontinued      08/26/19 1309     Place sequential compression device  Until discontinued      08/26/19 1309        Was chiang catheter removed? Yes    Ghada Broderick MD  Gynecologic  Oncology  [facility]

## 2019-08-27 NOTE — SUBJECTIVE & OBJECTIVE
Interval History:   POD #1. Doing well. Tolerating PO. Ambulating in the room. Zee removed this AM, awaiting spontaneous void. Pain is well controlled on minimal medications. Meeting milestones appropriately. Once voiding spontaneously, will plan to d/c to home later today, POD #1. Patient to RTC in 4-6 weeks for routine postoperative care.     Scheduled Meds:   acetaminophen  1,000 mg Oral Q8H    buPROPion  150 mg Oral Daily    docusate sodium  100 mg Oral BID    gabapentin  300 mg Oral QHS    simethicone  1 tablet Oral TID     Continuous Infusions:   lactated ringers 125 mL/hr at 08/26/19 2203     PRN Meds:Dextrose 10% Bolus, Dextrose 10% Bolus, diphenhydrAMINE, diphenhydrAMINE, glucagon (human recombinant), glucose, glucose, HYDROmorphone, insulin aspart U-100, ondansetron, oxyCODONE, oxyCODONE, promethazine (PHENERGAN) IVPB    Review of patient's allergies indicates:   Allergen Reactions    Celebrex [celecoxib] Anaphylaxis    Codeine Hives     Does not know what she can take -usually just takes tylenol or ibuprofen    Lidocaine Hives    Vicodin [hydrocodone-acetaminophen] Hives and Swelling       Objective:     Vital Signs (Most Recent):  Temp: 97 °F (36.1 °C) (08/27/19 0544)  Pulse: 65 (08/27/19 0544)  Resp: 18 (08/27/19 0544)  BP: 113/61 (08/27/19 0544)  SpO2: (!) 94 % (08/27/19 0544) Vital Signs (24h Range):  Temp:  [97 °F (36.1 °C)-98.7 °F (37.1 °C)] 97 °F (36.1 °C)  Pulse:  [65-92] 65  Resp:  [15-24] 18  SpO2:  [92 %-100 %] 94 %  BP: (101-167)/(54-91) 113/61     Weight: 136.1 kg (300 lb)  Body mass index is 56.68 kg/m².    Intake/Output - Last 3 Shifts       08/25 0700 - 08/26 0659 08/26 0700 - 08/27 0659    P.O.  1100    I.V. (mL/kg)  1000 (7.3)    Total Intake(mL/kg)  2100 (15.4)    Urine (mL/kg/hr)  2000    Blood  150    Total Output  2150    Net  -50              Physical Exam:   Constitutional: She is oriented to person, place, and time. She appears well-developed and well-nourished.     HENT:   Head: Normocephalic and atraumatic.    Eyes: Pupils are equal, round, and reactive to light. EOM are normal.    Neck: Normal range of motion. Neck supple.    Cardiovascular: Normal rate, regular rhythm and normal heart sounds.     Pulmonary/Chest: Effort normal and breath sounds normal. No respiratory distress.        Abdominal: Soft. Bowel sounds are normal. She exhibits distension and abdominal incision. There is tenderness (mild, appropriate). There is no rebound and no guarding.   LSC incisions are c/d/i with steri strips in place. Minimal distention, bowel sounds present throughout     Genitourinary:   Genitourinary Comments: Deferred           Musculoskeletal: Normal range of motion.       Neurological: She is alert and oriented to person, place, and time.    Skin: Skin is warm and dry. No rash noted.    Psychiatric: She has a normal mood and affect. Her behavior is normal. Judgment and thought content normal.     Lines/Drains/Airways     Peripheral Intravenous Line                 Peripheral IV - Single Lumen Right;Left Antecubital -- days         Peripheral IV - Single Lumen 08/26/19 0915 18 G Left Forearm less than 1 day                Laboratory:  Recent Labs   Lab 08/27/19  0427   WBC 13.16*   HGB 10.0*   HCT 33.8*   MCV 88           Diagnostic Results:  Pathology pending

## 2019-08-27 NOTE — ASSESSMENT & PLAN NOTE
- Procedure complications: none  - EBL: 150 cc  - Diet: regular, tolerating  - Pain control: ibuprofen 600 mg, percocet 5/10 mg, 0.5 mg dilaudid BTP  - In/Out: Zee in place draining clear yellow urine; d/c Zee in AM POD #1  - Bowel function: +flatus/-BM as expected on DOS  - PRN: simethicone, zofran, phenergan, benadryl  - Heme: Preop H/h 13/41; AM CBC 13.2/10/33.8/268  - PPX: ambulation encouraged, IS encouraged, no indicated anticoagulation, TEDs/SCDs in place     Disposition: Meeting post-operative milestones appropraitely. Awaiting spontaneous void. Will plan for discharge to home later today, POD #1. RTC 4-6 weeks for routine postoperative care. Discharge instructions were given this AM.

## 2019-08-28 LAB
BLD PROD TYP BPU: NORMAL
BLOOD UNIT EXPIRATION DATE: NORMAL
BLOOD UNIT TYPE CODE: 5100
BLOOD UNIT TYPE: NORMAL
CODING SYSTEM: NORMAL
DISPENSE STATUS: NORMAL
TRANS ERYTHROCYTES VOL PATIENT: NORMAL ML

## 2019-08-30 ENCOUNTER — PATIENT MESSAGE (OUTPATIENT)
Dept: GYNECOLOGIC ONCOLOGY | Facility: CLINIC | Age: 59
End: 2019-08-30

## 2019-09-02 ENCOUNTER — NURSE TRIAGE (OUTPATIENT)
Dept: ADMINISTRATIVE | Facility: CLINIC | Age: 59
End: 2019-09-02

## 2019-09-02 ENCOUNTER — TELEPHONE (OUTPATIENT)
Dept: GYNECOLOGIC ONCOLOGY | Facility: HOSPITAL | Age: 59
End: 2019-09-02

## 2019-09-02 RX ORDER — SULFAMETHOXAZOLE AND TRIMETHOPRIM 800; 160 MG/1; MG/1
1 TABLET ORAL 2 TIMES DAILY
Qty: 6 TABLET | Refills: 0 | Status: SHIPPED | OUTPATIENT
Start: 2019-09-02 | End: 2019-09-05

## 2019-09-02 NOTE — TELEPHONE ENCOUNTER
Patient called to report symptoms of UTI. She endorses burning on urination and frequency. States that she has had UTIs before and this feels similar. She denies abdominal pain, fever, nausea, vomiting. Rx called to patient's pharmacy. Encouraged patient to call back for worsening symptoms.    Cierra Muñoz MD  OBGYN PGY-2

## 2019-09-02 NOTE — TELEPHONE ENCOUNTER
Reason for Disposition   Caller has already spoken with the PCP and has no further questions.    Additional Information   Negative: Caller is angry or rude (e.g., hangs up, verbally abusive, yelling)   Negative: Caller hangs up    Protocols used: NO CONTACT OR DUPLICATE CONTACT CALL-A-    Patient states she has already spoken to on call provider. No other questions or concerns voiced. Declined triage at this time.

## 2019-09-04 ENCOUNTER — PATIENT MESSAGE (OUTPATIENT)
Dept: INTERNAL MEDICINE | Facility: CLINIC | Age: 59
End: 2019-09-04

## 2019-09-04 ENCOUNTER — TELEPHONE (OUTPATIENT)
Dept: GYNECOLOGIC ONCOLOGY | Facility: CLINIC | Age: 59
End: 2019-09-04

## 2019-09-04 DIAGNOSIS — N30.00 ACUTE CYSTITIS WITHOUT HEMATURIA: ICD-10-CM

## 2019-09-04 RX ORDER — HYDROCORTISONE 25 MG/G
CREAM TOPICAL 2 TIMES DAILY
Qty: 60 G | Refills: 1 | Status: SHIPPED | OUTPATIENT
Start: 2019-09-04 | End: 2020-08-12 | Stop reason: SDUPTHER

## 2019-09-04 RX ORDER — CIPROFLOXACIN 500 MG/1
500 TABLET ORAL 2 TIMES DAILY
Qty: 14 TABLET | Refills: 0 | Status: SHIPPED | OUTPATIENT
Start: 2019-09-04 | End: 2019-09-11

## 2019-09-04 NOTE — TELEPHONE ENCOUNTER
----- Message from Gifty Bhagat MA sent at 9/4/2019  9:32 AM CDT -----  Patient called stating she thinks she have an UTI. States her Dr. Muñoz gave her antibiotics  and she started taking them on Monday. States she is not getting any better would like to know if she can take something else. Patient states she is bleeding as well but bleeding is lite and spotting. Patient denies any nausea, or fever. Please advise. KJ   ----- Message -----  From: Parul Cerda  Sent: 9/4/2019   9:21 AM  To: [facility] Staff    Patient had hysterectomy last week and developed a UTI over the weekend and has blood when she urinates. Also when she's not urinating. Patient wants a call back.

## 2019-09-11 NOTE — PROGRESS NOTES
Subjective:       Patient ID: Tawny Valerio is a 59 y.o. female.    Chief Complaint: Fall (last month); Arm Pain (left); and Sciatica (since Sat. left side)    Patient is a 59 y.o.female who presents today for fall.      Before her surgery, she fell onto stairs by accident. She fell on her stomach and breast and left arm. She had the surgery. Had uti and hemorrhoids. Now, she has excruciating sciatic to left leg. Pain starts mostly in low back and radiates to her toes. Mostly occurs upon sitting, standing too long or walking. Pain is 10/10 when it occurs. She took ibuprofen 600 mg and oxycodone with no relief. She tried tylenol with no relief. She tried motrin last night and it did help. The motrin does lower the pain scale. She takes 2 motrin at once.         Review of Systems   Constitutional: Negative for appetite change, chills, diaphoresis and fever.   HENT: Negative for congestion, ear discharge, ear pain, postnasal drip, tinnitus, trouble swallowing and voice change.    Eyes: Negative for discharge, redness and itching.   Respiratory: Negative for cough, chest tightness, shortness of breath and wheezing.    Cardiovascular: Negative for chest pain, palpitations and leg swelling.   Gastrointestinal: Negative for abdominal pain, constipation, diarrhea, nausea and vomiting.   Endocrine: Negative for cold intolerance and heat intolerance.   Genitourinary: Negative for difficulty urinating, dysuria, flank pain, hematuria, pelvic pain and urgency.   Musculoskeletal: Positive for back pain. Negative for arthralgias, gait problem, myalgias and neck stiffness.   Skin: Negative for color change and rash.   Neurological: Positive for numbness. Negative for dizziness, seizures, syncope, weakness and headaches.   Hematological: Negative for adenopathy.   Psychiatric/Behavioral: Negative for agitation, behavioral problems, confusion and sleep disturbance.       Objective:      Physical Exam   Constitutional: She is  oriented to person, place, and time. She appears well-developed and well-nourished. No distress.   HENT:   Head: Normocephalic and atraumatic.   Right Ear: External ear normal.   Left Ear: External ear normal.   Nose: Nose normal.   Mouth/Throat: Oropharynx is clear and moist. No oropharyngeal exudate.   Eyes: Pupils are equal, round, and reactive to light. Conjunctivae and EOM are normal. Right eye exhibits no discharge. Left eye exhibits no discharge. No scleral icterus.   Neck: Neck supple. No JVD present. No tracheal deviation present. No thyromegaly present.   Cardiovascular: Normal rate, normal heart sounds and intact distal pulses. Exam reveals no gallop and no friction rub.   No murmur heard.  Pulmonary/Chest: Effort normal and breath sounds normal. No stridor. No respiratory distress. She has no wheezes. She has no rales. She exhibits no tenderness.   Abdominal: Soft. Bowel sounds are normal. She exhibits no distension. There is no tenderness. There is no rebound.   Musculoskeletal: She exhibits no edema or tenderness.   Lymphadenopathy:     She has no cervical adenopathy.   Neurological: She is alert and oriented to person, place, and time.   Skin: Skin is warm and dry. No rash noted. She is not diaphoretic. No erythema.   Psychiatric: She has a normal mood and affect. Her behavior is normal.   Nursing note and vitals reviewed.      Assessment and Plan:       1. Left sided sciatica  - continue motrin 2 tabs po bid with food  - heat to low back  - piriformis stretching  Notify clinic if symptoms change, worsen or do not improve  - triamcinolone acetonide injection 40 mg  - methylPREDNISolone (MEDROL DOSEPACK) 4 mg tablet; Take as directed  Dispense: 1 Package; Refill: 0    2. Left arm pain  - declines xray; see above plan     Notify clinic if symptoms change, worsen or do not improve        No follow-ups on file.

## 2019-09-19 ENCOUNTER — OFFICE VISIT (OUTPATIENT)
Dept: INTERNAL MEDICINE | Facility: CLINIC | Age: 59
End: 2019-09-19
Payer: COMMERCIAL

## 2019-09-19 VITALS
BODY MASS INDEX: 55.32 KG/M2 | RESPIRATION RATE: 16 BRPM | SYSTOLIC BLOOD PRESSURE: 116 MMHG | DIASTOLIC BLOOD PRESSURE: 76 MMHG | WEIGHT: 293 LBS | TEMPERATURE: 98 F | HEIGHT: 61 IN | HEART RATE: 89 BPM

## 2019-09-19 DIAGNOSIS — M79.602 LEFT ARM PAIN: ICD-10-CM

## 2019-09-19 DIAGNOSIS — M54.32 LEFT SIDED SCIATICA: Primary | ICD-10-CM

## 2019-09-19 PROCEDURE — 3008F BODY MASS INDEX DOCD: CPT | Mod: CPTII,S$GLB,, | Performed by: INTERNAL MEDICINE

## 2019-09-19 PROCEDURE — 3074F SYST BP LT 130 MM HG: CPT | Mod: CPTII,S$GLB,, | Performed by: INTERNAL MEDICINE

## 2019-09-19 PROCEDURE — 3008F PR BODY MASS INDEX (BMI) DOCUMENTED: ICD-10-PCS | Mod: CPTII,S$GLB,, | Performed by: INTERNAL MEDICINE

## 2019-09-19 PROCEDURE — 3074F PR MOST RECENT SYSTOLIC BLOOD PRESSURE < 130 MM HG: ICD-10-PCS | Mod: CPTII,S$GLB,, | Performed by: INTERNAL MEDICINE

## 2019-09-19 PROCEDURE — 3078F PR MOST RECENT DIASTOLIC BLOOD PRESSURE < 80 MM HG: ICD-10-PCS | Mod: CPTII,S$GLB,, | Performed by: INTERNAL MEDICINE

## 2019-09-19 PROCEDURE — 99214 PR OFFICE/OUTPT VISIT, EST, LEVL IV, 30-39 MIN: ICD-10-PCS | Mod: 25,S$GLB,, | Performed by: INTERNAL MEDICINE

## 2019-09-19 PROCEDURE — 99999 PR PBB SHADOW E&M-EST. PATIENT-LVL III: CPT | Mod: PBBFAC,,, | Performed by: INTERNAL MEDICINE

## 2019-09-19 PROCEDURE — 96372 PR INJECTION,THERAP/PROPH/DIAG2ST, IM OR SUBCUT: ICD-10-PCS | Mod: S$GLB,,, | Performed by: INTERNAL MEDICINE

## 2019-09-19 PROCEDURE — 99999 PR PBB SHADOW E&M-EST. PATIENT-LVL III: ICD-10-PCS | Mod: PBBFAC,,, | Performed by: INTERNAL MEDICINE

## 2019-09-19 PROCEDURE — 3078F DIAST BP <80 MM HG: CPT | Mod: CPTII,S$GLB,, | Performed by: INTERNAL MEDICINE

## 2019-09-19 PROCEDURE — 99214 OFFICE O/P EST MOD 30 MIN: CPT | Mod: 25,S$GLB,, | Performed by: INTERNAL MEDICINE

## 2019-09-19 PROCEDURE — 96372 THER/PROPH/DIAG INJ SC/IM: CPT | Mod: S$GLB,,, | Performed by: INTERNAL MEDICINE

## 2019-09-19 RX ORDER — TRIAMCINOLONE ACETONIDE 40 MG/ML
40 INJECTION, SUSPENSION INTRA-ARTICULAR; INTRAMUSCULAR
Status: COMPLETED | OUTPATIENT
Start: 2019-09-19 | End: 2019-09-19

## 2019-09-19 RX ORDER — METHYLPREDNISOLONE 4 MG/1
TABLET ORAL
Qty: 1 PACKAGE | Refills: 0 | Status: SHIPPED | OUTPATIENT
Start: 2019-09-19 | End: 2019-10-08

## 2019-09-19 RX ADMIN — TRIAMCINOLONE ACETONIDE 40 MG: 40 INJECTION, SUSPENSION INTRA-ARTICULAR; INTRAMUSCULAR at 11:09

## 2019-09-24 DIAGNOSIS — E66.01 MORBID OBESITY WITH BMI OF 60.0-69.9, ADULT: ICD-10-CM

## 2019-09-24 DIAGNOSIS — E11.9 CONTROLLED TYPE 2 DIABETES MELLITUS WITHOUT COMPLICATION, WITHOUT LONG-TERM CURRENT USE OF INSULIN: ICD-10-CM

## 2019-09-24 DIAGNOSIS — R60.0 BILATERAL EDEMA OF LOWER EXTREMITY: ICD-10-CM

## 2019-09-24 DIAGNOSIS — R06.02 SOB (SHORTNESS OF BREATH): ICD-10-CM

## 2019-09-24 RX ORDER — FUROSEMIDE 20 MG/1
TABLET ORAL
Qty: 30 TABLET | Refills: 11 | Status: SHIPPED | OUTPATIENT
Start: 2019-09-24 | End: 2020-11-04 | Stop reason: SDUPTHER

## 2019-09-25 ENCOUNTER — PATIENT MESSAGE (OUTPATIENT)
Dept: INTERNAL MEDICINE | Facility: CLINIC | Age: 59
End: 2019-09-25

## 2019-09-25 ENCOUNTER — TELEPHONE (OUTPATIENT)
Dept: ADMINISTRATIVE | Facility: OTHER | Age: 59
End: 2019-09-25

## 2019-09-25 DIAGNOSIS — M79.602 LEFT ARM PAIN: Primary | ICD-10-CM

## 2019-09-26 ENCOUNTER — TELEPHONE (OUTPATIENT)
Dept: GYNECOLOGIC ONCOLOGY | Facility: CLINIC | Age: 59
End: 2019-09-26

## 2019-09-26 ENCOUNTER — HOSPITAL ENCOUNTER (OUTPATIENT)
Dept: RADIOLOGY | Facility: HOSPITAL | Age: 59
Discharge: HOME | End: 2019-09-26
Attending: INTERNAL MEDICINE
Payer: COMMERCIAL

## 2019-09-26 ENCOUNTER — PATIENT MESSAGE (OUTPATIENT)
Dept: GYNECOLOGIC ONCOLOGY | Facility: CLINIC | Age: 59
End: 2019-09-26

## 2019-09-26 ENCOUNTER — OFFICE VISIT (OUTPATIENT)
Dept: GYNECOLOGIC ONCOLOGY | Facility: CLINIC | Age: 59
End: 2019-09-26
Payer: COMMERCIAL

## 2019-09-26 ENCOUNTER — DOCUMENTATION ONLY (OUTPATIENT)
Dept: GYNECOLOGIC ONCOLOGY | Facility: CLINIC | Age: 59
End: 2019-09-26

## 2019-09-26 VITALS
HEIGHT: 61 IN | DIASTOLIC BLOOD PRESSURE: 76 MMHG | WEIGHT: 287.5 LBS | BODY MASS INDEX: 54.28 KG/M2 | SYSTOLIC BLOOD PRESSURE: 134 MMHG | HEART RATE: 74 BPM

## 2019-09-26 DIAGNOSIS — C54.1 ENDOMETRIAL CA: Primary | ICD-10-CM

## 2019-09-26 DIAGNOSIS — M79.602 LEFT ARM PAIN: ICD-10-CM

## 2019-09-26 DIAGNOSIS — Z90.710 HISTORY OF ROBOT-ASSISTED LAPAROSCOPIC HYSTERECTOMY: ICD-10-CM

## 2019-09-26 DIAGNOSIS — Z12.11 COLON CANCER SCREENING: ICD-10-CM

## 2019-09-26 PROCEDURE — 73030 XR SHOULDER COMPLETE 2 OR MORE VIEWS LEFT: ICD-10-PCS | Mod: 26,LT,, | Performed by: RADIOLOGY

## 2019-09-26 PROCEDURE — 73070 X-RAY EXAM OF ELBOW: CPT | Mod: TC,PO,LT

## 2019-09-26 PROCEDURE — 99999 PR PBB SHADOW E&M-EST. PATIENT-LVL IV: CPT | Mod: PBBFAC,,, | Performed by: OBSTETRICS & GYNECOLOGY

## 2019-09-26 PROCEDURE — 73070 X-RAY EXAM OF ELBOW: CPT | Mod: 26,LT,, | Performed by: RADIOLOGY

## 2019-09-26 PROCEDURE — 73030 X-RAY EXAM OF SHOULDER: CPT | Mod: TC,PO,LT

## 2019-09-26 PROCEDURE — 99999 PR PBB SHADOW E&M-EST. PATIENT-LVL IV: ICD-10-PCS | Mod: PBBFAC,,, | Performed by: OBSTETRICS & GYNECOLOGY

## 2019-09-26 PROCEDURE — 73030 X-RAY EXAM OF SHOULDER: CPT | Mod: 26,LT,, | Performed by: RADIOLOGY

## 2019-09-26 PROCEDURE — 99024 PR POST-OP FOLLOW-UP VISIT: ICD-10-PCS | Mod: S$GLB,,, | Performed by: OBSTETRICS & GYNECOLOGY

## 2019-09-26 PROCEDURE — 99024 POSTOP FOLLOW-UP VISIT: CPT | Mod: S$GLB,,, | Performed by: OBSTETRICS & GYNECOLOGY

## 2019-09-26 PROCEDURE — 73070 XR ELBOW 2 VIEWS LEFT: ICD-10-PCS | Mod: 26,LT,, | Performed by: RADIOLOGY

## 2019-09-26 RX ORDER — ASPIRIN 325 MG
TABLET, DELAYED RELEASE (ENTERIC COATED) ORAL
COMMUNITY
End: 2020-08-18

## 2019-09-26 RX ORDER — ALPRAZOLAM 0.5 MG/1
TABLET ORAL
COMMUNITY
End: 2021-07-09

## 2019-09-26 NOTE — PROGRESS NOTES
Subjective:       Patient ID: Tawny Valerio is a 59 y.o. female.    Chief Complaint: Endometrial Cancer and Post-op Evaluation (RALH/BSO)    HPI   Patient comes in today for her postoperative visit after robotic assisted laparoscopic hysterectomy and bilateral salpingo-oophorectomy on August 26, 2019. Final pathology shows grade 1 cancer the uterus.  No invasion into the muscular wall the uterus.   FIGO stage IA.       Colonoscopy: never    Her oncologic history is:  Referring physician: Bobby Frazier MD/ Jennifer Rosales MD  Reason for referral: endometrial cancer            7/2016, hysteroscopy/polypectomy w D&C with focal patterns of atypical glandular hyperplasia. She was lost to follow up.  Continued with intermittent spotting every 6 months.      Dec 2018: had heavier bleeding and saw Dr. Rosales.     3/1/2019 repeat hysteroscopy/D&C with Dr. Rosales with numerous polyps but pathology was simple cystic hyperplasia.      March 2019:  Patient referred to me for further management.     July 2019:  Hysteroscopic D&C with Dr. Bobby Frazier.  Uterus sounded 8 cm.  Polyp noted on posterior aspect of endometrium.  Curettage was performed. Phill-Cut soft tissue polyp remover was used.  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    Aug 2019:  robotic assisted laparoscopic hysterectomy and bilateral salpingo-oophorectomy on August 26, 2019. Final pathology shows grade 1 cancer the uterus.  No invasion into the muscular wall the uterus.   FIGO stage IA.     Review of Systems   Constitutional: Negative for chills, fatigue and fever.   Respiratory: Negative for cough, shortness of breath and wheezing.    Cardiovascular: Negative for chest pain, palpitations and leg swelling.   Gastrointestinal: Negative for abdominal pain, constipation, diarrhea, nausea and vomiting.   Genitourinary: Negative for difficulty urinating, dysuria, frequency, genital sores, hematuria, urgency, vaginal bleeding, vaginal  "discharge and vaginal pain.   Musculoskeletal: Negative for gait problem.   Neurological: Negative for weakness and numbness.   Hematological: Negative for adenopathy. Does not bruise/bleed easily.   Psychiatric/Behavioral: The patient is not nervous/anxious.        Objective:   /76   Pulse 74   Ht 5' 1" (1.549 m)   Wt 130.4 kg (287 lb 7.7 oz)   LMP 01/15/2019 (Approximate)   BMI 54.32 kg/m²      Physical Exam   Constitutional: She is oriented to person, place, and time. She appears well-developed and well-nourished.   HENT:   Head: Normocephalic and atraumatic.   Eyes: No scleral icterus.   Abdominal: Soft. She exhibits no distension and no mass. There is no tenderness. There is no rebound and no guarding.   Healing trocar sites    Genitourinary:   Genitourinary Comments: Bimanual exam:  Unable to tolerate speculum exam      Musculoskeletal: She exhibits no edema or tenderness.   Neurological: She is alert and oriented to person, place, and time.   Skin: Skin is warm and dry.   Psychiatric: She has a normal mood and affect. Her behavior is normal. Judgment and thought content normal.       Assessment:       1. Endometrial ca    2. s/p DIANA/BSO 8/26/2019    3. Colon cancer screening        Plan:   Endometrial ca  FIGO Stage IA.   No indication for post op radiation.   RTC in 3 months.   RTW Oct 14, 2019     s/p DIANA/JEDO 8/26/2019    Colon cancer screening  -     Case request GI: COLONOSCOPY        "

## 2019-09-27 ENCOUNTER — PATIENT MESSAGE (OUTPATIENT)
Dept: INTERNAL MEDICINE | Facility: CLINIC | Age: 59
End: 2019-09-27

## 2019-09-27 DIAGNOSIS — M77.10 LATERAL EPICONDYLITIS, UNSPECIFIED LATERALITY: Primary | ICD-10-CM

## 2019-09-30 ENCOUNTER — PATIENT MESSAGE (OUTPATIENT)
Dept: ADMINISTRATIVE | Facility: OTHER | Age: 59
End: 2019-09-30

## 2019-09-30 ENCOUNTER — OFFICE VISIT (OUTPATIENT)
Dept: ORTHOPEDICS | Facility: CLINIC | Age: 59
End: 2019-09-30
Payer: COMMERCIAL

## 2019-09-30 VITALS
BODY MASS INDEX: 54.28 KG/M2 | DIASTOLIC BLOOD PRESSURE: 86 MMHG | HEART RATE: 71 BPM | SYSTOLIC BLOOD PRESSURE: 149 MMHG | WEIGHT: 287.5 LBS | HEIGHT: 61 IN

## 2019-09-30 DIAGNOSIS — M77.12 LATERAL EPICONDYLITIS OF LEFT ELBOW: ICD-10-CM

## 2019-09-30 PROCEDURE — 99203 PR OFFICE/OUTPT VISIT, NEW, LEVL III, 30-44 MIN: ICD-10-PCS | Mod: 25,S$GLB,, | Performed by: NURSE PRACTITIONER

## 2019-09-30 PROCEDURE — 97760 ORTHOTIC MGMT&TRAING 1ST ENC: CPT | Mod: GP,S$GLB,, | Performed by: NURSE PRACTITIONER

## 2019-09-30 PROCEDURE — 99999 PR PBB SHADOW E&M-EST. PATIENT-LVL III: ICD-10-PCS | Mod: PBBFAC,,, | Performed by: NURSE PRACTITIONER

## 2019-09-30 PROCEDURE — 99999 PR PBB SHADOW E&M-EST. PATIENT-LVL III: CPT | Mod: PBBFAC,,, | Performed by: NURSE PRACTITIONER

## 2019-09-30 PROCEDURE — 97760 PR ORTHOTIC MGMT&TRAINJ INITIAL ENC EA 15 MINS: ICD-10-PCS | Mod: GP,S$GLB,, | Performed by: NURSE PRACTITIONER

## 2019-09-30 PROCEDURE — 99203 OFFICE O/P NEW LOW 30 MIN: CPT | Mod: 25,S$GLB,, | Performed by: NURSE PRACTITIONER

## 2019-09-30 NOTE — PROGRESS NOTES
DATE: 2019  PATIENT: Tawny Valerio  REFERRING MD:   CHIEF COMPLAINT:   Chief Complaint   Patient presents with    Left Elbow - Pain       HISTORY:  Tawny Valerio is a 59 y.o. female  who presents for initial evaluation of her left elbow pain, she is right hand dominant.  She is a new patient to me referred by Dr Zahra Kelley for orthopedic evaluation.  Her pain rating today is 0 but increases with certain motions and lifting objects.  The pain began a couple weeks ago.  She had slip on stairs, she was holding the rail with her left hand, on 2019.  She reports it has gotten better but she would like to have it checked.  She recently underwent hysterectomy for endometrial cancer.        PAST MEDICAL/SURGICAL HISTORY:  Past Medical History:   Diagnosis Date    Acute cystitis without hematuria 2019    Anxiety     Colon cancer screening 2019    Diabetes type 2, controlled 2016    Endometrial ca 2019    Essential hypertension 2018    Simple endometrial hyperplasia 3/15/2019    Uterine polyp 3/14/2019     Past Surgical History:   Procedure Laterality Date     SECTION      x 1    DILATION AND CURETTAGE OF UTERUS  2019    HYSTEROSCOPY WITH DILATION AND CURETTAGE OF UTERUS N/A 2019    Procedure: HYSTEROSCOPY, WITH DILATION AND CURETTAGE OF UTERUS;  Surgeon: Bobby Frazier Jr., MD;  Location: [facility];  Service: OB/GYN;  Laterality: N/A;    lipoma removed      ROBOT-ASSISTED LAPAROSCOPIC ABDOMINAL HYSTERECTOMY USING DA AWAIS XI N/A 2019    Procedure: XI ROBOTIC HYSTERECTOMY;  Surgeon: Aneudy Almeida MD;  Location: [address];  Service: OB/GYN;  Laterality: N/A;    ROBOT-ASSISTED LAPAROSCOPIC LYSIS OF ADHESIONS USING DA AWAIS XI N/A 2019    Procedure: XI ROBOTIC LYSIS, ADHESIONS;  Surgeon: Aneudy Almeida MD;  [facility];  Service: OB/GYN;  Laterality: N/A;  Omental    ROBOT-ASSISTED LAPAROSCOPIC  SALPINGO-OOPHORECTOMY USING DA AWAIS XI Bilateral 8/26/2019    Procedure: XI ROBOTIC SALPINGO-OOPHORECTOMY;  Surgeon: Aneudy Almeida MD;  Location: [facility] OR [address] [place] [address];  Service: OB/GYN;  Laterality: Bilateral;  Wt 299lbs       Current Medications:   Current Outpatient Medications:     acetaminophen (TYLENOL) 325 MG tablet, Take 2 tablets (650 mg total) by mouth every 6 (six) hours as needed for Pain., Disp: 30 tablet, Rfl: 1    ALPRAZolam (XANAX) 0.5 MG tablet, 1 tab(s), Disp: , Rfl:     azelastine (ASTELIN) 137 mcg (0.1 %) nasal spray, USE 1 SPRAY IN EACH NOSTRIL TWICE A DAY, Disp: 30 mL, Rfl: 0    buPROPion (WELLBUTRIN SR) 150 MG TBSR 12 hr tablet, TAKE 1 TABLET (150 MG TOTAL) BY MOUTH 2 (TWO) TIMES DAILY., Disp: 60 tablet, Rfl: 6    cholecalciferol, vitamin D3, 50,000 unit capsule, 1 cap(s), Disp: , Rfl:     furosemide (LASIX) 20 MG tablet, TAKE 1 TABLET EVERY DAY, Disp: 30 tablet, Rfl: 11    gabapentin (NEURONTIN) 300 MG capsule, Take 1 capsule (300 mg total) by mouth every evening., Disp: 90 capsule, Rfl: 1    hydroCHLOROthiazide (HYDRODIURIL) 25 MG tablet, TAKE 1 TABLET BY MOUTH EVERY DAY, Disp: 30 tablet, Rfl: 6    hydrocortisone 2.5 % cream, Apply topically 2 (two) times daily. AAA bid, Disp: 60 g, Rfl: 1    ibuprofen (ADVIL,MOTRIN) 600 MG tablet, Take 1 tablet (600 mg total) by mouth 3 (three) times daily., Disp: 30 tablet, Rfl: 1    lancets Misc, Check fasting glucose daily, Disp: 100 each, Rfl: 3    losartan (COZAAR) 25 MG tablet, Take 25 mg by mouth every evening. , Disp: , Rfl:     meloxicam (MOBIC) 15 MG tablet, Take 15 mg by mouth daily as needed. , Disp: , Rfl:     metFORMIN (GLUCOPHAGE-XR) 750 MG 24 hr tablet, TAKE 1 TABLET (750 MG TOTAL) BY MOUTH DAILY WITH BREAKFAST., Disp: 30 tablet, Rfl: 6    methylPREDNISolone (MEDROL DOSEPACK) 4 mg tablet, Take as directed, Disp: 1 Package, Rfl: 0    oxyCODONE-acetaminophen (PERCOCET) 5-325 mg per tablet, Take 1 tablet by mouth every 4 (four)  hours as needed for Pain., Disp: 30 tablet, Rfl: 0    Family History: family history was reviewed and is noncontributory  Social History:   Social History     Socioeconomic History    Marital status:      Spouse name: Not on file    Number of children: Not on file    Years of education: Not on file    Highest education level: Not on file   Occupational History     Employer: [employer]     Social Needs    Financial resource strain: Not hard at all    Food insecurity:     Worry: Never true     Inability: Never true    Transportation needs:     Medical: No     Non-medical: No   Tobacco Use    Smoking status: Never Smoker    Smokeless tobacco: Never Used   Substance and Sexual Activity    Alcohol use: Yes     Frequency: Monthly or less     Drinks per session: 1 or 2     Binge frequency: Never     Comment: approx one glass wine/month    Drug use: No    Sexual activity: Yes     Partners: Male   Lifestyle    Physical activity:     Days per week: 3 days     Minutes per session: 60 min    Stress: To some extent   Relationships    Social connections:     Talks on phone: More than three times a week     Gets together: Twice a week     Attends Restoration[employer] service: Not on file     Active member of club or organization: No     Attends meetings of clubs or organizations: Never     Relationship status:    Other Topics Concern    Not on file   Social History Narrative    Not on file       ROS:  Constitution: Negative for chills, fever, and sweats. Negative for unexplained weight loss.  Eyes: no redness, no discharge  Ears: no ear pain or tinnitus  Cardiovascular: Negative for chest pain, irregular heartbeat, leg swelling and palpitations.   Respiratory: Negative for cough and shortness of breath.   Gastrointestinal: Negative for abdominal pain, nausea and vomiting.   Genitourinary: Negative for bladder incontinence and dysuria.   Neurological: Negative for numbness.   Psychiatric/Behavioral:  "Negative for behavior changes.   Endocrine: Negative for palpitations.   Hematologic/Lymphatic: Negative for bleeding disorders.  Skin: Negative for pruritis or rash.     PHYSICAL EXAM:  Right Elbow Exam   Right elbow exam is normal.      Left Elbow Exam     Tenderness   The patient is experiencing tenderness in the lateral epicondyle.     Range of Motion   Left elbow extension: pain with resisted extension of the wrist.   Flexion: normal   Pronation: normal   Left elbow supination: pain with resisted supination.     Other   Erythema: absent  Sensation: normal  Pulse: present           Constitutional:  Tawny Valerio is a well developed, well nourished female in no acute distress.   Vitals:    09/30/19 1107   BP: (!) 149/86   Pulse: 71   Weight: 130.4 kg (287 lb 7.7 oz)   Height: 5' 1" (1.549 m)   PainSc: 0-No pain   PainLoc: Elbow     Psychiatric: pleasant,normal mood and affect, behavior is normal  Neurological:  Sensation intact to light touch, normal reflexes. Coordination normal.   Skin: warm, dry, intact  Cardiovascular: capillary refill less than 3 seconds, pulses 2+      IMAGING:   X-ray obtained and personally reviewed with patient. Radiologist report as follows:  X-Ray Shoulder 2 or More Views Left  EXAMINATION:  XR SHOULDER COMPLETE 2 OR MORE VIEWS LEFT    CLINICAL HISTORY:  Pain in left arm    FINDINGS:  Three views: No fracture dislocation bone destruction seen.  There is mild DJD.    Electronically signed by: Abel Mares MD  Date:    09/26/2019  Time:    15:43  X-Ray Elbow 2 Views Left  EXAMINATION:  XR ELBOW 2 VIEWS LEFT    CLINICAL HISTORY:  Pain in left arm    FINDINGS:  No fracture dislocation bone destruction seen.  There is calcific medial and lateral epicondylitis.    Electronically signed by: Abel Mares MD  Date:    09/26/2019  Time:    15:42         ASSESSMENT:   1. Lateral epicondylitis of left elbow  Ambulatory referral/consult to Orthopedics         PLAN:  The nature of the " diagnosis, using models and diagrams when appropriate, was explained to the patient in detail. Treatment option discussed included non-operative measures of custom orthotic, rest,  modification of activities, application of ice, elevation of extremity, compression, over the counter pain/antiinflammatory relief, physica/occupational therapy and cortisone injection.  More aggressive treatment options include referal to Dr Chadwick and hand specialist.  All questions answered and the patient wishes to proceed today with a counter pressure strap as she just had a cortisone injection and medrol dose pack for her sciatica. I performed a custom sling orthotic /brace adjustment, fitting and training with the patient. The patient demonstrated understanding and proper care. This was performed for 15 minutes.  Follow up if no improvement or worsening of symptoms.  If no improvement, consider referral to Dr Chadwick for ultrasound guided injection or physical therapy.      Answers for HPI/ROS submitted by the patient on 9/30/2019   Arm pain  unexpected weight change: No  appetite change : No  sleep disturbance: No  IMMUNOCOMPROMISED: No  nervous/ anxious: No  dysphoric mood: No  rash: No  visual disturbance: No  eye redness: No  eye pain: No  ear pain: No  tinnitus: No  hearing loss: No  sinus pressure : No  nosebleeds: No  enviro allergies: No  food allergies: No  cough: No  shortness of breath: No  sweating: Yes  dysuria: Yes  frequency: Yes  difficulty urinating: No  hematuria: No  painful intercourse: No  chest pain: No  palpitations: No  nausea: No  vomiting: No  diarrhea: No  blood in stool: No  constipation: No  headaches: Yes  dizziness: No  numbness: No  seizures: No  joint swelling: No  myalgia: No  weakness: No  back pain: Yes  Pain Chronicity: new  History of trauma: Yes  Onset: 1 to 4 weeks ago  Frequency: 2 to 4 times per day  Progression since onset: unchanged  Injury mechanism: collision/contact  injury  location: at home  pain- numeric: 5/10  pain location: left elbow, other  pain quality: aching, sharp, numbing, shooting  Radiating Pain: No  If your pain is radiating, to what part of the body?: left arm  Aggravating factors: bearing weight, touching  fever: No  inability to bear weight: Yes  itching: No  joint locking: No  limited range of motion: No  stiffness: No  tingling: Yes  Treatments tried: OTC pain meds, rest, nothing  physical therapy: not tried  Improvement on treatment: no relief

## 2019-09-30 NOTE — LETTER
September 30, 2019      Zahra Kelley, DO  [address] [facility] [place] [address]           [place] [facility]  [address] [facility] [address]  Phone: [phone]          Patient: Tawny Valerio   MR Number: [id number]   YOB: 1960   Date of Visit: 9/30/2019       Dear Dr. Zahra Kelley:    Thank you for referring Tawny Valerio to me for evaluation. Attached you will find relevant portions of my assessment and plan of care.    If you have questions, please do not hesitate to call me. I look forward to following Tawny Valerio along with you.    Sincerely,    Belinda Andrea, APRN    Enclosure  CC:  No Recipients    If you would like to receive this communication electronically, please contact [email or web address] or [phone] to request more information on [employer] access.    For providers and/or their staff who would like to refer a patient to [facility], please contact us through our one-stop-shop provider referral line, Konstantin Mckeon, at [phone].    If you feel you have received this communication in error or would no longer like to receive these types of communications, please e-mail [email or web address]

## 2019-09-30 NOTE — PATIENT INSTRUCTIONS
Understanding Lateral Epicondylitis    Tendons are strong bands of tissue that connect muscles to bones. Lateral epicondylitis affects the tendons that connect muscles in the forearm to the lateral epicondyle. This is the bony knob on the outer side of the elbow. The condition occurs if the extensor tendons of the wrist become red and swollen (irritated). This can cause pain in the elbow, forearm, and wrist. Because the condition is sometimes caused by playing tennis, it is also known as tennis elbow.  How to say it  [id number] [id number]   What causes lateral epicondylitis?  The condition most often occurs because of overuse. This can be from any activity that repeatedly puts stress on the forearm extensor muscles or tendons and wrist. For instance, playing tennis, lifting weights, cutting meat, painting, and typing can all cause the condition. Wear and tear of the tendons from aging or an injury to the tendons can also cause the condition.  Symptoms of lateral epicondylitis  The most common symptom is pain. You may feel it on the outer side of the elbow and down the back of the forearm. It may be worse when moving or using the elbow, forearm, or wrist. You may also feel pain when gripping or lifting things.  Treatment for lateral epicondylitis  Treatments may include:  · Resting the elbow, forearm, and wrist. Youll need to avoid movements that can make your symptoms worse. You also may need to avoid certain sports and types of work for a time. This helps relieve symptoms and prevent further damage to the tendons.  · Changing the action that caused the problem. For instance, if the tendons were damaged from playing tennis, it may help to change your playing technique or use different equipment. This helps prevent further damage to the tendons.  · Using cold packs. Putting an ice pack on the injured area can help reduce pain and swelling.  · Taking pain medicines. Taking prescription or  over-the-counter pain medicines may help reduce pain and swelling.    · Wearing a brace. This helps reduce strain on the muscles and tendons in the forearm, which may relieve symptoms. It is very important to wear the brace properly.  · Doing exercises and physical therapy. These help improve strength and range of motion in the elbow, forearm, and wrist.  · Getting shots of medicine into the injured area. These may help relieve symptoms for a time.  · Having surgery. This may be an option if other treatments fail to relieve symptoms. In many cases, the surgeon removes the damaged tissue.  Possible complications of lateral epicondylitis  If the tendons involved dont heal properly, symptoms may return or get worse. To help prevent this, follow your treatment plan as directed.  When to call your healthcare provider  Call your healthcare provider right away if you have any of these:  · Fever of 100.4°F (38°C) or higher, or as directed  · Redness, swelling, or warmth in the elbow or forearm that gets worse  · Symptoms that dont get better with treatment, or get worse  · New symptoms   Date Last Reviewed: 3/10/2016  © 8220-1830 [employer]. [address] [place] [address]. All rights reserved. This information is not intended as a substitute for professional medical care. Always follow your healthcare professional's instructions.

## 2019-10-02 ENCOUNTER — TELEPHONE (OUTPATIENT)
Dept: GASTROENTEROLOGY | Facility: CLINIC | Age: 59
End: 2019-10-02

## 2019-10-03 DIAGNOSIS — Z12.11 SPECIAL SCREENING FOR MALIGNANT NEOPLASMS, COLON: Primary | ICD-10-CM

## 2019-10-03 RX ORDER — POLYETHYLENE GLYCOL 3350, SODIUM SULFATE ANHYDROUS, SODIUM BICARBONATE, SODIUM CHLORIDE, POTASSIUM CHLORIDE 236; 22.74; 6.74; 5.86; 2.97 G/4L; G/4L; G/4L; G/4L; G/4L
4 POWDER, FOR SOLUTION ORAL ONCE
Qty: 4000 ML | Refills: 0 | Status: SHIPPED | OUTPATIENT
Start: 2019-10-03 | End: 2019-10-03

## 2019-10-07 ENCOUNTER — PATIENT OUTREACH (OUTPATIENT)
Dept: ADMINISTRATIVE | Facility: OTHER | Age: 59
End: 2019-10-07

## 2019-10-07 ENCOUNTER — TELEPHONE (OUTPATIENT)
Dept: GASTROENTEROLOGY | Facility: CLINIC | Age: 59
End: 2019-10-07

## 2019-10-07 NOTE — TELEPHONE ENCOUNTER
----- Message from Layla Johnson sent at 10/4/2019  7:30 AM CDT -----  Patient is requesting to schedule on the [facility], please contact to advise.   ----- Message -----  From: Ashley Samuel LPN  Sent: 10/2/2019  11:21 AM CDT  To: Layla Johnson    This message was sent to [facility] in error.   ----- Message -----  From: Layla Johnson  Sent: 10/2/2019   9:38 AM CDT  To: [facility] MEADE Staff    Type: Needs Medical Advice    Who Called:  Patient   Best Call Back Number:[phone]  Additional Information: patient primary care at the [facility], order a colonoscopy for patient the first available in [place]  is a few week away and she is requesting to schedule sometime next week, please contact to advise.     Thank you

## 2019-10-07 NOTE — PROGRESS NOTES
Subjective:       Patient ID: Tawny Valerio is a 59 y.o. female.    Chief Complaint: Consult    CC: Weight.     Current attempts at weight loss: New pt to me, referred by DO Obi Fernandez , with Patient Active Problem List:     Diabetes type 2, controlled     Vitamin D insufficiency     SOB (shortness of breath)     Morbid obesity with BMI of 60.0-69.9, adult     Edema leg     ASHLI (obstructive sleep apnea)- ESS 19     Essential hypertension     Uterine polyp     Simple endometrial hyperplasia     Endometrial ca s/p RALH/BSO 8/26/2019     Acute cystitis without hematuria     Colon cancer screening     Lab Results       Component                Value               Date                       HGBA1C                   6.7 (H)             07/26/2019                 HGBA1C                   6.5 (H)             01/22/2019                 HGBA1C                   6.5 (H)             05/24/2018            Lab Results       Component                Value               Date                       LDLCALC                  117.4               07/26/2019                 CREATININE               0.7                 08/27/2019               Walking and swimming usually, but has not been able to since hyst.     Previous diet attempts: keto, meditteranean, IF, OTC supplements. Dmitriy OTC.     History of medication for loss:   checked today     Heaviest weight: 336#    Lightest weight: 230#    Goal weight:  230#      Last eye exam:   Some time. No glaucoma per pt.                                     Provider:    Typical eating patterns:  Works as  at [employer]. Lives with . Pt does cooking  Breakfast: premiere. Hard boiled eggs and fruit.  Weekends: same. Evangelista, evangelista sandwich.     Lunch: Atkins meals. [place]. Leftovers.Weekends: may skip.    Dinner: grilled chicken, salad. Grilled chicken, veg, salad. If out- seafood- boiled.     Snacks: Atkins snacks and bars. Celery and carrots. Candy and chips.  "    Beverages: premiere drinks, water, diet coke. Iced tea- unsweetened. ETOH- 1 per month.     Willingness to change: 10/10  EKG:   Normal sinus rhythm  Left axis deviation  LVH with QRS widening  Abnormal ECG  When compared with ECG of 31-JUL-2019 12:38,  Vent. rate has decreased BY  33 BPM    · The stress echo portion of this study is negative for myocardial ischemia. Target heart rate was achieved.  · Normal left ventricular systolic function. The estimated ejection fraction is 60%  · No wall motion abnormalities.  · Normal LV diastolic function.  · Normal right ventricular systolic function.  · Normal central venous pressure (3 mm Hg).  · The EKG portion of this study is negative for myocardial ischemia.  · The test was stopped because the patient experienced shortness of breath.  · There were no arrhythmias during stress.  · Overall, the patient's exercise capacity was below average. Achieved 6 METs.  · Hypertensive response to exercise with peak /73 at peak stress.    BMR: 1841    Review of Systems   Constitutional: Negative for chills and fever.   Respiratory: Negative for shortness of breath.         Does not use CPAP. States could not tolerate it. ESS 19.    Cardiovascular: Negative for chest pain and leg swelling.   Gastrointestinal: Positive for diarrhea. Negative for constipation.        Rare GERD   Genitourinary: Positive for dysuria. Negative for difficulty urinating.   Musculoskeletal: Positive for back pain. Negative for arthralgias.   Neurological: Negative for dizziness and light-headedness.   Psychiatric/Behavioral: Negative for dysphoric mood. The patient is nervous/anxious.        Objective:     /80   Pulse 77   Ht 5' 1" (1.549 m)   Wt 131.7 kg (290 lb 5.5 oz)   LMP 01/15/2019 (Approximate)   BMI 54.86 kg/m²    Physical Exam   Constitutional: She is oriented to person, place, and time. She appears well-developed. No distress.   Morbidly obese     HENT:   Head: Normocephalic and " atraumatic.   Eyes: Pupils are equal, round, and reactive to light. EOM are normal. No scleral icterus.   Neck: Normal range of motion. Neck supple. No thyromegaly present.   Cardiovascular: Normal rate and normal heart sounds. Exam reveals no gallop and no friction rub.   No murmur heard.  Pulmonary/Chest: Effort normal and breath sounds normal. No respiratory distress. She has no wheezes.   Abdominal: Soft. Bowel sounds are normal. She exhibits no distension. There is no tenderness.   Musculoskeletal: Normal range of motion. She exhibits no edema.   Neurological: She is alert and oriented to person, place, and time. No cranial nerve deficit.   Skin: Skin is warm and dry. No erythema.   Psychiatric: She has a normal mood and affect. Her behavior is normal. Judgment normal.   Vitals reviewed.      Assessment:       1. Class 3 severe obesity due to excess calories with serious comorbidity and body mass index (BMI) of 50.0 to 59.9 in adult    2. Controlled type 2 diabetes mellitus without complication, without long-term current use of insulin    3. Fatty liver    4. ASHLI (obstructive sleep apnea)    5. Endometrial ca    6. Essential hypertension        Plan:         1. Class 3 severe obesity due to excess calories with serious comorbidity and body mass index (BMI) of 50.0 to 59.9 in adult  Protein and fiber in every meal.       Intermittent fasting. NO eating from 8 pm to 12pm. Can have water, tea, coffee in that time without sweeteners.    2 meals made up of the following:  Unlimited green vegetables, tomatoes, mushrooms, spaghetti squash, cauliflower, meat, poultry, seafood, eggs and hard cheeses.   Avoid fried foods  Dressings, seasonings, condiments, etc should have less than 2 g sugars.   Beans or nuts can have 1 x a day.   1-2 servings of citrus fruits, berries, pineapple or melon a day (1/2 cup)  Milk and plain yogurt    Avoid starchy carbohydrates (bread, rice, pasta, potatoes, corn, peas, oatmeal, grits,  tortillas, crackers, chips)      No soda, sweet tea, juices or lemonade. All drinks should be 5 calories or less.      Www.dietdoctor.ALTHIA for info on intermittent fasting.     Meal ideas given    2. Controlled type 2 diabetes mellitus without complication, without long-term current use of insulin    - semaglutide (OZEMPIC) 0.25 mg or 0.5 mg(2 mg/1.5 mL) PnIj; Inject 0.5 mg into the skin every 7 days.  Dispense: 1.5 mL; Refill: 3  Start Ozempic once a week. Start with 0.25mg once a week x 4 weeks, then 0.5 mg weekly. [email or web address].ALTHIA for coupon.       Decrease portions as soon as you start Ozempic. Some nausea in the first 2 weeks is not unusual.     If you get pain across the upper abdomen and around to your back, please call the office.  3. Fatty liver  Discussed with patient that the only treatment for fatty liver is to lose weight.  Without doing so, it may progress to cirrhosis, permanent liver damage and possibly liver failure. Expect improvement with weight loss.      4. ASHLI (obstructive sleep apnea)  Discussed importance of compliance with treatment, and that ASHLI does require getting closer to normal BMI range to see improvement that some other co-morbidities. Continue with CPAP.      5. Endometrial ca  Optimize BMI      6. Essential hypertension  Expect improvement with weight loss. The current medical regimen is effective;  continue present plan and medications.

## 2019-10-08 ENCOUNTER — PATIENT MESSAGE (OUTPATIENT)
Dept: ADMINISTRATIVE | Facility: OTHER | Age: 59
End: 2019-10-08

## 2019-10-08 ENCOUNTER — LAB VISIT (OUTPATIENT)
Dept: LAB | Facility: HOSPITAL | Age: 59
End: 2019-10-08
Attending: INTERNAL MEDICINE
Payer: COMMERCIAL

## 2019-10-08 ENCOUNTER — PATIENT MESSAGE (OUTPATIENT)
Dept: INTERNAL MEDICINE | Facility: CLINIC | Age: 59
End: 2019-10-08

## 2019-10-08 ENCOUNTER — OFFICE VISIT (OUTPATIENT)
Dept: BARIATRICS | Facility: CLINIC | Age: 59
End: 2019-10-08
Payer: COMMERCIAL

## 2019-10-08 VITALS
SYSTOLIC BLOOD PRESSURE: 110 MMHG | WEIGHT: 290.38 LBS | HEART RATE: 77 BPM | HEIGHT: 61 IN | BODY MASS INDEX: 54.82 KG/M2 | DIASTOLIC BLOOD PRESSURE: 80 MMHG

## 2019-10-08 DIAGNOSIS — C54.1 ENDOMETRIAL CA: ICD-10-CM

## 2019-10-08 DIAGNOSIS — G47.33 OSA (OBSTRUCTIVE SLEEP APNEA): ICD-10-CM

## 2019-10-08 DIAGNOSIS — N39.0 URINARY TRACT INFECTION WITHOUT HEMATURIA, SITE UNSPECIFIED: ICD-10-CM

## 2019-10-08 DIAGNOSIS — I10 ESSENTIAL HYPERTENSION: ICD-10-CM

## 2019-10-08 DIAGNOSIS — E66.01 CLASS 3 SEVERE OBESITY DUE TO EXCESS CALORIES WITH SERIOUS COMORBIDITY AND BODY MASS INDEX (BMI) OF 50.0 TO 59.9 IN ADULT: ICD-10-CM

## 2019-10-08 DIAGNOSIS — K76.0 FATTY LIVER: ICD-10-CM

## 2019-10-08 DIAGNOSIS — N39.0 URINARY TRACT INFECTION WITHOUT HEMATURIA, SITE UNSPECIFIED: Primary | ICD-10-CM

## 2019-10-08 DIAGNOSIS — E11.9 CONTROLLED TYPE 2 DIABETES MELLITUS WITHOUT COMPLICATION, WITHOUT LONG-TERM CURRENT USE OF INSULIN: ICD-10-CM

## 2019-10-08 PROCEDURE — 3074F PR MOST RECENT SYSTOLIC BLOOD PRESSURE < 130 MM HG: ICD-10-PCS | Mod: CPTII,S$GLB,, | Performed by: INTERNAL MEDICINE

## 2019-10-08 PROCEDURE — 99999 PR PBB SHADOW E&M-EST. PATIENT-LVL III: CPT | Mod: PBBFAC,,, | Performed by: INTERNAL MEDICINE

## 2019-10-08 PROCEDURE — 3008F BODY MASS INDEX DOCD: CPT | Mod: CPTII,S$GLB,, | Performed by: INTERNAL MEDICINE

## 2019-10-08 PROCEDURE — 99215 PR OFFICE/OUTPT VISIT, EST, LEVL V, 40-54 MIN: ICD-10-PCS | Mod: S$GLB,,, | Performed by: INTERNAL MEDICINE

## 2019-10-08 PROCEDURE — 3008F PR BODY MASS INDEX (BMI) DOCUMENTED: ICD-10-PCS | Mod: CPTII,S$GLB,, | Performed by: INTERNAL MEDICINE

## 2019-10-08 PROCEDURE — 3044F HG A1C LEVEL LT 7.0%: CPT | Mod: CPTII,S$GLB,, | Performed by: INTERNAL MEDICINE

## 2019-10-08 PROCEDURE — 3044F PR MOST RECENT HEMOGLOBIN A1C LEVEL <7.0%: ICD-10-PCS | Mod: CPTII,S$GLB,, | Performed by: INTERNAL MEDICINE

## 2019-10-08 PROCEDURE — 3074F SYST BP LT 130 MM HG: CPT | Mod: CPTII,S$GLB,, | Performed by: INTERNAL MEDICINE

## 2019-10-08 PROCEDURE — 87086 URINE CULTURE/COLONY COUNT: CPT

## 2019-10-08 PROCEDURE — 3079F PR MOST RECENT DIASTOLIC BLOOD PRESSURE 80-89 MM HG: ICD-10-PCS | Mod: CPTII,S$GLB,, | Performed by: INTERNAL MEDICINE

## 2019-10-08 PROCEDURE — 3079F DIAST BP 80-89 MM HG: CPT | Mod: CPTII,S$GLB,, | Performed by: INTERNAL MEDICINE

## 2019-10-08 PROCEDURE — 99215 OFFICE O/P EST HI 40 MIN: CPT | Mod: S$GLB,,, | Performed by: INTERNAL MEDICINE

## 2019-10-08 PROCEDURE — 99999 PR PBB SHADOW E&M-EST. PATIENT-LVL III: ICD-10-PCS | Mod: PBBFAC,,, | Performed by: INTERNAL MEDICINE

## 2019-10-08 NOTE — LETTER
[facility] B[facility]  [address]  [facility] [address]  Phone: [phone]  Fax: [phone] October 15, 2019      Zahra Kelley,   [address] [facility] [address]    Patient: Tawny Valerio   MR Number: [id number]   YOB: 1960   Date of Visit: 10/8/2019     Dear Dr. Kelley:    Thank you for referring Tawny Valerio to me for evaluation. Below are the relevant portions of my assessment and plan of care.    Ms. Valerio's assessment is as follows:    1. Class 3 severe obesity due to excess calories with serious comorbidity and body mass index (BMI) of 50.0 to 59.9 in adult    2. Controlled type 2 diabetes mellitus without complication, without long-term current use of insulin    3. Fatty liver    4. ASHLI (obstructive sleep apnea)    5. Endometrial ca    6. Essential hypertension        PLAN:  1. Class 3 severe obesity due to excess calories with serious comorbidity and body mass index (BMI) of 50.0 to 59.9 in adult  Protein and fiber in every meal.      Intermittent fasting. NO eating from 8 pm to 12pm. Can have water, tea, coffee in that time without sweeteners.     2 meals made up of the following:  · Unlimited green vegetables, tomatoes, mushrooms, spaghetti squash, cauliflower, meat, poultry, seafood, eggs and hard cheeses.   · Avoid fried foods  · Dressings, seasonings, condiments, etc should have less than 2 g sugars.   · Beans or nuts can have 1 x a day.   · 1-2 servings of citrus fruits, berries, pineapple or melon a day (1/2 cup)  · Milk and plain yogurt     Avoid starchy carbohydrates (bread, rice, pasta, potatoes, corn, peas, oatmeal, grits, tortillas, crackers, chips)     No soda, sweet tea, juices or lemonade. All drinks should be 5 calories or less.       Www.dietdoctor.com for info on intermittent fasting.      Meal ideas given     2. Controlled type 2 diabetes mellitus without complication, without long-term current use of insulin  -  semaglutide (OZEMPIC) 0.25 mg or 0.5 mg(2 mg/1.5 mL) PnIj; Inject 0.5 mg into the skin every 7 days.  Dispense: 1.5 mL; Refill: 3  Start Ozempic once a week. Start with 0.25mg once a week x 4 weeks, then 0.5 mg weekly. Www.[email or web address].com for coupon.      Decrease portions as soon as you start Ozempic. Some nausea in the first 2 weeks is not unusual.      If you get pain across the upper abdomen and around to your back, please call the office.    3. Fatty liver  Discussed with patient that the only treatment for fatty liver is to lose weight.  Without doing so, it may progress to cirrhosis, permanent liver damage and possibly liver failure. Expect improvement with weight loss.       4. ASHLI (obstructive sleep apnea)  Discussed importance of compliance with treatment, and that ASHLI does require getting closer to normal BMI range to see improvement that some other co-morbidities. Continue with CPAP.     5. Endometrial ca  Optimize BMI     6. Essential hypertension  Expect improvement with weight loss. The current medical regimen is effective;  continue present plan and medications.      If you have questions, please do not hesitate to call me. I look forward to following Tawny along with you.    Sincerely,      Rosanna Jefferson MD   Section of Bariatric Surgery   Department of Surgery   [facility]    ELISE/lacy

## 2019-10-08 NOTE — PATIENT INSTRUCTIONS
Start Ozempic once a week. Start with 0.25mg once a week x 4 weeks, then 0.5 mg weekly. [email or web address].[email or web address] [email or web address] [email or web address] for coupon.       Decrease portions as soon as you start Ozempic. Some nausea in the first 2 weeks is not unusual.     If you get pain across the upper abdomen and around to your back, please call the office.       Protein and fiber in every meal.       Intermittent fasting. NO eating from 8 pm to 12pm. Can have water, tea, coffee in that time without sweeteners.    2 meals made up of the following:  Unlimited green vegetables, tomatoes, mushrooms, spaghetti squash, cauliflower, meat, poultry, seafood, eggs and hard cheeses.   Avoid fried foods  Dressings, seasonings, condiments, etc should have less than 2 g sugars.   Beans or nuts can have 1 x a day.   1-2 servings of citrus fruits, berries, pineapple or melon a day (1/2 cup)  Milk and plain yogurt    Avoid starchy carbohydrates (bread, rice, pasta, potatoes, corn, peas, oatmeal, grits, tortillas, crackers, chips)      No soda, sweet tea, juices or lemonade. All drinks should be 5 calories or less.      Www.dietdoctor.DeciZium for info on intermittent fasting.       Meal Ideas for Regular Bariatric Diet  *Recipes and products available at www.bariatriceating.com        - Egg white omelet: 2 egg whites or ½ cup Egg Beaters. (Optional proteins: cheese, shrimp, black beans, chicken, sliced turkey) (Optional veggies: tomatoes, salsa, spinach, mushrooms, onions, green peppers, or small slice avocado)     - Egg and sausage: 1 egg or ¼ cup Egg Beaters (any variety), with 1 gretta or 2 links of Turkey sausage or Veggie breakfast sausage ([employer] or [employer])    - Crust-less breakfast quiche: To make a glass pie dish, mix 4oz part skim Ricotta, 1 cup skim milk, and 2 eggs as your base. Add protein: shredded cheese, sliced lean ham or turkey, turkey win/sausage. Add veggies: tomato, onion, green onion, mushroom, green pepper, spinach, etc.    - Yogurt parfait:  Mix 1 - 6oz container Dannon Light N Fit vanilla yogurt, with ¼ cup Kashi Go Lean cereal    - Cottage cheese and fruit: ½ cup part-skim cottage cheese or ricotta cheese topped with fresh fruit or sugar free preserves     - Mayte Puentes's Vanilla Egg custard* (add 2 Tbsp instant coffee granules to make Cappuccino Custard*)    - Hi-Protein café latte (skim milk, decaf coffee, 1 scoop protein powder). Optional to add Sugar free syrup or extract flavoring.        - ½ cup Black bean soup (Homemade or Progresso), with ¼ cup shredded low-fat cheese. Top with chopped tomato or fresh salsa.     - Lean deli turkey breast and low-fat sliced cheese, mustard or light tucker to moisten, rolled up together, or wrapped in a Kamaljit lettuce leaf    - Chicken salad made from dinner leftovers, moisten with low-fat salad dressing or light tucker. Also try leftover salmon, shrimp, tuna or boiled eggs. Serve ½ cup over dark green salad    - Fat-free canned refried beans, topped with ¼ cup shredded low-fat cheese. Top with chopped tomato or fresh salsa.     - Greek salad: Top mixed greens with 1-2oz grilled chicken, tomatoes, red onions, 2-3 kalamata olives, and sprinkle lightly with feta cheese. Spritz with Balsamic vinegar to taste.     - Crust-less lunch quiche: To make a glass pie dish, mix 4oz part skim Ricotta, 1 cup skim milk, and 2 eggs as your base. Add protein: shredded cheese, sliced lean ham or turkey, shrimp, chicken. Add veggies: tomato, onion, green onion, mushroom, green pepper, spinach, artichoke, broccoli, etc.    - Pizza bake: tomato sauce, low-fat shredded mozzarella and turkey pepperoni or Harrison win. Add any veggies.    - Cucumber crab bites: Spread ¼ cup crab dip (lump crabmeat + light cream cheese and green onions) over sliced cucumber.     - Chicken with light spinach and artichoke dip*: Puree in : 6oz cooked and drained spinach, 2 cloves garlic, 1 can cannelloni beans, ½ cup chopped green onions, 1  can drained artichoke hearts (not marinated in oil), lemon juice and basil. Mix in 2oz chopped up chicken.      - Serve grilled fish over dark green salad tossed with low-fat dressing, served with grilled asparagus nolasco     - Rotisserie chicken salad: served with sliced strawberries, walnuts, fat-free feta cheese crumbles and 1 tbsp Sanchezs Own Light Raspberry [place] Vinaigrette    - Shrimp cocktail: Dip cold boiled shrimp in homemade low-sugar cocktail sauce (1/2 cup Gee One Carb ketchup, 2 tbsp horseradish, 1/4 tsp hot sauce, 1 tsp Worcestershire sauce, 1 tbsp freshly-squeezed lemon juice). Serve with dark green salad, walnuts, and crumbled blue cheese drizzled with olive oil and Balsamic vinegar    - Tuna Melt: Spread tuna salad onto 2 thick slices of tomato. Top with low-fat cheese and broil until cheese is melted. May also be made with chicken salad of shrimp salad. [place] with different types of cheeses.    - Homemade low-fat Chili using extra lean ground beef or ground turkey. Top with shredded cheese and salsa as desired. May add dollop fat-free sour cream if desired    - Dinner Omelet with shrimp or chicken and onion, green peppers and chives.    - No noodle lasagna: Use sliced zucchini or eggplant in place of noodles.  Layer with part skim ricotta cheese and low sugar meat sauce (use very lean ground beef or ground turkey).    - Mexican chicken bake: Bake chunks of chicken breast or thigh with taco seasoning, Pace brand enchilada sauce, green onions and low-fat cheese. Serve with ¼ cup black beans or fat free refried beans topped with chopped tomatoes or salsa.    - Martinez frozen meatballs, simmered in Classico Marinara sauce. Different flavors of salsa or spaghetti sauce create different dishes! Sprinkle with parmesan cheese. Serve with grilled or steamed veggies, or a dark green salad.    - Simmer boneless skinless chicken thigh chunks in Classico Marinara sauce or roasted salsa until tender  with chopped onion, bell pepper, garlic, mushrooms, spinach, etc.     - Hamburger, without the bun, dressed the way you like. Served with grilled or steamed veggies.    - Eggplant parmesan: Bake slices of eggplant at 350 degrees for 15 minutes. Layer tomato sauce, sliced eggplant and low-fat mozzarella cheese in a baking dish and cover with foil. Bake 30-40 more minutes or until bubbly. Uncover and bake at 400 degrees for about 15 more minutes, or until top is slightly crisp.    - Fish tacos: grilled/baked white fish, wrapped in Kamaljit lettuce leaf, topped with salsa, shredded low-fat cheese, and light coleslaw.    Snacks: (100-200 calories; >5g protein)    - 1 low-fat cheese stick with 8 cherry tomatoes or 1 serving fresh fruit  - 4 thin slices fat-free turkey breast and 1 slice low-fat cheese  - 4 thin slices fat-free honey ham with wedge of melon  - 1/4 cup unsalted nuts with ½ cup fruit  - 6-oz container Dannon Light n Fit vanilla yogurt, topped with 1oz unsalted nuts         - apple, celery or baby carrots spread with 2 Tbsp natural peanut butter or almond butter   - apple slices with 1 oz slice low-fat cheese  - celery, cucumber, bell pepper or baby carrots dipped in ¼ cup hummus bean spread or light spinach and artichoke dip (*recipe in lunch section)  - 100 calorie bag microwave light popcorn with 3 tbsp grated parmesan cheese  - Kishor Links Beef Steak - 14g protein! (similar to beef jerky)  - 2 wedges Laughing Cow - Light Herb & Garlic Cheese with sliced cucumber or green bell pepper  - 1/2 cup low-fat cottage cheese with ¼ cup fruit or ¼ cup salsa  - RTD Protein drinks: Atkins, Low Carb Slim Fast, EAS light, Muscle Milk Light, etc.  - Homemade Protein drinks: GNC Soy95, Isopure, Nectar, UNJURY, Whey Gourmet, etc. Mix 1 scoop powder with 8oz skim/1% milk or light soymilk.  - Protein bars: Atkins, EAS, Pure Protein, Think Thin, Detour, etc. Must have 0-4 grams sugar - Read the label.    Takeout Options: No  more than twice/week  Deli - Salads (no pasta or rice), meats, cheeses. Roasted chicken. Lox (salmon)    Mexican - Platters which don't include tortillas, chips, or rice. Go easy on the beans. Example: Fajitas without the tortillas. Ask the  not to bring chips to the table if they are too tempting.    Greek - Meat or fish and vegetable, but no bread or rice. Including hummus, baba ganoush, etc, is OK. Most sit-down Greek restaurants can provide you with cucumber slices for dipping instead of cece bread.    Fast Food (Avoid as much as possible) - Salads (no croutons and limit salad dressing to 2 tbsp), grilled chicken sandwich without the bun and ask for no tucker. Gavis low fat chili or Taco Bell pintos and cheese.    BBQ - The meats are fine if you ask for sauces on the side, but most of the traditional side dishes are loaded with carbs. Carlo slaw, baked beans and BBQ sauce are typically made with sugar.    Chinese - Nothing deep-fried, no rice or noodles. Many Chinese sauces have starch and sugar in them, so you'll have to use your judgement. If you find that these sauces trigger cravings, or cause Dumping, you can ask for the sauce to be made without sugar or just use soy sauce.

## 2019-10-09 ENCOUNTER — TELEPHONE (OUTPATIENT)
Dept: GASTROENTEROLOGY | Facility: CLINIC | Age: 59
End: 2019-10-09

## 2019-10-09 DIAGNOSIS — E11.9 TYPE 2 DIABETES MELLITUS: ICD-10-CM

## 2019-10-09 LAB
BACTERIA UR CULT: NORMAL
BACTERIA UR CULT: NORMAL

## 2019-10-09 NOTE — TELEPHONE ENCOUNTER
----- Message from Deborah Grimm sent at 10/9/2019  2:54 PM CDT -----  Contact: patient  Type: Needs Medical Advice    Who Called: Patient  Best Call Back Number: [phone] (home) [phone] (work)  Additional Information: Patient said she ha schedule her colonoscopy at the [facility]

## 2019-10-11 RX ORDER — BUPROPION HYDROCHLORIDE 150 MG/1
TABLET, EXTENDED RELEASE ORAL
Qty: 60 TABLET | Refills: 6 | Status: SHIPPED | OUTPATIENT
Start: 2019-10-11 | End: 2020-08-05

## 2019-10-22 ENCOUNTER — PATIENT MESSAGE (OUTPATIENT)
Dept: BARIATRICS | Facility: CLINIC | Age: 59
End: 2019-10-22

## 2019-12-26 ENCOUNTER — PATIENT MESSAGE (OUTPATIENT)
Dept: INTERNAL MEDICINE | Facility: CLINIC | Age: 59
End: 2019-12-26

## 2020-01-02 ENCOUNTER — PATIENT OUTREACH (OUTPATIENT)
Dept: ADMINISTRATIVE | Facility: HOSPITAL | Age: 60
End: 2020-01-02

## 2020-01-13 ENCOUNTER — PATIENT OUTREACH (OUTPATIENT)
Dept: ADMINISTRATIVE | Facility: HOSPITAL | Age: 60
End: 2020-01-13

## 2020-01-27 DIAGNOSIS — E11.9 CONTROLLED TYPE 2 DIABETES MELLITUS WITHOUT COMPLICATION, WITHOUT LONG-TERM CURRENT USE OF INSULIN: ICD-10-CM

## 2020-01-30 ENCOUNTER — TELEPHONE (OUTPATIENT)
Dept: ADMINISTRATIVE | Facility: OTHER | Age: 60
End: 2020-01-30

## 2020-01-30 RX ORDER — GABAPENTIN 300 MG/1
CAPSULE ORAL
Qty: 90 CAPSULE | Refills: 1 | Status: SHIPPED | OUTPATIENT
Start: 2020-01-30 | End: 2022-02-19 | Stop reason: SDUPTHER

## 2020-02-04 ENCOUNTER — PATIENT OUTREACH (OUTPATIENT)
Dept: ADMINISTRATIVE | Facility: OTHER | Age: 60
End: 2020-02-04

## 2020-02-04 DIAGNOSIS — E11.9 DIABETES MELLITUS WITHOUT COMPLICATION: Primary | ICD-10-CM

## 2020-02-05 ENCOUNTER — OFFICE VISIT (OUTPATIENT)
Dept: BARIATRICS | Facility: CLINIC | Age: 60
End: 2020-02-05
Payer: COMMERCIAL

## 2020-02-05 VITALS
SYSTOLIC BLOOD PRESSURE: 122 MMHG | HEIGHT: 61 IN | WEIGHT: 277.75 LBS | HEART RATE: 78 BPM | DIASTOLIC BLOOD PRESSURE: 84 MMHG | BODY MASS INDEX: 52.44 KG/M2

## 2020-02-05 DIAGNOSIS — E66.01 CLASS 3 SEVERE OBESITY DUE TO EXCESS CALORIES WITH SERIOUS COMORBIDITY AND BODY MASS INDEX (BMI) OF 50.0 TO 59.9 IN ADULT: ICD-10-CM

## 2020-02-05 DIAGNOSIS — E11.9 CONTROLLED TYPE 2 DIABETES MELLITUS WITHOUT COMPLICATION, WITHOUT LONG-TERM CURRENT USE OF INSULIN: Primary | ICD-10-CM

## 2020-02-05 PROCEDURE — 3008F BODY MASS INDEX DOCD: CPT | Mod: CPTII,S$GLB,, | Performed by: INTERNAL MEDICINE

## 2020-02-05 PROCEDURE — 99213 PR OFFICE/OUTPT VISIT, EST, LEVL III, 20-29 MIN: ICD-10-PCS | Mod: S$GLB,,, | Performed by: INTERNAL MEDICINE

## 2020-02-05 PROCEDURE — 99999 PR PBB SHADOW E&M-EST. PATIENT-LVL III: ICD-10-PCS | Mod: PBBFAC,,, | Performed by: INTERNAL MEDICINE

## 2020-02-05 PROCEDURE — 3044F PR MOST RECENT HEMOGLOBIN A1C LEVEL <7.0%: ICD-10-PCS | Mod: CPTII,S$GLB,, | Performed by: INTERNAL MEDICINE

## 2020-02-05 PROCEDURE — 3074F SYST BP LT 130 MM HG: CPT | Mod: CPTII,S$GLB,, | Performed by: INTERNAL MEDICINE

## 2020-02-05 PROCEDURE — 3044F HG A1C LEVEL LT 7.0%: CPT | Mod: CPTII,S$GLB,, | Performed by: INTERNAL MEDICINE

## 2020-02-05 PROCEDURE — 99999 PR PBB SHADOW E&M-EST. PATIENT-LVL III: CPT | Mod: PBBFAC,,, | Performed by: INTERNAL MEDICINE

## 2020-02-05 PROCEDURE — 3008F PR BODY MASS INDEX (BMI) DOCUMENTED: ICD-10-PCS | Mod: CPTII,S$GLB,, | Performed by: INTERNAL MEDICINE

## 2020-02-05 PROCEDURE — 99213 OFFICE O/P EST LOW 20 MIN: CPT | Mod: S$GLB,,, | Performed by: INTERNAL MEDICINE

## 2020-02-05 PROCEDURE — 3079F DIAST BP 80-89 MM HG: CPT | Mod: CPTII,S$GLB,, | Performed by: INTERNAL MEDICINE

## 2020-02-05 PROCEDURE — 3074F PR MOST RECENT SYSTOLIC BLOOD PRESSURE < 130 MM HG: ICD-10-PCS | Mod: CPTII,S$GLB,, | Performed by: INTERNAL MEDICINE

## 2020-02-05 PROCEDURE — 3079F PR MOST RECENT DIASTOLIC BLOOD PRESSURE 80-89 MM HG: ICD-10-PCS | Mod: CPTII,S$GLB,, | Performed by: INTERNAL MEDICINE

## 2020-02-05 NOTE — PATIENT INSTRUCTIONS
Start Ozempic 1mg once a week.       Decrease portions as soon as you start Ozempic. Some nausea in the first 2 weeks is not unusual.     If you get pain across the upper abdomen and around to your back, please call the office.       Protein and fiber in every meal.       Intermittent fasting. NO eating from 8 pm to 12pm. Can have water, tea, coffee in that time without sweeteners.    2 meals made up of the following:  Unlimited green vegetables, tomatoes, mushrooms, spaghetti squash, cauliflower, meat, poultry, seafood, eggs and hard cheeses.   Avoid fried foods  Dressings, seasonings, condiments, etc should have less than 2 g sugars.   Beans or nuts can have 1 x a day.   1-2 servings of citrus fruits, berries, pineapple or melon a day (1/2 cup)  Milk and plain yogurt    Avoid starchy carbohydrates (bread, rice, pasta, potatoes, corn, peas, oatmeal, grits, tortillas, crackers, chips)      No soda, sweet tea, juices or lemonade. All drinks should be 5 calories or less.      Www.dietdoctor.com for info on intermittent fasting.     Start exercise. These can be body weight exercises, light weight or elastic bands. [employer] and [employer] are great sources for free work out plans and videos.     Dinner in Under 30 minutes and 400 calories.     Baked Chicken breast with Broccoli Cheese Casserole  2-3 chicken breast (approximately 6-8 oz each)    2 tsp each garlic and herb Mrs. Dash seasoning   2 tsp your favorite creole seasoning  2 tablespoons of balsamic vinegar  2 - 10 oz packages of frozen broccoli  1 egg   ½ cup skim milk  ½ tsp paprika   ½ tsp cayenne pepper   1 can fat free cream of mushroom soup.   1 .5 cups of shredded cheddar cheese    Pre-heat oven to 450 degrees. Toss 2-3 chicken breast (approximately 6-8 oz each) in 2 tsp each garlic and herb Mrs. Dash seasoning, 2 tsp your favorite creole seasoning, and 2 tablespoons of balsamic vinegar. This can also be done up to 24 hours ahead of time, and the chicken can  be left in the refrigerator to marinate. Place on a baking sheet sprayed with non-stick cooking spray and bake on 450 degrees for 10 min, then reduce heat to 350 degrees and cook an addition 10-15 minutes until chicken is cooked through.   While chicken is baking, microwave safe dish, thaw 2 - 10 oz packages of frozen broccoli. Drain any excess water, season with salt, pepper, all-purpose seasoning of your choice. In another bowl, whisk together 1 egg, ½ cup skim milk, ½ tsp paprika, ½ tsp cayenne pepper and 1 can fat free cream of mushroom soup. Combine broccoli, soup mixture, 1 cup of shredded cheddar cheese in baking dish sprayed with cooking spray. Top with remaining cheese. Bake in the oven with chicken for about 20 min or until set cheese is melted and boone.     Makes 4 servings. 389 calories per serving.10 g fat, 13 g carbs, 54g protein     Sheet Pan Andrews Shrimp  1 pound large shrimp, shelled, peeled and deveined.   2 tablespoon olive oil  The zest and the juice of 1 lime  ½ tsp chili powder  ½-1 tsp cayenne pepper  1 tsp cumin  ½ tsp dry oregano  1 red bell pepper  1 green bell pepper  1 red onion  2 cups mushrooms, quartered  1 avocado  Whole jone lettuce leaves  Preheat oven to 375 degrees.  In a bowl combine shrimp, lime juice, lime zest, cumin, cayenne pepper, chili powder, and a pinch of salt. Set aside.   Slice peppers and onions into thin strips. Toss in 1 tablespoon of olive oil. Sprinkle with salt and pepper and arrange in a single layer over 1/3 of a large sheet pan  Toss mushrooms with remaining olive oil, oregano, salt and pepper. Arrange in single layer on sheet pan. Place sheet pan in oven for about 15-20 minutes until vegetables are softened, cooked about ¾ of the way through. Remove the sheet pan from oven.  Change setting on oven to low broil.  If needed, rearrange vegetables to make room for shrimp. Arrange the shrimp in a single layer on the sheet pan and return pan to oven. After  5 minutes, flip shrimp. Cook 3-5 additional minutes, or until  Shrimp are opaque.   Serve shrimp and cooked vegetables on lettuce leaves with sliced avocado.   Makes 4 servings. Calories per servin; 23g fat, 19 g carbohydrates, 27g protein.    Zoodles Primavera with Seasoned Ricotta  8 cups zucchini noodles. These can be purchased already prepared, or you can make them on your own with whole zucchini and a vegetable spiralizer.   1.5 cups cherry tomatoes, quartered  2 cups sliced mushrooms  1 cup chopped fresh broccoli  1 cup frozen peas and carrots  2 cloves garlic, finely chopped  16 oz part skim ricotta cheese  2 oz Neufchatel cream cream cheese, cut into small cubes  Juice and zest of 1 lemon  1 pinch red pepper flakes    2 tsp Italian seasoning  4-5 leaves fresh basil, thinly sliced  1 tablespoon of olive oil  Parmesan cheese for topping (optional)     In a bowl, combine ricotta cheese, 1 tsp Italian seasoning, ½ teaspoon lemon zest. Season with salt and pepper to taste. Mix well. Fold in half of fresh basil. Set aside.   Heat a large skillet to medium high. Add olive oil. Sautee mushrooms until starting to become tender. Season them with salt and pepper while cooking.  Add broccoli and peas and carrots. Reduce heat to medium. Cover pan and cook for 4-5 minutes until broccoli is tender and peas and carrots are warmed through. Add Neufchatel cheese, Italian seasoning, red pepper flakes, 1 tsp lemon zest and lemon juice. Stir until a smooth sauce is formed. Add zucchini noodles (zoodles) to skillet and toss in sauce, then add cherry tomatoes.  Separate zoodle and vegetables into 4 equal portions. Serve each with a ¼ cup serving of seasoned ricotta cheese. Sprinkle with grated parmesan cheese or fresh basil,  if desired.   Makes 4 servings. Calories per servin calories, 32 g carbs, 33 g protein, 18 g fat

## 2020-02-05 NOTE — PROGRESS NOTES
"Subjective:       Patient ID: Tawny Valerio is a 59 y.o. female.    Chief Complaint: Follow-up    Pt here today for follow-up. Has lost 13 lbs. Started ozempic for DM2 and IF. She has been trying to stick with fasting. Sometimes more like 12 hours. No exercise.   Has had some upset stomach, but associated with fried foods. Otherwise, no other SE. Has had some times when she felt sleepy.   Will get labs next week.       Lab Results       Component                Value               Date                       HGBA1C                   6.7 (H)             07/26/2019                 HGBA1C                   6.5 (H)             01/22/2019                 HGBA1C                   6.5 (H)             05/24/2018            Lab Results       Component                Value               Date                       LDLCALC                  117.4               07/26/2019                 CREATININE               0.7                 08/27/2019                 Follow-up   Pertinent negatives include no arthralgias, chest pain, chills or fever.     Review of Systems   Constitutional: Negative for chills and fever.   Respiratory: Negative for shortness of breath.         Does not use CPAP. States could not tolerate it. ESS 19.    Cardiovascular: Negative for chest pain and leg swelling.   Gastrointestinal: Positive for diarrhea. Negative for constipation.        Rare GERD   Genitourinary: Positive for dysuria. Negative for difficulty urinating.   Musculoskeletal: Positive for back pain. Negative for arthralgias.   Neurological: Negative for dizziness and light-headedness.   Psychiatric/Behavioral: Negative for dysphoric mood. The patient is nervous/anxious.        Objective:     /84   Pulse 78   Ht 5' 1" (1.549 m)   Wt 126 kg (277 lb 12.5 oz)   LMP 01/15/2019 (Approximate)   BMI 52.49 kg/m²    Physical Exam   Constitutional: She is oriented to person, place, and time. She appears well-developed. No distress.   Morbidly " obese     HENT:   Head: Normocephalic and atraumatic.   Eyes: Pupils are equal, round, and reactive to light. EOM are normal. No scleral icterus.   Neck: Normal range of motion. Neck supple.   Cardiovascular: Normal rate.   Pulmonary/Chest: Effort normal.   Musculoskeletal: Normal range of motion. She exhibits no edema.   Neurological: She is alert and oriented to person, place, and time. No cranial nerve deficit.   Skin: Skin is warm and dry. No erythema.   Psychiatric: She has a normal mood and affect. Her behavior is normal. Judgment normal.   Vitals reviewed.      Assessment:       1. Controlled type 2 diabetes mellitus without complication, without long-term current use of insulin    2. Class 3 severe obesity due to excess calories with serious comorbidity and body mass index (BMI) of 50.0 to 59.9 in adult        Plan:         Tawny was seen today for follow-up.    Diagnoses and all orders for this visit:    Controlled type 2 diabetes mellitus without complication, without long-term current use of insulin  -     semaglutide (OZEMPIC) 1 mg/dose (2 mg/1.5 mL) PnIj; Inject 1 mg subq weekly.    Class 3 severe obesity due to excess calories with serious comorbidity and body mass index (BMI) of 50.0 to 59.9 in adult        Start Ozempic 1mg once a week.       Decrease portions as soon as you start Ozempic. Some nausea in the first 2 weeks is not unusual.     If you get pain across the upper abdomen and around to your back, please call the office.       Protein and fiber in every meal.       Intermittent fasting. NO eating from 8 pm to 12pm. Can have water, tea, coffee in that time without sweeteners.    2 meals made up of the following:  Unlimited green vegetables, tomatoes, mushrooms, spaghetti squash, cauliflower, meat, poultry, seafood, eggs and hard cheeses.   Avoid fried foods  Dressings, seasonings, condiments, etc should have less than 2 g sugars.   Beans or nuts can have 1 x a day.   1-2 servings of citrus  fruits, berries, pineapple or melon a day (1/2 cup)  Milk and plain yogurt    Avoid starchy carbohydrates (bread, rice, pasta, potatoes, corn, peas, oatmeal, grits, tortillas, crackers, chips)      No soda, sweet tea, juices or lemonade. All drinks should be 5 calories or less.      Www.dietdoctor.[place] for info on intermittent fasting.     Meal ideas given

## 2020-02-07 ENCOUNTER — PATIENT OUTREACH (OUTPATIENT)
Dept: ADMINISTRATIVE | Facility: OTHER | Age: 60
End: 2020-02-07

## 2020-02-10 NOTE — PROGRESS NOTES
Subjective:       Patient ID: Tawny Valerio is a 59 y.o. female.    Chief Complaint: Follow-up    Patient is a 59 y.o.female who presents today for annual    Labs: due now  Vaccines: Influenza (yearly); Tetanus (done)   Eye exam: april  Mammogram: due now  Gyn exam: dr cheng  Colonoscopy: declines; will schedule soon  Diet: atkins    Pt has lost 15 pounds in last few months  Review of Systems   Constitutional: Negative for appetite change, chills, diaphoresis and fever.   HENT: Negative for congestion, ear discharge, ear pain, postnasal drip, tinnitus, trouble swallowing and voice change.    Eyes: Negative for discharge, redness and itching.   Respiratory: Negative for cough, chest tightness, shortness of breath and wheezing.    Cardiovascular: Negative for chest pain, palpitations and leg swelling.   Gastrointestinal: Negative for abdominal pain, constipation, diarrhea, nausea and vomiting.   Endocrine: Negative for cold intolerance and heat intolerance.   Genitourinary: Negative for difficulty urinating, flank pain, hematuria and urgency.   Musculoskeletal: Negative for arthralgias, gait problem, myalgias and neck stiffness.   Skin: Negative for color change and rash.   Neurological: Negative for dizziness, seizures, syncope and headaches.   Hematological: Negative for adenopathy.   Psychiatric/Behavioral: Negative for agitation, behavioral problems, confusion and sleep disturbance.       Objective:      Physical Exam   Constitutional: She is oriented to person, place, and time. She appears well-developed and well-nourished. No distress.   HENT:   Head: Normocephalic and atraumatic.   Right Ear: External ear normal.   Left Ear: External ear normal.   Nose: Nose normal.   Mouth/Throat: Oropharynx is clear and moist. No oropharyngeal exudate.   Eyes: Pupils are equal, round, and reactive to light. Conjunctivae and EOM are normal. Right eye exhibits no discharge. Left eye exhibits no discharge. No scleral  icterus.   Neck: Neck supple. No JVD present. No tracheal deviation present. No thyromegaly present.   Cardiovascular: Normal rate, normal heart sounds and intact distal pulses. Exam reveals no gallop and no friction rub.   No murmur heard.  Pulmonary/Chest: Effort normal and breath sounds normal. No stridor. No respiratory distress. She has no wheezes. She has no rales. She exhibits no tenderness.   Abdominal: Soft. Bowel sounds are normal. She exhibits no distension. There is no tenderness. There is no rebound.   Musculoskeletal: She exhibits no edema or tenderness.   Lymphadenopathy:     She has no cervical adenopathy.   Neurological: She is alert and oriented to person, place, and time.   Skin: Skin is warm and dry. No rash noted. She is not diaphoretic. No erythema.   Psychiatric: She has a normal mood and affect. Her behavior is normal.   Nursing note and vitals reviewed.      Assessment and Plan:       1. Controlled type 2 diabetes mellitus without complication, without long-term current use of insulin    - CBC auto differential; Future  - Comprehensive metabolic panel; Future  - Hemoglobin A1c; Future  - Lipid panel; Future  - Microalbumin/creatinine urine ratio; Future  - TSH; Future  - Urinalysis; Future  - Vitamin D; Future  - ESTRADIOL; Future  - Follicle stimulating hormone; Future  - Luteinizing hormone; Future  - Vitamin B12; Future    2. Vitamin D insufficiency    - CBC auto differential; Future  - Comprehensive metabolic panel; Future  - Hemoglobin A1c; Future  - Lipid panel; Future  - Microalbumin/creatinine urine ratio; Future  - TSH; Future  - Urinalysis; Future  - Vitamin D; Future  - ESTRADIOL; Future  - Follicle stimulating hormone; Future  - Luteinizing hormone; Future  - Vitamin B12; Future    3. Endometrial cancer    - CBC auto differential; Future  - Comprehensive metabolic panel; Future  - Hemoglobin A1c; Future  - Lipid panel; Future  - Microalbumin/creatinine urine ratio; Future  - TSH;  Future  - Urinalysis; Future  - Vitamin D; Future  - ESTRADIOL; Future  - Follicle stimulating hormone; Future  - Luteinizing hormone; Future  - Vitamin B12; Future    4. Morbid obesity with BMI of 60.0-69.9, adult    - CBC auto differential; Future  - Comprehensive metabolic panel; Future  - Hemoglobin A1c; Future  - Lipid panel; Future  - Microalbumin/creatinine urine ratio; Future  - TSH; Future  - Urinalysis; Future  - Vitamin D; Future  - ESTRADIOL; Future  - Follicle stimulating hormone; Future  - Luteinizing hormone; Future  - Vitamin B12; Future    5. Visit for screening mammogram    - Mammo Digital Screening Bilat without CA; Future          No follow-ups on file.

## 2020-02-11 ENCOUNTER — OFFICE VISIT (OUTPATIENT)
Dept: INTERNAL MEDICINE | Facility: CLINIC | Age: 60
End: 2020-02-11
Payer: COMMERCIAL

## 2020-02-11 VITALS
HEART RATE: 80 BPM | WEIGHT: 281.06 LBS | DIASTOLIC BLOOD PRESSURE: 66 MMHG | HEIGHT: 61 IN | RESPIRATION RATE: 16 BRPM | TEMPERATURE: 98 F | BODY MASS INDEX: 53.07 KG/M2 | SYSTOLIC BLOOD PRESSURE: 120 MMHG

## 2020-02-11 DIAGNOSIS — E55.9 VITAMIN D INSUFFICIENCY: ICD-10-CM

## 2020-02-11 DIAGNOSIS — C54.1 ENDOMETRIAL CANCER: ICD-10-CM

## 2020-02-11 DIAGNOSIS — E66.01 MORBID OBESITY WITH BMI OF 60.0-69.9, ADULT: ICD-10-CM

## 2020-02-11 DIAGNOSIS — Z12.31 VISIT FOR SCREENING MAMMOGRAM: ICD-10-CM

## 2020-02-11 DIAGNOSIS — E11.9 CONTROLLED TYPE 2 DIABETES MELLITUS WITHOUT COMPLICATION, WITHOUT LONG-TERM CURRENT USE OF INSULIN: Primary | ICD-10-CM

## 2020-02-11 PROCEDURE — 3044F PR MOST RECENT HEMOGLOBIN A1C LEVEL <7.0%: ICD-10-PCS | Mod: CPTII,S$GLB,, | Performed by: INTERNAL MEDICINE

## 2020-02-11 PROCEDURE — 99214 PR OFFICE/OUTPT VISIT, EST, LEVL IV, 30-39 MIN: ICD-10-PCS | Mod: S$GLB,,, | Performed by: INTERNAL MEDICINE

## 2020-02-11 PROCEDURE — 99214 OFFICE O/P EST MOD 30 MIN: CPT | Mod: S$GLB,,, | Performed by: INTERNAL MEDICINE

## 2020-02-11 PROCEDURE — 3008F BODY MASS INDEX DOCD: CPT | Mod: CPTII,S$GLB,, | Performed by: INTERNAL MEDICINE

## 2020-02-11 PROCEDURE — 3078F PR MOST RECENT DIASTOLIC BLOOD PRESSURE < 80 MM HG: ICD-10-PCS | Mod: CPTII,S$GLB,, | Performed by: INTERNAL MEDICINE

## 2020-02-11 PROCEDURE — 99999 PR PBB SHADOW E&M-EST. PATIENT-LVL III: CPT | Mod: PBBFAC,,, | Performed by: INTERNAL MEDICINE

## 2020-02-11 PROCEDURE — 3074F SYST BP LT 130 MM HG: CPT | Mod: CPTII,S$GLB,, | Performed by: INTERNAL MEDICINE

## 2020-02-11 PROCEDURE — 3074F PR MOST RECENT SYSTOLIC BLOOD PRESSURE < 130 MM HG: ICD-10-PCS | Mod: CPTII,S$GLB,, | Performed by: INTERNAL MEDICINE

## 2020-02-11 PROCEDURE — 3078F DIAST BP <80 MM HG: CPT | Mod: CPTII,S$GLB,, | Performed by: INTERNAL MEDICINE

## 2020-02-11 PROCEDURE — 3044F HG A1C LEVEL LT 7.0%: CPT | Mod: CPTII,S$GLB,, | Performed by: INTERNAL MEDICINE

## 2020-02-11 PROCEDURE — 3008F PR BODY MASS INDEX (BMI) DOCUMENTED: ICD-10-PCS | Mod: CPTII,S$GLB,, | Performed by: INTERNAL MEDICINE

## 2020-02-11 PROCEDURE — 99999 PR PBB SHADOW E&M-EST. PATIENT-LVL III: ICD-10-PCS | Mod: PBBFAC,,, | Performed by: INTERNAL MEDICINE

## 2020-02-12 ENCOUNTER — LAB VISIT (OUTPATIENT)
Dept: LAB | Facility: HOSPITAL | Age: 60
End: 2020-02-12
Attending: INTERNAL MEDICINE
Payer: COMMERCIAL

## 2020-02-12 DIAGNOSIS — E66.01 MORBID OBESITY WITH BMI OF 60.0-69.9, ADULT: ICD-10-CM

## 2020-02-12 DIAGNOSIS — E55.9 VITAMIN D INSUFFICIENCY: ICD-10-CM

## 2020-02-12 DIAGNOSIS — C54.1 ENDOMETRIAL CANCER: ICD-10-CM

## 2020-02-12 DIAGNOSIS — E11.9 CONTROLLED TYPE 2 DIABETES MELLITUS WITHOUT COMPLICATION, WITHOUT LONG-TERM CURRENT USE OF INSULIN: ICD-10-CM

## 2020-02-12 LAB
25(OH)D3+25(OH)D2 SERPL-MCNC: 16 NG/ML (ref 30–96)
ALBUMIN SERPL BCP-MCNC: 3 G/DL (ref 3.5–5.2)
ALP SERPL-CCNC: 109 U/L (ref 55–135)
ALT SERPL W/O P-5'-P-CCNC: 13 U/L (ref 10–44)
ANION GAP SERPL CALC-SCNC: 7 MMOL/L (ref 8–16)
AST SERPL-CCNC: 12 U/L (ref 10–40)
BILIRUB SERPL-MCNC: 0.3 MG/DL (ref 0.1–1)
BUN SERPL-MCNC: 19 MG/DL (ref 6–20)
CALCIUM SERPL-MCNC: 9.7 MG/DL (ref 8.7–10.5)
CHLORIDE SERPL-SCNC: 103 MMOL/L (ref 95–110)
CHOLEST SERPL-MCNC: 186 MG/DL (ref 120–199)
CHOLEST/HDLC SERPL: 3.4 {RATIO} (ref 2–5)
CO2 SERPL-SCNC: 30 MMOL/L (ref 23–29)
CREAT SERPL-MCNC: 0.8 MG/DL (ref 0.5–1.4)
EST. GFR  (AFRICAN AMERICAN): >60 ML/MIN/1.73 M^2
EST. GFR  (NON AFRICAN AMERICAN): >60 ML/MIN/1.73 M^2
ESTRADIOL SERPL-MCNC: 37 PG/ML
FSH SERPL-ACNC: 19.9 MIU/ML
GLUCOSE SERPL-MCNC: 93 MG/DL (ref 70–110)
HDLC SERPL-MCNC: 55 MG/DL (ref 40–75)
HDLC SERPL: 29.6 % (ref 20–50)
LDLC SERPL CALC-MCNC: 117.4 MG/DL (ref 63–159)
LH SERPL-ACNC: 12.4 MIU/ML
NONHDLC SERPL-MCNC: 131 MG/DL
POTASSIUM SERPL-SCNC: 3.7 MMOL/L (ref 3.5–5.1)
PROT SERPL-MCNC: 7.5 G/DL (ref 6–8.4)
SODIUM SERPL-SCNC: 140 MMOL/L (ref 136–145)
TRIGL SERPL-MCNC: 68 MG/DL (ref 30–150)
TSH SERPL DL<=0.005 MIU/L-ACNC: 0.63 UIU/ML (ref 0.4–4)
VIT B12 SERPL-MCNC: 525 PG/ML (ref 210–950)

## 2020-02-12 PROCEDURE — 83036 HEMOGLOBIN GLYCOSYLATED A1C: CPT

## 2020-02-12 PROCEDURE — 80053 COMPREHEN METABOLIC PANEL: CPT

## 2020-02-12 PROCEDURE — 82306 VITAMIN D 25 HYDROXY: CPT

## 2020-02-12 PROCEDURE — 82607 VITAMIN B-12: CPT

## 2020-02-12 PROCEDURE — 83001 ASSAY OF GONADOTROPIN (FSH): CPT

## 2020-02-12 PROCEDURE — 85025 COMPLETE CBC W/AUTO DIFF WBC: CPT

## 2020-02-12 PROCEDURE — 82670 ASSAY OF TOTAL ESTRADIOL: CPT

## 2020-02-12 PROCEDURE — 84443 ASSAY THYROID STIM HORMONE: CPT

## 2020-02-12 PROCEDURE — 83002 ASSAY OF GONADOTROPIN (LH): CPT

## 2020-02-12 PROCEDURE — 80061 LIPID PANEL: CPT

## 2020-02-12 PROCEDURE — 36415 COLL VENOUS BLD VENIPUNCTURE: CPT | Mod: PO

## 2020-02-13 LAB
BASOPHILS # BLD AUTO: 0.06 K/UL (ref 0–0.2)
BASOPHILS NFR BLD: 0.7 % (ref 0–1.9)
DIFFERENTIAL METHOD: ABNORMAL
EOSINOPHIL # BLD AUTO: 0.2 K/UL (ref 0–0.5)
EOSINOPHIL NFR BLD: 2 % (ref 0–8)
ERYTHROCYTE [DISTWIDTH] IN BLOOD BY AUTOMATED COUNT: 14.8 % (ref 11.5–14.5)
ESTIMATED AVG GLUCOSE: 128 MG/DL (ref 68–131)
HBA1C MFR BLD HPLC: 6.1 % (ref 4–5.6)
HCT VFR BLD AUTO: 42.9 % (ref 37–48.5)
HGB BLD-MCNC: 12.4 G/DL (ref 12–16)
IMM GRANULOCYTES # BLD AUTO: 0.02 K/UL (ref 0–0.04)
IMM GRANULOCYTES NFR BLD AUTO: 0.2 % (ref 0–0.5)
LYMPHOCYTES # BLD AUTO: 2.2 K/UL (ref 1–4.8)
LYMPHOCYTES NFR BLD: 27.9 % (ref 18–48)
MCH RBC QN AUTO: 26.4 PG (ref 27–31)
MCHC RBC AUTO-ENTMCNC: 28.9 G/DL (ref 32–36)
MCV RBC AUTO: 91 FL (ref 82–98)
MONOCYTES # BLD AUTO: 0.5 K/UL (ref 0.3–1)
MONOCYTES NFR BLD: 5.7 % (ref 4–15)
NEUTROPHILS # BLD AUTO: 5.1 K/UL (ref 1.8–7.7)
NEUTROPHILS NFR BLD: 63.5 % (ref 38–73)
NRBC BLD-RTO: 0 /100 WBC
PLATELET # BLD AUTO: 352 K/UL (ref 150–350)
PMV BLD AUTO: 10.3 FL (ref 9.2–12.9)
RBC # BLD AUTO: 4.7 M/UL (ref 4–5.4)
WBC # BLD AUTO: 8.02 K/UL (ref 3.9–12.7)

## 2020-02-17 ENCOUNTER — TELEPHONE (OUTPATIENT)
Dept: ADMINISTRATIVE | Facility: OTHER | Age: 60
End: 2020-02-17

## 2020-02-27 ENCOUNTER — TELEPHONE (OUTPATIENT)
Dept: ADMINISTRATIVE | Facility: OTHER | Age: 60
End: 2020-02-27

## 2020-02-27 RX ORDER — LOSARTAN POTASSIUM 25 MG/1
TABLET ORAL
Qty: 30 TABLET | Refills: 0 | Status: SHIPPED | OUTPATIENT
Start: 2020-02-27 | End: 2021-07-09 | Stop reason: SDUPTHER

## 2020-02-28 ENCOUNTER — PATIENT MESSAGE (OUTPATIENT)
Dept: GYNECOLOGIC ONCOLOGY | Facility: CLINIC | Age: 60
End: 2020-02-28

## 2020-03-03 ENCOUNTER — TELEPHONE (OUTPATIENT)
Dept: ENDOSCOPY | Facility: HOSPITAL | Age: 60
End: 2020-03-03

## 2020-03-19 ENCOUNTER — PATIENT MESSAGE (OUTPATIENT)
Dept: BARIATRICS | Facility: CLINIC | Age: 60
End: 2020-03-19

## 2020-04-09 DIAGNOSIS — E11.9 CONTROLLED TYPE 2 DIABETES MELLITUS WITHOUT COMPLICATION, WITHOUT LONG-TERM CURRENT USE OF INSULIN: ICD-10-CM

## 2020-04-09 RX ORDER — SEMAGLUTIDE 1.34 MG/ML
INJECTION, SOLUTION SUBCUTANEOUS
Qty: 9 ML | Refills: 0 | Status: SHIPPED | OUTPATIENT
Start: 2020-04-09 | End: 2020-04-23 | Stop reason: SDUPTHER

## 2020-04-10 NOTE — TELEPHONE ENCOUNTER
----- Message from Bobby rFazier Jr., MD sent at 4/4/2019  3:23 PM CDT -----  PT WANTS TO SCHEDULE D&C H-SCOPE AND POLYPECTOMY IN June. PLEASE CALL HER  
No answer. Message left to call clinic back.   
denies pain/discomfort

## 2020-04-13 ENCOUNTER — TELEPHONE (OUTPATIENT)
Dept: INTERNAL MEDICINE | Facility: CLINIC | Age: 60
End: 2020-04-13

## 2020-04-13 NOTE — TELEPHONE ENCOUNTER
----- Message from Mini Zapata sent at 4/13/2020 12:09 PM CDT -----  Contact: self/[phone]  .1 Patient would like to get medical advice.  Symptoms (please be specific): gout  How long has patient had these symptoms: 04/10/20  Pharmacy name and phone#: [id number] - [place] - [address] [place] [phone] (Phone)  [phone] (Fax)  Any drug allergies: onfile  Comments: Patient would like to get medical advice. Would like to get an rx but first call her back.

## 2020-04-13 NOTE — TELEPHONE ENCOUNTER
Pt states she is having a Gout flare up since Friday. She started taking Mitigare (husbands Rx) and it has helped some. She spoke to a nurse on call and they called in the same medication. She is wondering if you could possibly call in a steroid    Please advise

## 2020-04-14 NOTE — PROGRESS NOTES
Subjective:       Patient ID: Tawny Valerio is a 59 y.o. female.    Chief Complaint: No chief complaint on file.    Patient is a 59 y.o.female who presents today for gout    The patient location is: home  The chief complaint leading to consultation is: gout  Visit type: audiovisual  Total time spent with patient: 15 min  Each patient to whom he or she provides medical services by telemedicine is:  (1) informed of the relationship between the physician and patient and the respective role of any other health care provider with respect to management of the patient; and (2) notified that he or she may decline to receive medical services by telemedicine and may withdraw from such care at any time.    Notes:     - on big toe; left foot  - it was very red and swollen; slightly better now  - couldn't walk for three days  -  She started taking her husbands' medication; colchicine. Still has a flare though  - this has only occurred once prior  Review of Systems   Constitutional: Negative for appetite change, chills, diaphoresis and fever.   HENT: Negative for congestion, ear discharge, ear pain, postnasal drip, tinnitus, trouble swallowing and voice change.    Eyes: Negative for discharge, redness and itching.   Respiratory: Negative for cough, chest tightness, shortness of breath and wheezing.    Cardiovascular: Negative for chest pain, palpitations and leg swelling.   Gastrointestinal: Negative for abdominal pain, constipation, diarrhea, nausea and vomiting.   Endocrine: Negative for cold intolerance and heat intolerance.   Genitourinary: Negative for difficulty urinating, flank pain, hematuria and urgency.   Musculoskeletal: Negative for arthralgias, gait problem, myalgias and neck stiffness.        Left great toe swelling and redness   Skin: Negative for color change and rash.   Neurological: Negative for dizziness, seizures, syncope and headaches.   Hematological: Negative for adenopathy.   Psychiatric/Behavioral:  Negative for agitation, behavioral problems, confusion and sleep disturbance.       Objective:      Physical Exam   Constitutional: She is oriented to person, place, and time. She appears well-developed and well-nourished. No distress.   HENT:   Head: Normocephalic and atraumatic.   Right Ear: External ear normal.   Left Ear: External ear normal.   Eyes: Pupils are equal, round, and reactive to light. Conjunctivae are normal. Right eye exhibits no discharge. Left eye exhibits no discharge. No scleral icterus.   Neck: Normal range of motion. Neck supple.   Pulmonary/Chest: Effort normal. No stridor.   Neurological: She is alert and oriented to person, place, and time.   Skin: She is not diaphoretic.   Psychiatric: She has a normal mood and affect. Her behavior is normal. Judgment and thought content normal.       Assessment and Plan:       1. Gout, unspecified cause, unspecified chronicity, unspecified site  - slight improvement with colchicine but not totally gone; will start medrol [place]  - methylPREDNISolone (MEDROL DOSEPACK) 4 mg tablet; Take as directed  Dispense: 1 Package; Refill: 0    2. Controlled type 2 diabetes mellitus without complication, without long-term current use of insulin  Medrol may temporarily increase glucose; if so, notify clinic    Discussed food triggers for gout  Notify clinic if symptoms change, worsen or do not improve          No follow-ups on file.

## 2020-04-15 ENCOUNTER — OFFICE VISIT (OUTPATIENT)
Dept: INTERNAL MEDICINE | Facility: CLINIC | Age: 60
End: 2020-04-15
Payer: COMMERCIAL

## 2020-04-15 DIAGNOSIS — M10.9 GOUT, UNSPECIFIED CAUSE, UNSPECIFIED CHRONICITY, UNSPECIFIED SITE: Primary | ICD-10-CM

## 2020-04-15 DIAGNOSIS — E11.9 CONTROLLED TYPE 2 DIABETES MELLITUS WITHOUT COMPLICATION, WITHOUT LONG-TERM CURRENT USE OF INSULIN: ICD-10-CM

## 2020-04-15 PROCEDURE — 3044F PR MOST RECENT HEMOGLOBIN A1C LEVEL <7.0%: ICD-10-PCS | Mod: CPTII,,, | Performed by: INTERNAL MEDICINE

## 2020-04-15 PROCEDURE — 99213 PR OFFICE/OUTPT VISIT, EST, LEVL III, 20-29 MIN: ICD-10-PCS | Mod: GT,,, | Performed by: INTERNAL MEDICINE

## 2020-04-15 PROCEDURE — 99213 OFFICE O/P EST LOW 20 MIN: CPT | Mod: GT,,, | Performed by: INTERNAL MEDICINE

## 2020-04-15 PROCEDURE — 3044F HG A1C LEVEL LT 7.0%: CPT | Mod: CPTII,,, | Performed by: INTERNAL MEDICINE

## 2020-04-15 RX ORDER — METHYLPREDNISOLONE 4 MG/1
TABLET ORAL
Qty: 1 PACKAGE | Refills: 0 | Status: SHIPPED | OUTPATIENT
Start: 2020-04-15 | End: 2020-04-23

## 2020-04-21 ENCOUNTER — PATIENT OUTREACH (OUTPATIENT)
Dept: ADMINISTRATIVE | Facility: OTHER | Age: 60
End: 2020-04-21

## 2020-04-21 DIAGNOSIS — E11.9 DIABETES MELLITUS WITHOUT COMPLICATION: Primary | ICD-10-CM

## 2020-04-23 ENCOUNTER — TELEPHONE (OUTPATIENT)
Dept: BARIATRICS | Facility: CLINIC | Age: 60
End: 2020-04-23

## 2020-04-23 ENCOUNTER — OFFICE VISIT (OUTPATIENT)
Dept: BARIATRICS | Facility: CLINIC | Age: 60
End: 2020-04-23
Payer: COMMERCIAL

## 2020-04-23 DIAGNOSIS — E11.9 CONTROLLED TYPE 2 DIABETES MELLITUS WITHOUT COMPLICATION, WITHOUT LONG-TERM CURRENT USE OF INSULIN: Primary | ICD-10-CM

## 2020-04-23 DIAGNOSIS — E66.01 CLASS 3 SEVERE OBESITY DUE TO EXCESS CALORIES WITH SERIOUS COMORBIDITY AND BODY MASS INDEX (BMI) OF 50.0 TO 59.9 IN ADULT: ICD-10-CM

## 2020-04-23 PROCEDURE — 99212 OFFICE O/P EST SF 10 MIN: CPT | Mod: 95,,, | Performed by: INTERNAL MEDICINE

## 2020-04-23 PROCEDURE — 99212 PR OFFICE/OUTPT VISIT, EST, LEVL II, 10-19 MIN: ICD-10-PCS | Mod: 95,,, | Performed by: INTERNAL MEDICINE

## 2020-04-23 PROCEDURE — 3044F HG A1C LEVEL LT 7.0%: CPT | Mod: CPTII,,, | Performed by: INTERNAL MEDICINE

## 2020-04-23 PROCEDURE — 3044F PR MOST RECENT HEMOGLOBIN A1C LEVEL <7.0%: ICD-10-PCS | Mod: CPTII,,, | Performed by: INTERNAL MEDICINE

## 2020-04-23 NOTE — PATIENT INSTRUCTIONS
Continue with Ozempic.    Decrease portions as soon as you start Ozempic. Some nausea in the first 2 weeks is not unusual.     If you get pain across the upper abdomen and around to your back, please call the office.       Protein and fiber in every meal.       Intermittent fasting. NO eating from 8 pm to 12pm. Can have water, tea, coffee in that time without sweeteners.    2 meals made up of the following:  Unlimited green vegetables, tomatoes, mushrooms, spaghetti squash, cauliflower, meat, poultry, seafood, eggs and hard cheeses.   Avoid fried foods  Dressings, seasonings, condiments, etc should have less than 2 g sugars.   Beans or nuts can have 1 x a day.   1-2 servings of citrus fruits, berries, pineapple or melon a day (1/2 cup)  Milk and plain yogurt    Avoid starchy carbohydrates (bread, rice, pasta, potatoes, corn, peas, oatmeal, grits, tortillas, crackers, chips)      No soda, sweet tea, juices or lemonade. All drinks should be 5 calories or less.      [email or web address].[email or web address] for info on intermittent fasting.     [facility]  Tips to Stay on Track During COVID-19      DO DON'T   Keep up with your food log  Maintaining your caloric intake and diet quality is critical for achieving your health and weight loss goals.  Download the [employer] and use [facility] Code 06019 to track food and fluid intake Feel Discouraged - You can do this!  There are a lot of changes happening in our world but don't let them discourage you. Focus on the future and remind yourself of all the work and effort you've put in so far.     Keep protein-rich foods stocked up  buy chicken and turkey in bulk and freeze them, keep dry or canned beans on hand, get the largest quantity of eggs available. Make sure you are getting your required protein intake (between  grams EACH DAY).   Drink fluid during meals or 30 minutes before/after eating  Your regular routine may have changed which may have  caused some of your meal or snack times to change.  keep track of when you consume any liquid and time your meals accordingly. This will also help you make sure you are staying hydrated throughout the day   Continue with your protein drinks or bars  Order online or use grocery delivery service. Always make sure you order more WELL BEFORE you are running low, especially now when deliveries may take longer to arrive.  Remember to check to make sure your protein shakes or bars have 4 gms of sugar or less.     Keep table sugar around  You may be more tempted to add it to your drinks or food if it is visible and easily accessible.   Try new recipes  Use this time to experiment in the kitchen and find some different healthy dishes you enjoy - you can use the nutrition booklet to help guide you.  If you cannot find your copy, please download it from our website @ [email or web address]  Click here to download [facility]'s Nutrition Binder.   Add tough or crunchy foods back into your diet too quickly  Raw veggies are great snack foods but adding them in too quickly after surgery will cause pain and discomfort   Eat your meals slowly and intentionally  You shouldn't have to rush out to be anywhere so really take your time and aim for each meal to last around 20-30 minutes.   Drink sugary/bubbly drinks or alcohol    It may be tempting especially if you are surrounded by others without these limitations, but these beverages will prevent weight loss and cause gastric  pain/discomfort     Listen to your body - try to recognize when you feel full.  Learning your body's signals can be difficult but it is a key step in your weight loss journey. While you are is this process of working on this step, be sure portion out the recommended quantities of foods and meals/snacks to prevent overeating.   Eat your meals using electronics  This is especially difficult at home where your  use of computers, phones, and TVs are pretty much unregulated. Designate a meal spot or spots where there is limited distractions and you can focus on your food - maybe your kitchen table or on an outside porch or deck.   Continue taking vitamins and minerals  Take them at the same time each day and keep a log of when you take your supplements to make sure you don't miss any. These supplements are essential for preventing malnutrition and other health problems that will deter your progress.   'Save' your appetite   You may feel like holding out from food as long as possible so you can eat a large meal later on, but your body needs energy throughout the day in order to properly fuel itself and keep you alert.  Try eating small meals throughout the day - use these meals as mini breaks from work or projects you are doing at home.   Stay active!  It is so important for both your physical and mental health that you get regular physical activity. Even if you can no longer physically go to the gym or to workout classes there are tons of online resources to keep you moving. Incorporate a specific exercise time into your daily home routine and keep a journal of your activity.   Order food delivery or take out   Most places are now offering delivery services, but it can still be difficult to find and choose healthy options. Choose to do a grocery store  or delivery instead- cooking food at home is less expensive then eating out!   Review the resources you've been provided and keep in touch with your healthcare team.  Let them know if you are struggling or experiencing any problems - they are here to help!  Call us to schedule a telehealth visit at [phone] Isolate yourself   It may be called 'social distancing' but that does not mean we can't still connect with one another. Phone calls or group video chats are great ways to keep in touch while staying at home. Any method of getting regular social time with friends  and family will help to remind you that you're not in this alone.     Grocery List    Items to Keep Stocked in Your Kitchen  PROTEINS (Lean)    Eggs  Beans (canned and/or dried)  Skinless chicken/turkey   Tuna/Kenly (canned and/or pouch)  Tofu or Tempeh  Jj Veggie Burgers  Fish or shrimp (fresh or frozen)  Ground Beef (90% lean)  Steaks  Chobani Greek Yogurt  Cabot Cottage Cheese  Hummus  Low-fat cheeses (Laughing Cow, Baby Bell, mozzarella string cheese)  Fairlife Non-fat Milk    Vegetables (non-starchy)  *veggies can be fresh or frozen    Broccoli   Cauliflower   Carrots   Onions   Cabbage   Radishes   Zucchini   Okra   Greens   Peppers   Spinach   Turnips   Mushrooms   Tomatoes   Celery   Lettuce   Asparagus  Eggplant   Green Onions   Kale    Fruits  *Fruits can be fresh or frozen    Apples  Oranges  Pears  Kiwi  Melon  Berries  Peaches  Unsweetened Applesauce    *Avoid fruits canned In syrup  *Stick to 1-2 servings of fruit/day   Vitamins    Flintstones Complete  Chewables    Super B-Complex tablets with 50 mg Thiamine    Nature's Way liquid Calcium Citrate + Vit D     Sublingual Vitamin B12   Other    Sugar-free Popsicles    Sugar-free Jello    Crystal Lite    Low Fat Condiments     Decaf Coffee/Tea           Foods to Avoid Having Around the House  Butter/Margarine Cookies Candy Chips  Pretzels Grits  Granola Popcorn Evangelista Corn  Bread Alcohol Soda  Pasta  Rice  Cake/Pie Sausage Potatoes Ice Cream                 Tricks to Prevent Emotional Eating During Quarantine      Keep 'trigger foods' out of the house   Keep yourself distracted with work, games, music, or whatever hobby you enjoy. This may be the time to try a new activity!   Try fighting stress with breathing techniques, yoga, meditation, or prayer   Mix up your meals with a variety of dishes   Keep up with your food diary   Call or video chat with a friend or family   Plan your meals for specific time and try for smaller meals throughout  the day   Pre-portion all meals and snacks    Step outside for some fresh air or do a quick exercise activity to reset yourself    *Avoid negative thoughts about yourself - if you have a slip-up, you are not a failure. Forgive yourself and focus on learning from it so you can prevent it for the future              Ways to Stay Active While Staying Inside      Youtube Videos - free exercise and gym classes at any fitness level   Apps -tons of fitness apps are currently offering free trial periods and offer workouts that don't require equipment   Chores around the house, such as cleaning or gardening   Walk up and down the stairs   Video-chat with your regular workout shayna and do an online class together   Make a playlist of your favorite fun songs and dance around - no need to worry about knowing any serious dance moves, just jump around get your heart rate up!    While you are limited to working out at home, you may find it easier to do short, mini workouts multiple times a day instead of all at once. Try to still get at least 30 minutes of physical activity in each day!   Physical Health = Mental Health    The health of both your mind and body are equally important -be sure you are taking the time to care for both. It is likely that the current health concerns and quarantine mandates caused significant changes to your normal routine. Although this can feel overwhelming and seem difficult to manage, there are ways you can take to manage these feelings and keep your mental and physical health journey on track. Here are some tips for self-care during quarantine:     Meditate, take deep breaths - find any practice that will help you center yourself.   Move around your house throughout the day. Avoid staying in the same seat or room for too long and try to work in an area of your house that get lots of sunlight if possible.   Get fresh air for a bit every day - being outside is a great way to improve your  mood! You don't necessarily have to go far from your house. You could even just hang out on your porch for a while or take a walk around the block.    Get good sleep and maintain a regular sleep schedule   Connect with others. While we aren't able to physically be with others right now it is still so important to socialize and interact with other people, even if it is being done remotely.    Keep yourself busy. Make a list of tasks that you want to complete around the house, start a new book, do some art projects, try journaling.

## 2020-04-23 NOTE — PROGRESS NOTES
Subjective:       Patient ID: Tawny Valerio is a 59 y.o. female.    Chief Complaint: No chief complaint on file.    The patient location is: [place], [place] .[place]  The chief complaint leading to consultation is: Patient presents with:  Follow-up     Visit type: audiovisual  Total time spent with patient: 11min  Each patient to whom he or she provides medical services by telemedicine is:  (1) informed of the relationship between the physician and patient and the respective role of any other health care provider with respect to management of the patient; and (2) notified that he or she may decline to receive medical services by telemedicine and may withdraw from such care at any time.    Notes:     Pt here today for follow-up. Has lost 13 lbs. Started ozempic for DM2 and IF. She has been trying to stick with fasting. Sometimes more like 12 hours. No exercise. States she was eating a lot of sugar around [facility]. Has been doing better more recently (past week). Was eating out more, and has started to cook more now. Doing a little house work, etc.   Has had some upset stomach, but associated with fried or sugary foods. Also had a gout attack around that time. HAd steroids for that.   A1c with good improvement.   Lab Results       Component                Value               Date                       HGBA1C                   6.1 (H)             02/12/2020                 HGBA1C                   6.7 (H)             07/26/2019                 HGBA1C                   6.5 (H)             01/22/2019            Lab Results       Component                Value               Date                       LDLCALC                  117.4               02/12/2020                 CREATININE               0.8                 02/12/2020                       Prev 277#. Pt not able to weigh at home.       Follow-up   Pertinent negatives include no arthralgias, chest pain, chills or fever.     Review of Systems   Constitutional: Negative  for chills and fever.   Respiratory: Negative for shortness of breath.         Does not use CPAP. States could not tolerate it. ESS 19.    Cardiovascular: Negative for chest pain and leg swelling.   Gastrointestinal: Positive for diarrhea. Negative for constipation.        Rare GERD   Genitourinary: Positive for dysuria. Negative for difficulty urinating.   Musculoskeletal: Positive for back pain. Negative for arthralgias.   Neurological: Negative for dizziness and light-headedness.   Psychiatric/Behavioral: Negative for dysphoric mood. The patient is nervous/anxious.        Objective:     LMP 01/15/2019 (Approximate)    Physical Exam   Constitutional: She is oriented to person, place, and time. She appears well-developed. No distress.   Morbidly obese     HENT:   Head: Normocephalic and atraumatic.   Pulmonary/Chest: Effort normal.   Neurological: She is alert and oriented to person, place, and time.   Psychiatric: She has a normal mood and affect. Her behavior is normal. Judgment normal.   Vitals reviewed.      Assessment:       1. Controlled type 2 diabetes mellitus without complication, without long-term current use of insulin    2. Class 3 severe obesity due to excess calories with serious comorbidity and body mass index (BMI) of 50.0 to 59.9 in adult        Plan:         Tawny was seen today for follow-up.    Diagnoses and all orders for this visit:    Controlled type 2 diabetes mellitus without complication, without long-term current use of insulin  -     semaglutide (OZEMPIC) 1 mg/dose (2 mg/1.5 mL) PnIj; INJECT 1 MG UNDER THE SKIN WEEKLY    Class 3 severe obesity due to excess calories with serious comorbidity and body mass index (BMI) of 50.0 to 59.9 in adult         Ozempic 1mg once a week.       Decrease portions as soon as you start Ozempic. Some nausea in the first 2 weeks is not unusual.     If you get pain across the upper abdomen and around to your back, please call the office.       Protein and  fiber in every meal.       Intermittent fasting. NO eating from 8 pm to 12pm. Can have water, tea, coffee in that time without sweeteners.    2 meals made up of the following:  Unlimited green vegetables, tomatoes, mushrooms, spaghetti squash, cauliflower, meat, poultry, seafood, eggs and hard cheeses.   Avoid fried foods  Dressings, seasonings, condiments, etc should have less than 2 g sugars.   Beans or nuts can have 1 x a day.   1-2 servings of citrus fruits, berries, pineapple or melon a day (1/2 cup)  Milk and plain yogurt    Avoid starchy carbohydrates (bread, rice, pasta, potatoes, corn, peas, oatmeal, grits, tortillas, crackers, chips)      No soda, sweet tea, juices or lemonade. All drinks should be 5 calories or less.      Www.dietdoctor.com for info on intermittent fasting.     Quarantine tips given

## 2020-05-04 ENCOUNTER — PATIENT MESSAGE (OUTPATIENT)
Dept: INTERNAL MEDICINE | Facility: CLINIC | Age: 60
End: 2020-05-04

## 2020-05-05 ENCOUNTER — PATIENT MESSAGE (OUTPATIENT)
Dept: INTERNAL MEDICINE | Facility: CLINIC | Age: 60
End: 2020-05-05

## 2020-05-06 ENCOUNTER — PATIENT MESSAGE (OUTPATIENT)
Dept: INTERNAL MEDICINE | Facility: CLINIC | Age: 60
End: 2020-05-06

## 2020-05-11 ENCOUNTER — PATIENT MESSAGE (OUTPATIENT)
Dept: INTERNAL MEDICINE | Facility: CLINIC | Age: 60
End: 2020-05-11

## 2020-05-12 LAB — SARS-COV-2 IGG SERPL QL IA: NEGATIVE

## 2020-05-13 ENCOUNTER — PATIENT MESSAGE (OUTPATIENT)
Dept: BARIATRICS | Facility: CLINIC | Age: 60
End: 2020-05-13

## 2020-05-17 ENCOUNTER — PATIENT MESSAGE (OUTPATIENT)
Dept: INTERNAL MEDICINE | Facility: CLINIC | Age: 60
End: 2020-05-17

## 2020-05-17 RX ORDER — COLCHICINE 0.6 MG/1
0.6 TABLET ORAL DAILY
Qty: 30 TABLET | Refills: 1 | Status: SHIPPED | OUTPATIENT
Start: 2020-05-17 | End: 2020-08-18 | Stop reason: ALTCHOICE

## 2020-05-17 RX ORDER — METHYLPREDNISOLONE 4 MG/1
TABLET ORAL
Qty: 21 TABLET | Refills: 0 | Status: SHIPPED | OUTPATIENT
Start: 2020-05-17 | End: 2020-08-18

## 2020-07-27 ENCOUNTER — PATIENT OUTREACH (OUTPATIENT)
Dept: ADMINISTRATIVE | Facility: OTHER | Age: 60
End: 2020-07-27

## 2020-08-12 ENCOUNTER — PATIENT MESSAGE (OUTPATIENT)
Dept: INTERNAL MEDICINE | Facility: CLINIC | Age: 60
End: 2020-08-12

## 2020-08-12 DIAGNOSIS — Z12.11 COLON CANCER SCREENING: Primary | ICD-10-CM

## 2020-08-12 RX ORDER — HYDROCORTISONE 25 MG/G
CREAM TOPICAL 2 TIMES DAILY
Qty: 30 G | Refills: 1 | Status: SHIPPED | OUTPATIENT
Start: 2020-08-12 | End: 2023-06-12

## 2020-08-12 NOTE — TELEPHONE ENCOUNTER
escripted cream to try and ease the sx,  However, she may need a Consult w/ CRS she may have a thrombosis

## 2020-08-17 ENCOUNTER — PATIENT OUTREACH (OUTPATIENT)
Dept: ADMINISTRATIVE | Facility: OTHER | Age: 60
End: 2020-08-17

## 2020-08-17 NOTE — PROGRESS NOTES
"Subjective:       Patient ID: Tawny Valerio is a 60 y.o. female.    Chief Complaint: Follow-up    Pt here today for follow-up. Has gained 1 lbs, net neg 12 lbs. Started ozempic,( currently on 1 mg) for DM2 and IF. Her sister  in past 2 weeks. Several family members had covid. SHe has a new job(works in hotel industry). .She has been trying to stick with fasting. Sometimes more like 12 hours. Was swimming for a while when she was not working. She does feel like she is ready to re-focus.   Denies SE. Has had some times when she felt sleepy.     A1c much better.     Lab Results   Component Value Date    HGBA1C 6.1 (H) 2020    HGBA1C 6.7 (H) 2019    HGBA1C 6.5 (H) 2019     Lab Results   Component Value Date    LDLCALC 117.4 2020    CREATININE 0.8 2020      Follow-up   Pertinent negatives include no arthralgias, chest pain, chills or fever.     Review of Systems   Constitutional: Negative for chills and fever.   Respiratory: Negative for shortness of breath.         Does not use CPAP. States could not tolerate it. ESS 19.    Cardiovascular: Negative for chest pain and leg swelling.   Gastrointestinal: Positive for diarrhea. Negative for constipation.        Rare GERD   Genitourinary: Positive for dysuria. Negative for difficulty urinating.   Musculoskeletal: Positive for back pain. Negative for arthralgias.   Neurological: Negative for dizziness and light-headedness.   Psychiatric/Behavioral: Negative for dysphoric mood. The patient is nervous/anxious.        Objective:     /70   Pulse 78   Ht 5' 1" (1.549 m)   Wt 126.4 kg (278 lb 10.6 oz)   LMP 01/15/2019 (Approximate)   BMI 52.65 kg/m²    Physical Exam   Constitutional: She is oriented to person, place, and time. She appears well-developed. No distress.   Morbidly obese     HENT:   Head: Normocephalic and atraumatic.   Eyes: Pupils are equal, round, and reactive to light. EOM are normal. No scleral icterus.   Neck: " Normal range of motion. Neck supple.   Cardiovascular: Normal rate.   Pulmonary/Chest: Effort normal.   Musculoskeletal: Normal range of motion. She exhibits no edema.   Neurological: She is alert and oriented to person, place, and time. No cranial nerve deficit.   Skin: Skin is warm and dry. No erythema.   Psychiatric: She has a normal mood and affect. Her behavior is normal. Judgment normal.   Vitals reviewed.      Assessment:       1. Class 3 severe obesity due to excess calories with serious comorbidity and body mass index (BMI) of 50.0 to 59.9 in adult    2. Controlled type 2 diabetes mellitus without complication, without long-term current use of insulin        Plan:         Tawny was seen today for follow-up.    Diagnoses and all orders for this visit:    Class 3 severe obesity due to excess calories with serious comorbidity and body mass index (BMI) of 50.0 to 59.9 in adult  -     phentermine (ADIPEX-P) 37.5 mg tablet; 1/2 tab po qam    Controlled type 2 diabetes mellitus without complication, without long-term current use of insulin  -     semaglutide (OZEMPIC) 1 mg/dose (2 mg/1.5 mL) PnIj; INJECT 1 MG UNDER THE SKIN WEEKLY        Continue Ozempic 1mg once a week.       Decrease portions as soon as you start Ozempic. Some nausea in the first 2 weeks is not unusual.     If you get pain across the upper abdomen and around to your back, please call the office.       Protein and fiber in every meal.     Patient warned of common side effects of phentermine including anxiety, insomnia, palpitations and increased blood pressure. It was also explained that it is for short-term usage along with diet and exercise, and that stopping the medication without making lifestyle changes will result in regain of weight. Patient states understanding.     Weight loss medications are controlled substances.  They require routine follow up. Prescription or pills that are lost or destroyed will not be replaced.     Start  phentermine with 1/2 pill a day     Intermittent fasting. NO eating from 8 pm to 12pm. Can have water, tea, coffee in that time without sweeteners.    2 meals made up of the following:  Unlimited green vegetables, tomatoes, mushrooms, spaghetti squash, cauliflower, meat, poultry, seafood, eggs and hard cheeses.   Avoid fried foods  Dressings, seasonings, condiments, etc should have less than 2 g sugars.   Beans or nuts can have 1 x a day.   1-2 servings of citrus fruits, berries, pineapple or melon a day (1/2 cup)  Milk and plain yogurt    Avoid starchy carbohydrates (bread, rice, pasta, potatoes, corn, peas, oatmeal, grits, tortillas, crackers, chips)      No soda, sweet tea, juices or lemonade. All drinks should be 5 calories or less.      Www.dietdoctor.com for info on intermittent fasting.     Exercise handouts given    Advised pt to work on getting on track with 1-2 changes at a time, and once comfortable with those, she can take on another one.     25 min face to face.

## 2020-08-17 NOTE — PROGRESS NOTES
LINKS immunization registry updated  Care Everywhere updated  Health Maintenance updated  DIS/Chart reviewed for overdue Proactive [facility] ([facility]) health maintenance testing (CRS, Breast Ca, Diabetic Eye Exam)   Orders entered:N/A  Patient has open case request for colonoscopy.    Portal message sent to patient with scheduling link for mammogram

## 2020-08-18 ENCOUNTER — DOCUMENTATION ONLY (OUTPATIENT)
Dept: BARIATRICS | Facility: CLINIC | Age: 60
End: 2020-08-18

## 2020-08-18 ENCOUNTER — OFFICE VISIT (OUTPATIENT)
Dept: BARIATRICS | Facility: CLINIC | Age: 60
End: 2020-08-18
Payer: COMMERCIAL

## 2020-08-18 VITALS
WEIGHT: 278.69 LBS | HEIGHT: 61 IN | SYSTOLIC BLOOD PRESSURE: 124 MMHG | DIASTOLIC BLOOD PRESSURE: 70 MMHG | HEART RATE: 78 BPM | BODY MASS INDEX: 52.62 KG/M2

## 2020-08-18 DIAGNOSIS — E11.9 CONTROLLED TYPE 2 DIABETES MELLITUS WITHOUT COMPLICATION, WITHOUT LONG-TERM CURRENT USE OF INSULIN: ICD-10-CM

## 2020-08-18 DIAGNOSIS — E66.01 CLASS 3 SEVERE OBESITY DUE TO EXCESS CALORIES WITH SERIOUS COMORBIDITY AND BODY MASS INDEX (BMI) OF 50.0 TO 59.9 IN ADULT: Primary | ICD-10-CM

## 2020-08-18 PROCEDURE — 99999 PR PBB SHADOW E&M-EST. PATIENT-LVL IV: ICD-10-PCS | Mod: PBBFAC,,, | Performed by: INTERNAL MEDICINE

## 2020-08-18 PROCEDURE — 99214 OFFICE O/P EST MOD 30 MIN: CPT | Mod: S$GLB,,, | Performed by: INTERNAL MEDICINE

## 2020-08-18 PROCEDURE — 3078F DIAST BP <80 MM HG: CPT | Mod: CPTII,S$GLB,, | Performed by: INTERNAL MEDICINE

## 2020-08-18 PROCEDURE — 3008F PR BODY MASS INDEX (BMI) DOCUMENTED: ICD-10-PCS | Mod: CPTII,S$GLB,, | Performed by: INTERNAL MEDICINE

## 2020-08-18 PROCEDURE — 3044F PR MOST RECENT HEMOGLOBIN A1C LEVEL <7.0%: ICD-10-PCS | Mod: CPTII,S$GLB,, | Performed by: INTERNAL MEDICINE

## 2020-08-18 PROCEDURE — 3078F PR MOST RECENT DIASTOLIC BLOOD PRESSURE < 80 MM HG: ICD-10-PCS | Mod: CPTII,S$GLB,, | Performed by: INTERNAL MEDICINE

## 2020-08-18 PROCEDURE — 3008F BODY MASS INDEX DOCD: CPT | Mod: CPTII,S$GLB,, | Performed by: INTERNAL MEDICINE

## 2020-08-18 PROCEDURE — 3044F HG A1C LEVEL LT 7.0%: CPT | Mod: CPTII,S$GLB,, | Performed by: INTERNAL MEDICINE

## 2020-08-18 PROCEDURE — 3074F PR MOST RECENT SYSTOLIC BLOOD PRESSURE < 130 MM HG: ICD-10-PCS | Mod: CPTII,S$GLB,, | Performed by: INTERNAL MEDICINE

## 2020-08-18 PROCEDURE — 99214 PR OFFICE/OUTPT VISIT, EST, LEVL IV, 30-39 MIN: ICD-10-PCS | Mod: S$GLB,,, | Performed by: INTERNAL MEDICINE

## 2020-08-18 PROCEDURE — 99999 PR PBB SHADOW E&M-EST. PATIENT-LVL IV: CPT | Mod: PBBFAC,,, | Performed by: INTERNAL MEDICINE

## 2020-08-18 PROCEDURE — 3074F SYST BP LT 130 MM HG: CPT | Mod: CPTII,S$GLB,, | Performed by: INTERNAL MEDICINE

## 2020-08-18 RX ORDER — SEMAGLUTIDE 1.34 MG/ML
INJECTION, SOLUTION SUBCUTANEOUS
Qty: 9 ML | Refills: 1 | Status: SHIPPED | OUTPATIENT
Start: 2020-08-18 | End: 2020-12-01 | Stop reason: SDUPTHER

## 2020-08-18 RX ORDER — PHENTERMINE HYDROCHLORIDE 37.5 MG/1
TABLET ORAL
Qty: 30 TABLET | Refills: 1 | Status: SHIPPED | OUTPATIENT
Start: 2020-08-18 | End: 2021-03-02 | Stop reason: SDUPTHER

## 2020-08-18 NOTE — PATIENT INSTRUCTIONS
Continue Ozempic 1mg once a week.       Decrease portions as soon as you start Ozempic. Some nausea in the first 2 weeks is not unusual.     If you get pain across the upper abdomen and around to your back, please call the office.       Patient warned of common side effects of phentermine including anxiety, insomnia, palpitations and increased blood pressure. It was also explained that it is for short-term usage along with diet and exercise, and that stopping the medication without making lifestyle changes will result in regain of weight. Patient states understanding.     Weight loss medications are controlled substances.  They require routine follow up. Prescription or pills that are lost or destroyed will not be replaced.     Start phentermine with 1/2 pill a day     Protein and fiber in every meal.       Intermittent fasting. NO eating from 8 pm to 12pm. Can have water, tea, coffee in that time without sweeteners.    2 meals made up of the following:  Unlimited green vegetables, tomatoes, mushrooms, spaghetti squash, cauliflower, meat, poultry, seafood, eggs and hard cheeses.   Avoid fried foods  Dressings, seasonings, condiments, etc should have less than 2 g sugars.   Beans or nuts can have 1 x a day.   1-2 servings of citrus fruits, berries, pineapple or melon a day (1/2 cup)  Milk and plain yogurt    Avoid starchy carbohydrates (bread, rice, pasta, potatoes, corn, peas, oatmeal, grits, tortillas, crackers, chips)      No soda, sweet tea, juices or lemonade. All drinks should be 5 calories or less.      Www.dietdoctor.edelight for info on intermittent fasting.       STRETCHING  Stretching involves the ability to move joints and muscles through their full range of motion. It is a neglected area of most fitness routines. Stretching must be an integral part of a workout because it keeps the muscles and joints loose. In addition, it protects against injury, improves blood flow, and increases tendon flexibility.  Stretching can be performed at any time of the day and practically anywhere. All one needs is a padded surface or exercise mat.    Stretching should be done for about 10 to 12 minutes and should cover all the muscle groups. Patients should be encouraged to stretch to the point that they can feel some minor discomfort, hold the stretch for 15 to 20 seconds, and repeat 2 to 3 times. Stretching should be done at least 2 to 3 times per week. The following are some basic stretches targeting the major muscle groups:    · Cherry stretch (hamstrings, lower back): Sit on the floor with your legs outstretched in front of you and your feet together. Bend forward at the waist and reach toward your toes with your hands.  · Straddle stretch (groin, upper back, obliques): Sit on the floor and spread your legs apart. Reach your right hand toward your left foot. Hold for 15 to 20 seconds. Reach your left hand toward your right foot.  · Cat stretch (back, triceps, laterals): Kneel on the floor with your forearms and hands outstretched on the floor in front of you. Slide your forearms forward as far as possible while trying to keep your thighs perpendicular to the floor. Maintaining this position, while supporting yourself with your arms and shoulders, attempt to lower and press your abdomen to the floor. Try to hold this position for 15 to 20 seconds.  · Door push (chest):  front of a doorway, step slightly in, and press both hands and arms against the door frame. Lean forward.  · Shoulder stretch: In a seated position with your back upright, grab your elbow with the opposite hand. Gently pull across your body. Hold for 15 to 20 seconds. Repeat with the other arm.    STRENGTHENING  An adequate resistance training program could include the following types of exercise, focusing on the major muscle groups. One can use 5 to 10 pound dumbbells for those exercises that require the use of weight. At home, one can use a household item  that provides a comfortable weight, such as a milk carton or beverage container. These exercises can also be performed without weights.    · Chest (Bench Press): Hold the weights with your hands. Lying on a flat surface, with knees bent and feet flat, slowly bring the weights to the chest area with palms facing upward. Begin to exhale and press the weights up, fully extending the arms, and keeping them above your eyes. Inhale as you lower them to the starting position and repeat the movement.  · Back (Bent-Over Row): Start by placing your feet shoulder-width apart.  a weight with each hand just outside the knees. Keeping the back straight and the knees flexed, slightly bend forward at the waist. Let the arms hang naturally while holding the weights. From this starting position, pull the weights to the lower abdomen, keeping the elbows close to the body. Exhale as you pull. Return to the starting position, inhaling as you go.  · Arms (Bicep Curls): Hold a weight in each hand, with elbows at your sides, palms facing forward. The back should be straight, the chest flared outward. Begin to bend your right arm up first while exhaling, keeping the elbow totally stationary. Wait until the right arm goes completely down to the fully extended position, and then begin to curl the left arm. Each arm curled completes one full repetition.  · Shoulders (Lateral Raises): Place the feet a few inches apart with the knees bent slightly. Keep the back erect as you lean forward slightly. With the weights in front of your thighs and palms facing together, begin to slowly raise them up to the side until parallel with the floor. Lower the weights to your thighs, and repeat.  · Legs (Stiff Leg Dead Lift): Start by standing straight, holding the weights close to your sides, nearly touching your thighs. Keep the weights close to the body--this protects the back. The back must stay straight. Bend at the waist as far forward as you  "comfortably can while keeping your legs straight, and begin to feel the pull in your hamstrings as you lower the weights toward the ground. Slowly return to the starting position, keeping the back straight.  · Legs (Dumbbell Lunge): Hold a weight in each hand, arms hanging at your sides. Step out with one leg, keeping the back straight. It is important to step out far enough so that the knee does not extend over the toe. This puts too much stress on the knee. Go down far enough so that the opposite knee nearly touches the ground. Keep this stance and repeat the lunging motion for several repetitions.  · Abdominals: Do not perform standard sit-ups as these could hurt the lower back. Rather, focus on the following 2 exercises: (1) Obliques--Lie on your back with the knees flexed in the bent sit-up position. From this position, bring both knees down to the ground. With the back remaining flat, begin to flex your body toward your toes ("crunch"). Bring your shoulders up off the ground, but go slowly, controlling your momentum. Repeat this for 10 to 15 times. (2) Seated bench kicks/mic knives--Sit on the end of a bench or chair with the hands placed behind the buttocks. Begin to kick the legs outward with the knees bent slightly--at the same time, lean back to extend the torso. Come back to the beginning position and repeat this motion 10 to 15 times.    "

## 2020-09-23 ENCOUNTER — TELEPHONE (OUTPATIENT)
Dept: ADMINISTRATIVE | Facility: HOSPITAL | Age: 60
End: 2020-09-23

## 2020-09-29 ENCOUNTER — TELEPHONE (OUTPATIENT)
Dept: ADMINISTRATIVE | Facility: HOSPITAL | Age: 60
End: 2020-09-29

## 2020-10-05 ENCOUNTER — PATIENT MESSAGE (OUTPATIENT)
Dept: ADMINISTRATIVE | Facility: HOSPITAL | Age: 60
End: 2020-10-05

## 2020-10-30 ENCOUNTER — PATIENT MESSAGE (OUTPATIENT)
Dept: ADMINISTRATIVE | Facility: HOSPITAL | Age: 60
End: 2020-10-30

## 2020-11-04 ENCOUNTER — TELEPHONE (OUTPATIENT)
Dept: ADMINISTRATIVE | Facility: HOSPITAL | Age: 60
End: 2020-11-04

## 2020-11-04 DIAGNOSIS — E66.01 MORBID OBESITY WITH BMI OF 60.0-69.9, ADULT: ICD-10-CM

## 2020-11-04 DIAGNOSIS — R06.02 SOB (SHORTNESS OF BREATH): ICD-10-CM

## 2020-11-04 DIAGNOSIS — E11.9 CONTROLLED TYPE 2 DIABETES MELLITUS WITHOUT COMPLICATION, WITHOUT LONG-TERM CURRENT USE OF INSULIN: ICD-10-CM

## 2020-11-04 DIAGNOSIS — R60.0 BILATERAL EDEMA OF LOWER EXTREMITY: ICD-10-CM

## 2020-11-04 RX ORDER — FUROSEMIDE 20 MG/1
TABLET ORAL
Qty: 90 TABLET | Refills: 3 | Status: SHIPPED | OUTPATIENT
Start: 2020-11-04 | End: 2021-12-13

## 2020-11-13 RX ORDER — AZELASTINE 1 MG/ML
1 SPRAY, METERED NASAL 2 TIMES DAILY
Qty: 30 ML | Refills: 11 | Status: SHIPPED | OUTPATIENT
Start: 2020-11-13 | End: 2023-08-31 | Stop reason: SDUPTHER

## 2020-12-01 ENCOUNTER — PATIENT MESSAGE (OUTPATIENT)
Dept: BARIATRICS | Facility: CLINIC | Age: 60
End: 2020-12-01

## 2020-12-01 DIAGNOSIS — E11.9 CONTROLLED TYPE 2 DIABETES MELLITUS WITHOUT COMPLICATION, WITHOUT LONG-TERM CURRENT USE OF INSULIN: ICD-10-CM

## 2020-12-01 RX ORDER — SEMAGLUTIDE 1.34 MG/ML
INJECTION, SOLUTION SUBCUTANEOUS
Qty: 9 ML | Refills: 0 | Status: SHIPPED | OUTPATIENT
Start: 2020-12-01 | End: 2021-03-02 | Stop reason: SDUPTHER

## 2020-12-02 ENCOUNTER — PATIENT MESSAGE (OUTPATIENT)
Dept: INTERNAL MEDICINE | Facility: CLINIC | Age: 60
End: 2020-12-02

## 2020-12-02 RX ORDER — METHOCARBAMOL 500 MG/1
500 TABLET, FILM COATED ORAL NIGHTLY
Qty: 10 TABLET | Refills: 0 | Status: SHIPPED | OUTPATIENT
Start: 2020-12-02 | End: 2020-12-12

## 2020-12-02 RX ORDER — METHYLPREDNISOLONE 4 MG/1
TABLET ORAL
Qty: 1 PACKAGE | Refills: 0 | Status: SHIPPED | OUTPATIENT
Start: 2020-12-02 | End: 2020-12-23

## 2020-12-02 NOTE — TELEPHONE ENCOUNTER
Have sugars been good at home? If so, we can try a muscle relaxer and steroid [place] to see if that helps

## 2020-12-07 ENCOUNTER — TELEPHONE (OUTPATIENT)
Dept: ADMINISTRATIVE | Facility: HOSPITAL | Age: 60
End: 2020-12-07

## 2020-12-23 ENCOUNTER — PATIENT OUTREACH (OUTPATIENT)
Dept: ADMINISTRATIVE | Facility: OTHER | Age: 60
End: 2020-12-23

## 2020-12-23 NOTE — PROGRESS NOTES
LINKS immunization registry not responding  Care Everywhere updated  Health Maintenance updated  DIS/Chart reviewed for overdue Proactive [facility] Encounters ([facility]) health maintenance testing (CRS, Breast Ca, Diabetic Eye Exam)   Orders entered:N/A  Portal message sent to patient with scheduling link for mammogram 8/17/20. Message left by PCP staff for patient regarding mammogram on 12/7/20

## 2021-01-04 ENCOUNTER — PATIENT MESSAGE (OUTPATIENT)
Dept: ADMINISTRATIVE | Facility: HOSPITAL | Age: 61
End: 2021-01-04

## 2021-01-15 ENCOUNTER — TELEPHONE (OUTPATIENT)
Dept: DERMATOLOGY | Facility: CLINIC | Age: 61
End: 2021-01-15

## 2021-01-15 ENCOUNTER — OFFICE VISIT (OUTPATIENT)
Dept: DERMATOLOGY | Facility: CLINIC | Age: 61
End: 2021-01-15
Payer: COMMERCIAL

## 2021-01-15 DIAGNOSIS — L98.9 SKIN LESION: Primary | ICD-10-CM

## 2021-01-15 DIAGNOSIS — L01.00 IMPETIGO: ICD-10-CM

## 2021-01-15 PROCEDURE — 99203 PR OFFICE/OUTPT VISIT, NEW, LEVL III, 30-44 MIN: ICD-10-PCS | Mod: 95,,, | Performed by: STUDENT IN AN ORGANIZED HEALTH CARE EDUCATION/TRAINING PROGRAM

## 2021-01-15 PROCEDURE — 99203 OFFICE O/P NEW LOW 30 MIN: CPT | Mod: 95,,, | Performed by: STUDENT IN AN ORGANIZED HEALTH CARE EDUCATION/TRAINING PROGRAM

## 2021-01-15 RX ORDER — CLINDAMYCIN PHOSPHATE 10 UG/ML
LOTION TOPICAL 2 TIMES DAILY
Qty: 60 ML | Refills: 1 | Status: SHIPPED | OUTPATIENT
Start: 2021-01-15 | End: 2021-03-03 | Stop reason: SDUPTHER

## 2021-01-15 RX ORDER — MUPIROCIN 20 MG/G
OINTMENT TOPICAL 2 TIMES DAILY
Qty: 30 G | Refills: 0 | Status: SHIPPED | OUTPATIENT
Start: 2021-01-15 | End: 2022-02-22 | Stop reason: SDUPTHER

## 2021-01-16 ENCOUNTER — LAB VISIT (OUTPATIENT)
Dept: LAB | Facility: HOSPITAL | Age: 61
End: 2021-01-16
Attending: INTERNAL MEDICINE
Payer: COMMERCIAL

## 2021-01-16 ENCOUNTER — OFFICE VISIT (OUTPATIENT)
Dept: INTERNAL MEDICINE | Facility: CLINIC | Age: 61
End: 2021-01-16
Payer: COMMERCIAL

## 2021-01-16 VITALS
DIASTOLIC BLOOD PRESSURE: 74 MMHG | HEIGHT: 61 IN | TEMPERATURE: 98 F | HEART RATE: 94 BPM | BODY MASS INDEX: 51.65 KG/M2 | RESPIRATION RATE: 18 BRPM | SYSTOLIC BLOOD PRESSURE: 138 MMHG | WEIGHT: 273.56 LBS | OXYGEN SATURATION: 97 %

## 2021-01-16 DIAGNOSIS — E66.01 MORBID OBESITY WITH BMI OF 50.0-59.9, ADULT: ICD-10-CM

## 2021-01-16 DIAGNOSIS — R53.83 FATIGUE, UNSPECIFIED TYPE: ICD-10-CM

## 2021-01-16 DIAGNOSIS — R68.89 COLD INTOLERANCE: ICD-10-CM

## 2021-01-16 DIAGNOSIS — Z83.49 FAMILY HISTORY OF THYROID DISEASE: ICD-10-CM

## 2021-01-16 DIAGNOSIS — E11.9 CONTROLLED TYPE 2 DIABETES MELLITUS WITHOUT COMPLICATION, WITHOUT LONG-TERM CURRENT USE OF INSULIN: ICD-10-CM

## 2021-01-16 DIAGNOSIS — R79.89 ABNORMAL CBC: ICD-10-CM

## 2021-01-16 DIAGNOSIS — E55.9 VITAMIN D INSUFFICIENCY: ICD-10-CM

## 2021-01-16 DIAGNOSIS — E11.9 CONTROLLED TYPE 2 DIABETES MELLITUS WITHOUT COMPLICATION, WITHOUT LONG-TERM CURRENT USE OF INSULIN: Primary | ICD-10-CM

## 2021-01-16 LAB
25(OH)D3+25(OH)D2 SERPL-MCNC: 19 NG/ML (ref 30–96)
BASOPHILS # BLD AUTO: 0.05 K/UL (ref 0–0.2)
BASOPHILS NFR BLD: 0.5 % (ref 0–1.9)
DIFFERENTIAL METHOD: ABNORMAL
EOSINOPHIL # BLD AUTO: 0.2 K/UL (ref 0–0.5)
EOSINOPHIL NFR BLD: 2.5 % (ref 0–8)
ERYTHROCYTE [DISTWIDTH] IN BLOOD BY AUTOMATED COUNT: 14.8 % (ref 11.5–14.5)
ESTIMATED AVG GLUCOSE: 117 MG/DL (ref 68–131)
FERRITIN SERPL-MCNC: 117 NG/ML (ref 20–300)
HBA1C MFR BLD HPLC: 5.7 % (ref 4–5.6)
HCT VFR BLD AUTO: 43 % (ref 37–48.5)
HGB BLD-MCNC: 12.8 G/DL (ref 12–16)
IMM GRANULOCYTES # BLD AUTO: 0.04 K/UL (ref 0–0.04)
IMM GRANULOCYTES NFR BLD AUTO: 0.4 % (ref 0–0.5)
IRON SERPL-MCNC: 36 UG/DL (ref 30–160)
LYMPHOCYTES # BLD AUTO: 2.4 K/UL (ref 1–4.8)
LYMPHOCYTES NFR BLD: 26 % (ref 18–48)
MCH RBC QN AUTO: 27.4 PG (ref 27–31)
MCHC RBC AUTO-ENTMCNC: 29.8 G/DL (ref 32–36)
MCV RBC AUTO: 92 FL (ref 82–98)
MONOCYTES # BLD AUTO: 0.6 K/UL (ref 0.3–1)
MONOCYTES NFR BLD: 6.2 % (ref 4–15)
NEUTROPHILS # BLD AUTO: 6 K/UL (ref 1.8–7.7)
NEUTROPHILS NFR BLD: 64.4 % (ref 38–73)
NRBC BLD-RTO: 0 /100 WBC
PLATELET # BLD AUTO: 305 K/UL (ref 150–350)
PMV BLD AUTO: 10.4 FL (ref 9.2–12.9)
RBC # BLD AUTO: 4.67 M/UL (ref 4–5.4)
SATURATED IRON: 9 % (ref 20–50)
TOTAL IRON BINDING CAPACITY: 398 UG/DL (ref 250–450)
TRANSFERRIN SERPL-MCNC: 269 MG/DL (ref 200–375)
TSH SERPL DL<=0.005 MIU/L-ACNC: 1.59 UIU/ML (ref 0.4–4)
WBC # BLD AUTO: 9.38 K/UL (ref 3.9–12.7)

## 2021-01-16 PROCEDURE — 99999 PR PBB SHADOW E&M-EST. PATIENT-LVL V: CPT | Mod: PBBFAC,,, | Performed by: INTERNAL MEDICINE

## 2021-01-16 PROCEDURE — 3075F PR MOST RECENT SYSTOLIC BLOOD PRESS GE 130-139MM HG: ICD-10-PCS | Mod: CPTII,S$GLB,, | Performed by: INTERNAL MEDICINE

## 2021-01-16 PROCEDURE — 99214 PR OFFICE/OUTPT VISIT, EST, LEVL IV, 30-39 MIN: ICD-10-PCS | Mod: S$GLB,,, | Performed by: INTERNAL MEDICINE

## 2021-01-16 PROCEDURE — 85025 COMPLETE CBC W/AUTO DIFF WBC: CPT

## 2021-01-16 PROCEDURE — 82728 ASSAY OF FERRITIN: CPT

## 2021-01-16 PROCEDURE — 1126F PR PAIN SEVERITY QUANTIFIED, NO PAIN PRESENT: ICD-10-PCS | Mod: S$GLB,,, | Performed by: INTERNAL MEDICINE

## 2021-01-16 PROCEDURE — 3078F DIAST BP <80 MM HG: CPT | Mod: CPTII,S$GLB,, | Performed by: INTERNAL MEDICINE

## 2021-01-16 PROCEDURE — 36415 COLL VENOUS BLD VENIPUNCTURE: CPT | Mod: PO

## 2021-01-16 PROCEDURE — 1126F AMNT PAIN NOTED NONE PRSNT: CPT | Mod: S$GLB,,, | Performed by: INTERNAL MEDICINE

## 2021-01-16 PROCEDURE — 83036 HEMOGLOBIN GLYCOSYLATED A1C: CPT

## 2021-01-16 PROCEDURE — 82306 VITAMIN D 25 HYDROXY: CPT

## 2021-01-16 PROCEDURE — 3008F PR BODY MASS INDEX (BMI) DOCUMENTED: ICD-10-PCS | Mod: CPTII,S$GLB,, | Performed by: INTERNAL MEDICINE

## 2021-01-16 PROCEDURE — 3078F PR MOST RECENT DIASTOLIC BLOOD PRESSURE < 80 MM HG: ICD-10-PCS | Mod: CPTII,S$GLB,, | Performed by: INTERNAL MEDICINE

## 2021-01-16 PROCEDURE — 99214 OFFICE O/P EST MOD 30 MIN: CPT | Mod: S$GLB,,, | Performed by: INTERNAL MEDICINE

## 2021-01-16 PROCEDURE — 3044F HG A1C LEVEL LT 7.0%: CPT | Mod: CPTII,S$GLB,, | Performed by: INTERNAL MEDICINE

## 2021-01-16 PROCEDURE — 3075F SYST BP GE 130 - 139MM HG: CPT | Mod: CPTII,S$GLB,, | Performed by: INTERNAL MEDICINE

## 2021-01-16 PROCEDURE — 3008F BODY MASS INDEX DOCD: CPT | Mod: CPTII,S$GLB,, | Performed by: INTERNAL MEDICINE

## 2021-01-16 PROCEDURE — 84443 ASSAY THYROID STIM HORMONE: CPT

## 2021-01-16 PROCEDURE — 3044F PR MOST RECENT HEMOGLOBIN A1C LEVEL <7.0%: ICD-10-PCS | Mod: CPTII,S$GLB,, | Performed by: INTERNAL MEDICINE

## 2021-01-16 PROCEDURE — 83540 ASSAY OF IRON: CPT

## 2021-01-16 PROCEDURE — 99999 PR PBB SHADOW E&M-EST. PATIENT-LVL V: ICD-10-PCS | Mod: PBBFAC,,, | Performed by: INTERNAL MEDICINE

## 2021-01-19 ENCOUNTER — OFFICE VISIT (OUTPATIENT)
Dept: DERMATOLOGY | Facility: CLINIC | Age: 61
End: 2021-01-19
Payer: COMMERCIAL

## 2021-01-19 DIAGNOSIS — L82.1 SEBORRHEIC KERATOSES: ICD-10-CM

## 2021-01-19 DIAGNOSIS — L71.9 ROSACEA: Primary | ICD-10-CM

## 2021-01-19 DIAGNOSIS — R22.9 SUBCUTANEOUS NODULE: ICD-10-CM

## 2021-01-19 PROCEDURE — 1126F AMNT PAIN NOTED NONE PRSNT: CPT | Mod: S$GLB,,, | Performed by: STUDENT IN AN ORGANIZED HEALTH CARE EDUCATION/TRAINING PROGRAM

## 2021-01-19 PROCEDURE — 99213 PR OFFICE/OUTPT VISIT, EST, LEVL III, 20-29 MIN: ICD-10-PCS | Mod: 25,S$GLB,, | Performed by: STUDENT IN AN ORGANIZED HEALTH CARE EDUCATION/TRAINING PROGRAM

## 2021-01-19 PROCEDURE — 99999 PR PBB SHADOW E&M-EST. PATIENT-LVL III: CPT | Mod: PBBFAC,,, | Performed by: STUDENT IN AN ORGANIZED HEALTH CARE EDUCATION/TRAINING PROGRAM

## 2021-01-19 PROCEDURE — 99999 PR PBB SHADOW E&M-EST. PATIENT-LVL III: ICD-10-PCS | Mod: PBBFAC,,, | Performed by: STUDENT IN AN ORGANIZED HEALTH CARE EDUCATION/TRAINING PROGRAM

## 2021-01-19 PROCEDURE — 17110 DESTRUCTION B9 LES UP TO 14: CPT | Mod: S$GLB,,, | Performed by: STUDENT IN AN ORGANIZED HEALTH CARE EDUCATION/TRAINING PROGRAM

## 2021-01-19 PROCEDURE — 99213 OFFICE O/P EST LOW 20 MIN: CPT | Mod: 25,S$GLB,, | Performed by: STUDENT IN AN ORGANIZED HEALTH CARE EDUCATION/TRAINING PROGRAM

## 2021-01-19 PROCEDURE — 17110 PR DESTRUCTION BENIGN LESIONS UP TO 14: ICD-10-PCS | Mod: S$GLB,,, | Performed by: STUDENT IN AN ORGANIZED HEALTH CARE EDUCATION/TRAINING PROGRAM

## 2021-01-19 PROCEDURE — 1126F PR PAIN SEVERITY QUANTIFIED, NO PAIN PRESENT: ICD-10-PCS | Mod: S$GLB,,, | Performed by: STUDENT IN AN ORGANIZED HEALTH CARE EDUCATION/TRAINING PROGRAM

## 2021-01-19 RX ORDER — METRONIDAZOLE 7.5 MG/G
CREAM TOPICAL 2 TIMES DAILY
Qty: 45 G | Refills: 1 | Status: SHIPPED | OUTPATIENT
Start: 2021-01-19 | End: 2021-03-03 | Stop reason: SDUPTHER

## 2021-01-22 ENCOUNTER — OFFICE VISIT (OUTPATIENT)
Dept: OPTOMETRY | Facility: CLINIC | Age: 61
End: 2021-01-22
Payer: COMMERCIAL

## 2021-01-22 DIAGNOSIS — H52.4 HYPEROPIA WITH ASTIGMATISM AND PRESBYOPIA, BILATERAL: Primary | ICD-10-CM

## 2021-01-22 DIAGNOSIS — H52.03 HYPEROPIA WITH ASTIGMATISM AND PRESBYOPIA, BILATERAL: Primary | ICD-10-CM

## 2021-01-22 DIAGNOSIS — H52.203 HYPEROPIA WITH ASTIGMATISM AND PRESBYOPIA, BILATERAL: Primary | ICD-10-CM

## 2021-01-22 DIAGNOSIS — H25.13 SENILE NUCLEAR SCLEROSIS, BILATERAL: ICD-10-CM

## 2021-01-22 PROCEDURE — 99999 PR PBB SHADOW E&M-EST. PATIENT-LVL III: CPT | Mod: PBBFAC,,, | Performed by: OPTOMETRIST

## 2021-01-22 PROCEDURE — 99999 PR PBB SHADOW E&M-EST. PATIENT-LVL III: ICD-10-PCS | Mod: PBBFAC,,, | Performed by: OPTOMETRIST

## 2021-01-22 PROCEDURE — 92004 COMPRE OPH EXAM NEW PT 1/>: CPT | Mod: S$GLB,,, | Performed by: OPTOMETRIST

## 2021-01-22 PROCEDURE — 92015 PR REFRACTION: ICD-10-PCS | Mod: S$GLB,,, | Performed by: OPTOMETRIST

## 2021-01-22 PROCEDURE — 92004 PR EYE EXAM, NEW PATIENT,COMPREHESV: ICD-10-PCS | Mod: S$GLB,,, | Performed by: OPTOMETRIST

## 2021-01-22 PROCEDURE — 92015 DETERMINE REFRACTIVE STATE: CPT | Mod: S$GLB,,, | Performed by: OPTOMETRIST

## 2021-01-25 ENCOUNTER — PATIENT OUTREACH (OUTPATIENT)
Dept: ADMINISTRATIVE | Facility: OTHER | Age: 61
End: 2021-01-25

## 2021-01-26 ENCOUNTER — TELEPHONE (OUTPATIENT)
Dept: INTERNAL MEDICINE | Facility: CLINIC | Age: 61
End: 2021-01-26

## 2021-01-26 DIAGNOSIS — E55.9 VITAMIN D INSUFFICIENCY: ICD-10-CM

## 2021-01-26 DIAGNOSIS — I10 ESSENTIAL HYPERTENSION: ICD-10-CM

## 2021-01-26 DIAGNOSIS — E11.9 CONTROLLED TYPE 2 DIABETES MELLITUS WITHOUT COMPLICATION, WITHOUT LONG-TERM CURRENT USE OF INSULIN: Primary | ICD-10-CM

## 2021-02-01 ENCOUNTER — TELEPHONE (OUTPATIENT)
Dept: ADMINISTRATIVE | Facility: HOSPITAL | Age: 61
End: 2021-02-01

## 2021-02-04 ENCOUNTER — PATIENT MESSAGE (OUTPATIENT)
Dept: INTERNAL MEDICINE | Facility: CLINIC | Age: 61
End: 2021-02-04

## 2021-02-04 DIAGNOSIS — H91.90 HEARING LOSS, UNSPECIFIED HEARING LOSS TYPE, UNSPECIFIED LATERALITY: Primary | ICD-10-CM

## 2021-02-09 ENCOUNTER — PATIENT MESSAGE (OUTPATIENT)
Dept: INTERNAL MEDICINE | Facility: CLINIC | Age: 61
End: 2021-02-09

## 2021-02-10 ENCOUNTER — CLINICAL SUPPORT (OUTPATIENT)
Dept: AUDIOLOGY | Facility: CLINIC | Age: 61
End: 2021-02-10
Payer: COMMERCIAL

## 2021-02-10 DIAGNOSIS — H93.293 ABNORMAL AUDITORY PERCEPTION OF BOTH EARS: Primary | ICD-10-CM

## 2021-02-10 DIAGNOSIS — H91.90 HEARING LOSS, UNSPECIFIED HEARING LOSS TYPE, UNSPECIFIED LATERALITY: ICD-10-CM

## 2021-02-10 PROCEDURE — 92557 PR COMPREHENSIVE HEARING TEST: ICD-10-PCS | Mod: S$GLB,,, | Performed by: AUDIOLOGIST

## 2021-02-10 PROCEDURE — 92567 TYMPANOMETRY: CPT | Mod: S$GLB,,, | Performed by: AUDIOLOGIST

## 2021-02-10 PROCEDURE — 92567 PR TYMPA2METRY: ICD-10-PCS | Mod: S$GLB,,, | Performed by: AUDIOLOGIST

## 2021-02-10 PROCEDURE — 92557 COMPREHENSIVE HEARING TEST: CPT | Mod: S$GLB,,, | Performed by: AUDIOLOGIST

## 2021-02-10 PROCEDURE — 99999 PR PBB SHADOW E&M-EST. PATIENT-LVL I: CPT | Mod: PBBFAC,,, | Performed by: AUDIOLOGIST

## 2021-02-10 PROCEDURE — 99999 PR PBB SHADOW E&M-EST. PATIENT-LVL I: ICD-10-PCS | Mod: PBBFAC,,, | Performed by: AUDIOLOGIST

## 2021-02-26 ENCOUNTER — PATIENT OUTREACH (OUTPATIENT)
Dept: ADMINISTRATIVE | Facility: OTHER | Age: 61
End: 2021-02-26

## 2021-02-26 DIAGNOSIS — Z12.11 COLON CANCER SCREENING: Primary | ICD-10-CM

## 2021-03-01 ENCOUNTER — TELEPHONE (OUTPATIENT)
Dept: ADMINISTRATIVE | Facility: HOSPITAL | Age: 61
End: 2021-03-01

## 2021-03-02 ENCOUNTER — OFFICE VISIT (OUTPATIENT)
Dept: BARIATRICS | Facility: CLINIC | Age: 61
End: 2021-03-02
Payer: COMMERCIAL

## 2021-03-02 VITALS
HEIGHT: 61 IN | BODY MASS INDEX: 51.83 KG/M2 | WEIGHT: 274.5 LBS | HEART RATE: 72 BPM | SYSTOLIC BLOOD PRESSURE: 118 MMHG | DIASTOLIC BLOOD PRESSURE: 72 MMHG | OXYGEN SATURATION: 98 %

## 2021-03-02 DIAGNOSIS — E66.01 CLASS 3 SEVERE OBESITY DUE TO EXCESS CALORIES WITH SERIOUS COMORBIDITY AND BODY MASS INDEX (BMI) OF 50.0 TO 59.9 IN ADULT: ICD-10-CM

## 2021-03-02 DIAGNOSIS — E11.9 CONTROLLED TYPE 2 DIABETES MELLITUS WITHOUT COMPLICATION, WITHOUT LONG-TERM CURRENT USE OF INSULIN: ICD-10-CM

## 2021-03-02 PROCEDURE — 99214 PR OFFICE/OUTPT VISIT, EST, LEVL IV, 30-39 MIN: ICD-10-PCS | Mod: S$GLB,,, | Performed by: INTERNAL MEDICINE

## 2021-03-02 PROCEDURE — 99999 PR PBB SHADOW E&M-EST. PATIENT-LVL III: CPT | Mod: PBBFAC,,, | Performed by: INTERNAL MEDICINE

## 2021-03-02 PROCEDURE — 3044F PR MOST RECENT HEMOGLOBIN A1C LEVEL <7.0%: ICD-10-PCS | Mod: CPTII,S$GLB,, | Performed by: INTERNAL MEDICINE

## 2021-03-02 PROCEDURE — 3008F BODY MASS INDEX DOCD: CPT | Mod: CPTII,S$GLB,, | Performed by: INTERNAL MEDICINE

## 2021-03-02 PROCEDURE — 3074F SYST BP LT 130 MM HG: CPT | Mod: CPTII,S$GLB,, | Performed by: INTERNAL MEDICINE

## 2021-03-02 PROCEDURE — 3008F PR BODY MASS INDEX (BMI) DOCUMENTED: ICD-10-PCS | Mod: CPTII,S$GLB,, | Performed by: INTERNAL MEDICINE

## 2021-03-02 PROCEDURE — 3044F HG A1C LEVEL LT 7.0%: CPT | Mod: CPTII,S$GLB,, | Performed by: INTERNAL MEDICINE

## 2021-03-02 PROCEDURE — 1126F AMNT PAIN NOTED NONE PRSNT: CPT | Mod: S$GLB,,, | Performed by: INTERNAL MEDICINE

## 2021-03-02 PROCEDURE — 99999 PR PBB SHADOW E&M-EST. PATIENT-LVL III: ICD-10-PCS | Mod: PBBFAC,,, | Performed by: INTERNAL MEDICINE

## 2021-03-02 PROCEDURE — 1126F PR PAIN SEVERITY QUANTIFIED, NO PAIN PRESENT: ICD-10-PCS | Mod: S$GLB,,, | Performed by: INTERNAL MEDICINE

## 2021-03-02 PROCEDURE — 3078F PR MOST RECENT DIASTOLIC BLOOD PRESSURE < 80 MM HG: ICD-10-PCS | Mod: CPTII,S$GLB,, | Performed by: INTERNAL MEDICINE

## 2021-03-02 PROCEDURE — 3074F PR MOST RECENT SYSTOLIC BLOOD PRESSURE < 130 MM HG: ICD-10-PCS | Mod: CPTII,S$GLB,, | Performed by: INTERNAL MEDICINE

## 2021-03-02 PROCEDURE — 99214 OFFICE O/P EST MOD 30 MIN: CPT | Mod: S$GLB,,, | Performed by: INTERNAL MEDICINE

## 2021-03-02 PROCEDURE — 3078F DIAST BP <80 MM HG: CPT | Mod: CPTII,S$GLB,, | Performed by: INTERNAL MEDICINE

## 2021-03-02 RX ORDER — PHENTERMINE HYDROCHLORIDE 37.5 MG/1
TABLET ORAL
Qty: 30 TABLET | Refills: 1 | Status: SHIPPED | OUTPATIENT
Start: 2021-03-02 | End: 2021-08-10

## 2021-03-02 RX ORDER — SEMAGLUTIDE 1.34 MG/ML
INJECTION, SOLUTION SUBCUTANEOUS
Qty: 9 ML | Refills: 0 | Status: SHIPPED | OUTPATIENT
Start: 2021-03-02 | End: 2021-07-09

## 2021-03-03 ENCOUNTER — OFFICE VISIT (OUTPATIENT)
Dept: DERMATOLOGY | Facility: CLINIC | Age: 61
End: 2021-03-03
Payer: COMMERCIAL

## 2021-03-03 DIAGNOSIS — L71.9 ROSACEA: ICD-10-CM

## 2021-03-03 DIAGNOSIS — L82.1 SEBORRHEIC KERATOSES: Primary | ICD-10-CM

## 2021-03-03 DIAGNOSIS — L01.00 IMPETIGO: ICD-10-CM

## 2021-03-03 PROCEDURE — 1126F AMNT PAIN NOTED NONE PRSNT: CPT | Mod: S$GLB,,, | Performed by: STUDENT IN AN ORGANIZED HEALTH CARE EDUCATION/TRAINING PROGRAM

## 2021-03-03 PROCEDURE — 17110 PR DESTRUCTION BENIGN LESIONS UP TO 14: ICD-10-PCS | Mod: S$GLB,,, | Performed by: STUDENT IN AN ORGANIZED HEALTH CARE EDUCATION/TRAINING PROGRAM

## 2021-03-03 PROCEDURE — 17110 DESTRUCTION B9 LES UP TO 14: CPT | Mod: S$GLB,,, | Performed by: STUDENT IN AN ORGANIZED HEALTH CARE EDUCATION/TRAINING PROGRAM

## 2021-03-03 PROCEDURE — 99213 OFFICE O/P EST LOW 20 MIN: CPT | Mod: 25,S$GLB,, | Performed by: STUDENT IN AN ORGANIZED HEALTH CARE EDUCATION/TRAINING PROGRAM

## 2021-03-03 PROCEDURE — 99999 PR PBB SHADOW E&M-EST. PATIENT-LVL III: CPT | Mod: PBBFAC,,, | Performed by: STUDENT IN AN ORGANIZED HEALTH CARE EDUCATION/TRAINING PROGRAM

## 2021-03-03 PROCEDURE — 99213 PR OFFICE/OUTPT VISIT, EST, LEVL III, 20-29 MIN: ICD-10-PCS | Mod: 25,S$GLB,, | Performed by: STUDENT IN AN ORGANIZED HEALTH CARE EDUCATION/TRAINING PROGRAM

## 2021-03-03 PROCEDURE — 1126F PR PAIN SEVERITY QUANTIFIED, NO PAIN PRESENT: ICD-10-PCS | Mod: S$GLB,,, | Performed by: STUDENT IN AN ORGANIZED HEALTH CARE EDUCATION/TRAINING PROGRAM

## 2021-03-03 PROCEDURE — 99999 PR PBB SHADOW E&M-EST. PATIENT-LVL III: ICD-10-PCS | Mod: PBBFAC,,, | Performed by: STUDENT IN AN ORGANIZED HEALTH CARE EDUCATION/TRAINING PROGRAM

## 2021-03-03 RX ORDER — CLINDAMYCIN PHOSPHATE 10 UG/ML
LOTION TOPICAL 2 TIMES DAILY
Qty: 60 ML | Refills: 3 | Status: SHIPPED | OUTPATIENT
Start: 2021-03-03 | End: 2023-12-26 | Stop reason: SDUPTHER

## 2021-03-03 RX ORDER — METRONIDAZOLE 7.5 MG/G
CREAM TOPICAL 2 TIMES DAILY
Qty: 45 G | Refills: 3 | Status: SHIPPED | OUTPATIENT
Start: 2021-03-03 | End: 2023-12-26 | Stop reason: SDUPTHER

## 2021-04-05 ENCOUNTER — PATIENT MESSAGE (OUTPATIENT)
Dept: ADMINISTRATIVE | Facility: HOSPITAL | Age: 61
End: 2021-04-05

## 2021-04-15 DIAGNOSIS — Z12.31 OTHER SCREENING MAMMOGRAM: ICD-10-CM

## 2021-05-10 ENCOUNTER — PATIENT MESSAGE (OUTPATIENT)
Dept: DERMATOLOGY | Facility: CLINIC | Age: 61
End: 2021-05-10

## 2021-05-28 ENCOUNTER — PATIENT MESSAGE (OUTPATIENT)
Dept: DERMATOLOGY | Facility: CLINIC | Age: 61
End: 2021-05-28

## 2021-06-01 ENCOUNTER — PATIENT MESSAGE (OUTPATIENT)
Dept: DERMATOLOGY | Facility: CLINIC | Age: 61
End: 2021-06-01

## 2021-06-01 RX ORDER — DOXYCYCLINE 100 MG/1
100 CAPSULE ORAL EVERY 12 HOURS
Qty: 60 CAPSULE | Refills: 0 | Status: SHIPPED | OUTPATIENT
Start: 2021-06-01 | End: 2021-06-24

## 2021-07-06 ENCOUNTER — PATIENT MESSAGE (OUTPATIENT)
Dept: ADMINISTRATIVE | Facility: HOSPITAL | Age: 61
End: 2021-07-06

## 2021-07-09 ENCOUNTER — OFFICE VISIT (OUTPATIENT)
Dept: INTERNAL MEDICINE | Facility: CLINIC | Age: 61
End: 2021-07-09
Payer: COMMERCIAL

## 2021-07-09 ENCOUNTER — LAB VISIT (OUTPATIENT)
Dept: LAB | Facility: HOSPITAL | Age: 61
End: 2021-07-09
Attending: INTERNAL MEDICINE
Payer: COMMERCIAL

## 2021-07-09 VITALS
RESPIRATION RATE: 16 BRPM | SYSTOLIC BLOOD PRESSURE: 130 MMHG | WEIGHT: 273.81 LBS | BODY MASS INDEX: 51.7 KG/M2 | DIASTOLIC BLOOD PRESSURE: 80 MMHG | HEIGHT: 61 IN | HEART RATE: 76 BPM | TEMPERATURE: 98 F

## 2021-07-09 DIAGNOSIS — I10 ESSENTIAL HYPERTENSION: ICD-10-CM

## 2021-07-09 DIAGNOSIS — E55.9 VITAMIN D INSUFFICIENCY: ICD-10-CM

## 2021-07-09 DIAGNOSIS — E11.9 CONTROLLED TYPE 2 DIABETES MELLITUS WITHOUT COMPLICATION, WITHOUT LONG-TERM CURRENT USE OF INSULIN: ICD-10-CM

## 2021-07-09 DIAGNOSIS — C54.1 ENDOMETRIAL SARCOMA: ICD-10-CM

## 2021-07-09 DIAGNOSIS — L91.8 SKIN TAG: ICD-10-CM

## 2021-07-09 DIAGNOSIS — Z12.11 SCREEN FOR COLON CANCER: ICD-10-CM

## 2021-07-09 DIAGNOSIS — Z12.31 VISIT FOR SCREENING MAMMOGRAM: ICD-10-CM

## 2021-07-09 DIAGNOSIS — Z00.00 ANNUAL PHYSICAL EXAM: Primary | ICD-10-CM

## 2021-07-09 LAB
25(OH)D3+25(OH)D2 SERPL-MCNC: 42 NG/ML (ref 30–96)
ALBUMIN SERPL BCP-MCNC: 3 G/DL (ref 3.5–5.2)
ALP SERPL-CCNC: 108 U/L (ref 55–135)
ALT SERPL W/O P-5'-P-CCNC: 13 U/L (ref 10–44)
ANION GAP SERPL CALC-SCNC: 8 MMOL/L (ref 8–16)
AST SERPL-CCNC: 12 U/L (ref 10–40)
BASOPHILS # BLD AUTO: 0.06 K/UL (ref 0–0.2)
BASOPHILS NFR BLD: 0.6 % (ref 0–1.9)
BILIRUB SERPL-MCNC: 0.3 MG/DL (ref 0.1–1)
BUN SERPL-MCNC: 12 MG/DL (ref 6–20)
CALCIUM SERPL-MCNC: 9.7 MG/DL (ref 8.7–10.5)
CHLORIDE SERPL-SCNC: 105 MMOL/L (ref 95–110)
CHOLEST SERPL-MCNC: 176 MG/DL (ref 120–199)
CHOLEST/HDLC SERPL: 3.1 {RATIO} (ref 2–5)
CO2 SERPL-SCNC: 28 MMOL/L (ref 23–29)
CREAT SERPL-MCNC: 0.8 MG/DL (ref 0.5–1.4)
DIFFERENTIAL METHOD: ABNORMAL
EOSINOPHIL # BLD AUTO: 0.3 K/UL (ref 0–0.5)
EOSINOPHIL NFR BLD: 2.8 % (ref 0–8)
ERYTHROCYTE [DISTWIDTH] IN BLOOD BY AUTOMATED COUNT: 14.5 % (ref 11.5–14.5)
EST. GFR  (AFRICAN AMERICAN): >60 ML/MIN/1.73 M^2
EST. GFR  (NON AFRICAN AMERICAN): >60 ML/MIN/1.73 M^2
ESTIMATED AVG GLUCOSE: 108 MG/DL (ref 68–131)
GLUCOSE SERPL-MCNC: 91 MG/DL (ref 70–110)
HBA1C MFR BLD: 5.4 % (ref 4–5.6)
HCT VFR BLD AUTO: 39.4 % (ref 37–48.5)
HDLC SERPL-MCNC: 56 MG/DL (ref 40–75)
HDLC SERPL: 31.8 % (ref 20–50)
HGB BLD-MCNC: 12.3 G/DL (ref 12–16)
IMM GRANULOCYTES # BLD AUTO: 0.04 K/UL (ref 0–0.04)
IMM GRANULOCYTES NFR BLD AUTO: 0.4 % (ref 0–0.5)
LDLC SERPL CALC-MCNC: 104.2 MG/DL (ref 63–159)
LYMPHOCYTES # BLD AUTO: 2.7 K/UL (ref 1–4.8)
LYMPHOCYTES NFR BLD: 28.4 % (ref 18–48)
MCH RBC QN AUTO: 27.6 PG (ref 27–31)
MCHC RBC AUTO-ENTMCNC: 31.2 G/DL (ref 32–36)
MCV RBC AUTO: 89 FL (ref 82–98)
MONOCYTES # BLD AUTO: 0.6 K/UL (ref 0.3–1)
MONOCYTES NFR BLD: 6 % (ref 4–15)
NEUTROPHILS # BLD AUTO: 6 K/UL (ref 1.8–7.7)
NEUTROPHILS NFR BLD: 61.8 % (ref 38–73)
NONHDLC SERPL-MCNC: 120 MG/DL
NRBC BLD-RTO: 0 /100 WBC
PLATELET # BLD AUTO: 300 K/UL (ref 150–450)
PMV BLD AUTO: 10.1 FL (ref 9.2–12.9)
POTASSIUM SERPL-SCNC: 4.2 MMOL/L (ref 3.5–5.1)
PROT SERPL-MCNC: 7.6 G/DL (ref 6–8.4)
RBC # BLD AUTO: 4.45 M/UL (ref 4–5.4)
SODIUM SERPL-SCNC: 141 MMOL/L (ref 136–145)
TRIGL SERPL-MCNC: 79 MG/DL (ref 30–150)
TSH SERPL DL<=0.005 MIU/L-ACNC: 1.04 UIU/ML (ref 0.4–4)
WBC # BLD AUTO: 9.64 K/UL (ref 3.9–12.7)

## 2021-07-09 PROCEDURE — 1126F PR PAIN SEVERITY QUANTIFIED, NO PAIN PRESENT: ICD-10-PCS | Mod: S$GLB,,, | Performed by: INTERNAL MEDICINE

## 2021-07-09 PROCEDURE — 99999 PR PBB SHADOW E&M-EST. PATIENT-LVL IV: CPT | Mod: PBBFAC,,, | Performed by: INTERNAL MEDICINE

## 2021-07-09 PROCEDURE — 84443 ASSAY THYROID STIM HORMONE: CPT | Performed by: INTERNAL MEDICINE

## 2021-07-09 PROCEDURE — 36415 COLL VENOUS BLD VENIPUNCTURE: CPT | Mod: PO | Performed by: INTERNAL MEDICINE

## 2021-07-09 PROCEDURE — 82306 VITAMIN D 25 HYDROXY: CPT | Performed by: INTERNAL MEDICINE

## 2021-07-09 PROCEDURE — 3008F PR BODY MASS INDEX (BMI) DOCUMENTED: ICD-10-PCS | Mod: CPTII,S$GLB,, | Performed by: INTERNAL MEDICINE

## 2021-07-09 PROCEDURE — 83036 HEMOGLOBIN GLYCOSYLATED A1C: CPT | Performed by: INTERNAL MEDICINE

## 2021-07-09 PROCEDURE — 99396 PR PREVENTIVE VISIT,EST,40-64: ICD-10-PCS | Mod: S$GLB,,, | Performed by: INTERNAL MEDICINE

## 2021-07-09 PROCEDURE — 1126F AMNT PAIN NOTED NONE PRSNT: CPT | Mod: S$GLB,,, | Performed by: INTERNAL MEDICINE

## 2021-07-09 PROCEDURE — 85025 COMPLETE CBC W/AUTO DIFF WBC: CPT | Performed by: INTERNAL MEDICINE

## 2021-07-09 PROCEDURE — 80061 LIPID PANEL: CPT | Performed by: INTERNAL MEDICINE

## 2021-07-09 PROCEDURE — 99999 PR PBB SHADOW E&M-EST. PATIENT-LVL IV: ICD-10-PCS | Mod: PBBFAC,,, | Performed by: INTERNAL MEDICINE

## 2021-07-09 PROCEDURE — 99396 PREV VISIT EST AGE 40-64: CPT | Mod: S$GLB,,, | Performed by: INTERNAL MEDICINE

## 2021-07-09 PROCEDURE — 80053 COMPREHEN METABOLIC PANEL: CPT | Performed by: INTERNAL MEDICINE

## 2021-07-09 PROCEDURE — 3008F BODY MASS INDEX DOCD: CPT | Mod: CPTII,S$GLB,, | Performed by: INTERNAL MEDICINE

## 2021-07-09 RX ORDER — SEMAGLUTIDE 1.34 MG/ML
INJECTION, SOLUTION SUBCUTANEOUS
COMMUNITY
Start: 2021-06-29 | End: 2021-08-10 | Stop reason: SDUPTHER

## 2021-07-09 RX ORDER — LOSARTAN POTASSIUM 25 MG/1
25 TABLET ORAL DAILY
Qty: 90 TABLET | Refills: 3 | Status: SHIPPED | OUTPATIENT
Start: 2021-07-09 | End: 2022-07-18 | Stop reason: SDUPTHER

## 2021-07-09 RX ORDER — CEPHALEXIN 500 MG/1
500 CAPSULE ORAL EVERY 8 HOURS
COMMUNITY
Start: 2021-07-05 | End: 2021-11-15 | Stop reason: ALTCHOICE

## 2021-07-29 ENCOUNTER — PATIENT MESSAGE (OUTPATIENT)
Dept: INTERNAL MEDICINE | Facility: CLINIC | Age: 61
End: 2021-07-29

## 2021-07-29 ENCOUNTER — PATIENT MESSAGE (OUTPATIENT)
Dept: BARIATRICS | Facility: CLINIC | Age: 61
End: 2021-07-29

## 2021-08-02 ENCOUNTER — PATIENT MESSAGE (OUTPATIENT)
Dept: INTERNAL MEDICINE | Facility: CLINIC | Age: 61
End: 2021-08-02

## 2021-08-06 ENCOUNTER — PATIENT MESSAGE (OUTPATIENT)
Dept: INTERNAL MEDICINE | Facility: CLINIC | Age: 61
End: 2021-08-06

## 2021-08-06 DIAGNOSIS — Z82.49 FAMILY HISTORY OF CORONARY ARTERY DISEASE: Primary | ICD-10-CM

## 2021-08-07 ENCOUNTER — PATIENT MESSAGE (OUTPATIENT)
Dept: INTERNAL MEDICINE | Facility: CLINIC | Age: 61
End: 2021-08-07

## 2021-08-07 ENCOUNTER — PATIENT MESSAGE (OUTPATIENT)
Dept: BARIATRICS | Facility: CLINIC | Age: 61
End: 2021-08-07

## 2021-08-09 ENCOUNTER — PATIENT OUTREACH (OUTPATIENT)
Dept: ADMINISTRATIVE | Facility: OTHER | Age: 61
End: 2021-08-09

## 2021-08-10 ENCOUNTER — OFFICE VISIT (OUTPATIENT)
Dept: BARIATRICS | Facility: CLINIC | Age: 61
End: 2021-08-10
Payer: COMMERCIAL

## 2021-08-10 ENCOUNTER — TELEPHONE (OUTPATIENT)
Dept: BARIATRICS | Facility: CLINIC | Age: 61
End: 2021-08-10

## 2021-08-10 DIAGNOSIS — E66.01 CLASS 3 SEVERE OBESITY DUE TO EXCESS CALORIES WITH SERIOUS COMORBIDITY AND BODY MASS INDEX (BMI) OF 50.0 TO 59.9 IN ADULT: ICD-10-CM

## 2021-08-10 DIAGNOSIS — E11.9 CONTROLLED TYPE 2 DIABETES MELLITUS WITHOUT COMPLICATION, WITHOUT LONG-TERM CURRENT USE OF INSULIN: ICD-10-CM

## 2021-08-10 PROCEDURE — 1159F PR MEDICATION LIST DOCUMENTED IN MEDICAL RECORD: ICD-10-PCS | Mod: CPTII,95,, | Performed by: INTERNAL MEDICINE

## 2021-08-10 PROCEDURE — 99212 OFFICE O/P EST SF 10 MIN: CPT | Mod: 95,,, | Performed by: INTERNAL MEDICINE

## 2021-08-10 PROCEDURE — 99212 PR OFFICE/OUTPT VISIT, EST, LEVL II, 10-19 MIN: ICD-10-PCS | Mod: 95,,, | Performed by: INTERNAL MEDICINE

## 2021-08-10 PROCEDURE — 1160F PR REVIEW ALL MEDS BY PRESCRIBER/CLIN PHARMACIST DOCUMENTED: ICD-10-PCS | Mod: CPTII,95,, | Performed by: INTERNAL MEDICINE

## 2021-08-10 PROCEDURE — 1159F MED LIST DOCD IN RCRD: CPT | Mod: CPTII,95,, | Performed by: INTERNAL MEDICINE

## 2021-08-10 PROCEDURE — 1160F RVW MEDS BY RX/DR IN RCRD: CPT | Mod: CPTII,95,, | Performed by: INTERNAL MEDICINE

## 2021-08-10 PROCEDURE — 3044F HG A1C LEVEL LT 7.0%: CPT | Mod: CPTII,95,, | Performed by: INTERNAL MEDICINE

## 2021-08-10 PROCEDURE — 3044F PR MOST RECENT HEMOGLOBIN A1C LEVEL <7.0%: ICD-10-PCS | Mod: CPTII,95,, | Performed by: INTERNAL MEDICINE

## 2021-08-10 RX ORDER — DIETHYLPROPION HYDROCHLORIDE 75 MG/1
75 TABLET, EXTENDED RELEASE ORAL DAILY
Qty: 30 TABLET | Refills: 2 | Status: SHIPPED | OUTPATIENT
Start: 2021-08-10 | End: 2021-12-13

## 2021-08-10 RX ORDER — SEMAGLUTIDE 1.34 MG/ML
1 INJECTION, SOLUTION SUBCUTANEOUS
Qty: 3 PEN | Refills: 0 | Status: SHIPPED | OUTPATIENT
Start: 2021-08-10 | End: 2021-09-03 | Stop reason: SDUPTHER

## 2021-08-11 ENCOUNTER — TELEPHONE (OUTPATIENT)
Dept: BARIATRICS | Facility: CLINIC | Age: 61
End: 2021-08-11

## 2021-08-13 ENCOUNTER — TELEPHONE (OUTPATIENT)
Dept: BARIATRICS | Facility: CLINIC | Age: 61
End: 2021-08-13

## 2021-08-23 ENCOUNTER — PATIENT MESSAGE (OUTPATIENT)
Dept: INTERNAL MEDICINE | Facility: CLINIC | Age: 61
End: 2021-08-23

## 2021-08-24 ENCOUNTER — PATIENT MESSAGE (OUTPATIENT)
Dept: INTERNAL MEDICINE | Facility: CLINIC | Age: 61
End: 2021-08-24

## 2021-08-24 DIAGNOSIS — Z82.49 FAMILY HISTORY OF CORONARY ARTERY DISEASE: Primary | ICD-10-CM

## 2021-09-01 ENCOUNTER — PATIENT MESSAGE (OUTPATIENT)
Dept: BARIATRICS | Facility: CLINIC | Age: 61
End: 2021-09-01

## 2021-09-02 ENCOUNTER — PATIENT MESSAGE (OUTPATIENT)
Dept: INTERNAL MEDICINE | Facility: CLINIC | Age: 61
End: 2021-09-02

## 2021-09-02 ENCOUNTER — PATIENT MESSAGE (OUTPATIENT)
Dept: BARIATRICS | Facility: CLINIC | Age: 61
End: 2021-09-02

## 2021-09-02 DIAGNOSIS — E11.9 CONTROLLED TYPE 2 DIABETES MELLITUS WITHOUT COMPLICATION, WITHOUT LONG-TERM CURRENT USE OF INSULIN: ICD-10-CM

## 2021-09-03 RX ORDER — SEMAGLUTIDE 1.34 MG/ML
1 INJECTION, SOLUTION SUBCUTANEOUS
Qty: 3 PEN | Refills: 0 | Status: SHIPPED | OUTPATIENT
Start: 2021-09-03 | End: 2021-09-27 | Stop reason: SDUPTHER

## 2021-09-15 ENCOUNTER — TELEPHONE (OUTPATIENT)
Dept: INTERNAL MEDICINE | Facility: CLINIC | Age: 61
End: 2021-09-15

## 2021-09-15 DIAGNOSIS — E11.9 CONTROLLED TYPE 2 DIABETES MELLITUS WITHOUT COMPLICATION, WITHOUT LONG-TERM CURRENT USE OF INSULIN: Primary | ICD-10-CM

## 2021-09-15 DIAGNOSIS — I10 ESSENTIAL HYPERTENSION: ICD-10-CM

## 2021-09-23 ENCOUNTER — TELEPHONE (OUTPATIENT)
Dept: BARIATRICS | Facility: CLINIC | Age: 61
End: 2021-09-23
Payer: COMMERCIAL

## 2021-09-23 ENCOUNTER — PATIENT MESSAGE (OUTPATIENT)
Dept: BARIATRICS | Facility: CLINIC | Age: 61
End: 2021-09-23

## 2021-09-23 DIAGNOSIS — E11.9 CONTROLLED TYPE 2 DIABETES MELLITUS WITHOUT COMPLICATION, WITHOUT LONG-TERM CURRENT USE OF INSULIN: ICD-10-CM

## 2021-09-23 RX ORDER — SEMAGLUTIDE 1.34 MG/ML
1 INJECTION, SOLUTION SUBCUTANEOUS
Qty: 3 PEN | Refills: 0 | Status: CANCELLED | OUTPATIENT
Start: 2021-09-23

## 2021-09-27 ENCOUNTER — PATIENT MESSAGE (OUTPATIENT)
Dept: INTERNAL MEDICINE | Facility: CLINIC | Age: 61
End: 2021-09-27

## 2021-09-27 ENCOUNTER — PATIENT OUTREACH (OUTPATIENT)
Dept: ADMINISTRATIVE | Facility: OTHER | Age: 61
End: 2021-09-27

## 2021-09-27 DIAGNOSIS — E11.9 CONTROLLED TYPE 2 DIABETES MELLITUS WITHOUT COMPLICATION, WITHOUT LONG-TERM CURRENT USE OF INSULIN: ICD-10-CM

## 2021-09-27 RX ORDER — SEMAGLUTIDE 1.34 MG/ML
1 INJECTION, SOLUTION SUBCUTANEOUS
Qty: 3 PEN | Refills: 0 | Status: SHIPPED | OUTPATIENT
Start: 2021-09-27 | End: 2021-10-20 | Stop reason: SDUPTHER

## 2021-10-04 ENCOUNTER — PATIENT MESSAGE (OUTPATIENT)
Dept: ADMINISTRATIVE | Facility: HOSPITAL | Age: 61
End: 2021-10-04

## 2021-10-06 ENCOUNTER — PATIENT MESSAGE (OUTPATIENT)
Dept: INTERNAL MEDICINE | Facility: CLINIC | Age: 61
End: 2021-10-06

## 2021-10-11 ENCOUNTER — PATIENT OUTREACH (OUTPATIENT)
Dept: ADMINISTRATIVE | Facility: HOSPITAL | Age: 61
End: 2021-10-11

## 2021-10-20 DIAGNOSIS — E11.9 CONTROLLED TYPE 2 DIABETES MELLITUS WITHOUT COMPLICATION, WITHOUT LONG-TERM CURRENT USE OF INSULIN: ICD-10-CM

## 2021-10-20 RX ORDER — SEMAGLUTIDE 1.34 MG/ML
1 INJECTION, SOLUTION SUBCUTANEOUS
Qty: 3 PEN | Refills: 11 | Status: SHIPPED | OUTPATIENT
Start: 2021-10-20 | End: 2021-12-13 | Stop reason: SDUPTHER

## 2021-11-11 ENCOUNTER — PATIENT OUTREACH (OUTPATIENT)
Dept: ADMINISTRATIVE | Facility: OTHER | Age: 61
End: 2021-11-11
Payer: COMMERCIAL

## 2021-11-15 ENCOUNTER — PATIENT MESSAGE (OUTPATIENT)
Dept: INTERNAL MEDICINE | Facility: CLINIC | Age: 61
End: 2021-11-15
Payer: COMMERCIAL

## 2021-11-15 ENCOUNTER — OFFICE VISIT (OUTPATIENT)
Dept: CARDIOLOGY | Facility: CLINIC | Age: 61
End: 2021-11-15
Payer: COMMERCIAL

## 2021-11-15 VITALS
HEART RATE: 64 BPM | HEIGHT: 61 IN | BODY MASS INDEX: 50.28 KG/M2 | WEIGHT: 266.31 LBS | SYSTOLIC BLOOD PRESSURE: 108 MMHG | DIASTOLIC BLOOD PRESSURE: 55 MMHG

## 2021-11-15 DIAGNOSIS — E11.618 CONTROLLED TYPE 2 DIABETES MELLITUS WITH OTHER DIABETIC ARTHROPATHY, WITHOUT LONG-TERM CURRENT USE OF INSULIN: ICD-10-CM

## 2021-11-15 DIAGNOSIS — G47.33 OSA (OBSTRUCTIVE SLEEP APNEA): ICD-10-CM

## 2021-11-15 DIAGNOSIS — E66.01 MORBID OBESITY WITH BMI OF 60.0-69.9, ADULT: ICD-10-CM

## 2021-11-15 DIAGNOSIS — I10 ESSENTIAL HYPERTENSION: Primary | ICD-10-CM

## 2021-11-15 DIAGNOSIS — Z82.49 FAMILY HISTORY OF CORONARY ARTERY DISEASE: ICD-10-CM

## 2021-11-15 PROCEDURE — 3044F HG A1C LEVEL LT 7.0%: CPT | Mod: CPTII,S$GLB,, | Performed by: INTERNAL MEDICINE

## 2021-11-15 PROCEDURE — 3066F PR DOCUMENTATION OF TREATMENT FOR NEPHROPATHY: ICD-10-PCS | Mod: CPTII,S$GLB,, | Performed by: INTERNAL MEDICINE

## 2021-11-15 PROCEDURE — 1160F RVW MEDS BY RX/DR IN RCRD: CPT | Mod: CPTII,S$GLB,, | Performed by: INTERNAL MEDICINE

## 2021-11-15 PROCEDURE — 99214 OFFICE O/P EST MOD 30 MIN: CPT | Mod: S$GLB,,, | Performed by: INTERNAL MEDICINE

## 2021-11-15 PROCEDURE — 3078F PR MOST RECENT DIASTOLIC BLOOD PRESSURE < 80 MM HG: ICD-10-PCS | Mod: CPTII,S$GLB,, | Performed by: INTERNAL MEDICINE

## 2021-11-15 PROCEDURE — 1160F PR REVIEW ALL MEDS BY PRESCRIBER/CLIN PHARMACIST DOCUMENTED: ICD-10-PCS | Mod: CPTII,S$GLB,, | Performed by: INTERNAL MEDICINE

## 2021-11-15 PROCEDURE — 3074F PR MOST RECENT SYSTOLIC BLOOD PRESSURE < 130 MM HG: ICD-10-PCS | Mod: CPTII,S$GLB,, | Performed by: INTERNAL MEDICINE

## 2021-11-15 PROCEDURE — 3061F PR NEG MICROALBUMINURIA RESULT DOCUMENTED/REVIEW: ICD-10-PCS | Mod: CPTII,S$GLB,, | Performed by: INTERNAL MEDICINE

## 2021-11-15 PROCEDURE — 4010F PR ACE/ARB THEARPY RXD/TAKEN: ICD-10-PCS | Mod: CPTII,S$GLB,, | Performed by: INTERNAL MEDICINE

## 2021-11-15 PROCEDURE — 93010 ELECTROCARDIOGRAM REPORT: CPT | Mod: S$GLB,,, | Performed by: INTERNAL MEDICINE

## 2021-11-15 PROCEDURE — 93010 EKG 12-LEAD: ICD-10-PCS | Mod: S$GLB,,, | Performed by: INTERNAL MEDICINE

## 2021-11-15 PROCEDURE — 3061F NEG MICROALBUMINURIA REV: CPT | Mod: CPTII,S$GLB,, | Performed by: INTERNAL MEDICINE

## 2021-11-15 PROCEDURE — 1159F PR MEDICATION LIST DOCUMENTED IN MEDICAL RECORD: ICD-10-PCS | Mod: CPTII,S$GLB,, | Performed by: INTERNAL MEDICINE

## 2021-11-15 PROCEDURE — 3074F SYST BP LT 130 MM HG: CPT | Mod: CPTII,S$GLB,, | Performed by: INTERNAL MEDICINE

## 2021-11-15 PROCEDURE — 99999 PR PBB SHADOW E&M-EST. PATIENT-LVL IV: ICD-10-PCS | Mod: PBBFAC,,, | Performed by: INTERNAL MEDICINE

## 2021-11-15 PROCEDURE — 93005 EKG 12-LEAD: ICD-10-PCS | Mod: S$GLB,,, | Performed by: INTERNAL MEDICINE

## 2021-11-15 PROCEDURE — 99999 PR PBB SHADOW E&M-EST. PATIENT-LVL IV: CPT | Mod: PBBFAC,,, | Performed by: INTERNAL MEDICINE

## 2021-11-15 PROCEDURE — 3078F DIAST BP <80 MM HG: CPT | Mod: CPTII,S$GLB,, | Performed by: INTERNAL MEDICINE

## 2021-11-15 PROCEDURE — 1159F MED LIST DOCD IN RCRD: CPT | Mod: CPTII,S$GLB,, | Performed by: INTERNAL MEDICINE

## 2021-11-15 PROCEDURE — 4010F ACE/ARB THERAPY RXD/TAKEN: CPT | Mod: CPTII,S$GLB,, | Performed by: INTERNAL MEDICINE

## 2021-11-15 PROCEDURE — 3008F BODY MASS INDEX DOCD: CPT | Mod: CPTII,S$GLB,, | Performed by: INTERNAL MEDICINE

## 2021-11-15 PROCEDURE — 3066F NEPHROPATHY DOC TX: CPT | Mod: CPTII,S$GLB,, | Performed by: INTERNAL MEDICINE

## 2021-11-15 PROCEDURE — 3044F PR MOST RECENT HEMOGLOBIN A1C LEVEL <7.0%: ICD-10-PCS | Mod: CPTII,S$GLB,, | Performed by: INTERNAL MEDICINE

## 2021-11-15 PROCEDURE — 3008F PR BODY MASS INDEX (BMI) DOCUMENTED: ICD-10-PCS | Mod: CPTII,S$GLB,, | Performed by: INTERNAL MEDICINE

## 2021-11-15 PROCEDURE — 93005 ELECTROCARDIOGRAM TRACING: CPT | Mod: S$GLB,,, | Performed by: INTERNAL MEDICINE

## 2021-11-15 PROCEDURE — 99214 PR OFFICE/OUTPT VISIT, EST, LEVL IV, 30-39 MIN: ICD-10-PCS | Mod: S$GLB,,, | Performed by: INTERNAL MEDICINE

## 2021-11-16 ENCOUNTER — PATIENT MESSAGE (OUTPATIENT)
Dept: INTERNAL MEDICINE | Facility: CLINIC | Age: 61
End: 2021-11-16
Payer: COMMERCIAL

## 2021-11-16 ENCOUNTER — TELEPHONE (OUTPATIENT)
Dept: INTERNAL MEDICINE | Facility: CLINIC | Age: 61
End: 2021-11-16
Payer: COMMERCIAL

## 2021-11-16 DIAGNOSIS — E11.9 CONTROLLED TYPE 2 DIABETES MELLITUS WITHOUT COMPLICATION, WITHOUT LONG-TERM CURRENT USE OF INSULIN: ICD-10-CM

## 2021-11-16 RX ORDER — ERGOCALCIFEROL 1.25 MG/1
50000 CAPSULE ORAL
Qty: 12 CAPSULE | Refills: 3 | Status: SHIPPED | OUTPATIENT
Start: 2021-11-16 | End: 2022-10-27

## 2021-11-28 ENCOUNTER — PATIENT MESSAGE (OUTPATIENT)
Dept: INTERNAL MEDICINE | Facility: CLINIC | Age: 61
End: 2021-11-28
Payer: COMMERCIAL

## 2021-11-29 ENCOUNTER — PATIENT MESSAGE (OUTPATIENT)
Dept: INTERNAL MEDICINE | Facility: CLINIC | Age: 61
End: 2021-11-29
Payer: COMMERCIAL

## 2021-11-30 ENCOUNTER — OFFICE VISIT (OUTPATIENT)
Dept: FAMILY MEDICINE | Facility: CLINIC | Age: 61
End: 2021-11-30
Payer: COMMERCIAL

## 2021-11-30 VITALS
WEIGHT: 269.38 LBS | HEIGHT: 61 IN | BODY MASS INDEX: 50.86 KG/M2 | OXYGEN SATURATION: 98 % | SYSTOLIC BLOOD PRESSURE: 128 MMHG | HEART RATE: 78 BPM | DIASTOLIC BLOOD PRESSURE: 78 MMHG

## 2021-11-30 DIAGNOSIS — M54.42 ACUTE LEFT-SIDED LOW BACK PAIN WITH LEFT-SIDED SCIATICA: Primary | ICD-10-CM

## 2021-11-30 PROBLEM — Z12.11 COLON CANCER SCREENING: Status: RESOLVED | Noted: 2019-09-26 | Resolved: 2021-11-30

## 2021-11-30 PROBLEM — N30.00 ACUTE CYSTITIS WITHOUT HEMATURIA: Status: RESOLVED | Noted: 2019-09-04 | Resolved: 2021-11-30

## 2021-11-30 PROCEDURE — 4010F PR ACE/ARB THEARPY RXD/TAKEN: ICD-10-PCS | Mod: CPTII,S$GLB,, | Performed by: PHYSICIAN ASSISTANT

## 2021-11-30 PROCEDURE — 99213 OFFICE O/P EST LOW 20 MIN: CPT | Mod: S$GLB,,, | Performed by: PHYSICIAN ASSISTANT

## 2021-11-30 PROCEDURE — 3066F NEPHROPATHY DOC TX: CPT | Mod: CPTII,S$GLB,, | Performed by: PHYSICIAN ASSISTANT

## 2021-11-30 PROCEDURE — 3061F PR NEG MICROALBUMINURIA RESULT DOCUMENTED/REVIEW: ICD-10-PCS | Mod: CPTII,S$GLB,, | Performed by: PHYSICIAN ASSISTANT

## 2021-11-30 PROCEDURE — 3061F NEG MICROALBUMINURIA REV: CPT | Mod: CPTII,S$GLB,, | Performed by: PHYSICIAN ASSISTANT

## 2021-11-30 PROCEDURE — 99999 PR PBB SHADOW E&M-EST. PATIENT-LVL V: CPT | Mod: PBBFAC,,, | Performed by: PHYSICIAN ASSISTANT

## 2021-11-30 PROCEDURE — 4010F ACE/ARB THERAPY RXD/TAKEN: CPT | Mod: CPTII,S$GLB,, | Performed by: PHYSICIAN ASSISTANT

## 2021-11-30 PROCEDURE — 3066F PR DOCUMENTATION OF TREATMENT FOR NEPHROPATHY: ICD-10-PCS | Mod: CPTII,S$GLB,, | Performed by: PHYSICIAN ASSISTANT

## 2021-11-30 PROCEDURE — 99213 PR OFFICE/OUTPT VISIT, EST, LEVL III, 20-29 MIN: ICD-10-PCS | Mod: S$GLB,,, | Performed by: PHYSICIAN ASSISTANT

## 2021-11-30 PROCEDURE — 99999 PR PBB SHADOW E&M-EST. PATIENT-LVL V: ICD-10-PCS | Mod: PBBFAC,,, | Performed by: PHYSICIAN ASSISTANT

## 2021-11-30 RX ORDER — METHYLPREDNISOLONE 4 MG/1
TABLET ORAL
Qty: 21 EACH | Refills: 0 | Status: SHIPPED | OUTPATIENT
Start: 2021-11-30 | End: 2021-12-13

## 2021-12-06 DIAGNOSIS — E66.01 CLASS 3 SEVERE OBESITY DUE TO EXCESS CALORIES WITH SERIOUS COMORBIDITY AND BODY MASS INDEX (BMI) OF 50.0 TO 59.9 IN ADULT: ICD-10-CM

## 2021-12-06 RX ORDER — DIETHYLPROPION HYDROCHLORIDE 75 MG/1
75 TABLET, EXTENDED RELEASE ORAL DAILY
Qty: 30 TABLET | Refills: 2 | OUTPATIENT
Start: 2021-12-06

## 2021-12-13 ENCOUNTER — OFFICE VISIT (OUTPATIENT)
Dept: BARIATRICS | Facility: CLINIC | Age: 61
End: 2021-12-13
Payer: COMMERCIAL

## 2021-12-13 ENCOUNTER — PATIENT OUTREACH (OUTPATIENT)
Dept: ADMINISTRATIVE | Facility: OTHER | Age: 61
End: 2021-12-13
Payer: COMMERCIAL

## 2021-12-13 VITALS
SYSTOLIC BLOOD PRESSURE: 136 MMHG | WEIGHT: 267.63 LBS | DIASTOLIC BLOOD PRESSURE: 72 MMHG | OXYGEN SATURATION: 98 % | BODY MASS INDEX: 50.53 KG/M2 | HEIGHT: 61 IN | HEART RATE: 71 BPM

## 2021-12-13 DIAGNOSIS — E66.01 CLASS 3 SEVERE OBESITY DUE TO EXCESS CALORIES WITH SERIOUS COMORBIDITY AND BODY MASS INDEX (BMI) OF 50.0 TO 59.9 IN ADULT: Primary | ICD-10-CM

## 2021-12-13 DIAGNOSIS — E11.9 CONTROLLED TYPE 2 DIABETES MELLITUS WITHOUT COMPLICATION, WITHOUT LONG-TERM CURRENT USE OF INSULIN: ICD-10-CM

## 2021-12-13 PROCEDURE — 99999 PR PBB SHADOW E&M-EST. PATIENT-LVL IV: ICD-10-PCS | Mod: PBBFAC,,, | Performed by: INTERNAL MEDICINE

## 2021-12-13 PROCEDURE — 3061F PR NEG MICROALBUMINURIA RESULT DOCUMENTED/REVIEW: ICD-10-PCS | Mod: CPTII,S$GLB,, | Performed by: INTERNAL MEDICINE

## 2021-12-13 PROCEDURE — 99212 PR OFFICE/OUTPT VISIT, EST, LEVL II, 10-19 MIN: ICD-10-PCS | Mod: S$GLB,,, | Performed by: INTERNAL MEDICINE

## 2021-12-13 PROCEDURE — 3066F PR DOCUMENTATION OF TREATMENT FOR NEPHROPATHY: ICD-10-PCS | Mod: CPTII,S$GLB,, | Performed by: INTERNAL MEDICINE

## 2021-12-13 PROCEDURE — 4010F ACE/ARB THERAPY RXD/TAKEN: CPT | Mod: CPTII,S$GLB,, | Performed by: INTERNAL MEDICINE

## 2021-12-13 PROCEDURE — 3061F NEG MICROALBUMINURIA REV: CPT | Mod: CPTII,S$GLB,, | Performed by: INTERNAL MEDICINE

## 2021-12-13 PROCEDURE — 3066F NEPHROPATHY DOC TX: CPT | Mod: CPTII,S$GLB,, | Performed by: INTERNAL MEDICINE

## 2021-12-13 PROCEDURE — 99212 OFFICE O/P EST SF 10 MIN: CPT | Mod: S$GLB,,, | Performed by: INTERNAL MEDICINE

## 2021-12-13 PROCEDURE — 4010F PR ACE/ARB THEARPY RXD/TAKEN: ICD-10-PCS | Mod: CPTII,S$GLB,, | Performed by: INTERNAL MEDICINE

## 2021-12-13 PROCEDURE — 99999 PR PBB SHADOW E&M-EST. PATIENT-LVL IV: CPT | Mod: PBBFAC,,, | Performed by: INTERNAL MEDICINE

## 2021-12-13 RX ORDER — PHENTERMINE HYDROCHLORIDE 37.5 MG/1
37.5 TABLET ORAL
Qty: 30 TABLET | Refills: 2 | Status: SHIPPED | OUTPATIENT
Start: 2021-12-13 | End: 2022-01-12

## 2021-12-13 RX ORDER — SEMAGLUTIDE 1.34 MG/ML
1 INJECTION, SOLUTION SUBCUTANEOUS
Qty: 3 PEN | Refills: 11 | Status: SHIPPED | OUTPATIENT
Start: 2021-12-13 | End: 2022-05-11 | Stop reason: SDUPTHER

## 2021-12-13 RX ORDER — BUPROPION HYDROCHLORIDE 150 MG/1
150 TABLET, EXTENDED RELEASE ORAL 2 TIMES DAILY
COMMUNITY
End: 2022-05-06 | Stop reason: SDUPTHER

## 2021-12-15 ENCOUNTER — TELEPHONE (OUTPATIENT)
Dept: BARIATRICS | Facility: CLINIC | Age: 61
End: 2021-12-15
Payer: COMMERCIAL

## 2021-12-15 ENCOUNTER — TELEPHONE (OUTPATIENT)
Dept: PHARMACY | Facility: CLINIC | Age: 61
End: 2021-12-15
Payer: COMMERCIAL

## 2021-12-15 ENCOUNTER — TELEPHONE (OUTPATIENT)
Dept: CARDIOLOGY | Facility: HOSPITAL | Age: 61
End: 2021-12-15
Payer: COMMERCIAL

## 2021-12-15 ENCOUNTER — PATIENT MESSAGE (OUTPATIENT)
Dept: PHARMACY | Facility: CLINIC | Age: 61
End: 2021-12-15
Payer: COMMERCIAL

## 2021-12-17 ENCOUNTER — HOSPITAL ENCOUNTER (OUTPATIENT)
Dept: CARDIOLOGY | Facility: HOSPITAL | Age: 61
Discharge: HOME | End: 2021-12-17
Attending: INTERNAL MEDICINE
Payer: COMMERCIAL

## 2021-12-17 VITALS
SYSTOLIC BLOOD PRESSURE: 137 MMHG | HEART RATE: 80 BPM | WEIGHT: 267 LBS | HEIGHT: 61 IN | DIASTOLIC BLOOD PRESSURE: 76 MMHG | BODY MASS INDEX: 50.41 KG/M2

## 2021-12-17 DIAGNOSIS — G47.33 OSA (OBSTRUCTIVE SLEEP APNEA): ICD-10-CM

## 2021-12-17 DIAGNOSIS — E11.618 CONTROLLED TYPE 2 DIABETES MELLITUS WITH OTHER DIABETIC ARTHROPATHY, WITHOUT LONG-TERM CURRENT USE OF INSULIN: ICD-10-CM

## 2021-12-17 DIAGNOSIS — Z82.49 FAMILY HISTORY OF CORONARY ARTERY DISEASE: ICD-10-CM

## 2021-12-17 DIAGNOSIS — E66.01 MORBID OBESITY WITH BMI OF 60.0-69.9, ADULT: ICD-10-CM

## 2021-12-17 DIAGNOSIS — I10 ESSENTIAL HYPERTENSION: ICD-10-CM

## 2021-12-17 LAB
CFR FLOW - ANTERIOR: 1.65
CFR FLOW - INFERIOR: 1.67
CFR FLOW - LATERAL: 1.54
CFR FLOW - MAX: 2.64
CFR FLOW - MIN: 1.18
CFR FLOW - SEPTAL: 1.87
CFR FLOW - WHOLE HEART: 1.68
CV STRESS BASE HR: 80 BPM
DIASTOLIC BLOOD PRESSURE: 76 MMHG
EJECTION FRACTION- HIGH: 65 %
END DIASTOLIC INDEX-HIGH: 153 ML/M2
END DIASTOLIC INDEX-LOW: 93 ML/M2
END SYSTOLIC INDEX-HIGH: 71 ML/M2
END SYSTOLIC INDEX-LOW: 31 ML/M2
NUC REST DIASTOLIC VOLUME INDEX: 107
NUC REST EJECTION FRACTION: 64
NUC REST SYSTOLIC VOLUME INDEX: 38
NUC STRESS DIASTOLIC VOLUME INDEX: 118
NUC STRESS EJECTION FRACTION: 74 %
NUC STRESS SYSTOLIC VOLUME INDEX: 31
OHS CV CPX 85 PERCENT MAX PREDICTED HEART RATE MALE: 131
OHS CV CPX MAX PREDICTED HEART RATE: 154
OHS CV CPX PATIENT IS FEMALE: 1
OHS CV CPX PATIENT IS MALE: 0
OHS CV CPX PEAK DIASTOLIC BLOOD PRESSURE: 59 MMHG
OHS CV CPX PEAK HEAR RATE: 86 BPM
OHS CV CPX PEAK RATE PRESSURE PRODUCT: 9976
OHS CV CPX PEAK SYSTOLIC BLOOD PRESSURE: 116 MMHG
OHS CV CPX PERCENT MAX PREDICTED HEART RATE ACHIEVED: 56
OHS CV CPX RATE PRESSURE PRODUCT PRESENTING: NORMAL
REST FLOW - ANTERIOR: 0.8 CC/MIN/G
REST FLOW - INFERIOR: 0.86 CC/MIN/G
REST FLOW - LATERAL: 0.88 CC/MIN/G
REST FLOW - MAX: 1.23 CC/MIN/G
REST FLOW - MIN: 0.44 CC/MIN/G
REST FLOW - SEPTAL: 0.88 CC/MIN/G
REST FLOW - WHOLE HEART: 0.86 CC/MIN/G
RETIRED EF AND QEF - SEE NOTES: 53 %
STRESS FLOW - ANTERIOR: 1.32 CC/MIN/G
STRESS FLOW - INFERIOR: 1.42 CC/MIN/G
STRESS FLOW - LATERAL: 1.36 CC/MIN/G
STRESS FLOW - MAX: 2.21 CC/MIN/G
STRESS FLOW - MIN: 0.78 CC/MIN/G
STRESS FLOW - SEPTAL: 1.67 CC/MIN/G
STRESS FLOW - WHOLE HEART: 1.44 CC/MIN/G
SYSTOLIC BLOOD PRESSURE: 137 MMHG

## 2021-12-17 PROCEDURE — 63600175 PHARM REV CODE 636 W HCPCS: Performed by: INTERNAL MEDICINE

## 2021-12-17 PROCEDURE — 93018 CV STRESS TEST I&R ONLY: CPT | Mod: ,,, | Performed by: INTERNAL MEDICINE

## 2021-12-17 PROCEDURE — 93018 CARDIAC PET SCAN STRESS (CUPID ONLY): ICD-10-PCS | Mod: ,,, | Performed by: INTERNAL MEDICINE

## 2021-12-17 PROCEDURE — 78434 AQMBF PET REST & RX STRESS: CPT | Mod: 26,,, | Performed by: INTERNAL MEDICINE

## 2021-12-17 PROCEDURE — 93016 CV STRESS TEST SUPVJ ONLY: CPT | Mod: ,,, | Performed by: INTERNAL MEDICINE

## 2021-12-17 PROCEDURE — 78431 CARDIAC PET SCAN STRESS (CUPID ONLY): ICD-10-PCS | Mod: 26,,, | Performed by: INTERNAL MEDICINE

## 2021-12-17 PROCEDURE — 78434 AQMBF PET REST & RX STRESS: CPT

## 2021-12-17 PROCEDURE — 78434 CARDIAC PET SCAN STRESS (CUPID ONLY): ICD-10-PCS | Mod: 26,,, | Performed by: INTERNAL MEDICINE

## 2021-12-17 PROCEDURE — 78431 MYOCRD IMG PET RST&STRS CT: CPT | Mod: 26,,, | Performed by: INTERNAL MEDICINE

## 2021-12-17 PROCEDURE — 93016 CARDIAC PET SCAN STRESS (CUPID ONLY): ICD-10-PCS | Mod: ,,, | Performed by: INTERNAL MEDICINE

## 2021-12-17 RX ORDER — AMINOPHYLLINE 25 MG/ML
75 INJECTION, SOLUTION INTRAVENOUS ONCE
Status: COMPLETED | OUTPATIENT
Start: 2021-12-17 | End: 2021-12-17

## 2021-12-17 RX ORDER — DIPYRIDAMOLE 5 MG/ML
60 INJECTION INTRAVENOUS ONCE
Status: COMPLETED | OUTPATIENT
Start: 2021-12-17 | End: 2021-12-17

## 2021-12-17 RX ADMIN — AMINOPHYLLINE 75 MG: 25 INJECTION, SOLUTION INTRAVENOUS at 11:12

## 2021-12-17 RX ADMIN — DIPYRIDAMOLE 60 MG: 5 INJECTION INTRAVENOUS at 11:12

## 2021-12-20 ENCOUNTER — TELEPHONE (OUTPATIENT)
Dept: CARDIOLOGY | Facility: CLINIC | Age: 61
End: 2021-12-20
Payer: COMMERCIAL

## 2021-12-20 ENCOUNTER — PATIENT MESSAGE (OUTPATIENT)
Dept: CARDIOLOGY | Facility: CLINIC | Age: 61
End: 2021-12-20
Payer: COMMERCIAL

## 2021-12-20 DIAGNOSIS — I25.10 CORONARY ARTERY DISEASE, UNSPECIFIED VESSEL OR LESION TYPE, UNSPECIFIED WHETHER ANGINA PRESENT, UNSPECIFIED WHETHER NATIVE OR TRANSPLANTED HEART: Primary | ICD-10-CM

## 2021-12-20 RX ORDER — ATORVASTATIN CALCIUM 20 MG/1
20 TABLET, FILM COATED ORAL DAILY
Qty: 30 TABLET | Refills: 11 | Status: SHIPPED | OUTPATIENT
Start: 2021-12-20 | End: 2022-09-09 | Stop reason: SDUPTHER

## 2021-12-20 RX ORDER — ATORVASTATIN CALCIUM 20 MG/1
20 TABLET, FILM COATED ORAL DAILY
COMMUNITY
End: 2021-12-20 | Stop reason: SDUPTHER

## 2021-12-20 RX ORDER — NAPROXEN SODIUM 220 MG/1
81 TABLET, FILM COATED ORAL DAILY
COMMUNITY
End: 2023-06-12

## 2022-01-06 ENCOUNTER — LAB VISIT (OUTPATIENT)
Dept: LAB | Facility: HOSPITAL | Age: 62
End: 2022-01-06
Attending: INTERNAL MEDICINE
Payer: COMMERCIAL

## 2022-01-06 DIAGNOSIS — E11.9 CONTROLLED TYPE 2 DIABETES MELLITUS WITHOUT COMPLICATION, WITHOUT LONG-TERM CURRENT USE OF INSULIN: ICD-10-CM

## 2022-01-06 DIAGNOSIS — I10 ESSENTIAL HYPERTENSION: ICD-10-CM

## 2022-01-06 LAB
ALBUMIN SERPL BCP-MCNC: 3.5 G/DL (ref 3.5–5.2)
ALP SERPL-CCNC: 126 U/L (ref 55–135)
ALT SERPL W/O P-5'-P-CCNC: 17 U/L (ref 10–44)
ANION GAP SERPL CALC-SCNC: 8 MMOL/L (ref 8–16)
AST SERPL-CCNC: 13 U/L (ref 10–40)
BILIRUB SERPL-MCNC: 0.4 MG/DL (ref 0.1–1)
BUN SERPL-MCNC: 14 MG/DL (ref 6–20)
CALCIUM SERPL-MCNC: 9.5 MG/DL (ref 8.7–10.5)
CHLORIDE SERPL-SCNC: 105 MMOL/L (ref 95–110)
CHOLEST SERPL-MCNC: 144 MG/DL (ref 120–199)
CHOLEST/HDLC SERPL: 2.4 {RATIO} (ref 2–5)
CO2 SERPL-SCNC: 29 MMOL/L (ref 23–29)
CREAT SERPL-MCNC: 0.9 MG/DL (ref 0.5–1.4)
EST. GFR  (AFRICAN AMERICAN): >60 ML/MIN/1.73 M^2
EST. GFR  (NON AFRICAN AMERICAN): >60 ML/MIN/1.73 M^2
ESTIMATED AVG GLUCOSE: 108 MG/DL (ref 68–131)
GLUCOSE SERPL-MCNC: 81 MG/DL (ref 70–110)
HBA1C MFR BLD: 5.4 % (ref 4–5.6)
HDLC SERPL-MCNC: 61 MG/DL (ref 40–75)
HDLC SERPL: 42.4 % (ref 20–50)
LDLC SERPL CALC-MCNC: 72.8 MG/DL (ref 63–159)
NONHDLC SERPL-MCNC: 83 MG/DL
POTASSIUM SERPL-SCNC: 4.9 MMOL/L (ref 3.5–5.1)
PROT SERPL-MCNC: 7.2 G/DL (ref 6–8.4)
SODIUM SERPL-SCNC: 142 MMOL/L (ref 136–145)
TRIGL SERPL-MCNC: 51 MG/DL (ref 30–150)

## 2022-01-06 PROCEDURE — 80053 COMPREHEN METABOLIC PANEL: CPT | Performed by: INTERNAL MEDICINE

## 2022-01-06 PROCEDURE — 80061 LIPID PANEL: CPT | Performed by: INTERNAL MEDICINE

## 2022-01-06 PROCEDURE — 36415 COLL VENOUS BLD VENIPUNCTURE: CPT | Performed by: INTERNAL MEDICINE

## 2022-01-06 PROCEDURE — 83036 HEMOGLOBIN GLYCOSYLATED A1C: CPT | Performed by: INTERNAL MEDICINE

## 2022-01-10 ENCOUNTER — TELEPHONE (OUTPATIENT)
Dept: INTERNAL MEDICINE | Facility: CLINIC | Age: 62
End: 2022-01-10
Payer: COMMERCIAL

## 2022-01-10 NOTE — TELEPHONE ENCOUNTER
Pt needs 6 mo f/u apt for DM2. Can you do this virtually?   Will she need labs prior to the virtual  Please advise

## 2022-01-10 NOTE — TELEPHONE ENCOUNTER
----- Message from Tiffany Boston sent at 1/10/2022 11:54 AM CST -----  Contact: Self [phone]  Patient is returning a phone call.    Who left a message for the patient: nurse    Does patient know what this is regarding:  r/s appt for the next soonest.     Would you like a call back, or a response through your [facility] portal?:  portal    Comments:

## 2022-01-11 ENCOUNTER — HOSPITAL ENCOUNTER (OUTPATIENT)
Dept: RADIOLOGY | Facility: HOSPITAL | Age: 62
Discharge: HOME | End: 2022-01-11
Attending: PHYSICIAN ASSISTANT
Payer: COMMERCIAL

## 2022-01-11 ENCOUNTER — PATIENT MESSAGE (OUTPATIENT)
Dept: INTERNAL MEDICINE | Facility: CLINIC | Age: 62
End: 2022-01-11
Payer: COMMERCIAL

## 2022-01-11 ENCOUNTER — OFFICE VISIT (OUTPATIENT)
Dept: FAMILY MEDICINE | Facility: CLINIC | Age: 62
End: 2022-01-11
Payer: COMMERCIAL

## 2022-01-11 ENCOUNTER — PATIENT MESSAGE (OUTPATIENT)
Dept: FAMILY MEDICINE | Facility: CLINIC | Age: 62
End: 2022-01-11

## 2022-01-11 VITALS
SYSTOLIC BLOOD PRESSURE: 132 MMHG | DIASTOLIC BLOOD PRESSURE: 78 MMHG | HEIGHT: 61 IN | TEMPERATURE: 98 F | HEART RATE: 81 BPM | OXYGEN SATURATION: 98 % | BODY MASS INDEX: 50.78 KG/M2 | WEIGHT: 268.94 LBS

## 2022-01-11 DIAGNOSIS — M25.562 LEFT ANTERIOR KNEE PAIN: Primary | ICD-10-CM

## 2022-01-11 DIAGNOSIS — M25.562 LEFT ANTERIOR KNEE PAIN: ICD-10-CM

## 2022-01-11 PROCEDURE — 99999 PR PBB SHADOW E&M-EST. PATIENT-LVL V: CPT | Mod: PBBFAC,,, | Performed by: PHYSICIAN ASSISTANT

## 2022-01-11 PROCEDURE — 3072F LOW RISK FOR RETINOPATHY: CPT | Mod: CPTII,S$GLB,, | Performed by: PHYSICIAN ASSISTANT

## 2022-01-11 PROCEDURE — 99213 OFFICE O/P EST LOW 20 MIN: CPT | Mod: S$GLB,,, | Performed by: PHYSICIAN ASSISTANT

## 2022-01-11 PROCEDURE — 1159F MED LIST DOCD IN RCRD: CPT | Mod: CPTII,S$GLB,, | Performed by: PHYSICIAN ASSISTANT

## 2022-01-11 PROCEDURE — 3075F SYST BP GE 130 - 139MM HG: CPT | Mod: CPTII,S$GLB,, | Performed by: PHYSICIAN ASSISTANT

## 2022-01-11 PROCEDURE — 1159F PR MEDICATION LIST DOCUMENTED IN MEDICAL RECORD: ICD-10-PCS | Mod: CPTII,S$GLB,, | Performed by: PHYSICIAN ASSISTANT

## 2022-01-11 PROCEDURE — 3044F HG A1C LEVEL LT 7.0%: CPT | Mod: CPTII,S$GLB,, | Performed by: PHYSICIAN ASSISTANT

## 2022-01-11 PROCEDURE — 3008F PR BODY MASS INDEX (BMI) DOCUMENTED: ICD-10-PCS | Mod: CPTII,S$GLB,, | Performed by: PHYSICIAN ASSISTANT

## 2022-01-11 PROCEDURE — 3008F BODY MASS INDEX DOCD: CPT | Mod: CPTII,S$GLB,, | Performed by: PHYSICIAN ASSISTANT

## 2022-01-11 PROCEDURE — 3072F PR LOW RISK FOR RETINOPATHY: ICD-10-PCS | Mod: CPTII,S$GLB,, | Performed by: PHYSICIAN ASSISTANT

## 2022-01-11 PROCEDURE — 73560 X-RAY EXAM OF KNEE 1 OR 2: CPT | Mod: TC,FY,PO,LT

## 2022-01-11 PROCEDURE — 73560 X-RAY EXAM OF KNEE 1 OR 2: CPT | Mod: 26,LT,, | Performed by: RADIOLOGY

## 2022-01-11 PROCEDURE — 99213 PR OFFICE/OUTPT VISIT, EST, LEVL III, 20-29 MIN: ICD-10-PCS | Mod: S$GLB,,, | Performed by: PHYSICIAN ASSISTANT

## 2022-01-11 PROCEDURE — 3075F PR MOST RECENT SYSTOLIC BLOOD PRESS GE 130-139MM HG: ICD-10-PCS | Mod: CPTII,S$GLB,, | Performed by: PHYSICIAN ASSISTANT

## 2022-01-11 PROCEDURE — 3078F PR MOST RECENT DIASTOLIC BLOOD PRESSURE < 80 MM HG: ICD-10-PCS | Mod: CPTII,S$GLB,, | Performed by: PHYSICIAN ASSISTANT

## 2022-01-11 PROCEDURE — 73560 XR KNEE 1 OR 2 VIEW LEFT: ICD-10-PCS | Mod: 26,LT,, | Performed by: RADIOLOGY

## 2022-01-11 PROCEDURE — 3044F PR MOST RECENT HEMOGLOBIN A1C LEVEL <7.0%: ICD-10-PCS | Mod: CPTII,S$GLB,, | Performed by: PHYSICIAN ASSISTANT

## 2022-01-11 PROCEDURE — 99999 PR PBB SHADOW E&M-EST. PATIENT-LVL V: ICD-10-PCS | Mod: PBBFAC,,, | Performed by: PHYSICIAN ASSISTANT

## 2022-01-11 PROCEDURE — 3078F DIAST BP <80 MM HG: CPT | Mod: CPTII,S$GLB,, | Performed by: PHYSICIAN ASSISTANT

## 2022-01-11 NOTE — TELEPHONE ENCOUNTER
She just did labs so no need to redraw; let her know her current labs are all well controlled and in good range; if she is doing well, we can skip this appt and see her in six months for her yearly exam. She is due for her mammogram and fitkit if she wishes to have these ordered

## 2022-01-11 NOTE — PROGRESS NOTES
"Subjective:      Patient ID: Tawny Valerio is a 61 y.o. female.    Chief Complaint: lipoma (Left leg) and review lab results    HPI   Patient has PMH of HTN, Type 2 DM, ASHLI, and endometrial cancer.    Patient has had left anterior distal knee pain for three months.  Tried ice and heat, Tylenol, and ibuprofen without resolution of symptoms.  No trauma or injury noted.  Has had lipomas in the past and feels like this.    Lab Results   Component Value Date    HGBA1C 5.4 01/06/2022     Review of Systems   Constitutional: Positive for fatigue.   Cardiovascular: Negative for chest pain.   Endocrine: Positive for polydipsia and polyphagia. Negative for polyuria.   Skin: Negative for pallor.   Neurological: Negative for dizziness, tremors, seizures, speech difficulty, weakness and headaches.   Psychiatric/Behavioral: Negative for confusion. The patient is not nervous/anxious.        Objective:   /78   Pulse 81   Temp 97.9 °F (36.6 °C)   Ht 5' 1" (1.549 m)   Wt 122 kg (268 lb 15.4 oz)   LMP 01/15/2019 (Approximate)   SpO2 98%   BMI 50.82 kg/m²      Physical Exam  Vitals reviewed.   Constitutional:       General: She is not in acute distress.     Appearance: Normal appearance. She is well-developed and well-nourished.   HENT:      Head: Normocephalic and atraumatic.      Right Ear: External ear normal.      Left Ear: External ear normal.   Eyes:      Extraocular Movements: EOM normal.      Conjunctiva/sclera: Conjunctivae normal.   Cardiovascular:      Rate and Rhythm: Normal rate and regular rhythm.      Heart sounds: Normal heart sounds. No murmur heard.  No friction rub. No gallop.    Pulmonary:      Effort: Pulmonary effort is normal. No respiratory distress.      Breath sounds: Normal breath sounds. No wheezing or rales.   Musculoskeletal:         General: Normal range of motion.      Cervical back: Normal range of motion.      Left knee: Normal range of motion. Tenderness present.        " Legs:    Skin:     General: Skin is warm and dry.      Findings: No rash.   Neurological:      General: No focal deficit present.      Mental Status: She is alert and oriented to person, place, and time.   Psychiatric:         Mood and Affect: Mood and affect and mood normal.         Behavior: Behavior normal.         Judgment: Judgment normal.        Assessment:      1. Left anterior knee pain       Plan:   1. Left anterior knee pain  - US Soft Tissue, Lower Extremity, Left; Future  - X-Ray Knee 1 or 2 View Left; Future  - Ambulatory referral/consult to Orthopedics; Future    Follow up as needed.  Patient agreed with plan and expressed understanding.    Thank you for allowing me to serve you,

## 2022-01-12 ENCOUNTER — TELEPHONE (OUTPATIENT)
Dept: ORTHOPEDICS | Facility: CLINIC | Age: 62
End: 2022-01-12
Payer: COMMERCIAL

## 2022-01-25 ENCOUNTER — PATIENT MESSAGE (OUTPATIENT)
Dept: ADMINISTRATIVE | Facility: OTHER | Age: 62
End: 2022-01-25
Payer: COMMERCIAL

## 2022-01-25 ENCOUNTER — PATIENT MESSAGE (OUTPATIENT)
Dept: ADMINISTRATIVE | Facility: HOSPITAL | Age: 62
End: 2022-01-25
Payer: COMMERCIAL

## 2022-01-27 ENCOUNTER — PATIENT MESSAGE (OUTPATIENT)
Dept: BARIATRICS | Facility: CLINIC | Age: 62
End: 2022-01-27
Payer: COMMERCIAL

## 2022-01-27 NOTE — TELEPHONE ENCOUNTER
Phoned pt in regard to rescheduling fasting labs prior to f/u with . Scheduled pt at [facility] location in [place] per pt request.

## 2022-02-01 ENCOUNTER — PATIENT OUTREACH (OUTPATIENT)
Dept: ADMINISTRATIVE | Facility: HOSPITAL | Age: 62
End: 2022-02-01
Payer: COMMERCIAL

## 2022-02-19 ENCOUNTER — PATIENT MESSAGE (OUTPATIENT)
Dept: BARIATRICS | Facility: CLINIC | Age: 62
End: 2022-02-19
Payer: COMMERCIAL

## 2022-02-19 DIAGNOSIS — L01.00 IMPETIGO: ICD-10-CM

## 2022-02-19 RX ORDER — MUPIROCIN 20 MG/G
OINTMENT TOPICAL 2 TIMES DAILY
Qty: 30 G | Refills: 0 | Status: CANCELLED | OUTPATIENT
Start: 2022-02-19

## 2022-02-20 NOTE — TELEPHONE ENCOUNTER
No new care gaps identified.  Powered by [employer]. Reference number: [id number].   2/19/2022 6:54:54 PM CST

## 2022-02-21 ENCOUNTER — PATIENT OUTREACH (OUTPATIENT)
Dept: ADMINISTRATIVE | Facility: OTHER | Age: 62
End: 2022-02-21
Payer: COMMERCIAL

## 2022-02-21 ENCOUNTER — PATIENT MESSAGE (OUTPATIENT)
Dept: DERMATOLOGY | Facility: CLINIC | Age: 62
End: 2022-02-21
Payer: COMMERCIAL

## 2022-02-21 RX ORDER — PHENTERMINE HYDROCHLORIDE 37.5 MG/1
TABLET ORAL
Qty: 30 TABLET | Refills: 0 | Status: SHIPPED | OUTPATIENT
Start: 2022-02-21 | End: 2022-06-08

## 2022-02-21 RX ORDER — GABAPENTIN 300 MG/1
300 CAPSULE ORAL NIGHTLY
Qty: 30 CAPSULE | Refills: 3 | Status: SHIPPED | OUTPATIENT
Start: 2022-02-21 | End: 2022-06-27

## 2022-02-22 RX ORDER — MUPIROCIN 20 MG/G
OINTMENT TOPICAL 2 TIMES DAILY
Qty: 30 G | Refills: 0 | Status: SHIPPED | OUTPATIENT
Start: 2022-02-22 | End: 2023-06-12

## 2022-02-22 NOTE — PROGRESS NOTES
LINKS immunization registry updated  Care Everywhere updated  Health Maintenance updated  DIS/Chart reviewed for overdue [facility] Encounters ([facility]) health maintenance testing (CRS, Breast Ca, Diabetic Eye Exam)   Orders entered:N/A  Portal message sent to patient with scheduling link for mammogram 1/25/22  Patient has open case request for colonoscopy.    fair balance

## 2022-03-18 ENCOUNTER — PATIENT MESSAGE (OUTPATIENT)
Dept: BARIATRICS | Facility: CLINIC | Age: 62
End: 2022-03-18
Payer: COMMERCIAL

## 2022-03-18 ENCOUNTER — PATIENT MESSAGE (OUTPATIENT)
Dept: DERMATOLOGY | Facility: CLINIC | Age: 62
End: 2022-03-18
Payer: COMMERCIAL

## 2022-03-18 ENCOUNTER — PATIENT MESSAGE (OUTPATIENT)
Dept: CARDIOLOGY | Facility: CLINIC | Age: 62
End: 2022-03-18
Payer: COMMERCIAL

## 2022-03-18 DIAGNOSIS — E11.9 CONTROLLED TYPE 2 DIABETES MELLITUS WITHOUT COMPLICATION, WITHOUT LONG-TERM CURRENT USE OF INSULIN: ICD-10-CM

## 2022-04-04 ENCOUNTER — PATIENT OUTREACH (OUTPATIENT)
Dept: ADMINISTRATIVE | Facility: OTHER | Age: 62
End: 2022-04-04
Payer: COMMERCIAL

## 2022-04-22 ENCOUNTER — PATIENT MESSAGE (OUTPATIENT)
Dept: OPTOMETRY | Facility: CLINIC | Age: 62
End: 2022-04-22
Payer: COMMERCIAL

## 2022-05-06 ENCOUNTER — PATIENT MESSAGE (OUTPATIENT)
Dept: OPTOMETRY | Facility: CLINIC | Age: 62
End: 2022-05-06
Payer: COMMERCIAL

## 2022-05-06 ENCOUNTER — PATIENT MESSAGE (OUTPATIENT)
Dept: INTERNAL MEDICINE | Facility: CLINIC | Age: 62
End: 2022-05-06
Payer: COMMERCIAL

## 2022-05-06 RX ORDER — BUPROPION HYDROCHLORIDE 150 MG/1
TABLET, EXTENDED RELEASE ORAL
Qty: 180 TABLET | OUTPATIENT
Start: 2022-05-06

## 2022-05-06 RX ORDER — BUPROPION HYDROCHLORIDE 150 MG/1
150 TABLET, EXTENDED RELEASE ORAL 2 TIMES DAILY
Qty: 60 TABLET | Refills: 11 | Status: SHIPPED | OUTPATIENT
Start: 2022-05-06 | End: 2023-10-26 | Stop reason: SDUPTHER

## 2022-05-06 NOTE — TELEPHONE ENCOUNTER
No new care gaps identified.  [facility] Embedded Care Gaps. Reference number: [id number]. 5/06/2022   9:43:42 AM MELANIAT

## 2022-05-06 NOTE — TELEPHONE ENCOUNTER
No new care gaps identified.  [facility] Embedded Care Gaps. Reference number: [id number]. 5/06/2022   11:50:40 AM CDT

## 2022-05-09 ENCOUNTER — PATIENT MESSAGE (OUTPATIENT)
Dept: SMOKING CESSATION | Facility: CLINIC | Age: 62
End: 2022-05-09
Payer: COMMERCIAL

## 2022-05-10 ENCOUNTER — PATIENT OUTREACH (OUTPATIENT)
Dept: ADMINISTRATIVE | Facility: OTHER | Age: 62
End: 2022-05-10
Payer: COMMERCIAL

## 2022-05-10 DIAGNOSIS — Z12.11 COLON CANCER SCREENING: Primary | ICD-10-CM

## 2022-05-10 NOTE — PROGRESS NOTES
Health Maintenance Due   Topic Date Due    Pneumococcal Vaccines (Age 0-64) (1 - PCV) Never done    HIV Screening  Never done    Mammogram  Never done    Shingles Vaccine (1 of 2) Never done    Colorectal Cancer Screening  Never done    Foot Exam  11/20/2019    COVID-19 Vaccine (4 - Booster for Pfizer series) 02/01/2022    Diabetes Urine Screening  07/09/2022     Updates were requested from care everywhere.  DIS/Chart was reviewed for overdue [facility] Encounters ([facility]) topics (CRS, Breast Cancer Screening, Eye exam)  Health Maintenance has been updated.  LINKS immunization registry triggered.  Immunizations were reconciled.  Portal message sent to patient with scheduling link for mammogram 1/25/22  Orders entered:  Fit kit

## 2022-05-17 NOTE — PROGRESS NOTES
Pt cancelled CT scan and OV with Dr. Radha Peña.     Pt will need to be called to see if he wants to reschedule Subjective:       Patient ID: Tawny Valerio is a 59 y.o. female.    Chief Complaint: Follow-up (D&C )    HPI     Referring physician: Bobby Frazier MD/ Jennifer Rosaels MD  Reason for referral: endometrial cancer            2016, hysteroscopy/polypectomy w D&C with focal patterns of atypical glandular hyperplasia. She was lost to follow up.  Continued with intermittent spotting every 6 months.      Dec 2018: had heavier bleeding and saw Dr. Rosales.     3/1/2019 repeat hysteroscopy/D&C with Dr. Rosales with numerous polyps but pathology was simple cystic hyperplasia.      2019:  Patient referred to me for further management.     2019:  Hysteroscopic D&C with Dr. Bobby Frazier.  Uterus sounded 8 cm.  Polyp noted on posterior aspect of endometrium.  Curettage was performed. Phill-Cut soft tissue polyp remover was used.  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    Past Medical History:   Diagnosis Date    Anxiety     Diabetes type 2, controlled 2016    Endometrial ca 2019    Essential hypertension 2018    Simple endometrial hyperplasia 3/15/2019    Uterine polyp 3/14/2019     Past Surgical History:   Procedure Laterality Date     SECTION      x 1    DILATION AND CURETTAGE OF UTERUS  2019    HYSTEROSCOPY, WITH DILATION AND CURETTAGE OF UTERUS N/A 2019    [facility]    lipoma removed      POLYPECTOMY N/A 2019    [facility] by [facility] Jr[facility]     Family History   Problem Relation Age of Onset    No Known Problems Mother     Diabetes Father     Heart disease Sister     Heart disease Brother     Heart disease Maternal Grandmother     Heart disease Maternal Grandfather     Heart disease Paternal Grandfather     Breast cancer Other     Ovarian cancer Neg Hx     Uterine cancer Neg Hx     Colon cancer Neg Hx      Social History     Tobacco Use    Smoking status: Never Smoker     Smokeless tobacco: Never Used   Substance Use Topics    Alcohol use: Yes     Frequency: Monthly or less     Drinks per session: 1 or 2     Binge frequency: Never     Comment: approx one glass wine/month    Drug use: No     Review of patient's allergies indicates:   Allergen Reactions    Celebrex [celecoxib] Anaphylaxis    Codeine Hives     Does not know what she can take -usually just takes tylenol or ibuprofen    Lidocaine Hives    Vicodin [hydrocodone-acetaminophen] Hives and Swelling       Current Outpatient Medications:     acetaminophen (TYLENOL) 325 MG tablet, Take 2 tablets (650 mg total) by mouth every 6 (six) hours as needed for Pain., Disp: 30 tablet, Rfl: 1    azelastine (ASTELIN) 137 mcg (0.1 %) nasal spray, USE 1 SPRAY IN EACH NOSTRIL TWICE A DAY, Disp: 30 mL, Rfl: 0    buPROPion (WELLBUTRIN SR) 150 MG TBSR 12 hr tablet, TAKE 1 TABLET (150 MG TOTAL) BY MOUTH 2 (TWO) TIMES DAILY., Disp: 60 tablet, Rfl: 6    cholecalciferol, vitamin D3, 50,000 unit capsule, 1 cap(s) weekly, Disp: , Rfl:     furosemide (LASIX) 20 MG tablet, TAKE 1 TABLET (20 MG TOTAL) BY MOUTH ONCE DAILY., Disp: 30 tablet, Rfl: 11    hydroCHLOROthiazide (HYDRODIURIL) 25 MG tablet, TAKE 1 TABLET BY MOUTH EVERY DAY, Disp: 30 tablet, Rfl: 6    lancets Misc, Check fasting glucose daily, Disp: 100 each, Rfl: 3    losartan (COZAAR) 25 MG tablet, Take 25 mg by mouth once daily. , Disp: , Rfl:     metFORMIN (GLUCOPHAGE-XR) 750 MG 24 hr tablet, TAKE 1 TABLET (750 MG TOTAL) BY MOUTH DAILY WITH BREAKFAST., Disp: 30 tablet, Rfl: 4    oxyCODONE-acetaminophen (PERCOCET) 5-325 mg per tablet, Take 1 tablet by mouth every 6 (six) hours as needed for Pain., Disp: 10 tablet, Rfl: 0    gabapentin (NEURONTIN) 300 MG capsule, Take 1 capsule (300 mg total) by mouth every evening., Disp: 90 capsule, Rfl: 1    medroxyPROGESTERone (PROVERA) 10 MG tablet, Take 1 tablet (10 mg total) by mouth once daily., Disp: 30 tablet, Rfl: 3    meloxicam  (MOBIC) 15 MG tablet, Take 15 mg by mouth daily as needed. , Disp: , Rfl:   No current facility-administered medications for this visit.     Review of Systems   Constitutional: Negative for chills, fatigue and fever.   Respiratory: Positive for shortness of breath (ode). Negative for cough and wheezing.    Cardiovascular: Negative for chest pain, palpitations and leg swelling.   Gastrointestinal: Negative for abdominal pain, constipation, diarrhea, nausea and vomiting.   Genitourinary: Negative for difficulty urinating, dysuria, frequency, genital sores, hematuria, urgency, vaginal bleeding, vaginal discharge and vaginal pain.   Neurological: Negative for weakness.   Hematological: Negative for adenopathy. Does not bruise/bleed easily.   Psychiatric/Behavioral: The patient is not nervous/anxious.        Objective:   /61   Pulse 87   Ht 5' (1.524 m)   Wt 135.8 kg (299 lb 6.2 oz)   BMI 58.47 kg/m²      Physical Exam   Constitutional: She is oriented to person, place, and time. She appears well-developed and well-nourished.   HENT:   Head: Normocephalic and atraumatic.   Genitourinary:   Genitourinary Comments: Pelvic exam was not performed. When I saw the patient previously and tried to do a pelvic examination it was unsatisfactory.  She had a recent exam with Dr. Frazier at time of hysteroscopic D&C.   Neurological: She is alert and oriented to person, place, and time.   Skin: Skin is warm and dry.   Psychiatric: She has a normal mood and affect. Her behavior is normal. Judgment and thought content normal.       Assessment:       1. Endometrial ca        Plan:   Endometrial ca  Consents were obtained for robotic assisted laparoscopic hysterectomy bilateral salpingo-oophorectomy.    I explained to the patient and her  that lymphadenectomy is not going to be feasible given her body habitus.    Patient may also need mini-laparotomy for removal of the uterus given narrow vaginal canal.    Preop orders  placed.     -     CT Abdomen Pelvis With Contrast; Future; Expected date: 07/31/2019  -     Basic metabolic panel; Future; Expected date: 07/31/2019  -     CBC auto differential; Future; Expected date: 07/31/2019  -     EKG 12-lead; Future

## 2022-06-01 ENCOUNTER — PATIENT MESSAGE (OUTPATIENT)
Dept: ADMINISTRATIVE | Facility: HOSPITAL | Age: 62
End: 2022-06-01
Payer: COMMERCIAL

## 2022-06-02 ENCOUNTER — PATIENT MESSAGE (OUTPATIENT)
Dept: INTERNAL MEDICINE | Facility: CLINIC | Age: 62
End: 2022-06-02
Payer: COMMERCIAL

## 2022-06-07 ENCOUNTER — TELEPHONE (OUTPATIENT)
Dept: CARDIOLOGY | Facility: CLINIC | Age: 62
End: 2022-06-07
Payer: COMMERCIAL

## 2022-06-07 NOTE — TELEPHONE ENCOUNTER
----- Message from G[place]ito Castle sent at 6/7/2022  9:32 AM CDT -----  Regarding: Appointment  PT requested an 8 am on a Wednesday in November if possible.        Thanks

## 2022-06-08 ENCOUNTER — OFFICE VISIT (OUTPATIENT)
Dept: BARIATRICS | Facility: CLINIC | Age: 62
End: 2022-06-08
Payer: COMMERCIAL

## 2022-06-08 VITALS
BODY MASS INDEX: 51.11 KG/M2 | WEIGHT: 270.5 LBS | DIASTOLIC BLOOD PRESSURE: 66 MMHG | OXYGEN SATURATION: 98 % | SYSTOLIC BLOOD PRESSURE: 124 MMHG | TEMPERATURE: 98 F | HEART RATE: 75 BPM

## 2022-06-08 DIAGNOSIS — E11.618 CONTROLLED TYPE 2 DIABETES MELLITUS WITH OTHER DIABETIC ARTHROPATHY, WITHOUT LONG-TERM CURRENT USE OF INSULIN: ICD-10-CM

## 2022-06-08 DIAGNOSIS — E66.01 CLASS 3 SEVERE OBESITY DUE TO EXCESS CALORIES WITH SERIOUS COMORBIDITY AND BODY MASS INDEX (BMI) OF 50.0 TO 59.9 IN ADULT: ICD-10-CM

## 2022-06-08 PROCEDURE — 99999 PR PBB SHADOW E&M-EST. PATIENT-LVL V: CPT | Mod: PBBFAC,,, | Performed by: INTERNAL MEDICINE

## 2022-06-08 PROCEDURE — 3074F PR MOST RECENT SYSTOLIC BLOOD PRESSURE < 130 MM HG: ICD-10-PCS | Mod: CPTII,S$GLB,, | Performed by: INTERNAL MEDICINE

## 2022-06-08 PROCEDURE — 3008F BODY MASS INDEX DOCD: CPT | Mod: CPTII,S$GLB,, | Performed by: INTERNAL MEDICINE

## 2022-06-08 PROCEDURE — 3074F SYST BP LT 130 MM HG: CPT | Mod: CPTII,S$GLB,, | Performed by: INTERNAL MEDICINE

## 2022-06-08 PROCEDURE — 3072F PR LOW RISK FOR RETINOPATHY: ICD-10-PCS | Mod: CPTII,S$GLB,, | Performed by: INTERNAL MEDICINE

## 2022-06-08 PROCEDURE — 99214 PR OFFICE/OUTPT VISIT, EST, LEVL IV, 30-39 MIN: ICD-10-PCS | Mod: S$GLB,,, | Performed by: INTERNAL MEDICINE

## 2022-06-08 PROCEDURE — 1160F RVW MEDS BY RX/DR IN RCRD: CPT | Mod: CPTII,S$GLB,, | Performed by: INTERNAL MEDICINE

## 2022-06-08 PROCEDURE — 3078F PR MOST RECENT DIASTOLIC BLOOD PRESSURE < 80 MM HG: ICD-10-PCS | Mod: CPTII,S$GLB,, | Performed by: INTERNAL MEDICINE

## 2022-06-08 PROCEDURE — 1160F PR REVIEW ALL MEDS BY PRESCRIBER/CLIN PHARMACIST DOCUMENTED: ICD-10-PCS | Mod: CPTII,S$GLB,, | Performed by: INTERNAL MEDICINE

## 2022-06-08 PROCEDURE — 3044F PR MOST RECENT HEMOGLOBIN A1C LEVEL <7.0%: ICD-10-PCS | Mod: CPTII,S$GLB,, | Performed by: INTERNAL MEDICINE

## 2022-06-08 PROCEDURE — 1159F MED LIST DOCD IN RCRD: CPT | Mod: CPTII,S$GLB,, | Performed by: INTERNAL MEDICINE

## 2022-06-08 PROCEDURE — 1159F PR MEDICATION LIST DOCUMENTED IN MEDICAL RECORD: ICD-10-PCS | Mod: CPTII,S$GLB,, | Performed by: INTERNAL MEDICINE

## 2022-06-08 PROCEDURE — 3078F DIAST BP <80 MM HG: CPT | Mod: CPTII,S$GLB,, | Performed by: INTERNAL MEDICINE

## 2022-06-08 PROCEDURE — 3072F LOW RISK FOR RETINOPATHY: CPT | Mod: CPTII,S$GLB,, | Performed by: INTERNAL MEDICINE

## 2022-06-08 PROCEDURE — 99999 PR PBB SHADOW E&M-EST. PATIENT-LVL V: ICD-10-PCS | Mod: PBBFAC,,, | Performed by: INTERNAL MEDICINE

## 2022-06-08 PROCEDURE — 99214 OFFICE O/P EST MOD 30 MIN: CPT | Mod: S$GLB,,, | Performed by: INTERNAL MEDICINE

## 2022-06-08 PROCEDURE — 3008F PR BODY MASS INDEX (BMI) DOCUMENTED: ICD-10-PCS | Mod: CPTII,S$GLB,, | Performed by: INTERNAL MEDICINE

## 2022-06-08 PROCEDURE — 3044F HG A1C LEVEL LT 7.0%: CPT | Mod: CPTII,S$GLB,, | Performed by: INTERNAL MEDICINE

## 2022-06-08 RX ORDER — EMPAGLIFLOZIN 10 MG/1
10 TABLET, FILM COATED ORAL DAILY
Qty: 90 TABLET | Refills: 1 | Status: SHIPPED | OUTPATIENT
Start: 2022-06-08 | End: 2023-06-16 | Stop reason: SDUPTHER

## 2022-06-08 RX ORDER — SEMAGLUTIDE 2.68 MG/ML
2 INJECTION, SOLUTION SUBCUTANEOUS
Qty: 9 ML | Refills: 0 | Status: SHIPPED | OUTPATIENT
Start: 2022-06-08 | End: 2022-08-04

## 2022-06-08 NOTE — PATIENT INSTRUCTIONS
Continue Ozempic 2 mg once a week.       Decrease portions as soon as you start Ozempic. Some nausea in the first 2 weeks is not unusual.     If you get pain across the upper abdomen and around to your back, please call the office.       Continue Jardiance     Protein and fiber in every meal.       Intermittent fasting. NO eating from 8 pm to 12pm. Can have water, tea, coffee in that time without sweeteners.    2 meals made up of the following:  Unlimited green vegetables, tomatoes, mushrooms, spaghetti squash, cauliflower, meat, poultry, seafood, eggs and hard cheeses.   Avoid fried foods  Dressings, seasonings, condiments, etc should have less than 2 g sugars.   Beans or nuts can have 1 x a day.   1-2 servings of citrus fruits, berries, pineapple or melon a day (1/2 cup)  Milk and plain yogurt    Avoid starchy carbohydrates (bread, rice, pasta, potatoes, corn, peas, oatmeal, grits, tortillas, crackers, chips)      No soda, sweet tea, juices or lemonade. All drinks should be 5 calories or less.      Www.dietdoctor.com for info on intermittent fasting.       Continue Exercise.      Add some type of resistance training 2-3 days a week. These can be body weight exercises, light weight or elastic bands. [employer] and [employer] are great sources for free work out plans and videos.       Tips for preparing for hurricane season:    If you are on weight loss medications, please take your medication with you in case of evacuation. Injectable medications should be transported with an ice pack if unopened or room temperature if you have started to use them.   Hurricane supplies do not necessarily need to be junk food or high in carbs or sugar. Shelf stable and healthy choices to include in your supplies could include:  Canned/packets of tuna or chicken  Apples, oranges, banana, pears  Beef or turkey jerky  Sugar free pudding  Pickle, olives, dill relish (mix with the tuna or chicken!)  Low sodium or no salt added canned  vegetables  If you get bread, get lite bread (40 calories a slice)  0 sugar sports drinks  Water  String cheese will be okay for a few days without refrigeration or in an ice chest.   Peanut or other nut butter.   Parmesan crisps  Pork skins  Protein shakes (20-30g protein, less than 5 g sugar)  Protein bars (15 or more g protein, less than 5 g sugar)  Don't forget disposable forks, spoons, plates in your supplies  If evacuated, manage stress by taking walks, reading or meditating.   If eating out make better choices when possible.   Salads with a lean protein and limited dressing, cheese or other toppings  Grilled chicken sandwich or burger without the bun.   Skip the fries!  Order water or unsweetened tea instead of soda  Grocery stores with a deli, salad/food bar can be a good quick and affordable option to replace fast food

## 2022-06-08 NOTE — PROGRESS NOTES
Subjective:       Patient ID: Tawny Valerio is a 62 y.o. female.    Chief Complaint: Follow-up    Pt here today for follow-up. Has gained 3 lbs since last seen Dec 2021, net neg 20 lbs. Started ozempic,(currently on 1 mg) for DM2 and IF.wen youngers been doing better with her eating. Last virtual visit, started. Diethylpropion, last filled 10/11/2021. She did find less cravings on it, but feels the phentermine worked better overall. phentermine. Last filled 2/21/22.    A1c improved.     Recent EKG. PET stress pending for risk factors for CAD. [facility] did suggest stating SGLT2 and decreasing lasix. Pt w h/o cystis, but no pathogen on culture in 2019 x 2. Pt is not taking the lasix daily.     Normal sinus rhythm   Left anterior fascicular block   Abnormal ECG   When compared with ECG of 15-AUG-2019 15:08,   No significant change was found       Lab Results   Component Value Date    HGBA1C 5.4 01/06/2022    HGBA1C 5.4 07/09/2021    HGBA1C 5.7 (H) 01/16/2021     Lab Results   Component Value Date    LDLCALC 72.8 01/06/2022    CREATININE 0.9 01/06/2022      Follow-up   Pertinent negatives include no arthralgias, chest pain, chills or fever.     Review of Systems   Constitutional: Negative for chills and fever.   Respiratory: Negative for shortness of breath.         Does not use CPAP. States could not tolerate it. ESS 19.    Cardiovascular: Negative for chest pain and leg swelling.   Gastrointestinal: Positive for diarrhea. Negative for constipation.        Rare GERD   Genitourinary: Positive for dysuria. Negative for difficulty urinating.   Musculoskeletal: Positive for back pain. Negative for arthralgias.   Neurological: Negative for dizziness and light-headedness.   Psychiatric/Behavioral: Negative for dysphoric mood. The patient is nervous/anxious.        Objective:     /66   Pulse 75   Temp 98.4 °F (36.9 °C)   Wt 122.7 kg (270 lb 8.1 oz)   LMP 01/15/2019 (Approximate)   SpO2 98%   BMI 51.11 kg/m²     Physical Exam   Constitutional: She is oriented to person, place, and time. She appears well-developed. No distress.   Morbidly obese     HENT:   Head: Normocephalic and atraumatic.   Eyes: Pupils are equal, round, and reactive to light. EOM are normal. No scleral icterus.   Neck: Normal range of motion. Neck supple.   Cardiovascular: Normal rate.   Pulmonary/Chest: Effort normal.   Musculoskeletal: Normal range of motion. She exhibits no edema.   Neurological: She is alert and oriented to person, place, and time. No cranial nerve deficit.   Skin: Skin is warm and dry. No erythema.   Psychiatric: She has a normal mood and affect. Her behavior is normal. Judgment normal.   Vitals reviewed.      Assessment:       1. Class 3 severe obesity due to excess calories with serious comorbidity and body mass index (BMI) of 50.0 to 59.9 in adult    2. 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  -     empagliflozin (JARDIANCE) 10 mg tablet; Take 1 tablet (10 mg total) by mouth once daily.        Continue Ozempic 2 mg once a week.       Decrease portions as soon as you start Ozempic. Some nausea in the first 2 weeks is not unusual.     If you get pain across the upper abdomen and around to your back, please call the office.       Continue Jardiance     Protein and fiber in every meal.       Intermittent fasting. NO eating from 8 pm to 12pm. Can have water, tea, coffee in that time without sweeteners.    2 meals made up of the  following:  Unlimited green vegetables, tomatoes, mushrooms, spaghetti squash, cauliflower, meat, poultry, seafood, eggs and hard cheeses.   Avoid fried foods  Dressings, seasonings, condiments, etc should have less than 2 g sugars.   Beans or nuts can have 1 x a day.   1-2 servings of citrus fruits, berries, pineapple or melon a day (1/2 cup)  Milk and plain yogurt    Avoid starchy carbohydrates (bread, rice, pasta, potatoes, corn, peas, oatmeal, grits, tortillas, crackers, chips)      No soda, sweet tea, juices or lemonade. All drinks should be 5 calories or less.      Www.dietdoctor.com for info on intermittent fasting.       Continue Exercise.        Hurricane tips given.

## 2022-06-23 ENCOUNTER — TELEPHONE (OUTPATIENT)
Dept: INTERNAL MEDICINE | Facility: CLINIC | Age: 62
End: 2022-06-23
Payer: COMMERCIAL

## 2022-06-23 DIAGNOSIS — Z00.00 ANNUAL PHYSICAL EXAM: Primary | ICD-10-CM

## 2022-06-23 NOTE — TELEPHONE ENCOUNTER
From: [facility], System Message   Sent: 6/18/2022   8:49 AM CDT   To: [facility] Im Clinical Support   Subject: Appointment scheduled from Patient Portal          Appointment For: Tawny Valerio ([id number])   Visit Type: MYCHART ANNUAL CHECKUP/PHYS (2385)      8/16/2022    8:30 AM  30 mins.  Zahra Kelley DO      [facility]      Patient Comments:   A1C and neuropathy

## 2022-07-11 RX ORDER — GABAPENTIN 300 MG/1
300 CAPSULE ORAL NIGHTLY
Qty: 30 CAPSULE | Refills: 0 | Status: SHIPPED | OUTPATIENT
Start: 2022-07-11 | End: 2022-07-20 | Stop reason: SDUPTHER

## 2022-07-12 ENCOUNTER — PATIENT MESSAGE (OUTPATIENT)
Dept: ADMINISTRATIVE | Facility: HOSPITAL | Age: 62
End: 2022-07-12
Payer: COMMERCIAL

## 2022-08-03 ENCOUNTER — PATIENT MESSAGE (OUTPATIENT)
Dept: BARIATRICS | Facility: CLINIC | Age: 62
End: 2022-08-03
Payer: COMMERCIAL

## 2022-08-09 ENCOUNTER — LAB VISIT (OUTPATIENT)
Dept: LAB | Facility: HOSPITAL | Age: 62
End: 2022-08-09
Attending: INTERNAL MEDICINE
Payer: COMMERCIAL

## 2022-08-09 ENCOUNTER — PATIENT MESSAGE (OUTPATIENT)
Dept: BARIATRICS | Facility: CLINIC | Age: 62
End: 2022-08-09
Payer: COMMERCIAL

## 2022-08-09 DIAGNOSIS — I25.10 CORONARY ARTERY DISEASE, UNSPECIFIED VESSEL OR LESION TYPE, UNSPECIFIED WHETHER ANGINA PRESENT, UNSPECIFIED WHETHER NATIVE OR TRANSPLANTED HEART: ICD-10-CM

## 2022-08-09 DIAGNOSIS — E11.618 CONTROLLED TYPE 2 DIABETES MELLITUS WITH OTHER DIABETIC ARTHROPATHY, WITHOUT LONG-TERM CURRENT USE OF INSULIN: Primary | ICD-10-CM

## 2022-08-09 DIAGNOSIS — Z00.00 ANNUAL PHYSICAL EXAM: ICD-10-CM

## 2022-08-09 LAB
25(OH)D3+25(OH)D2 SERPL-MCNC: 29 NG/ML (ref 30–96)
ALBUMIN SERPL BCP-MCNC: 3.5 G/DL (ref 3.5–5.2)
ALP SERPL-CCNC: 118 U/L (ref 55–135)
ALT SERPL W/O P-5'-P-CCNC: 15 U/L (ref 10–44)
ANION GAP SERPL CALC-SCNC: 9 MMOL/L (ref 8–16)
AST SERPL-CCNC: 12 U/L (ref 10–40)
AST SERPL-CCNC: 12 U/L (ref 10–40)
BASOPHILS # BLD AUTO: 0.05 K/UL (ref 0–0.2)
BASOPHILS NFR BLD: 0.5 % (ref 0–1.9)
BILIRUB SERPL-MCNC: 0.4 MG/DL (ref 0.1–1)
BUN SERPL-MCNC: 16 MG/DL (ref 6–20)
CALCIUM SERPL-MCNC: 9.4 MG/DL (ref 8.7–10.5)
CHLORIDE SERPL-SCNC: 106 MMOL/L (ref 95–110)
CHOLEST SERPL-MCNC: 152 MG/DL (ref 120–199)
CHOLEST/HDLC SERPL: 2.8 {RATIO} (ref 2–5)
CO2 SERPL-SCNC: 26 MMOL/L (ref 23–29)
CREAT SERPL-MCNC: 0.8 MG/DL (ref 0.5–1.4)
DIFFERENTIAL METHOD: ABNORMAL
EOSINOPHIL # BLD AUTO: 0.3 K/UL (ref 0–0.5)
EOSINOPHIL NFR BLD: 3.1 % (ref 0–8)
ERYTHROCYTE [DISTWIDTH] IN BLOOD BY AUTOMATED COUNT: 15.1 % (ref 11.5–14.5)
EST. GFR  (NO RACE VARIABLE): >60 ML/MIN/1.73 M^2
ESTIMATED AVG GLUCOSE: 111 MG/DL (ref 68–131)
GLUCOSE SERPL-MCNC: 92 MG/DL (ref 70–110)
HBA1C MFR BLD: 5.5 % (ref 4–5.6)
HCT VFR BLD AUTO: 42.9 % (ref 37–48.5)
HDLC SERPL-MCNC: 55 MG/DL (ref 40–75)
HDLC SERPL: 36.2 % (ref 20–50)
HGB BLD-MCNC: 13.5 G/DL (ref 12–16)
IMM GRANULOCYTES # BLD AUTO: 0.03 K/UL (ref 0–0.04)
IMM GRANULOCYTES NFR BLD AUTO: 0.3 % (ref 0–0.5)
LDLC SERPL CALC-MCNC: 80 MG/DL (ref 63–159)
LYMPHOCYTES # BLD AUTO: 2.5 K/UL (ref 1–4.8)
LYMPHOCYTES NFR BLD: 25.9 % (ref 18–48)
MCH RBC QN AUTO: 27.6 PG (ref 27–31)
MCHC RBC AUTO-ENTMCNC: 31.5 G/DL (ref 32–36)
MCV RBC AUTO: 88 FL (ref 82–98)
MONOCYTES # BLD AUTO: 0.6 K/UL (ref 0.3–1)
MONOCYTES NFR BLD: 6.6 % (ref 4–15)
NEUTROPHILS # BLD AUTO: 6.1 K/UL (ref 1.8–7.7)
NEUTROPHILS NFR BLD: 63.6 % (ref 38–73)
NONHDLC SERPL-MCNC: 97 MG/DL
NRBC BLD-RTO: 0 /100 WBC
PLATELET # BLD AUTO: 278 K/UL (ref 150–450)
PMV BLD AUTO: 10.2 FL (ref 9.2–12.9)
POTASSIUM SERPL-SCNC: 4.7 MMOL/L (ref 3.5–5.1)
PROT SERPL-MCNC: 7 G/DL (ref 6–8.4)
RBC # BLD AUTO: 4.9 M/UL (ref 4–5.4)
SODIUM SERPL-SCNC: 141 MMOL/L (ref 136–145)
T4 FREE SERPL-MCNC: 0.78 NG/DL (ref 0.71–1.51)
TRIGL SERPL-MCNC: 85 MG/DL (ref 30–150)
TSH SERPL DL<=0.005 MIU/L-ACNC: 1.25 UIU/ML (ref 0.4–4)
WBC # BLD AUTO: 9.53 K/UL (ref 3.9–12.7)

## 2022-08-09 PROCEDURE — 85025 COMPLETE CBC W/AUTO DIFF WBC: CPT | Performed by: INTERNAL MEDICINE

## 2022-08-09 PROCEDURE — 83036 HEMOGLOBIN GLYCOSYLATED A1C: CPT | Performed by: INTERNAL MEDICINE

## 2022-08-09 PROCEDURE — 84439 ASSAY OF FREE THYROXINE: CPT | Performed by: INTERNAL MEDICINE

## 2022-08-09 PROCEDURE — 36415 COLL VENOUS BLD VENIPUNCTURE: CPT | Mod: PO | Performed by: INTERNAL MEDICINE

## 2022-08-09 PROCEDURE — 80053 COMPREHEN METABOLIC PANEL: CPT | Performed by: INTERNAL MEDICINE

## 2022-08-09 PROCEDURE — 82306 VITAMIN D 25 HYDROXY: CPT | Performed by: INTERNAL MEDICINE

## 2022-08-09 PROCEDURE — 80061 LIPID PANEL: CPT | Performed by: INTERNAL MEDICINE

## 2022-08-09 PROCEDURE — 84443 ASSAY THYROID STIM HORMONE: CPT | Performed by: INTERNAL MEDICINE

## 2022-08-15 RX ORDER — SEMAGLUTIDE 1.34 MG/ML
1 INJECTION, SOLUTION SUBCUTANEOUS
Qty: 3 PEN | Refills: 0 | Status: SHIPPED | OUTPATIENT
Start: 2022-08-15 | End: 2022-10-17

## 2022-08-23 ENCOUNTER — TELEPHONE (OUTPATIENT)
Dept: FAMILY MEDICINE | Facility: CLINIC | Age: 62
End: 2022-08-23
Payer: COMMERCIAL

## 2022-08-23 NOTE — TELEPHONE ENCOUNTER
Spoke to pt. Advised she needs to see her PCP for annual appointment. Pt. Verbalized understanding. Phone call ended.

## 2022-08-23 NOTE — TELEPHONE ENCOUNTER
----- Message from Jorge Luis Mcfadden sent at 8/23/2022  2:37 PM CDT -----  Contact: [phone]  Type:  Patient Returning Call    Who Called Tawny   Who Left Message for Patient:nurse   Does the patient know what this is regarding?:appt   Would the patient rather a call back or a response via [employer]?  Call back   Best Call Back Number:[phone]  Additional Information: n/a       Thanks  KB

## 2022-08-24 ENCOUNTER — PATIENT MESSAGE (OUTPATIENT)
Dept: ADMINISTRATIVE | Facility: HOSPITAL | Age: 62
End: 2022-08-24
Payer: COMMERCIAL

## 2022-09-14 DIAGNOSIS — Z12.31 OTHER SCREENING MAMMOGRAM: ICD-10-CM

## 2022-09-14 DIAGNOSIS — E11.9 TYPE 2 DIABETES MELLITUS WITHOUT COMPLICATION: ICD-10-CM

## 2022-09-20 ENCOUNTER — PATIENT MESSAGE (OUTPATIENT)
Dept: ADMINISTRATIVE | Facility: HOSPITAL | Age: 62
End: 2022-09-20
Payer: COMMERCIAL

## 2022-09-22 ENCOUNTER — OFFICE VISIT (OUTPATIENT)
Dept: DERMATOLOGY | Facility: CLINIC | Age: 62
End: 2022-09-22
Payer: COMMERCIAL

## 2022-09-22 DIAGNOSIS — L91.8 SKIN TAG: Primary | ICD-10-CM

## 2022-09-22 PROCEDURE — 3072F LOW RISK FOR RETINOPATHY: CPT | Mod: CPTII,S$GLB,, | Performed by: STUDENT IN AN ORGANIZED HEALTH CARE EDUCATION/TRAINING PROGRAM

## 2022-09-22 PROCEDURE — 1160F RVW MEDS BY RX/DR IN RCRD: CPT | Mod: CPTII,S$GLB,, | Performed by: STUDENT IN AN ORGANIZED HEALTH CARE EDUCATION/TRAINING PROGRAM

## 2022-09-22 PROCEDURE — 3044F HG A1C LEVEL LT 7.0%: CPT | Mod: CPTII,S$GLB,, | Performed by: STUDENT IN AN ORGANIZED HEALTH CARE EDUCATION/TRAINING PROGRAM

## 2022-09-22 PROCEDURE — 3044F PR MOST RECENT HEMOGLOBIN A1C LEVEL <7.0%: ICD-10-PCS | Mod: CPTII,S$GLB,, | Performed by: STUDENT IN AN ORGANIZED HEALTH CARE EDUCATION/TRAINING PROGRAM

## 2022-09-22 PROCEDURE — 4010F ACE/ARB THERAPY RXD/TAKEN: CPT | Mod: CPTII,S$GLB,, | Performed by: STUDENT IN AN ORGANIZED HEALTH CARE EDUCATION/TRAINING PROGRAM

## 2022-09-22 PROCEDURE — 4010F PR ACE/ARB THEARPY RXD/TAKEN: ICD-10-PCS | Mod: CPTII,S$GLB,, | Performed by: STUDENT IN AN ORGANIZED HEALTH CARE EDUCATION/TRAINING PROGRAM

## 2022-09-22 PROCEDURE — 1159F PR MEDICATION LIST DOCUMENTED IN MEDICAL RECORD: ICD-10-PCS | Mod: CPTII,S$GLB,, | Performed by: STUDENT IN AN ORGANIZED HEALTH CARE EDUCATION/TRAINING PROGRAM

## 2022-09-22 PROCEDURE — 99213 PR OFFICE/OUTPT VISIT, EST, LEVL III, 20-29 MIN: ICD-10-PCS | Mod: S$GLB,,, | Performed by: STUDENT IN AN ORGANIZED HEALTH CARE EDUCATION/TRAINING PROGRAM

## 2022-09-22 PROCEDURE — 99999 PR PBB SHADOW E&M-EST. PATIENT-LVL IV: ICD-10-PCS | Mod: PBBFAC,,, | Performed by: STUDENT IN AN ORGANIZED HEALTH CARE EDUCATION/TRAINING PROGRAM

## 2022-09-22 PROCEDURE — 99213 OFFICE O/P EST LOW 20 MIN: CPT | Mod: S$GLB,,, | Performed by: STUDENT IN AN ORGANIZED HEALTH CARE EDUCATION/TRAINING PROGRAM

## 2022-09-22 PROCEDURE — 3072F PR LOW RISK FOR RETINOPATHY: ICD-10-PCS | Mod: CPTII,S$GLB,, | Performed by: STUDENT IN AN ORGANIZED HEALTH CARE EDUCATION/TRAINING PROGRAM

## 2022-09-22 PROCEDURE — 1160F PR REVIEW ALL MEDS BY PRESCRIBER/CLIN PHARMACIST DOCUMENTED: ICD-10-PCS | Mod: CPTII,S$GLB,, | Performed by: STUDENT IN AN ORGANIZED HEALTH CARE EDUCATION/TRAINING PROGRAM

## 2022-09-22 PROCEDURE — 99999 PR PBB SHADOW E&M-EST. PATIENT-LVL IV: CPT | Mod: PBBFAC,,, | Performed by: STUDENT IN AN ORGANIZED HEALTH CARE EDUCATION/TRAINING PROGRAM

## 2022-09-22 PROCEDURE — 1159F MED LIST DOCD IN RCRD: CPT | Mod: CPTII,S$GLB,, | Performed by: STUDENT IN AN ORGANIZED HEALTH CARE EDUCATION/TRAINING PROGRAM

## 2022-09-23 ENCOUNTER — PATIENT MESSAGE (OUTPATIENT)
Dept: DERMATOLOGY | Facility: CLINIC | Age: 62
End: 2022-09-23
Payer: COMMERCIAL

## 2022-09-23 RX ORDER — TRETINOIN 0.25 MG/G
CREAM TOPICAL NIGHTLY
Qty: 45 G | Refills: 2 | Status: SHIPPED | OUTPATIENT
Start: 2022-09-23 | End: 2023-08-08

## 2022-09-23 NOTE — PATIENT INSTRUCTIONS

## 2022-09-23 NOTE — PROGRESS NOTES
Subjective:       Patient ID:  Tawny Valerio is a 62 y.o. female who presents for   Chief Complaint   Patient presents with    Skin Tags     Inside of thigh. Pt reports it is growing and has knobs on it.  Pt reports feeling it when she walks.       History of Present Illness: The patient presents with chief complaint of a large skin growth.  Location: left inner leg  Duration: years, has been growing with time  Signs/Symptoms: large hanging growth, gets rubbed and irritated  Prior treatments: none    Patient would also like to discuss topical retinoids.     Skin Tags      Review of Systems   Constitutional:  Negative for fever and chills.   Skin:  Negative for itching, rash and dry skin.      Objective:    Physical Exam   Constitutional: She appears well-developed and well-nourished. No distress.   Neurological: She is alert and oriented to person, place, and time. She is not disoriented.   Psychiatric: She has a normal mood and affect.   Skin:   Areas Examined (abnormalities noted in diagram):   Head / Face Inspection Performed  Neck Inspection Performed  RLE Inspected  LLE Inspection Performed                 Diagram Legend     Erythematous scaling macule/papule c/w actinic keratosis       Vascular papule c/w angioma      Pigmented verrucoid papule/plaque c/w seborrheic keratosis      Yellow umbilicated papule c/w sebaceous hyperplasia      Irregularly shaped tan macule c/w lentigo     1-2 mm smooth white papules consistent with Milia      Movable subcutaneous cyst with punctum c/w epidermal inclusion cyst      Subcutaneous movable cyst c/w pilar cyst      Firm pink to brown papule c/w dermatofibroma      Pedunculated fleshy papule(s) c/w skin tag(s)      Evenly pigmented macule c/w junctional nevus     Mildly variegated pigmented, slightly irregular-bordered macule c/w mildly atypical nevus      Flesh colored to evenly pigmented papule c/w intradermal nevus       Pink pearly papule/plaque c/w basal cell  carcinoma      Erythematous hyperkeratotic cursted plaque c/w SCC      Surgical scar with no sign of skin cancer recurrence      Open and closed comedones      Inflammatory papules and pustules      Verrucoid papule consistent consistent with wart     Erythematous eczematous patches and plaques     Dystrophic onycholytic nail with subungual debris c/w onychomycosis     Umbilicated papule    Erythematous-base heme-crusted tan verrucoid plaque consistent with inflamed seborrheic keratosis     Erythematous Silvery Scaling Plaque c/w Psoriasis     See annotation      Assessment / Plan:        Skin tag - patient with allergy to lidocaine (develops hives requiring treatment). Discussed will need to order a non lidocaine anesthetic in order to remove lesion. Will contact pharmacy and notify patient once available to schedule appointment.      Other orders  -     tretinoin (RETIN-A) 0.025 % cream; Apply topically every evening.  Dispense: 45 g; Refill: 2             Follow up if symptoms worsen or fail to improve.

## 2022-09-29 ENCOUNTER — PATIENT MESSAGE (OUTPATIENT)
Dept: INTERNAL MEDICINE | Facility: CLINIC | Age: 62
End: 2022-09-29
Payer: COMMERCIAL

## 2022-09-30 ENCOUNTER — OFFICE VISIT (OUTPATIENT)
Dept: PAIN MEDICINE | Facility: CLINIC | Age: 62
End: 2022-09-30
Payer: COMMERCIAL

## 2022-09-30 DIAGNOSIS — E66.01 MORBID OBESITY WITH BMI OF 60.0-69.9, ADULT: ICD-10-CM

## 2022-09-30 DIAGNOSIS — M54.16 LUMBAR RADICULOPATHY: ICD-10-CM

## 2022-09-30 DIAGNOSIS — M48.062 SPINAL STENOSIS OF LUMBAR REGION WITH NEUROGENIC CLAUDICATION: ICD-10-CM

## 2022-09-30 DIAGNOSIS — M51.36 DDD (DEGENERATIVE DISC DISEASE), LUMBAR: Primary | ICD-10-CM

## 2022-09-30 DIAGNOSIS — M43.16 ANTEROLISTHESIS OF LUMBAR SPINE: ICD-10-CM

## 2022-09-30 PROCEDURE — 99204 PR OFFICE/OUTPT VISIT, NEW, LEVL IV, 45-59 MIN: ICD-10-PCS | Mod: 95,,, | Performed by: PAIN MEDICINE

## 2022-09-30 PROCEDURE — 3072F PR LOW RISK FOR RETINOPATHY: ICD-10-PCS | Mod: CPTII,95,, | Performed by: PAIN MEDICINE

## 2022-09-30 PROCEDURE — 3072F LOW RISK FOR RETINOPATHY: CPT | Mod: CPTII,95,, | Performed by: PAIN MEDICINE

## 2022-09-30 PROCEDURE — 4010F ACE/ARB THERAPY RXD/TAKEN: CPT | Mod: CPTII,95,, | Performed by: PAIN MEDICINE

## 2022-09-30 PROCEDURE — 3044F PR MOST RECENT HEMOGLOBIN A1C LEVEL <7.0%: ICD-10-PCS | Mod: CPTII,95,, | Performed by: PAIN MEDICINE

## 2022-09-30 PROCEDURE — 3044F HG A1C LEVEL LT 7.0%: CPT | Mod: CPTII,95,, | Performed by: PAIN MEDICINE

## 2022-09-30 PROCEDURE — 4010F PR ACE/ARB THEARPY RXD/TAKEN: ICD-10-PCS | Mod: CPTII,95,, | Performed by: PAIN MEDICINE

## 2022-09-30 PROCEDURE — 99204 OFFICE O/P NEW MOD 45 MIN: CPT | Mod: 95,,, | Performed by: PAIN MEDICINE

## 2022-09-30 NOTE — PROGRESS NOTES
"[facility]  Telemedicine Encounter    Telemedicine Bundle:  The patient location is: patient's home  The chief complaint leading to consultation is: Sciatica  Visit type: Virtual visit with synchronous audio and video  Total time spent with patient: 21 minutes  Each patient to whom he or she provides medical services by telemedicine is:    (1) informed of the relationship between the physician and patient and the respective role of any other health care provider with respect to management of the patient  (2) notified that he or she may decline to receive medical services by telemedicine and may withdraw from such care at any time.    Referred by: Self  Reason for referral: Sciatica    CC: Sciatica     Subjective:   Tawny Valerio is a 62 y.o. female who has a past medical history of Acute cystitis without hematuria, Anxiety, Colon cancer screening, Diabetes type 2, controlled, Endometrial ca, Essential hypertension, Simple endometrial hyperplasia, and Uterine polyp. She complains of pain as described below.    Location: back of left knee  Onset: "years" ago  Radiation: behind the knee -> legs  Timing: intermittent  Current Pain Score: 2/10 while sitting  Weekly Pain Range: 2-8/10  Quality: Aching and Throbbing  Worsened by: standing and walking or lying flat  Improved by: rest and sitting    Previous Therapies:  PT/OT: None for knee or back  HEP: Was swimming this past summer 2022 but stopped 2/2 life circumstances  TENS:   Interventions: None  Surgery: None for knee or low back  Opioids:  Adjuvants:     Current Pain Medications:  Gabapentin 300 mg - no longer helping and causes sedation  Tylenol   - Excedrin ES    Assessment & Plan:  Problem List Items Addressed This Visit       Morbid obesity with BMI of 60.0-69.9, adult     Other Visit Diagnoses       DDD (degenerative disc disease), lumbar    -  Primary    Spinal stenosis of lumbar region with neurogenic claudication        " Lumbar radiculopathy        Anterolisthesis of lumbar spine              9/30/22 - Tawny Valerio is a 62 y.o. female who  has a past medical history of Acute cystitis without hematuria (9/4/2019), Anxiety, Colon cancer screening (9/26/2019), Diabetes type 2, controlled (4/22/2016), Endometrial ca (7/24/2019), Essential hypertension (5/14/2018), Simple endometrial hyperplasia (3/15/2019), and Uterine polyp (3/14/2019).  By history and chart review this patient appears to have chronic low back pain with radiculopathy but her current issues is more likely related to chronic degenerative joint disease affecting both knees.  She appears to be describing radiating neuropathic pain from the posterior aspect of the knee traveling down the back of the leg to the foot in the distribution of the tibial nerve, which is likely being compressed by a distended Baker's cyst.  Patient was advised that lumbar radiculopathy can not be excluded at this time.  I have recommended a left knee drainage and steroid injection in clinic.  If this relieves the pain, and it also helps to confirm the diagnosis.    Schedule for clinic procedure visit for left knee aspiration and steroid injection.  Patient has documented lidocaine allergy.  Unclear if allergy is to lidocaine or preservative, but we will seek to use a preservative-free bupivacaine.  Return to daily swimming  Weight loss -> continue with bariatrics    Follow Up:  In clinic for procedure    Disclaimer: This note was partly generated using dictation software which may occasionally result in transcription errors.    Imaging:  CT Abd/Pelvis 2019  Shows advanced DDD most notably at L4-5 with grade 1 anterolisthesis and posterior facet hypertrophy contributing to central and foraminal stenosis.  There are also numerous level sof vacuum disc phenomenon in the T-spine.    Review of Systems:  Review of Systems   Constitutional:  Negative for chills and fever.   HENT:  Negative for  nosebleeds.    Eyes:  Negative for blurred vision and pain.   Respiratory:  Negative for hemoptysis.    Cardiovascular:  Negative for chest pain and palpitations.   Gastrointestinal:  Negative for heartburn, nausea and vomiting.   Genitourinary:  Negative for dysuria and hematuria.   Musculoskeletal:  Positive for back pain and joint pain. Negative for falls and myalgias.   Skin:  Negative for rash.   Neurological:  Positive for tingling. Negative for seizures and loss of consciousness.   Endo/Heme/Allergies:  Does not bruise/bleed easily.   Psychiatric/Behavioral:  Negative for hallucinations.      Physical Exam:  There were no vitals filed for this visit.  General    Constitutional: She is oriented to person, place, and time. She appears well-developed. No distress.   HENT:   Head: Atraumatic.   Nose: Nose normal.   Eyes: Conjunctivae and EOM are normal.   Pulmonary/Chest: Effort normal. No respiratory distress.   Abdominal: She exhibits no distension.   Neurological: She is alert and oriented to person, place, and time.   Psychiatric: She has a normal mood and affect.

## 2022-10-06 ENCOUNTER — OFFICE VISIT (OUTPATIENT)
Dept: INTERNAL MEDICINE | Facility: CLINIC | Age: 62
End: 2022-10-06
Payer: COMMERCIAL

## 2022-10-06 ENCOUNTER — PATIENT MESSAGE (OUTPATIENT)
Dept: BARIATRICS | Facility: CLINIC | Age: 62
End: 2022-10-06
Payer: COMMERCIAL

## 2022-10-06 VITALS
OXYGEN SATURATION: 100 % | DIASTOLIC BLOOD PRESSURE: 60 MMHG | HEIGHT: 61 IN | WEIGHT: 279.56 LBS | HEART RATE: 70 BPM | TEMPERATURE: 98 F | SYSTOLIC BLOOD PRESSURE: 132 MMHG | BODY MASS INDEX: 52.78 KG/M2

## 2022-10-06 DIAGNOSIS — M54.32 SCIATICA OF LEFT SIDE: Primary | ICD-10-CM

## 2022-10-06 DIAGNOSIS — M17.12 PRIMARY OSTEOARTHRITIS OF LEFT KNEE: ICD-10-CM

## 2022-10-06 PROCEDURE — 1159F MED LIST DOCD IN RCRD: CPT | Mod: CPTII,S$GLB,, | Performed by: PHYSICIAN ASSISTANT

## 2022-10-06 PROCEDURE — 99999 PR PBB SHADOW E&M-EST. PATIENT-LVL V: ICD-10-PCS | Mod: PBBFAC,,, | Performed by: PHYSICIAN ASSISTANT

## 2022-10-06 PROCEDURE — 99214 PR OFFICE/OUTPT VISIT, EST, LEVL IV, 30-39 MIN: ICD-10-PCS | Mod: S$GLB,,, | Performed by: PHYSICIAN ASSISTANT

## 2022-10-06 PROCEDURE — 99214 OFFICE O/P EST MOD 30 MIN: CPT | Mod: S$GLB,,, | Performed by: PHYSICIAN ASSISTANT

## 2022-10-06 PROCEDURE — 3072F PR LOW RISK FOR RETINOPATHY: ICD-10-PCS | Mod: CPTII,S$GLB,, | Performed by: PHYSICIAN ASSISTANT

## 2022-10-06 PROCEDURE — 4010F PR ACE/ARB THEARPY RXD/TAKEN: ICD-10-PCS | Mod: CPTII,S$GLB,, | Performed by: PHYSICIAN ASSISTANT

## 2022-10-06 PROCEDURE — 1159F PR MEDICATION LIST DOCUMENTED IN MEDICAL RECORD: ICD-10-PCS | Mod: CPTII,S$GLB,, | Performed by: PHYSICIAN ASSISTANT

## 2022-10-06 PROCEDURE — 4010F ACE/ARB THERAPY RXD/TAKEN: CPT | Mod: CPTII,S$GLB,, | Performed by: PHYSICIAN ASSISTANT

## 2022-10-06 PROCEDURE — 3044F PR MOST RECENT HEMOGLOBIN A1C LEVEL <7.0%: ICD-10-PCS | Mod: CPTII,S$GLB,, | Performed by: PHYSICIAN ASSISTANT

## 2022-10-06 PROCEDURE — 99999 PR PBB SHADOW E&M-EST. PATIENT-LVL V: CPT | Mod: PBBFAC,,, | Performed by: PHYSICIAN ASSISTANT

## 2022-10-06 PROCEDURE — 3078F DIAST BP <80 MM HG: CPT | Mod: CPTII,S$GLB,, | Performed by: PHYSICIAN ASSISTANT

## 2022-10-06 PROCEDURE — 3044F HG A1C LEVEL LT 7.0%: CPT | Mod: CPTII,S$GLB,, | Performed by: PHYSICIAN ASSISTANT

## 2022-10-06 PROCEDURE — 3008F PR BODY MASS INDEX (BMI) DOCUMENTED: ICD-10-PCS | Mod: CPTII,S$GLB,, | Performed by: PHYSICIAN ASSISTANT

## 2022-10-06 PROCEDURE — 3075F SYST BP GE 130 - 139MM HG: CPT | Mod: CPTII,S$GLB,, | Performed by: PHYSICIAN ASSISTANT

## 2022-10-06 PROCEDURE — 3078F PR MOST RECENT DIASTOLIC BLOOD PRESSURE < 80 MM HG: ICD-10-PCS | Mod: CPTII,S$GLB,, | Performed by: PHYSICIAN ASSISTANT

## 2022-10-06 PROCEDURE — 3072F LOW RISK FOR RETINOPATHY: CPT | Mod: CPTII,S$GLB,, | Performed by: PHYSICIAN ASSISTANT

## 2022-10-06 PROCEDURE — 3008F BODY MASS INDEX DOCD: CPT | Mod: CPTII,S$GLB,, | Performed by: PHYSICIAN ASSISTANT

## 2022-10-06 PROCEDURE — 3075F PR MOST RECENT SYSTOLIC BLOOD PRESS GE 130-139MM HG: ICD-10-PCS | Mod: CPTII,S$GLB,, | Performed by: PHYSICIAN ASSISTANT

## 2022-10-06 RX ORDER — METHOCARBAMOL 500 MG/1
500 TABLET, FILM COATED ORAL 4 TIMES DAILY
Qty: 28 TABLET | Refills: 0 | Status: SHIPPED | OUTPATIENT
Start: 2022-10-06 | End: 2022-10-13

## 2022-10-06 RX ORDER — METHYLPREDNISOLONE 4 MG/1
TABLET ORAL
Qty: 1 EACH | Refills: 0 | Status: SHIPPED | OUTPATIENT
Start: 2022-10-06 | End: 2023-03-16

## 2022-10-06 NOTE — PROGRESS NOTES
Subjective:      Patient ID: Tawny Valerio is a 62 y.o. female.    Chief Complaint: Sciatica (/)    Back Pain  This is a recurrent problem. The current episode started in the past 7 days. The problem has been gradually worsening since onset. The pain is present in the lumbar spine. The pain radiates to the left knee, left foot and left thigh. The pain is moderate. Associated symptoms include leg pain, numbness and tingling. Pertinent negatives include no abdominal pain, bladder incontinence, bowel incontinence, chest pain, dysuria, fever, headaches, paresis, paresthesias, pelvic pain, perianal numbness, weakness or weight loss. Treatments tried: aspirin and gabapentin. The treatment provided no relief.   Left sciatica pain recurrent.     Also peripheral neuropathy severe. No improvement with gabapentin.     Patient Active Problem List   Diagnosis    Diabetes type 2, controlled    Vitamin D insufficiency    SOB (shortness of breath)    Morbid obesity with BMI of 60.0-69.9, adult    Edema leg    ASHLI (obstructive sleep apnea)    Essential hypertension    Uterine polyp    Simple endometrial hyperplasia    Endometrial ca    s/p RALH/BSO 8/26/2019         Current Outpatient Medications:     acetaminophen (TYLENOL) 325 MG tablet, Take 2 tablets (650 mg total) by mouth every 6 (six) hours as needed for Pain., Disp: 30 tablet, Rfl: 1    aspirin 81 MG Chew, Take 81 mg by mouth once daily., Disp: , Rfl:     atorvastatin (LIPITOR) 20 MG tablet, Take 1 tablet (20 mg total) by mouth once daily., Disp: 30 tablet, Rfl: 11    azelastine (ASTELIN) 137 mcg (0.1 %) nasal spray, 1 spray (137 mcg total) by Nasal route 2 (two) times daily., Disp: 30 mL, Rfl: 11    buPROPion (WELLBUTRIN SR) 150 MG TBSR 12 hr tablet, Take 1 tablet (150 mg total) by mouth 2 (two) times daily., Disp: 60 tablet, Rfl: 11    clindamycin (CLEOCIN T) 1 % lotion, Apply topically 2 (two) times daily., Disp: 60 mL, Rfl: 3    empagliflozin (JARDIANCE) 10 mg  tablet, Take 1 tablet (10 mg total) by mouth once daily., Disp: 90 tablet, Rfl: 1    ergocalciferol (ERGOCALCIFEROL) 50,000 unit Cap, Take 1 capsule (50,000 Units total) by mouth every 7 days., Disp: 12 capsule, Rfl: 3    gabapentin (NEURONTIN) 300 MG capsule, Take 1 capsule (300 mg total) by mouth every evening., Disp: 30 capsule, Rfl: 0    hydrocortisone 2.5 % cream, Apply topically 2 (two) times daily. AAA bid, Disp: 30 g, Rfl: 1    ibuprofen (ADVIL,MOTRIN) 600 MG tablet, Take 1 tablet (600 mg total) by mouth 3 (three) times daily., Disp: 30 tablet, Rfl: 1    lancets Misc, Check fasting glucose daily, Disp: 100 each, Rfl: 3    losartan (COZAAR) 25 MG tablet, Take 1 tablet (25 mg total) by mouth once daily., Disp: 90 tablet, Rfl: 3    mupirocin (BACTROBAN) 2 % ointment, Apply topically 2 (two) times daily., Disp: 30 g, Rfl: 0    semaglutide (OZEMPIC) 1 mg/dose (4 mg/3 mL), Inject 1 mg into the skin every 7 days., Disp: 3 pen, Rfl: 0    tretinoin (RETIN-A) 0.025 % cream, Apply topically every evening., Disp: 45 g, Rfl: 2    methocarbamoL (ROBAXIN) 500 MG Tab, Take 1 tablet (500 mg total) by mouth 4 (four) times daily. for 7 days, Disp: 28 tablet, Rfl: 0    methylPREDNISolone (MEDROL DOSEPACK) 4 mg tablet, use as directed, Disp: 1 each, Rfl: 0    metronidazole 0.75% (METROCREAM) 0.75 % Crea, Apply topically 2 (two) times daily. For rosacea on face., Disp: 45 g, Rfl: 3    Review of Systems   Constitutional:  Negative for activity change, appetite change, chills, diaphoresis, fatigue, fever, unexpected weight change and weight loss.   HENT: Negative.  Negative for congestion, hearing loss, postnasal drip, rhinorrhea, sore throat, trouble swallowing and voice change.    Eyes: Negative.  Negative for visual disturbance.   Respiratory: Negative.  Negative for cough, choking, chest tightness and shortness of breath.    Cardiovascular:  Negative for chest pain, palpitations and leg swelling.   Gastrointestinal:  Negative  "for abdominal distention, abdominal pain, blood in stool, bowel incontinence, constipation, diarrhea, nausea and vomiting.   Endocrine: Negative for cold intolerance, heat intolerance, polydipsia and polyuria.   Genitourinary: Negative.  Negative for bladder incontinence, difficulty urinating, dysuria, frequency and pelvic pain.   Musculoskeletal:  Positive for back pain. Negative for arthralgias, gait problem, joint swelling and myalgias.   Skin:  Negative for color change, pallor, rash and wound.   Neurological:  Positive for tingling and numbness. Negative for dizziness, tremors, weakness, light-headedness, headaches and paresthesias.   Hematological:  Negative for adenopathy.   Psychiatric/Behavioral:  Negative for behavioral problems, confusion, self-injury, sleep disturbance and suicidal ideas. The patient is not nervous/anxious.    Objective:   /60 (BP Location: Left arm, Patient Position: Sitting, BP Method: Large (Manual))   Pulse 70   Temp 98 °F (36.7 °C) (Tympanic)   Ht 5' 1" (1.549 m)   Wt 126.8 kg (279 lb 8.7 oz)   LMP 01/15/2019 (Approximate)   SpO2 100%   BMI 52.82 kg/m²     Physical Exam  Vitals and nursing note reviewed.   Constitutional:       General: She is not in acute distress.     Appearance: Normal appearance. She is well-developed. She is not ill-appearing, toxic-appearing or diaphoretic.   HENT:      Head: Normocephalic and atraumatic.   Cardiovascular:      Rate and Rhythm: Normal rate and regular rhythm.      Heart sounds: Normal heart sounds. No murmur heard.    No friction rub. No gallop.   Pulmonary:      Effort: Pulmonary effort is normal. No respiratory distress.      Breath sounds: Normal breath sounds. No wheezing or rales.   Musculoskeletal:         General: Normal range of motion.      Lumbar back: Spasms and tenderness present.        Back:    Skin:     General: Skin is warm.      Capillary Refill: Capillary refill takes less than 2 seconds.      Findings: No rash. "   Neurological:      Mental Status: She is alert and oriented to person, place, and time.      Motor: No weakness.      Gait: Gait normal.   Psychiatric:         Mood and Affect: Mood normal.         Behavior: Behavior normal.         Thought Content: Thought content normal.         Judgment: Judgment normal.       Assessment:     1. Sciatica of left side    2. Primary osteoarthritis of left knee      Plan:   Sciatica of left side  -     methylPREDNISolone (MEDROL DOSEPACK) 4 mg tablet; use as directed  Dispense: 1 each; Refill: 0  -     Ambulatory referral/consult to Physical/Occupational Therapy; Future; Expected date: 10/13/2022  -     methocarbamoL (ROBAXIN) 500 MG Tab; Take 1 tablet (500 mg total) by mouth 4 (four) times daily. for 7 days  Dispense: 28 tablet; Refill: 0    Primary osteoarthritis of left knee  -compression sleeve for knee  -RICE    -increase gabapentin to 600 at night    Follow up if symptoms worsen or fail to improve.

## 2022-10-12 ENCOUNTER — PATIENT MESSAGE (OUTPATIENT)
Dept: ADMINISTRATIVE | Facility: HOSPITAL | Age: 62
End: 2022-10-12
Payer: COMMERCIAL

## 2022-10-15 ENCOUNTER — PATIENT MESSAGE (OUTPATIENT)
Dept: BARIATRICS | Facility: CLINIC | Age: 62
End: 2022-10-15
Payer: COMMERCIAL

## 2022-10-17 ENCOUNTER — PATIENT MESSAGE (OUTPATIENT)
Dept: BARIATRICS | Facility: CLINIC | Age: 62
End: 2022-10-17
Payer: COMMERCIAL

## 2022-12-04 ENCOUNTER — PATIENT MESSAGE (OUTPATIENT)
Dept: INTERNAL MEDICINE | Facility: CLINIC | Age: 62
End: 2022-12-04
Payer: COMMERCIAL

## 2022-12-04 ENCOUNTER — PATIENT MESSAGE (OUTPATIENT)
Dept: BARIATRICS | Facility: CLINIC | Age: 62
End: 2022-12-04
Payer: COMMERCIAL

## 2022-12-05 NOTE — TELEPHONE ENCOUNTER
Since she already messaged dr patiño, I would recommend giving her a few days to respond and if no response is given then check back in with us.

## 2022-12-06 ENCOUNTER — PATIENT MESSAGE (OUTPATIENT)
Dept: BARIATRICS | Facility: CLINIC | Age: 62
End: 2022-12-06
Payer: COMMERCIAL

## 2022-12-06 ENCOUNTER — TELEPHONE (OUTPATIENT)
Dept: BARIATRICS | Facility: CLINIC | Age: 62
End: 2022-12-06
Payer: COMMERCIAL

## 2022-12-06 NOTE — TELEPHONE ENCOUNTER
Assist with calling in medication, spoke with jose at [facility] and she stated that she will reach out to [facility] to get medication pulled. Informed patient of information.

## 2022-12-06 NOTE — TELEPHONE ENCOUNTER
----- Message from Felisa Samuel sent at 12/6/2022  2:29 PM CST -----  Regarding: RX inquiry-returning missed call  Contact: PT @ [phone]  Pt is returning a missed call from Caterina in the office regarding her rx. Pt is asking for a return call back. Thanks.

## 2022-12-27 ENCOUNTER — TELEPHONE (OUTPATIENT)
Dept: CARDIOLOGY | Facility: CLINIC | Age: 62
End: 2022-12-27
Payer: COMMERCIAL

## 2022-12-27 DIAGNOSIS — E11.618 CONTROLLED TYPE 2 DIABETES MELLITUS WITH OTHER DIABETIC ARTHROPATHY, WITHOUT LONG-TERM CURRENT USE OF INSULIN: ICD-10-CM

## 2022-12-27 DIAGNOSIS — E66.01 MORBID OBESITY WITH BMI OF 60.0-69.9, ADULT: ICD-10-CM

## 2022-12-27 DIAGNOSIS — I10 ESSENTIAL HYPERTENSION: Primary | ICD-10-CM

## 2023-01-13 ENCOUNTER — LAB VISIT (OUTPATIENT)
Dept: LAB | Facility: HOSPITAL | Age: 63
End: 2023-01-13
Payer: COMMERCIAL

## 2023-01-13 ENCOUNTER — HOSPITAL ENCOUNTER (OUTPATIENT)
Dept: RADIOLOGY | Facility: HOSPITAL | Age: 63
Discharge: HOME | End: 2023-01-13
Attending: PHYSICIAN ASSISTANT
Payer: COMMERCIAL

## 2023-01-13 ENCOUNTER — OFFICE VISIT (OUTPATIENT)
Dept: INTERNAL MEDICINE | Facility: CLINIC | Age: 63
End: 2023-01-13
Payer: COMMERCIAL

## 2023-01-13 VITALS
WEIGHT: 285.69 LBS | TEMPERATURE: 98 F | SYSTOLIC BLOOD PRESSURE: 130 MMHG | HEART RATE: 76 BPM | HEIGHT: 61 IN | DIASTOLIC BLOOD PRESSURE: 70 MMHG | OXYGEN SATURATION: 97 % | BODY MASS INDEX: 53.94 KG/M2

## 2023-01-13 DIAGNOSIS — R10.32 LEFT LOWER QUADRANT PAIN: ICD-10-CM

## 2023-01-13 DIAGNOSIS — R10.32 LEFT LOWER QUADRANT PAIN: Primary | ICD-10-CM

## 2023-01-13 DIAGNOSIS — R19.7 DIARRHEA, UNSPECIFIED TYPE: ICD-10-CM

## 2023-01-13 LAB
BACTERIA #/AREA URNS HPF: ABNORMAL /HPF
BILIRUB UR QL STRIP: NEGATIVE
CLARITY UR: CLEAR
COLOR UR: YELLOW
GLUCOSE UR QL STRIP: ABNORMAL
HGB UR QL STRIP: ABNORMAL
KETONES UR QL STRIP: NEGATIVE
LEUKOCYTE ESTERASE UR QL STRIP: NEGATIVE
MICROSCOPIC COMMENT: ABNORMAL
NITRITE UR QL STRIP: NEGATIVE
PH UR STRIP: 6 [PH] (ref 5–8)
PROT UR QL STRIP: NEGATIVE
RBC #/AREA URNS HPF: 2 /HPF (ref 0–4)
SP GR UR STRIP: 1.02 (ref 1–1.03)
SQUAMOUS #/AREA URNS HPF: 3 /HPF
URN SPEC COLLECT METH UR: ABNORMAL
WBC #/AREA URNS HPF: 2 /HPF (ref 0–5)
YEAST URNS QL MICRO: ABNORMAL

## 2023-01-13 PROCEDURE — 87086 URINE CULTURE/COLONY COUNT: CPT | Performed by: PHYSICIAN ASSISTANT

## 2023-01-13 PROCEDURE — 3078F PR MOST RECENT DIASTOLIC BLOOD PRESSURE < 80 MM HG: ICD-10-PCS | Mod: CPTII,S$GLB,, | Performed by: PHYSICIAN ASSISTANT

## 2023-01-13 PROCEDURE — 74177 CT ABD & PELVIS W/CONTRAST: CPT | Mod: TC

## 2023-01-13 PROCEDURE — 3008F BODY MASS INDEX DOCD: CPT | Mod: CPTII,S$GLB,, | Performed by: PHYSICIAN ASSISTANT

## 2023-01-13 PROCEDURE — 4010F ACE/ARB THERAPY RXD/TAKEN: CPT | Mod: CPTII,S$GLB,, | Performed by: PHYSICIAN ASSISTANT

## 2023-01-13 PROCEDURE — 1160F PR REVIEW ALL MEDS BY PRESCRIBER/CLIN PHARMACIST DOCUMENTED: ICD-10-PCS | Mod: CPTII,S$GLB,, | Performed by: PHYSICIAN ASSISTANT

## 2023-01-13 PROCEDURE — 3075F SYST BP GE 130 - 139MM HG: CPT | Mod: CPTII,S$GLB,, | Performed by: PHYSICIAN ASSISTANT

## 2023-01-13 PROCEDURE — 1159F MED LIST DOCD IN RCRD: CPT | Mod: CPTII,S$GLB,, | Performed by: PHYSICIAN ASSISTANT

## 2023-01-13 PROCEDURE — 3078F DIAST BP <80 MM HG: CPT | Mod: CPTII,S$GLB,, | Performed by: PHYSICIAN ASSISTANT

## 2023-01-13 PROCEDURE — 81000 URINALYSIS NONAUTO W/SCOPE: CPT | Performed by: PHYSICIAN ASSISTANT

## 2023-01-13 PROCEDURE — 1159F PR MEDICATION LIST DOCUMENTED IN MEDICAL RECORD: ICD-10-PCS | Mod: CPTII,S$GLB,, | Performed by: PHYSICIAN ASSISTANT

## 2023-01-13 PROCEDURE — 99214 PR OFFICE/OUTPT VISIT, EST, LEVL IV, 30-39 MIN: ICD-10-PCS | Mod: S$GLB,,, | Performed by: PHYSICIAN ASSISTANT

## 2023-01-13 PROCEDURE — 99999 PR PBB SHADOW E&M-EST. PATIENT-LVL V: CPT | Mod: PBBFAC,,, | Performed by: PHYSICIAN ASSISTANT

## 2023-01-13 PROCEDURE — 74177 CT ABD & PELVIS W/CONTRAST: CPT | Mod: 26,,, | Performed by: STUDENT IN AN ORGANIZED HEALTH CARE EDUCATION/TRAINING PROGRAM

## 2023-01-13 PROCEDURE — 1160F RVW MEDS BY RX/DR IN RCRD: CPT | Mod: CPTII,S$GLB,, | Performed by: PHYSICIAN ASSISTANT

## 2023-01-13 PROCEDURE — 3008F PR BODY MASS INDEX (BMI) DOCUMENTED: ICD-10-PCS | Mod: CPTII,S$GLB,, | Performed by: PHYSICIAN ASSISTANT

## 2023-01-13 PROCEDURE — 74177 CT ABDOMEN PELVIS WITH CONTRAST: ICD-10-PCS | Mod: 26,,, | Performed by: STUDENT IN AN ORGANIZED HEALTH CARE EDUCATION/TRAINING PROGRAM

## 2023-01-13 PROCEDURE — 4010F PR ACE/ARB THEARPY RXD/TAKEN: ICD-10-PCS | Mod: CPTII,S$GLB,, | Performed by: PHYSICIAN ASSISTANT

## 2023-01-13 PROCEDURE — 99214 OFFICE O/P EST MOD 30 MIN: CPT | Mod: S$GLB,,, | Performed by: PHYSICIAN ASSISTANT

## 2023-01-13 PROCEDURE — 3075F PR MOST RECENT SYSTOLIC BLOOD PRESS GE 130-139MM HG: ICD-10-PCS | Mod: CPTII,S$GLB,, | Performed by: PHYSICIAN ASSISTANT

## 2023-01-13 PROCEDURE — 25500020 PHARM REV CODE 255: Performed by: PHYSICIAN ASSISTANT

## 2023-01-13 PROCEDURE — 99999 PR PBB SHADOW E&M-EST. PATIENT-LVL V: ICD-10-PCS | Mod: PBBFAC,,, | Performed by: PHYSICIAN ASSISTANT

## 2023-01-13 RX ADMIN — IOHEXOL 30 ML: 350 INJECTION, SOLUTION INTRAVENOUS at 11:01

## 2023-01-13 RX ADMIN — IOHEXOL 100 ML: 350 INJECTION, SOLUTION INTRAVENOUS at 12:01

## 2023-01-13 NOTE — PROGRESS NOTES
Subjective:      Patient ID: Tawny Valerio is a 62 y.o. female.    Chief Complaint: Diarrhea    Abdominal Pain  This is a chronic problem. The current episode started in the past 7 days. The onset quality is gradual. The problem occurs 2 to 4 times per day. The most recent episode lasted 1 day. The problem has been waxing and waning. The pain is located in the LUQ and left flank. The pain is at a severity of 5/10. The pain is moderate. The quality of the pain is aching, cramping and a sensation of fullness. Associated symptoms include anorexia, arthralgias, belching, constipation, diarrhea, a fever, flatus, hematochezia, myalgias and weight loss. Pertinent negatives include no dysuria, frequency, headaches, hematuria, melena, nausea or vomiting. The pain is aggravated by bowel movement and eating. She has tried antacids for the symptoms. The treatment provided moderate relief. Her past medical history is significant for abdominal surgery and GERD. There is no history of colon cancer, Crohn's disease, gallstones, irritable bowel syndrome, pancreatitis, PUD or ulcerative colitis. Patient's medical history includes UTI. Patient's medical history does not include kidney stones.   Irregular BM for past 6 months. Alternate diarrhea alternating with constipation.   Last week had diarrhea with some BRB.  Left abd pain.     Patient Active Problem List   Diagnosis    Diabetes type 2, controlled    Vitamin D insufficiency    SOB (shortness of breath)    Morbid obesity with BMI of 60.0-69.9, adult    Edema leg    ASHLI (obstructive sleep apnea)    Essential hypertension    Uterine polyp    Simple endometrial hyperplasia    Endometrial ca    s/p RALH/BSO 8/26/2019         Current Outpatient Medications:     acetaminophen (TYLENOL) 325 MG tablet, Take 2 tablets (650 mg total) by mouth every 6 (six) hours as needed for Pain., Disp: 30 tablet, Rfl: 1    aspirin 81 MG Chew, Take 81 mg by mouth once daily., Disp: , Rfl:      atorvastatin (LIPITOR) 20 MG tablet, Take 1 tablet (20 mg total) by mouth once daily., Disp: 30 tablet, Rfl: 11    azelastine (ASTELIN) 137 mcg (0.1 %) nasal spray, 1 spray (137 mcg total) by Nasal route 2 (two) times daily., Disp: 30 mL, Rfl: 11    buPROPion (WELLBUTRIN SR) 150 MG TBSR 12 hr tablet, Take 1 tablet (150 mg total) by mouth 2 (two) times daily., Disp: 60 tablet, Rfl: 11    clindamycin (CLEOCIN T) 1 % lotion, Apply topically 2 (two) times daily., Disp: 60 mL, Rfl: 3    empagliflozin (JARDIANCE) 10 mg tablet, Take 1 tablet (10 mg total) by mouth once daily., Disp: 90 tablet, Rfl: 1    ergocalciferol (ERGOCALCIFEROL) 50,000 unit Cap, TAKE 1 CAPSULE BY MOUTH ONE TIME PER WEEK, Disp: 12 capsule, Rfl: 3    gabapentin (NEURONTIN) 300 MG capsule, Take 1 capsule (300 mg total) by mouth every evening., Disp: 30 capsule, Rfl: 0    hydrocortisone 2.5 % cream, Apply topically 2 (two) times daily. AAA bid, Disp: 30 g, Rfl: 1    ibuprofen (ADVIL,MOTRIN) 600 MG tablet, Take 1 tablet (600 mg total) by mouth 3 (three) times daily., Disp: 30 tablet, Rfl: 1    lancets Misc, Check fasting glucose daily, Disp: 100 each, Rfl: 3    losartan (COZAAR) 25 MG tablet, Take 1 tablet (25 mg total) by mouth once daily., Disp: 90 tablet, Rfl: 3    methylPREDNISolone (MEDROL DOSEPACK) 4 mg tablet, use as directed, Disp: 1 each, Rfl: 0    metronidazole 0.75% (METROCREAM) 0.75 % Crea, Apply topically 2 (two) times daily. For rosacea on face., Disp: 45 g, Rfl: 3    mupirocin (BACTROBAN) 2 % ointment, Apply topically 2 (two) times daily., Disp: 30 g, Rfl: 0    semaglutide 2 mg/dose (8 mg/3 mL) PnIj, Inject 2 mg into the skin every 7 days., Disp: 9 mL, Rfl: 0    tretinoin (RETIN-A) 0.025 % cream, Apply topically every evening., Disp: 45 g, Rfl: 2    Review of Systems   Constitutional:  Positive for chills, fever and weight loss. Negative for activity change, appetite change, diaphoresis, fatigue and unexpected weight change.   HENT:  "Negative.  Negative for congestion, hearing loss, postnasal drip, rhinorrhea, sore throat, trouble swallowing and voice change.    Eyes: Negative.  Negative for visual disturbance.   Respiratory: Negative.  Negative for cough, choking, chest tightness and shortness of breath.    Cardiovascular:  Negative for chest pain, palpitations and leg swelling.   Gastrointestinal:  Positive for abdominal distention, abdominal pain, anorexia, blood in stool, constipation, diarrhea, flatus and hematochezia. Negative for anal bleeding, melena, nausea, rectal pain and vomiting.   Endocrine: Negative for cold intolerance, heat intolerance, polydipsia and polyuria.   Genitourinary: Negative.  Negative for difficulty urinating, dysuria, frequency and hematuria.   Musculoskeletal:  Positive for arthralgias and myalgias. Negative for back pain, gait problem, joint swelling, neck pain and neck stiffness.   Skin:  Negative for color change, pallor, rash and wound.   Neurological:  Negative for dizziness, tremors, weakness, light-headedness, numbness and headaches.   Hematological:  Negative for adenopathy.   Psychiatric/Behavioral:  Negative for behavioral problems, confusion, self-injury, sleep disturbance and suicidal ideas. The patient is not nervous/anxious.    Objective:   /70 (BP Location: Left arm, Patient Position: Sitting, BP Method: X-Large (Manual))   Pulse 76   Temp 97.8 °F (36.6 °C) (Tympanic)   Ht 5' 1" (1.549 m)   Wt 129.6 kg (285 lb 11.5 oz)   LMP 01/15/2019 (Approximate)   SpO2 97%   BMI 53.99 kg/m²     Physical Exam  Vitals reviewed.   Constitutional:       General: She is not in acute distress.     Appearance: Normal appearance. She is well-developed. She is obese. She is not ill-appearing, toxic-appearing or diaphoretic.   HENT:      Head: Normocephalic and atraumatic.      Right Ear: External ear normal.      Left Ear: External ear normal.      Nose: Nose normal.   Eyes:      Conjunctiva/sclera: " Conjunctivae normal.      Pupils: Pupils are equal, round, and reactive to light.   Cardiovascular:      Rate and Rhythm: Normal rate and regular rhythm.      Heart sounds: Normal heart sounds. No murmur heard.    No friction rub. No gallop.   Pulmonary:      Effort: Pulmonary effort is normal. No respiratory distress.      Breath sounds: Normal breath sounds. No wheezing or rales.   Chest:      Chest wall: No tenderness.   Abdominal:      General: Abdomen is protuberant. Bowel sounds are increased. There is no distension.      Palpations: Abdomen is soft.      Tenderness: There is abdominal tenderness in the left upper quadrant and left lower quadrant. There is no guarding or rebound.   Musculoskeletal:         General: Normal range of motion.      Cervical back: Normal range of motion and neck supple.   Lymphadenopathy:      Cervical: No cervical adenopathy.   Skin:     General: Skin is warm and dry.      Capillary Refill: Capillary refill takes less than 2 seconds.      Findings: No rash.   Neurological:      Mental Status: She is alert and oriented to person, place, and time.      Motor: No weakness.      Coordination: Coordination normal.      Gait: Gait normal.   Psychiatric:         Mood and Affect: Mood normal.         Behavior: Behavior normal.         Thought Content: Thought content normal.         Judgment: Judgment normal.       Assessment:     1. Left lower quadrant pain    2. Diarrhea, unspecified type      Plan:   Left lower quadrant pain  -     CT Abdomen Pelvis With Contrast; Future; Expected date: 01/13/2023  -     Creatinine, serum; Future; Expected date: 01/13/2023  -     CBC Auto Differential; Future; Expected date: 01/13/2023  -     Comprehensive Metabolic Panel; Future  -     TSH; Future; Expected date: 01/13/2023  -     Lipase; Future; Expected date: 01/13/2023  -     Urinalysis; Future; Expected date: 01/13/2023  -     Urine culture; Future    Diarrhea, unspecified type  -     CBC Auto  Differential; Future; Expected date: 01/13/2023  -     Comprehensive Metabolic Panel; Future  -     TSH; Future; Expected date: 01/13/2023  -     Lipase; Future; Expected date: 01/13/2023  -     Urinalysis; Future; Expected date: 01/13/2023  -     Urine culture; Future  -     Stool culture; Future; Expected date: 01/13/2023  -     Stool Exam-Ova,Cysts,Parasites; Future; Expected date: 01/13/2023  -     Rotavirus antigen, stool; Future; Expected date: 01/13/2023  -     Clostridium difficile EIA; Future; Expected date: 01/13/2023  -     Giardia / Cryptosporidum, EIA; Future; Expected date: 01/13/2023    -rule out diverticulitis  -bland liquid diet    Follow up if symptoms worsen or fail to improve.

## 2023-01-15 LAB
BACTERIA UR CULT: NORMAL
BACTERIA UR CULT: NORMAL

## 2023-01-17 ENCOUNTER — PATIENT MESSAGE (OUTPATIENT)
Dept: INTERNAL MEDICINE | Facility: CLINIC | Age: 63
End: 2023-01-17
Payer: COMMERCIAL

## 2023-01-18 ENCOUNTER — TELEPHONE (OUTPATIENT)
Dept: INTERNAL MEDICINE | Facility: CLINIC | Age: 63
End: 2023-01-18
Payer: COMMERCIAL

## 2023-01-18 DIAGNOSIS — N30.00 ACUTE CYSTITIS WITHOUT HEMATURIA: Primary | ICD-10-CM

## 2023-01-18 RX ORDER — CIPROFLOXACIN 500 MG/1
500 TABLET ORAL EVERY 12 HOURS
Qty: 20 TABLET | Refills: 0 | Status: SHIPPED | OUTPATIENT
Start: 2023-01-18 | End: 2023-01-28

## 2023-01-18 NOTE — TELEPHONE ENCOUNTER
----- Message from Adela Colby MA sent at 1/18/2023 11:10 AM CST -----  Stated she still having loose stools fatigue and chills

## 2023-01-26 ENCOUNTER — LAB VISIT (OUTPATIENT)
Dept: LAB | Facility: HOSPITAL | Age: 63
End: 2023-01-26
Attending: PHYSICIAN ASSISTANT
Payer: COMMERCIAL

## 2023-01-26 ENCOUNTER — OFFICE VISIT (OUTPATIENT)
Dept: INTERNAL MEDICINE | Facility: CLINIC | Age: 63
End: 2023-01-26
Payer: COMMERCIAL

## 2023-01-26 VITALS
BODY MASS INDEX: 54.11 KG/M2 | HEART RATE: 86 BPM | WEIGHT: 286.63 LBS | HEIGHT: 61 IN | DIASTOLIC BLOOD PRESSURE: 74 MMHG | SYSTOLIC BLOOD PRESSURE: 104 MMHG | OXYGEN SATURATION: 96 %

## 2023-01-26 DIAGNOSIS — R68.89 COLD INTOLERANCE: ICD-10-CM

## 2023-01-26 DIAGNOSIS — R49.0 HOARSENESS: Primary | ICD-10-CM

## 2023-01-26 PROCEDURE — 84480 ASSAY TRIIODOTHYRONINE (T3): CPT | Performed by: PHYSICIAN ASSISTANT

## 2023-01-26 PROCEDURE — 84439 ASSAY OF FREE THYROXINE: CPT | Performed by: PHYSICIAN ASSISTANT

## 2023-01-26 PROCEDURE — 99213 OFFICE O/P EST LOW 20 MIN: CPT | Mod: S$GLB,,, | Performed by: PHYSICIAN ASSISTANT

## 2023-01-26 PROCEDURE — 3008F BODY MASS INDEX DOCD: CPT | Mod: CPTII,S$GLB,, | Performed by: PHYSICIAN ASSISTANT

## 2023-01-26 PROCEDURE — 3008F PR BODY MASS INDEX (BMI) DOCUMENTED: ICD-10-PCS | Mod: CPTII,S$GLB,, | Performed by: PHYSICIAN ASSISTANT

## 2023-01-26 PROCEDURE — 99213 PR OFFICE/OUTPT VISIT, EST, LEVL III, 20-29 MIN: ICD-10-PCS | Mod: S$GLB,,, | Performed by: PHYSICIAN ASSISTANT

## 2023-01-26 PROCEDURE — 3078F DIAST BP <80 MM HG: CPT | Mod: CPTII,S$GLB,, | Performed by: PHYSICIAN ASSISTANT

## 2023-01-26 PROCEDURE — 1159F PR MEDICATION LIST DOCUMENTED IN MEDICAL RECORD: ICD-10-PCS | Mod: CPTII,S$GLB,, | Performed by: PHYSICIAN ASSISTANT

## 2023-01-26 PROCEDURE — 1160F RVW MEDS BY RX/DR IN RCRD: CPT | Mod: CPTII,S$GLB,, | Performed by: PHYSICIAN ASSISTANT

## 2023-01-26 PROCEDURE — 4010F ACE/ARB THERAPY RXD/TAKEN: CPT | Mod: CPTII,S$GLB,, | Performed by: PHYSICIAN ASSISTANT

## 2023-01-26 PROCEDURE — 3074F SYST BP LT 130 MM HG: CPT | Mod: CPTII,S$GLB,, | Performed by: PHYSICIAN ASSISTANT

## 2023-01-26 PROCEDURE — 36415 COLL VENOUS BLD VENIPUNCTURE: CPT | Performed by: PHYSICIAN ASSISTANT

## 2023-01-26 PROCEDURE — 1159F MED LIST DOCD IN RCRD: CPT | Mod: CPTII,S$GLB,, | Performed by: PHYSICIAN ASSISTANT

## 2023-01-26 PROCEDURE — 1160F PR REVIEW ALL MEDS BY PRESCRIBER/CLIN PHARMACIST DOCUMENTED: ICD-10-PCS | Mod: CPTII,S$GLB,, | Performed by: PHYSICIAN ASSISTANT

## 2023-01-26 PROCEDURE — 99999 PR PBB SHADOW E&M-EST. PATIENT-LVL V: CPT | Mod: PBBFAC,,, | Performed by: PHYSICIAN ASSISTANT

## 2023-01-26 PROCEDURE — 4010F PR ACE/ARB THEARPY RXD/TAKEN: ICD-10-PCS | Mod: CPTII,S$GLB,, | Performed by: PHYSICIAN ASSISTANT

## 2023-01-26 PROCEDURE — 99999 PR PBB SHADOW E&M-EST. PATIENT-LVL V: ICD-10-PCS | Mod: PBBFAC,,, | Performed by: PHYSICIAN ASSISTANT

## 2023-01-26 PROCEDURE — 3074F PR MOST RECENT SYSTOLIC BLOOD PRESSURE < 130 MM HG: ICD-10-PCS | Mod: CPTII,S$GLB,, | Performed by: PHYSICIAN ASSISTANT

## 2023-01-26 PROCEDURE — 3078F PR MOST RECENT DIASTOLIC BLOOD PRESSURE < 80 MM HG: ICD-10-PCS | Mod: CPTII,S$GLB,, | Performed by: PHYSICIAN ASSISTANT

## 2023-01-26 PROCEDURE — 84443 ASSAY THYROID STIM HORMONE: CPT | Performed by: PHYSICIAN ASSISTANT

## 2023-01-26 NOTE — PROGRESS NOTES
Subjective:      Patient ID: Tawny Valerio is a 62 y.o. female.    Chief Complaint: Follow-up    Thyroid Problem  Presents for initial visit. Symptoms include cold intolerance, diarrhea, hair loss, hoarse voice and leg swelling. Patient reports no anxiety, constipation, depressed mood, diaphoresis, dry skin, fatigue, heat intolerance, menstrual problem, nail problem, palpitations, tremors, visual change, weight gain or weight loss. The symptoms have been stable. Risk factors include family history of goiter.   Here to discuss ongoing issues/concerns that she has about her thyroid.     Patient Active Problem List   Diagnosis    Diabetes type 2, controlled    Vitamin D insufficiency    SOB (shortness of breath)    Morbid obesity with BMI of 60.0-69.9, adult    Edema leg    ASHLI (obstructive sleep apnea)    Essential hypertension    Uterine polyp    Simple endometrial hyperplasia    Endometrial ca    s/p RALH/BSO 8/26/2019         Current Outpatient Medications:     acetaminophen (TYLENOL) 325 MG tablet, Take 2 tablets (650 mg total) by mouth every 6 (six) hours as needed for Pain., Disp: 30 tablet, Rfl: 1    aspirin 81 MG Chew, Take 81 mg by mouth once daily., Disp: , Rfl:     atorvastatin (LIPITOR) 20 MG tablet, Take 1 tablet (20 mg total) by mouth once daily., Disp: 30 tablet, Rfl: 11    azelastine (ASTELIN) 137 mcg (0.1 %) nasal spray, 1 spray (137 mcg total) by Nasal route 2 (two) times daily., Disp: 30 mL, Rfl: 11    buPROPion (WELLBUTRIN SR) 150 MG TBSR 12 hr tablet, Take 1 tablet (150 mg total) by mouth 2 (two) times daily., Disp: 60 tablet, Rfl: 11    ciprofloxacin HCl (CIPRO) 500 MG tablet, Take 1 tablet (500 mg total) by mouth every 12 (twelve) hours. for 10 days, Disp: 20 tablet, Rfl: 0    clindamycin (CLEOCIN T) 1 % lotion, Apply topically 2 (two) times daily., Disp: 60 mL, Rfl: 3    empagliflozin (JARDIANCE) 10 mg tablet, Take 1 tablet (10 mg total) by mouth once daily., Disp: 90 tablet, Rfl: 1     "ergocalciferol (ERGOCALCIFEROL) 50,000 unit Cap, TAKE 1 CAPSULE BY MOUTH ONE TIME PER WEEK, Disp: 12 capsule, Rfl: 3    gabapentin (NEURONTIN) 300 MG capsule, Take 1 capsule (300 mg total) by mouth every evening., Disp: 30 capsule, Rfl: 0    hydrocortisone 2.5 % cream, Apply topically 2 (two) times daily. AAA bid, Disp: 30 g, Rfl: 1    ibuprofen (ADVIL,MOTRIN) 600 MG tablet, Take 1 tablet (600 mg total) by mouth 3 (three) times daily., Disp: 30 tablet, Rfl: 1    lancets Misc, Check fasting glucose daily, Disp: 100 each, Rfl: 3    losartan (COZAAR) 25 MG tablet, Take 1 tablet (25 mg total) by mouth once daily., Disp: 90 tablet, Rfl: 3    methylPREDNISolone (MEDROL DOSEPACK) 4 mg tablet, use as directed, Disp: 1 each, Rfl: 0    mupirocin (BACTROBAN) 2 % ointment, Apply topically 2 (two) times daily., Disp: 30 g, Rfl: 0    semaglutide 2 mg/dose (8 mg/3 mL) PnIj, Inject 2 mg into the skin every 7 days., Disp: 9 mL, Rfl: 0    tretinoin (RETIN-A) 0.025 % cream, Apply topically every evening., Disp: 45 g, Rfl: 2    metronidazole 0.75% (METROCREAM) 0.75 % Crea, Apply topically 2 (two) times daily. For rosacea on face., Disp: 45 g, Rfl: 3    Review of Systems   Constitutional:  Negative for diaphoresis, fatigue, weight gain and weight loss.   HENT:  Positive for hoarse voice.    Cardiovascular:  Negative for palpitations.   Gastrointestinal:  Positive for diarrhea. Negative for constipation.   Endocrine: Positive for cold intolerance. Negative for heat intolerance.   Genitourinary:  Negative for menstrual problem.   Neurological:  Negative for tremors.   Psychiatric/Behavioral:  The patient is not nervous/anxious.    Objective:   /74 (BP Location: Left arm, Patient Position: Sitting, BP Method: X-Large (Manual))   Pulse 86   Ht 5' 1" (1.549 m)   Wt 130 kg (286 lb 9.6 oz)   LMP 01/15/2019 (Approximate)   SpO2 96%   BMI 54.15 kg/m²     Physical Exam  Vitals and nursing note reviewed.   Constitutional:       " General: She is not in acute distress.     Appearance: Normal appearance. She is well-developed. She is not ill-appearing, toxic-appearing or diaphoretic.   HENT:      Head: Normocephalic and atraumatic.   Neck:      Thyroid: No thyroid mass, thyromegaly or thyroid tenderness.   Cardiovascular:      Rate and Rhythm: Normal rate and regular rhythm.      Heart sounds: Normal heart sounds. No murmur heard.    No friction rub. No gallop.   Pulmonary:      Effort: Pulmonary effort is normal. No respiratory distress.      Breath sounds: Normal breath sounds. No wheezing or rales.   Musculoskeletal:         General: Normal range of motion.   Skin:     General: Skin is warm.      Capillary Refill: Capillary refill takes less than 2 seconds.      Findings: No rash.   Neurological:      Mental Status: She is alert and oriented to person, place, and time.      Motor: No weakness.      Gait: Gait normal.   Psychiatric:         Mood and Affect: Mood normal.         Behavior: Behavior normal.         Thought Content: Thought content normal.         Judgment: Judgment normal.       Assessment:     1. Hoarseness    2. Cold intolerance      Plan:   Hoarseness  -     US Soft Tissue Head Neck Thyroid; Future; Expected date: 01/26/2023    Cold intolerance  -     TSH; Future  -     T3; Future; Expected date: 01/26/2023  -     T4, Free; Future; Expected date: 01/26/2023        Follow up if symptoms worsen or fail to improve.

## 2023-01-27 ENCOUNTER — PATIENT MESSAGE (OUTPATIENT)
Dept: INTERNAL MEDICINE | Facility: CLINIC | Age: 63
End: 2023-01-27
Payer: COMMERCIAL

## 2023-01-27 LAB
T3 SERPL-MCNC: 101 NG/DL (ref 60–180)
T4 FREE SERPL-MCNC: 0.82 NG/DL (ref 0.71–1.51)
TSH SERPL DL<=0.005 MIU/L-ACNC: 1.21 UIU/ML (ref 0.4–4)

## 2023-01-31 ENCOUNTER — HOSPITAL ENCOUNTER (OUTPATIENT)
Dept: RADIOLOGY | Facility: HOSPITAL | Age: 63
Discharge: HOME | End: 2023-01-31
Attending: PHYSICIAN ASSISTANT
Payer: COMMERCIAL

## 2023-01-31 DIAGNOSIS — R49.0 HOARSENESS: ICD-10-CM

## 2023-01-31 PROCEDURE — 76536 US SOFT TISSUE HEAD NECK THYROID: ICD-10-PCS | Mod: 26,,, | Performed by: RADIOLOGY

## 2023-01-31 PROCEDURE — 76536 US EXAM OF HEAD AND NECK: CPT | Mod: 26,,, | Performed by: RADIOLOGY

## 2023-01-31 PROCEDURE — 76536 US EXAM OF HEAD AND NECK: CPT | Mod: TC

## 2023-02-06 ENCOUNTER — CLINICAL SUPPORT (OUTPATIENT)
Dept: REHABILITATION | Facility: HOSPITAL | Age: 63
End: 2023-02-06
Payer: COMMERCIAL

## 2023-02-06 DIAGNOSIS — M54.32 SCIATICA OF LEFT SIDE: ICD-10-CM

## 2023-02-06 DIAGNOSIS — Z74.09 DECREASED STRENGTH, ENDURANCE, AND MOBILITY: ICD-10-CM

## 2023-02-06 DIAGNOSIS — R68.89 DECREASED STRENGTH, ENDURANCE, AND MOBILITY: ICD-10-CM

## 2023-02-06 DIAGNOSIS — R26.9 GAIT ABNORMALITY: ICD-10-CM

## 2023-02-06 DIAGNOSIS — R53.1 DECREASED STRENGTH, ENDURANCE, AND MOBILITY: ICD-10-CM

## 2023-02-06 DIAGNOSIS — M25.60 DECREASED RANGE OF MOTION: ICD-10-CM

## 2023-02-06 PROCEDURE — 97162 PT EVAL MOD COMPLEX 30 MIN: CPT

## 2023-02-06 NOTE — PLAN OF CARE
[facility]   Physical Therapy Initial Evaluation   Date: 2/6/2023   [id number]    Therapy Diagnosis:    Encounter Diagnoses   Name Primary?    Sciatica of left side     Decreased strength, endurance, and mobility       Physician: Maya Rivas*     Physician Orders: PT Eval and Treat  Medical Diagnosis from Referral: Sciatica of left side  Evaluation Date: 2/6/2023  Authorization Period Expiration: 10/6/23  Plan of Care Expiration: 4/6/23  Progress Note Due: 3/6/2023  Visit # / Visits authorized: 1/1  FOTO: 1/3 (last performed on 2/6/2023)    Precautions: Diabetes, cancer, and anxiety, HTN    Time In: 2:15 pm  Time Out: 3:05 pm  Total Billable Time (timed & untimed codes): 45 minutes    SUBJECTIVE   Date of onset: flare up of sciatica in late October    History of current condition - Tawny reports had a flare up of sciatica symptoms in October, had prednisone to help get through her daughters wedding.  Is on and off and now but has recently flared up.  She reports that she has pain after standing for more then 5 minutes. Static positions seem to be worse. She reports that in the summer she is in her pool a lot and has less pain overall.    Current Activity Level: Trying to do silver sneakers chair exercise class on [employer].    Falls: [x] No  [] Yes:     Imaging: [] Xray [] MRI [] CT: None for lower back    Prior Therapy:  [x] No  [] Yes:   Social History: Pt lives with their spouse [x] House [] Apartment/Condo []other  Stairs: [x] No  [] Yes:   Occupation: Patient is desk work, computer/sales.  Prior Level of Function: not a lot of change in past 6 months  Current Level of Function:  standing still for about 5 min, walking ~ 15 minutes (fatigue not pain).    Pain:  Current 0/10, worst 8/10, best 0/10   Location: left upper leg and lower back, leg feels numb along the outside of upper leg.  Description: Numb and pinching/  sting  Aggravating Factors: standing  Easing Factors:  OTC medication, gabapentin    Dominant Extremity:    [x] Right    [] Left    Pts goals: Increase standing time, to improve QOL.    Medical History:   Past Medical History:   Diagnosis Date    Acute cystitis without hematuria 2019    Anxiety     Colon cancer screening 2019    Diabetes type 2, controlled 2016    Endometrial ca 2019    Essential hypertension 2018    Simple endometrial hyperplasia 3/15/2019    Uterine polyp 3/14/2019       Surgical History:   Tawny Valerio  has a past surgical history that includes lipoma removed; Dilation and curettage of uterus (2019); Hysteroscopy with dilation and curettage of uterus (N/A, 2019);  section; Robot-assisted laparoscopic salpingo-oophorectomy using da Yaya Xi (Bilateral, 2019); Robot-assisted laparoscopic abdominal hysterectomy using da Yaya Xi (N/A, 2019); and Robot-assisted laparoscopic lysis of adhesions using da Yaya Xi (N/A, 2019).    Medications:   Tawny has a current medication list which includes the following prescription(s): acetaminophen, aspirin, atorvastatin, azelastine, bupropion, clindamycin, jardiance, ergocalciferol, gabapentin, hydrocortisone, ibuprofen, lancets, losartan, methylprednisolone, metronidazole 0.75%, mupirocin, semaglutide, and tretinoin.    Allergies:   Review of patient's allergies indicates:   Allergen Reactions    Celebrex [celecoxib] Anaphylaxis    Codeine Hives     Does not know what she can take -usually just takes tylenol or ibuprofen    Lidocaine Hives    Vicodin [hydrocodone-acetaminophen] Hives and Swelling        OBJECTIVE     Posture:  Pt presents with postural abnormalities which include:    [x] Forward Head   [x] Increased Lumbar Lordosis   [x] Rounded Shoulder   [] Flat Back Posture   [] Increased Thoracic Kyphosis [] Pes Planus   [] Increased Trunk Sway  [] Valgus knee position   [] Increased Trunk  Rotation  [] Varus knee position   [] Increased cervical lordosis [x] Other: Mesomorphic body type    Sensation:    Sensation to light touch over UE's is  [x] Intact [] Impaired [] Defer  Sensation to light touch over LE's is  [] Intact [x] Impaired [] Defer: decreased over left lateral thigh    Reflexes:  Patellar   [x] Defer [] Normal [] Hyper []  Hypo   Achilles  [x] Defer [] Normal [] Hyper []  Hypo  Tricep  [x] Defer [] Normal [] Hyper []  Hypo  Brachioradialis [x] Defer [] Normal [] Hyper []  Hypo      Gait Analysis: The patient ambulated with the following assistive device: none; the pt presents with the following gait abnormalities: unsteady gait, decreased step length, antalgic gait, and + hip drop.     Balance  Right   (seconds) Left  (seconds) Pain/dysfunction Noted   Narrow Base of Support NT ---    Tandem Stance NT NT    Single Leg Stance NT NT              Functional Tests  Outcome Norms   30 second Sit to Stand 8x Age Male Female   60-64 <14 <12   65-69 <12 <11   70-74 <12 <10   75-79 <11 <10   80-84 <10 <9   85-89 <8 <8   90-93 <7 <4      Five Time Sit to Stand 17.93 sec 60s: <11.4 sec  70s: <12.6 sec  80s: 14.8 sec        Range of Motion:    Knee AROM/PROM Right Left Pain/Dysfunction with Movement   Knee Flexion (135º) 110 80    Knee Extension (0º) sitting/supine -2/-8 -5/-15        AROM:  Motion: Movement Results:   Multi-Segmental Flexion ankles   Multi-Segmental Extension 70%   Multi-Segmental Rotation 90% bilateral    Multi-Segmental sidebending To knees bilateral      In supine decreased left side IR/ER of hip with pain reproduced in groin, anterior thigh and lateral thigh.    Strength:    L/E MMT Right  (spine) Left Pain/Dysfunction with Movement   Hip Flexion  2+/5 2+/5    Hip Extension  NT NT    Hip Abduction  NT NT    Knee Extension 5/5 5/5    Knee Flexion 5/5 5/5    Hip IR 4-/5 4-/5    Hip ER 4/5 4/5    Ankle DF 4+/5 4+/5         Muscle Length:   defer    Joint Mobility:   Decreased left  hip    Special Tests:     Right  (spine) Left    SLUMP [] Positive    [x] Negative [] Positive    [x] Negative   SHEY  [] Positive    [x] Negative [x] Positive    [] Negative   FADIR  [] Positive    [x] Negative [x] Positive    [] Negative   Scour  [] Positive    [x] Negative [x] Positive    [] Negative       Palpation:  Tender to palpation over adductors, tensor fasciae latae and ITB, also very tender at greater trochanter.      FOTO:    CMS Impairment/Limitation/Restriction for FOTO hip Survey    Therapist reviewed FOTO scores for Tawny on 2/6/2023.   FOTO documents entered into [employer] - see Media section.    Limitation Score: see below           TREATMENT     Total Treatment time (time-based codes) separate from Evaluation: (5) minutes     Tawny received the treatments listed below:   Tawny received therapeutic exercises to develop strength, endurance, ROM, and core stabilization for 5 minutes including:    Intervention 2/6/2023  Parameters   Home exercise program  x                                                             Plan for Next Visit: Hip and core strength        PATIENT EDUCATION AND HOME EXERCISES     Education provided: (during treatment session) minutes  Home exercise program, and importance of regular activity/exercise     Written Home Exercises Provided: yes.  Exercises were reviewed and Tawny was able to demonstrate them prior to the end of the session.  Tawny demonstrated good  understanding of the education provided. See EMR under Patient Instructions for exercises provided during therapy sessions.    ASSESSMENT     Tawny is a 62 y.o. female referred to outpatient Physical Therapy with a medical diagnosis of Sciatica of left side. The patient presents with signs and symptoms consistent with diagnosis along with generalized deconditioning and impairments which include weakness, impaired endurance, impaired self care skills, impaired functional mobility, gait instability,  impaired balance, decreased lower extremity function, pain, and abnormal tone.      Pt prognosis is Good, if patient is consistent and compliant with Physical Therapy and home exercise program.  Pt will benefit from skilled outpatient Physical Therapy to address the deficits stated above and in the chart below, provide pt/family education, and to maximize pt's level of independence.     Plan of care discussed with patient: Yes  Pt's spiritual, cultural and educational needs considered and patient is agreeable to the plan of care and goals as stated below:     Anticipated Barriers for therapy: sedentary lifestyle, chronicity of condition, lack of understanding of condition, adherence to treatment plan, and coping style,  Anxiety or Panic Disorders, Arthritis, Back pain, BMI over 30, Cancer, DiabetesType I or II, Prior Surgery, Sleep dysfunction    Medical Necessity is demonstrated by the following  History  Co-morbidities and personal factors that may impact the plan of care Co-morbidities:   Past Medical History:   Diagnosis Date    Acute cystitis without hematuria 9/4/2019    Anxiety     Colon cancer screening 9/26/2019    Diabetes type 2, controlled 4/22/2016    Endometrial ca 7/24/2019    Essential hypertension 5/14/2018    Simple endometrial hyperplasia 3/15/2019    Uterine polyp 3/14/2019   sedentary lifestyle, chronicity of condition, lack of understanding of condition, adherence to treatment plan, and coping style,  Anxiety or Panic Disorders, Arthritis, Back pain, BMI over 30, Cancer, DiabetesType I or II, Prior Surgery, Sleep dysfunction    Personal Factors:   []Age   []Education   [x]Coping style   []Social background   [x]Lifestyle    []Character   []Attitudes   []Other:   []No deficits      []Low   []Moderate  [x]High    Examination  Body Structures and Functions, activity limitations and participation restrictions that may impact the plan of care Body Regions:   []Head  []Neck   [x]Back   [x]Trunk   "[]Upper extremities   [x]Lower extremities   []Other:      Body Systems:    []Gross symmetry   [x]ROM   [x]Strength   []Gross coordinated movement   [x]Balance   [x]Gait  [] Muscle tone  [] Other:  [x]Transfers  [x]Transitions   []Motor control   []Motor learning   []Scar formation  [x] Joint mobility  [] Muscle length      Participation Restrictions:   See above in "Current Level of Function"     Activity limitations:   Learning and applying knowledge  [x]No deficits       General Tasks and Commands  [x]No deficits    Communication  [x]No deficits    Mobility   []No deficits  []Fine hand use  [x]Walking   []Driving  [] Other: [x]Lifting/carrying objects  []Using Transportation   []Moving around using equipment  [x]Stairs       Self care  []No deficits  [x]Washing oneself   [x]Caring for body parts   []Toileting   [x]Dressing  []Eating   []Drinking   []Looking after one's health  []Other:        Domestic Life  []No Deficits  [x]Shopping   [x]Cooking  [x]Doing housework  [x]Assisting others   []Other:      Interactions/Relationships  [x]No deficits    Life Areas  [x]No deficits  [] Employment     Community and Social Life  [x]Community life  [x]Recreation and leisure   []Orthodoxy and spirituality  []Human rights   []Political life / citizenship  []No deficits      []Low   []Moderate  [x]High    Clinical Presentation []Stable and uncomplicated   [x]Evolving presentation with changing characteristics  []Unstable presentation with unpredictable characteristics []Low   [x]Moderate  []High      Decision Making/ Complexity Score:  []Low   [x]Moderate  []High      Goals:  Reviewed:2/6/2023    Short Term Goals: In 4 weeks   1.Patient to be educated on HEP.  2.Patient to increase knee range of motion to full extension, in order to improve available range of motion for ADL's.    3.Patient to increase bilateral LE strength by 1/2 grade, in order to improve endurance and increase ability to perform all functional activities for " increased time.   4.Patient to have pain less than 5/10 at worst, to improve QOL.  5.Patient to improve score on the FOTO, to improve QOL.  6. Patient to improve score on 30 sec sit to stand, 5x sit to stand  in order to decrease fall risk.    Long Term Goals: In 8 weeks  1.Patient to improve score on the FOTO to 48% or less, to improve QOL.  2. Patient to increase bilateral LE strength to 4+/5 or greater, in order to improve endurance and increase ability to perform all functional activities for increased time.  3. Patient to have decreased pain to 2/10 at worst, to improve QOL.  4. Patient to normalize score on 30 sec sit to stand, 5x sit to stand, in order to improve endurance and decrease fall risk.  5. Patient to perform daily activities including:   Putting on your shoes or socks - A little bit of difficulty  Getting into or out of a car - A little bit of difficulty  Rolling over in bed. - A little bit of difficulty  Getting into or out of the bath - A little bit of difficulty  Sitting for 1 hour. - A little bit of difficulty  Getting up or down 10 stairs (about 1 flight of stairs) - Moderate difficulty  Standing for 1 hour - Moderate difficulty  Squatting - Quite a bit of difficulty  Walking a mile - Quite a bit of difficulty  With your usual hobbies, recreational or sporting activities - Quite a bit of difficulty.     PLAN     Plan of care Certification: 2/6/2023  to 4/6/23.    Outpatient Physical Therapy 2 times weekly for 8 weeks to include any combination of the following interventions: virtual visits, electrical stimulation (PRN), Aquatic Therapy, Gait Training, Manual Therapy, Neuromuscular Re-ed, Patient Education, Self Care, Therapeutic Exercise, Therapeutic Activites, Dry Needling, and Moist Hot Pack/Cold Pack.       Christiane Muller, PT      I CERTIFY THE NEED FOR THESE SERVICES FURNISHED UNDER THIS PLAN OF TREATMENT AND WHILE UNDER MY CARE   Physician's comments:     Physician's Signature:  ___________________________________________________

## 2023-02-08 ENCOUNTER — TELEPHONE (OUTPATIENT)
Dept: INTERNAL MEDICINE | Facility: CLINIC | Age: 63
End: 2023-02-08
Payer: COMMERCIAL

## 2023-02-08 DIAGNOSIS — M25.552 LEFT HIP PAIN: Primary | ICD-10-CM

## 2023-02-08 PROBLEM — Z74.09 DECREASED STRENGTH, ENDURANCE, AND MOBILITY: Status: ACTIVE | Noted: 2023-02-08

## 2023-02-08 PROBLEM — R68.89 DECREASED STRENGTH, ENDURANCE, AND MOBILITY: Status: ACTIVE | Noted: 2023-02-08

## 2023-02-08 PROBLEM — M25.60 DECREASED RANGE OF MOTION: Status: ACTIVE | Noted: 2023-02-08

## 2023-02-08 PROBLEM — R53.1 DECREASED STRENGTH, ENDURANCE, AND MOBILITY: Status: ACTIVE | Noted: 2023-02-08

## 2023-02-08 PROBLEM — R26.9 GAIT ABNORMALITY: Status: ACTIVE | Noted: 2023-02-08

## 2023-02-08 NOTE — TELEPHONE ENCOUNTER
----- Message from Christiane Muller PT sent at 2/8/2023  8:00 AM CST -----  Good morning,   I recently evaluated Ms Valerio for sciatica.    During evaluation her pain is reproduced more with special testing for the hip versus lower back.  Hip testing reproduced her lateral thigh pain and also caused anterior thigh and groin pain.  She may benefit from further assessment of her hip at this time, in addition to Physical Therapy.  Best,  Christiane

## 2023-02-21 ENCOUNTER — CLINICAL SUPPORT (OUTPATIENT)
Dept: REHABILITATION | Facility: HOSPITAL | Age: 63
End: 2023-02-21
Payer: COMMERCIAL

## 2023-02-21 DIAGNOSIS — Z74.09 DECREASED STRENGTH, ENDURANCE, AND MOBILITY: Primary | ICD-10-CM

## 2023-02-21 DIAGNOSIS — R68.89 DECREASED STRENGTH, ENDURANCE, AND MOBILITY: Primary | ICD-10-CM

## 2023-02-21 DIAGNOSIS — R26.9 GAIT ABNORMALITY: ICD-10-CM

## 2023-02-21 DIAGNOSIS — R53.1 DECREASED STRENGTH, ENDURANCE, AND MOBILITY: Primary | ICD-10-CM

## 2023-02-21 DIAGNOSIS — M25.60 DECREASED RANGE OF MOTION: ICD-10-CM

## 2023-02-21 PROCEDURE — 97140 MANUAL THERAPY 1/> REGIONS: CPT | Performed by: PHYSICAL THERAPIST

## 2023-02-21 PROCEDURE — 97112 NEUROMUSCULAR REEDUCATION: CPT | Performed by: PHYSICAL THERAPIST

## 2023-02-21 PROCEDURE — 97110 THERAPEUTIC EXERCISES: CPT | Performed by: PHYSICAL THERAPIST

## 2023-02-21 NOTE — PROGRESS NOTES
[facility]   Physical Therapy Treatment Note     Name: Tawny ESTRADA [facility] Number: [id number]    Therapy Diagnosis:   Encounter Diagnoses   Name Primary?    Decreased strength, endurance, and mobility Yes    Decreased range of motion     Gait abnormality      Physician: Maya Rivas*    Visit Date: 2/21/2023    Physician Orders: PT Eval and Treat  Medical Diagnosis from Referral: Sciatica of left side  Evaluation Date: 2/6/2023  Authorization Period Expiration: 10/6/23  Plan of Care Expiration: 4/6/23  Progress Note Due: 3/6/2023  Visit # / Visits authorized: 1/40 (+eval)  FOTO: 1/3 (last performed on 2/6/2023)     Precautions: Diabetes, cancer, and anxiety, HTN    PTA Visit #: 0/5     Time In: 1651  Time Out: 1731  Total Billable Time: 40 minutes (Billing reflects 1 on 1 treatment time spent with patient)    SUBJECTIVE     Patient reports: that she has been feeling a little better since her evaluation. She states that she knows a lot of it is her left knee, but she does still have some numbness into the L LE as well. Patient reports that she also wonders if she has a lipoma in her left lower leg because she has one in her shoulder, and it feels similar.     He/She was not compliant with home exercise program.  Response to previous treatment: no adverse reaction   Functional change: none noted yet    Pain: 4/10     Location: left hip and LE    OBJECTIVE     Objective Measures updated at progress report only unless specified.       TREATMENT     Tawny received the treatments listed below:       MANUAL THERAPY TECHNIQUES were applied for (10) minutes, including:    Manual Intervention Performed Today    Soft Tissue Mobilization & IASTM [x] left glutes, piriformis, and ITB   Joint Mobilizations []     []     []    Functional Dry Needling  []      Plan for Next Visit: Continue as needed     ,     THERAPEUTIC EXERCISES to develop strength, endurance, ROM, flexibility,  "posture, and core stabilization for (15) minutes including:    Intervention Performed Today    Nustep for ROM and strength x 7', level 1   Heel slides with strap x 3 x 10, 5" holds   Lower trunk rotations x 2 x 10, 5" holds                              Plan for Next Visit:      ,     NEUROMUSCULAR RE-EDUCATION ACTIVITIES to improve Balance, Coordination, Kinesthetic, Sense, Proprioception, and Posture for (15) minutes.  The following were included:    Intervention Performed Today    Quad sets x 3 x 10, 3-5" holds, L LE, tactile cues required initially   Pelvic tilts x 3 x 10, tactile cues required initially   Bridges x 2 x 10                              Plan for Next Visit:          PATIENT EDUCATION AND HOME EXERCISES     Home Exercises Provided and Patient Education Provided     Education provided:   PURPOSE: Patient educated on the impairments noted above and the effects of physical therapy intervention to improve overall condition and QOL.   EXERCISE: Patient was educated on all the above exercise prior/during/after for proper posture, positioning, and execution for safe performance with home exercise program.   STRENGTH: Patient educated on the importance of improved core and extremity strength in order to improve alignment of the spine and extremities with static positions and dynamic movement.   GAIT & BALANCE: Patient educated on the importance of strong core and lower extremity musculature in order to improve both static and dynamic balance, improve gait mechanics, reduce fall risk and improve household and community mobility.   POSTURE: Patient educated on postural awareness to reduce stress and maintain optimal alignment of the spine with static positions and dynamic movement     Written Home Exercises Provided: yes.  Exercises were reviewed and Tawny was able to demonstrate them prior to the end of the session.  Tawny demonstrated good  understanding of the education provided. See EMR under " Patient Instructions for exercises provided during therapy sessions.    ASSESSMENT     Patient tolerated treatment well, with only minimal difficulty and complaint due to decreased strength and core stabilization. Patient reported significant relief with manual therapy this visit. Soft tissue adhesions noted throughout L piriformis and gluteal musculature, which improved with manual therapy.     Tawny is progressing well towards her goals.   Pt prognosis is Good.     Pt will continue to benefit from skilled outpatient physical therapy to address the deficits listed in the problem list box on initial evaluation, provide pt/family education and to maximize pt's level of independence in the home and community environment.     Pt's spiritual, cultural and educational needs considered and pt agreeable to plan of care and goals.     Anticipated Barriers for therapy: sedentary lifestyle, chronicity of condition, lack of understanding of condition, adherence to treatment plan, and coping style,  Anxiety or Panic Disorders, Arthritis, Back pain, BMI over 30, Cancer, DiabetesType I or II, Prior Surgery, Sleep dysfunction      Goals:  Reviewed:2/6/2023    Short Term Goals: In 4 weeks   1.Patient to be educated on HEP.  2.Patient to increase knee range of motion to full extension, in order to improve available range of motion for ADL's.    3.Patient to increase bilateral LE strength by 1/2 grade, in order to improve endurance and increase ability to perform all functional activities for increased time.   4.Patient to have pain less than 5/10 at worst, to improve QOL.  5.Patient to improve score on the FOTO, to improve QOL.  6. Patient to improve score on 30 sec sit to stand, 5x sit to stand  in order to decrease fall risk.     Long Term Goals: In 8 weeks  1.Patient to improve score on the FOTO to 48% or less, to improve QOL.  2. Patient to increase bilateral LE strength to 4+/5 or greater, in order to improve endurance and  increase ability to perform all functional activities for increased time.  3. Patient to have decreased pain to 2/10 at worst, to improve QOL.  4. Patient to normalize score on 30 sec sit to stand, 5x sit to stand, in order to improve endurance and decrease fall risk.  5. Patient to perform daily activities including:   Putting on your shoes or socks - A little bit of difficulty  Getting into or out of a car - A little bit of difficulty  Rolling over in bed. - A little bit of difficulty  Getting into or out of the bath - A little bit of difficulty  Sitting for 1 hour. - A little bit of difficulty  Getting up or down 10 stairs (about 1 flight of stairs) - Moderate difficulty  Standing for 1 hour - Moderate difficulty  Squatting - Quite a bit of difficulty  Walking a mile - Quite a bit of difficulty  With your usual hobbies, recreational or sporting activities - Quite a bit of difficulty.      PLAN     Continue Plan of Care (POC) and progress per patient tolerance. See treatment section for details on planned progressions next session.    2/6/2023 (evaluation): Outpatient Physical Therapy 2 times weekly for 8 weeks to include any combination of the following interventions: virtual visits, electrical stimulation (PRN), Aquatic Therapy, Gait Training, Manual Therapy, Neuromuscular Re-ed, Patient Education, Self Care, Therapeutic Exercise, Therapeutic Activites, Dry Needling, and Moist Hot Pack/Cold Pack.     Marisabel Amaya, PT

## 2023-02-22 ENCOUNTER — PATIENT MESSAGE (OUTPATIENT)
Dept: BARIATRICS | Facility: CLINIC | Age: 63
End: 2023-02-22
Payer: COMMERCIAL

## 2023-02-24 ENCOUNTER — CLINICAL SUPPORT (OUTPATIENT)
Dept: REHABILITATION | Facility: HOSPITAL | Age: 63
End: 2023-02-24
Payer: COMMERCIAL

## 2023-02-24 DIAGNOSIS — R26.9 GAIT ABNORMALITY: ICD-10-CM

## 2023-02-24 DIAGNOSIS — R68.89 DECREASED STRENGTH, ENDURANCE, AND MOBILITY: Primary | ICD-10-CM

## 2023-02-24 DIAGNOSIS — Z74.09 DECREASED STRENGTH, ENDURANCE, AND MOBILITY: Primary | ICD-10-CM

## 2023-02-24 DIAGNOSIS — R53.1 DECREASED STRENGTH, ENDURANCE, AND MOBILITY: Primary | ICD-10-CM

## 2023-02-24 DIAGNOSIS — M25.60 DECREASED RANGE OF MOTION: ICD-10-CM

## 2023-02-24 DIAGNOSIS — E11.9 TYPE 2 DIABETES MELLITUS WITHOUT COMPLICATION: ICD-10-CM

## 2023-02-24 PROCEDURE — 97112 NEUROMUSCULAR REEDUCATION: CPT | Mod: CQ

## 2023-02-24 PROCEDURE — 97110 THERAPEUTIC EXERCISES: CPT | Mod: CQ

## 2023-02-24 PROCEDURE — 97140 MANUAL THERAPY 1/> REGIONS: CPT | Mod: CQ

## 2023-02-24 NOTE — PROGRESS NOTES
[facility]   Physical Therapy Treatment Note     Name: Tawny ESTRADA [facility] Number: [id number]    Therapy Diagnosis:   Encounter Diagnoses   Name Primary?    Decreased strength, endurance, and mobility Yes    Decreased range of motion     Gait abnormality      Physician: Maya Rivas*    Visit Date: 2/24/2023    Physician Orders: PT Eval and Treat  Medical Diagnosis from Referral: Sciatica of left side  Evaluation Date: 2/6/2023  Authorization Period Expiration: 10/6/23  Plan of Care Expiration: 4/6/23  Progress Note Due: 3/6/2023  Visit # / Visits authorized: 2/40 (+eval)  FOTO: 1/3 (last performed on 2/6/2023)     Precautions: Diabetes, cancer, and anxiety, HTN    PTA Visit #: 1/5     Time In: 1400  Time Out: 1445  Total Billable Time: 45 minutes (Billing reflects 1 on 1 treatment time spent with patient)    SUBJECTIVE     Patient reports: feels a constant numbness in the lateral aspect of her left hip.     She was compliant with home exercise program.    Response to previous treatment: no adverse reaction     Functional change: prolonged sleeping on her let side causes hip numbness, sitting for about 60 minutes causes increased symptoms,     Pain: 4/10     Location: left hip and LE    OBJECTIVE     Objective Measures updated at progress report only unless specified.       TREATMENT     Tawny received the treatments listed below:       MANUAL THERAPY TECHNIQUES were applied for (10) minutes, including:    Manual Intervention Performed Today    Soft Tissue Mobilization & IASTM [x] left left hip flexors and ITB   Joint Mobilizations []     []     []    Functional Dry Needling  []      Plan for Next Visit: Continue as needed     ,   THERAPEUTIC EXERCISES to develop strength, endurance, ROM, flexibility, posture, and core stabilization for (15) minutes including:    Intervention Performed Today    Nustep for ROM and strength x 7', level 1   Heel slides with strap x 3 x 10,  5 second holds   Lower trunk rotations x 2 x 10, 5 second holds                              Plan for Next Visit:         NEUROMUSCULAR RE-EDUCATION ACTIVITIES to improve Balance, Coordination, Kinesthetic, Sense, Proprioception, and Posture for (20) minutes.  The following were included:    Intervention Performed Today    Quad sets x 3 x 10, 3-5 second holds, Left    Pelvic tilts x 3 x 10, tactile cues required initially   Bridges x 2 x 10                              Plan for Next Visit:        PATIENT EDUCATION AND HOME EXERCISES     Home Exercises Provided and Patient Education Provided     Education provided:   PURPOSE: Patient educated on the impairments noted above and the effects of physical therapy intervention to improve overall condition and QOL.   EXERCISE: Patient was educated on all the above exercise prior/during/after for proper posture, positioning, and execution for safe performance with home exercise program.   STRENGTH: Patient educated on the importance of improved core and extremity strength in order to improve alignment of the spine and extremities with static positions and dynamic movement.   GAIT & BALANCE: Patient educated on the importance of strong core and lower extremity musculature in order to improve both static and dynamic balance, improve gait mechanics, reduce fall risk and improve household and community mobility.   POSTURE: Patient educated on postural awareness to reduce stress and maintain optimal alignment of the spine with static positions and dynamic movement     Written Home Exercises Provided: yes.  Exercises were reviewed and Tawny was able to demonstrate them prior to the end of the session.  Tawny demonstrated good  understanding of the education provided. See EMR under Patient Instructions for exercises provided during therapy sessions.    ASSESSMENT     Patient tolerated treatment well, with minimal difficulty and complaint due to decreased strength and core  stabilization. Patient reported relief with manual therapy this visit. Soft tissue adhesions noted throughout Left hip flexors and proximal quadricepts, which improved with manual therapy.     Tawny is progressing well towards her goals.   Pt prognosis is Good.     Pt will continue to benefit from skilled outpatient physical therapy to address the deficits listed in the problem list box on initial evaluation, provide pt/family education and to maximize pt's level of independence in the home and community environment.     Pt's spiritual, cultural and educational needs considered and pt agreeable to plan of care and goals.     Anticipated Barriers for therapy: sedentary lifestyle, chronicity of condition, lack of understanding of condition, adherence to treatment plan, and coping style,  Anxiety or Panic Disorders, Arthritis, Back pain, BMI over 30, Cancer, DiabetesType I or II, Prior Surgery, Sleep dysfunction      Goals:  Reviewed:2/6/2023    Short Term Goals: In 4 weeks   1.Patient to be educated on HEP.  2.Patient to increase knee range of motion to full extension, in order to improve available range of motion for ADL's.    3.Patient to increase bilateral LE strength by 1/2 grade, in order to improve endurance and increase ability to perform all functional activities for increased time.   4.Patient to have pain less than 5/10 at worst, to improve QOL.  5.Patient to improve score on the FOTO, to improve QOL.  6. Patient to improve score on 30 sec sit to stand, 5x sit to stand  in order to decrease fall risk.     Long Term Goals: In 8 weeks  1.Patient to improve score on the FOTO to 48% or less, to improve QOL.  2. Patient to increase bilateral LE strength to 4+/5 or greater, in order to improve endurance and increase ability to perform all functional activities for increased time.  3. Patient to have decreased pain to 2/10 at worst, to improve QOL.  4. Patient to normalize score on 30 sec sit to stand, 5x sit to  stand, in order to improve endurance and decrease fall risk.  5. Patient to perform daily activities including:   Putting on your shoes or socks - A little bit of difficulty  Getting into or out of a car - A little bit of difficulty  Rolling over in bed. - A little bit of difficulty  Getting into or out of the bath - A little bit of difficulty  Sitting for 1 hour. - A little bit of difficulty  Getting up or down 10 stairs (about 1 flight of stairs) - Moderate difficulty  Standing for 1 hour - Moderate difficulty  Squatting - Quite a bit of difficulty  Walking a mile - Quite a bit of difficulty  With your usual hobbies, recreational or sporting activities - Quite a bit of difficulty.      PLAN     Continue Plan of Care (POC) and progress per patient tolerance. See treatment section for details on planned progressions next session.    2/6/2023 (evaluation): Outpatient Physical Therapy 2 times weekly for 8 weeks to include any combination of the following interventions: virtual visits, electrical stimulation (PRN), Aquatic Therapy, Gait Training, Manual Therapy, Neuromuscular Re-ed, Patient Education, Self Care, Therapeutic Exercise, Therapeutic Activites, Dry Needling, and Moist Hot Pack/Cold Pack.     Adan Hernandez, PTA

## 2023-02-27 ENCOUNTER — DOCUMENTATION ONLY (OUTPATIENT)
Dept: REHABILITATION | Facility: HOSPITAL | Age: 63
End: 2023-02-27
Payer: COMMERCIAL

## 2023-02-27 NOTE — PROGRESS NOTES
PT/PTA met face to face to discuss pt's treatment plan and progress towards established goals. Pt will be seen by a physical therapist minimally every 6th visit or every 30 days.        Adan Hernandez PTA

## 2023-02-28 ENCOUNTER — PATIENT MESSAGE (OUTPATIENT)
Dept: ADMINISTRATIVE | Facility: HOSPITAL | Age: 63
End: 2023-02-28
Payer: COMMERCIAL

## 2023-02-28 ENCOUNTER — CLINICAL SUPPORT (OUTPATIENT)
Dept: REHABILITATION | Facility: HOSPITAL | Age: 63
End: 2023-02-28
Payer: COMMERCIAL

## 2023-02-28 DIAGNOSIS — Z74.09 DECREASED STRENGTH, ENDURANCE, AND MOBILITY: Primary | ICD-10-CM

## 2023-02-28 DIAGNOSIS — M25.60 DECREASED RANGE OF MOTION: ICD-10-CM

## 2023-02-28 DIAGNOSIS — R68.89 DECREASED STRENGTH, ENDURANCE, AND MOBILITY: Primary | ICD-10-CM

## 2023-02-28 DIAGNOSIS — R53.1 DECREASED STRENGTH, ENDURANCE, AND MOBILITY: Primary | ICD-10-CM

## 2023-02-28 DIAGNOSIS — R26.9 GAIT ABNORMALITY: ICD-10-CM

## 2023-02-28 PROCEDURE — 97112 NEUROMUSCULAR REEDUCATION: CPT | Mod: CQ

## 2023-02-28 PROCEDURE — 97110 THERAPEUTIC EXERCISES: CPT | Mod: CQ

## 2023-02-28 PROCEDURE — 97140 MANUAL THERAPY 1/> REGIONS: CPT | Mod: CQ

## 2023-02-28 NOTE — PROGRESS NOTES
[facility]   Physical Therapy Treatment Note     Name: Tawny ESTRADA [facility] Number: [id number]    Therapy Diagnosis:   Encounter Diagnoses   Name Primary?    Decreased strength, endurance, and mobility Yes    Decreased range of motion     Gait abnormality      Physician: Maya Rivsa*    Visit Date: 2/28/2023    Physician Orders: PT Eval and Treat  Medical Diagnosis from Referral: Sciatica of left side  Evaluation Date: 2/6/2023  Authorization Period Expiration: 10/6/23  Plan of Care Expiration: 4/6/23  Progress Note Due: 3/6/2023  Visit # / Visits authorized: 3/40 (+eval)  FOTO: 1/3 (last performed on 2/6/2023)     Precautions: Diabetes, cancer, and anxiety, HTN    PTA Visit #: 2/5     Time In: 1700  Time Out: 1745  Total Billable Time: 41 minutes (Billing reflects 1 on 1 treatment time spent with patient)    SUBJECTIVE     Patient reports: feels a slight numbness that is not a constant symptom in the lateral aspect of her left hip. Gardening on Saturday and had to rest due to fatigue.     She was compliant with home exercise program.    Response to previous treatment: feeling looser after last session after manual therapy     Functional change: prolonged sleeping on her let side causes hip numbness, sitting for about 60 minutes causes increased symptoms,     Pain: 0/10     Location: left hip and LOWER EXTREMITY (no pain today)    OBJECTIVE     Objective Measures updated at progress report only unless specified.       TREATMENT     Tawny received the treatments listed below:       MANUAL THERAPY TECHNIQUES were applied for (12) minutes, including:    Manual Intervention Performed Today    Soft Tissue Mobilization & IASTM [x] left left hip flexors and ITB   Joint Mobilizations []     []     []    Functional Dry Needling  []      Plan for Next Visit: Continue as needed     ,   THERAPEUTIC EXERCISES to develop strength, endurance, ROM, flexibility, posture, and core  stabilization for (21) minutes including:    Intervention Performed Today    Nustep for ROM and strength x 7 minutes, level 3 (increased) for endurance   Heel slides with strap (home exercise program) x 3 x 10, 5 second holds   Lower trunk rotations (home exercise program) x 2 x 10, 5 second holds   Short arch quads with posterior pelvic tilt (added)  x 2 x 10 bilateral   Hip abduction with belt (added)  x 2 minute      Hip abduction with ball (added)  x 2 minute with 3 second hold               Plan for Next Visit:         NEUROMUSCULAR RE-EDUCATION ACTIVITIES to improve Balance, Coordination, Kinesthetic, Sense, Proprioception, and Posture for (10) minutes.  The following were included:    Intervention Performed Today    Quad sets  3 x 10, 3-5 second holds, Left    Pelvic tilts (home exercise program) x 3 x 10,    Bridges (home exercise program) x 2 x 10   clams     Straight leg raise with posterior pelvic tilt (added)  x 3 x 5 bilaterally                    Plan for Next Visit:        PATIENT EDUCATION AND HOME EXERCISES     Home Exercises Provided and Patient Education Provided     Education provided:   PURPOSE: Patient educated on the impairments noted above and the effects of physical therapy intervention to improve overall condition and QOL.   EXERCISE: Patient was educated on all the above exercise prior/during/after for proper posture, positioning, and execution for safe performance with home exercise program.   STRENGTH: Patient educated on the importance of improved core and extremity strength in order to improve alignment of the spine and extremities with static positions and dynamic movement.   GAIT & BALANCE: Patient educated on the importance of strong core and lower extremity musculature in order to improve both static and dynamic balance, improve gait mechanics, reduce fall risk and improve household and community mobility.   POSTURE: Patient educated on postural awareness to reduce stress and  maintain optimal alignment of the spine with static positions and dynamic movement     Written Home Exercises Provided: yes.  Exercises were reviewed and Tawny was able to demonstrate them prior to the end of the session.  Tawny demonstrated good  understanding of the education provided. See EMR under Patient Instructions for exercises provided during therapy sessions.    ASSESSMENT     Patient tolerated treatment with resistance on nustep and was able to be progressed with her performing more exercises. She reported less discomfort with manual therapy this visit and with noted less adhesions in her Left hip IT band.      Tawny is progressing well towards her goals.   Pt prognosis is Good.     Pt will continue to benefit from skilled outpatient physical therapy to address the deficits listed in the problem list box on initial evaluation, provide pt/family education and to maximize pt's level of independence in the home and community environment.     Pt's spiritual, cultural and educational needs considered and pt agreeable to plan of care and goals.     Anticipated Barriers for therapy: sedentary lifestyle, chronicity of condition, lack of understanding of condition, adherence to treatment plan, and coping style,  Anxiety or Panic Disorders, Arthritis, Back pain, BMI over 30, Cancer, DiabetesType I or II, Prior Surgery, Sleep dysfunction      Goals:  Reviewed:2/6/2023    Short Term Goals: In 4 weeks   1.Patient to be educated on HEP.  2.Patient to increase knee range of motion to full extension, in order to improve available range of motion for ADL's.    3.Patient to increase bilateral LE strength by 1/2 grade, in order to improve endurance and increase ability to perform all functional activities for increased time.   4.Patient to have pain less than 5/10 at worst, to improve QOL.  5.Patient to improve score on the FOTO, to improve QOL.  6. Patient to improve score on 30 sec sit to stand, 5x sit to stand   in order to decrease fall risk.     Long Term Goals: In 8 weeks  1.Patient to improve score on the FOTO to 48% or less, to improve QOL.  2. Patient to increase bilateral LE strength to 4+/5 or greater, in order to improve endurance and increase ability to perform all functional activities for increased time.  3. Patient to have decreased pain to 2/10 at worst, to improve QOL.  4. Patient to normalize score on 30 sec sit to stand, 5x sit to stand, in order to improve endurance and decrease fall risk.  5. Patient to perform daily activities including:   Putting on your shoes or socks - A little bit of difficulty  Getting into or out of a car - A little bit of difficulty  Rolling over in bed. - A little bit of difficulty  Getting into or out of the bath - A little bit of difficulty  Sitting for 1 hour. - A little bit of difficulty  Getting up or down 10 stairs (about 1 flight of stairs) - Moderate difficulty  Standing for 1 hour - Moderate difficulty  Squatting - Quite a bit of difficulty  Walking a mile - Quite a bit of difficulty  With your usual hobbies, recreational or sporting activities - Quite a bit of difficulty.      PLAN     Continue Plan of Care (POC) and progress per patient tolerance. See treatment section for details on planned progressions next session.    2/6/2023 (evaluation): Outpatient Physical Therapy 2 times weekly for 8 weeks to include any combination of the following interventions: virtual visits, electrical stimulation (PRN), Aquatic Therapy, Gait Training, Manual Therapy, Neuromuscular Re-ed, Patient Education, Self Care, Therapeutic Exercise, Therapeutic Activites, Dry Needling, and Moist Hot Pack/Cold Pack.     Adan Hernandez, PTA

## 2023-03-04 ENCOUNTER — PATIENT MESSAGE (OUTPATIENT)
Dept: BARIATRICS | Facility: CLINIC | Age: 63
End: 2023-03-04
Payer: COMMERCIAL

## 2023-03-04 DIAGNOSIS — E11.618 CONTROLLED TYPE 2 DIABETES MELLITUS WITH OTHER DIABETIC ARTHROPATHY, WITHOUT LONG-TERM CURRENT USE OF INSULIN: Primary | ICD-10-CM

## 2023-03-06 ENCOUNTER — CLINICAL SUPPORT (OUTPATIENT)
Dept: REHABILITATION | Facility: HOSPITAL | Age: 63
End: 2023-03-06
Payer: COMMERCIAL

## 2023-03-06 DIAGNOSIS — M25.60 DECREASED RANGE OF MOTION: ICD-10-CM

## 2023-03-06 DIAGNOSIS — R68.89 DECREASED STRENGTH, ENDURANCE, AND MOBILITY: Primary | ICD-10-CM

## 2023-03-06 DIAGNOSIS — R53.1 DECREASED STRENGTH, ENDURANCE, AND MOBILITY: Primary | ICD-10-CM

## 2023-03-06 DIAGNOSIS — R26.9 GAIT ABNORMALITY: ICD-10-CM

## 2023-03-06 DIAGNOSIS — Z74.09 DECREASED STRENGTH, ENDURANCE, AND MOBILITY: Primary | ICD-10-CM

## 2023-03-06 PROCEDURE — 97110 THERAPEUTIC EXERCISES: CPT | Mod: CQ

## 2023-03-06 NOTE — PROGRESS NOTES
[facility]   Physical Therapy Treatment Note + Progress Report    Name[facility]  [facility] Number: [id number]    Therapy Diagnosis:   Encounter Diagnoses   Name Primary?    Decreased strength, endurance, and mobility Yes    Decreased range of motion     Gait abnormality      Physician: Maya Rivas*    Visit Date: 3/6/2023    Physician Orders: PT Eval and Treat  Medical Diagnosis from Referral: Sciatica of left side  Evaluation Date: 2/6/2023  Authorization Period Expiration: 10/6/23  Plan of Care Expiration: 4/6/23  Progress Note Due: 3/6/2023  Visit # / Visits authorized: 4/40 (+eval)  FOTO: 1/3 (last performed on 2/6/2023)     Precautions: Diabetes, cancer, and anxiety, HTN    PTA Visit #: 3/5     Time In: 1555  Time Out: 1645  Total Billable Time: 40 minutes (Billing reflects 1 on 1 treatment time spent with patient)    SUBJECTIVE     Patient reports: she has noticed some numbness in her lateral hip and anterior left thigh. This occurred over the weekend and patient reports she had these episodes of numbness while laying supine with lower extremities out stretched. She reports that overall she is able to tell that she is moving in general better and is able to perform sit to stand better. Taking more breaks while at work.     She was compliant with home exercise program.    Response to previous treatment: feeling looser after last session after manual therapy     Functional change: prolonged sleeping on her let side causes hip numbness, sitting for about 60 minutes causes increased symptoms, working in her garden, and now able to stand and cook a meal. Walking more confidently up/down community surfaces    Pain: 0/10     Location: left hip and LOWER EXTREMITY (no pain today)    OBJECTIVE     Objective Measures updated at progress report only unless specified.     Functional Tests  Outcome Norms   30 second Sit to Stand 11x  Age Male Female   60-64 <14 <12   65-69 <12  <11   70-74 <12 <10   75-79 <11 <10   80-84 <10 <9   85-89 <8 <8   90-93 <7 <4      Five Time Sit to Stand 13 sec  60s: <11.4 sec  70s: <12.6 sec  80s: 14.8 sec         Range of Motion:     Knee AROM/PROM Right Left Pain/Dysfunction with Movement Right 3/6/2023 Left 3/6/2023   Knee Flexion (135º) 110 80   110 96   Knee Extension (0º) sitting/supine -2/-8 -5/-15   NT/-5 NT/-11      Strength:     L/E MMT Right   Left Pain/Dysfunction with Movement Right 3/6/2023 Left 3/6/2023   Hip Flexion  2+/5 2+/5   4+/5 4/5   Hip Extension  NT NT   NT NT   Hip Abduction  NT NT   5/5 sitting 5/5 sitting   Knee Extension 5/5 5/5   5/5 5/5   Knee Flexion 5/5 5/5   4+/5 4/5   Hip IR 4-/5 4-/5   4/5 4-/5   Hip ER 4/5 4/5   4/5 4/5   Ankle DF 4+/5 4+/5   5/5 5/5          TREATMENT     Tawny received the treatments listed below:       MANUAL THERAPY TECHNIQUES were applied for (0) minutes, including:    Manual Intervention Performed Today    Soft Tissue Mobilization & IASTM [] left left hip flexors and ITB   Joint Mobilizations []     []     []    Functional Dry Needling  []      Plan for Next Visit: Continue as needed     ,   THERAPEUTIC EXERCISES to develop strength, endurance, ROM, flexibility, posture, and core stabilization for (38) minutes including: including objective measurements     Intervention Performed Today    Nustep for ROM and strength x 5 minutes, level 1 (decreased) for endurance   Heel slides with strap (home exercise program)  3 x 10, 5 second holds   Lower trunk rotations (home exercise program)  2 x 10, 5 second holds   Short arch quads with posterior pelvic tilt  x 3 x 10 bilateral (increased)   Hip abduction with band x 3 minute red band (increased)      Hip abduction with ball  x 3 minute with 3 second hold (increased)               Plan for Next Visit:         NEUROMUSCULAR RE-EDUCATION ACTIVITIES to improve Balance, Coordination, Kinesthetic, Sense, Proprioception, and Posture for (2) minutes.  The following  were included:    Intervention Performed Today    Quad sets  3 x 10, 3-5 second holds, Left    Pelvic tilts (home exercise program)  3 x 10,    Bridges (home exercise program)  2 x 10   clams     Straight leg raise with posterior pelvic tilt (added)   3 x 5 bilaterally   March in place x 1 minute x 2 supine               Plan for Next Visit:        PATIENT EDUCATION AND HOME EXERCISES     Home Exercises Provided and Patient Education Provided     Education provided:   PURPOSE: Patient educated on the impairments noted above and the effects of physical therapy intervention to improve overall condition and QOL.   EXERCISE: Patient was educated on all the above exercise prior/during/after for proper posture, positioning, and execution for safe performance with home exercise program.   STRENGTH: Patient educated on the importance of improved core and extremity strength in order to improve alignment of the spine and extremities with static positions and dynamic movement.   GAIT & BALANCE: Patient educated on the importance of strong core and lower extremity musculature in order to improve both static and dynamic balance, improve gait mechanics, reduce fall risk and improve household and community mobility.   POSTURE: Patient educated on postural awareness to reduce stress and maintain optimal alignment of the spine with static positions and dynamic movement     Written Home Exercises Provided: yes.  Exercises were reviewed and Tawny was able to demonstrate them prior to the end of the session.  Tawny demonstrated good  understanding of the education provided. See EMR under Patient Instructions for exercises provided during therapy sessions.    ASSESSMENT     Mrs. Hector reports she has made functional progress since starting physical therapy. She is able to work in her garden, and now able to stand and cook a meal. She is also walking more confidently up/down community surfaces. She is able to perform sit to  stand with increased reps and decreased time with 5 time sit to stand tests. She is able to show improvement her left knee flexion range of motion. She is also stronger in the following: bilateral hip flexion and ankle dorsiflexion. All objective measurements are compared to 2/6/2023. She may benefit from additional physical therapy to address her remaining deficits.        Tawny is progressing well towards her goals.   Pt prognosis is Good.     Pt will continue to benefit from skilled outpatient physical therapy to address the deficits listed in the problem list box on initial evaluation, provide pt/family education and to maximize pt's level of independence in the home and community environment.     Pt's spiritual, cultural and educational needs considered and pt agreeable to plan of care and goals.     Anticipated Barriers for therapy: sedentary lifestyle, chronicity of condition, lack of understanding of condition, adherence to treatment plan, and coping style,  Anxiety or Panic Disorders, Arthritis, Back pain, BMI over 30, Cancer, DiabetesType I or II, Prior Surgery, Sleep dysfunction      Goals:  Reviewed: 3/6/2023    Short Term Goals: In 4 weeks   1.Patient to be educated on HEP. MET 3/6/2023  2.Patient to increase knee range of motion to full extension, in order to improve available range of motion for ADL's.    3.Patient to increase bilateral LE strength by 1/2 grade, in order to improve endurance and increase ability to perform all functional activities for increased time. MET 3/6/2023  4.Patient to have pain less than 5/10 at worst, to improve QOL. MET 3/6/2023  5.Patient to improve score on the FOTO, to improve QOL. MET 3/6/2023  6. Patient to improve score on 30 sec sit to stand, 5x sit to stand  in order to decrease fall risk. MET 3/6/2023     Long Term Goals: In 8 weeks  1.Patient to improve score on the FOTO to 48% or less, to improve QOL. PROGRESSING 3/6/2023  2. Patient to increase bilateral LE  strength to 4+/5 or greater, in order to improve endurance and increase ability to perform all functional activities for increased time. PROGRESSING 3/6/2023  3. Patient to have decreased pain to 2/10 at worst, to improve QOL. PROGRESSING 3/6/2023  4. Patient to normalize score on 30 sec sit to stand, 5x sit to stand, in order to improve endurance and decrease fall risk. PROGRESSING 3/6/2023  5. Patient to perform daily activities including: PROGRESSING 3/6/2023   Putting on your shoes or socks - A little bit of difficulty  Getting into or out of a car - A little bit of difficulty  Rolling over in bed. - A little bit of difficulty  Getting into or out of the bath - A little bit of difficulty  Sitting for 1 hour. - A little bit of difficulty  Getting up or down 10 stairs (about 1 flight of stairs) - Moderate difficulty  Standing for 1 hour - Moderate difficulty  Squatting - Quite a bit of difficulty  Walking a mile - Quite a bit of difficulty  With your usual hobbies, recreational or sporting activities - Quite a bit of difficulty.      PLAN     Continue Plan of Care (POC) and progress per patient tolerance. See treatment section for details on planned progressions next session.    2/6/2023 (evaluation): Outpatient Physical Therapy 2 times weekly for 8 weeks to include any combination of the following interventions: virtual visits, electrical stimulation (PRN), Aquatic Therapy, Gait Training, Manual Therapy, Neuromuscular Re-ed, Patient Education, Self Care, Therapeutic Exercise, Therapeutic Activites, Dry Needling, and Moist Hot Pack/Cold Pack.     Adan Hernandez, PTA

## 2023-03-07 ENCOUNTER — OFFICE VISIT (OUTPATIENT)
Dept: INTERNAL MEDICINE | Facility: CLINIC | Age: 63
End: 2023-03-07
Payer: COMMERCIAL

## 2023-03-07 VITALS
HEART RATE: 96 BPM | OXYGEN SATURATION: 98 % | WEIGHT: 282.19 LBS | HEIGHT: 61 IN | SYSTOLIC BLOOD PRESSURE: 128 MMHG | DIASTOLIC BLOOD PRESSURE: 76 MMHG | TEMPERATURE: 98 F | BODY MASS INDEX: 53.28 KG/M2

## 2023-03-07 DIAGNOSIS — R22.42 LOWER LEG MASS, LEFT: Primary | ICD-10-CM

## 2023-03-07 PROCEDURE — 1160F PR REVIEW ALL MEDS BY PRESCRIBER/CLIN PHARMACIST DOCUMENTED: ICD-10-PCS | Mod: CPTII,S$GLB,, | Performed by: PHYSICIAN ASSISTANT

## 2023-03-07 PROCEDURE — 99999 PR PBB SHADOW E&M-EST. PATIENT-LVL V: ICD-10-PCS | Mod: PBBFAC,,, | Performed by: PHYSICIAN ASSISTANT

## 2023-03-07 PROCEDURE — 99999 PR PBB SHADOW E&M-EST. PATIENT-LVL V: CPT | Mod: PBBFAC,,, | Performed by: PHYSICIAN ASSISTANT

## 2023-03-07 PROCEDURE — 1160F RVW MEDS BY RX/DR IN RCRD: CPT | Mod: CPTII,S$GLB,, | Performed by: PHYSICIAN ASSISTANT

## 2023-03-07 PROCEDURE — 3008F BODY MASS INDEX DOCD: CPT | Mod: CPTII,S$GLB,, | Performed by: PHYSICIAN ASSISTANT

## 2023-03-07 PROCEDURE — 1159F PR MEDICATION LIST DOCUMENTED IN MEDICAL RECORD: ICD-10-PCS | Mod: CPTII,S$GLB,, | Performed by: PHYSICIAN ASSISTANT

## 2023-03-07 PROCEDURE — 1159F MED LIST DOCD IN RCRD: CPT | Mod: CPTII,S$GLB,, | Performed by: PHYSICIAN ASSISTANT

## 2023-03-07 PROCEDURE — 99213 PR OFFICE/OUTPT VISIT, EST, LEVL III, 20-29 MIN: ICD-10-PCS | Mod: S$GLB,,, | Performed by: PHYSICIAN ASSISTANT

## 2023-03-07 PROCEDURE — 3008F PR BODY MASS INDEX (BMI) DOCUMENTED: ICD-10-PCS | Mod: CPTII,S$GLB,, | Performed by: PHYSICIAN ASSISTANT

## 2023-03-07 PROCEDURE — 99213 OFFICE O/P EST LOW 20 MIN: CPT | Mod: S$GLB,,, | Performed by: PHYSICIAN ASSISTANT

## 2023-03-07 PROCEDURE — 3074F PR MOST RECENT SYSTOLIC BLOOD PRESSURE < 130 MM HG: ICD-10-PCS | Mod: CPTII,S$GLB,, | Performed by: PHYSICIAN ASSISTANT

## 2023-03-07 PROCEDURE — 3078F PR MOST RECENT DIASTOLIC BLOOD PRESSURE < 80 MM HG: ICD-10-PCS | Mod: CPTII,S$GLB,, | Performed by: PHYSICIAN ASSISTANT

## 2023-03-07 PROCEDURE — 3078F DIAST BP <80 MM HG: CPT | Mod: CPTII,S$GLB,, | Performed by: PHYSICIAN ASSISTANT

## 2023-03-07 PROCEDURE — 4010F PR ACE/ARB THEARPY RXD/TAKEN: ICD-10-PCS | Mod: CPTII,S$GLB,, | Performed by: PHYSICIAN ASSISTANT

## 2023-03-07 PROCEDURE — 4010F ACE/ARB THERAPY RXD/TAKEN: CPT | Mod: CPTII,S$GLB,, | Performed by: PHYSICIAN ASSISTANT

## 2023-03-07 PROCEDURE — 3074F SYST BP LT 130 MM HG: CPT | Mod: CPTII,S$GLB,, | Performed by: PHYSICIAN ASSISTANT

## 2023-03-07 NOTE — PROGRESS NOTES
Subjective:      Patient ID: Tawny Valerio is a 62 y.o. female.    Chief Complaint: Knee Pain    Knee Pain   Incident onset: 1-2 months. There was no injury mechanism. The pain is present in the left leg. The quality of the pain is described as aching. The pain is moderate. The pain has been Worsening since onset. Pertinent negatives include no inability to bear weight, loss of motion, loss of sensation, muscle weakness, numbness or tingling. The symptoms are aggravated by palpation. She has tried nothing for the symptoms.   Feels a lump in her left lower leg (anterior). Has been causing pain intermittently. Has had a lipoma in her right arm which was removed. Pt states that it feels similar.     Patient Active Problem List   Diagnosis    Diabetes type 2, controlled    Vitamin D insufficiency    SOB (shortness of breath)    Morbid obesity with BMI of 60.0-69.9, adult    Edema leg    ASHLI (obstructive sleep apnea)    Essential hypertension    Uterine polyp    Simple endometrial hyperplasia    Endometrial ca    s/p RALH/BSO 8/26/2019    Decreased strength, endurance, and mobility    Decreased range of motion    Gait abnormality         Current Outpatient Medications:     acetaminophen (TYLENOL) 325 MG tablet, Take 2 tablets (650 mg total) by mouth every 6 (six) hours as needed for Pain., Disp: 30 tablet, Rfl: 1    aspirin 81 MG Chew, Take 81 mg by mouth once daily., Disp: , Rfl:     atorvastatin (LIPITOR) 20 MG tablet, Take 1 tablet (20 mg total) by mouth once daily., Disp: 30 tablet, Rfl: 11    azelastine (ASTELIN) 137 mcg (0.1 %) nasal spray, 1 spray (137 mcg total) by Nasal route 2 (two) times daily., Disp: 30 mL, Rfl: 11    buPROPion (WELLBUTRIN SR) 150 MG TBSR 12 hr tablet, Take 1 tablet (150 mg total) by mouth 2 (two) times daily., Disp: 60 tablet, Rfl: 11    clindamycin (CLEOCIN T) 1 % lotion, Apply topically 2 (two) times daily., Disp: 60 mL, Rfl: 3    empagliflozin (JARDIANCE) 10 mg tablet, Take 1  tablet (10 mg total) by mouth once daily., Disp: 90 tablet, Rfl: 1    ergocalciferol (ERGOCALCIFEROL) 50,000 unit Cap, Take 1 capsule (50,000 Units total) by mouth every 7 days., Disp: 12 capsule, Rfl: 3    gabapentin (NEURONTIN) 300 MG capsule, Take 1 capsule (300 mg total) by mouth every evening., Disp: 30 capsule, Rfl: 0    hydrocortisone 2.5 % cream, Apply topically 2 (two) times daily. AAA bid, Disp: 30 g, Rfl: 1    ibuprofen (ADVIL,MOTRIN) 600 MG tablet, Take 1 tablet (600 mg total) by mouth 3 (three) times daily., Disp: 30 tablet, Rfl: 1    lancets Misc, Check fasting glucose daily, Disp: 100 each, Rfl: 3    losartan (COZAAR) 25 MG tablet, Take 1 tablet (25 mg total) by mouth once daily., Disp: 90 tablet, Rfl: 3    methylPREDNISolone (MEDROL DOSEPACK) 4 mg tablet, use as directed, Disp: 1 each, Rfl: 0    mupirocin (BACTROBAN) 2 % ointment, Apply topically 2 (two) times daily., Disp: 30 g, Rfl: 0    semaglutide (OZEMPIC) 2 mg/dose (8 mg/3 mL) PnIj, Inject 2 mg into the skin every 7 days., Disp: 3 pen, Rfl: 0    tretinoin (RETIN-A) 0.025 % cream, Apply topically every evening., Disp: 45 g, Rfl: 2    metronidazole 0.75% (METROCREAM) 0.75 % Crea, Apply topically 2 (two) times daily. For rosacea on face., Disp: 45 g, Rfl: 3    Review of Systems   Constitutional:  Negative for activity change, appetite change, chills, diaphoresis, fatigue, fever and unexpected weight change.   HENT: Negative.  Negative for congestion, hearing loss, postnasal drip, rhinorrhea, sore throat, trouble swallowing and voice change.    Eyes: Negative.  Negative for discharge and visual disturbance.   Respiratory: Negative.  Negative for cough, choking, chest tightness, shortness of breath and wheezing.    Cardiovascular:  Negative for chest pain, palpitations and leg swelling.   Gastrointestinal:  Negative for abdominal distention, abdominal pain, anal bleeding, blood in stool, constipation, diarrhea, nausea and vomiting.   Endocrine:  "Negative for cold intolerance, heat intolerance, polydipsia and polyuria.   Genitourinary: Negative.  Negative for difficulty urinating, dysuria, frequency, hematuria and menstrual problem.   Musculoskeletal:  Positive for arthralgias, joint swelling and neck pain. Negative for back pain, gait problem and myalgias.   Skin:  Negative for color change, pallor, rash and wound.   Neurological:  Negative for dizziness, tingling, tremors, weakness, light-headedness, numbness and headaches.   Hematological:  Negative for adenopathy.   Psychiatric/Behavioral:  Negative for behavioral problems, confusion, dysphoric mood, self-injury, sleep disturbance and suicidal ideas. The patient is not nervous/anxious.    Objective:   /76 (BP Location: Right arm, Patient Position: Sitting, BP Method: Large (Manual))   Pulse 96   Temp 98.1 °F (36.7 °C) (Tympanic)   Ht 5' 1" (1.549 m)   Wt 128 kg (282 lb 3 oz)   LMP 01/15/2019 (Approximate)   SpO2 98%   BMI 53.32 kg/m²     Physical Exam  Vitals and nursing note reviewed.   Constitutional:       General: She is not in acute distress.     Appearance: Normal appearance. She is well-developed. She is not ill-appearing, toxic-appearing or diaphoretic.   HENT:      Head: Normocephalic and atraumatic.   Cardiovascular:      Rate and Rhythm: Normal rate and regular rhythm.      Heart sounds: Normal heart sounds. No murmur heard.    No friction rub. No gallop.   Pulmonary:      Effort: Pulmonary effort is normal. No respiratory distress.      Breath sounds: Normal breath sounds. No wheezing or rales.   Musculoskeletal:         General: Normal range of motion.        Legs:    Skin:     General: Skin is warm.      Capillary Refill: Capillary refill takes less than 2 seconds.      Findings: No rash.   Neurological:      Mental Status: She is alert and oriented to person, place, and time.      Motor: No weakness.      Gait: Gait normal.   Psychiatric:         Mood and Affect: Mood normal.  "        Behavior: Behavior normal.         Thought Content: Thought content normal.         Judgment: Judgment normal.         Assessment:     1. Lower leg mass, left      Plan:   Lower leg mass, left  -     US Soft Tissue, Lower Extremity, Left; Future; Expected date: 03/07/2023  -     Ambulatory referral/consult to General Surgery; Future; Expected date: 03/14/2023      Follow up if symptoms worsen or fail to improve.

## 2023-03-08 ENCOUNTER — TELEPHONE (OUTPATIENT)
Dept: BARIATRICS | Facility: CLINIC | Age: 63
End: 2023-03-08
Payer: COMMERCIAL

## 2023-03-09 ENCOUNTER — CLINICAL SUPPORT (OUTPATIENT)
Dept: REHABILITATION | Facility: HOSPITAL | Age: 63
End: 2023-03-09
Payer: COMMERCIAL

## 2023-03-09 DIAGNOSIS — R26.9 GAIT ABNORMALITY: ICD-10-CM

## 2023-03-09 DIAGNOSIS — R68.89 DECREASED STRENGTH, ENDURANCE, AND MOBILITY: Primary | ICD-10-CM

## 2023-03-09 DIAGNOSIS — Z74.09 DECREASED STRENGTH, ENDURANCE, AND MOBILITY: Primary | ICD-10-CM

## 2023-03-09 DIAGNOSIS — R53.1 DECREASED STRENGTH, ENDURANCE, AND MOBILITY: Primary | ICD-10-CM

## 2023-03-09 DIAGNOSIS — M25.60 DECREASED RANGE OF MOTION: ICD-10-CM

## 2023-03-09 PROCEDURE — 97112 NEUROMUSCULAR REEDUCATION: CPT | Mod: CQ

## 2023-03-09 PROCEDURE — 97110 THERAPEUTIC EXERCISES: CPT | Mod: CQ

## 2023-03-09 PROCEDURE — 97140 MANUAL THERAPY 1/> REGIONS: CPT | Mod: CQ

## 2023-03-09 NOTE — PROGRESS NOTES
[facility]   Physical Therapy Treatment Note     Name: Tawny ESTRADA [facility] Number: [id number]    Therapy Diagnosis:   Encounter Diagnoses   Name Primary?    Decreased strength, endurance, and mobility Yes    Decreased range of motion     Gait abnormality      Physician: Maya Rivas*    Visit Date: 3/9/2023    Physician Orders: PT Eval and Treat  Medical Diagnosis from Referral: Sciatica of left side  Evaluation Date: 2/6/2023  Authorization Period Expiration: 10/6/23  Plan of Care Expiration: 4/6/23  Progress Note Due: 3/6/2023  Visit # / Visits authorized: 5/40 (+eval)  FOTO: 1/3 (last performed on 2/6/2023)     Precautions: Diabetes, cancer, and anxiety, HTN    PTA Visit #: 5/5     Time In: 1615  Time Out: 1700  Total Billable Time: 35 minutes (Billing reflects 1 on 1 treatment time spent with patient)    SUBJECTIVE     Patient reports: she took Gabapentin last night which usually last until the following evening. She is having less pain today. She has been performing a self massage with an instrument she found on [employer]. She reports she took less breaks today while at work garcia to her busy schedule.     She was compliant with home exercise program.    Response to previous treatment: feeling pain in her lateral noticed some numbness in her lateral hip and anterior left thigh     Functional change: prolonged sleeping on her let side causes hip numbness, sitting for about 60 minutes causes increased symptoms, working in her garden, and now able to stand and cook a meal (30 minutes). Walking more confidently up/down community surfaces    Pain: 0/10     Location: left hip and LOWER EXTREMITY (no pain today)    OBJECTIVE     Objective Measures updated at progress report only unless specified.      TREATMENT     Tawny received the treatments listed below:       MANUAL THERAPY TECHNIQUES were applied for (10) minutes, including:    Manual Intervention Performed Today    Soft  Tissue Mobilization & IASTM [x] left left hip flexors and ITBwith tiger tail   Joint Mobilizations []     []     []    Functional Dry Needling  []      Plan for Next Visit: Continue as needed     ,   THERAPEUTIC EXERCISES to develop strength, endurance, ROM, flexibility, posture, and core stabilization for (12) minutes including: including objective measurements     Intervention Performed Today    Nustep for ROM and strength x 5 minutes, level 1 (decreased) for endurance   Heel slides with strap (home exercise program)  3 x 10, 5 second holds   Lower trunk rotations (home exercise program)  2 x 10, 5 second holds   Short arch quads with posterior pelvic tilt   3 x 10 bilateral    Hip abduction with band x 3 x 10 red band (increased)     Hip abduction with ball  x 3 minute with 3 second hold (increased)               Plan for Next Visit:         NEUROMUSCULAR RE-EDUCATION ACTIVITIES to improve Balance, Coordination, Kinesthetic, Sense, Proprioception, and Posture for (13) minutes.  The following were included:    Intervention Performed Today    Quad sets x 3 x 10, 3-5 second holds, bilateral     Pelvic tilts (home exercise program)  3 x 10,    Bridges (home exercise program)  2 x 10   clams     Straight leg raise with posterior pelvic tilt  x 3 x 5 bilaterally   March in place supine x 2 x 10 bilateral (increased)               Plan for Next Visit:        PATIENT EDUCATION AND HOME EXERCISES     Home Exercises Provided and Patient Education Provided     Education provided:   PURPOSE: Patient educated on the impairments noted above and the effects of physical therapy intervention to improve overall condition and QOL.   EXERCISE: Patient was educated on all the above exercise prior/during/after for proper posture, positioning, and execution for safe performance with home exercise program.   STRENGTH: Patient educated on the importance of improved core and extremity strength in order to improve alignment of the spine  and extremities with static positions and dynamic movement.   GAIT & BALANCE: Patient educated on the importance of strong core and lower extremity musculature in order to improve both static and dynamic balance, improve gait mechanics, reduce fall risk and improve household and community mobility.   POSTURE: Patient educated on postural awareness to reduce stress and maintain optimal alignment of the spine with static positions and dynamic movement     Written Home Exercises Provided: yes.  Exercises were reviewed and Tawny was able to demonstrate them prior to the end of the session.  Tawny demonstrated good  understanding of the education provided. See EMR under Patient Instructions for exercises provided during therapy sessions.    ASSESSMENT     Mrs. Hector was able to perform all exercises with good quality and with no increased symptoms with the exception for heel slide with abdominal brace. This exercise was modified by her performing supine march in place. Manual therapy reveals trigger points in her lateral hip and vastus lateralis but was able to tolerate only light pressure due to increased tenderness with increased pressure. She has a large trigger point in her lateral hip for which patient reports that this trigger point may actually be a lipoma based on her history of having lipomas in the past. She left this session with no complaints.     Tawny is progressing well towards her goals.   Pt prognosis is Good.     Pt will continue to benefit from skilled outpatient physical therapy to address the deficits listed in the problem list box on initial evaluation, provide pt/family education and to maximize pt's level of independence in the home and community environment.     Pt's spiritual, cultural and educational needs considered and pt agreeable to plan of care and goals.     Anticipated Barriers for therapy: sedentary lifestyle, chronicity of condition, lack of understanding of condition,  adherence to treatment plan, and coping style,  Anxiety or Panic Disorders, Arthritis, Back pain, BMI over 30, Cancer, DiabetesType I or II, Prior Surgery, Sleep dysfunction      Goals:  Reviewed: 3/6/2023    Short Term Goals: In 4 weeks   1.Patient to be educated on HEP. MET 3/6/2023  2.Patient to increase knee range of motion to full extension, in order to improve available range of motion for ADL's.    3.Patient to increase bilateral LE strength by 1/2 grade, in order to improve endurance and increase ability to perform all functional activities for increased time. MET 3/6/2023  4.Patient to have pain less than 5/10 at worst, to improve QOL. MET 3/6/2023  5.Patient to improve score on the FOTO, to improve QOL. MET 3/6/2023  6. Patient to improve score on 30 sec sit to stand, 5x sit to stand  in order to decrease fall risk. MET 3/6/2023     Long Term Goals: In 8 weeks  1.Patient to improve score on the FOTO to 48% or less, to improve QOL. PROGRESSING 3/6/2023  2. Patient to increase bilateral LE strength to 4+/5 or greater, in order to improve endurance and increase ability to perform all functional activities for increased time. PROGRESSING 3/6/2023  3. Patient to have decreased pain to 2/10 at worst, to improve QOL. PROGRESSING 3/6/2023  4. Patient to normalize score on 30 sec sit to stand, 5x sit to stand, in order to improve endurance and decrease fall risk. PROGRESSING 3/6/2023  5. Patient to perform daily activities including: PROGRESSING 3/6/2023   Putting on your shoes or socks - A little bit of difficulty  Getting into or out of a car - A little bit of difficulty  Rolling over in bed. - A little bit of difficulty  Getting into or out of the bath - A little bit of difficulty  Sitting for 1 hour. - A little bit of difficulty  Getting up or down 10 stairs (about 1 flight of stairs) - Moderate difficulty  Standing for 1 hour - Moderate difficulty  Squatting - Quite a bit of difficulty  Walking a mile - Quite  a bit of difficulty  With your usual hobbies, recreational or sporting activities - Quite a bit of difficulty.      PLAN     Continue Plan of Care (POC) and progress per patient tolerance. See treatment section for details on planned progressions next session.    2/6/2023 (evaluation): Outpatient Physical Therapy 2 times weekly for 8 weeks to include any combination of the following interventions: virtual visits, electrical stimulation (PRN), Aquatic Therapy, Gait Training, Manual Therapy, Neuromuscular Re-ed, Patient Education, Self Care, Therapeutic Exercise, Therapeutic Activites, Dry Needling, and Moist Hot Pack/Cold Pack.     Adan Hernandez, PTA

## 2023-03-13 ENCOUNTER — CLINICAL SUPPORT (OUTPATIENT)
Dept: REHABILITATION | Facility: HOSPITAL | Age: 63
End: 2023-03-13
Payer: COMMERCIAL

## 2023-03-13 DIAGNOSIS — R68.89 DECREASED STRENGTH, ENDURANCE, AND MOBILITY: Primary | ICD-10-CM

## 2023-03-13 DIAGNOSIS — R26.9 GAIT ABNORMALITY: ICD-10-CM

## 2023-03-13 DIAGNOSIS — Z74.09 DECREASED STRENGTH, ENDURANCE, AND MOBILITY: Primary | ICD-10-CM

## 2023-03-13 DIAGNOSIS — M25.60 DECREASED RANGE OF MOTION: ICD-10-CM

## 2023-03-13 DIAGNOSIS — R53.1 DECREASED STRENGTH, ENDURANCE, AND MOBILITY: Primary | ICD-10-CM

## 2023-03-13 PROCEDURE — 97112 NEUROMUSCULAR REEDUCATION: CPT

## 2023-03-13 PROCEDURE — 97110 THERAPEUTIC EXERCISES: CPT

## 2023-03-13 PROCEDURE — 97140 MANUAL THERAPY 1/> REGIONS: CPT

## 2023-03-13 NOTE — PROGRESS NOTES
[facility]   Physical Therapy Treatment Note     Name: Tawny Valerio  [facility] Number: [id number]    Therapy Diagnosis:   Encounter Diagnoses   Name Primary?    Decreased strength, endurance, and mobility Yes    Decreased range of motion     Gait abnormality        Physician: Maya Rivas*    Visit Date: 3/13/2023    Physician Orders: PT Eval and Treat  Medical Diagnosis from Referral: Sciatica of left side  Evaluation Date: 2/6/2023  Authorization Period Expiration: 10/6/23  Plan of Care Expiration: 4/6/23  Progress Note Due: 4/6/2023  Visit # / Visits authorized: 6/40 (+1)  FOTO: 2/3 (last performed on 3/6/2023)     Precautions: Diabetes, cancer, and anxiety, HTN    PTA Visit #: 5/5     Time In: 4:30 pm  Time Out: 5:15 pm  Total Billable Time: 40 minutes   (Billing reflects 1 on 1 treatment time spent with patient)    SUBJECTIVE     Patient reports: she was very sore in her left knee post treatment session last visit. She thinks that she has a lipoma in the front of her knee - she has been having some issues with it for a while and will be having a ultrasound soon.  She reports that she is feeling OK today, and is not having any leg pain.  She reports pain with manual releases at left buttock muscle's but improved symptoms posterior treatment session.  During treatment session patient noted to have neck rotated and side bent to left side when performing more difficult activities. Discussed with patient and she reports long standing history of neck pain and tension/stress - she does feel that her head is too heavy to hold up at times.  Encouraged her to follow up with MD regarding neck issues as noted to be unabel to hold her neck in neutral position during more strenuous activities today.    She was compliant with home exercise program.    Response to previous treatment: feeling pain in her lateral noticed some numbness in her lateral hip and anterior left thigh      Functional change: prolonged sleeping on her let side causes hip numbness, sitting for about 60 minutes causes increased symptoms, working in her garden, and now able to stand and cook a meal (30 minutes). Walking more confidently up/down community surfaces    Pain: 0/10     Location: left hip and LOWER EXTREMITY (no pain today)    OBJECTIVE     Objective Measures updated at progress report only unless specified.      TREATMENT     Tawny received the treatments listed below:       MANUAL THERAPY TECHNIQUES were applied for (8) minutes, including:    Manual Intervention Performed Today    Soft Tissue Mobilization & IASTM [x] left left glutes and piriformis   Joint Mobilizations []     []     []    Functional Dry Needling  []      Plan for Next Visit: Continue as needed     ,   THERAPEUTIC EXERCISES to develop strength, endurance, ROM, flexibility, posture, and core stabilization for (14) minutes including: including objective measurements     Intervention Performed Today    Nustep for ROM and strength x 6 minutes, level 1 (decreased) for endurance   Heel slides with strap (home exercise program)  3 x 10, 5 second holds   Lower trunk rotations (home exercise program)  2 x 10, 5 second holds   Short arc quads with posterior pelvic tilt   3 x 10 bilateral    Hip abduction with band x 3 x 10 red band (increased)     Hip abduction with ball  x 3 minute with 5  second hold (increased)   Sciatic nerve glides x 5 pumps, 3 sets          Plan for Next Visit:         NEUROMUSCULAR RE-EDUCATION ACTIVITIES to improve Balance, Coordination, Kinesthetic, Sense, Proprioception, and Posture for (16) minutes.  The following were included:    Intervention Performed Today    Quad sets  3 x 10, 3-5 second holds, bilateral     Pelvic tilts (home exercise program)  3 x 10,    Bridges (home exercise program)  2 x 10   clamshells x 10x/ 2 sets   Straight leg raise with posterior pelvic tilt  x 3 x 5 bilaterally   March in place supine  x 2 x 10 bilateral (increased)   Sidelying hip abduction  x 5x each side   Step ups x 5x 8 inch   Sit to stand x 5x / 2 sets     Plan for Next Visit: progress as tolerated       PATIENT EDUCATION AND HOME EXERCISES     Home Exercises Provided and Patient Education Provided     Education provided:   PURPOSE: Patient educated on the impairments noted above and the effects of physical therapy intervention to improve overall condition and QOL.   EXERCISE: Patient was educated on all the above exercise prior/during/after for proper posture, positioning, and execution for safe performance with home exercise program.   STRENGTH: Patient educated on the importance of improved core and extremity strength in order to improve alignment of the spine and extremities with static positions and dynamic movement.   GAIT & BALANCE: Patient educated on the importance of strong core and lower extremity musculature in order to improve both static and dynamic balance, improve gait mechanics, reduce fall risk and improve household and community mobility.   POSTURE: Patient educated on postural awareness to reduce stress and maintain optimal alignment of the spine with static positions and dynamic movement     Written Home Exercises Provided: yes.  Exercises were reviewed and Tawny was able to demonstrate them prior to the end of the session.  Tawny demonstrated good  understanding of the education provided. See EMR under Patient Instructions for exercises provided during therapy sessions.    ASSESSMENT     Mrs. Hector tolerated all treatment well - she was able to tolerate progressions and had a + response to manual interventions today.  She was encouraged to follow up with MD regarding neck pain and inability to hold neck in neutral position.  Cues for all interventions today for correct technique.    Tawny is progressing well towards her goals.   Pt prognosis is Good.     Pt will continue to benefit from skilled outpatient  physical therapy to address the deficits listed in the problem list box on initial evaluation, provide pt/family education and to maximize pt's level of independence in the home and community environment.     Pt's spiritual, cultural and educational needs considered and pt agreeable to plan of care and goals.     Anticipated Barriers for therapy: sedentary lifestyle, chronicity of condition, lack of understanding of condition, adherence to treatment plan, and coping style,  Anxiety or Panic Disorders, Arthritis, Back pain, BMI over 30, Cancer, DiabetesType I or II, Prior Surgery, Sleep dysfunction      Goals:  Reviewed: 3/6/2023    Short Term Goals: In 4 weeks   1.Patient to be educated on HEP. MET 3/6/2023  2.Patient to increase knee range of motion to full extension, in order to improve available range of motion for ADL's.    3.Patient to increase bilateral LE strength by 1/2 grade, in order to improve endurance and increase ability to perform all functional activities for increased time. MET 3/6/2023  4.Patient to have pain less than 5/10 at worst, to improve QOL. MET 3/6/2023  5.Patient to improve score on the FOTO, to improve QOL. MET 3/6/2023  6. Patient to improve score on 30 sec sit to stand, 5x sit to stand  in order to decrease fall risk. MET 3/6/2023     Long Term Goals: In 8 weeks  1.Patient to improve score on the FOTO to 48% or less, to improve QOL. PROGRESSING 3/6/2023  2. Patient to increase bilateral LE strength to 4+/5 or greater, in order to improve endurance and increase ability to perform all functional activities for increased time. PROGRESSING 3/6/2023  3. Patient to have decreased pain to 2/10 at worst, to improve QOL. PROGRESSING 3/6/2023  4. Patient to normalize score on 30 sec sit to stand, 5x sit to stand, in order to improve endurance and decrease fall risk. PROGRESSING 3/6/2023  5. Patient to perform daily activities including: PROGRESSING 3/6/2023   Putting on your shoes or socks - A  little bit of difficulty  Getting into or out of a car - A little bit of difficulty  Rolling over in bed. - A little bit of difficulty  Getting into or out of the bath - A little bit of difficulty  Sitting for 1 hour. - A little bit of difficulty  Getting up or down 10 stairs (about 1 flight of stairs) - Moderate difficulty  Standing for 1 hour - Moderate difficulty  Squatting - Quite a bit of difficulty  Walking a mile - Quite a bit of difficulty  With your usual hobbies, recreational or sporting activities - Quite a bit of difficulty.      PLAN     Monitor response to today's treatment session and progress as indicated.    2/6/2023 (evaluation): Outpatient Physical Therapy 2 times weekly for 8 weeks to include any combination of the following interventions: virtual visits, electrical stimulation (PRN), Aquatic Therapy, Gait Training, Manual Therapy, Neuromuscular Re-ed, Patient Education, Self Care, Therapeutic Exercise, Therapeutic Activites, Dry Needling, and Moist Hot Pack/Cold Pack.     Christiane Muller, PT

## 2023-03-14 ENCOUNTER — HOSPITAL ENCOUNTER (OUTPATIENT)
Dept: RADIOLOGY | Facility: HOSPITAL | Age: 63
Discharge: HOME | End: 2023-03-14
Attending: PHYSICIAN ASSISTANT
Payer: COMMERCIAL

## 2023-03-14 DIAGNOSIS — R22.42 LOWER LEG MASS, LEFT: ICD-10-CM

## 2023-03-14 PROCEDURE — 76882 US LMTD JT/FCL EVL NVASC XTR: CPT | Mod: TC,LT

## 2023-03-14 PROCEDURE — 76882 US SOFT TISSUE, LOWER EXTREMITY, LEFT: ICD-10-PCS | Mod: 26,LT,, | Performed by: RADIOLOGY

## 2023-03-14 PROCEDURE — 76882 US LMTD JT/FCL EVL NVASC XTR: CPT | Mod: 26,LT,, | Performed by: RADIOLOGY

## 2023-03-16 ENCOUNTER — OFFICE VISIT (OUTPATIENT)
Dept: SURGERY | Facility: CLINIC | Age: 63
End: 2023-03-16
Payer: COMMERCIAL

## 2023-03-16 VITALS
HEART RATE: 82 BPM | WEIGHT: 284.38 LBS | SYSTOLIC BLOOD PRESSURE: 135 MMHG | DIASTOLIC BLOOD PRESSURE: 79 MMHG | BODY MASS INDEX: 53.74 KG/M2

## 2023-03-16 DIAGNOSIS — R22.42 LOWER LEG MASS, LEFT: ICD-10-CM

## 2023-03-16 PROCEDURE — 4010F PR ACE/ARB THEARPY RXD/TAKEN: ICD-10-PCS | Mod: CPTII,S$GLB,, | Performed by: SURGERY

## 2023-03-16 PROCEDURE — 1160F PR REVIEW ALL MEDS BY PRESCRIBER/CLIN PHARMACIST DOCUMENTED: ICD-10-PCS | Mod: CPTII,S$GLB,, | Performed by: SURGERY

## 2023-03-16 PROCEDURE — 4010F ACE/ARB THERAPY RXD/TAKEN: CPT | Mod: CPTII,S$GLB,, | Performed by: SURGERY

## 2023-03-16 PROCEDURE — 3078F PR MOST RECENT DIASTOLIC BLOOD PRESSURE < 80 MM HG: ICD-10-PCS | Mod: CPTII,S$GLB,, | Performed by: SURGERY

## 2023-03-16 PROCEDURE — 3078F DIAST BP <80 MM HG: CPT | Mod: CPTII,S$GLB,, | Performed by: SURGERY

## 2023-03-16 PROCEDURE — 99999 PR PBB SHADOW E&M-EST. PATIENT-LVL IV: CPT | Mod: PBBFAC,,, | Performed by: SURGERY

## 2023-03-16 PROCEDURE — 99999 PR PBB SHADOW E&M-EST. PATIENT-LVL IV: ICD-10-PCS | Mod: PBBFAC,,, | Performed by: SURGERY

## 2023-03-16 PROCEDURE — 3008F PR BODY MASS INDEX (BMI) DOCUMENTED: ICD-10-PCS | Mod: CPTII,S$GLB,, | Performed by: SURGERY

## 2023-03-16 PROCEDURE — 99213 OFFICE O/P EST LOW 20 MIN: CPT | Mod: S$GLB,,, | Performed by: SURGERY

## 2023-03-16 PROCEDURE — 3075F PR MOST RECENT SYSTOLIC BLOOD PRESS GE 130-139MM HG: ICD-10-PCS | Mod: CPTII,S$GLB,, | Performed by: SURGERY

## 2023-03-16 PROCEDURE — 99213 PR OFFICE/OUTPT VISIT, EST, LEVL III, 20-29 MIN: ICD-10-PCS | Mod: S$GLB,,, | Performed by: SURGERY

## 2023-03-16 PROCEDURE — 3008F BODY MASS INDEX DOCD: CPT | Mod: CPTII,S$GLB,, | Performed by: SURGERY

## 2023-03-16 PROCEDURE — 1160F RVW MEDS BY RX/DR IN RCRD: CPT | Mod: CPTII,S$GLB,, | Performed by: SURGERY

## 2023-03-16 PROCEDURE — 3075F SYST BP GE 130 - 139MM HG: CPT | Mod: CPTII,S$GLB,, | Performed by: SURGERY

## 2023-03-16 PROCEDURE — 1159F PR MEDICATION LIST DOCUMENTED IN MEDICAL RECORD: ICD-10-PCS | Mod: CPTII,S$GLB,, | Performed by: SURGERY

## 2023-03-16 PROCEDURE — 1159F MED LIST DOCD IN RCRD: CPT | Mod: CPTII,S$GLB,, | Performed by: SURGERY

## 2023-03-16 NOTE — PROGRESS NOTES
Patient ID: Tawny Valerio is a 62 y.o. female.    Lipoma of the left lower anterior leg    Chief Complaint: Consult (Lipoma leg)      HPI:  Patient reports feeling a fullness of her left anterior leg.  It has been there for some time.  It causes some occasional discomfort.  The patient underwent an ultrasound that showed no definable masses but an area of edema.      Patient now presents for surgical evaluation.      The patient is allergic to lidocaine      Review of Systems   Constitutional:  Positive for activity change. Negative for unexpected weight change.   HENT:  Negative for hearing loss, rhinorrhea and trouble swallowing.    Eyes:  Negative for discharge and visual disturbance.   Respiratory:  Negative for chest tightness and wheezing.    Cardiovascular:  Negative for chest pain and palpitations.   Gastrointestinal:  Positive for constipation and diarrhea. Negative for blood in stool and vomiting.   Endocrine: Negative for polydipsia and polyuria.   Genitourinary:  Negative for difficulty urinating, dysuria, hematuria and menstrual problem.   Musculoskeletal:  Positive for arthralgias, joint swelling and neck pain.   Neurological:  Negative for weakness and headaches.   Psychiatric/Behavioral:  Negative for confusion and dysphoric mood.      Current Outpatient Medications   Medication Sig Dispense Refill    acetaminophen (TYLENOL) 325 MG tablet Take 2 tablets (650 mg total) by mouth every 6 (six) hours as needed for Pain. 30 tablet 1    aspirin 81 MG Chew Take 81 mg by mouth once daily.      atorvastatin (LIPITOR) 20 MG tablet Take 1 tablet (20 mg total) by mouth once daily. 30 tablet 11    azelastine (ASTELIN) 137 mcg (0.1 %) nasal spray 1 spray (137 mcg total) by Nasal route 2 (two) times daily. 30 mL 11    buPROPion (WELLBUTRIN SR) 150 MG TBSR 12 hr tablet Take 1 tablet (150 mg total) by mouth 2 (two) times daily. 60 tablet 11    clindamycin (CLEOCIN T) 1 % lotion Apply topically 2 (two)  times daily. 60 mL 3    empagliflozin (JARDIANCE) 10 mg tablet Take 1 tablet (10 mg total) by mouth once daily. 90 tablet 1    ergocalciferol (ERGOCALCIFEROL) 50,000 unit Cap Take 1 capsule (50,000 Units total) by mouth every 7 days. 12 capsule 3    gabapentin (NEURONTIN) 300 MG capsule Take 1 capsule (300 mg total) by mouth every evening. 30 capsule 11    hydrocortisone 2.5 % cream Apply topically 2 (two) times daily. AAA bid 30 g 1    ibuprofen (ADVIL,MOTRIN) 600 MG tablet Take 1 tablet (600 mg total) by mouth 3 (three) times daily. 30 tablet 1    lancets Misc Check fasting glucose daily 100 each 3    losartan (COZAAR) 25 MG tablet Take 1 tablet (25 mg total) by mouth once daily. 90 tablet 3    mupirocin (BACTROBAN) 2 % ointment Apply topically 2 (two) times daily. 30 g 0    semaglutide (OZEMPIC) 2 mg/dose (8 mg/3 mL) PnIj Inject 2 mg into the skin every 7 days. 3 pen 0    tretinoin (RETIN-A) 0.025 % cream Apply topically every evening. 45 g 2    metronidazole 0.75% (METROCREAM) 0.75 % Crea Apply topically 2 (two) times daily. For rosacea on face. 45 g 3     No current facility-administered medications for this visit.       Review of patient's allergies indicates:   Allergen Reactions    Celebrex [celecoxib] Anaphylaxis    Codeine Hives     Does not know what she can take -usually just takes tylenol or ibuprofen    Lidocaine Hives    Vicodin [hydrocodone-acetaminophen] Hives and Swelling       Past Medical History:   Diagnosis Date    Acute cystitis without hematuria 2019    Anxiety     Colon cancer screening 2019    Diabetes type 2, controlled 2016    Endometrial ca 2019    Essential hypertension 2018    Simple endometrial hyperplasia 3/15/2019    Uterine polyp 3/14/2019       Past Surgical History:   Procedure Laterality Date     SECTION      x 1    DILATION AND CURETTAGE OF UTERUS  2019    HYSTEROSCOPY WITH DILATION AND CURETTAGE OF UTERUS N/A  7/16/2019    Procedure: HYSTEROSCOPY, WITH DILATION AND CURETTAGE OF UTERUS;  Surgeon: Bobby Frazier Jr., MD;  Location: [facility];  Service: OB/GYN;  Laterality: N/A;    lipoma removed      ROBOT-ASSISTED LAPAROSCOPIC ABDOMINAL HYSTERECTOMY USING DA AWAIS XI N/A 8/26/2019    Procedure: XI ROBOTIC HYSTERECTOMY;  Surgeon: Aneudy Almeida MD;  [facility];  Service: OB/GYN;  Laterality: N/A;    ROBOT-ASSISTED LAPAROSCOPIC LYSIS OF ADHESIONS USING DA AWAIS XI N/A 8/26/2019    Procedure: XI ROBOTIC LYSIS, ADHESIONS;  Surgeon: Aneudy Almeida MD;  Location: [facility] OR [facility];  Service: OB/GYN;  Laterality: N/A;  Omental    ROBOT-ASSISTED LAPAROSCOPIC SALPINGO-OOPHORECTOMY USING DA AWAIS XI Bilateral 8/26/2019    Procedure: XI ROBOTIC SALPINGO-OOPHORECTOMY;  Surgeon: Aneudy Almeida MD;  Location: [facility] OR [facility];  Service: OB/GYN;  Laterality: Bilateral;  Wt 299lbs       Family History   Problem Relation Age of Onset    No Known Problems Mother     Diabetes Father     Stroke Father     Hypertension Father     Heart disease Sister     Hypertension Sister     Heart disease Brother     Heart attack Brother     Hypertension Brother     Heart disease Maternal Grandmother     Heart disease Maternal Grandfather     Heart disease Paternal Grandfather     Breast cancer Other     Ovarian cancer Neg Hx     Uterine cancer Neg Hx     Colon cancer Neg Hx     Heart failure Neg Hx     Hyperlipidemia Neg Hx        Social History     Socioeconomic History    Marital status:    Occupational History     Employer: [employer]     Tobacco Use    Smoking status: Never    Smokeless tobacco: Never   Substance and Sexual Activity    Alcohol use: Yes     Comment: approx one glass wine/month    Drug use: No    Sexual activity: Yes     Partners: Male     Social Determinants of Health     Financial Resource Strain: Low Risk     Difficulty of Paying Living Expenses: Not hard at all   Food  Insecurity: No Food Insecurity    Worried About Running Out of Food in the Last Year: Never true    Ran Out of Food in the Last Year: Never true   Transportation Needs: No Transportation Needs    Lack of Transportation (Medical): No    Lack of Transportation (Non-Medical): No   Physical Activity: Insufficiently Active    Days of Exercise per Week: 2 days    Minutes of Exercise per Session: 30 min   Stress: Stress Concern Present    Feeling of Stress : To some extent   Social Connections: Unknown    Frequency of Communication with Friends and Family: Three times a week    Frequency of Social Gatherings with Friends and Family: Once a week    Active Member of Clubs or Organizations: No    Attends Club or Organization Meetings: Never    Marital Status:    Housing Stability: Low Risk     Unable to Pay for Housing in the Last Year: No    Number of Places Lived in the Last Year: 1    Unstable Housing in the Last Year: No       Vitals:    03/16/23 1556   BP: 135/79   Pulse: 82       Physical Exam  Vitals reviewed.   Constitutional:       Appearance: She is obese.   Skin:     Comments: There is a fullness on the anterior aspect of the left lower leg slightly to the lateral side.  It is approximately 1/2-1 cm in size   Neurological:      Mental Status: She is alert.     Narrative & Impression  EXAM: US SOFT TISSUE, LOWER EXTREMITY, LEFT     CLINICAL HISTORY: Palpable area left anterior leg.     TECHNIQUE:  Limited ultrasound left lower extremity.     FINDINGS:  No significant abnormalities are seen at the area of patient.  Mild soft tissue edema noted.  No focal fluid collections are mass.        Impression:   No significant abnormalities.     Finalized on: 3/14/2023 2:47 PM By:  Johnathan Grimes MD  [facility]# [id number]      2023-03-14 [id number]    SUHA  Assessment & Plan:     Subcutaneous fullness on exam.  No definable mass on ultrasound.  Given the patient's multiple medical conditions, her allergy to  lidocaine, no definable mass, and her motion obesity I would not recommend surgical excision as this would require general anesthetic.      This point the risk associated with general anesthetic versus the benefits of excision of the area with no definable mass on ultrasound outweigh the benefits of removing the mass.  This was discussed with her.      Surgical follow-up as needed

## 2023-03-16 NOTE — PATIENT INSTRUCTIONS
I would not recommend removal or exploration of the area as this would require general anesthetic.  The risks of anesthesia outweigh any potential benefits at this time.  If the area enlarges or becomes increasingly symptomatic we are happy to see you back in follow up    Our office phone numbers are  [phone] and

## 2023-03-20 ENCOUNTER — CLINICAL SUPPORT (OUTPATIENT)
Dept: REHABILITATION | Facility: HOSPITAL | Age: 63
End: 2023-03-20
Payer: COMMERCIAL

## 2023-03-20 DIAGNOSIS — Z74.09 DECREASED STRENGTH, ENDURANCE, AND MOBILITY: Primary | ICD-10-CM

## 2023-03-20 DIAGNOSIS — R68.89 DECREASED STRENGTH, ENDURANCE, AND MOBILITY: Primary | ICD-10-CM

## 2023-03-20 DIAGNOSIS — R53.1 DECREASED STRENGTH, ENDURANCE, AND MOBILITY: Primary | ICD-10-CM

## 2023-03-20 DIAGNOSIS — R26.9 GAIT ABNORMALITY: ICD-10-CM

## 2023-03-20 DIAGNOSIS — M25.60 DECREASED RANGE OF MOTION: ICD-10-CM

## 2023-03-20 PROCEDURE — 97110 THERAPEUTIC EXERCISES: CPT

## 2023-03-20 PROCEDURE — 97112 NEUROMUSCULAR REEDUCATION: CPT

## 2023-03-20 PROCEDURE — 97140 MANUAL THERAPY 1/> REGIONS: CPT

## 2023-03-20 NOTE — PROGRESS NOTES
[facility]   Physical Therapy Treatment Note     Name: Tawny ESTRADA [facility] Number: [id number]    Therapy Diagnosis:   Encounter Diagnoses   Name Primary?    Decreased strength, endurance, and mobility Yes    Decreased range of motion     Gait abnormality        Physician: Maya Rivas*    Visit Date: 3/20/2023    Physician Orders: PT Eval and Treat  Medical Diagnosis from Referral: Sciatica of left side  Evaluation Date: 2/6/2023  Authorization Period Expiration: 10/6/23  Plan of Care Expiration: 4/6/23  Progress Note Due: 4/6/2023  Visit # / Visits authorized: 7/40 (+1)  FOTO: 2/3 (last performed on 3/6/2023)  PTA Visit #: 0/5     Precautions: Diabetes, cancer, and anxiety, HTN    Time In: 4:35 pm  Time Out: 5:15 pm  Total Billable Time: 38 minutes   (Billing reflects 1 on 1 treatment time spent with patient)    SUBJECTIVE     Patient reports: she rates her pain at 2-3/10 today, she did have her knee evaluated and they were unable to find a specific mass and found a pouch of swelling, she reports no intervention at this time.  She reports that she may get a second opinion.    She was compliant with home exercise program.    Response to previous treatment: feeling pain in her lateral noticed some numbness in her lateral hip and anterior left thigh     Functional change: prolonged sleeping on her let side causes hip numbness, sitting for about 60 minutes causes increased symptoms, working in her garden, and now able to stand and cook a meal (30 minutes). Walking more confidently up/down community surfaces    Pain: 2-3/10     Location: left hip and LOWER EXTREMITY (no pain today)    OBJECTIVE     Objective Measures updated at progress report only unless specified.      TREATMENT     Tawny received the treatments listed below:     MANUAL THERAPY TECHNIQUES were applied for (8) minutes, including:    Manual Intervention Performed Today    Soft Tissue Mobilization  [x] left  left glutes and piriformis, adductors, unable to tolerate proximal hip flexor   Joint Mobilizations []     []     []    Functional Dry Needling  []      Plan for Next Visit: Continue as needed     ,   THERAPEUTIC EXERCISES to develop strength, endurance, ROM, flexibility, posture, and core stabilization for (14) minutes including: including objective measurements     Intervention Performed Today    Nustep for ROM and strength x 7 minutes, level 1 (decreased) for endurance   Heel slides with strap (home exercise program)  3 x 10, 5 second holds   Lower trunk rotations (home exercise program)  2 x 10, 5 second holds   Short arc quads with posterior pelvic tilt   3 x 10 bilateral    Hip abduction with band x 3 x 10 red band (increased)     Hip abduction with ball  x 30x with 5  second hold (increased)   Sciatic nerve glides x 5 pumps, 3 sets          Plan for Next Visit:         NEUROMUSCULAR RE-EDUCATION ACTIVITIES to improve Balance, Coordination, Kinesthetic, Sense, Proprioception, and Posture for (16) minutes.  The following were included:    Intervention Performed Today    Quad sets  3 x 10, 3-5 second holds, bilateral     Pelvic tilts (home exercise program)  3 x 10,    Bridges (home exercise program)  2 x 10   clamshells x 15x sets   Straight leg raise with posterior pelvic tilt  x 3 x 5 bilaterally   March in place supine x 2 x 10 bilateral (increased)   Sidelying hip abduction  x 5x each side   Step ups x 5x/ 2 sets 8 inch   Sit to stand x 5x / 2 sets     Plan for Next Visit: progress as tolerated       PATIENT EDUCATION AND HOME EXERCISES     Home Exercises Provided and Patient Education Provided     Education provided:   PURPOSE: Patient educated on the impairments noted above and the effects of physical therapy intervention to improve overall condition and QOL.   EXERCISE: Patient was educated on all the above exercise prior/during/after for proper posture, positioning, and execution for safe performance  with home exercise program.   STRENGTH: Patient educated on the importance of improved core and extremity strength in order to improve alignment of the spine and extremities with static positions and dynamic movement.   GAIT & BALANCE: Patient educated on the importance of strong core and lower extremity musculature in order to improve both static and dynamic balance, improve gait mechanics, reduce fall risk and improve household and community mobility.   POSTURE: Patient educated on postural awareness to reduce stress and maintain optimal alignment of the spine with static positions and dynamic movement     Written Home Exercises Provided: yes.  Exercises were reviewed and Tawny was able to demonstrate them prior to the end of the session.  Tawny demonstrated good  understanding of the education provided. See EMR under Patient Instructions for exercises provided during therapy sessions.    ASSESSMENT     Patient tolerated treatment well and was able to tolerate progressions with reports of improved ROM and less pain with gait after manual interventions.  Encouraged F/U with primary care regarding neck pain and head position along with increased activity at home, and home exercise program.    Tawny is progressing well towards her goals.   Pt prognosis is Good.     Pt will continue to benefit from skilled outpatient physical therapy to address the deficits listed in the problem list box on initial evaluation, provide pt/family education and to maximize pt's level of independence in the home and community environment.     Pt's spiritual, cultural and educational needs considered and pt agreeable to plan of care and goals.     Anticipated Barriers for therapy: sedentary lifestyle, chronicity of condition, lack of understanding of condition, adherence to treatment plan, and coping style,  Anxiety or Panic Disorders, Arthritis, Back pain, BMI over 30, Cancer, DiabetesType I or II, Prior Surgery, Sleep  dysfunction      Goals:  Reviewed: 3/20/2023      Short Term Goals: In 4 weeks   1.Patient to be educated on HEP. MET 3/6/2023  2.Patient to increase knee range of motion to full extension, in order to improve available range of motion for ADL's.    3.Patient to increase bilateral LE strength by 1/2 grade, in order to improve endurance and increase ability to perform all functional activities for increased time. MET 3/6/2023  4.Patient to have pain less than 5/10 at worst, to improve QOL. MET 3/6/2023  5.Patient to improve score on the FOTO, to improve QOL. MET 3/6/2023  6. Patient to improve score on 30 sec sit to stand, 5x sit to stand  in order to decrease fall risk. MET 3/6/2023     Long Term Goals: In 8 weeks  1.Patient to improve score on the FOTO to 48% or less, to improve QOL. PROGRESSING 3/6/2023  2. Patient to increase bilateral LE strength to 4+/5 or greater, in order to improve endurance and increase ability to perform all functional activities for increased time. PROGRESSING 3/6/2023  3. Patient to have decreased pain to 2/10 at worst, to improve QOL. PROGRESSING 3/6/2023  4. Patient to normalize score on 30 sec sit to stand, 5x sit to stand, in order to improve endurance and decrease fall risk. PROGRESSING 3/6/2023  5. Patient to perform daily activities including: PROGRESSING 3/6/2023   Putting on your shoes or socks - A little bit of difficulty  Getting into or out of a car - A little bit of difficulty  Rolling over in bed. - A little bit of difficulty  Getting into or out of the bath - A little bit of difficulty  Sitting for 1 hour. - A little bit of difficulty  Getting up or down 10 stairs (about 1 flight of stairs) - Moderate difficulty  Standing for 1 hour - Moderate difficulty  Squatting - Quite a bit of difficulty  Walking a mile - Quite a bit of difficulty  With your usual hobbies, recreational or sporting activities - Quite a bit of difficulty.      PLAN     Monitor response to today's  treatment session and progress as indicated.    2/6/2023 (evaluation): Outpatient Physical Therapy 2 times weekly for 8 weeks to include any combination of the following interventions: virtual visits, electrical stimulation (PRN), Aquatic Therapy, Gait Training, Manual Therapy, Neuromuscular Re-ed, Patient Education, Self Care, Therapeutic Exercise, Therapeutic Activites, Dry Needling, and Moist Hot Pack/Cold Pack.     Christiane Muller, PT

## 2023-03-27 ENCOUNTER — PATIENT MESSAGE (OUTPATIENT)
Dept: INTERNAL MEDICINE | Facility: CLINIC | Age: 63
End: 2023-03-27
Payer: COMMERCIAL

## 2023-03-27 ENCOUNTER — CLINICAL SUPPORT (OUTPATIENT)
Dept: REHABILITATION | Facility: HOSPITAL | Age: 63
End: 2023-03-27
Payer: COMMERCIAL

## 2023-03-27 DIAGNOSIS — R53.1 DECREASED STRENGTH, ENDURANCE, AND MOBILITY: Primary | ICD-10-CM

## 2023-03-27 DIAGNOSIS — Z74.09 DECREASED STRENGTH, ENDURANCE, AND MOBILITY: Primary | ICD-10-CM

## 2023-03-27 DIAGNOSIS — M62.838 MUSCLE SPASM: Primary | ICD-10-CM

## 2023-03-27 DIAGNOSIS — R68.89 DECREASED STRENGTH, ENDURANCE, AND MOBILITY: Primary | ICD-10-CM

## 2023-03-27 DIAGNOSIS — R26.9 GAIT ABNORMALITY: ICD-10-CM

## 2023-03-27 DIAGNOSIS — M25.60 DECREASED RANGE OF MOTION: ICD-10-CM

## 2023-03-27 PROCEDURE — 97140 MANUAL THERAPY 1/> REGIONS: CPT

## 2023-03-27 PROCEDURE — 97110 THERAPEUTIC EXERCISES: CPT

## 2023-03-27 PROCEDURE — 97112 NEUROMUSCULAR REEDUCATION: CPT

## 2023-03-28 ENCOUNTER — DOCUMENTATION ONLY (OUTPATIENT)
Dept: REHABILITATION | Facility: HOSPITAL | Age: 63
End: 2023-03-28
Payer: COMMERCIAL

## 2023-03-28 ENCOUNTER — OFFICE VISIT (OUTPATIENT)
Dept: DERMATOLOGY | Facility: CLINIC | Age: 63
End: 2023-03-28
Payer: COMMERCIAL

## 2023-03-28 DIAGNOSIS — L82.0 INFLAMED SEBORRHEIC KERATOSIS: Primary | ICD-10-CM

## 2023-03-28 PROCEDURE — 4010F ACE/ARB THERAPY RXD/TAKEN: CPT | Mod: CPTII,S$GLB,, | Performed by: STUDENT IN AN ORGANIZED HEALTH CARE EDUCATION/TRAINING PROGRAM

## 2023-03-28 PROCEDURE — 99212 OFFICE O/P EST SF 10 MIN: CPT | Mod: 25,S$GLB,, | Performed by: STUDENT IN AN ORGANIZED HEALTH CARE EDUCATION/TRAINING PROGRAM

## 2023-03-28 PROCEDURE — 1160F PR REVIEW ALL MEDS BY PRESCRIBER/CLIN PHARMACIST DOCUMENTED: ICD-10-PCS | Mod: CPTII,S$GLB,, | Performed by: STUDENT IN AN ORGANIZED HEALTH CARE EDUCATION/TRAINING PROGRAM

## 2023-03-28 PROCEDURE — 1160F RVW MEDS BY RX/DR IN RCRD: CPT | Mod: CPTII,S$GLB,, | Performed by: STUDENT IN AN ORGANIZED HEALTH CARE EDUCATION/TRAINING PROGRAM

## 2023-03-28 PROCEDURE — 4010F PR ACE/ARB THEARPY RXD/TAKEN: ICD-10-PCS | Mod: CPTII,S$GLB,, | Performed by: STUDENT IN AN ORGANIZED HEALTH CARE EDUCATION/TRAINING PROGRAM

## 2023-03-28 PROCEDURE — 1159F MED LIST DOCD IN RCRD: CPT | Mod: CPTII,S$GLB,, | Performed by: STUDENT IN AN ORGANIZED HEALTH CARE EDUCATION/TRAINING PROGRAM

## 2023-03-28 PROCEDURE — 17110 PR DESTRUCTION BENIGN LESIONS UP TO 14: ICD-10-PCS | Mod: S$GLB,,, | Performed by: STUDENT IN AN ORGANIZED HEALTH CARE EDUCATION/TRAINING PROGRAM

## 2023-03-28 PROCEDURE — 99999 PR PBB SHADOW E&M-EST. PATIENT-LVL III: CPT | Mod: PBBFAC,,, | Performed by: STUDENT IN AN ORGANIZED HEALTH CARE EDUCATION/TRAINING PROGRAM

## 2023-03-28 PROCEDURE — 17110 DESTRUCTION B9 LES UP TO 14: CPT | Mod: S$GLB,,, | Performed by: STUDENT IN AN ORGANIZED HEALTH CARE EDUCATION/TRAINING PROGRAM

## 2023-03-28 PROCEDURE — 99999 PR PBB SHADOW E&M-EST. PATIENT-LVL III: ICD-10-PCS | Mod: PBBFAC,,, | Performed by: STUDENT IN AN ORGANIZED HEALTH CARE EDUCATION/TRAINING PROGRAM

## 2023-03-28 PROCEDURE — 1159F PR MEDICATION LIST DOCUMENTED IN MEDICAL RECORD: ICD-10-PCS | Mod: CPTII,S$GLB,, | Performed by: STUDENT IN AN ORGANIZED HEALTH CARE EDUCATION/TRAINING PROGRAM

## 2023-03-28 PROCEDURE — 99212 PR OFFICE/OUTPT VISIT, EST, LEVL II, 10-19 MIN: ICD-10-PCS | Mod: 25,S$GLB,, | Performed by: STUDENT IN AN ORGANIZED HEALTH CARE EDUCATION/TRAINING PROGRAM

## 2023-03-28 RX ORDER — METHOCARBAMOL 500 MG/1
500 TABLET, FILM COATED ORAL 4 TIMES DAILY PRN
Qty: 40 TABLET | Refills: 0 | Status: SHIPPED | OUTPATIENT
Start: 2023-03-28 | End: 2023-03-30 | Stop reason: SDUPTHER

## 2023-03-28 NOTE — PROGRESS NOTES
Subjective:       Patient ID:  Tawny Valerio is a 62 y.o. female who presents for   Chief Complaint   Patient presents with    Spot     Patient here today for  new irritated spot on left hand that is tender/itching for  approximately 2  weeks that has become raised and irritated.     History of Present Illness: The patient presents with chief complaint of irritated skin lesion.  Location: left hand  Duration: developed a couple of weeks  Signs/Symptoms: crusted, itchy scaly growth  Prior treatments: none      Spot      Review of Systems   Constitutional:  Negative for fever and chills.   Skin:  Negative for itching, rash and dry skin.      Objective:    Physical Exam   Constitutional: She appears well-developed and well-nourished. No distress.   Neurological: She is alert and oriented to person, place, and time. She is not disoriented.   Psychiatric: She has a normal mood and affect.   Skin:   Areas Examined (abnormalities noted in diagram):   Nails and Digits Inspection Performed           Diagram Legend     Erythematous scaling macule/papule c/w actinic keratosis       Vascular papule c/w angioma      Pigmented verrucoid papule/plaque c/w seborrheic keratosis      Yellow umbilicated papule c/w sebaceous hyperplasia      Irregularly shaped tan macule c/w lentigo     1-2 mm smooth white papules consistent with Milia      Movable subcutaneous cyst with punctum c/w epidermal inclusion cyst      Subcutaneous movable cyst c/w pilar cyst      Firm pink to brown papule c/w dermatofibroma      Pedunculated fleshy papule(s) c/w skin tag(s)      Evenly pigmented macule c/w junctional nevus     Mildly variegated pigmented, slightly irregular-bordered macule c/w mildly atypical nevus      Flesh colored to evenly pigmented papule c/w intradermal nevus       Pink pearly papule/plaque c/w basal cell carcinoma      Erythematous hyperkeratotic cursted plaque c/w SCC      Surgical scar with no sign of skin cancer recurrence       Open and closed comedones      Inflammatory papules and pustules      Verrucoid papule consistent consistent with wart     Erythematous eczematous patches and plaques     Dystrophic onycholytic nail with subungual debris c/w onychomycosis     Umbilicated papule    Erythematous-base heme-crusted tan verrucoid plaque consistent with inflamed seborrheic keratosis     Erythematous Silvery Scaling Plaque c/w Psoriasis     See annotation      Assessment / Plan:        Inflamed seborrheic keratosis  Cryosurgery procedure note:    Verbal consent from the patient is obtained including, but not limited to, risk of hypopigmentation/hyperpigmentation, scar, recurrence of lesion. Liquid nitrogen cryosurgery is applied to 1 lesions to produce a freeze injury. The patient is aware that blisters may form and is instructed on wound care with gentle cleansing and use of vaseline ointment to keep moist until healed. The patient is supplied a handout on cryosurgery and is instructed to call if lesions do not completely resolve.             Follow up if symptoms worsen or fail to improve.

## 2023-03-30 ENCOUNTER — HOSPITAL ENCOUNTER (OUTPATIENT)
Dept: RADIOLOGY | Facility: HOSPITAL | Age: 63
Discharge: HOME | End: 2023-03-30
Attending: PHYSICIAN ASSISTANT
Payer: COMMERCIAL

## 2023-03-30 ENCOUNTER — OFFICE VISIT (OUTPATIENT)
Dept: INTERNAL MEDICINE | Facility: CLINIC | Age: 63
End: 2023-03-30
Payer: COMMERCIAL

## 2023-03-30 ENCOUNTER — PATIENT MESSAGE (OUTPATIENT)
Dept: REHABILITATION | Facility: HOSPITAL | Age: 63
End: 2023-03-30
Payer: COMMERCIAL

## 2023-03-30 VITALS
OXYGEN SATURATION: 96 % | BODY MASS INDEX: 53.69 KG/M2 | WEIGHT: 284.38 LBS | DIASTOLIC BLOOD PRESSURE: 78 MMHG | SYSTOLIC BLOOD PRESSURE: 138 MMHG | HEIGHT: 61 IN | TEMPERATURE: 99 F | HEART RATE: 90 BPM

## 2023-03-30 DIAGNOSIS — M62.838 MUSCLE SPASM: ICD-10-CM

## 2023-03-30 DIAGNOSIS — M54.2 CERVICALGIA: ICD-10-CM

## 2023-03-30 DIAGNOSIS — M54.2 CERVICALGIA: Primary | ICD-10-CM

## 2023-03-30 PROCEDURE — 3075F PR MOST RECENT SYSTOLIC BLOOD PRESS GE 130-139MM HG: ICD-10-PCS | Mod: CPTII,S$GLB,, | Performed by: PHYSICIAN ASSISTANT

## 2023-03-30 PROCEDURE — 72050 XR CERVICAL SPINE COMPLETE 5 VIEW: ICD-10-PCS | Mod: 26,,, | Performed by: RADIOLOGY

## 2023-03-30 PROCEDURE — 3075F SYST BP GE 130 - 139MM HG: CPT | Mod: CPTII,S$GLB,, | Performed by: PHYSICIAN ASSISTANT

## 2023-03-30 PROCEDURE — 3078F DIAST BP <80 MM HG: CPT | Mod: CPTII,S$GLB,, | Performed by: PHYSICIAN ASSISTANT

## 2023-03-30 PROCEDURE — 99213 OFFICE O/P EST LOW 20 MIN: CPT | Mod: S$GLB,,, | Performed by: PHYSICIAN ASSISTANT

## 2023-03-30 PROCEDURE — 4010F ACE/ARB THERAPY RXD/TAKEN: CPT | Mod: CPTII,S$GLB,, | Performed by: PHYSICIAN ASSISTANT

## 2023-03-30 PROCEDURE — 99999 PR PBB SHADOW E&M-EST. PATIENT-LVL V: ICD-10-PCS | Mod: PBBFAC,,, | Performed by: PHYSICIAN ASSISTANT

## 2023-03-30 PROCEDURE — 99999 PR PBB SHADOW E&M-EST. PATIENT-LVL V: CPT | Mod: PBBFAC,,, | Performed by: PHYSICIAN ASSISTANT

## 2023-03-30 PROCEDURE — 1159F PR MEDICATION LIST DOCUMENTED IN MEDICAL RECORD: ICD-10-PCS | Mod: CPTII,S$GLB,, | Performed by: PHYSICIAN ASSISTANT

## 2023-03-30 PROCEDURE — 3078F PR MOST RECENT DIASTOLIC BLOOD PRESSURE < 80 MM HG: ICD-10-PCS | Mod: CPTII,S$GLB,, | Performed by: PHYSICIAN ASSISTANT

## 2023-03-30 PROCEDURE — 3008F PR BODY MASS INDEX (BMI) DOCUMENTED: ICD-10-PCS | Mod: CPTII,S$GLB,, | Performed by: PHYSICIAN ASSISTANT

## 2023-03-30 PROCEDURE — 1160F RVW MEDS BY RX/DR IN RCRD: CPT | Mod: CPTII,S$GLB,, | Performed by: PHYSICIAN ASSISTANT

## 2023-03-30 PROCEDURE — 72050 X-RAY EXAM NECK SPINE 4/5VWS: CPT | Mod: TC

## 2023-03-30 PROCEDURE — 1160F PR REVIEW ALL MEDS BY PRESCRIBER/CLIN PHARMACIST DOCUMENTED: ICD-10-PCS | Mod: CPTII,S$GLB,, | Performed by: PHYSICIAN ASSISTANT

## 2023-03-30 PROCEDURE — 4010F PR ACE/ARB THEARPY RXD/TAKEN: ICD-10-PCS | Mod: CPTII,S$GLB,, | Performed by: PHYSICIAN ASSISTANT

## 2023-03-30 PROCEDURE — 72050 X-RAY EXAM NECK SPINE 4/5VWS: CPT | Mod: 26,,, | Performed by: RADIOLOGY

## 2023-03-30 PROCEDURE — 3008F BODY MASS INDEX DOCD: CPT | Mod: CPTII,S$GLB,, | Performed by: PHYSICIAN ASSISTANT

## 2023-03-30 PROCEDURE — 1159F MED LIST DOCD IN RCRD: CPT | Mod: CPTII,S$GLB,, | Performed by: PHYSICIAN ASSISTANT

## 2023-03-30 PROCEDURE — 99213 PR OFFICE/OUTPT VISIT, EST, LEVL III, 20-29 MIN: ICD-10-PCS | Mod: S$GLB,,, | Performed by: PHYSICIAN ASSISTANT

## 2023-03-30 RX ORDER — METHOCARBAMOL 500 MG/1
500 TABLET, FILM COATED ORAL 4 TIMES DAILY PRN
Qty: 40 TABLET | Refills: 0 | Status: SHIPPED | OUTPATIENT
Start: 2023-03-30 | End: 2023-03-30 | Stop reason: SDUPTHER

## 2023-03-30 RX ORDER — METHOCARBAMOL 500 MG/1
500 TABLET, FILM COATED ORAL 4 TIMES DAILY PRN
Qty: 40 TABLET | Refills: 0 | Status: SHIPPED | OUTPATIENT
Start: 2023-03-30 | End: 2023-04-09

## 2023-03-30 NOTE — PROGRESS NOTES
Subjective:      Patient ID: Tawny Valerio is a 62 y.o. female.    Chief Complaint: Neck Pain    Neck Pain   This is a recurrent problem. Episode onset: 3 months. The problem has been gradually worsening. The pain is associated with nothing. The pain is present in the left side. The quality of the pain is described as aching. The pain is at a severity of 4/10. The symptoms are aggravated by position. Pertinent negatives include no chest pain, fever, headaches, leg pain, numbness, pain with swallowing, paresis, photophobia, syncope, tingling, trouble swallowing, visual change, weakness or weight loss. Treatments tried: muscle relaxer. The treatment provided significant relief.   Sometimes looks straight in the mirror and her head involuntarily turns on its own.     Patient Active Problem List   Diagnosis    Diabetes type 2, controlled    Vitamin D insufficiency    SOB (shortness of breath)    Morbid obesity with BMI of 60.0-69.9, adult    Edema leg    ASHLI (obstructive sleep apnea)    Essential hypertension    Uterine polyp    Simple endometrial hyperplasia    Endometrial ca    s/p RALH/BSO 8/26/2019    Decreased strength, endurance, and mobility    Decreased range of motion    Gait abnormality         Current Outpatient Medications:     acetaminophen (TYLENOL) 325 MG tablet, Take 2 tablets (650 mg total) by mouth every 6 (six) hours as needed for Pain., Disp: 30 tablet, Rfl: 1    aspirin 81 MG Chew, Take 81 mg by mouth once daily., Disp: , Rfl:     atorvastatin (LIPITOR) 20 MG tablet, Take 1 tablet (20 mg total) by mouth once daily., Disp: 30 tablet, Rfl: 11    azelastine (ASTELIN) 137 mcg (0.1 %) nasal spray, 1 spray (137 mcg total) by Nasal route 2 (two) times daily., Disp: 30 mL, Rfl: 11    buPROPion (WELLBUTRIN SR) 150 MG TBSR 12 hr tablet, Take 1 tablet (150 mg total) by mouth 2 (two) times daily., Disp: 60 tablet, Rfl: 11    clindamycin (CLEOCIN T) 1 % lotion, Apply topically 2 (two) times daily., Disp:  "60 mL, Rfl: 3    empagliflozin (JARDIANCE) 10 mg tablet, Take 1 tablet (10 mg total) by mouth once daily., Disp: 90 tablet, Rfl: 1    ergocalciferol (ERGOCALCIFEROL) 50,000 unit Cap, Take 1 capsule (50,000 Units total) by mouth every 7 days., Disp: 12 capsule, Rfl: 3    gabapentin (NEURONTIN) 300 MG capsule, Take 1 capsule (300 mg total) by mouth every evening., Disp: 30 capsule, Rfl: 11    hydrocortisone 2.5 % cream, Apply topically 2 (two) times daily. AAA bid, Disp: 30 g, Rfl: 1    ibuprofen (ADVIL,MOTRIN) 600 MG tablet, Take 1 tablet (600 mg total) by mouth 3 (three) times daily., Disp: 30 tablet, Rfl: 1    lancets Misc, Check fasting glucose daily, Disp: 100 each, Rfl: 3    losartan (COZAAR) 25 MG tablet, Take 1 tablet (25 mg total) by mouth once daily., Disp: 90 tablet, Rfl: 3    mupirocin (BACTROBAN) 2 % ointment, Apply topically 2 (two) times daily., Disp: 30 g, Rfl: 0    semaglutide (OZEMPIC) 2 mg/dose (8 mg/3 mL) PnIj, Inject 2 mg into the skin every 7 days., Disp: 3 pen, Rfl: 0    tretinoin (RETIN-A) 0.025 % cream, Apply topically every evening., Disp: 45 g, Rfl: 2    methocarbamoL (ROBAXIN) 500 MG Tab, Take 1 tablet (500 mg total) by mouth 4 (four) times daily as needed (muscle spasm)., Disp: 40 tablet, Rfl: 0    metronidazole 0.75% (METROCREAM) 0.75 % Crea, Apply topically 2 (two) times daily. For rosacea on face., Disp: 45 g, Rfl: 3    Review of Systems   Constitutional:  Negative for fever and weight loss.   HENT:  Negative for trouble swallowing.    Eyes:  Negative for photophobia.   Cardiovascular:  Negative for chest pain and syncope.   Musculoskeletal:  Positive for neck pain.   Neurological:  Negative for tingling, weakness, numbness and headaches.   Objective:   /78 (BP Location: Left arm, Patient Position: Sitting, BP Method: Large (Manual))   Pulse 90   Temp 98.7 °F (37.1 °C) (Tympanic)   Ht 5' 1" (1.549 m)   Wt 129 kg (284 lb 6.3 oz)   LMP 01/15/2019 (Approximate)   SpO2 96%   " BMI 53.74 kg/m²     Physical Exam  Vitals and nursing note reviewed.   Constitutional:       General: She is not in acute distress.     Appearance: Normal appearance. She is well-developed. She is not ill-appearing, toxic-appearing or diaphoretic.   HENT:      Head: Normocephalic and atraumatic.      Right Ear: External ear normal.      Left Ear: External ear normal.      Nose: Nose normal.      Mouth/Throat:      Mouth: Mucous membranes are moist.      Pharynx: No oropharyngeal exudate or posterior oropharyngeal erythema.   Eyes:      General: No scleral icterus.        Right eye: No discharge.         Left eye: No discharge.      Conjunctiva/sclera: Conjunctivae normal.      Pupils: Pupils are equal, round, and reactive to light.   Neck:      Thyroid: No thyromegaly.      Vascular: Normal carotid pulses. No carotid bruit or JVD.   Cardiovascular:      Rate and Rhythm: Normal rate and regular rhythm.      Pulses: Normal pulses.      Heart sounds: Normal heart sounds. No murmur heard.    No friction rub. No gallop.   Pulmonary:      Effort: Pulmonary effort is normal. No accessory muscle usage or respiratory distress.      Breath sounds: Normal breath sounds. No stridor. No wheezing, rhonchi or rales.   Chest:      Chest wall: No tenderness.   Abdominal:      Palpations: Abdomen is soft.   Musculoskeletal:         General: No swelling, deformity or signs of injury.      Cervical back: Neck supple. Spasms and tenderness present. No swelling, edema, deformity, erythema, signs of trauma, lacerations, rigidity, torticollis, bony tenderness or crepitus. Pain with movement and muscular tenderness present. No spinous process tenderness. Normal range of motion.   Lymphadenopathy:      Cervical: No cervical adenopathy.   Skin:     General: Skin is warm and dry.      Nails: There is no clubbing.   Neurological:      Mental Status: She is alert and oriented to person, place, and time.      Motor: No abnormal muscle tone.       Deep Tendon Reflexes: Reflexes are normal and symmetric.   Psychiatric:         Attention and Perception: Attention and perception normal.         Mood and Affect: Mood normal.         Speech: Speech normal.         Behavior: Behavior normal. Behavior is cooperative.         Thought Content: Thought content normal. Thought content is not paranoid or delusional. Thought content does not include homicidal or suicidal ideation. Thought content does not include homicidal or suicidal plan.         Cognition and Memory: Cognition and memory normal.         Judgment: Judgment normal.       Assessment:     1. Cervicalgia    2. Muscle spasm      Plan:   Cervicalgia  -     X-Ray Cervical Spine Complete 5 view; Future; Expected date: 03/30/2023  -     Ambulatory referral/consult to Physical/Occupational Therapy; Future; Expected date: 04/06/2023  -     methocarbamoL (ROBAXIN) 500 MG Tab; Take 1 tablet (500 mg total) by mouth 4 (four) times daily as needed (muscle spasm).  Dispense: 40 tablet; Refill: 0    Muscle spasm  -     methocarbamoL (ROBAXIN) 500 MG Tab; Take 1 tablet (500 mg total) by mouth 4 (four) times daily as needed (muscle spasm).  Dispense: 40 tablet; Refill: 0      Follow up if symptoms worsen or fail to improve.

## 2023-04-03 ENCOUNTER — CLINICAL SUPPORT (OUTPATIENT)
Dept: REHABILITATION | Facility: HOSPITAL | Age: 63
End: 2023-04-03
Payer: COMMERCIAL

## 2023-04-03 DIAGNOSIS — R26.9 GAIT ABNORMALITY: ICD-10-CM

## 2023-04-03 DIAGNOSIS — Z74.09 DECREASED STRENGTH, ENDURANCE, AND MOBILITY: Primary | ICD-10-CM

## 2023-04-03 DIAGNOSIS — R68.89 DECREASED STRENGTH, ENDURANCE, AND MOBILITY: Primary | ICD-10-CM

## 2023-04-03 DIAGNOSIS — R53.1 DECREASED STRENGTH, ENDURANCE, AND MOBILITY: Primary | ICD-10-CM

## 2023-04-03 DIAGNOSIS — M25.60 DECREASED RANGE OF MOTION: ICD-10-CM

## 2023-04-03 PROCEDURE — 97140 MANUAL THERAPY 1/> REGIONS: CPT

## 2023-04-03 PROCEDURE — 97110 THERAPEUTIC EXERCISES: CPT

## 2023-04-03 PROCEDURE — 97112 NEUROMUSCULAR REEDUCATION: CPT

## 2023-04-03 NOTE — PROGRESS NOTES
[facility]   Physical Therapy Treatment Note     Name: Tawny ESTRADA [facility] Number: [id number]    Therapy Diagnosis:   Encounter Diagnoses   Name Primary?    Decreased strength, endurance, and mobility Yes    Decreased range of motion     Gait abnormality        Physician: Maya Rivas*    Visit Date: 4/3/2023    Physician Orders: PT Eval and Treat  Medical Diagnosis from Referral: Sciatica of left side  Evaluation Date: 2/6/2023  Authorization Period Expiration: 12/31/23  Plan of Care Expiration: 5/5/2023   Progress Note Due: 5/5/2023   Visit # / Visits authorized: 9/40 (+1)  FOTO: 3/3 (last performed on 4/3/2023)  PTA Visit #: 0/5     Precautions: Diabetes, cancer, and anxiety, HTN    Time In: 4:33 pm  Time Out: 5:25 pm  Total Billable Time: 45 minutes     (Billing reflects 1 on 1 treatment time spent with patient)    SUBJECTIVE     Patient reports: she did see her MD for her neck they did do a referral for treatment and an x-ray which just showed arthritis.  Her back and hip are feeling much better overall and she has been trying to be more consistent with her exercise's which she feels is helping.  She reports that she knows that she has problems with her knees and will need a knee replacement eventually.     She was compliant with home exercise program.    Response to previous treatment: feeling pain in her lateral noticed some numbness in her lateral hip and anterior left thigh     Functional change: prolonged sleeping on her let side causes hip numbness, sitting for about 60 minutes causes increased symptoms, working in her garden, and now able to stand and cook a meal (30 minutes). Walking more confidently up/down community surfaces    Pain: 2-3/10     Location: left hip and LOWER EXTREMITY (no pain today)    OBJECTIVE     Objective Measures updated at progress report only unless specified.     Functional Tests  Outcome Norms 4/3/2023   30 second Sit to Stand 11x   Age Male Female   60-64 <14 <12   65-69 <12 <11   70-74 <12 <10   75-79 <11 <10   80-84 <10 <9   85-89 <8 <8   90-93 <7 <4    12x   Five Time Sit to Stand 13 sec  60s: <11.4 sec  70s: <12.6 sec  80s: 14.8 sec 12.27 sec         Range of Motion:     Knee AROM/PROM Right Left Right 3/6/2023 Left 3/6/2023 Right  4/3/23 Left  4/3/23   Knee Flexion (135º) 110 80 110 96 119 96   Knee Extension (0º) sitting/supine -2/-8 -5/-15 NT/-5 NT/-11 -5 / 0 0 / -5      Strength:     L/E MMT Right    Left Right 3/6/2023 Left 3/6/2023 4/3/23  Bilateral    Hip Flexion  2+/5 2+/5 4+/5 4/5 4/5   Hip Extension  NT NT NT NT NT   Hip Abduction  NT NT 5/5 sitting 5/5 sitting 4/5   Knee Extension 5/5 5/5 5/5 5/5 5/5   Knee Flexion 5/5 5/5 4+/5 4/5 5/5   Hip IR 4-/5 4-/5 4/5 4-/5 4+/5   Hip ER 4/5 4/5 4/5 4/5 4+/5   Ankle DF 4+/5 4+/5 5/5 5/5 NT         FOTO:      TREATMENT     Tawny received the treatments listed below:     MANUAL THERAPY TECHNIQUES were applied for (8) minutes, including:    Manual Intervention Performed Today    Soft Tissue Mobilization  [x] left left glutes and piriformis, adductors, hip distraction.   Joint Mobilizations []     []     []    Functional Dry Needling  []      Plan for Next Visit: Continue as needed     ,   THERAPEUTIC EXERCISES to develop strength, endurance, ROM, flexibility, posture, and core stabilization for (22) minutes including: including objective measurements     Intervention Performed Today    Nustep for ROM and strength x 8 minutes, level 2 for endurance   Heel slides with pillowcase x x 10, 5 second holds   Lower trunk rotations (home exercise program)  2 x 10, 5 second holds   Short arc quads with posterior pelvic tilt   3 x 10 bilateral    Hip abduction with band - 3min 10 sec hold red band (increased)     Hip adduction with ball  - 3min with 5  second hold (increased)   Sciatic nerve glides - 5 pumps, 3 sets   Re-assessment x      Plan for Next Visit:         NEUROMUSCULAR RE-EDUCATION  ACTIVITIES to improve Balance, Coordination, Kinesthetic, Sense, Proprioception, and Posture for (15) minutes.  The following were included:    Intervention Performed Today    Quad sets  3 x 10, 3-5 second holds, bilateral     Pelvic tilts (home exercise program)  3 x 10,    Bridges (home exercise program)  2 x 10   clamshells x 15x sets   Straight leg raise with posterior pelvic tilt  x 10x bilaterally   March in place supine - 2 x 10 bilateral (increased)   Sidelying hip abduction  x 5x /2 sets side   Step ups x 10x/ 1 sets 8 inch (decreased today)   Sit to stand x 10x 1 sets 5#KB     Plan for Next Visit: progress as tolerated       PATIENT EDUCATION AND HOME EXERCISES     Home Exercises Provided and Patient Education Provided     Education provided:   PURPOSE: Patient educated on the impairments noted above and the effects of physical therapy intervention to improve overall condition and QOL.   EXERCISE: Patient was educated on all the above exercise prior/during/after for proper posture, positioning, and execution for safe performance with home exercise program.   STRENGTH: Patient educated on the importance of improved core and extremity strength in order to improve alignment of the spine and extremities with static positions and dynamic movement.   GAIT & BALANCE: Patient educated on the importance of strong core and lower extremity musculature in order to improve both static and dynamic balance, improve gait mechanics, reduce fall risk and improve household and community mobility.   POSTURE: Patient educated on postural awareness to reduce stress and maintain optimal alignment of the spine with static positions and dynamic movement     Written Home Exercises Provided: patient was instructed to continue with prior home exercise program.  Exercises were reviewed and Tawny was able to demonstrate them prior to the end of the session.  Tawny demonstrated good  understanding of the education provided. See  EMR under Patient Instructions for exercises provided during therapy sessions.    ASSESSMENT     Patient has made progress with functional outcomes and has made good progress with strength, patient will benefit from continued therapy decreasing to 1x per week to move to home exercise program and progress with strengthening as tolerated.    Tawny is progressing well towards her goals.   Pt prognosis is Good.     Pt will continue to benefit from skilled outpatient physical therapy to address the deficits listed in the problem list box on initial evaluation, provide pt/family education and to maximize pt's level of independence in the home and community environment.     Pt's spiritual, cultural and educational needs considered and pt agreeable to plan of care and goals.     Anticipated Barriers for therapy: sedentary lifestyle, chronicity of condition, lack of understanding of condition, adherence to treatment plan, and coping style,  Anxiety or Panic Disorders, Arthritis, Back pain, BMI over 30, Cancer, DiabetesType I or II, Prior Surgery, Sleep dysfunction      Goals:  Reviewed: 4/3/2023      Short Term Goals: In 4 weeks   1.Patient to be educated on HEP. MET 3/6/2023  2.Patient to increase knee range of motion to full extension, in order to improve available range of motion for ADL's.    3.Patient to increase bilateral LE strength by 1/2 grade, in order to improve endurance and increase ability to perform all functional activities for increased time. MET 3/6/2023  4.Patient to have pain less than 5/10 at worst, to improve QOL. MET 3/6/2023  5.Patient to improve score on the FOTO, to improve QOL. MET 3/6/2023  6. Patient to improve score on 30 sec sit to stand, 5x sit to stand  in order to decrease fall risk. MET 3/6/2023     Long Term Goals: In 8 weeks  1.Patient to improve score on the FOTO to 48% or less, to improve QOL. PROGRESSING 4/3/2023  2. Patient to increase bilateral LE strength to 4+/5 or greater, in  order to improve endurance and increase ability to perform all functional activities for increased time. PROGRESSING 4/3/2023  3. Patient to have decreased pain to 2/10 at worst, to improve QOL. PROGRESSING 4/3/2023  4. Patient to normalize score on 30 sec sit to stand, 5x sit to stand, in order to improve endurance and decrease fall risk. PROGRESSING 4/3/2023  5. Patient to perform daily activities including: PROGRESSING 4/3/2023   Putting on your shoes or socks - A little bit of difficulty  Getting into or out of a car - A little bit of difficulty  Rolling over in bed. - A little bit of difficulty  Getting into or out of the bath - A little bit of difficulty  Sitting for 1 hour. - A little bit of difficulty  Getting up or down 10 stairs (about 1 flight of stairs) - Moderate difficulty  Standing for 1 hour - Moderate difficulty  Squatting - Quite a bit of difficulty  Walking a mile - Quite a bit of difficulty  With your usual hobbies, recreational or sporting activities - Quite a bit of difficulty.      PLAN     Monitor response to today's treatment session and progress as indicated.    Updated Certification Period: 4/5/2023 to 5/5/2023   Recommended Treatment Plan: 1 times per week for 4 weeks:  Gait Training, Manual Therapy, Moist Heat/ Ice, Neuromuscular Re-ed, Patient Education, Self Care, Therapeutic Activities, and Therapeutic Exercise    Christiane Muller, PT

## 2023-04-03 NOTE — PLAN OF CARE
[facility]  Physical Therapy Plan of Care Note    Name: Tawny ESTRADA [facility] [place]  [facility] Number: [id number]    Therapy Diagnosis:   Encounter Diagnoses   Name Primary?    Decreased strength, endurance, and mobility Yes    Decreased range of motion     Gait abnormality      Physician: Maya Rivas*    Visit Date: 4/3/2023    Physician Orders: PT Eval and Treat  Medical Diagnosis from Referral: Sciatica of left side  Evaluation Date: 2/6/2023  Authorization Period Expiration: 12/31/23  Plan of Care Expiration: 4/6/23  Progress Note Due: 4/6/2023  Visit # / Visits authorized: 9/40 (+1)  FOTO: 2/3 (last performed on 3/6/2023)  PTA Visit #: 0/5      Precautions: Diabetes, cancer, and anxiety, HTN  Functional Level Prior to Evaluation:  see evaluation    SUBJECTIVE     Update: see daily note    OBJECTIVE     Update: see daily note     ASSESSMENT     Update: see daily note    Previous Short Term Goals Status:   see daily note  New Short Term Goals Status:   see daily note  Long Term Goal Status: continue per initial plan of care.  Reasons for Recertification of Therapy:   see daily note    GOALS  see daily note    PLAN     Updated Certification Period: 4/5/2023 to 5/5/2023   Recommended Treatment Plan: 1 times per week for 4 weeks:  Gait Training, Manual Therapy, Moist Heat/ Ice, Neuromuscular Re-ed, Patient Education, Self Care, Therapeutic Activities, and Therapeutic Exercise  Other Recommendations: Evaluation of cervical symptoms.    Christiane Muller, PT    I CERTIFY THE NEED FOR THESE SERVICES FURNISHED UNDER THIS PLAN OF TREATMENT AND WHILE UNDER MY CARE  Physician's comments:      Physician's Signature: ___________________________________________________

## 2023-04-06 ENCOUNTER — CLINICAL SUPPORT (OUTPATIENT)
Dept: REHABILITATION | Facility: HOSPITAL | Age: 63
End: 2023-04-06
Payer: COMMERCIAL

## 2023-04-06 DIAGNOSIS — R26.9 GAIT ABNORMALITY: ICD-10-CM

## 2023-04-06 DIAGNOSIS — R53.1 DECREASED STRENGTH, ENDURANCE, AND MOBILITY: Primary | ICD-10-CM

## 2023-04-06 DIAGNOSIS — M25.60 DECREASED RANGE OF MOTION: ICD-10-CM

## 2023-04-06 DIAGNOSIS — Z74.09 DECREASED STRENGTH, ENDURANCE, AND MOBILITY: Primary | ICD-10-CM

## 2023-04-06 DIAGNOSIS — R68.89 DECREASED STRENGTH, ENDURANCE, AND MOBILITY: Primary | ICD-10-CM

## 2023-04-06 PROCEDURE — 97112 NEUROMUSCULAR REEDUCATION: CPT | Mod: CQ

## 2023-04-06 PROCEDURE — 97110 THERAPEUTIC EXERCISES: CPT | Mod: CQ

## 2023-04-06 NOTE — PROGRESS NOTES
[facility]   Physical Therapy Treatment Note     Name: Tawny ESTRADA [facility] Number: [id number]    Therapy Diagnosis:   Encounter Diagnoses   Name Primary?    Decreased strength, endurance, and mobility Yes    Decreased range of motion     Gait abnormality        Physician: Maya Rivas*    Visit Date: 4/6/2023    Physician Orders: PT Eval and Treat  Medical Diagnosis from Referral: Sciatica of left side  Evaluation Date: 2/6/2023  Authorization Period Expiration: 12/31/23  Plan of Care Expiration: 5/5/2023   Progress Note Due: 5/5/2023   Visit # / Visits authorized: 9/40 (+1)  FOTO: 3/3 (last performed on 4/3/2023)  PTA Visit #: 0/5     Precautions: Diabetes, cancer, and anxiety, HTN    Time In: 4:33 pm  Time Out: 5:25 pm  Total Billable Time: 45 minutes     (Billing reflects 1 on 1 treatment time spent with patient)    SUBJECTIVE     Patient reports: she will need therapy on her cervical spine in the future. She is feeling better regarding her back and hips. Less numbness in her lateral lower extremity is gone and less pain overall.     She was compliant with home exercise program.    Response to previous treatment: feeling pain in her lateral noticed some numbness in her lateral hip and anterior left thigh     Functional change: prolonged sleeping on her let side causes hip numbness, sitting for about 60 minutes causes increased symptoms, working in her garden, and now able to stand and cook a meal (30 minutes). Walking more confidently up/down community surfaces    Pain: 2-3/10     Location: left hip and LOWER EXTREMITY (no pain today)    OBJECTIVE     Objective Measures updated at progress report only unless specified.      TREATMENT     Tawny received the treatments listed below:     MANUAL THERAPY TECHNIQUES were applied for (8) minutes, including:    Manual Intervention Performed Today    Soft Tissue Mobilization  [x] left left glutes and piriformis, adductors, hip  distraction.   Joint Mobilizations []     []     []    Functional Dry Needling  []      Plan for Next Visit: Continue as needed       THERAPEUTIC EXERCISES to develop strength, endurance, ROM, flexibility, posture, and core stabilization for (15) minutes including: including objective measurements     Intervention Performed Today    Nustep for ROM and strength x 8 minutes, level 2 for endurance   Heel slides with pillowcase x 3 x 10, 5 second holds   Lower trunk rotations (home exercise program) home 2 x 10, 5 second holds   Short arc quads with posterior pelvic tilt  x 3 x 10 bilateral    Hip abduction with band x 3min 10 sec hold red band (increased)     Hip adduction with ball  - 3min with 5  second hold (increased)   Sciatic nerve glides - 5 pumps, 3 sets   Re-assessment       Plan for Next Visit:         NEUROMUSCULAR RE-EDUCATION ACTIVITIES to improve Balance, Coordination, Kinesthetic, Sense, Proprioception, and Posture for (25) minutes.  The following were included:    Intervention Performed Today    Quad sets x 3 x 10, 3-5 second holds, bilateral     Pelvic tilts (home exercise program) Home  3 x 10    Bridges (home exercise program) Home  2 x 10   clamshells x 3 x 10 red band bilateral    Straight leg raise with posterior pelvic tilt  x 2 x 8  bilaterally   March in place supine - 2 x 10 bilateral (increased)   Sidelying hip abduction  x 5x /2 sets side   Step ups x 10x/ 1 sets 6 inch (decreased today)   Sit to stand  10x 1 sets 5#KB   Sitting with good posture x X 2 minutes    Hip flexion  x X 30 sitting on tan mat on blue therex disk   Shoulder flexion x X 30 sitting on tan mat on blue therex disk        Side steps x 2 minutes parallel bars 2 hand support     Plan for Next Visit: progress as tolerated       PATIENT EDUCATION AND HOME EXERCISES     Home Exercises Provided and Patient Education Provided     Education provided:   PURPOSE: Patient educated on the impairments noted above and the effects of  physical therapy intervention to improve overall condition and QOL.   EXERCISE: Patient was educated on all the above exercise prior/during/after for proper posture, positioning, and execution for safe performance with home exercise program.   STRENGTH: Patient educated on the importance of improved core and extremity strength in order to improve alignment of the spine and extremities with static positions and dynamic movement.   GAIT & BALANCE: Patient educated on the importance of strong core and lower extremity musculature in order to improve both static and dynamic balance, improve gait mechanics, reduce fall risk and improve household and community mobility.   POSTURE: Patient educated on postural awareness to reduce stress and maintain optimal alignment of the spine with static positions and dynamic movement     Written Home Exercises Provided: patient was instructed to continue with prior home exercise program.  Exercises were reviewed and Tawny was able to demonstrate them prior to the end of the session.  Tawny demonstrated good  understanding of the education provided. See EMR under Patient Instructions for exercises provided during therapy sessions.    ASSESSMENT     Patient was able to be progressed with exercises and with sitting on unstable surface while maintaining her balance indicating that her core has gotten stronger.     Tawny is progressing well towards her goals.   Pt prognosis is Good.     Pt will continue to benefit from skilled outpatient physical therapy to address the deficits listed in the problem list box on initial evaluation, provide pt/family education and to maximize pt's level of independence in the home and community environment.     Pt's spiritual, cultural and educational needs considered and pt agreeable to plan of care and goals.     Anticipated Barriers for therapy: sedentary lifestyle, chronicity of condition, lack of understanding of condition, adherence to  treatment plan, and coping style,  Anxiety or Panic Disorders, Arthritis, Back pain, BMI over 30, Cancer, DiabetesType I or II, Prior Surgery, Sleep dysfunction      Goals:  Reviewed: 4/6/2023      Short Term Goals: In 4 weeks   1.Patient to be educated on HEP. MET 3/6/2023  2.Patient to increase knee range of motion to full extension, in order to improve available range of motion for ADL's.    3.Patient to increase bilateral LE strength by 1/2 grade, in order to improve endurance and increase ability to perform all functional activities for increased time. MET 3/6/2023  4.Patient to have pain less than 5/10 at worst, to improve QOL. MET 3/6/2023  5.Patient to improve score on the FOTO, to improve QOL. MET 3/6/2023  6. Patient to improve score on 30 sec sit to stand, 5x sit to stand  in order to decrease fall risk. MET 3/6/2023     Long Term Goals: In 8 weeks  1.Patient to improve score on the FOTO to 48% or less, to improve QOL. PROGRESSING 4/3/2023  2. Patient to increase bilateral LE strength to 4+/5 or greater, in order to improve endurance and increase ability to perform all functional activities for increased time. PROGRESSING 4/3/2023  3. Patient to have decreased pain to 2/10 at worst, to improve QOL. PROGRESSING 4/3/2023  4. Patient to normalize score on 30 sec sit to stand, 5x sit to stand, in order to improve endurance and decrease fall risk. PROGRESSING 4/3/2023  5. Patient to perform daily activities including: PROGRESSING 4/3/2023   Putting on your shoes or socks - A little bit of difficulty  Getting into or out of a car - A little bit of difficulty  Rolling over in bed. - A little bit of difficulty  Getting into or out of the bath - A little bit of difficulty  Sitting for 1 hour. - A little bit of difficulty  Getting up or down 10 stairs (about 1 flight of stairs) - Moderate difficulty  Standing for 1 hour - Moderate difficulty  Squatting - Quite a bit of difficulty  Walking a mile - Quite a bit of  difficulty  With your usual hobbies, recreational or sporting activities - Quite a bit of difficulty.      PLAN     Monitor response to today's treatment session and progress as indicated.    Updated Certification Period: 4/5/2023 to 5/5/2023   Recommended Treatment Plan: 1 times per week for 4 weeks:  Gait Training, Manual Therapy, Moist Heat/ Ice, Neuromuscular Re-ed, Patient Education, Self Care, Therapeutic Activities, and Therapeutic Exercise    Adan Hernandez, PTA

## 2023-04-10 ENCOUNTER — CLINICAL SUPPORT (OUTPATIENT)
Dept: REHABILITATION | Facility: HOSPITAL | Age: 63
End: 2023-04-10
Payer: COMMERCIAL

## 2023-04-10 DIAGNOSIS — R68.89 DECREASED FUNCTIONAL ACTIVITY TOLERANCE: ICD-10-CM

## 2023-04-10 DIAGNOSIS — R68.89 DECREASED STRENGTH, ENDURANCE, AND MOBILITY: Primary | ICD-10-CM

## 2023-04-10 DIAGNOSIS — M54.2 NECK PAIN: ICD-10-CM

## 2023-04-10 DIAGNOSIS — R29.3 ABNORMAL POSTURE: ICD-10-CM

## 2023-04-10 DIAGNOSIS — R26.9 GAIT ABNORMALITY: ICD-10-CM

## 2023-04-10 DIAGNOSIS — Z74.09 DECREASED STRENGTH, ENDURANCE, AND MOBILITY: Primary | ICD-10-CM

## 2023-04-10 DIAGNOSIS — M54.2 CERVICALGIA: ICD-10-CM

## 2023-04-10 DIAGNOSIS — R53.1 DECREASED STRENGTH, ENDURANCE, AND MOBILITY: Primary | ICD-10-CM

## 2023-04-10 DIAGNOSIS — M25.60 DECREASED RANGE OF MOTION: ICD-10-CM

## 2023-04-10 PROCEDURE — 97163 PT EVAL HIGH COMPLEX 45 MIN: CPT

## 2023-04-10 PROCEDURE — 97140 MANUAL THERAPY 1/> REGIONS: CPT

## 2023-04-10 NOTE — PLAN OF CARE
[facility]   Physical Therapy Initial Evaluation   Date: 4/10/2023   Name: Tawny ESTRADA [facility] Number: [id number]    Therapy Diagnosis:    Encounter Diagnoses   Name Primary?    Cervicalgia     Decreased strength, endurance, and mobility Yes    Decreased range of motion     Gait abnormality     Neck pain     Decreased functional activity tolerance     Abnormal posture       Physician: Maya Rivas*     Physician Orders: PT Eval and Treat  Medical Diagnosis from Referral: Cervicalgia  Evaluation Date: 4/10/2023  Authorization Period Expiration: 3/29/24  Plan of Care Expiration: 6/10/23  Progress Note Due: 5/10/2023  Visit # / Visits authorized: 1/1  FOTO: 1/3 (last performed on 4/10/2023)    Precautions: Diabetes and anxiety and HTN    Time In: 3:45 pm  Time Out: 4:40  Total Billable Time (timed & untimed codes): 50 minutes    SUBJECTIVE   Date of onset: December 2022/January 2023    History of current condition - Tawny reports patient reports that she has had neck pain off and on for a while, but between December 2022/January 2023 started to get worse. She has recently been in therapy for her back and hip and has noticed that when she is performing a difficult activity her neck turns to the side for relief of neck pain, and she is unable to hold her neck in a neutral position with verbal cues.   She reports that she feels weak in her neck muscle's and feels better with support, she does on occasion wear a soft collar for about 20 min at a time. (Patient was instructed to try and stop wearing the soft collar)  Indicates pain behind ears bilateral left>right and over upper trapezial on left side.  No injury to neck that she recalls.  Feels like head is too heavy to hold up. Pain does go away if she lays down.  Does have a over the counter , which mostly seems to be helping. Have been told she grinds her teeth. Has had jaw popping and jaw has gotten stuck in  wrong place during the past year but has never been diagnosed with TMJ.    Current Activity Level: Physical Therapy exercise's at home.    Falls: [x] No  [] Yes:     Imaging: [x] Xray [] MRI [] CT:   FINDINGS:  7 views of the cervical spine. Vertebral body height and alignment are maintained.  Moderate disc space narrowing and spondylosis throughout the cervical spine.  Oblique views demonstrate no significant neuroforaminal narrowing.  No acute fracture is evident.  No prevertebral soft tissue swelling.    Prior Therapy:  [] No  [x] Yes: currently   Social History: Pt lives with their spouse [x] House [] Apartment/Condo []other  Stairs: [x] No  [] Yes:   Occupation: Patient is desk work at [employer]  School/Work Restrictions: [x] none  Prior Level of Function: was less painful with workday, able to get through day without shifting head to the side  Current Level of Function:  more pain with typing, longer that she sits up the worse it is.    Pain:  Current 5/10, worst 10/10, best 0/10   Location: bilaterally behind TMJ at ears and over upper trapezial on left   Description: Aching and Dull, constantly has bilateral fingertip numbness, sometimes has wrist pain but also does have a brace to help  Aggravating Factors: walking, holding head straight up, sitting and driving - is awful (sitting in car)  Easing Factors:  laying down, heat, icyhot, neck pillow with vibration, massager for neck with heat.    Pts goals: Decrease feeling of tightness, be able to hold neck up and straight without twisting to side with exertion.    Medical History:   Past Medical History:   Diagnosis Date    Acute cystitis without hematuria 9/4/2019    Anxiety     Colon cancer screening 9/26/2019    Diabetes type 2, controlled 4/22/2016    Endometrial ca 7/24/2019    Essential hypertension 5/14/2018    Simple endometrial hyperplasia 3/15/2019    Uterine polyp 3/14/2019       Surgical History:   Tawny Valerio  has a past surgical history that  includes lipoma removed; Dilation and curettage of uterus (2019); Hysteroscopy with dilation and curettage of uterus (N/A, 2019);  section; Robot-assisted laparoscopic salpingo-oophorectomy using da Yaya Xi (Bilateral, 2019); Robot-assisted laparoscopic abdominal hysterectomy using da Yaya Xi (N/A, 2019); and Robot-assisted laparoscopic lysis of adhesions using da Yaya Xi (N/A, 2019).    Medications:   Tawny has a current medication list which includes the following prescription(s): acetaminophen, aspirin, atorvastatin, azelastine, bupropion, clindamycin, jardiance, ergocalciferol, gabapentin, hydrocortisone, ibuprofen, lancets, losartan, metronidazole 0.75%, mupirocin, semaglutide, and tretinoin.    Allergies:   Review of patient's allergies indicates:   Allergen Reactions    Celebrex [celecoxib] Anaphylaxis    Codeine Hives     Does not know what she can take -usually just takes tylenol or ibuprofen    Lidocaine Hives    Vicodin [hydrocodone-acetaminophen] Hives and Swelling        OBJECTIVE     Posture:  Pt presents with postural abnormalities which include:    [x] Forward Head   [] Increased Lumbar Lordosis   [x] Rounded Shoulder   [] Flat Back Posture   [] Increased Thoracic Kyphosis [] Pes Planus   [] Increased Trunk Sway  [] Valgus knee position   [] Increased Trunk Rotation  [] Varus knee position   [] Increased cervical lordosis [x] Other: Mesomorphic    Sensation:    Sensation to light touch over UE's is  [x] Intact [] Impaired [] Defer lighter on left lateral upper arm  Sensation to light touch over LE's is  [] Intact [] Impaired [x] Defer    Reflexes:  Patellar   [x] Defer [] Normal [] Hyper []  Hypo   Achilles  [x] Defer [] Normal [] Hyper []  Hypo  Tricep  [] Defer [] Normal [] Hyper []  Hypo [x] Unable to elicit bilaterally  Brachioradialis [] Defer [x] Normal [] Hyper []  Hypo Left      Range of Motion:    Shoulder AROM/PROM Right Left Pain/Dysfunction with  Movement   Shoulder Flexion (180º) WNL WNL    Shoulder Abduction (180º) WNL WNL          AROM:  Motion: Movement Results:   Cervical Flexion  chin to chest   Cervical Extension 50% pain bilateral ears   Cervical Rotation 50% pain in upper trapezial and pulling    Cervical Sidebending  50% painful in left lower cervical spine, pulling bilateral    Upper Extremity IR reach (Medial Rotation) L2   Upper Extremity ER reach (External Rotation) C/T JUNCTION     Strength:    U/E MMT Right Left Pain/Dysfunction with Movement   Cervical Side Bending 4-/5 4-/5    Upper trapezial  4-/5 4-/5 Difficulty holding neck in neutral position - rotates to right, with SB to right as well   Shoulder Abduction 4-/5 4-/5 Difficulty holding neck in neutral position - rotates to right, with SB to right as well   Shoulder IR 3+/5 3+/5 Difficulty holding neck in neutral position - rotates to right, with SB to right as well   Shoulder ER   3+/5 3+/5 Difficulty holding neck in neutral position - rotates to right, with SB to right as well   Elbow Flexion  4-/5 4-/5 Difficulty holding neck in neutral position - rotates to right, with SB to right as well   Elbow Extension 4-/5 4-/5 Difficulty holding neck in neutral position - rotates to right, with SB to right as well   Wrist Extension 4/5 4/5    4th/5th Finger Intrinsics 4-/5 4-/5        Muscle Length:       Muscle Tested  Right Left  Limitation   Upper Trapezius [] Normal      [x] Limited [] Normal      [x] Limited    Levator Scapular  [] Normal      [x] Limited [] Normal      [x] Limited    Scalenes [] Normal      [x] Limited [] Normal      [x] Limited    Pectoralis Minor [] Normal      [x] Limited [] Normal      [x] Limited    Pectoralis Major [] Normal      [x] Limited [] Normal      [x] Limited        Joint Mobility:   Unable to assess cervical mobility due to adipose tissue.    Special Tests:     Right  (spine) Left    Median ULTT  [] Positive    [x] Negative [] Positive    [x] Negative    Ulnar ULTT  [] Positive    [x] Negative [] Positive    [x] Negative   Raidal ULTT  [] Positive    [x] Negative [] Positive    [x] Negative       Palpation:  Patient tender with increased tone over bilateral  upper trapezial, levator scapula, latissimus dorsi, teres minor, infraspinatus, subscapularis, anterior chest muscle's, posterior cervical muscle's, sternocleidomastoid, suboccipital muscle, medial/lateral scapular muscle's.       FOTO:    CMS Impairment/Limitation/Restriction for FOTO neck Survey    Therapist reviewed FOTO scores for Tawny on 4/10/2023.   FOTO documents entered into [employer] - see Media section.    Limitation Score: see below           TREATMENT     Total Treatment time (time-based codes) separate from Evaluation: (15) minutes     Tawny received the treatments listed below:   therapeutic exercises to develop strength, endurance, ROM, and core stabilization for 0 minutes including:    Intervention 4/10/2023  Parameters   Home exercise program issued via [employer] x                            Tawny received the following manual therapy techniques: Myofacial release and Soft tissue Mobilization were applied to the: bilateral upper quadrants for 15 minutes, including:    Manual Intervention 4/10/2023     Soft Tissue Mobilization x bilateral suboccipitals, scalenes , SCM, upper trapezius, levator scapulae , periscapular musculature, latissimus dorsi , pectorals, infraspinatus , teres major and minor , subscapularis    Joint Mobilizations x 1st rib mobilizations   Mobilization with movement x Scapular tilts        Functional Dry Needling             Plan for Next Visit: Progress/initiate with cervical strengthening, scapular and UE strengthening.        PATIENT EDUCATION AND HOME EXERCISES     Education provided: (during treatment session) minutes  Home exercise program, and importance of strengthening cervical and UE muscle's.    Written Home Exercises Provided: yes.  Exercises were reviewed  and Tawny was able to demonstrate them prior to the end of the session.  Tawny demonstrated good  understanding of the education provided. See EMR under Patient Instructions for exercises provided during therapy sessions.    ASSESSMENT     Tawny is a 62 y.o. female referred to outpatient Physical Therapy with a medical diagnosis of Cervicalgia. The patient presents with signs and symptoms consistent with diagnosis along with impairments which include weakness, impaired endurance, impaired self care skills, decreased upper extremity function, pain, abnormal tone, decreased ROM, impaired joint extensibility, and impaired muscle length.      Pt prognosis is Good, if patient is consistent and compliant with Physical Therapy and home exercise program.  Pt will benefit from skilled outpatient Physical Therapy to address the deficits stated above and in the chart below, provide pt/family education, and to maximize pt's level of independence.     Plan of care discussed with patient: Yes  Pt's spiritual, cultural and educational needs considered and patient is agreeable to the plan of care and goals as stated below:     Anticipated Barriers for therapy: co-morbidities, sedentary lifestyle, chronicity of condition, coping style, and work stress, high BMI , Prior Surgery, Sleep dysfunction.    Medical Necessity is demonstrated by the following   History  Co-morbidities and personal factors that may impact the plan of care Co-morbidities:   Past Medical History:   Diagnosis Date    Acute cystitis without hematuria 9/4/2019    Anxiety     Colon cancer screening 9/26/2019    Diabetes type 2, controlled 4/22/2016    Endometrial ca 7/24/2019    Essential hypertension 5/14/2018    Simple endometrial hyperplasia 3/15/2019    Uterine polyp 3/14/2019   Prior Surgery, Sleep dysfunction.    Personal Factors:   []Age   []Education   []Coping style   [x]Social background   [x]Lifestyle    []Character   []Attitudes   []Other:  "  []No deficits      []Low   []Moderate  [x]High    Examination  Body Structures and Functions, activity limitations and participation restrictions that may impact the plan of care Body Regions:   []Head  [x]Neck   []Back   [x]Trunk  [x]Upper extremities   []Lower extremities   []Other:      Body Systems:    []Gross symmetry   [x]ROM   [x]Strength   []Gross coordinated movement   []Balance   []Gait  [x] Muscle tone  [] Other:  []Transfers  []Transitions   []Motor control   []Motor learning   []Scar formation  [x] Joint mobility  [x] Muscle length      Participation Restrictions:   See above in "Current Level of Function"     Activity limitations:   Learning and applying knowledge  [x]No deficits       General Tasks and Commands  [x]No deficits    Communication  [x]No deficits    Mobility   []No deficits  [x]Fine hand use  []Walking   []Driving  [] Other: [x]Lifting/carrying objects  []Using Transportation   []Moving around using equipment  []Stairs       Self care  []No deficits  [x]Washing oneself   [x]Caring for body parts   []Toileting   [x]Dressing  []Eating   []Drinking   []Looking after one's health  []Other:        Domestic Life  []No Deficits  [x]Shopping   [x]Cooking  [x]Doing housework  [x]Assisting others   []Other:      Interactions/Relationships  [x]No deficits    Life Areas  []No deficits  [x] Employment     Community and Social Life  [x]Community life  [x]Recreation and leisure   []Samaritan and spirituality  []Human rights   []Political life / citizenship  []No deficits      []Low   []Moderate  [x]High    Clinical Presentation []Stable and uncomplicated   []Evolving presentation with changing characteristics  [x]Unstable presentation with unpredictable characteristics []Low   []Moderate  [x]High      Decision Making/ Complexity Score:  []Low   []Moderate  [x]High      Goals:  Reviewed:4/10/2023    Short Term Goals: In 4 weeks   1.Patient to be educated on HEP.  2.Patient to increase cervical range of " motion to WNL's, in order to improve available range of motion for ADL's.    3.Patient to increase bilateral UE strength by 1/2 grade, in order to improve endurance and increase ability to perform all functional activities for increased time.   4.Patient to have pain less than 7/10 at worst, to improve QOL.  5.Patient to improve score on the FOTO, to improve QOL.    Long Term Goals: In 8 weeks  1.Patient to improve score on the FOTO to 34% or less, to improve QOL.  2. Patient to increase bilateral UE strength to 4+/5 or greater, in order to improve endurance and increase ability to perform all functional activities for increased time.  3. Patient to have decreased pain to 4/10 at worst, to improve QOL.  4. Patient to perform daily activities while holding neck in neutral position, including step ups and LE activities.      PLAN     Plan of care Certification: 4/10/2023  to 6/10/23.    Outpatient Physical Therapy 2 times weekly for 8 weeks to include any combination of the following interventions: virtual visits, electrical stimulation (PRN), Cervical/Lumbar Traction, Manual Therapy, Neuromuscular Re-ed, Patient Education, Self Care, Therapeutic Exercise, Therapeutic Activites, Dry Needling, and Moist Hot Pack/Cold Pack       Christiane Muller, FRANCISCO      I CERTIFY THE NEED FOR THESE SERVICES FURNISHED UNDER THIS PLAN OF TREATMENT AND WHILE UNDER MY CARE   Physician's comments:     Physician's Signature: ___________________________________________________

## 2023-04-13 PROBLEM — M54.2 NECK PAIN: Status: ACTIVE | Noted: 2023-04-13

## 2023-04-13 PROBLEM — R68.89 DECREASED FUNCTIONAL ACTIVITY TOLERANCE: Status: ACTIVE | Noted: 2023-04-13

## 2023-04-13 PROBLEM — M53.9 ABNORMAL HEAD POSITION: Status: ACTIVE | Noted: 2023-04-13

## 2023-04-13 PROBLEM — R29.3 ABNORMAL POSTURE: Status: ACTIVE | Noted: 2023-04-13

## 2023-04-14 ENCOUNTER — CLINICAL SUPPORT (OUTPATIENT)
Dept: REHABILITATION | Facility: HOSPITAL | Age: 63
End: 2023-04-14
Payer: COMMERCIAL

## 2023-04-14 DIAGNOSIS — R29.3 ABNORMAL POSTURE: ICD-10-CM

## 2023-04-14 DIAGNOSIS — R26.9 GAIT ABNORMALITY: ICD-10-CM

## 2023-04-14 DIAGNOSIS — R68.89 DECREASED FUNCTIONAL ACTIVITY TOLERANCE: ICD-10-CM

## 2023-04-14 DIAGNOSIS — Z74.09 DECREASED STRENGTH, ENDURANCE, AND MOBILITY: ICD-10-CM

## 2023-04-14 DIAGNOSIS — M54.2 NECK PAIN: Primary | ICD-10-CM

## 2023-04-14 DIAGNOSIS — R68.89 DECREASED STRENGTH, ENDURANCE, AND MOBILITY: ICD-10-CM

## 2023-04-14 DIAGNOSIS — M25.60 DECREASED RANGE OF MOTION: ICD-10-CM

## 2023-04-14 DIAGNOSIS — R53.1 DECREASED STRENGTH, ENDURANCE, AND MOBILITY: ICD-10-CM

## 2023-04-14 PROBLEM — M53.9 ABNORMAL HEAD POSITION: Status: RESOLVED | Noted: 2023-04-13 | Resolved: 2023-04-14

## 2023-04-14 PROCEDURE — 97112 NEUROMUSCULAR REEDUCATION: CPT

## 2023-04-14 PROCEDURE — 97140 MANUAL THERAPY 1/> REGIONS: CPT

## 2023-04-14 PROCEDURE — 97110 THERAPEUTIC EXERCISES: CPT

## 2023-04-14 NOTE — PROGRESS NOTES
[facility]   Physical Therapy Treatment Note     Name: Tawny ESTRADA [facility] Number: [id number]    Therapy Diagnosis:   Encounter Diagnoses   Name Primary?    Neck pain Yes    Decreased functional activity tolerance     Decreased strength, endurance, and mobility     Decreased range of motion     Gait abnormality     Abnormal posture         Physician: Maya Rivas*    Visit Date: 4/14/2023    Physician Orders: PT Eval and Treat  Medical Diagnosis from Referral: Sciatica of left side  Evaluation Date: 2/6/2023  Authorization Period Expiration: 12/31/23  Plan of Care Expiration: 5/5/2023   Progress Note Due: 5/5/2023   Visit # / Visits authorized: 9/40 (+1)  FOTO: 3/3 (last performed on 4/3/2023)  PTA Visit #: 0/5     Precautions: Diabetes, cancer, and anxiety, HTN    Time In: 12:33 pm  Time Out: 1:15 pm  Total Billable Time: 43 minutes     (Billing reflects 1 on 1 treatment time spent with patient)    SUBJECTIVE     Patient reports: post manual she is able to hold her neck in neutral position without pain but does feel a little strain on right side with activity. (Patient was very happy about this!)    She was compliant with home exercise program.    Response to previous treatment: feeling pain in her lateral noticed some numbness in her lateral hip and anterior left thigh     Functional change: prolonged sleeping on her let side causes hip numbness, sitting for about 60 minutes causes increased symptoms, working in her garden, and now able to stand and cook a meal (30 minutes). Walking more confidently up/down community surfaces    Pain: 4/10   neck  Location:bilaterally behind TMJ at ears and over upper trapezial on left AND left hip and LOWER EXTREMITY (no pain today)    OBJECTIVE     Objective Measures updated at progress report only unless specified.      TREATMENT     Tawny received the treatments listed below:     MANUAL THERAPY TECHNIQUES were applied for (20)  minutes, including:    Manual Intervention Performed Today    Soft Tissue Mobilization  [x] Bilateral suboccipitals, scalenes , SCM, upper trapezius, levator scapulae , periscapular musculature, pectorals, subscapularis  extensive at subcranial and sternocleidomastoid/upper trapezial on left side   Joint Mobilizations []    Gentle manual distraction  [x] Cervical     []    Functional Dry Needling  []      Plan for Next Visit: Continue as needed       THERAPEUTIC EXERCISES to develop strength, endurance, ROM, flexibility, posture, and core stabilization for (14) minutes including: including objective measurements     Intervention 4/14/2023     Nustep for ROM and strength - 8 minutes, level 2 for endurance   Heel slides with pillowcase - 3 x 10, 5 second holds   Lower trunk rotations (home exercise program) home 2 x 10, 5 second holds   Short arc quads with posterior pelvic tilt  - 3 x 10 bilateral    Hip abduction with band - 3min 10 sec hold red band (increased)     Hip adduction with ball  - 3min with 5  second hold (increased)   Sciatic nerve glides - 5 pumps, 3 sets   Re-assessment          UBE x 2 min FW/BW   Chin tucks supine x 10x   Chin tucks with head lift supine x 10x/ 5 sec hold   Isometric pulls at cervical spine with band sitting x  x Extension 10x/5sec  Side bending 5x/5 sec hold 2 sets   AROM cervical spine x  x Rotation 10x/ 2 sets  Side bending 10x/ 2 sets     Plan for Next Visit:         NEUROMUSCULAR RE-EDUCATION ACTIVITIES to improve Balance, Coordination, Kinesthetic, Sense, Proprioception, and Posture for (9) minutes.  The following were included:    Intervention Performed Today    Shoulder retraction isometrics x 10x 5 sec holds   Bilateral shoulder ER x 10x/ 2 sets with yellow band   Sitting rows  x 10x/ yellow band   Shoulder rolls BW x 10x   Open book x 10x each side        Quad sets - 3 x 10, 3-5 second holds, bilateral     Pelvic tilts (home exercise program) Home  3 x 10    Bridges (home  exercise program) Home  2 x 10   clamshells - 3 x 10 red band bilateral    Straight leg raise with posterior pelvic tilt  - 2 x 8  bilaterally   March in place supine - 2 x 10 bilateral (increased)   Sidelying hip abduction  - 5x /2 sets side   Step ups - 10x/ 1 sets 6 inch (decreased today)   Sit to stand  10x 1 sets 5#KB   Sitting with good posture - X 2 minutes    Hip flexion  - X 30 sitting on tan mat on blue therex disk   Shoulder flexion - X 30 sitting on tan mat on blue therex disk        Side steps - 2 minutes parallel bars 2 hand support     Plan for Next Visit: progress as tolerated       PATIENT EDUCATION AND HOME EXERCISES     Home Exercises Provided and Patient Education Provided     Education provided:   PURPOSE: Patient educated on the impairments noted above and the effects of physical therapy intervention to improve overall condition and QOL.   EXERCISE: Patient was educated on all the above exercise prior/during/after for proper posture, positioning, and execution for safe performance with home exercise program.   STRENGTH: Patient educated on the importance of improved core and extremity strength in order to improve alignment of the spine and extremities with static positions and dynamic movement.   GAIT & BALANCE: Patient educated on the importance of strong core and lower extremity musculature in order to improve both static and dynamic balance, improve gait mechanics, reduce fall risk and improve household and community mobility.   POSTURE: Patient educated on postural awareness to reduce stress and maintain optimal alignment of the spine with static positions and dynamic movement     Written Home Exercises Provided: patient was instructed to continue with prior home exercise program.  Exercises were reviewed and Tawny was able to demonstrate them prior to the end of the session.  Tawny demonstrated good  understanding of the education provided. See EMR under Patient Instructions for  exercises provided during therapy sessions.    ASSESSMENT     Patient tolerated all treatment well and had + response to manual interventions, with patient being able to hold her neck in neutral position post manual, she is significantly weak with neck SB holds with band pull to left, but did tolerate all treatment well and was able to hold neck in neutral position with either verbal cues or visual cues from mirror in front of her. Encouraged home exercise program.    Tawny is progressing well towards her goals.   Pt prognosis is Good.     Pt will continue to benefit from skilled outpatient physical therapy to address the deficits listed in the problem list box on initial evaluation, provide pt/family education and to maximize pt's level of independence in the home and community environment.     Pt's spiritual, cultural and educational needs considered and pt agreeable to plan of care and goals.     Anticipated Barriers for therapy: sedentary lifestyle, chronicity of condition, lack of understanding of condition, adherence to treatment plan, and coping style,  Anxiety or Panic Disorders, Arthritis, Back pain, BMI over 30, Cancer, DiabetesType I or II, Prior Surgery, Sleep dysfunction      Goals:  Reviewed: 4/14/2023      LOWER BODY/BACK  Short Term Goals: In 4 weeks   1.Patient to be educated on HEP. MET 3/6/2023  2.Patient to increase knee range of motion to full extension, in order to improve available range of motion for ADL's.    3.Patient to increase bilateral LE strength by 1/2 grade, in order to improve endurance and increase ability to perform all functional activities for increased time. MET 3/6/2023  4.Patient to have pain less than 5/10 at worst, to improve QOL. MET 3/6/2023  5.Patient to improve score on the FOTO, to improve QOL. MET 3/6/2023  6. Patient to improve score on 30 sec sit to stand, 5x sit to stand  in order to decrease fall risk. MET 3/6/2023     Long Term Goals: In 8 weeks  1.Patient to  improve score on the FOTO to 48% or less, to improve QOL. PROGRESSING 4/3/2023  2. Patient to increase bilateral LE strength to 4+/5 or greater, in order to improve endurance and increase ability to perform all functional activities for increased time. PROGRESSING 4/3/2023  3. Patient to have decreased pain to 2/10 at worst, to improve QOL. PROGRESSING 4/3/2023  4. Patient to normalize score on 30 sec sit to stand, 5x sit to stand, in order to improve endurance and decrease fall risk. PROGRESSING 4/3/2023  5. Patient to perform daily activities including: PROGRESSING 4/3/2023   Putting on your shoes or socks - A little bit of difficulty  Getting into or out of a car - A little bit of difficulty  Rolling over in bed. - A little bit of difficulty  Getting into or out of the bath - A little bit of difficulty  Sitting for 1 hour. - A little bit of difficulty  Getting up or down 10 stairs (about 1 flight of stairs) - Moderate difficulty  Standing for 1 hour - Moderate difficulty  Squatting - Quite a bit of difficulty  Walking a mile - Quite a bit of difficulty  With your usual hobbies, recreational or sporting activities - Quite a bit of difficulty.      CERVICAL:  Short Term Goals: In 4 weeks   1.Patient to be educated on HEP.  2.Patient to increase cervical range of motion to WNL's, in order to improve available range of motion for ADL's.    3.Patient to increase bilateral UE strength by 1/2 grade, in order to improve endurance and increase ability to perform all functional activities for increased time.   4.Patient to have pain less than 7/10 at worst, to improve QOL.  5.Patient to improve score on the FOTO, to improve QOL.     Long Term Goals: In 8 weeks  1.Patient to improve score on the FOTO to 34% or less, to improve QOL.  2. Patient to increase bilateral UE strength to 4+/5 or greater, in order to improve endurance and increase ability to perform all functional activities for increased time.  3. Patient to have  decreased pain to 4/10 at worst, to improve QOL.  4. Patient to perform daily activities while holding neck in neutral position, including step ups and LE activities.    PLAN     Monitor response to today's treatment session and progress as indicated.    Updated Certification Period: 4/5/2023 to 5/5/2023   Recommended Treatment Plan: Outpatient Physical Therapy 2 times weekly for 8 weeks to include any combination of the following interventions: virtual visits, electrical stimulation (PRN), Cervical/Lumbar Traction, Manual Therapy, Neuromuscular Re-ed, Patient Education, Self Care, Therapeutic Exercise, Therapeutic Activites, Dry Needling, and Moist Hot Pack/Cold Pack     Christiane Muller, PT

## 2023-04-20 ENCOUNTER — CLINICAL SUPPORT (OUTPATIENT)
Dept: REHABILITATION | Facility: HOSPITAL | Age: 63
End: 2023-04-20
Payer: COMMERCIAL

## 2023-04-20 DIAGNOSIS — R26.9 GAIT ABNORMALITY: ICD-10-CM

## 2023-04-20 DIAGNOSIS — M25.60 DECREASED RANGE OF MOTION: ICD-10-CM

## 2023-04-20 DIAGNOSIS — R53.1 DECREASED STRENGTH, ENDURANCE, AND MOBILITY: ICD-10-CM

## 2023-04-20 DIAGNOSIS — R68.89 DECREASED STRENGTH, ENDURANCE, AND MOBILITY: ICD-10-CM

## 2023-04-20 DIAGNOSIS — R68.89 DECREASED FUNCTIONAL ACTIVITY TOLERANCE: ICD-10-CM

## 2023-04-20 DIAGNOSIS — M54.2 NECK PAIN: Primary | ICD-10-CM

## 2023-04-20 DIAGNOSIS — R29.3 ABNORMAL POSTURE: ICD-10-CM

## 2023-04-20 DIAGNOSIS — Z74.09 DECREASED STRENGTH, ENDURANCE, AND MOBILITY: ICD-10-CM

## 2023-04-20 PROCEDURE — 97112 NEUROMUSCULAR REEDUCATION: CPT | Mod: CQ

## 2023-04-20 PROCEDURE — 97140 MANUAL THERAPY 1/> REGIONS: CPT | Mod: CQ

## 2023-04-20 PROCEDURE — 97110 THERAPEUTIC EXERCISES: CPT | Mod: CQ

## 2023-04-20 NOTE — PROGRESS NOTES
[facility]   Physical Therapy Treatment Note     Name: Tawny ESTRADA [facility] Number: [id number]    Therapy Diagnosis:   Encounter Diagnoses   Name Primary?    Neck pain Yes    Decreased functional activity tolerance     Decreased strength, endurance, and mobility     Decreased range of motion     Gait abnormality     Abnormal posture         Physician: Maya Rivas*    Visit Date: 4/20/2023    Physician Orders: PT Eval and Treat  Medical Diagnosis from Referral: Sciatica of left side  Evaluation Date: 2/6/2023  Authorization Period Expiration: 12/31/23  Plan of Care Expiration: 5/5/2023   Progress Note Due: 5/5/2023   Visit # / Visits authorized: 10/40 (+1)  FOTO: 3/3 (last performed on 4/3/2023)  PTA Visit #: 1/5     Precautions: Diabetes, cancer, and anxiety, HTN    Time In: 3:30 pm  Time Out: 4:25 pm  Total Billable Time: 43 minutes     (Billing reflects 1 on 1 treatment time spent with patient)    SUBJECTIVE     Patient reports: she typically has no pain but notices she wakes feeling good but after being at work for about 2 hours, she has more difficulty with holding her head up. With her concentrating on holding her head up, she reports feelings of stretch and tightness in her cervical muscles. Upon returning to home after her work day, she rests for about 1 hour and then she is able to hold her head up in midline once again.     She was compliant with home exercise program.    Response to previous treatment: feeling pain in her lateral noticed some numbness in her lateral hip and anterior left thigh     Functional change: prolonged sleeping on her let side causes hip numbness, sitting for about 60 minutes causes increased symptoms, working in her garden, and now able to stand and cook a meal (30 minutes). Walking more confidently up/down community surfaces    Pain: 0/10   neck  Location:bilaterally behind TMJ at ears and over upper trapezial on left AND left hip and  LOWER EXTREMITY (no pain today)    OBJECTIVE     Objective Measures updated at progress report only unless specified.      TREATMENT     Tawny received the treatments listed below:     MANUAL THERAPY TECHNIQUES were applied for (10) minutes, including:    Manual Intervention Performed Today    Soft Tissue Mobilization  [x] Bilateral suboccipitals, scalenes , SCM, upper trapezius, levator scapulae , subscapularis  extensive at subcranial and sternocleidomastoid/upper trapezial on left side   Joint Mobilizations []    Gentle manual distraction  [x] Cervical     []    Functional Dry Needling  []      Plan for Next Visit: Continue as needed       THERAPEUTIC EXERCISES to develop strength, endurance, ROM, flexibility, posture, and core stabilization for (18) minutes including: including objective measurements     Intervention 4/20/2023     Nustep for ROM and strength - 8 minutes, level 2 for endurance   Heel slides with pillowcase - 3 x 10, 5 second holds   Lower trunk rotations (home exercise program) home 2 x 10, 5 second holds   Short arc quads with posterior pelvic tilt  - 3 x 10 bilateral    Hip abduction with band - 3min 10 sec hold red band (increased)     Hip adduction with ball  - 3min with 5  second hold (increased)   Sciatic nerve glides - 5 pumps, 3 sets   Re-assessment          UBE x 3 minutes forward, 2 minutes  backward    Chin tucks supine/sitting x 10x   Chin tucks with head lift supine x 10x/ 5 sec hold   Isometric pulls at cervical spine with band sitting x  x Extension 10x/5 seconds   Side bending 5x/5 seconds hold 2 sets   AROM cervical spine x  x Rotation 10 2 sets  Side bending 10x/ 2 sets     Plan for Next Visit:         NEUROMUSCULAR RE-EDUCATION ACTIVITIES to improve Balance, Coordination, Kinesthetic, Sense, Proprioception, and Posture for (15) minutes.  The following were included:    Intervention Performed Today    Shoulder retraction isometrics x 10x 5 seconds holds   Bilateral shoulder  external rotation  x 10x/ 2 sets with yellow band   Sitting rows  x 10x/ yellow band   Shoulder rolls Backward  x 10x   Open book x 10x each side 10 seconds hold         Quad sets - 3 x 10, 3-5 second holds, bilateral     Pelvic tilts (home exercise program) Home  3 x 10    Bridges (home exercise program) Home  2 x 10   clamshells - 3 x 10 red band bilateral    Straight leg raise with posterior pelvic tilt  - 2 x 8  bilaterally   March in place supine - 2 x 10 bilateral (increased)   Sidelying hip abduction  - 5x /2 sets side   Step ups - 10x/ 1 sets 6 inch (decreased today)   Sit to stand  10x 1 sets 5#KB   Sitting with good posture - X 2 minutes    Hip flexion  - X 30 sitting on tan mat on blue therex disk   Shoulder flexion - X 30 sitting on tan mat on blue therex disk        Side steps - 2 minutes parallel bars 2 hand support     Plan for Next Visit: progress as tolerated       PATIENT EDUCATION AND HOME EXERCISES     Home Exercises Provided and Patient Education Provided     Education provided:   PURPOSE: Patient educated on the impairments noted above and the effects of physical therapy intervention to improve overall condition and QOL.   EXERCISE: Patient was educated on all the above exercise prior/during/after for proper posture, positioning, and execution for safe performance with home exercise program.   STRENGTH: Patient educated on the importance of improved core and extremity strength in order to improve alignment of the spine and extremities with static positions and dynamic movement.   GAIT & BALANCE: Patient educated on the importance of strong core and lower extremity musculature in order to improve both static and dynamic balance, improve gait mechanics, reduce fall risk and improve household and community mobility.   POSTURE: Patient educated on postural awareness to reduce stress and maintain optimal alignment of the spine with static positions and dynamic movement   4/20/2023: Encouraged home  exercise program.    Written Home Exercises Provided: patient was instructed to continue with prior home exercise program.  Exercises were reviewed and Tawny was able to demonstrate them prior to the end of the session.  Tawny demonstrated good  understanding of the education provided. See EMR under Patient Instructions for exercises provided during therapy sessions.    ASSESSMENT     Patient tolerated all treatment with frequent cues for her to maintain her head in midline. Isometrics with performed with better quality with her giving herself manual resistance in comparison to her using elastic bands. Her cervical muscles fatigue quickly and without visual feedback of mirror, she is unable to correct her cervical posture due to her being unaware of her head not being in midline.      Tawny is progressing well towards her goals.   Pt prognosis is Good.     Pt will continue to benefit from skilled outpatient physical therapy to address the deficits listed in the problem list box on initial evaluation, provide pt/family education and to maximize pt's level of independence in the home and community environment.     Pt's spiritual, cultural and educational needs considered and pt agreeable to plan of care and goals.     Anticipated Barriers for therapy: sedentary lifestyle, chronicity of condition, lack of understanding of condition, adherence to treatment plan, and coping style,  Anxiety or Panic Disorders, Arthritis, Back pain, BMI over 30, Cancer, DiabetesType I or II, Prior Surgery, Sleep dysfunction      Goals:  Reviewed: 4/20/2023      LOWER BODY/BACK  Short Term Goals: In 4 weeks   1.Patient to be educated on HEP. MET 3/6/2023  2.Patient to increase knee range of motion to full extension, in order to improve available range of motion for ADL's.    3.Patient to increase bilateral LE strength by 1/2 grade, in order to improve endurance and increase ability to perform all functional activities for increased  time. MET 3/6/2023  4.Patient to have pain less than 5/10 at worst, to improve QOL. MET 3/6/2023  5.Patient to improve score on the FOTO, to improve QOL. MET 3/6/2023  6. Patient to improve score on 30 sec sit to stand, 5x sit to stand  in order to decrease fall risk. MET 3/6/2023     Long Term Goals: In 8 weeks  1.Patient to improve score on the FOTO to 48% or less, to improve QOL. PROGRESSING 4/3/2023  2. Patient to increase bilateral LE strength to 4+/5 or greater, in order to improve endurance and increase ability to perform all functional activities for increased time. PROGRESSING 4/3/2023  3. Patient to have decreased pain to 2/10 at worst, to improve QOL. PROGRESSING 4/3/2023  4. Patient to normalize score on 30 sec sit to stand, 5x sit to stand, in order to improve endurance and decrease fall risk. PROGRESSING 4/3/2023  5. Patient to perform daily activities including: PROGRESSING 4/3/2023   Putting on your shoes or socks - A little bit of difficulty  Getting into or out of a car - A little bit of difficulty  Rolling over in bed. - A little bit of difficulty  Getting into or out of the bath - A little bit of difficulty  Sitting for 1 hour. - A little bit of difficulty  Getting up or down 10 stairs (about 1 flight of stairs) - Moderate difficulty  Standing for 1 hour - Moderate difficulty  Squatting - Quite a bit of difficulty  Walking a mile - Quite a bit of difficulty  With your usual hobbies, recreational or sporting activities - Quite a bit of difficulty.      CERVICAL:  Short Term Goals: In 4 weeks   1.Patient to be educated on HEP.  2.Patient to increase cervical range of motion to WNL's, in order to improve available range of motion for ADL's.    3.Patient to increase bilateral UE strength by 1/2 grade, in order to improve endurance and increase ability to perform all functional activities for increased time.   4.Patient to have pain less than 7/10 at worst, to improve QOL.  5.Patient to improve score  on the FOTO, to improve QOL.     Long Term Goals: In 8 weeks  1.Patient to improve score on the FOTO to 34% or less, to improve QOL.  2. Patient to increase bilateral UE strength to 4+/5 or greater, in order to improve endurance and increase ability to perform all functional activities for increased time.  3. Patient to have decreased pain to 4/10 at worst, to improve QOL.  4. Patient to perform daily activities while holding neck in neutral position, including step ups and LE activities.    PLAN     Monitor response to today's treatment session and progress as indicated.    Updated Certification Period: 4/5/2023 to 5/5/2023   Recommended Treatment Plan: Outpatient Physical Therapy 2 times weekly for 8 weeks to include any combination of the following interventions: virtual visits, electrical stimulation (PRN), Cervical/Lumbar Traction, Manual Therapy, Neuromuscular Re-ed, Patient Education, Self Care, Therapeutic Exercise, Therapeutic Activites, Dry Needling, and Moist Hot Pack/Cold Pack     Adan Hernandez, PTA

## 2023-04-21 ENCOUNTER — PATIENT MESSAGE (OUTPATIENT)
Dept: ADMINISTRATIVE | Facility: HOSPITAL | Age: 63
End: 2023-04-21
Payer: COMMERCIAL

## 2023-04-25 ENCOUNTER — DOCUMENTATION ONLY (OUTPATIENT)
Dept: REHABILITATION | Facility: HOSPITAL | Age: 63
End: 2023-04-25

## 2023-04-25 ENCOUNTER — CLINICAL SUPPORT (OUTPATIENT)
Dept: REHABILITATION | Facility: HOSPITAL | Age: 63
End: 2023-04-25
Payer: COMMERCIAL

## 2023-04-25 DIAGNOSIS — R68.89 DECREASED STRENGTH, ENDURANCE, AND MOBILITY: ICD-10-CM

## 2023-04-25 DIAGNOSIS — R53.1 DECREASED STRENGTH, ENDURANCE, AND MOBILITY: ICD-10-CM

## 2023-04-25 DIAGNOSIS — M25.60 DECREASED RANGE OF MOTION: ICD-10-CM

## 2023-04-25 DIAGNOSIS — R29.3 ABNORMAL POSTURE: ICD-10-CM

## 2023-04-25 DIAGNOSIS — M54.2 NECK PAIN: Primary | ICD-10-CM

## 2023-04-25 DIAGNOSIS — R26.9 GAIT ABNORMALITY: ICD-10-CM

## 2023-04-25 DIAGNOSIS — R68.89 DECREASED FUNCTIONAL ACTIVITY TOLERANCE: ICD-10-CM

## 2023-04-25 DIAGNOSIS — Z74.09 DECREASED STRENGTH, ENDURANCE, AND MOBILITY: ICD-10-CM

## 2023-04-25 PROCEDURE — 97112 NEUROMUSCULAR REEDUCATION: CPT | Performed by: PHYSICAL THERAPIST

## 2023-04-25 PROCEDURE — 97140 MANUAL THERAPY 1/> REGIONS: CPT | Performed by: PHYSICAL THERAPIST

## 2023-04-25 PROCEDURE — 97110 THERAPEUTIC EXERCISES: CPT | Performed by: PHYSICAL THERAPIST

## 2023-04-25 NOTE — PROGRESS NOTES
[facility]   Physical Therapy Treatment Note     Name: Tawny ESTRADA [facility] Number: [id number]    Therapy Diagnosis:   Encounter Diagnoses   Name Primary?    Neck pain Yes    Decreased functional activity tolerance     Decreased strength, endurance, and mobility     Decreased range of motion     Gait abnormality     Abnormal posture         Physician: Maya Rivas*    Visit Date: 4/25/2023    Physician Orders: PT Eval and Treat  Medical Diagnosis from Referral: Sciatica of left side  Evaluation Date: 2/6/2023  Authorization Period Expiration: 12/31/23  Plan of Care Expiration: 5/5/2023   Progress Note Due: 5/5/2023   Visit # / Visits authorized: 11/40 (+1)  FOTO: 3/3 (last performed on 4/3/2023)  PTA Visit #: 0/5     Precautions: Diabetes, cancer, and anxiety, HTN    Time In: 1648  Time Out: 1730  Total Billable Time: 40 minutes     (Billing reflects 1 on 1 treatment time spent with patient)    SUBJECTIVE     Patient reports: she is tired today. Her pain typically isn't as bad in the morning, but increases throughout the day. By her drive home at the end of the day, she is typically hurting more. She does feel her neck is getting a little stronger, as she doesn't feel she has to hold her head up as much anymore.     She was compliant with home exercise program.    Response to previous treatment: feeling pain in her lateral noticed some numbness in her lateral hip and anterior left thigh     Functional change: prolonged sleeping on her let side causes hip numbness, sitting for about 60 minutes causes increased symptoms, working in her garden, and now able to stand and cook a meal (30 minutes). Walking more confidently up/down community surfaces    Pain: 4/10   neck  Location:bilaterally behind TMJ at ears and over upper trapezial on left AND left hip and LOWER EXTREMITY (no pain today)    OBJECTIVE     Objective Measures updated at progress report only unless specified.       TREATMENT     Tawny received the treatments listed below:     MANUAL THERAPY TECHNIQUES were applied for (10) minutes, including:    Manual Intervention Performed Today    Soft Tissue Mobilization  [x] Bilateral suboccipitals, scalenes , SCM, upper trapezius, levator scapulae , subscapularis  extensive at subcranial and sternocleidomastoid/upper trapezial on left side   Joint Mobilizations []    Gentle manual distraction  [x] Cervical     []    Functional Dry Needling  []      Plan for Next Visit: Continue as needed       THERAPEUTIC EXERCISES to develop strength, endurance, ROM, flexibility, posture, and core stabilization for (15) minutes including: including objective measurements     Intervention 4/25/2023     Nustep for ROM and strength - 8 minutes, level 2 for endurance   Heel slides with pillowcase - 3 x 10, 5 second holds   Lower trunk rotations (home exercise program) home 2 x 10, 5 second holds   Short arc quads with posterior pelvic tilt  - 3 x 10 bilateral    Hip abduction with band - 3min 10 sec hold red band (increased)     Hip adduction with ball  - 3min with 5  second hold (increased)   Sciatic nerve glides - 5 pumps, 3 sets   Re-assessment          UBE x 3 minutes forward, 2 minutes  backward    Chin tucks supine/sitting x 10x   Chin tucks with head lift supine x 10x/ 5 sec hold   Isometric pulls at cervical spine with band sitting x  x Extension 10x/5 seconds   Side bending 5x/5 seconds hold 2 sets   AROM cervical spine x  x Rotation 10 2 sets  Side bending 10x/ 2 sets     Plan for Next Visit:         NEUROMUSCULAR RE-EDUCATION ACTIVITIES to improve Balance, Coordination, Kinesthetic, Sense, Proprioception, and Posture for (15) minutes.  The following were included:    Intervention Performed Today    Shoulder retraction isometrics x 10x 5 seconds holds   Bilateral shoulder external rotation  x 10x/ 2 sets with yellow band   Sitting rows  x 10x/ yellow band   Shoulder rolls Backward  x 2 x 10    Open book x 10x each side 10 seconds hold         Quad sets - 3 x 10, 3-5 second holds, bilateral     Pelvic tilts (home exercise program) Home  3 x 10    Bridges (home exercise program) Home  2 x 10   clamshells - 3 x 10 red band bilateral    Straight leg raise with posterior pelvic tilt  - 2 x 8  bilaterally   March in place supine - 2 x 10 bilateral (increased)   Sidelying hip abduction  - 5x /2 sets side   Step ups - 10x/ 1 sets 6 inch (decreased today)   Sit to stand  10x 1 sets 5#KB   Sitting with good posture - X 2 minutes    Hip flexion  - X 30 sitting on tan mat on blue therex disk   Shoulder flexion - X 30 sitting on tan mat on blue therex disk        Side steps - 2 minutes parallel bars 2 hand support     Plan for Next Visit: progress as tolerated       PATIENT EDUCATION AND HOME EXERCISES     Home Exercises Provided and Patient Education Provided     Education provided:   PURPOSE: Patient educated on the impairments noted above and the effects of physical therapy intervention to improve overall condition and QOL.   EXERCISE: Patient was educated on all the above exercise prior/during/after for proper posture, positioning, and execution for safe performance with home exercise program.   STRENGTH: Patient educated on the importance of improved core and extremity strength in order to improve alignment of the spine and extremities with static positions and dynamic movement.   GAIT & BALANCE: Patient educated on the importance of strong core and lower extremity musculature in order to improve both static and dynamic balance, improve gait mechanics, reduce fall risk and improve household and community mobility.   POSTURE: Patient educated on postural awareness to reduce stress and maintain optimal alignment of the spine with static positions and dynamic movement   4/20/2023: Encouraged home exercise program.    Written Home Exercises Provided: patient was instructed to continue with prior home exercise  program.  Exercises were reviewed and Tawny was able to demonstrate them prior to the end of the session.  Tawny demonstrated good  understanding of the education provided. See EMR under Patient Instructions for exercises provided during therapy sessions.    ASSESSMENT     Patient tolerated all treatment well. She still requires frequent cues for her to maintain her head in midline. Her cervical muscles still fatigue quickly, but she does demonstrate improvement in strength with exercises. Patient also requires cues to perform exercises a little more slow and controlled and to not go too quickly through them. Soft tissue adhesions present along bilateral upper trapezius, cervical paraspinals, suboccipitals, and left sternocleidomastoid. Patient reported significant relief following manual therapy this visit.     Tawny is progressing well towards her goals.   Pt prognosis is Good.     Pt will continue to benefit from skilled outpatient physical therapy to address the deficits listed in the problem list box on initial evaluation, provide pt/family education and to maximize pt's level of independence in the home and community environment.     Pt's spiritual, cultural and educational needs considered and pt agreeable to plan of care and goals.     Anticipated Barriers for therapy: sedentary lifestyle, chronicity of condition, lack of understanding of condition, adherence to treatment plan, and coping style,  Anxiety or Panic Disorders, Arthritis, Back pain, BMI over 30, Cancer, DiabetesType I or II, Prior Surgery, Sleep dysfunction      Goals:  Reviewed: 4/25/2023      LOWER BODY/BACK  Short Term Goals: In 4 weeks   1.Patient to be educated on HEP. MET 3/6/2023  2.Patient to increase knee range of motion to full extension, in order to improve available range of motion for ADL's.    3.Patient to increase bilateral LE strength by 1/2 grade, in order to improve endurance and increase ability to perform all  functional activities for increased time. MET 3/6/2023  4.Patient to have pain less than 5/10 at worst, to improve QOL. MET 3/6/2023  5.Patient to improve score on the FOTO, to improve QOL. MET 3/6/2023  6. Patient to improve score on 30 sec sit to stand, 5x sit to stand  in order to decrease fall risk. MET 3/6/2023     Long Term Goals: In 8 weeks  1.Patient to improve score on the FOTO to 48% or less, to improve QOL. PROGRESSING 4/3/2023  2. Patient to increase bilateral LE strength to 4+/5 or greater, in order to improve endurance and increase ability to perform all functional activities for increased time. PROGRESSING 4/3/2023  3. Patient to have decreased pain to 2/10 at worst, to improve QOL. PROGRESSING 4/3/2023  4. Patient to normalize score on 30 sec sit to stand, 5x sit to stand, in order to improve endurance and decrease fall risk. PROGRESSING 4/3/2023  5. Patient to perform daily activities including: PROGRESSING 4/3/2023   Putting on your shoes or socks - A little bit of difficulty  Getting into or out of a car - A little bit of difficulty  Rolling over in bed. - A little bit of difficulty  Getting into or out of the bath - A little bit of difficulty  Sitting for 1 hour. - A little bit of difficulty  Getting up or down 10 stairs (about 1 flight of stairs) - Moderate difficulty  Standing for 1 hour - Moderate difficulty  Squatting - Quite a bit of difficulty  Walking a mile - Quite a bit of difficulty  With your usual hobbies, recreational or sporting activities - Quite a bit of difficulty.      CERVICAL:  Short Term Goals: In 4 weeks   1.Patient to be educated on HEP.  2.Patient to increase cervical range of motion to WNL's, in order to improve available range of motion for ADL's.    3.Patient to increase bilateral UE strength by 1/2 grade, in order to improve endurance and increase ability to perform all functional activities for increased time.   4.Patient to have pain less than 7/10 at worst, to  improve QOL.  5.Patient to improve score on the FOTO, to improve QOL.     Long Term Goals: In 8 weeks  1.Patient to improve score on the FOTO to 34% or less, to improve QOL.  2. Patient to increase bilateral UE strength to 4+/5 or greater, in order to improve endurance and increase ability to perform all functional activities for increased time.  3. Patient to have decreased pain to 4/10 at worst, to improve QOL.  4. Patient to perform daily activities while holding neck in neutral position, including step ups and LE activities.    PLAN     Monitor response to today's treatment session and progress as indicated.    Updated Certification Period: 4/5/2023 to 5/5/2023   Recommended Treatment Plan: Outpatient Physical Therapy 2 times weekly for 8 weeks to include any combination of the following interventions: virtual visits, electrical stimulation (PRN), Cervical/Lumbar Traction, Manual Therapy, Neuromuscular Re-ed, Patient Education, Self Care, Therapeutic Exercise, Therapeutic Activites, Dry Needling, and Moist Hot Pack/Cold Pack     Marisabel Amaya, PT

## 2023-05-02 ENCOUNTER — TELEPHONE (OUTPATIENT)
Dept: REHABILITATION | Facility: HOSPITAL | Age: 63
End: 2023-05-02
Payer: COMMERCIAL

## 2023-05-02 NOTE — TELEPHONE ENCOUNTER
Called in an attempt to get patient added to the schedule before her plan of care expires. Left message for return phone call.

## 2023-05-09 ENCOUNTER — PATIENT OUTREACH (OUTPATIENT)
Dept: ADMINISTRATIVE | Facility: HOSPITAL | Age: 63
End: 2023-05-09
Payer: COMMERCIAL

## 2023-05-09 ENCOUNTER — PATIENT MESSAGE (OUTPATIENT)
Dept: ADMINISTRATIVE | Facility: HOSPITAL | Age: 63
End: 2023-05-09
Payer: COMMERCIAL

## 2023-05-10 ENCOUNTER — OFFICE VISIT (OUTPATIENT)
Dept: BARIATRICS | Facility: CLINIC | Age: 63
End: 2023-05-10
Payer: COMMERCIAL

## 2023-05-10 VITALS
WEIGHT: 282.44 LBS | HEIGHT: 61 IN | OXYGEN SATURATION: 96 % | HEART RATE: 67 BPM | SYSTOLIC BLOOD PRESSURE: 119 MMHG | BODY MASS INDEX: 53.32 KG/M2 | DIASTOLIC BLOOD PRESSURE: 65 MMHG

## 2023-05-10 DIAGNOSIS — E66.01 CLASS 3 SEVERE OBESITY DUE TO EXCESS CALORIES WITH SERIOUS COMORBIDITY AND BODY MASS INDEX (BMI) OF 50.0 TO 59.9 IN ADULT: Primary | ICD-10-CM

## 2023-05-10 DIAGNOSIS — E11.618 CONTROLLED TYPE 2 DIABETES MELLITUS WITH OTHER DIABETIC ARTHROPATHY, WITHOUT LONG-TERM CURRENT USE OF INSULIN: ICD-10-CM

## 2023-05-10 PROCEDURE — 99215 PR OFFICE/OUTPT VISIT, EST, LEVL V, 40-54 MIN: ICD-10-PCS | Mod: S$GLB,,, | Performed by: INTERNAL MEDICINE

## 2023-05-10 PROCEDURE — 3078F PR MOST RECENT DIASTOLIC BLOOD PRESSURE < 80 MM HG: ICD-10-PCS | Mod: CPTII,S$GLB,, | Performed by: INTERNAL MEDICINE

## 2023-05-10 PROCEDURE — 99215 OFFICE O/P EST HI 40 MIN: CPT | Mod: S$GLB,,, | Performed by: INTERNAL MEDICINE

## 2023-05-10 PROCEDURE — 3078F DIAST BP <80 MM HG: CPT | Mod: CPTII,S$GLB,, | Performed by: INTERNAL MEDICINE

## 2023-05-10 PROCEDURE — 4010F PR ACE/ARB THEARPY RXD/TAKEN: ICD-10-PCS | Mod: CPTII,S$GLB,, | Performed by: INTERNAL MEDICINE

## 2023-05-10 PROCEDURE — 4010F ACE/ARB THERAPY RXD/TAKEN: CPT | Mod: CPTII,S$GLB,, | Performed by: INTERNAL MEDICINE

## 2023-05-10 PROCEDURE — 1160F PR REVIEW ALL MEDS BY PRESCRIBER/CLIN PHARMACIST DOCUMENTED: ICD-10-PCS | Mod: CPTII,S$GLB,, | Performed by: INTERNAL MEDICINE

## 2023-05-10 PROCEDURE — 99999 PR PBB SHADOW E&M-EST. PATIENT-LVL V: CPT | Mod: PBBFAC,,, | Performed by: INTERNAL MEDICINE

## 2023-05-10 PROCEDURE — 99999 PR PBB SHADOW E&M-EST. PATIENT-LVL V: ICD-10-PCS | Mod: PBBFAC,,, | Performed by: INTERNAL MEDICINE

## 2023-05-10 PROCEDURE — 1160F RVW MEDS BY RX/DR IN RCRD: CPT | Mod: CPTII,S$GLB,, | Performed by: INTERNAL MEDICINE

## 2023-05-10 PROCEDURE — 3008F PR BODY MASS INDEX (BMI) DOCUMENTED: ICD-10-PCS | Mod: CPTII,S$GLB,, | Performed by: INTERNAL MEDICINE

## 2023-05-10 PROCEDURE — 3074F SYST BP LT 130 MM HG: CPT | Mod: CPTII,S$GLB,, | Performed by: INTERNAL MEDICINE

## 2023-05-10 PROCEDURE — 1159F MED LIST DOCD IN RCRD: CPT | Mod: CPTII,S$GLB,, | Performed by: INTERNAL MEDICINE

## 2023-05-10 PROCEDURE — 1159F PR MEDICATION LIST DOCUMENTED IN MEDICAL RECORD: ICD-10-PCS | Mod: CPTII,S$GLB,, | Performed by: INTERNAL MEDICINE

## 2023-05-10 PROCEDURE — 3074F PR MOST RECENT SYSTOLIC BLOOD PRESSURE < 130 MM HG: ICD-10-PCS | Mod: CPTII,S$GLB,, | Performed by: INTERNAL MEDICINE

## 2023-05-10 PROCEDURE — 3008F BODY MASS INDEX DOCD: CPT | Mod: CPTII,S$GLB,, | Performed by: INTERNAL MEDICINE

## 2023-05-10 NOTE — PROGRESS NOTES
Subjective:       Patient ID: Tawny Valerio is a 62 y.o. female.    Chief Complaint: Follow-up    Pt here today for follow-up. Has gained 12 more lbs since last seen 1 year ago, net neg 20 lbs. Started ozempic,currently on 2 mg for DM2 and IF. States she has been trying to cut back on bread, and get more protein and veg. Ice cream once a week.   Has been going to PT. Has added some walking.   Does sometimes get indigestion or nausea.   Has been having up to 1800 ariana a day.   Thyroid labs are normal.   Prev med hx:  Diethylpropion, last filled 10/11/2021. She did find less cravings on it, but feels the phentermine worked better overall. phentermine. Last filled 2/21/22.    A1c improved.     Recent EKG. PET stress pending for risk factors for CAD. [facility] did suggest stating SGLT2 and decreasing lasix. Pt w h/o cystis, but no pathogen on culture in 2019 x 2. Pt is not taking the lasix daily.     Normal sinus rhythm   Left anterior fascicular block   Abnormal ECG   When compared with ECG of 15-AUG-2019 15:08,   No significant change was found       Lab Results   Component Value Date    HGBA1C 5.5 08/09/2022    HGBA1C 5.4 01/06/2022    HGBA1C 5.4 07/09/2021     Lab Results   Component Value Date    LDLCALC 80.0 08/09/2022    CREATININE 0.8 01/13/2023    CREATININE 0.8 01/13/2023      Follow-up   Pertinent negatives include no arthralgias, chest pain, chills or fever.     Review of Systems   Constitutional: Negative for chills and fever.   Respiratory: Negative for shortness of breath.         Does not use CPAP. States could not tolerate it. ESS 19.    Cardiovascular: Negative for chest pain and leg swelling.   Gastrointestinal: Positive for diarrhea. Negative for constipation.        Rare GERD   Genitourinary: Positive for dysuria. Negative for difficulty urinating.   Musculoskeletal: Positive for back pain. Negative for arthralgias.   Neurological: Negative for dizziness and light-headedness.  "  Psychiatric/Behavioral: Negative for dysphoric mood. The patient is nervous/anxious.        Objective:     /65   Pulse 67   Ht 5' 1" (1.549 m)   Wt 128.1 kg (282 lb 6.6 oz)   LMP 01/15/2019 (Approximate)   SpO2 96%   BMI 53.36 kg/m²    Physical Exam   Constitutional: She is oriented to person, place, and time. She appears well-developed. No distress.   Morbidly obese     HENT:   Head: Normocephalic and atraumatic.   Eyes: Pupils are equal, round, and reactive to light. EOM are normal. No scleral icterus.   Neck: Normal range of motion. Neck supple.   Cardiovascular: Normal rate.   Pulmonary/Chest: Effort normal.   Musculoskeletal: Normal range of motion. She exhibits no edema.   Neurological: She is alert and oriented to person, place, and time. No cranial nerve deficit.   Skin: Skin is warm and dry. No erythema.   Psychiatric: She has a normal mood and affect. Her behavior is normal. Judgment normal.   Vitals reviewed.      Assessment:       1. Class 3 severe obesity due to excess calories with serious comorbidity and body mass index (BMI) of 50.0 to 59.9 in adult    2. 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          Continue Ozempic 2 mg once a week.       Decrease portions as soon as you start Ozempic. Some nausea in the first 2 weeks is not unusual.     If you get pain across the upper abdomen and around to your back, please call the office.       Continue Jardiance     Protein and fiber in every " meal.         Continue Exercise.Increase low impact activity as tolerated.  Avoid high impact activity, very heavy lifting or other exercise regimens that may cause discomfort.       1200 ariana pb meal planner given.

## 2023-05-10 NOTE — PATIENT INSTRUCTIONS
"Continue Ozempic 2 mg once a week.       Decrease portions as soon as you start Ozempic. Some nausea in the first 2 weeks is not unusual.     If you get pain across the upper abdomen and around to your back, please call the office.       Continue Jardiance     Protein and fiber in every meal.       Continue Exercise. Increase low impact activity as tolerated.  Avoid high impact activity, very heavy lifting or other exercise regimens that may cause discomfort.         1200 calorie  Meal Plan  STARCHES 80 CALORIES PER SERVING 15g CARB, 3g PROTEIN, 1g FAT  Servings per day   bread   tortilla   crackers   cooked cereals   dry cereals   pasta   rice   corn   popcorn   potato (small)   potato, mashed   sweet potato   squash, winter   cooked beans, peas, lentils (add 1 meat exchange)   1 slice   1 (6")   4-6 (3/4 oz)   1/2 cup   3/4 cup   1/2 cup   1/3 cup  1/2 cup   1 small light bag  1 (3 oz)   1/2 cup   1/3 cup   1 cup   1/2 cup Most starches are a good source of B vitamins   Choose whole grain foods such as 100% whole wheat bread and flour, brown rice, tortillas, etc. for nutrients and fiber.   Combine beans (starch & meat) with grains (starch) for their complimentary proteins and fiber   Combine grains (starch) with milk (milk) or cheese (meat) to compliment proteins     3   FRUIT 60 CALORIES PER SERVING 15g CARB    fresh fruit   banana  melon (cubes)   berries  canned fruit   dried fruit    1 small   1/2  ½ cup   ¾ cup  ½ cup   ¼ cup    Choose whole fruits for fiber   No fruit juices   2   DAIRY  CALORIES PER SERVING 12g CARB, 8g PROTEIN, 0-8g FAT    milk   yogurt  Protein soy or almond milk 1 cup   1 cup   1 cup Use unsweetened almond or soy milk with added protein  Avoid chocolate or flavored milk  Avoid yogurt with more than 8 gms of sugar. Gms of protein should be higher than grams of sugar               2   MEAT AND SUBSTITUES  CALORIES PER SERVING 7g Protein, 0-13g FAT    Meat,Seafood and fish "   cheese   cottage cheese   egg   peanut butter   tofu or tempeh  cooked beans, peas, lentils (add 1 starch)  Quinoa (add 1 starch)   Nuts and seeds (½ serving protein + 1 fat)  Nutritional yeast  [employer]ning [employer] grillers 1 oz     1 oz  1/4 cup     1   1.5 Tbsp   4 oz (1/2 cup)   1/2 cup              ¼ cup            2 tablespoons    1 gretta or ½ cup      Choose fish, seafood and lower fat cheeses  Limit frying or adding fat.      9   FATS 45 CALORIES PER SERVING     oil   mayonnaise   cream cheese   salad dressing   peanuts   avocado   butter or margarine    1 tsp   1 tsp   1 Tbsp   1 Tbsp   10   1/8   1 tsp Eat less fat.   Eat less saturated fat such as animal fat found in fatter meat, cheese, and butter. Also eat less hydrogenated fat.      2-3   VEGETABLES 25 CALORIES PER SERVING 5 g CARB, 2g PROTEIN    raw vegetables   cooked vegetables   tomato or vegetable juice 1 cup   1/2 cup     1/2 cup Choose dark green leafy and deep yellow vegetables such as spinach, zuchinni, squash, mushrooms, cauliflower ,broccoli, carrots, and peppers.   Unlimited          1200 CALORIE MEAL PLAN  Eat 3 small meals per day with 1-2 small snacks to keep you full throughout the day  Aim for at least 15 gms of protein at breakfast, at least 25 grams at lunch and at least 25 grams of protein at dinner.  Snacks should contain at least 5 grams of protein  Limit starches to 1 serving per meal or snack.  Keep your carbs whole grain or whole wheat  Limit your intake of refined sugar including sugary beverages ie sweet tea, lemonade, fruit punch             Fruit Protein Dairy Starch Fats Calories   Breakfast 1 2 1 1  340   Lunch  3  1 1 290   Dinner 1 4  1 1 405   Snack   1 1  170   Total 2 9 2 4 2 1205     Sample breakfasts:  2 scrambled eggs (use spray), 1 cup Protein Silk soy milk, 1 slice whole wheat toast, 1 small orange  Omelet made with 1 egg, 1-ounce low fat cheese and non-starchy veggies, 1 low fat plain yogurt with ½ banana  Plain  yogurt with ¾ cup unsweetened cold cereal and ½ cup fresh fruit salad, 2 hardboiled eggs  Sample lunches:  ½ whole wheat bagel topped with 3 ounces of low fat cheese melted in oven with a wild green salad with 1 TBSP dressing  ¾ cup cooked beans with 6 whole grain crackers, 10 roasted peanuts  ½ medium baked potato with 3 ounces of low fat cheddar cheese and 1 TBSP  sour cream  1 small wheat tortilla brushed with 1 tsp olive oil then brushed with pizza sauce and 4 ounces low fat cheese, sliced mushrooms and green peppers broiled until cheese is melted.  ½ cup chopped melon    Sample Dinners:  4 ounces of tuna pan seared in 1 tsp olive oil, ½ baked small sweet potato and 2 small plums  ½ cup chick peas, 1 cup cauliflower sauteed with 1 tbsp chopped onion, 1 tsp oil, and 2 tsp corona powder. Add low sodium vegetable stock to desired consistency. Serve with ½ cup brown rice  Red beans seasoned with onions and bell peppers served over cauliflower rice  1 cup cooked green or brown lentils served with ½ cup cooked quinoa and chopped tomatoes and cucumbers and 1 tbsp feta cheese  Sample Snacks:  1 cup of Protein Silk Soy milk with 3 squares laney crackers  3/4 ounce Triscuits with 1 ounce cubed cheese  Chobani Triple Zero yogurt with ¾ cup unsweetened cereal  ½ cup edamame (shelled)                          Vegetarian Breakfast recipe      Delicious and Healthy Breakfast Egg Muffins. Simple recipe, great taste. Low carb and high in protein. Perfect as a full meal or filling snack.    Ready in: 25 minutes  Recipe by: Micky    Low Carb Egg Breakfast Muffins (25 Minutes, Vegetarian)  Delicious and Healthy Breakfast Egg Muffins. Simple recipe, great taste. Low carb and high in protein. Perfect as a full meal or filling snack.   Course Breakfast   Cuisine Vegetarian   Prep Time 5 minutes   Cook Time 20 minutes   Total Time 25 minutes   Servings 3 muffins   Calories 259 kcal   Author Micky  Ingredients  1 bell  pepper (your favorite color)  3 spring onions  4 little cherry tomatoes/one normal tomato  6 eggs  1 handful spinach/ green leaves  2 slices cheddar (2 slices = around 50g; you can use different cheese too)  ½-1 tsp salt  4-5 splashes hot sauce (or corona powder)  Equipment  6 slot muffin tin  Baking paper or muffin cups (only if you don't have a nonstick muffin tin)  Instructions  Preheat the oven to 400°F.  Wash and dice the pepper, onions and tomatoes. and put them in a large mixing bowl.  Wash the spinach, lightly chop it and add it to the bowl as well.  Add the eggs and salt. Mix well. Pro tip - crack the eggs separately before adding. That way if you get a dodgy one, it wont ruin the whole meal.  Optionally add some hot sauce, corona powder...whatever you like. Hot sauce is great!  Grease the muffin tin with oil and kitchen paper/baking brush and pour the egg mixture evenly into the muffin slots. (If you think they might still stick to the pan use some muffin cups or cut out some baking paper and to use as cups - definitely saves time on doing the washing up   If youre so inclined then layering some cheese over the top of each muffin before they go into the oven is a delicious addition! You can also mix in the cheese to the batter.  Pop the tray into the oven for 15-18 minutes or until the tops are firm to the touch.  Bon Appetit!!  COOKING TIME: How do you like your eggs? Rather soft? Stick to 20 minutes baking time. If you like them well done, almost crunchy, go for 25 minutes!  Nutrition Facts  Low Carb Egg Breakfast Muffins (25 Minutes, Vegetarian)  Amount Per Serving (461 g) 3 muffins  Calories 259 Calories from Fat 126  Total Fat 14g  Saturated Fat 4.2g  Cholesterol 491mg  Sodium 493mg  Potassium 948mg  Total Carbohydrates 16g  Dietary Fiber 4.8g  Sugars 10.5g  Protein 20g  1 serving = 3 muffins

## 2023-05-12 ENCOUNTER — CLINICAL SUPPORT (OUTPATIENT)
Dept: REHABILITATION | Facility: HOSPITAL | Age: 63
End: 2023-05-12
Payer: COMMERCIAL

## 2023-05-12 DIAGNOSIS — M54.2 NECK PAIN: Primary | ICD-10-CM

## 2023-05-12 DIAGNOSIS — R68.89 DECREASED STRENGTH, ENDURANCE, AND MOBILITY: ICD-10-CM

## 2023-05-12 DIAGNOSIS — R53.1 DECREASED STRENGTH, ENDURANCE, AND MOBILITY: ICD-10-CM

## 2023-05-12 DIAGNOSIS — R29.3 ABNORMAL POSTURE: ICD-10-CM

## 2023-05-12 DIAGNOSIS — Z74.09 DECREASED STRENGTH, ENDURANCE, AND MOBILITY: ICD-10-CM

## 2023-05-12 DIAGNOSIS — M25.60 DECREASED RANGE OF MOTION: ICD-10-CM

## 2023-05-12 DIAGNOSIS — R26.9 GAIT ABNORMALITY: ICD-10-CM

## 2023-05-12 DIAGNOSIS — R68.89 DECREASED FUNCTIONAL ACTIVITY TOLERANCE: ICD-10-CM

## 2023-05-12 PROCEDURE — 97110 THERAPEUTIC EXERCISES: CPT | Performed by: PHYSICAL THERAPIST

## 2023-05-12 PROCEDURE — 97140 MANUAL THERAPY 1/> REGIONS: CPT | Performed by: PHYSICAL THERAPIST

## 2023-05-12 NOTE — PROGRESS NOTES
[facility]   Physical Therapy Treatment Note + Plan of Care Update    Name: Tawny Valerio  [facility] Number: [id number]    Therapy Diagnosis:   Encounter Diagnoses   Name Primary?    Neck pain Yes    Decreased functional activity tolerance     Decreased strength, endurance, and mobility     Decreased range of motion     Gait abnormality     Abnormal posture         Physician: Maya Rivas*    Visit Date: 5/12/2023    Physician Orders: PT Eval and Treat  Medical Diagnosis from Referral: Sciatica of left side  Evaluation Date: 2/6/2023  Authorization Period Expiration: 12/31/23  Plan of Care Expiration: 8/10/2023  Progress Note Due: 6/11/2023  Visit # / Visits authorized: 12/40 (+1)  FOTO: 3/3 (last performed on 4/3/2023)  PTA Visit #: 0/5     Precautions: Diabetes, cancer, and anxiety, HTN    Time In: 1435  Time Out: 1520  Total Billable Time: 40 minutes     (Billing reflects 1 on 1 treatment time spent with patient)    SUBJECTIVE     Patient reports: she felt better after last visit, but then the pain in her neck soon returned like usual. Patient states that she takes gabapentin for her neuropathy, so she states that her doctor told her she could try increasing the dosage. This has helped some, but the pain continues to return throughout the day. She states that she does not feel like she has to hold her neck up anymore like she was having to do, but the nagging pain is still there. She feels it in the left side of her head, down behind her left ear, and around to the front of her neck. This pain is constant. It is either a nagging aching, or a sharp pain when she turns her head.     She was compliant with home exercise program.    Response to previous treatment: feeling pain in her lateral noticed some numbness in her lateral hip and anterior left thigh     Functional change: prolonged sleeping on her let side causes hip numbness, sitting for about 60 minutes causes increased  symptoms, working in her garden, and now able to stand and cook a meal (30 minutes). Walking more confidently up/down community surfaces    Pain: 7/10   neck  Location:bilaterally behind TMJ at ears and over upper trapezial on left AND left hip and LOWER EXTREMITY (no pain today)    OBJECTIVE     Objective Measures updated at progress report only unless specified.     Posture:  Pt presents with postural abnormalities which include: Patient rests with head/neck in right side bending with L rotation               [x] Forward Head                                [] Increased Lumbar Lordosis              [x] Rounded Shoulder                         [] Flat Back Posture              [] Increased Thoracic Kyphosis        [] Pes Planus              [] Increased Trunk Sway                   [] Valgus knee position              [] Increased Trunk Rotation              [] Varus knee position              [] Increased cervical lordosis            [x] Other: Mesomorphic     Sensation:    Sensation to light touch over UE's is  [x] Intact [] Impaired [] Defer lighter on left lateral upper arm  Sensation to light touch over LE's is  [] Intact [] Impaired [x] Defer        Range of Motion:     Shoulder AROM/PROM Right Left Pain/Dysfunction with Movement   Shoulder Flexion (180º) WNL WNL     Shoulder Abduction (180º) WNL WNL            AROM:  Motion: Movement Results:   Cervical Flexion  chin to chest (pulling in L anterior neck)   Cervical Extension 50% pain in right posterior neck   Cervical Rotation 50% pain in upper trapezial and pulling with L Rot   Cervical Sidebending  50% painful in left lower cervical spine, pulling bilateral, pain behind L ear   Upper Extremity IR reach (Medial Rotation) L2   Upper Extremity ER reach (External Rotation) C/T JUNCTION      Strength:     U/E MMT Right Left Pain/Dysfunction with Movement   Cervical Side Bending 4-/5 4-/5 NT - 5/12/2023    Upper trapezial  4-/5 4-/5 Difficulty holding neck in  neutral position - rotates to right, with SB to right as well   Shoulder Abduction 4-/5 4-/5 Difficulty holding neck in neutral position - rotates to right, with SB to right as well   Shoulder IR 4-/5 4-/5 Difficulty holding neck in neutral position - rotates to right, with SB to right as well   Shoulder ER    4-/5 4-/5 Difficulty holding neck in neutral position - rotates to right, with SB to right as well   Elbow Flexion  4/5 4/5 Difficulty holding neck in neutral position - rotates to right, with SB to right as well   Elbow Extension 4-/5 4-/5 Difficulty holding neck in neutral position - rotates to right, with SB to right as well   Wrist Extension 4/5 4/5     4th/5th Finger Intrinsics 4-/5 4-/5           Muscle Length:         Muscle Tested  Right Left  Limitation   Upper Trapezius [] Normal      [x] Limited [] Normal      [x] Limited     Levator Scapular  [] Normal      [x] Limited [] Normal      [x] Limited     Scalenes [] Normal      [x] Limited [] Normal      [x] Limited     Pectoralis Minor [] Normal      [x] Limited [] Normal      [x] Limited     Pectoralis Major [] Normal      [x] Limited [] Normal      [x] Limited            Palpation:  Patient tender with increased tone over bilateral  upper trapezius, levator scapula, latissimus dorsi, teres minor, infraspinatus, subscapularis, anterior chest muscle's, posterior cervical muscle's, sternocleidomastoid, suboccipital muscle, medial/lateral scapular muscle's.         FOTO:       TREATMENT     Tawny received the treatments listed below:     MANUAL THERAPY TECHNIQUES were applied for (10) minutes, including:    Manual Intervention Performed Today    Soft Tissue Mobilization  [x] Bilateral suboccipitals, scalenes , SCM, upper trapezius, levator scapulae , subscapularis  extensive at subcranial and sternocleidomastoid/upper trapezial on left side   Joint Mobilizations []    Gentle manual distraction  [x] Cervical     []    Functional Dry Needling  []       Plan for Next Visit: Continue as needed       THERAPEUTIC EXERCISES to develop strength, endurance, ROM, flexibility, posture, and core stabilization for (30) minutes including: including objective measurements     Intervention 5/12/2023     Nustep for ROM and strength - 8 minutes, level 2 for endurance   Heel slides with pillowcase - 3 x 10, 5 second holds   Lower trunk rotations (home exercise program) home 2 x 10, 5 second holds   Short arc quads with posterior pelvic tilt  - 3 x 10 bilateral    Hip abduction with band - 3min 10 sec hold red band (increased)     Hip adduction with ball  - 3min with 5  second hold (increased)   Sciatic nerve glides - 5 pumps, 3 sets   Re-assessment          UBE x 3 minutes forward, 2 minutes  backward    Chin tucks supine/sitting  10x   Chin tucks with head lift supine  10x/ 5 sec hold   Isometric pulls at cervical spine with band sitting  Extension 10x/5 seconds   Side bending 5x/5 seconds hold 2 sets   AROM cervical spine  Rotation 10 2 sets  Side bending 10x/ 2 sets   Progress Note/Plan of Care Update x See Objective section for details     Plan for Next Visit:         NEUROMUSCULAR RE-EDUCATION ACTIVITIES to improve Balance, Coordination, Kinesthetic, Sense, Proprioception, and Posture for (0) minutes.  The following were included:    Intervention Performed Today    Shoulder retraction isometrics  10x 5 seconds holds   Bilateral shoulder external rotation   10x/ 2 sets with yellow band   Sitting rows   10x/ yellow band   Shoulder rolls Backward   2 x 10   Open book  10x each side 10 seconds hold         Quad sets - 3 x 10, 3-5 second holds, bilateral     Pelvic tilts (home exercise program) Home  3 x 10    Bridges (home exercise program) Home  2 x 10   clamshells - 3 x 10 red band bilateral    Straight leg raise with posterior pelvic tilt  - 2 x 8  bilaterally   March in place supine - 2 x 10 bilateral (increased)   Sidelying hip abduction  - 5x /2 sets side   Step ups -  10x/ 1 sets 6 inch (decreased today)   Sit to stand  10x 1 sets 5#KB   Sitting with good posture - X 2 minutes    Hip flexion  - X 30 sitting on tan mat on blue therex disk   Shoulder flexion - X 30 sitting on tan mat on blue therex disk        Side steps - 2 minutes parallel bars 2 hand support     Plan for Next Visit: progress as tolerated       PATIENT EDUCATION AND HOME EXERCISES     Home Exercises Provided and Patient Education Provided     Education provided:   PURPOSE: Patient educated on the impairments noted above and the effects of physical therapy intervention to improve overall condition and QOL.   EXERCISE: Patient was educated on all the above exercise prior/during/after for proper posture, positioning, and execution for safe performance with home exercise program.   STRENGTH: Patient educated on the importance of improved core and extremity strength in order to improve alignment of the spine and extremities with static positions and dynamic movement.   GAIT & BALANCE: Patient educated on the importance of strong core and lower extremity musculature in order to improve both static and dynamic balance, improve gait mechanics, reduce fall risk and improve household and community mobility.   POSTURE: Patient educated on postural awareness to reduce stress and maintain optimal alignment of the spine with static positions and dynamic movement   4/20/2023: Encouraged home exercise program.    Written Home Exercises Provided: patient was instructed to continue with prior home exercise program.  Exercises were reviewed and Tawny was able to demonstrate them prior to the end of the session.  Tawny demonstrated good  understanding of the education provided. See EMR under Patient Instructions for exercises provided during therapy sessions.    ASSESSMENT     Patient tolerated all treatment well. She still requires frequent cues for her to maintain her head in midline, reporting that she feels her head is in  midline when it is not. Her cervical muscles still fatigue quickly, but she does demonstrate improvement in strength with exercises. Objective measurements show mild improvement in overall upper extremity strength as well. Decreased soft tissue adhesions noted following manual therapy this visit, with patient reporting good relief following. Patient would benefit from continue PT in order to address remaining limitations in cervical spine and shoulder range of motion, upper extremity strength, and postural stability, in order to achieve maximum functional potential.     Tawny is progressing well towards her goals.   Pt prognosis is Good.     Pt will continue to benefit from skilled outpatient physical therapy to address the deficits listed in the problem list box on initial evaluation, provide pt/family education and to maximize pt's level of independence in the home and community environment.     Pt's spiritual, cultural and educational needs considered and pt agreeable to plan of care and goals.     Anticipated Barriers for therapy: sedentary lifestyle, chronicity of condition, lack of understanding of condition, adherence to treatment plan, and coping style,  Anxiety or Panic Disorders, Arthritis, Back pain, BMI over 30, Cancer, DiabetesType I or II, Prior Surgery, Sleep dysfunction      Goals:  Reviewed: 5/12/2023      LOWER BODY/BACK  Short Term Goals: In 4 weeks   1.Patient to be educated on HEP. MET 3/6/2023  2.Patient to increase knee range of motion to full extension, in order to improve available range of motion for ADL's.    3.Patient to increase bilateral LE strength by 1/2 grade, in order to improve endurance and increase ability to perform all functional activities for increased time. MET 3/6/2023  4.Patient to have pain less than 5/10 at worst, to improve QOL. MET 3/6/2023  5.Patient to improve score on the FOTO, to improve QOL. MET 3/6/2023  6. Patient to improve score on 30 sec sit to stand, 5x  sit to stand  in order to decrease fall risk. MET 3/6/2023     Long Term Goals: In 8 weeks  1.Patient to improve score on the FOTO to 48% or less, to improve QOL. PROGRESSING   2. Patient to increase bilateral LE strength to 4+/5 or greater, in order to improve endurance and increase ability to perform all functional activities for increased time. PROGRESSING   3. Patient to have decreased pain to 2/10 at worst, to improve QOL. PROGRESSING   4. Patient to normalize score on 30 sec sit to stand, 5x sit to stand, in order to improve endurance and decrease fall risk. PROGRESSING   5. Patient to perform daily activities including: PROGRESSING    Putting on your shoes or socks - A little bit of difficulty  Getting into or out of a car - A little bit of difficulty  Rolling over in bed. - A little bit of difficulty  Getting into or out of the bath - A little bit of difficulty  Sitting for 1 hour. - A little bit of difficulty  Getting up or down 10 stairs (about 1 flight of stairs) - Moderate difficulty  Standing for 1 hour - Moderate difficulty  Squatting - Quite a bit of difficulty  Walking a mile - Quite a bit of difficulty  With your usual hobbies, recreational or sporting activities - Quite a bit of difficulty.      CERVICAL:  Reviewed: 5/12/2023   Short Term Goals: In 4 weeks   1.Patient to be educated on HEP. - Met 5/12/2023  2.Patient to increase cervical range of motion to WNL's, in order to improve available range of motion for ADL's.    3.Patient to increase bilateral UE strength by 1/2 grade, in order to improve endurance and increase ability to perform all functional activities for increased time. - MET 5/12/2023  4.Patient to have pain less than 7/10 at worst, to improve QOL.  5.Patient to improve score on the FOTO, to improve QOL. - MET 5/12/2023     Long Term Goals: In 8 weeks  1.Patient to improve score on the FOTO to 34% or less, to improve QOL.  2. Patient to increase bilateral UE strength to 4+/5 or  greater, in order to improve endurance and increase ability to perform all functional activities for increased time.  3. Patient to have decreased pain to 4/10 at worst, to improve QOL.  4. Patient to perform daily activities while holding neck in neutral position, including step ups and LE activities.    PLAN     Monitor response to today's treatment session and progress as indicated.    Updated Certification Period: 5/12/2023 to 8/10/2023   Recommended Treatment Plan: Outpatient Physical Therapy 2 times weekly for 12 more visits: to include any combination of the following interventions: virtual visits, electrical stimulation (PRN), Cervical/Lumbar Traction, Manual Therapy, Neuromuscular Re-ed, Patient Education, Self Care, Therapeutic Exercise, Therapeutic Activites, Dry Needling, and Moist Hot Pack/Cold Pack     Marisabel Amaya, PT

## 2023-05-12 NOTE — PLAN OF CARE
[facility]   Physical Therapy Treatment Note + Plan of Care Update    Name: Tawny Valerio  [facility] Number: [id number]    Therapy Diagnosis:   Encounter Diagnoses   Name Primary?    Neck pain Yes    Decreased functional activity tolerance     Decreased strength, endurance, and mobility     Decreased range of motion     Gait abnormality     Abnormal posture         Physician: Maya Rivas*    Visit Date: 5/12/2023    Physician Orders: PT Eval and Treat  Medical Diagnosis from Referral: Sciatica of left side  Evaluation Date: 2/6/2023  Authorization Period Expiration: 12/31/23  Plan of Care Expiration: 8/10/2023  Progress Note Due: 6/11/2023  Visit # / Visits authorized: 12/40 (+1)  FOTO: 3/3 (last performed on 4/3/2023)  PTA Visit #: 0/5     Precautions: Diabetes, cancer, and anxiety, HTN    Time In: 1435  Time Out: 1520  Total Billable Time: 40 minutes     (Billing reflects 1 on 1 treatment time spent with patient)    SUBJECTIVE     Patient reports: she felt better after last visit, but then the pain in her neck soon returned like usual. Patient states that she takes gabapentin for her neuropathy, so she states that her doctor told her she could try increasing the dosage. This has helped some, but the pain continues to return throughout the day. She states that she does not feel like she has to hold her neck up anymore like she was having to do, but the nagging pain is still there. She feels it in the left side of her head, down behind her left ear, and around to the front of her neck. This pain is constant. It is either a nagging aching, or a sharp pain when she turns her head.     She was compliant with home exercise program.    Response to previous treatment: feeling pain in her lateral noticed some numbness in her lateral hip and anterior left thigh     Functional change: prolonged sleeping on her let side causes hip numbness, sitting for about 60 minutes causes increased  symptoms, working in her garden, and now able to stand and cook a meal (30 minutes). Walking more confidently up/down community surfaces    Pain: 7/10   neck  Location:bilaterally behind TMJ at ears and over upper trapezial on left AND left hip and LOWER EXTREMITY (no pain today)    OBJECTIVE     Objective Measures updated at progress report only unless specified.     Posture:  Pt presents with postural abnormalities which include: Patient rests with head/neck in right side bending with L rotation               [x] Forward Head                                [] Increased Lumbar Lordosis              [x] Rounded Shoulder                         [] Flat Back Posture              [] Increased Thoracic Kyphosis        [] Pes Planus              [] Increased Trunk Sway                   [] Valgus knee position              [] Increased Trunk Rotation              [] Varus knee position              [] Increased cervical lordosis            [x] Other: Mesomorphic     Sensation:    Sensation to light touch over UE's is  [x] Intact [] Impaired [] Defer lighter on left lateral upper arm  Sensation to light touch over LE's is  [] Intact [] Impaired [x] Defer        Range of Motion:     Shoulder AROM/PROM Right Left Pain/Dysfunction with Movement   Shoulder Flexion (180º) WNL WNL     Shoulder Abduction (180º) WNL WNL            AROM:  Motion: Movement Results:   Cervical Flexion  chin to chest (pulling in L anterior neck)   Cervical Extension 50% pain in right posterior neck   Cervical Rotation 50% pain in upper trapezial and pulling with L Rot   Cervical Sidebending  50% painful in left lower cervical spine, pulling bilateral, pain behind L ear   Upper Extremity IR reach (Medial Rotation) L2   Upper Extremity ER reach (External Rotation) C/T JUNCTION      Strength:     U/E MMT Right Left Pain/Dysfunction with Movement   Cervical Side Bending 4-/5 4-/5 NT - 5/12/2023    Upper trapezial  4-/5 4-/5 Difficulty holding neck in  neutral position - rotates to right, with SB to right as well   Shoulder Abduction 4-/5 4-/5 Difficulty holding neck in neutral position - rotates to right, with SB to right as well   Shoulder IR 4-/5 4-/5 Difficulty holding neck in neutral position - rotates to right, with SB to right as well   Shoulder ER    4-/5 4-/5 Difficulty holding neck in neutral position - rotates to right, with SB to right as well   Elbow Flexion  4/5 4/5 Difficulty holding neck in neutral position - rotates to right, with SB to right as well   Elbow Extension 4-/5 4-/5 Difficulty holding neck in neutral position - rotates to right, with SB to right as well   Wrist Extension 4/5 4/5     4th/5th Finger Intrinsics 4-/5 4-/5           Muscle Length:         Muscle Tested  Right Left  Limitation   Upper Trapezius [] Normal      [x] Limited [] Normal      [x] Limited     Levator Scapular  [] Normal      [x] Limited [] Normal      [x] Limited     Scalenes [] Normal      [x] Limited [] Normal      [x] Limited     Pectoralis Minor [] Normal      [x] Limited [] Normal      [x] Limited     Pectoralis Major [] Normal      [x] Limited [] Normal      [x] Limited            Palpation:  Patient tender with increased tone over bilateral  upper trapezius, levator scapula, latissimus dorsi, teres minor, infraspinatus, subscapularis, anterior chest muscle's, posterior cervical muscle's, sternocleidomastoid, suboccipital muscle, medial/lateral scapular muscle's.         FOTO:       TREATMENT     Tawny received the treatments listed below:     MANUAL THERAPY TECHNIQUES were applied for (10) minutes, including:    Manual Intervention Performed Today    Soft Tissue Mobilization  [x] Bilateral suboccipitals, scalenes , SCM, upper trapezius, levator scapulae , subscapularis  extensive at subcranial and sternocleidomastoid/upper trapezial on left side   Joint Mobilizations []    Gentle manual distraction  [x] Cervical     []    Functional Dry Needling  []       Plan for Next Visit: Continue as needed       THERAPEUTIC EXERCISES to develop strength, endurance, ROM, flexibility, posture, and core stabilization for (30) minutes including: including objective measurements     Intervention 5/12/2023     Nustep for ROM and strength - 8 minutes, level 2 for endurance   Heel slides with pillowcase - 3 x 10, 5 second holds   Lower trunk rotations (home exercise program) home 2 x 10, 5 second holds   Short arc quads with posterior pelvic tilt  - 3 x 10 bilateral    Hip abduction with band - 3min 10 sec hold red band (increased)     Hip adduction with ball  - 3min with 5  second hold (increased)   Sciatic nerve glides - 5 pumps, 3 sets   Re-assessment          UBE x 3 minutes forward, 2 minutes  backward    Chin tucks supine/sitting  10x   Chin tucks with head lift supine  10x/ 5 sec hold   Isometric pulls at cervical spine with band sitting  Extension 10x/5 seconds   Side bending 5x/5 seconds hold 2 sets   AROM cervical spine  Rotation 10 2 sets  Side bending 10x/ 2 sets   Progress Note/Plan of Care Update x See Objective section for details     Plan for Next Visit:         NEUROMUSCULAR RE-EDUCATION ACTIVITIES to improve Balance, Coordination, Kinesthetic, Sense, Proprioception, and Posture for (0) minutes.  The following were included:    Intervention Performed Today    Shoulder retraction isometrics  10x 5 seconds holds   Bilateral shoulder external rotation   10x/ 2 sets with yellow band   Sitting rows   10x/ yellow band   Shoulder rolls Backward   2 x 10   Open book  10x each side 10 seconds hold         Quad sets - 3 x 10, 3-5 second holds, bilateral     Pelvic tilts (home exercise program) Home  3 x 10    Bridges (home exercise program) Home  2 x 10   clamshells - 3 x 10 red band bilateral    Straight leg raise with posterior pelvic tilt  - 2 x 8  bilaterally   March in place supine - 2 x 10 bilateral (increased)   Sidelying hip abduction  - 5x /2 sets side   Step ups -  10x/ 1 sets 6 inch (decreased today)   Sit to stand  10x 1 sets 5#KB   Sitting with good posture - X 2 minutes    Hip flexion  - X 30 sitting on tan mat on blue therex disk   Shoulder flexion - X 30 sitting on tan mat on blue therex disk        Side steps - 2 minutes parallel bars 2 hand support     Plan for Next Visit: progress as tolerated       PATIENT EDUCATION AND HOME EXERCISES     Home Exercises Provided and Patient Education Provided     Education provided:   PURPOSE: Patient educated on the impairments noted above and the effects of physical therapy intervention to improve overall condition and QOL.   EXERCISE: Patient was educated on all the above exercise prior/during/after for proper posture, positioning, and execution for safe performance with home exercise program.   STRENGTH: Patient educated on the importance of improved core and extremity strength in order to improve alignment of the spine and extremities with static positions and dynamic movement.   GAIT & BALANCE: Patient educated on the importance of strong core and lower extremity musculature in order to improve both static and dynamic balance, improve gait mechanics, reduce fall risk and improve household and community mobility.   POSTURE: Patient educated on postural awareness to reduce stress and maintain optimal alignment of the spine with static positions and dynamic movement   4/20/2023: Encouraged home exercise program.    Written Home Exercises Provided: patient was instructed to continue with prior home exercise program.  Exercises were reviewed and Tawny was able to demonstrate them prior to the end of the session.  Tawny demonstrated good  understanding of the education provided. See EMR under Patient Instructions for exercises provided during therapy sessions.    ASSESSMENT     Patient tolerated all treatment well. She still requires frequent cues for her to maintain her head in midline, reporting that she feels her head is in  midline when it is not. Her cervical muscles still fatigue quickly, but she does demonstrate improvement in strength with exercises. Objective measurements show mild improvement in overall upper extremity strength as well. Decreased soft tissue adhesions noted following manual therapy this visit, with patient reporting good relief following. Patient would benefit from continue PT in order to address remaining limitations in cervical spine and shoulder range of motion, upper extremity strength, and postural stability, in order to achieve maximum functional potential.     Tawny is progressing well towards her goals.   Pt prognosis is Good.     Pt will continue to benefit from skilled outpatient physical therapy to address the deficits listed in the problem list box on initial evaluation, provide pt/family education and to maximize pt's level of independence in the home and community environment.     Pt's spiritual, cultural and educational needs considered and pt agreeable to plan of care and goals.     Anticipated Barriers for therapy: sedentary lifestyle, chronicity of condition, lack of understanding of condition, adherence to treatment plan, and coping style,  Anxiety or Panic Disorders, Arthritis, Back pain, BMI over 30, Cancer, DiabetesType I or II, Prior Surgery, Sleep dysfunction      Goals:  Reviewed: 5/12/2023      LOWER BODY/BACK  Short Term Goals: In 4 weeks   1.Patient to be educated on HEP. MET 3/6/2023  2.Patient to increase knee range of motion to full extension, in order to improve available range of motion for ADL's.    3.Patient to increase bilateral LE strength by 1/2 grade, in order to improve endurance and increase ability to perform all functional activities for increased time. MET 3/6/2023  4.Patient to have pain less than 5/10 at worst, to improve QOL. MET 3/6/2023  5.Patient to improve score on the FOTO, to improve QOL. MET 3/6/2023  6. Patient to improve score on 30 sec sit to stand, 5x  sit to stand  in order to decrease fall risk. MET 3/6/2023     Long Term Goals: In 8 weeks  1.Patient to improve score on the FOTO to 48% or less, to improve QOL. PROGRESSING   2. Patient to increase bilateral LE strength to 4+/5 or greater, in order to improve endurance and increase ability to perform all functional activities for increased time. PROGRESSING   3. Patient to have decreased pain to 2/10 at worst, to improve QOL. PROGRESSING   4. Patient to normalize score on 30 sec sit to stand, 5x sit to stand, in order to improve endurance and decrease fall risk. PROGRESSING   5. Patient to perform daily activities including: PROGRESSING    Putting on your shoes or socks - A little bit of difficulty  Getting into or out of a car - A little bit of difficulty  Rolling over in bed. - A little bit of difficulty  Getting into or out of the bath - A little bit of difficulty  Sitting for 1 hour. - A little bit of difficulty  Getting up or down 10 stairs (about 1 flight of stairs) - Moderate difficulty  Standing for 1 hour - Moderate difficulty  Squatting - Quite a bit of difficulty  Walking a mile - Quite a bit of difficulty  With your usual hobbies, recreational or sporting activities - Quite a bit of difficulty.      CERVICAL:  Reviewed: 5/12/2023   Short Term Goals: In 4 weeks   1.Patient to be educated on HEP. - Met 5/12/2023  2.Patient to increase cervical range of motion to WNL's, in order to improve available range of motion for ADL's.    3.Patient to increase bilateral UE strength by 1/2 grade, in order to improve endurance and increase ability to perform all functional activities for increased time. - MET 5/12/2023  4.Patient to have pain less than 7/10 at worst, to improve QOL.  5.Patient to improve score on the FOTO, to improve QOL. - MET 5/12/2023     Long Term Goals: In 8 weeks  1.Patient to improve score on the FOTO to 34% or less, to improve QOL.  2. Patient to increase bilateral UE strength to 4+/5 or  greater, in order to improve endurance and increase ability to perform all functional activities for increased time.  3. Patient to have decreased pain to 4/10 at worst, to improve QOL.  4. Patient to perform daily activities while holding neck in neutral position, including step ups and LE activities.    PLAN     Monitor response to today's treatment session and progress as indicated.    Updated Certification Period: 5/12/2023 to 8/10/2023   Recommended Treatment Plan: Outpatient Physical Therapy 2 times weekly for 12 more visits: to include any combination of the following interventions: virtual visits, electrical stimulation (PRN), Cervical/Lumbar Traction, Manual Therapy, Neuromuscular Re-ed, Patient Education, Self Care, Therapeutic Exercise, Therapeutic Activites, Dry Needling, and Moist Hot Pack/Cold Pack     Marisabel Amaya, PT

## 2023-05-23 ENCOUNTER — CLINICAL SUPPORT (OUTPATIENT)
Dept: REHABILITATION | Facility: HOSPITAL | Age: 63
End: 2023-05-23
Payer: COMMERCIAL

## 2023-05-23 DIAGNOSIS — M54.2 NECK PAIN: Primary | ICD-10-CM

## 2023-05-23 DIAGNOSIS — R68.89 DECREASED STRENGTH, ENDURANCE, AND MOBILITY: ICD-10-CM

## 2023-05-23 DIAGNOSIS — R29.3 ABNORMAL POSTURE: ICD-10-CM

## 2023-05-23 DIAGNOSIS — R53.1 DECREASED STRENGTH, ENDURANCE, AND MOBILITY: ICD-10-CM

## 2023-05-23 DIAGNOSIS — Z74.09 DECREASED STRENGTH, ENDURANCE, AND MOBILITY: ICD-10-CM

## 2023-05-23 DIAGNOSIS — R26.9 GAIT ABNORMALITY: ICD-10-CM

## 2023-05-23 DIAGNOSIS — R68.89 DECREASED FUNCTIONAL ACTIVITY TOLERANCE: ICD-10-CM

## 2023-05-23 DIAGNOSIS — M25.60 DECREASED RANGE OF MOTION: ICD-10-CM

## 2023-05-23 PROCEDURE — 97112 NEUROMUSCULAR REEDUCATION: CPT | Performed by: PHYSICAL THERAPIST

## 2023-05-23 PROCEDURE — 97110 THERAPEUTIC EXERCISES: CPT | Performed by: PHYSICAL THERAPIST

## 2023-05-23 PROCEDURE — 97140 MANUAL THERAPY 1/> REGIONS: CPT | Performed by: PHYSICAL THERAPIST

## 2023-05-23 NOTE — PROGRESS NOTES
[facility]   Physical Therapy Treatment Note     Name: Tawny [facility] Number: [id number]    Therapy Diagnosis:   Encounter Diagnoses   Name Primary?    Neck pain Yes    Decreased functional activity tolerance     Decreased strength, endurance, and mobility     Decreased range of motion     Gait abnormality     Abnormal posture         Physician: Maya Rivas*    Visit Date: 5/23/2023    Physician Orders: PT Eval and Treat  Medical Diagnosis from Referral: Sciatica of left side  Evaluation Date: 2/6/2023  Authorization Period Expiration: 12/31/23  Plan of Care Expiration: 8/10/2023  Progress Note Due: 6/11/2023  Visit # / Visits authorized: 13/40 (+1)  FOTO: 3/3 (last performed on 4/3/2023)  PTA Visit #: 0/5     Precautions: Diabetes, cancer, and anxiety, HTN    Time In: 1734  Time Out: 1816  Total Billable Time: 40 minutes     (Billing reflects 1 on 1 treatment time spent with patient)    SUBJECTIVE     Patient reports: she feels about the same. If she takes her medication, it helps a little, but the pain is still there.    She was compliant with home exercise program.    Response to previous treatment: feeling pain in her lateral noticed some numbness in her lateral hip and anterior left thigh     Functional change: prolonged sleeping on her let side causes hip numbness, sitting for about 60 minutes causes increased symptoms, working in her garden, and now able to stand and cook a meal (30 minutes). Walking more confidently up/down community surfaces    Pain: 5/10   neck  Location:bilaterally behind TMJ at ears and over upper trapezial on left AND left hip and LOWER EXTREMITY (no pain today)    OBJECTIVE     Objective Measures updated at progress report only unless specified.        TREATMENT     Tawny received the treatments listed below:     MANUAL THERAPY TECHNIQUES were applied for (10) minutes, including:    Manual Intervention Performed Today    Soft Tissue  Mobilization  [x] Bilateral suboccipitals, scalenes , SCM, upper trapezius, levator scapulae , subscapularis  extensive at subcranial and sternocleidomastoid/upper trapezial on left side   Joint Mobilizations []    Gentle manual distraction  [x] Cervical     []    Functional Dry Needling  []      Plan for Next Visit: Continue as needed       THERAPEUTIC EXERCISES to develop strength, endurance, ROM, flexibility, posture, and core stabilization for (18) minutes including: including objective measurements     Intervention 5/23/2023     Nustep for ROM and strength - 8 minutes, level 2 for endurance   Heel slides with pillowcase - 3 x 10, 5 second holds   Lower trunk rotations (home exercise program) home 2 x 10, 5 second holds   Short arc quads with posterior pelvic tilt  - 3 x 10 bilateral    Hip abduction with band - 3min 10 sec hold red band (increased)     Hip adduction with ball  - 3min with 5  second hold (increased)   Sciatic nerve glides - 5 pumps, 3 sets   Re-assessment          UBE x 3 minutes forward, 2 minutes  backward    Chin tucks supine/sitting x 10x   Chin tucks with head lift supine x 10x/ 5 sec hold   Isometric pulls at cervical spine with band sitting x  x Extension 10x/5 seconds   Side bending 5x/5 seconds hold 2 sets   AROM cervical spine x  x Rotation 10 2 sets  Side bending 10x/ 2 sets   Progress Note/Plan of Care Update  See Objective section for details     Plan for Next Visit:         NEUROMUSCULAR RE-EDUCATION ACTIVITIES to improve Balance, Coordination, Kinesthetic, Sense, Proprioception, and Posture for (12) minutes.  The following were included:    Intervention Performed Today    Shoulder retraction isometrics  10x 5 seconds holds   Bilateral shoulder external rotation  x 10x/ 2 sets with yellow band   Standing rows  x 10x/ 2 sets yellow band   Shoulder rolls Backward  x 2 x 10   Open book x 10x each side 10 seconds hold         Quad sets - 3 x 10, 3-5 second holds, bilateral     Pelvic  tilts (home exercise program) Home  3 x 10    Bridges (home exercise program) Home  2 x 10   clamshells - 3 x 10 red band bilateral    Straight leg raise with posterior pelvic tilt  - 2 x 8  bilaterally   March in place supine - 2 x 10 bilateral (increased)   Sidelying hip abduction  - 5x /2 sets side   Step ups - 10x/ 1 sets 6 inch (decreased today)   Sit to stand  10x 1 sets 5#KB   Sitting with good posture - X 2 minutes    Hip flexion  - X 30 sitting on tan mat on blue therex disk   Shoulder flexion - X 30 sitting on tan mat on blue therex disk        Side steps - 2 minutes parallel bars 2 hand support     Plan for Next Visit: progress as tolerated       PATIENT EDUCATION AND HOME EXERCISES     Home Exercises Provided and Patient Education Provided     Education provided:   PURPOSE: Patient educated on the impairments noted above and the effects of physical therapy intervention to improve overall condition and QOL.   EXERCISE: Patient was educated on all the above exercise prior/during/after for proper posture, positioning, and execution for safe performance with home exercise program.   STRENGTH: Patient educated on the importance of improved core and extremity strength in order to improve alignment of the spine and extremities with static positions and dynamic movement.   GAIT & BALANCE: Patient educated on the importance of strong core and lower extremity musculature in order to improve both static and dynamic balance, improve gait mechanics, reduce fall risk and improve household and community mobility.   POSTURE: Patient educated on postural awareness to reduce stress and maintain optimal alignment of the spine with static positions and dynamic movement   4/20/2023: Encouraged home exercise program.    Written Home Exercises Provided: patient was instructed to continue with prior home exercise program.  Exercises were reviewed and Tawny was able to demonstrate them prior to the end of the session.   Tawny demonstrated good  understanding of the education provided. See EMR under Patient Instructions for exercises provided during therapy sessions.    ASSESSMENT     Patient did well with treatment today. She completed exercises with less difficulty and without increased complaint. She continues to respond well to manual therapy, but the relief has been mostly short lived thus far. She will be evaluated next visit for TMD to see if this may be contributing to s/s.     Tawny is progressing well towards her goals.   Pt prognosis is Good.     Pt will continue to benefit from skilled outpatient physical therapy to address the deficits listed in the problem list box on initial evaluation, provide pt/family education and to maximize pt's level of independence in the home and community environment.     Pt's spiritual, cultural and educational needs considered and pt agreeable to plan of care and goals.     Anticipated Barriers for therapy: sedentary lifestyle, chronicity of condition, lack of understanding of condition, adherence to treatment plan, and coping style,  Anxiety or Panic Disorders, Arthritis, Back pain, BMI over 30, Cancer, DiabetesType I or II, Prior Surgery, Sleep dysfunction      Goals:  Reviewed: 5/23/2023      LOWER BODY/BACK  Short Term Goals: In 4 weeks   1.Patient to be educated on HEP. MET 3/6/2023  2.Patient to increase knee range of motion to full extension, in order to improve available range of motion for ADL's.    3.Patient to increase bilateral LE strength by 1/2 grade, in order to improve endurance and increase ability to perform all functional activities for increased time. MET 3/6/2023  4.Patient to have pain less than 5/10 at worst, to improve QOL. MET 3/6/2023  5.Patient to improve score on the FOTO, to improve QOL. MET 3/6/2023  6. Patient to improve score on 30 sec sit to stand, 5x sit to stand  in order to decrease fall risk. MET 3/6/2023     Long Term Goals: In 8 weeks  1.Patient  to improve score on the FOTO to 48% or less, to improve QOL. PROGRESSING   2. Patient to increase bilateral LE strength to 4+/5 or greater, in order to improve endurance and increase ability to perform all functional activities for increased time. PROGRESSING   3. Patient to have decreased pain to 2/10 at worst, to improve QOL. PROGRESSING   4. Patient to normalize score on 30 sec sit to stand, 5x sit to stand, in order to improve endurance and decrease fall risk. PROGRESSING   5. Patient to perform daily activities including: PROGRESSING    Putting on your shoes or socks - A little bit of difficulty  Getting into or out of a car - A little bit of difficulty  Rolling over in bed. - A little bit of difficulty  Getting into or out of the bath - A little bit of difficulty  Sitting for 1 hour. - A little bit of difficulty  Getting up or down 10 stairs (about 1 flight of stairs) - Moderate difficulty  Standing for 1 hour - Moderate difficulty  Squatting - Quite a bit of difficulty  Walking a mile - Quite a bit of difficulty  With your usual hobbies, recreational or sporting activities - Quite a bit of difficulty.      CERVICAL:  Reviewed: 5/23/2023   Short Term Goals: In 4 weeks   1.Patient to be educated on HEP. - Met 5/12/2023  2.Patient to increase cervical range of motion to WNL's, in order to improve available range of motion for ADL's.    3.Patient to increase bilateral UE strength by 1/2 grade, in order to improve endurance and increase ability to perform all functional activities for increased time. - MET 5/12/2023  4.Patient to have pain less than 7/10 at worst, to improve QOL.  5.Patient to improve score on the FOTO, to improve QOL. - MET 5/12/2023     Long Term Goals: In 8 weeks  1.Patient to improve score on the FOTO to 34% or less, to improve QOL.  2. Patient to increase bilateral UE strength to 4+/5 or greater, in order to improve endurance and increase ability to perform all functional activities for  increased time.  3. Patient to have decreased pain to 4/10 at worst, to improve QOL.  4. Patient to perform daily activities while holding neck in neutral position, including step ups and LE activities.    PLAN     Monitor response to today's treatment session and progress as indicated.    Updated Certification Period: 5/12/2023 to 8/10/2023   Recommended Treatment Plan: Outpatient Physical Therapy 2 times weekly for 12 more visits: to include any combination of the following interventions: virtual visits, electrical stimulation (PRN), Cervical/Lumbar Traction, Manual Therapy, Neuromuscular Re-ed, Patient Education, Self Care, Therapeutic Exercise, Therapeutic Activites, Dry Needling, and Moist Hot Pack/Cold Pack     Marisabel Amaya, PT

## 2023-05-29 ENCOUNTER — OFFICE VISIT (OUTPATIENT)
Dept: INTERNAL MEDICINE | Facility: CLINIC | Age: 63
End: 2023-05-29
Payer: COMMERCIAL

## 2023-05-29 VITALS
HEIGHT: 61 IN | HEART RATE: 93 BPM | SYSTOLIC BLOOD PRESSURE: 118 MMHG | BODY MASS INDEX: 52.44 KG/M2 | TEMPERATURE: 98 F | OXYGEN SATURATION: 98 % | WEIGHT: 277.75 LBS | DIASTOLIC BLOOD PRESSURE: 72 MMHG

## 2023-05-29 DIAGNOSIS — M54.12 CERVICAL RADICULOPATHY: ICD-10-CM

## 2023-05-29 DIAGNOSIS — J02.9 ACUTE PHARYNGITIS, UNSPECIFIED ETIOLOGY: Primary | ICD-10-CM

## 2023-05-29 DIAGNOSIS — M54.2 CERVICALGIA: ICD-10-CM

## 2023-05-29 LAB
CTP QC/QA: YES
MOLECULAR STREP A: NEGATIVE

## 2023-05-29 PROCEDURE — 3008F PR BODY MASS INDEX (BMI) DOCUMENTED: ICD-10-PCS | Mod: CPTII,S$GLB,, | Performed by: PHYSICIAN ASSISTANT

## 2023-05-29 PROCEDURE — 1160F RVW MEDS BY RX/DR IN RCRD: CPT | Mod: CPTII,S$GLB,, | Performed by: PHYSICIAN ASSISTANT

## 2023-05-29 PROCEDURE — 1159F MED LIST DOCD IN RCRD: CPT | Mod: CPTII,S$GLB,, | Performed by: PHYSICIAN ASSISTANT

## 2023-05-29 PROCEDURE — 99213 OFFICE O/P EST LOW 20 MIN: CPT | Mod: S$GLB,,, | Performed by: PHYSICIAN ASSISTANT

## 2023-05-29 PROCEDURE — 99213 PR OFFICE/OUTPT VISIT, EST, LEVL III, 20-29 MIN: ICD-10-PCS | Mod: S$GLB,,, | Performed by: PHYSICIAN ASSISTANT

## 2023-05-29 PROCEDURE — 1159F PR MEDICATION LIST DOCUMENTED IN MEDICAL RECORD: ICD-10-PCS | Mod: CPTII,S$GLB,, | Performed by: PHYSICIAN ASSISTANT

## 2023-05-29 PROCEDURE — 3008F BODY MASS INDEX DOCD: CPT | Mod: CPTII,S$GLB,, | Performed by: PHYSICIAN ASSISTANT

## 2023-05-29 PROCEDURE — 87651 POCT STREP A MOLECULAR: ICD-10-PCS | Mod: QW,S$GLB,, | Performed by: PHYSICIAN ASSISTANT

## 2023-05-29 PROCEDURE — 4010F PR ACE/ARB THEARPY RXD/TAKEN: ICD-10-PCS | Mod: CPTII,S$GLB,, | Performed by: PHYSICIAN ASSISTANT

## 2023-05-29 PROCEDURE — 3078F PR MOST RECENT DIASTOLIC BLOOD PRESSURE < 80 MM HG: ICD-10-PCS | Mod: CPTII,S$GLB,, | Performed by: PHYSICIAN ASSISTANT

## 2023-05-29 PROCEDURE — 87651 STREP A DNA AMP PROBE: CPT | Mod: QW,S$GLB,, | Performed by: PHYSICIAN ASSISTANT

## 2023-05-29 PROCEDURE — 4010F ACE/ARB THERAPY RXD/TAKEN: CPT | Mod: CPTII,S$GLB,, | Performed by: PHYSICIAN ASSISTANT

## 2023-05-29 PROCEDURE — 99999 PR PBB SHADOW E&M-EST. PATIENT-LVL V: CPT | Mod: PBBFAC,,, | Performed by: PHYSICIAN ASSISTANT

## 2023-05-29 PROCEDURE — 1160F PR REVIEW ALL MEDS BY PRESCRIBER/CLIN PHARMACIST DOCUMENTED: ICD-10-PCS | Mod: CPTII,S$GLB,, | Performed by: PHYSICIAN ASSISTANT

## 2023-05-29 PROCEDURE — 3078F DIAST BP <80 MM HG: CPT | Mod: CPTII,S$GLB,, | Performed by: PHYSICIAN ASSISTANT

## 2023-05-29 PROCEDURE — 99999 PR PBB SHADOW E&M-EST. PATIENT-LVL V: ICD-10-PCS | Mod: PBBFAC,,, | Performed by: PHYSICIAN ASSISTANT

## 2023-05-29 PROCEDURE — 3074F PR MOST RECENT SYSTOLIC BLOOD PRESSURE < 130 MM HG: ICD-10-PCS | Mod: CPTII,S$GLB,, | Performed by: PHYSICIAN ASSISTANT

## 2023-05-29 PROCEDURE — 3074F SYST BP LT 130 MM HG: CPT | Mod: CPTII,S$GLB,, | Performed by: PHYSICIAN ASSISTANT

## 2023-05-29 NOTE — PROGRESS NOTES
Subjective:      Patient ID: Tawny Valerio is a 63 y.o. female.    Chief Complaint: Sore Throat    Sore Throat   This is a recurrent problem. The current episode started in the past 7 days. The problem has been waxing and waning. The pain is worse on the left side. The maximum temperature recorded prior to her arrival was 101 - 101.9 F. The fever has been present for 5 days or more. The pain is at a severity of 9/10. The pain is moderate. Associated symptoms include ear pain, headaches and neck pain. Pertinent negatives include no abdominal pain, congestion, coughing, diarrhea, drooling, ear discharge, hoarse voice, plugged ear sensation, shortness of breath, stridor, swollen glands, trouble swallowing or vomiting. She has had no exposure to strep or mono. She has tried NSAIDs, acetaminophen, cool liquids and gargles for the symptoms. The treatment provided moderate relief.   Neck Pain   This is a chronic problem. The current episode started more than 1 year ago. The problem occurs constantly. The problem has been unchanged. The pain is Same all the time. Stiffness is present In the morning. Associated symptoms include a fever, headaches, numbness and weakness. Pertinent negatives include no chest pain, leg pain, pain with swallowing, paresis, photophobia, syncope, tingling, trouble swallowing, visual change or weight loss. Treatments tried: pt/ot,gabapentin. The treatment provided mild relief.   THis past weekend gargled with salt water and then felt something release/pop in the left side of the back of her throat and she smelled and tasted puss. After that she had relief. Sore throat has since then improved.   Fever on last Wednesday only 101.9.     Patient Active Problem List   Diagnosis    Diabetes type 2, controlled    Vitamin D insufficiency    SOB (shortness of breath)    Morbid obesity with BMI of 60.0-69.9, adult    Edema leg    ASHLI (obstructive sleep apnea)    Essential hypertension    Uterine polyp     Simple endometrial hyperplasia    Endometrial ca    s/p RALH/BSO 8/26/2019    Decreased strength, endurance, and mobility    Decreased range of motion    Gait abnormality    Neck pain    Decreased functional activity tolerance    Abnormal posture         Current Outpatient Medications:     acetaminophen (TYLENOL) 325 MG tablet, Take 2 tablets (650 mg total) by mouth every 6 (six) hours as needed for Pain., Disp: 30 tablet, Rfl: 1    aspirin 81 MG Chew, Take 81 mg by mouth once daily., Disp: , Rfl:     atorvastatin (LIPITOR) 20 MG tablet, Take 1 tablet (20 mg total) by mouth once daily., Disp: 30 tablet, Rfl: 11    azelastine (ASTELIN) 137 mcg (0.1 %) nasal spray, 1 spray (137 mcg total) by Nasal route 2 (two) times daily., Disp: 30 mL, Rfl: 11    buPROPion (WELLBUTRIN SR) 150 MG TBSR 12 hr tablet, Take 1 tablet (150 mg total) by mouth 2 (two) times daily., Disp: 60 tablet, Rfl: 11    clindamycin (CLEOCIN T) 1 % lotion, Apply topically 2 (two) times daily., Disp: 60 mL, Rfl: 3    empagliflozin (JARDIANCE) 10 mg tablet, Take 1 tablet (10 mg total) by mouth once daily., Disp: 90 tablet, Rfl: 1    ergocalciferol (ERGOCALCIFEROL) 50,000 unit Cap, Take 1 capsule (50,000 Units total) by mouth every 7 days., Disp: 12 capsule, Rfl: 3    gabapentin (NEURONTIN) 300 MG capsule, Take 1 capsule (300 mg total) by mouth every evening., Disp: 30 capsule, Rfl: 11    hydrocortisone 2.5 % cream, Apply topically 2 (two) times daily. AAA bid, Disp: 30 g, Rfl: 1    ibuprofen (ADVIL,MOTRIN) 600 MG tablet, Take 1 tablet (600 mg total) by mouth 3 (three) times daily., Disp: 30 tablet, Rfl: 1    lancets Misc, Check fasting glucose daily, Disp: 100 each, Rfl: 3    losartan (COZAAR) 25 MG tablet, Take 1 tablet (25 mg total) by mouth once daily., Disp: 90 tablet, Rfl: 3    mupirocin (BACTROBAN) 2 % ointment, Apply topically 2 (two) times daily., Disp: 30 g, Rfl: 0    semaglutide (OZEMPIC) 2 mg/dose (8 mg/3 mL) [place], Inject 2 mg into the skin  "every 7 days., Disp: 9 mL, Rfl: 1    tretinoin (RETIN-A) 0.025 % cream, Apply topically every evening., Disp: 45 g, Rfl: 2    metronidazole 0.75% (METROCREAM) 0.75 % Crea, Apply topically 2 (two) times daily. For rosacea on face., Disp: 45 g, Rfl: 3    Review of Systems   Constitutional:  Positive for fever. Negative for activity change, appetite change, chills, diaphoresis, fatigue, unexpected weight change and weight loss.   HENT:  Positive for ear pain and sore throat. Negative for congestion, drooling, ear discharge, hearing loss, hoarse voice, postnasal drip, rhinorrhea, trouble swallowing and voice change.    Eyes: Negative.  Negative for photophobia and visual disturbance.   Respiratory: Negative.  Negative for cough, choking, chest tightness, shortness of breath and stridor.    Cardiovascular:  Negative for chest pain, palpitations, leg swelling and syncope.   Gastrointestinal:  Negative for abdominal distention, abdominal pain, blood in stool, constipation, diarrhea, nausea and vomiting.   Endocrine: Negative for cold intolerance, heat intolerance, polydipsia and polyuria.   Genitourinary: Negative.  Negative for difficulty urinating and frequency.   Musculoskeletal:  Positive for neck pain and neck stiffness. Negative for arthralgias, back pain, gait problem, joint swelling and myalgias.   Skin:  Negative for color change, pallor, rash and wound.   Neurological:  Positive for weakness, numbness and headaches. Negative for dizziness, tingling, tremors and light-headedness.   Hematological:  Negative for adenopathy.   Psychiatric/Behavioral:  Negative for behavioral problems, confusion, self-injury, sleep disturbance and suicidal ideas. The patient is not nervous/anxious.    Objective:   /72 (BP Location: Left arm, Patient Position: Sitting, BP Method: Large (Manual))   Pulse 93   Temp 98.2 °F (36.8 °C) (Tympanic)   Ht 5' 1" (1.549 m)   Wt 126 kg (277 lb 12.5 oz)   LMP 01/15/2019 (Approximate)   " SpO2 98%   BMI 52.49 kg/m²     Physical Exam  Vitals and nursing note reviewed.   Constitutional:       General: She is not in acute distress.     Appearance: Normal appearance. She is well-developed. She is not ill-appearing, toxic-appearing or diaphoretic.   HENT:      Head: Normocephalic and atraumatic.      Right Ear: Hearing, tympanic membrane, ear canal and external ear normal.      Left Ear: Hearing, tympanic membrane, ear canal and external ear normal.      Nose: Nose normal. No rhinorrhea.      Mouth/Throat:      Mouth: Mucous membranes are moist. No oral lesions or angioedema.      Pharynx: Uvula midline. Posterior oropharyngeal erythema present. No pharyngeal swelling, oropharyngeal exudate or uvula swelling.      Tonsils: No tonsillar exudate or tonsillar abscesses.     Eyes:      Conjunctiva/sclera: Conjunctivae normal.      Pupils: Pupils are equal, round, and reactive to light.   Cardiovascular:      Rate and Rhythm: Normal rate and regular rhythm.      Heart sounds: Normal heart sounds. No murmur heard.    No friction rub. No gallop.   Pulmonary:      Effort: Pulmonary effort is normal. No respiratory distress.      Breath sounds: Normal breath sounds. No wheezing or rales.   Chest:      Chest wall: No tenderness.   Abdominal:      General: There is no distension.      Palpations: Abdomen is soft.      Tenderness: There is no abdominal tenderness.   Musculoskeletal:         General: Normal range of motion.      Cervical back: Normal range of motion and neck supple. Tenderness present.   Lymphadenopathy:      Cervical: No cervical adenopathy.   Skin:     General: Skin is warm and dry.      Capillary Refill: Capillary refill takes less than 2 seconds.      Findings: No rash.   Neurological:      Mental Status: She is alert and oriented to person, place, and time.      Motor: No weakness.      Coordination: Coordination normal.      Gait: Gait normal.   Psychiatric:         Mood and Affect: Mood  normal.         Behavior: Behavior normal.         Thought Content: Thought content normal.         Judgment: Judgment normal.       Assessment:     1. Acute pharyngitis, unspecified etiology    2. Cervicalgia    3. Cervical radiculopathy      Plan:   Acute pharyngitis, unspecified etiology  -     POCT Strep A, Molecular    Cervicalgia  -     MRI Cervical Spine Without Contrast; Future; Expected date: 05/29/2023    Cervical radiculopathy  -     MRI Cervical Spine Without Contrast; Future; Expected date: 05/29/2023      -possible PTA or tonsil lith but resolved now    Follow up if symptoms worsen or fail to improve.

## 2023-05-30 ENCOUNTER — CLINICAL SUPPORT (OUTPATIENT)
Dept: REHABILITATION | Facility: HOSPITAL | Age: 63
End: 2023-05-30
Payer: COMMERCIAL

## 2023-05-30 ENCOUNTER — PATIENT MESSAGE (OUTPATIENT)
Dept: INTERNAL MEDICINE | Facility: CLINIC | Age: 63
End: 2023-05-30
Payer: COMMERCIAL

## 2023-05-30 DIAGNOSIS — R26.9 GAIT ABNORMALITY: ICD-10-CM

## 2023-05-30 DIAGNOSIS — R68.89 DECREASED STRENGTH, ENDURANCE, AND MOBILITY: ICD-10-CM

## 2023-05-30 DIAGNOSIS — M25.60 DECREASED RANGE OF MOTION: ICD-10-CM

## 2023-05-30 DIAGNOSIS — R68.89 DECREASED FUNCTIONAL ACTIVITY TOLERANCE: ICD-10-CM

## 2023-05-30 DIAGNOSIS — R29.3 ABNORMAL POSTURE: ICD-10-CM

## 2023-05-30 DIAGNOSIS — M54.2 NECK PAIN: Primary | ICD-10-CM

## 2023-05-30 DIAGNOSIS — Z74.09 DECREASED STRENGTH, ENDURANCE, AND MOBILITY: ICD-10-CM

## 2023-05-30 DIAGNOSIS — R53.1 DECREASED STRENGTH, ENDURANCE, AND MOBILITY: ICD-10-CM

## 2023-05-30 PROCEDURE — 97110 THERAPEUTIC EXERCISES: CPT

## 2023-05-30 PROCEDURE — 97140 MANUAL THERAPY 1/> REGIONS: CPT

## 2023-05-30 PROCEDURE — 97112 NEUROMUSCULAR REEDUCATION: CPT

## 2023-05-30 NOTE — PROGRESS NOTES
[facility]   Physical Therapy Treatment Note     Name: Tawny ESTRADA [facility] Number: [id number]    Therapy Diagnosis:   Encounter Diagnoses   Name Primary?    Neck pain Yes    Decreased functional activity tolerance     Decreased strength, endurance, and mobility     Decreased range of motion     Gait abnormality     Abnormal posture           Physician: Maya Rivas*    Visit Date: 5/30/2023    Physician Orders: PT Eval and Treat  Medical Diagnosis from Referral: Sciatica of left side  Evaluation Date: 2/6/2023  Authorization Period Expiration: 12/31/23  Plan of Care Expiration: 8/10/2023  Progress Note Due: 6/11/2023  Visit # / Visits authorized: 13/40 (+1)  FOTO: 3/3 (last performed on 4/3/2023)  PTA Visit #: 0/5     Precautions: Diabetes, cancer, and anxiety, HTN    Time In: 1734  Time Out: 1816  Total Billable Time: 40 minutes     (Billing reflects 1 on 1 treatment time spent with patient)    SUBJECTIVE     Patient reports: she feels about the same. If she takes her medication, it helps a little, but the pain is still there.    She was compliant with home exercise program.    Response to previous treatment: feeling pain in her lateral noticed some numbness in her lateral hip and anterior left thigh     Functional change: prolonged sleeping on her let side causes hip numbness, sitting for about 60 minutes causes increased symptoms, working in her garden, and now able to stand and cook a meal (30 minutes). Walking more confidently up/down community surfaces    Pain: 5/10   neck  Location:bilaterally behind TMJ at ears and over upper trapezial on left AND left hip and LOWER EXTREMITY (no pain today)    OBJECTIVE     Objective Measures updated at progress report only unless specified.        TREATMENT     Tawny received the treatments listed below:     MANUAL THERAPY TECHNIQUES were applied for (10) minutes, including:    Manual Intervention Performed Today    Soft Tissue  Mobilization  [x] Bilateral suboccipitals, scalenes , SCM, upper trapezius, levator scapulae , subscapularis  extensive at subcranial and sternocleidomastoid/upper trapezial on left side   Joint Mobilizations []    Gentle manual distraction  [x] Cervical     []    Functional Dry Needling  []      Plan for Next Visit: Continue as needed       THERAPEUTIC EXERCISES to develop strength, endurance, ROM, flexibility, posture, and core stabilization for (18) minutes including: including objective measurements     Intervention 5/30/2023     Nustep for ROM and strength - 8 minutes, level 2 for endurance   Heel slides with pillowcase - 3 x 10, 5 second holds   Lower trunk rotations (home exercise program) home 2 x 10, 5 second holds   Short arc quads with posterior pelvic tilt  - 3 x 10 bilateral    Hip abduction with band - 3min 10 sec hold red band (increased)     Hip adduction with ball  - 3min with 5  second hold (increased)   Sciatic nerve glides - 5 pumps, 3 sets   Re-assessment          UBE x 3 minutes forward, 2 minutes  backward    Chin tucks supine/sitting x 10x   Chin tucks with head lift supine x 10x/ 5 sec hold   Isometric pulls at cervical spine with band sitting x  x Extension 10x/5 seconds   Side bending 5x/5 seconds hold 2 sets   AROM cervical spine x  x Rotation 10 2 sets  Side bending 10x/ 2 sets   Progress Note/Plan of Care Update  See Objective section for details     Plan for Next Visit:         NEUROMUSCULAR RE-EDUCATION ACTIVITIES to improve Balance, Coordination, Kinesthetic, Sense, Proprioception, and Posture for (12) minutes.  The following were included:    Intervention Performed Today    Shoulder retraction isometrics  10x 5 seconds holds   Bilateral shoulder external rotation  x 10x/ 2 sets with yellow band   Standing rows  x 10x/ 2 sets yellow band   Shoulder rolls Backward  x 2 x 10   Open book x 10x each side 10 seconds hold         Quad sets - 3 x 10, 3-5 second holds, bilateral     Pelvic  tilts (home exercise program) Home  3 x 10    Bridges (home exercise program) Home  2 x 10   clamshells - 3 x 10 red band bilateral    Straight leg raise with posterior pelvic tilt  - 2 x 8  bilaterally   March in place supine - 2 x 10 bilateral (increased)   Sidelying hip abduction  - 5x /2 sets side   Step ups - 10x/ 1 sets 6 inch (decreased today)   Sit to stand  10x 1 sets 5#KB   Sitting with good posture - X 2 minutes    Hip flexion  - X 30 sitting on tan mat on blue therex disk   Shoulder flexion - X 30 sitting on tan mat on blue therex disk        Side steps - 2 minutes parallel bars 2 hand support     Plan for Next Visit: progress as tolerated       PATIENT EDUCATION AND HOME EXERCISES     Home Exercises Provided and Patient Education Provided     Education provided:   PURPOSE: Patient educated on the impairments noted above and the effects of physical therapy intervention to improve overall condition and QOL.   EXERCISE: Patient was educated on all the above exercise prior/during/after for proper posture, positioning, and execution for safe performance with home exercise program.   STRENGTH: Patient educated on the importance of improved core and extremity strength in order to improve alignment of the spine and extremities with static positions and dynamic movement.   GAIT & BALANCE: Patient educated on the importance of strong core and lower extremity musculature in order to improve both static and dynamic balance, improve gait mechanics, reduce fall risk and improve household and community mobility.   POSTURE: Patient educated on postural awareness to reduce stress and maintain optimal alignment of the spine with static positions and dynamic movement   4/20/2023: Encouraged home exercise program.    Written Home Exercises Provided: patient was instructed to continue with prior home exercise program.  Exercises were reviewed and Tawny was able to demonstrate them prior to the end of the session.   Tawny demonstrated good  understanding of the education provided. See EMR under Patient Instructions for exercises provided during therapy sessions.    ASSESSMENT     Patient did well with treatment today. She completed exercises with less difficulty and without increased complaint. She continues to respond well to manual therapy, but the relief has been mostly short lived thus far. She will be evaluated next visit for TMD to see if this may be contributing to s/s.     Tawny is progressing well towards her goals.   Pt prognosis is Good.     Pt will continue to benefit from skilled outpatient physical therapy to address the deficits listed in the problem list box on initial evaluation, provide pt/family education and to maximize pt's level of independence in the home and community environment.     Pt's spiritual, cultural and educational needs considered and pt agreeable to plan of care and goals.     Anticipated Barriers for therapy: sedentary lifestyle, chronicity of condition, lack of understanding of condition, adherence to treatment plan, and coping style,  Anxiety or Panic Disorders, Arthritis, Back pain, BMI over 30, Cancer, DiabetesType I or II, Prior Surgery, Sleep dysfunction      Goals:  Reviewed: 5/30/2023      LOWER BODY/BACK  Short Term Goals: In 4 weeks   1.Patient to be educated on HEP. MET 3/6/2023  2.Patient to increase knee range of motion to full extension, in order to improve available range of motion for ADL's.    3.Patient to increase bilateral LE strength by 1/2 grade, in order to improve endurance and increase ability to perform all functional activities for increased time. MET 3/6/2023  4.Patient to have pain less than 5/10 at worst, to improve QOL. MET 3/6/2023  5.Patient to improve score on the FOTO, to improve QOL. MET 3/6/2023  6. Patient to improve score on 30 sec sit to stand, 5x sit to stand  in order to decrease fall risk. MET 3/6/2023     Long Term Goals: In 8 weeks  1.Patient  to improve score on the FOTO to 48% or less, to improve QOL. PROGRESSING   2. Patient to increase bilateral LE strength to 4+/5 or greater, in order to improve endurance and increase ability to perform all functional activities for increased time. PROGRESSING   3. Patient to have decreased pain to 2/10 at worst, to improve QOL. PROGRESSING   4. Patient to normalize score on 30 sec sit to stand, 5x sit to stand, in order to improve endurance and decrease fall risk. PROGRESSING   5. Patient to perform daily activities including: PROGRESSING    Putting on your shoes or socks - A little bit of difficulty  Getting into or out of a car - A little bit of difficulty  Rolling over in bed. - A little bit of difficulty  Getting into or out of the bath - A little bit of difficulty  Sitting for 1 hour. - A little bit of difficulty  Getting up or down 10 stairs (about 1 flight of stairs) - Moderate difficulty  Standing for 1 hour - Moderate difficulty  Squatting - Quite a bit of difficulty  Walking a mile - Quite a bit of difficulty  With your usual hobbies, recreational or sporting activities - Quite a bit of difficulty.      CERVICAL:  Reviewed: 5/30/2023   Short Term Goals: In 4 weeks   1.Patient to be educated on HEP. - Met 5/12/2023  2.Patient to increase cervical range of motion to WNL's, in order to improve available range of motion for ADL's.    3.Patient to increase bilateral UE strength by 1/2 grade, in order to improve endurance and increase ability to perform all functional activities for increased time. - MET 5/12/2023  4.Patient to have pain less than 7/10 at worst, to improve QOL.  5.Patient to improve score on the FOTO, to improve QOL. - MET 5/12/2023     Long Term Goals: In 8 weeks  1.Patient to improve score on the FOTO to 34% or less, to improve QOL.  2. Patient to increase bilateral UE strength to 4+/5 or greater, in order to improve endurance and increase ability to perform all functional activities for  increased time.  3. Patient to have decreased pain to 4/10 at worst, to improve QOL.  4. Patient to perform daily activities while holding neck in neutral position, including step ups and LE activities.    PLAN     Monitor response to today's treatment session and progress as indicated.    Updated Certification Period: 5/12/2023 to 8/10/2023   Recommended Treatment Plan: Outpatient Physical Therapy 2 times weekly for 12 more visits: to include any combination of the following interventions: virtual visits, electrical stimulation (PRN), Cervical/Lumbar Traction, Manual Therapy, Neuromuscular Re-ed, Patient Education, Self Care, Therapeutic Exercise, Therapeutic Activites, Dry Needling, and Moist Hot Pack/Cold Pack     Marlene Boston, PT

## 2023-05-31 ENCOUNTER — PATIENT MESSAGE (OUTPATIENT)
Dept: INTERNAL MEDICINE | Facility: CLINIC | Age: 63
End: 2023-05-31
Payer: COMMERCIAL

## 2023-05-31 DIAGNOSIS — J03.90 ACUTE TONSILLITIS, UNSPECIFIED ETIOLOGY: Primary | ICD-10-CM

## 2023-05-31 RX ORDER — GABAPENTIN 300 MG/1
300 CAPSULE ORAL 3 TIMES DAILY
Qty: 90 CAPSULE | Refills: 3 | Status: SHIPPED | OUTPATIENT
Start: 2023-05-31 | End: 2023-08-26 | Stop reason: SDUPTHER

## 2023-06-01 ENCOUNTER — PATIENT MESSAGE (OUTPATIENT)
Dept: INTERNAL MEDICINE | Facility: CLINIC | Age: 63
End: 2023-06-01
Payer: COMMERCIAL

## 2023-06-01 ENCOUNTER — NURSE TRIAGE (OUTPATIENT)
Dept: ADMINISTRATIVE | Facility: CLINIC | Age: 63
End: 2023-06-01
Payer: COMMERCIAL

## 2023-06-01 ENCOUNTER — PATIENT MESSAGE (OUTPATIENT)
Dept: BARIATRICS | Facility: CLINIC | Age: 63
End: 2023-06-01
Payer: COMMERCIAL

## 2023-06-01 RX ORDER — AMOXICILLIN AND CLAVULANATE POTASSIUM 875; 125 MG/1; MG/1
1 TABLET, FILM COATED ORAL 2 TIMES DAILY
Qty: 20 TABLET | Refills: 0 | Status: SHIPPED | OUTPATIENT
Start: 2023-06-01 | End: 2023-06-12

## 2023-06-01 NOTE — TELEPHONE ENCOUNTER
"Pt calls c/o high fever and chills since yesterday. Pt's PCP prescribed a round of antibiotics which she took earlier. Pt vomited x1. Pt takes Ozempic. She notes that every time she injects herself on Wednesdays, she gets these fevers. Pt reports temp of 100.0 F currently. Pt is experiencing SOB and jitteriness whenever she tries to fall asleep. She rates her headache as the worst of all of her symptoms and describes it as severe.    Care Advice recommends that pt be seen in ED now. Pt verbalizes understanding and is instructed to call back with any new/worsening sxs, questions, or concerns.   Reason for Disposition   [1] SEVERE headache (e.g., excruciating) AND [2] "worst headache" of life    Additional Information   Negative: Shock suspected (e.g., cold/pale/clammy skin, too weak to stand, low BP, rapid pulse)   Negative: Difficult to awaken or acting confused (e.g., disoriented, slurred speech)   Negative: Sounds like a life-threatening emergency to the triager   Headache is main symptom   Negative: Difficult to awaken or acting confused (e.g., disoriented, slurred speech)   Negative: [1] Weakness of the face, arm or leg on one side of the body AND [2] new-onset   Negative: [1] Numbness of the face, arm or leg on one side of the body AND [2] new-onset   Negative: [1] Loss of speech or garbled speech AND [2] new-onset   Negative: Passed out (i.e., lost consciousness, collapsed and was not responding)   Negative: Sounds like a life-threatening emergency to the triager   Negative: Unable to walk, or can only walk with assistance (e.g., requires support)   Negative: Stiff neck (can't touch chin to chest)   Negative: Severe pain in one eye     Pt reports feeling "neuralgia" in face   Negative: [1] Other family members (or people in same household) with headaches AND [2] possibility of carbon monoxide exposure    Protocols used: Vomiting-A-AH, Headache-A-AH    "

## 2023-06-01 NOTE — TELEPHONE ENCOUNTER
Opened in error  Reason for Disposition   Caller has cancelled the call before the first contact    Protocols used: No Contact or Duplicate Contact Call-A-AH

## 2023-06-05 ENCOUNTER — HOSPITAL ENCOUNTER (OUTPATIENT)
Dept: RADIOLOGY | Facility: HOSPITAL | Age: 63
Discharge: HOME | End: 2023-06-05
Attending: PHYSICIAN ASSISTANT
Payer: COMMERCIAL

## 2023-06-05 ENCOUNTER — CLINICAL SUPPORT (OUTPATIENT)
Dept: REHABILITATION | Facility: HOSPITAL | Age: 63
End: 2023-06-05
Payer: COMMERCIAL

## 2023-06-05 DIAGNOSIS — M25.60 DECREASED RANGE OF MOTION: ICD-10-CM

## 2023-06-05 DIAGNOSIS — R68.89 DECREASED FUNCTIONAL ACTIVITY TOLERANCE: ICD-10-CM

## 2023-06-05 DIAGNOSIS — R68.89 DECREASED STRENGTH, ENDURANCE, AND MOBILITY: ICD-10-CM

## 2023-06-05 DIAGNOSIS — Z74.09 DECREASED STRENGTH, ENDURANCE, AND MOBILITY: ICD-10-CM

## 2023-06-05 DIAGNOSIS — M54.2 NECK PAIN: Primary | ICD-10-CM

## 2023-06-05 DIAGNOSIS — R53.1 DECREASED STRENGTH, ENDURANCE, AND MOBILITY: ICD-10-CM

## 2023-06-05 DIAGNOSIS — R29.3 ABNORMAL POSTURE: ICD-10-CM

## 2023-06-05 DIAGNOSIS — M54.2 CERVICALGIA: ICD-10-CM

## 2023-06-05 DIAGNOSIS — M54.12 CERVICAL RADICULOPATHY: ICD-10-CM

## 2023-06-05 DIAGNOSIS — R26.9 GAIT ABNORMALITY: ICD-10-CM

## 2023-06-05 PROCEDURE — 72141 MRI CERVICAL SPINE WITHOUT CONTRAST: ICD-10-PCS | Mod: 26,,, | Performed by: RADIOLOGY

## 2023-06-05 PROCEDURE — 72141 MRI NECK SPINE W/O DYE: CPT | Mod: TC

## 2023-06-05 PROCEDURE — 72141 MRI NECK SPINE W/O DYE: CPT | Mod: 26,,, | Performed by: RADIOLOGY

## 2023-06-05 PROCEDURE — 97110 THERAPEUTIC EXERCISES: CPT

## 2023-06-05 PROCEDURE — 97140 MANUAL THERAPY 1/> REGIONS: CPT

## 2023-06-05 NOTE — PROGRESS NOTES
[facility]   Physical Therapy Treatment Note     Name: Tawny ESTRADA [facility] Number: [id number]    Therapy Diagnosis:   Encounter Diagnoses   Name Primary?    Neck pain Yes    Decreased functional activity tolerance     Decreased strength, endurance, and mobility     Decreased range of motion     Gait abnormality     Abnormal posture           Physician: Maya Rivas*    Visit Date: 6/5/2023    Physician Orders: PT Eval and Treat  Medical Diagnosis from Referral: Sciatica of left side  Evaluation Date: 2/6/2023  Authorization Period Expiration: 12/31/23  Plan of Care Expiration: 8/10/2023  Progress Note Due: 6/11/2023  Visit # / Visits authorized: 18/40 (+1)  FOTO: 3/3 (last performed on 4/3/2023)  PTA Visit #: 0/5     Precautions: Diabetes, cancer, and anxiety, HTN    Time In: 3:45 pm  Time Out: 4:38 pm  Total Billable Time: 42 minutes     (Billing reflects 1 on 1 treatment time spent with patient)    SUBJECTIVE     Patient reports: she feels about the same. She does feel like she can hold her head straight but she has to constantly think about it, she reports that she does have a MRI tonight at 7pm. When she lays down she does find it easy to move her neck with no pain, but as soon as she sits up it is difficult for her to hold head up and move her head through ROM.    She was compliant with home exercise program.    Response to previous treatment: feeling easier to move neck.     Functional change: has to think about holding     Pain: 4-5/10   neck  Location:bilaterally behind TMJ at ears and over upper trapezial on left AND left hip and LOWER EXTREMITY (no pain today)    OBJECTIVE     Objective Measures updated at progress report only unless specified.     TREATMENT     Tawny received the treatments listed below:     MANUAL THERAPY TECHNIQUES were applied for (15) minutes, including:    Manual Intervention Performed Today    Soft Tissue Mobilization  [x] Bilateral  suboccipitals, scalenes , SCM, upper trapezius, levator scapulae , subscapularis  extensive at subcranial and sternocleidomastoid/upper trapezial on left side   Joint Mobilizations [x] First rib on left    Gentle manual distraction  [x] Cervical distraction    []    Functional Dry Needling  []      Plan for Next Visit: Continue as needed       THERAPEUTIC EXERCISES to develop strength, endurance, ROM, flexibility, posture, and core stabilization for (23) minutes including: including objective measurements     Intervention 6/5/2023     Nustep for ROM and strength - 8 minutes, level 2 for endurance   Heel slides with pillowcase - 3 x 10, 5 second holds   Lower trunk rotations (home exercise program) home 2 x 10, 5 second holds   Short arc quads with posterior pelvic tilt  - 3 x 10 bilateral    Hip abduction with band - 3min 10 sec hold red band (increased)     Hip adduction with ball  - 3min with 5  second hold (increased)   Sciatic nerve glides - 5 pumps, 3 sets   Re-assessment          UBE x 3 minutes forward, 3 minutes  backward    Chin tucks supine/sitting x 10x   Chin tucks with head lift supine x 10x/ 5 sec hold   Isometric pulls at cervical spine with band sitting x  x Extension 10x/5 seconds 2 sets  Side bending 5x/5 seconds hold 2 sets   AROM cervical spine x  x Rotation 10x 2 sets  Side bending 10x/ 2 sets   Progress Note/Plan of Care Update  See Objective section for details     Plan for Next Visit:         NEUROMUSCULAR RE-EDUCATION ACTIVITIES to improve Balance, Coordination, Kinesthetic, Sense, Proprioception, and Posture for (4) minutes.  The following were included:    Intervention Performed Today    Shoulder retraction isometrics  10x 5 seconds holds   Bilateral shoulder external rotation  x 10x/ 2 sets with yellow band   Standing rows  x 10x/ 2 sets red band   Shoulder rolls Backward  - 2 x 10   Open book x 10x each side 10 seconds hold         Quad sets - 3 x 10, 3-5 second holds, bilateral      Pelvic tilts (home exercise program) Home  3 x 10    Bridges (home exercise program) Home  2 x 10   clamshells - 3 x 10 red band bilateral    Straight leg raise with posterior pelvic tilt  - 2 x 8  bilaterally   March in place supine - 2 x 10 bilateral (increased)   Sidelying hip abduction  - 5x /2 sets side   Step ups - 10x/ 1 sets 6 inch (decreased today)   Sit to stand  10x 1 sets 5#KB   Sitting with good posture - X 2 minutes    Hip flexion  - X 30 sitting on tan mat on blue therex disk   Shoulder flexion - X 30 sitting on tan mat on blue therex disk        Side steps - 2 minutes parallel bars 2 hand support     Plan for Next Visit: progress as tolerated       PATIENT EDUCATION AND HOME EXERCISES     Home Exercises Provided and Patient Education Provided     Education provided:   PURPOSE: Patient educated on the impairments noted above and the effects of physical therapy intervention to improve overall condition and QOL.   EXERCISE: Patient was educated on all the above exercise prior/during/after for proper posture, positioning, and execution for safe performance with home exercise program.   STRENGTH: Patient educated on the importance of improved core and extremity strength in order to improve alignment of the spine and extremities with static positions and dynamic movement.   GAIT & BALANCE: Patient educated on the importance of strong core and lower extremity musculature in order to improve both static and dynamic balance, improve gait mechanics, reduce fall risk and improve household and community mobility.   POSTURE: Patient educated on postural awareness to reduce stress and maintain optimal alignment of the spine with static positions and dynamic movement   4/20/2023: Encouraged home exercise program.    Written Home Exercises Provided: patient was instructed to continue with prior home exercise program.  Exercises were reviewed and Tawny was able to demonstrate them prior to the end of the  session.  Tawny demonstrated good  understanding of the education provided. See EMR under Patient Instructions for exercises provided during therapy sessions.    ASSESSMENT     Patient did well with treatment today, she has      Tawny is progressing well towards her goals.   Pt prognosis is Good.     Pt will continue to benefit from skilled outpatient physical therapy to address the deficits listed in the problem list box on initial evaluation, provide pt/family education and to maximize pt's level of independence in the home and community environment.     Pt's spiritual, cultural and educational needs considered and pt agreeable to plan of care and goals.     Anticipated Barriers for therapy: sedentary lifestyle, chronicity of condition, lack of understanding of condition, adherence to treatment plan, and coping style,  Anxiety or Panic Disorders, Arthritis, Back pain, BMI over 30, Cancer, DiabetesType I or II, Prior Surgery, Sleep dysfunction      Goals:  Reviewed: 6/5/2023      LOWER BODY/BACK  Short Term Goals: In 4 weeks   1.Patient to be educated on HEP. MET 3/6/2023  2.Patient to increase knee range of motion to full extension, in order to improve available range of motion for ADL's.    3.Patient to increase bilateral LE strength by 1/2 grade, in order to improve endurance and increase ability to perform all functional activities for increased time. MET 3/6/2023  4.Patient to have pain less than 5/10 at worst, to improve QOL. MET 3/6/2023  5.Patient to improve score on the FOTO, to improve QOL. MET 3/6/2023  6. Patient to improve score on 30 sec sit to stand, 5x sit to stand  in order to decrease fall risk. MET 3/6/2023     Long Term Goals: In 8 weeks  1.Patient to improve score on the FOTO to 48% or less, to improve QOL. PROGRESSING   2. Patient to increase bilateral LE strength to 4+/5 or greater, in order to improve endurance and increase ability to perform all functional activities for increased  time. PROGRESSING   3. Patient to have decreased pain to 2/10 at worst, to improve QOL. PROGRESSING   4. Patient to normalize score on 30 sec sit to stand, 5x sit to stand, in order to improve endurance and decrease fall risk. PROGRESSING   5. Patient to perform daily activities including: PROGRESSING    Putting on your shoes or socks - A little bit of difficulty  Getting into or out of a car - A little bit of difficulty  Rolling over in bed. - A little bit of difficulty  Getting into or out of the bath - A little bit of difficulty  Sitting for 1 hour. - A little bit of difficulty  Getting up or down 10 stairs (about 1 flight of stairs) - Moderate difficulty  Standing for 1 hour - Moderate difficulty  Squatting - Quite a bit of difficulty  Walking a mile - Quite a bit of difficulty  With your usual hobbies, recreational or sporting activities - Quite a bit of difficulty.      CERVICAL:  Reviewed: 6/5/2023   Short Term Goals: In 4 weeks   1.Patient to be educated on HEP. - Met 5/12/2023  2.Patient to increase cervical range of motion to WNL's, in order to improve available range of motion for ADL's.    3.Patient to increase bilateral UE strength by 1/2 grade, in order to improve endurance and increase ability to perform all functional activities for increased time. - MET 5/12/2023  4.Patient to have pain less than 7/10 at worst, to improve QOL.  5.Patient to improve score on the FOTO, to improve QOL. - MET 5/12/2023     Long Term Goals: In 8 weeks  1.Patient to improve score on the FOTO to 34% or less, to improve QOL.  2. Patient to increase bilateral UE strength to 4+/5 or greater, in order to improve endurance and increase ability to perform all functional activities for increased time.  3. Patient to have decreased pain to 4/10 at worst, to improve QOL.  4. Patient to perform daily activities while holding neck in neutral position, including step ups and LE activities.    PLAN     Monitor response to today's  treatment session and progress as indicated.    Updated Certification Period: 5/12/2023 to 8/10/2023   Recommended Treatment Plan: Outpatient Physical Therapy 2 times weekly for 12 more visits: to include any combination of the following interventions: virtual visits, electrical stimulation (PRN), Cervical/Lumbar Traction, Manual Therapy, Neuromuscular Re-ed, Patient Education, Self Care, Therapeutic Exercise, Therapeutic Activites, Dry Needling, and Moist Hot Pack/Cold Pack     Christiane Muller, PT

## 2023-06-06 ENCOUNTER — PATIENT MESSAGE (OUTPATIENT)
Dept: INTERNAL MEDICINE | Facility: CLINIC | Age: 63
End: 2023-06-06
Payer: COMMERCIAL

## 2023-06-06 ENCOUNTER — TELEPHONE (OUTPATIENT)
Dept: INTERNAL MEDICINE | Facility: CLINIC | Age: 63
End: 2023-06-06
Payer: COMMERCIAL

## 2023-06-06 ENCOUNTER — PATIENT MESSAGE (OUTPATIENT)
Dept: REHABILITATION | Facility: HOSPITAL | Age: 63
End: 2023-06-06
Payer: COMMERCIAL

## 2023-06-06 DIAGNOSIS — M48.02 CERVICAL STENOSIS OF SPINE: ICD-10-CM

## 2023-06-06 DIAGNOSIS — M54.2 CERVICALGIA: Primary | ICD-10-CM

## 2023-06-06 NOTE — TELEPHONE ENCOUNTER
----- Message from Jessica Kaufman MA sent at 6/6/2023 10:16 AM CDT -----  Good morning Mrs. Evelyn Valerio said yes please send in a referral for pain management thanks,./ [facility]  ----- Message -----  From: Maya Rivas PA-C  Sent: 6/6/2023   9:01 AM CDT  To: Evelyn FUENTES Staff (Int Med)    Moderate arthritis seen on the MRI. Do you have a specialist yet for your neck pain? I can put in a referral?

## 2023-06-06 NOTE — TELEPHONE ENCOUNTER
----- Message from Maya Rivas PA-C sent at 6/6/2023 11:48 AM CDT -----  Referral placed for pain management. Please schedule  ----- Message -----  From: Jessica Kaufman MA  Sent: 6/6/2023  10:18 AM CDT  To: Maya Rivas PA-C    Good morning Mrs. Evelyn Valerio said yes please send in a referral for pain management thanks,./ [facility]  ----- Message -----  From: Maya Rivas PA-C  Sent: 6/6/2023   9:01 AM CDT  To: Evelyn FUENTES Staff (Int Med)    Moderate arthritis seen on the MRI. Do you have a specialist yet for your neck pain? I can put in a referral?

## 2023-06-06 NOTE — TELEPHONE ENCOUNTER
Called pt left msg about her schedule appt for July 27, 2020 @ 7:30 pm at the [place]

## 2023-06-07 ENCOUNTER — PATIENT MESSAGE (OUTPATIENT)
Dept: INTERNAL MEDICINE | Facility: CLINIC | Age: 63
End: 2023-06-07
Payer: COMMERCIAL

## 2023-06-12 ENCOUNTER — CLINICAL SUPPORT (OUTPATIENT)
Dept: REHABILITATION | Facility: HOSPITAL | Age: 63
End: 2023-06-12
Payer: COMMERCIAL

## 2023-06-12 ENCOUNTER — OFFICE VISIT (OUTPATIENT)
Dept: INTERNAL MEDICINE | Facility: CLINIC | Age: 63
End: 2023-06-12
Payer: COMMERCIAL

## 2023-06-12 VITALS
DIASTOLIC BLOOD PRESSURE: 70 MMHG | RESPIRATION RATE: 18 BRPM | OXYGEN SATURATION: 97 % | BODY MASS INDEX: 52.36 KG/M2 | WEIGHT: 277.13 LBS | SYSTOLIC BLOOD PRESSURE: 110 MMHG | HEART RATE: 94 BPM | TEMPERATURE: 98 F

## 2023-06-12 DIAGNOSIS — R53.1 DECREASED STRENGTH, ENDURANCE, AND MOBILITY: ICD-10-CM

## 2023-06-12 DIAGNOSIS — E66.01 MORBID OBESITY DUE TO EXCESS CALORIES: Primary | ICD-10-CM

## 2023-06-12 DIAGNOSIS — G47.33 OSA (OBSTRUCTIVE SLEEP APNEA): ICD-10-CM

## 2023-06-12 DIAGNOSIS — I10 ESSENTIAL HYPERTENSION: ICD-10-CM

## 2023-06-12 DIAGNOSIS — Z74.09 DECREASED STRENGTH, ENDURANCE, AND MOBILITY: ICD-10-CM

## 2023-06-12 DIAGNOSIS — M54.2 NECK PAIN: ICD-10-CM

## 2023-06-12 DIAGNOSIS — M54.2 NECK PAIN: Primary | ICD-10-CM

## 2023-06-12 DIAGNOSIS — E11.9 CONTROLLED TYPE 2 DIABETES MELLITUS WITHOUT COMPLICATION, WITHOUT LONG-TERM CURRENT USE OF INSULIN: ICD-10-CM

## 2023-06-12 DIAGNOSIS — R26.9 GAIT ABNORMALITY: ICD-10-CM

## 2023-06-12 DIAGNOSIS — M25.60 DECREASED RANGE OF MOTION: ICD-10-CM

## 2023-06-12 DIAGNOSIS — R68.89 DECREASED STRENGTH, ENDURANCE, AND MOBILITY: ICD-10-CM

## 2023-06-12 DIAGNOSIS — R68.89 DECREASED FUNCTIONAL ACTIVITY TOLERANCE: ICD-10-CM

## 2023-06-12 DIAGNOSIS — R29.3 ABNORMAL POSTURE: ICD-10-CM

## 2023-06-12 PROCEDURE — 3078F PR MOST RECENT DIASTOLIC BLOOD PRESSURE < 80 MM HG: ICD-10-PCS | Mod: CPTII,S$GLB,, | Performed by: FAMILY MEDICINE

## 2023-06-12 PROCEDURE — 3074F SYST BP LT 130 MM HG: CPT | Mod: CPTII,S$GLB,, | Performed by: FAMILY MEDICINE

## 2023-06-12 PROCEDURE — 1159F PR MEDICATION LIST DOCUMENTED IN MEDICAL RECORD: ICD-10-PCS | Mod: CPTII,S$GLB,, | Performed by: FAMILY MEDICINE

## 2023-06-12 PROCEDURE — 3074F PR MOST RECENT SYSTOLIC BLOOD PRESSURE < 130 MM HG: ICD-10-PCS | Mod: CPTII,S$GLB,, | Performed by: FAMILY MEDICINE

## 2023-06-12 PROCEDURE — 4010F PR ACE/ARB THEARPY RXD/TAKEN: ICD-10-PCS | Mod: CPTII,S$GLB,, | Performed by: FAMILY MEDICINE

## 2023-06-12 PROCEDURE — 99214 PR OFFICE/OUTPT VISIT, EST, LEVL IV, 30-39 MIN: ICD-10-PCS | Mod: S$GLB,,, | Performed by: FAMILY MEDICINE

## 2023-06-12 PROCEDURE — 99999 PR PBB SHADOW E&M-EST. PATIENT-LVL IV: CPT | Mod: PBBFAC,,, | Performed by: FAMILY MEDICINE

## 2023-06-12 PROCEDURE — 4010F ACE/ARB THERAPY RXD/TAKEN: CPT | Mod: CPTII,S$GLB,, | Performed by: FAMILY MEDICINE

## 2023-06-12 PROCEDURE — 1159F MED LIST DOCD IN RCRD: CPT | Mod: CPTII,S$GLB,, | Performed by: FAMILY MEDICINE

## 2023-06-12 PROCEDURE — 97140 MANUAL THERAPY 1/> REGIONS: CPT

## 2023-06-12 PROCEDURE — 99214 OFFICE O/P EST MOD 30 MIN: CPT | Mod: S$GLB,,, | Performed by: FAMILY MEDICINE

## 2023-06-12 PROCEDURE — 3078F DIAST BP <80 MM HG: CPT | Mod: CPTII,S$GLB,, | Performed by: FAMILY MEDICINE

## 2023-06-12 PROCEDURE — 97110 THERAPEUTIC EXERCISES: CPT

## 2023-06-12 PROCEDURE — 3008F BODY MASS INDEX DOCD: CPT | Mod: CPTII,S$GLB,, | Performed by: FAMILY MEDICINE

## 2023-06-12 PROCEDURE — 99999 PR PBB SHADOW E&M-EST. PATIENT-LVL IV: ICD-10-PCS | Mod: PBBFAC,,, | Performed by: FAMILY MEDICINE

## 2023-06-12 PROCEDURE — 3008F PR BODY MASS INDEX (BMI) DOCUMENTED: ICD-10-PCS | Mod: CPTII,S$GLB,, | Performed by: FAMILY MEDICINE

## 2023-06-12 NOTE — PROGRESS NOTES
[facility]   Physical Therapy Treatment Note     Name: Tawny ESTRADA [facility] [place] Number: [id number]    Therapy Diagnosis:   Encounter Diagnoses   Name Primary?    Neck pain Yes    Decreased functional activity tolerance     Decreased strength, endurance, and mobility     Decreased range of motion     Gait abnormality     Abnormal posture           Physician: Maya Rivas*    Visit Date: 6/12/2023    Physician Orders: PT Eval and Treat  Medical Diagnosis from Referral: Sciatica of left side  Evaluation Date: 2/6/2023  Authorization Period Expiration: 12/31/23  Plan of Care Expiration: 8/10/2023  Progress Note Due: 6/11/2023  Visit # / Visits authorized: 19/40 (+1)  FOTO: 3/3 (last performed on 4/3/2023)  PTA Visit #: 0/5     Precautions: Diabetes, cancer, and anxiety, HTN    Time In: 3:45 pm  Time Out: 4:38 pm  Total Billable Time: 43 minutes     (Billing reflects 1 on 1 treatment time spent with patient)    SUBJECTIVE     Patient reports: she feels about the same. She does feel like she can hold her head straight but she has to constantly think about it, she reports that she does have a MRI tonight at 7pm. When she lays down she does find it easy to move her neck with no pain, but as soon as she sits up it is difficult for her to hold head up and move her head through ROM.    She was compliant with home exercise program.    Response to previous treatment: feeling easier to move neck.     Functional change: has to think about holding     Pain: 4-5/10   neck  Location:bilaterally behind TMJ at ears and over upper trapezial on left AND left hip and LOWER EXTREMITY (no pain today)    OBJECTIVE     Objective Measures updated at progress report only unless specified.     TREATMENT     Tawny received the treatments listed below:     MANUAL THERAPY TECHNIQUES were applied for (20) minutes, including:    Manual Intervention Performed Today    Soft Tissue Mobilization  [x] Bilateral  suboccipitals, scalenes , SCM, upper trapezius, levator scapulae , subscapularis  extensive at subcranial and sternocleidomastoid/upper trapezial on left side   Joint Mobilizations [x] First rib on left    Gentle manual distraction  [x] Cervical distraction    []    Functional Dry Needling  []      Plan for Next Visit: Continue as needed       THERAPEUTIC EXERCISES to develop strength, endurance, ROM, flexibility, posture, and core stabilization for (23) minutes including: including objective measurements     Intervention 6/12/2023     Nustep for ROM and strength - 8 minutes, level 2 for endurance   Heel slides with pillowcase - 3 x 10, 5 second holds   Lower trunk rotations (home exercise program) home 2 x 10, 5 second holds   Short arc quads with posterior pelvic tilt  - 3 x 10 bilateral    Hip abduction with band - 3min 10 sec hold red band (increased)     Hip adduction with ball  - 3min with 5  second hold (increased)   Sciatic nerve glides - 5 pumps, 3 sets   Re-assessment          UBE x 3 minutes forward, 3 minutes  backward    Chin tucks sitting x 10x/ 2 sets   Chin tucks with head lift supine x 10x/ 5 sec hold   Isometric pulls at cervical spine with band sitting x  x Extension 10x/5 seconds 2 sets  Side bending 5x/5 seconds hold 2 sets   AROM cervical spine x  x Rotation 10x 2 sets  Side bending 10x/ 2 sets   Progress Note/Plan of Care Update  See Objective section for details     Plan for Next Visit:         NEUROMUSCULAR RE-EDUCATION ACTIVITIES to improve Balance, Coordination, Kinesthetic, Sense, Proprioception, and Posture for (2) minutes.  The following were included:    Intervention Performed Today    Shoulder retraction isometrics  10x 5 seconds holds   Bilateral shoulder external rotation * x 10x/ 2 sets with yellow band   Standing rows * - 10x/ 2 sets red band   Shoulder rolls Backward * - 2 x 10   Open book* - 10x each side 10 seconds hold         Quad sets - 3 x 10, 3-5 second holds, bilateral      Pelvic tilts (home exercise program) Home  3 x 10    Bridges (home exercise program) Home  2 x 10   clamshells - 3 x 10 red band bilateral    Straight leg raise with posterior pelvic tilt  - 2 x 8  bilaterally   March in place supine - 2 x 10 bilateral (increased)   Sidelying hip abduction  - 5x /2 sets side   Step ups - 10x/ 1 sets 6 inch (decreased today)   Sit to stand  10x 1 sets 5#KB   Sitting with good posture - X 2 minutes    Hip flexion  - X 30 sitting on tan mat on blue therex disk   Shoulder flexion - X 30 sitting on tan mat on blue therex disk        Side steps - 2 minutes parallel bars 2 hand support     Plan for Next Visit: progress as tolerated       PATIENT EDUCATION AND HOME EXERCISES     Home Exercises Provided and Patient Education Provided     Education provided:   PURPOSE: Patient educated on the impairments noted above and the effects of physical therapy intervention to improve overall condition and QOL.   EXERCISE: Patient was educated on all the above exercise prior/during/after for proper posture, positioning, and execution for safe performance with home exercise program.   STRENGTH: Patient educated on the importance of improved core and extremity strength in order to improve alignment of the spine and extremities with static positions and dynamic movement.   GAIT & BALANCE: Patient educated on the importance of strong core and lower extremity musculature in order to improve both static and dynamic balance, improve gait mechanics, reduce fall risk and improve household and community mobility.   POSTURE: Patient educated on postural awareness to reduce stress and maintain optimal alignment of the spine with static positions and dynamic movement   4/20/2023: Encouraged home exercise program.    Written Home Exercises Provided: patient was instructed to continue with prior home exercise program.  Exercises were reviewed and Twany was able to demonstrate them prior to the end of the  session.  Tawny demonstrated good  understanding of the education provided. See EMR under Patient Instructions for exercises provided during therapy sessions.    ASSESSMENT     Patient did well with treatment today, she expresses some frustration regarding lack of ability to hold neck upright without pain unless lying down. She does require mirror feedback to maintain head in neutral position, and was encouraged to participate with home exercise program but also to take rest breaks with leaning head back against head support or using UE's to support head for a short time to alleviate symptoms during her day.    Tawny is progressing well towards her goals.   Pt prognosis is Good.     Pt will continue to benefit from skilled outpatient physical therapy to address the deficits listed in the problem list box on initial evaluation, provide pt/family education and to maximize pt's level of independence in the home and community environment.     Pt's spiritual, cultural and educational needs considered and pt agreeable to plan of care and goals.     Anticipated Barriers for therapy: sedentary lifestyle, chronicity of condition, lack of understanding of condition, adherence to treatment plan, and coping style,  Anxiety or Panic Disorders, Arthritis, Back pain, BMI over 30, Cancer, DiabetesType I or II, Prior Surgery, Sleep dysfunction      Goals:  Reviewed: 6/12/2023      LOWER BODY/BACK  Short Term Goals: In 4 weeks   1.Patient to be educated on HEP. MET 3/6/2023  2.Patient to increase knee range of motion to full extension, in order to improve available range of motion for ADL's.    3.Patient to increase bilateral LE strength by 1/2 grade, in order to improve endurance and increase ability to perform all functional activities for increased time. MET 3/6/2023  4.Patient to have pain less than 5/10 at worst, to improve QOL. MET 3/6/2023  5.Patient to improve score on the FOTO, to improve QOL. MET 3/6/2023  6. Patient  to improve score on 30 sec sit to stand, 5x sit to stand  in order to decrease fall risk. MET 3/6/2023     Long Term Goals: In 8 weeks  1.Patient to improve score on the FOTO to 48% or less, to improve QOL. PROGRESSING   2. Patient to increase bilateral LE strength to 4+/5 or greater, in order to improve endurance and increase ability to perform all functional activities for increased time. PROGRESSING   3. Patient to have decreased pain to 2/10 at worst, to improve QOL. PROGRESSING   4. Patient to normalize score on 30 sec sit to stand, 5x sit to stand, in order to improve endurance and decrease fall risk. PROGRESSING   5. Patient to perform daily activities including: PROGRESSING    Putting on your shoes or socks - A little bit of difficulty  Getting into or out of a car - A little bit of difficulty  Rolling over in bed. - A little bit of difficulty  Getting into or out of the bath - A little bit of difficulty  Sitting for 1 hour. - A little bit of difficulty  Getting up or down 10 stairs (about 1 flight of stairs) - Moderate difficulty  Standing for 1 hour - Moderate difficulty  Squatting - Quite a bit of difficulty  Walking a mile - Quite a bit of difficulty  With your usual hobbies, recreational or sporting activities - Quite a bit of difficulty.      CERVICAL:  Reviewed: 6/12/2023   Short Term Goals: In 4 weeks   1.Patient to be educated on HEP. - Met 5/12/2023  2.Patient to increase cervical range of motion to WNL's, in order to improve available range of motion for ADL's.    3.Patient to increase bilateral UE strength by 1/2 grade, in order to improve endurance and increase ability to perform all functional activities for increased time. - MET 5/12/2023  4.Patient to have pain less than 7/10 at worst, to improve QOL.  5.Patient to improve score on the FOTO, to improve QOL. - MET 5/12/2023     Long Term Goals: In 8 weeks  1.Patient to improve score on the FOTO to 34% or less, to improve QOL.  2. Patient to  increase bilateral UE strength to 4+/5 or greater, in order to improve endurance and increase ability to perform all functional activities for increased time.  3. Patient to have decreased pain to 4/10 at worst, to improve QOL.  4. Patient to perform daily activities while holding neck in neutral position, including step ups and LE activities.    PLAN     Monitor response to today's treatment session and progress as indicated.    Updated Certification Period: 5/12/2023 to 8/10/2023   Recommended Treatment Plan: Outpatient Physical Therapy 2 times weekly for 12 more visits: to include any combination of the following interventions: virtual visits, electrical stimulation (PRN), Cervical/Lumbar Traction, Manual Therapy, Neuromuscular Re-ed, Patient Education, Self Care, Therapeutic Exercise, Therapeutic Activites, Dry Needling, and Moist Hot Pack/Cold Pack     Christiane Muller PT

## 2023-06-12 NOTE — PROGRESS NOTES
"Subjective:      Patient ID: Tawny Valerio is a 63 y.o. female.    Chief Complaint: Establish Care (Pt presents to clinic to [facility] care and also discuss her neck issues. )    Tawny is requesting that I assume the role of their primary care provider.    Pt has had MRI of neck which showed spinal canal stenosis and foraminal stenosis.   Has upcoming appointment with Dr. Butts  Doing physical therapy but no improvement    DM--on ozempic for 2 years; receiving from bariatric doctor in [facility].  -not interested in having bariatric surgery   -has not had HgA1c done in a recent time    On atorvastatin but does not take all the time because it makes her nauseated    Having chills and high fever "randomly" for the past two weeks.   Went to the ER.   Skipped ozempic and feels much better. Not having the nausea or fever.     Has ASHLI but does not use machine.  She states she has tried numerous different masks without any improvement of tolerance  Will wake up at night feeling like she can't breath.     The patient's Health Maintenance was reviewed and the following appears to be due at this time:   Health Maintenance Due   Topic Date Due    Pneumococcal Vaccines (Age 0-64) (1 - PCV) Never done    HIV Screening  Never done    Mammogram  Never done    Shingles Vaccine (1 of 2) Never done    Colorectal Cancer Screening  Never done    Foot Exam  2019    Eye Exam  2022    Diabetes Urine Screening  2022    COVID-19 Vaccine (5 - Pfizer series) 2022    Hemoglobin A1c  2023        Past Medical History:  Past Medical History:   Diagnosis Date    Acute cystitis without hematuria 2019    Anxiety     Colon cancer screening 2019    Diabetes type 2, controlled 2016    Endometrial ca 2019    Essential hypertension 2018    Simple endometrial hyperplasia 3/15/2019    Uterine polyp 3/14/2019     Past Surgical History:   Procedure Laterality Date     SECTION      x 1    DILATION " AND CURETTAGE OF UTERUS  03/01/2019    HYSTEROSCOPY WITH DILATION AND CURETTAGE OF UTERUS N/A 7/16/2019    Procedure: HYSTEROSCOPY, WITH DILATION AND CURETTAGE OF UTERUS;  Surgeon: Bobby Frazier Jr., MD;  Location: [facility];  Service: OB/GYN;  Laterality: N/A;    lipoma removed      ROBOT-ASSISTED LAPAROSCOPIC ABDOMINAL HYSTERECTOMY USING DA AWAIS XI N/A 8/26/2019    Procedure: XI ROBOTIC HYSTERECTOMY;  Surgeon: Aneudy Almeida MD;  Location: [address];  Service: OB/GYN;  Laterality: N/A;    ROBOT-ASSISTED LAPAROSCOPIC LYSIS OF ADHESIONS USING DA AWASI XI N/A 8/26/2019    Procedure: XI ROBOTIC LYSIS, ADHESIONS;  Surgeon: Aneudy Almeida MD;  Location: [facility] OR [address] [place] [address];  Service: OB/GYN;  Laterality: N/A;  Omental    ROBOT-ASSISTED LAPAROSCOPIC SALPINGO-OOPHORECTOMY USING DA AWAIS XI Bilateral 8/26/2019    Procedure: XI ROBOTIC SALPINGO-OOPHORECTOMY;  Surgeon: Aneudy Almeida MD;  Location: [address];  Service: OB/GYN;  Laterality: Bilateral;  Wt 299lbs     Review of patient's allergies indicates:   Allergen Reactions    Celebrex [celecoxib] Anaphylaxis    Codeine Hives     Does not know what she can take -usually just takes tylenol or ibuprofen    Lidocaine Hives    Vicodin [hydrocodone-acetaminophen] Hives and Swelling     Social History     Socioeconomic History    Marital status:    Occupational History     Employer: [employer]     Tobacco Use    Smoking status: Never    Smokeless tobacco: Never   Substance and Sexual Activity    Alcohol use: Yes     Comment: approx one glass wine/month    Drug use: No    Sexual activity: Yes     Partners: Male     Social Determinants of Health     Financial Resource Strain: Low Risk     Difficulty of Paying Living Expenses: Not hard at all   Food Insecurity: No Food Insecurity    Worried About Running Out of Food in the Last Year: Never true    Ran Out of Food in the Last Year: Never true   Transportation Needs: No Transportation Needs    Lack of  Transportation (Medical): No    Lack of Transportation (Non-Medical): No   Physical Activity: Insufficiently Active    Days of Exercise per Week: 2 days    Minutes of Exercise per Session: 40 min   Stress: No Stress Concern Present    Feeling of Stress : Only a little   Social Connections: Unknown    Frequency of Communication with Friends and Family: More than three times a week    Frequency of Social Gatherings with Friends and Family: Once a week    Active Member of Clubs or Organizations: No    Attends Club or Organization Meetings: Never    Marital Status:    Housing Stability: Low Risk     Unable to Pay for Housing in the Last Year: No    Number of Places Lived in the Last Year: 1    Unstable Housing in the Last Year: No     Family History   Problem Relation Age of Onset    No Known Problems Mother     Diabetes Father     Stroke Father     Hypertension Father     Heart disease Sister     Hypertension Sister     Heart disease Brother     Heart attack Brother     Hypertension Brother     Heart disease Maternal Grandmother     Heart disease Maternal Grandfather     Heart disease Paternal Grandfather     Breast cancer Other     Ovarian cancer Neg Hx     Uterine cancer Neg Hx     Colon cancer Neg Hx     Heart failure Neg Hx     Hyperlipidemia Neg Hx        Review of Systems   Constitutional:  Negative for chills and fever.   Respiratory:  Negative for cough and shortness of breath.    Cardiovascular:  Negative for chest pain.   Gastrointestinal:  Positive for constipation (when ozempic) and nausea (when ozempic). Negative for diarrhea.   Musculoskeletal:  Positive for neck pain.   Psychiatric/Behavioral:  Negative for dysphoric mood and sleep disturbance. The patient is nervous/anxious (will have occasional panic attacks).       Objective:   /70   Pulse 94   Temp 98.1 °F (36.7 °C)   Resp 18   Wt 125.7 kg (277 lb 1.9 oz)   LMP 01/15/2019 (Approximate)   SpO2 97%   BMI 52.36 kg/m²     Physical  Exam  Vitals and nursing note reviewed.   Constitutional:       Appearance: Normal appearance. She is obese.   HENT:      Head: Normocephalic.   Eyes:      Conjunctiva/sclera: Conjunctivae normal.   Neck:      Comments: Holding the right side of her neck during exam due to discomfort, very limited range of motion  Cardiovascular:      Rate and Rhythm: Normal rate and regular rhythm.   Pulmonary:      Effort: Pulmonary effort is normal.      Breath sounds: Normal breath sounds.   Musculoskeletal:      Cervical back: Rigidity present.      Right lower leg: No edema.      Left lower leg: No edema.   Skin:     General: Skin is warm and dry.      Findings: No rash.   Neurological:      Mental Status: She is alert and oriented to person, place, and time. Mental status is at baseline.   Psychiatric:         Mood and Affect: Mood normal.         Behavior: Behavior normal.         Thought Content: Thought content normal.     Assessment:     1. Morbid obesity due to excess calories    2. Essential hypertension    3. Controlled type 2 diabetes mellitus without complication, without long-term current use of insulin    4. Neck pain    5. ASHLI (obstructive sleep apnea)      Plan:       1. Morbid obesity due to excess calories  Overview:  Highest recent weight was 286    Assessment & Plan:  Currently weighing 277  We will refer to lifestyle and wellness clinic for evaluation  Patient does not want to pursue bariatric surgery at this time   Can consider starting monjaro    Orders:  -     Ambulatory referral/consult to [facility]; Future; Expected date: 06/19/2023    2. Essential hypertension  Overview:  On losartan 25 mg    Assessment & Plan:  Controlled in office today, we will continue current regimen      3. Controlled type 2 diabetes mellitus without complication, without long-term current use of insulin  Overview:  On Ozempic 2 mg and Jardiance 10 mg    Assessment & Plan:  Was having significant GI symptoms as  well as fever/chills with the 2 mg of Ozempic, she stopped on her own and since stopping symptoms have resolved   We will continue to hold Ozempic  Consider starting on monjaro after patient is evaluated at [facility] and wellness  Continue Jardiance  We will check hemoglobin A1c at next visit      4. Neck pain  Overview:  Significant abnormalities of cervical stenosis and foraminal stenosis on recent MRI    Assessment & Plan:  Keep upcoming appointment with pain management Dr. Butts       5. ASHLI (obstructive sleep apnea)  Overview:  Not currently using CPAP machine    Assessment & Plan:  Discussed with patient how using CPAP might help with anxiety feelings as well as with weight maintenance  Patient does not think she would be able to tolerate any new masks so does not want to pursue at this time           Medication List with Changes/Refills   Current Medications    ACETAMINOPHEN (TYLENOL) 325 MG TABLET    Take 2 tablets (650 mg total) by mouth every 6 (six) hours as needed for Pain.    ATORVASTATIN (LIPITOR) 20 MG TABLET    Take 1 tablet (20 mg total) by mouth once daily.    AZELASTINE (ASTELIN) 137 MCG (0.1 %) NASAL SPRAY    1 spray (137 mcg total) by Nasal route 2 (two) times daily.    BUPROPION (WELLBUTRIN SR) 150 MG TBSR 12 HR TABLET    Take 1 tablet (150 mg total) by mouth 2 (two) times daily.    CLINDAMYCIN (CLEOCIN T) 1 % LOTION    Apply topically 2 (two) times daily.    EMPAGLIFLOZIN (JARDIANCE) 10 MG TABLET    Take 1 tablet (10 mg total) by mouth once daily.    ERGOCALCIFEROL (ERGOCALCIFEROL) 50,000 UNIT CAP    Take 1 capsule (50,000 Units total) by mouth every 7 days.    GABAPENTIN (NEURONTIN) 300 MG CAPSULE    Take 1 capsule (300 mg total) by mouth 3 (three) times daily.    LANCETS MISC    Check fasting glucose daily    LOSARTAN (COZAAR) 25 MG TABLET    Take 1 tablet (25 mg total) by mouth once daily.    METRONIDAZOLE 0.75% (METROCREAM) 0.75 % CREA    Apply topically 2 (two) times daily. For  rosacea on face.    SEMAGLUTIDE (OZEMPIC) 2 MG/DOSE (8 MG/3 ML) PNIJ    Inject 2 mg into the skin every 7 days.    TRETINOIN (RETIN-A) 0.025 % CREAM    Apply topically every evening.   Discontinued Medications    AMOXICILLIN-CLAVULANATE 875-125MG (AUGMENTIN) 875-125 MG PER TABLET    Take 1 tablet by mouth 2 (two) times daily. for 10 days    ASPIRIN 81 MG CHEW    Take 81 mg by mouth once daily.    HYDROCORTISONE 2.5 % CREAM    Apply topically 2 (two) times daily. AAA bid    IBUPROFEN (ADVIL,MOTRIN) 600 MG TABLET    Take 1 tablet (600 mg total) by mouth 3 (three) times daily.    MUPIROCIN (BACTROBAN) 2 % OINTMENT    Apply topically 2 (two) times daily.              Follow up in about 3 months (around 9/12/2023) for F/U medical problems .

## 2023-06-13 ENCOUNTER — PATIENT MESSAGE (OUTPATIENT)
Dept: INTERNAL MEDICINE | Facility: CLINIC | Age: 63
End: 2023-06-13
Payer: COMMERCIAL

## 2023-06-13 NOTE — ASSESSMENT & PLAN NOTE
Was having significant GI symptoms as well as fever/chills with the 2 mg of Ozempic, she stopped on her own and since stopping symptoms have resolved   We will continue to hold Ozempic  Consider starting on monjaro after patient is evaluated at lifestyle and wellness  Continue Jardiance  We will check hemoglobin A1c at next visit

## 2023-06-13 NOTE — ASSESSMENT & PLAN NOTE
Discussed with patient how using CPAP might help with anxiety feelings as well as with weight maintenance  Patient does not think she would be able to tolerate any new masks so does not want to pursue at this time

## 2023-06-13 NOTE — ASSESSMENT & PLAN NOTE
Currently weighing 277  We will refer to lifestyle and wellness clinic for evaluation  Patient does not want to pursue bariatric surgery at this time   Can consider starting anil

## 2023-06-14 ENCOUNTER — LAB VISIT (OUTPATIENT)
Dept: LAB | Facility: HOSPITAL | Age: 63
End: 2023-06-14
Attending: INTERNAL MEDICINE
Payer: COMMERCIAL

## 2023-06-14 DIAGNOSIS — E11.618 CONTROLLED TYPE 2 DIABETES MELLITUS WITH OTHER DIABETIC ARTHROPATHY, WITHOUT LONG-TERM CURRENT USE OF INSULIN: ICD-10-CM

## 2023-06-14 DIAGNOSIS — I10 ESSENTIAL HYPERTENSION: ICD-10-CM

## 2023-06-14 DIAGNOSIS — E66.01 MORBID OBESITY WITH BMI OF 60.0-69.9, ADULT: ICD-10-CM

## 2023-06-14 LAB
ALBUMIN SERPL BCP-MCNC: 3.2 G/DL (ref 3.5–5.2)
ALP SERPL-CCNC: 100 U/L (ref 55–135)
ALT SERPL W/O P-5'-P-CCNC: 14 U/L (ref 10–44)
ANION GAP SERPL CALC-SCNC: 9 MMOL/L (ref 8–16)
AST SERPL-CCNC: 11 U/L (ref 10–40)
BILIRUB SERPL-MCNC: 0.5 MG/DL (ref 0.1–1)
BUN SERPL-MCNC: 14 MG/DL (ref 8–23)
CALCIUM SERPL-MCNC: 9.4 MG/DL (ref 8.7–10.5)
CHLORIDE SERPL-SCNC: 105 MMOL/L (ref 95–110)
CHOLEST SERPL-MCNC: 179 MG/DL (ref 120–199)
CHOLEST/HDLC SERPL: 3.1 {RATIO} (ref 2–5)
CO2 SERPL-SCNC: 27 MMOL/L (ref 23–29)
CREAT SERPL-MCNC: 0.8 MG/DL (ref 0.5–1.4)
EST. GFR  (NO RACE VARIABLE): >60 ML/MIN/1.73 M^2
GLUCOSE SERPL-MCNC: 88 MG/DL (ref 70–110)
HDLC SERPL-MCNC: 57 MG/DL (ref 40–75)
HDLC SERPL: 31.8 % (ref 20–50)
LDLC SERPL CALC-MCNC: 109.8 MG/DL (ref 63–159)
NONHDLC SERPL-MCNC: 122 MG/DL
POTASSIUM SERPL-SCNC: 4.3 MMOL/L (ref 3.5–5.1)
PROT SERPL-MCNC: 7.4 G/DL (ref 6–8.4)
SODIUM SERPL-SCNC: 141 MMOL/L (ref 136–145)
TRIGL SERPL-MCNC: 61 MG/DL (ref 30–150)
TSH SERPL DL<=0.005 MIU/L-ACNC: 0.64 UIU/ML (ref 0.4–4)

## 2023-06-14 PROCEDURE — 80053 COMPREHEN METABOLIC PANEL: CPT | Performed by: INTERNAL MEDICINE

## 2023-06-14 PROCEDURE — 84443 ASSAY THYROID STIM HORMONE: CPT | Performed by: INTERNAL MEDICINE

## 2023-06-14 PROCEDURE — 80061 LIPID PANEL: CPT | Performed by: INTERNAL MEDICINE

## 2023-06-14 PROCEDURE — 36415 COLL VENOUS BLD VENIPUNCTURE: CPT | Mod: PO | Performed by: INTERNAL MEDICINE

## 2023-06-15 DIAGNOSIS — E11.618 CONTROLLED TYPE 2 DIABETES MELLITUS WITH OTHER DIABETIC ARTHROPATHY, WITHOUT LONG-TERM CURRENT USE OF INSULIN: ICD-10-CM

## 2023-06-16 ENCOUNTER — PATIENT MESSAGE (OUTPATIENT)
Dept: BARIATRICS | Facility: CLINIC | Age: 63
End: 2023-06-16
Payer: COMMERCIAL

## 2023-06-16 ENCOUNTER — PATIENT MESSAGE (OUTPATIENT)
Dept: INTERNAL MEDICINE | Facility: CLINIC | Age: 63
End: 2023-06-16
Payer: COMMERCIAL

## 2023-06-16 DIAGNOSIS — E11.618 CONTROLLED TYPE 2 DIABETES MELLITUS WITH OTHER DIABETIC ARTHROPATHY, WITHOUT LONG-TERM CURRENT USE OF INSULIN: ICD-10-CM

## 2023-06-19 ENCOUNTER — PATIENT MESSAGE (OUTPATIENT)
Dept: BARIATRICS | Facility: CLINIC | Age: 63
End: 2023-06-19
Payer: COMMERCIAL

## 2023-06-19 DIAGNOSIS — E11.618 CONTROLLED TYPE 2 DIABETES MELLITUS WITH OTHER DIABETIC ARTHROPATHY, WITHOUT LONG-TERM CURRENT USE OF INSULIN: ICD-10-CM

## 2023-06-20 ENCOUNTER — PATIENT MESSAGE (OUTPATIENT)
Dept: INTERNAL MEDICINE | Facility: CLINIC | Age: 63
End: 2023-06-20
Payer: COMMERCIAL

## 2023-06-20 ENCOUNTER — PATIENT MESSAGE (OUTPATIENT)
Dept: REHABILITATION | Facility: HOSPITAL | Age: 63
End: 2023-06-20
Payer: COMMERCIAL

## 2023-06-21 ENCOUNTER — OFFICE VISIT (OUTPATIENT)
Dept: CARDIOLOGY | Facility: CLINIC | Age: 63
End: 2023-06-21
Payer: COMMERCIAL

## 2023-06-21 VITALS
SYSTOLIC BLOOD PRESSURE: 101 MMHG | WEIGHT: 277.69 LBS | BODY MASS INDEX: 52.43 KG/M2 | HEART RATE: 68 BPM | HEIGHT: 61 IN | DIASTOLIC BLOOD PRESSURE: 59 MMHG

## 2023-06-21 DIAGNOSIS — R09.89 LEFT CAROTID BRUIT: ICD-10-CM

## 2023-06-21 DIAGNOSIS — E55.9 VITAMIN D INSUFFICIENCY: ICD-10-CM

## 2023-06-21 DIAGNOSIS — R68.89 DECREASED FUNCTIONAL ACTIVITY TOLERANCE: ICD-10-CM

## 2023-06-21 DIAGNOSIS — E11.9 CONTROLLED TYPE 2 DIABETES MELLITUS WITHOUT COMPLICATION, WITHOUT LONG-TERM CURRENT USE OF INSULIN: ICD-10-CM

## 2023-06-21 DIAGNOSIS — G47.33 OSA (OBSTRUCTIVE SLEEP APNEA): ICD-10-CM

## 2023-06-21 DIAGNOSIS — M25.60 DECREASED RANGE OF MOTION: ICD-10-CM

## 2023-06-21 DIAGNOSIS — R06.02 SOB (SHORTNESS OF BREATH): ICD-10-CM

## 2023-06-21 DIAGNOSIS — I10 ESSENTIAL HYPERTENSION: Primary | ICD-10-CM

## 2023-06-21 DIAGNOSIS — E66.01 MORBID OBESITY DUE TO EXCESS CALORIES: ICD-10-CM

## 2023-06-21 DIAGNOSIS — M54.2 NECK PAIN: ICD-10-CM

## 2023-06-21 PROCEDURE — 3008F BODY MASS INDEX DOCD: CPT | Mod: CPTII,S$GLB,, | Performed by: INTERNAL MEDICINE

## 2023-06-21 PROCEDURE — 3074F PR MOST RECENT SYSTOLIC BLOOD PRESSURE < 130 MM HG: ICD-10-PCS | Mod: CPTII,S$GLB,, | Performed by: INTERNAL MEDICINE

## 2023-06-21 PROCEDURE — 3074F SYST BP LT 130 MM HG: CPT | Mod: CPTII,S$GLB,, | Performed by: INTERNAL MEDICINE

## 2023-06-21 PROCEDURE — 99214 PR OFFICE/OUTPT VISIT, EST, LEVL IV, 30-39 MIN: ICD-10-PCS | Mod: S$GLB,,, | Performed by: INTERNAL MEDICINE

## 2023-06-21 PROCEDURE — 1160F PR REVIEW ALL MEDS BY PRESCRIBER/CLIN PHARMACIST DOCUMENTED: ICD-10-PCS | Mod: CPTII,S$GLB,, | Performed by: INTERNAL MEDICINE

## 2023-06-21 PROCEDURE — 3008F PR BODY MASS INDEX (BMI) DOCUMENTED: ICD-10-PCS | Mod: CPTII,S$GLB,, | Performed by: INTERNAL MEDICINE

## 2023-06-21 PROCEDURE — 1159F PR MEDICATION LIST DOCUMENTED IN MEDICAL RECORD: ICD-10-PCS | Mod: CPTII,S$GLB,, | Performed by: INTERNAL MEDICINE

## 2023-06-21 PROCEDURE — 99214 OFFICE O/P EST MOD 30 MIN: CPT | Mod: S$GLB,,, | Performed by: INTERNAL MEDICINE

## 2023-06-21 PROCEDURE — 3078F PR MOST RECENT DIASTOLIC BLOOD PRESSURE < 80 MM HG: ICD-10-PCS | Mod: CPTII,S$GLB,, | Performed by: INTERNAL MEDICINE

## 2023-06-21 PROCEDURE — 1160F RVW MEDS BY RX/DR IN RCRD: CPT | Mod: CPTII,S$GLB,, | Performed by: INTERNAL MEDICINE

## 2023-06-21 PROCEDURE — 99999 PR PBB SHADOW E&M-EST. PATIENT-LVL IV: ICD-10-PCS | Mod: PBBFAC,,, | Performed by: INTERNAL MEDICINE

## 2023-06-21 PROCEDURE — 4010F PR ACE/ARB THEARPY RXD/TAKEN: ICD-10-PCS | Mod: CPTII,S$GLB,, | Performed by: INTERNAL MEDICINE

## 2023-06-21 PROCEDURE — 3078F DIAST BP <80 MM HG: CPT | Mod: CPTII,S$GLB,, | Performed by: INTERNAL MEDICINE

## 2023-06-21 PROCEDURE — 1159F MED LIST DOCD IN RCRD: CPT | Mod: CPTII,S$GLB,, | Performed by: INTERNAL MEDICINE

## 2023-06-21 PROCEDURE — 99999 PR PBB SHADOW E&M-EST. PATIENT-LVL IV: CPT | Mod: PBBFAC,,, | Performed by: INTERNAL MEDICINE

## 2023-06-21 PROCEDURE — 4010F ACE/ARB THERAPY RXD/TAKEN: CPT | Mod: CPTII,S$GLB,, | Performed by: INTERNAL MEDICINE

## 2023-06-21 RX ORDER — ONDANSETRON 4 MG/1
4 TABLET, ORALLY DISINTEGRATING ORAL EVERY 6 HOURS PRN
COMMUNITY
Start: 2023-06-02

## 2023-06-21 RX ORDER — EZETIMIBE 10 MG/1
10 TABLET ORAL DAILY
Qty: 90 TABLET | Refills: 3 | Status: SHIPPED | OUTPATIENT
Start: 2023-06-21 | End: 2024-06-20

## 2023-06-21 RX ORDER — PROMETHAZINE HYDROCHLORIDE 25 MG/1
25 TABLET ORAL EVERY 6 HOURS PRN
COMMUNITY
Start: 2023-06-02

## 2023-06-21 NOTE — PROGRESS NOTES
Subjective:   Patient ID:  Tawny Valerio is a 63 y.o. female who presents for follow-up of Hypertension      HPI: Patient was last seen in November 2022. She is most concerned about DJD of her neck and inability to lose weight. She is not able to tolerate C-PAP. She stopped Statin due to leg pains and nausea.  She also discontinued Ozempic at the same time due to the same symptoms.  She is able to tolerate Jardiance.    Blood work is fine.  Lipids slightly worse. She has low ASCVD risk, but known atherosclerotic disease.      The 10-year ASCVD risk score (Anneliese MAC, et al., 2019) is: 5.3%    Values used to calculate the score:      Age: 63 years      Sex: Female      Is Non- : No      Diabetic: Yes      Tobacco smoker: No      Systolic Blood Pressure: 101 mmHg      Is BP treated: Yes      HDL Cholesterol: 57 mg/dL      Total Cholesterol: 179 mg/dL      Lab Results   Component Value Date     06/14/2023    K 4.3 06/14/2023     06/14/2023    CO2 27 06/14/2023    BUN 14 06/14/2023    CREATININE 0.8 06/14/2023    GLU 88 06/14/2023    HGBA1C 5.5 08/09/2022    AST 11 06/14/2023    ALT 14 06/14/2023    ALBUMIN 3.2 (L) 06/14/2023    PROT 7.4 06/14/2023    BILITOT 0.5 06/14/2023    WBC 9.55 01/13/2023    HGB 14.0 01/13/2023    HCT 44.2 01/13/2023    MCV 88 01/13/2023     01/13/2023    TSH 0.636 06/14/2023    CHOL 179 06/14/2023    HDL 57 06/14/2023    LDLCALC 109.8 06/14/2023    TRIG 61 06/14/2023       No results found in the last 24 hours.     Review of Systems   Constitutional: Positive for malaise/fatigue and weight gain.   HENT: Negative.     Eyes: Negative.    Cardiovascular: Negative.  Negative for chest pain, claudication, dyspnea on exertion, irregular heartbeat, leg swelling, near-syncope, palpitations and syncope.   Respiratory: Negative.  Negative for cough, shortness of breath, snoring and wheezing.    Endocrine: Negative.  Negative for cold intolerance, heat  "intolerance, polydipsia, polyphagia and polyuria.   Skin: Negative.    Musculoskeletal:  Positive for arthritis, joint pain and neck pain.   Gastrointestinal:  Positive for constipation, diarrhea and heartburn.   Genitourinary: Negative.    Neurological:  Positive for headaches, numbness and paresthesias.   Psychiatric/Behavioral: Negative.       Objective:   Physical Exam  Vitals and nursing note reviewed.   Constitutional:       Appearance: Normal appearance. She is well-developed. She is obese.      Comments: BP (!) 101/59   Pulse 68   Ht 5' 1" (1.549 m)   Wt 126 kg (277 lb 10.7 oz)   LMP 01/15/2019 (Approximate)   BMI 52.47 kg/m²      HENT:      Head: Normocephalic.   Eyes:      Pupils: Pupils are equal, round, and reactive to light.   Neck:      Thyroid: No thyromegaly.      Vascular: No carotid bruit.   Cardiovascular:      Rate and Rhythm: Normal rate and regular rhythm.      Pulses: Intact distal pulses.           Carotid pulses are 2+ on the right side and 2+ on the left side.       Radial pulses are 2+ on the right side and 2+ on the left side.        Femoral pulses are 2+ on the right side and 2+ on the left side.       Popliteal pulses are 2+ on the right side and 2+ on the left side.        Dorsalis pedis pulses are 2+ on the right side and 2+ on the left side.        Posterior tibial pulses are 2+ on the right side and 2+ on the left side.      Heart sounds: Normal heart sounds. No murmur heard.    No friction rub. No gallop.   Pulmonary:      Effort: Pulmonary effort is normal. No respiratory distress.      Breath sounds: Normal breath sounds. No wheezing or rales.   Chest:      Chest wall: No tenderness.   Abdominal:      Palpations: Abdomen is soft.   Musculoskeletal:         General: Normal range of motion.      Cervical back: Normal range of motion and neck supple.   Skin:     General: Skin is warm and dry.   Neurological:      Mental Status: She is alert and oriented to person, place, and " time.         Assessment and Plan:     Problem List Items Addressed This Visit          Cardiology Problems    Essential hypertension - Primary    Relevant Medications    ezetimibe (ZETIA) 10 mg tablet       Other    Controlled type 2 diabetes mellitus without complication, without long-term current use of insulin    Relevant Medications    ezetimibe (ZETIA) 10 mg tablet    Vitamin D insufficiency    Relevant Medications    ezetimibe (ZETIA) 10 mg tablet    SOB (shortness of breath)    Relevant Medications    ezetimibe (ZETIA) 10 mg tablet    Morbid obesity due to excess calories    Relevant Medications    ezetimibe (ZETIA) 10 mg tablet    ASHLI (obstructive sleep apnea)    Relevant Medications    ezetimibe (ZETIA) 10 mg tablet    Decreased range of motion    Relevant Medications    ezetimibe (ZETIA) 10 mg tablet    Neck pain    Relevant Medications    ezetimibe (ZETIA) 10 mg tablet    Decreased functional activity tolerance    Relevant Medications    ezetimibe (ZETIA) 10 mg tablet     Other Visit Diagnoses       Left carotid bruit        Relevant Orders    CV Ultrasound Bilateral Doppler Carotid            Patient's Medications   New Prescriptions    EZETIMIBE (ZETIA) 10 MG TABLET    Take 1 tablet (10 mg total) by mouth once daily.   Previous Medications    ACETAMINOPHEN (TYLENOL) 325 MG TABLET    Take 2 tablets (650 mg total) by mouth every 6 (six) hours as needed for Pain.    ATORVASTATIN (LIPITOR) 20 MG TABLET    Take 1 tablet (20 mg total) by mouth once daily.    AZELASTINE (ASTELIN) 137 MCG (0.1 %) NASAL SPRAY    1 spray (137 mcg total) by Nasal route 2 (two) times daily.    BUPROPION (WELLBUTRIN SR) 150 MG TBSR 12 HR TABLET    Take 1 tablet (150 mg total) by mouth 2 (two) times daily.    CLINDAMYCIN (CLEOCIN T) 1 % LOTION    Apply topically 2 (two) times daily.    EMPAGLIFLOZIN (JARDIANCE) 10 MG TABLET    Take 1 tablet (10 mg total) by mouth once daily.    ERGOCALCIFEROL (ERGOCALCIFEROL) 50,000 UNIT CAP    Take  1 capsule (50,000 Units total) by mouth every 7 days.    GABAPENTIN (NEURONTIN) 300 MG CAPSULE    Take 1 capsule (300 mg total) by mouth 3 (three) times daily.    LANCETS MISC    Check fasting glucose daily    LOSARTAN (COZAAR) 25 MG TABLET    Take 1 tablet (25 mg total) by mouth once daily.    METRONIDAZOLE 0.75% (METROCREAM) 0.75 % CREA    Apply topically 2 (two) times daily. For rosacea on face.    ONDANSETRON (ZOFRAN-ODT) 4 MG TBDL    Take 4 mg by mouth every 6 (six) hours as needed.    PROMETHAZINE (PHENERGAN) 25 MG TABLET    Take 25 mg by mouth every 6 (six) hours as needed.    TRETINOIN (RETIN-A) 0.025 % CREAM    Apply topically every evening.   Modified Medications    No medications on file   Discontinued Medications    SEMAGLUTIDE (OZEMPIC) 2 MG/DOSE (8 MG/3 ML) PNIJ    Inject 2 mg into the skin every 7 days.     Will try monotherapy with Zetia 10 mg daily and repeat blood work in 2 months.  Recommended to revisit with sleep center to discuss other option than C-PAP  Primary prevention, weight loss, exercise and balanced diet encouraged.  Follow up in about 1 year (around 6/21/2024).

## 2023-06-25 ENCOUNTER — PATIENT MESSAGE (OUTPATIENT)
Dept: REHABILITATION | Facility: HOSPITAL | Age: 63
End: 2023-06-25
Payer: COMMERCIAL

## 2023-06-29 ENCOUNTER — HOSPITAL ENCOUNTER (OUTPATIENT)
Dept: CARDIOLOGY | Facility: HOSPITAL | Age: 63
Discharge: HOME | End: 2023-06-29
Attending: INTERNAL MEDICINE
Payer: COMMERCIAL

## 2023-06-29 DIAGNOSIS — R09.89 LEFT CAROTID BRUIT: ICD-10-CM

## 2023-06-29 LAB
LEFT ARM DIASTOLIC BLOOD PRESSURE: 51 MMHG
LEFT ARM SYSTOLIC BLOOD PRESSURE: 109 MMHG
LEFT CBA DIAS: 16 CM/S
LEFT CBA SYS: 93 CM/S
LEFT CCA DIST DIAS: 18 CM/S
LEFT CCA DIST SYS: 108 CM/S
LEFT CCA MID DIAS: 16 CM/S
LEFT CCA MID SYS: 122 CM/S
LEFT CCA PROX DIAS: 15 CM/S
LEFT CCA PROX SYS: 125 CM/S
LEFT ECA DIAS: 8 CM/S
LEFT ECA SYS: 110 CM/S
LEFT ICA DIST DIAS: 31 CM/S
LEFT ICA DIST SYS: 104 CM/S
LEFT ICA MID DIAS: 26 CM/S
LEFT ICA MID SYS: 97 CM/S
LEFT ICA PROX DIAS: 17 CM/S
LEFT ICA PROX SYS: 113 CM/S
LEFT VERTEBRAL DIAS: 10 CM/S
LEFT VERTEBRAL SYS: 43 CM/S
OHS CV CAROTID RIGHT ICA EDV HIGHEST: 25
OHS CV CAROTID ULTRASOUND LEFT ICA/CCA RATIO: 1.05
OHS CV CAROTID ULTRASOUND RIGHT ICA/CCA RATIO: 1.05
OHS CV PV CAROTID LEFT HIGHEST CCA: 125
OHS CV PV CAROTID LEFT HIGHEST ICA: 113
OHS CV PV CAROTID RIGHT HIGHEST CCA: 129
OHS CV PV CAROTID RIGHT HIGHEST ICA: 85
OHS CV US CAROTID LEFT HIGHEST EDV: 31
RIGHT ARM DIASTOLIC BLOOD PRESSURE: 57 MMHG
RIGHT ARM SYSTOLIC BLOOD PRESSURE: 113 MMHG
RIGHT CBA DIAS: 14 CM/S
RIGHT CBA SYS: 84 CM/S
RIGHT CCA DIST DIAS: 15 CM/S
RIGHT CCA DIST SYS: 81 CM/S
RIGHT CCA MID DIAS: 10 CM/S
RIGHT CCA MID SYS: 99 CM/S
RIGHT CCA PROX DIAS: 10 CM/S
RIGHT CCA PROX SYS: 129 CM/S
RIGHT ECA DIAS: 11 CM/S
RIGHT ECA SYS: 142 CM/S
RIGHT ICA MID DIAS: 25 CM/S
RIGHT ICA MID SYS: 85 CM/S
RIGHT ICA PROX DIAS: 19 CM/S
RIGHT ICA PROX SYS: 68 CM/S
RIGHT VERTEBRAL DIAS: 5 CM/S
RIGHT VERTEBRAL SYS: 39 CM/S

## 2023-06-29 PROCEDURE — 93880 EXTRACRANIAL BILAT STUDY: CPT | Mod: 26,,, | Performed by: INTERNAL MEDICINE

## 2023-06-29 PROCEDURE — 93880 CV US DOPPLER CAROTID (CUPID ONLY): ICD-10-PCS | Mod: 26,,, | Performed by: INTERNAL MEDICINE

## 2023-06-29 PROCEDURE — 93880 EXTRACRANIAL BILAT STUDY: CPT

## 2023-06-30 ENCOUNTER — TELEPHONE (OUTPATIENT)
Dept: CARDIOLOGY | Facility: CLINIC | Age: 63
End: 2023-06-30
Payer: COMMERCIAL

## 2023-06-30 RX ORDER — NAPROXEN SODIUM 220 MG/1
81 TABLET, FILM COATED ORAL DAILY
COMMUNITY

## 2023-07-03 ENCOUNTER — CLINICAL SUPPORT (OUTPATIENT)
Dept: REHABILITATION | Facility: HOSPITAL | Age: 63
End: 2023-07-03
Payer: COMMERCIAL

## 2023-07-03 DIAGNOSIS — M25.60 DECREASED RANGE OF MOTION: ICD-10-CM

## 2023-07-03 DIAGNOSIS — R29.3 ABNORMAL POSTURE: ICD-10-CM

## 2023-07-03 DIAGNOSIS — R68.89 DECREASED STRENGTH, ENDURANCE, AND MOBILITY: ICD-10-CM

## 2023-07-03 DIAGNOSIS — M54.2 NECK PAIN: Primary | ICD-10-CM

## 2023-07-03 DIAGNOSIS — R68.89 DECREASED FUNCTIONAL ACTIVITY TOLERANCE: ICD-10-CM

## 2023-07-03 DIAGNOSIS — R53.1 DECREASED STRENGTH, ENDURANCE, AND MOBILITY: ICD-10-CM

## 2023-07-03 DIAGNOSIS — Z74.09 DECREASED STRENGTH, ENDURANCE, AND MOBILITY: ICD-10-CM

## 2023-07-03 DIAGNOSIS — R26.9 GAIT ABNORMALITY: ICD-10-CM

## 2023-07-03 PROCEDURE — 97140 MANUAL THERAPY 1/> REGIONS: CPT

## 2023-07-03 PROCEDURE — 97110 THERAPEUTIC EXERCISES: CPT

## 2023-07-03 NOTE — PROGRESS NOTES
[facility]   Physical Therapy Treatment Note & PROGRESS NOTE    Name: Tawny Valerio  [facility] Number: [id number]    Therapy Diagnosis:   Encounter Diagnoses   Name Primary?    Neck pain Yes    Decreased functional activity tolerance     Decreased strength, endurance, and mobility     Decreased range of motion     Gait abnormality     Abnormal posture           Physician: Maya Rivas*    Visit Date: 7/3/2023    Physician Orders: PT Eval and Treat  Medical Diagnosis from Referral: Sciatica of left side  Evaluation Date: 2/6/2023  Authorization Period Expiration: 12/31/23  Plan of Care Expiration: 8/10/2023  Progress Note Due: 8/3/2023  Visit # / Visits authorized: 20/40 (+1)  FOTO: 3/3 (last performed on 4/3/2023)  PTA Visit #: 0/5     Precautions: Diabetes, cancer, and anxiety, HTN    Time In: 3:45 pm  Time Out: 4:30 pm  Total Billable Time: 43 minutes     (Billing reflects 1 on 1 treatment time spent with patient)    SUBJECTIVE     Patient reports: She is managing her neck pain much better she is taking 2-3 gabapentin, OTC ibuprofen, baby aspirin, and using lidocaine patches. She is taking more rest breaks from holding her neck up during the day. She rates her pain at 3/10 today. She has been referred to pain management, and also reports that one of her friends suggested that she see a neurologist.   Discussed with patient that when she see's either MD to let them know that her original presentation was like a torticollis type position with any position other then laying supine - with worsening with exertion.    She was compliant with home exercise program.    Response to previous treatment: feeling easier to move neck.     Functional change: has to think about holding     Pain: 2-3/10   neck  Location:bilaterally behind TMJ at ears and over upper trapezial on left AND left hip and LOWER EXTREMITY (no pain today)    OBJECTIVE     Objective Measures updated at progress report  only unless specified.        AROM:  Motion: Movement Results: 7/3/23   Cervical Flexion  chin to chest Chin to chest    Cervical Extension 50% pain bilateral ears 90% decreased pain   Cervical Rotation 50% pain in upper trapezial and pulling  80%   Cervical Sidebending  50% painful in left lower cervical spine, pulling bilateral  75% with verbal cues   Upper Extremity IR reach (Medial Rotation) L2 NT   Upper Extremity ER reach (External Rotation) C/T JUNCTION NT      Strength:     U/E MMT Right  eval Left  eval Pain/Dysfunction with Movement  eval 7/3/23   Cervical Side Bending 4-/5 4-/5   4+/5   Upper trapezial  4-/5 4-/5 Difficulty holding neck in neutral position - rotates to right, with SB to right as well 4+/5   Shoulder Abduction 4-/5 4-/5 Difficulty holding neck in neutral position - rotates to right, with SB to right as well 4+/5   Shoulder IR 3+/5 3+/5 Difficulty holding neck in neutral position - rotates to right, with SB to right as well 4+/5   Shoulder ER    3+/5 3+/5 Difficulty holding neck in neutral position - rotates to right, with SB to right as well 4+/5   Elbow Flexion  4-/5 4-/5 Difficulty holding neck in neutral position - rotates to right, with SB to right as well 4+/5   Elbow Extension 4-/5 4-/5 Difficulty holding neck in neutral position - rotates to right, with SB to right as well 4+/5   Wrist Extension 4/5 4/5   NT   4th/5th Finger Intrinsics 4-/5 4-/5   NT       FOTO:      TREATMENT     Tawny received the treatments listed below:     MANUAL THERAPY TECHNIQUES were applied for (15) minutes, including:    Manual Intervention Performed Today    Soft Tissue Mobilization  [x] Bilateral suboccipitals, scalenes , SCM, upper trapezius, levator scapulae , subscapularis  extensive at subcranial and sternocleidomastoid/upper trapezial on left side   Joint Mobilizations [x] Gentle lateral press    Gentle manual distraction  [x] Cervical distraction    []    Functional Dry Needling  []      Plan  for Next Visit: Continue as needed       THERAPEUTIC EXERCISES to develop strength, endurance, ROM, flexibility, posture, and core stabilization for (30) minutes including: including objective measurements     Intervention 7/3/2023     Nustep for ROM and strength - 8 minutes, level 2 for endurance   Heel slides with pillowcase - 3 x 10, 5 second holds   Lower trunk rotations (home exercise program) home 2 x 10, 5 second holds   Short arc quads with posterior pelvic tilt  - 3 x 10 bilateral    Hip abduction with band - 3min 10 sec hold red band (increased)     Hip adduction with ball  - 3min with 5  second hold (increased)   Sciatic nerve glides - 5 pumps, 3 sets   Re-assessment          UBE x 3 minutes forward, 3 minutes  backward    Chin tucks sitting x 10x/ 3 sets finger on sternum - lining up sternum to chin   Chin tucks with head lift supine x 5x/ 5 sec hold, 6 sets   Isometric pulls at cervical spine with band sitting -  - Extension 10x/5 seconds 2 sets  Side bending 5x/5 seconds hold 2 sets   AROM cervical spine -  - Rotation 10x 2 sets  Side bending 10x/ 2 sets   Progress Note/Plan of Care Update  See Objective section for details     Plan for Next Visit:         NEUROMUSCULAR RE-EDUCATION ACTIVITIES to improve Balance, Coordination, Kinesthetic, Sense, Proprioception, and Posture for (0) minutes.  The following were included:    Intervention Performed Today    Shoulder retraction isometrics  10x 5 seconds holds   Bilateral shoulder external rotation * - 10x/ 2 sets with yellow band   Standing rows * - 10x/ 2 sets red band   Shoulder rolls Backward * - 2 x 10   Open book* - 10x each side 10 seconds hold         Quad sets - 3 x 10, 3-5 second holds, bilateral     Pelvic tilts (home exercise program) Home  3 x 10    Bridges (home exercise program) Home  2 x 10   clamshells - 3 x 10 red band bilateral    Straight leg raise with posterior pelvic tilt  - 2 x 8  bilaterally   March in place supine - 2 x 10  bilateral (increased)   Sidelying hip abduction  - 5x /2 sets side   Step ups - 10x/ 1 sets 6 inch (decreased today)   Sit to stand  10x 1 sets 5#KB   Sitting with good posture - X 2 minutes    Hip flexion  - X 30 sitting on tan mat on blue therex disk   Shoulder flexion - X 30 sitting on tan mat on blue therex disk        Side steps - 2 minutes parallel bars 2 hand support     Plan for Next Visit: progress as tolerated       PATIENT EDUCATION AND HOME EXERCISES     Home Exercises Provided and Patient Education Provided     Education provided:   PURPOSE: Patient educated on the impairments noted above and the effects of physical therapy intervention to improve overall condition and QOL.   EXERCISE: Patient was educated on all the above exercise prior/during/after for proper posture, positioning, and execution for safe performance with home exercise program.   STRENGTH: Patient educated on the importance of improved core and extremity strength in order to improve alignment of the spine and extremities with static positions and dynamic movement.   GAIT & BALANCE: Patient educated on the importance of strong core and lower extremity musculature in order to improve both static and dynamic balance, improve gait mechanics, reduce fall risk and improve household and community mobility.   POSTURE: Patient educated on postural awareness to reduce stress and maintain optimal alignment of the spine with static positions and dynamic movement   4/20/2023: Encouraged home exercise program.    Written Home Exercises Provided: patient was instructed to continue with prior home exercise program.  Exercises were reviewed and Tawny was able to demonstrate them prior to the end of the session.  Tawny demonstrated good  understanding of the education provided. See EMR under Patient Instructions for exercises provided during therapy sessions.    ASSESSMENT     Patient presented with head in more neutral position and reports of  decreased overall pain and improved objective AROM, strength and less pain. She is reporting improved management of her symptoms along with improved functional outcomes per the FOTO.    Tawny is progressing well towards her goals.   Pt prognosis is Good.     Pt will continue to benefit from skilled outpatient physical therapy to address the deficits listed in the problem list box on initial evaluation, provide pt/family education and to maximize pt's level of independence in the home and community environment.     Pt's spiritual, cultural and educational needs considered and pt agreeable to plan of care and goals.     Anticipated Barriers for therapy: sedentary lifestyle, chronicity of condition, lack of understanding of condition, adherence to treatment plan, and coping style,  Anxiety or Panic Disorders, Arthritis, Back pain, BMI over 30, Cancer, DiabetesType I or II, Prior Surgery, Sleep dysfunction      Goals:  Reviewed: 7/3/2023      LOWER BODY/BACK  Short Term Goals: In 4 weeks   1.Patient to be educated on HEP. MET 3/6/2023  2.Patient to increase knee range of motion to full extension, in order to improve available range of motion for ADL's.    3.Patient to increase bilateral LE strength by 1/2 grade, in order to improve endurance and increase ability to perform all functional activities for increased time. MET 3/6/2023  4.Patient to have pain less than 5/10 at worst, to improve QOL. MET 3/6/2023  5.Patient to improve score on the FOTO, to improve QOL. MET 3/6/2023  6. Patient to improve score on 30 sec sit to stand, 5x sit to stand  in order to decrease fall risk. MET 3/6/2023     Long Term Goals: In 8 weeks  1.Patient to improve score on the FOTO to 48% or less, to improve QOL. PROGRESSING   2. Patient to increase bilateral LE strength to 4+/5 or greater, in order to improve endurance and increase ability to perform all functional activities for increased time. PROGRESSING   3. Patient to have decreased  pain to 2/10 at worst, to improve QOL. PROGRESSING   4. Patient to normalize score on 30 sec sit to stand, 5x sit to stand, in order to improve endurance and decrease fall risk. PROGRESSING   5. Patient to perform daily activities including: PROGRESSING    Putting on your shoes or socks - A little bit of difficulty  Getting into or out of a car - A little bit of difficulty  Rolling over in bed. - A little bit of difficulty  Getting into or out of the bath - A little bit of difficulty  Sitting for 1 hour. - A little bit of difficulty  Getting up or down 10 stairs (about 1 flight of stairs) - Moderate difficulty  Standing for 1 hour - Moderate difficulty  Squatting - Quite a bit of difficulty  Walking a mile - Quite a bit of difficulty  With your usual hobbies, recreational or sporting activities - Quite a bit of difficulty.      CERVICAL:  Reviewed: 7/3/2023   Short Term Goals: In 4 weeks   1.Patient to be educated on HEP. - Met 5/12/2023  2.Patient to increase cervical range of motion to WNL's, in order to improve available range of motion for ADL's.  7/3/2023 progressing  3.Patient to increase bilateral UE strength by 1/2 grade, in order to improve endurance and increase ability to perform all functional activities for increased time. - MET 5/12/2023  4.Patient to have pain less than 7/10 at worst, to improve QOL. 7/3/2023 MET  5.Patient to improve score on the FOTO, to improve QOL. - MET 5/12/2023     Long Term Goals: In 8 weeks  1.Patient to improve score on the FOTO to 34% or less, to improve QOL. 7/3/2023 progressing  2. Patient to increase bilateral UE strength to 4+/5 or greater, in order to improve endurance and increase ability to perform all functional activities for increased time. 7/3/2023 MET  3. Patient to have decreased pain to 4/10 at worst, to improve QOL. 7/3/2023 progressing  4. Patient to perform daily activities while holding neck in neutral position, including step ups and LE activities.  7/3/2023 progressing     PLAN     Monitor response to today's treatment session and progress as indicated.    Updated Certification Period: 5/12/2023 to 8/10/2023   Recommended Treatment Plan: Outpatient Physical Therapy 2 times weekly for 12 more visits: to include any combination of the following interventions: virtual visits, electrical stimulation (PRN), Cervical/Lumbar Traction, Manual Therapy, Neuromuscular Re-ed, Patient Education, Self Care, Therapeutic Exercise, Therapeutic Activites, Dry Needling, and Moist Hot Pack/Cold Pack     Christiane Muller, PT

## 2023-07-11 ENCOUNTER — CLINICAL SUPPORT (OUTPATIENT)
Dept: REHABILITATION | Facility: HOSPITAL | Age: 63
End: 2023-07-11
Payer: COMMERCIAL

## 2023-07-11 DIAGNOSIS — R68.89 DECREASED STRENGTH, ENDURANCE, AND MOBILITY: ICD-10-CM

## 2023-07-11 DIAGNOSIS — R53.1 DECREASED STRENGTH, ENDURANCE, AND MOBILITY: ICD-10-CM

## 2023-07-11 DIAGNOSIS — Z74.09 DECREASED STRENGTH, ENDURANCE, AND MOBILITY: ICD-10-CM

## 2023-07-11 DIAGNOSIS — M25.60 DECREASED RANGE OF MOTION: ICD-10-CM

## 2023-07-11 DIAGNOSIS — R26.9 GAIT ABNORMALITY: ICD-10-CM

## 2023-07-11 DIAGNOSIS — M54.2 NECK PAIN: Primary | ICD-10-CM

## 2023-07-11 DIAGNOSIS — R68.89 DECREASED FUNCTIONAL ACTIVITY TOLERANCE: ICD-10-CM

## 2023-07-11 DIAGNOSIS — R29.3 ABNORMAL POSTURE: ICD-10-CM

## 2023-07-11 PROCEDURE — 97112 NEUROMUSCULAR REEDUCATION: CPT | Performed by: PHYSICAL THERAPIST

## 2023-07-11 PROCEDURE — 97140 MANUAL THERAPY 1/> REGIONS: CPT | Performed by: PHYSICAL THERAPIST

## 2023-07-11 PROCEDURE — 97110 THERAPEUTIC EXERCISES: CPT | Performed by: PHYSICAL THERAPIST

## 2023-07-11 NOTE — PLAN OF CARE
[facility]   Physical Therapy Treatment Note + Discharge Summary    Name: Tawny Valerio  [facility] Number: [id number]    Therapy Diagnosis:   Encounter Diagnoses   Name Primary?    Neck pain Yes    Decreased functional activity tolerance     Decreased strength, endurance, and mobility     Decreased range of motion     Gait abnormality     Abnormal posture         Physician: Maya Rivas*    Visit Date: 7/11/2023    Physician Orders: PT Eval and Treat  Medical Diagnosis from Referral: Sciatica of left side  Evaluation Date: 2/6/2023  Authorization Period Expiration: 12/31/23  Plan of Care Expiration: 8/10/2023  Progress Note Due: 8/3/2023  Visit # / Visits authorized: 21/40 (+1)  FOTO: 3/3 (last performed on 4/3/2023)  PTA Visit #: 0/5     Precautions: Diabetes, cancer, and anxiety, HTN    Time In: 1605  Time Out: 1650  Total Billable Time: 42 minutes     (Billing reflects 1 on 1 treatment time spent with patient)    SUBJECTIVE     Patient reports: that she is feeling much better today than she was yesterday. She states that night before last she slept without her cervical pillow, and she ended up waking up on her stomach with her head turned to one side. This caused her to hurt most of the day yesterday. She slept with the cervical pillow last night and is feeling better. Patient reports that she found a neurologist who in [place] who specializes in occipital neuralgia, and she is hopeful that he can help her with her s/s. She was able to get an appointment scheduled with him in mid-August. She also sees pain management here at [facility] for the first time on 7/27/23. Patient would like to make today her last day with PT until she follows up with these doctors, and she will let us know if additional PT is necessary in the future following the appointments.     She was compliant with home exercise program.    Response to previous treatment: feeling easier to move neck.      Functional change: has to think about holding     Pain: 4/10   neck  Location:bilaterally behind TMJ at ears and over upper trapezial on left AND left hip and LOWER EXTREMITY (no pain today)    OBJECTIVE     Objective Measures updated at progress report only unless specified.     AROM:  Motion: Movement Results: 7/3/23   Cervical Flexion  chin to chest Chin to chest    Cervical Extension 50% pain bilateral ears 90% decreased pain   Cervical Rotation 50% pain in upper trapezial and pulling  80%   Cervical Sidebending  50% painful in left lower cervical spine, pulling bilateral  75% with verbal cues   Upper Extremity IR reach (Medial Rotation) L2 NT   Upper Extremity ER reach (External Rotation) C/T JUNCTION NT      Strength:     U/E MMT Right  eval Left  eval Pain/Dysfunction with Movement  eval 7/3/23   Cervical Side Bending 4-/5 4-/5   4+/5   Upper trapezial  4-/5 4-/5 Difficulty holding neck in neutral position - rotates to right, with SB to right as well 4+/5   Shoulder Abduction 4-/5 4-/5 Difficulty holding neck in neutral position - rotates to right, with SB to right as well 4+/5   Shoulder IR 3+/5 3+/5 Difficulty holding neck in neutral position - rotates to right, with SB to right as well 4+/5   Shoulder ER    3+/5 3+/5 Difficulty holding neck in neutral position - rotates to right, with SB to right as well 4+/5   Elbow Flexion  4-/5 4-/5 Difficulty holding neck in neutral position - rotates to right, with SB to right as well 4+/5   Elbow Extension 4-/5 4-/5 Difficulty holding neck in neutral position - rotates to right, with SB to right as well 4+/5   Wrist Extension 4/5 4/5   NT   4th/5th Finger Intrinsics 4-/5 4-/5   NT        TREATMENT     Tawny received the treatments listed below:     MANUAL THERAPY TECHNIQUES were applied for (15) minutes, including:    Manual Intervention Performed Today    Soft Tissue Mobilization  [x] Bilateral suboccipitals, scalenes , SCM, upper trapezius, levator scapulae  , subscapularis  extensive at subcranial and sternocleidomastoid/upper trapezial on left side   Joint Mobilizations [x] Gentle upglides/downslides   Gentle manual distraction  [x] Cervical distraction    []    Functional Dry Needling  []      Plan for Next Visit: Continue as needed       THERAPEUTIC EXERCISES to develop strength, endurance, ROM, flexibility, posture, and core stabilization for (17) minutes including: including objective measurements     Intervention 7/11/2023     Re-assessment          UBE x 3 minutes forward, 3 minutes  backward    Chin tucks sitting x 10x/ 3 sets finger on sternum - lining up sternum to chin   Chin tucks with head lift supine x 5x/ 5 sec hold, 6 sets   Isometric pulls at cervical spine with band sitting x  x Extension 10x/5 seconds 2 sets  Side bending 5x/5 seconds hold 2 sets   AROM cervical spine -  - Rotation 10x 2 sets  Side bending 10x/ 2 sets   Progress Note/Plan of Care Update  See Objective section for details     Plan for Next Visit:         NEUROMUSCULAR RE-EDUCATION ACTIVITIES to improve Balance, Coordination, Kinesthetic, Sense, Proprioception, and Posture for (10) minutes.  The following were included:    Intervention Performed Today    Shoulder retraction isometrics  10x 5 seconds holds   Bilateral shoulder external rotation  x 10x/ 2 sets with yellow band   Standing rows  x 10x/ 2 sets yellow band   Shoulder rolls Backward  - 2 x 10   Open book - 10x each side 10 seconds hold    Shoulder flexion - X 30 sitting on tan mat on blue therex disk          Plan for Next Visit: progress as tolerated       PATIENT EDUCATION AND HOME EXERCISES     Home Exercises Provided and Patient Education Provided     Education provided:   PURPOSE: Patient educated on the impairments noted above and the effects of physical therapy intervention to improve overall condition and QOL.   EXERCISE: Patient was educated on all the above exercise prior/during/after for proper posture,  positioning, and execution for safe performance with home exercise program.   STRENGTH: Patient educated on the importance of improved core and extremity strength in order to improve alignment of the spine and extremities with static positions and dynamic movement.   GAIT & BALANCE: Patient educated on the importance of strong core and lower extremity musculature in order to improve both static and dynamic balance, improve gait mechanics, reduce fall risk and improve household and community mobility.   POSTURE: Patient educated on postural awareness to reduce stress and maintain optimal alignment of the spine with static positions and dynamic movement   4/20/2023: Encouraged home exercise program.    Written Home Exercises Provided: patient was instructed to continue with prior home exercise program.  Exercises were reviewed and Tawny was able to demonstrate them prior to the end of the session.  Tawny demonstrated good  understanding of the education provided. See EMR under Patient Instructions for exercises provided during therapy sessions.    ASSESSMENT     Patient tolerated treatment well. She presented with her head in a more neutral position than compared to previous visits and with reports of decreased overall pain as compared to yesterday. However, patient did report upon arrival that she was starting to feel more of a pulling in the left side of her neck and shoulder and felt like she was giving back in to the head tilt/rotation. An increase in head tilt and rotation was noted as she continued with exercises. Mirror provided for feed back with chin tucks to help keep head straight. Although she demonstrates overall improvements in UE strength, postural stability, and cervical spine AROM, she is still limited functionally by pain and tension along left cervical and subcranial musculature. Patient is independent with exercises and appropriate to be DC with [facility] as she follows up with MD regarding  remaining s/s. She will contact us in the future if additional PT is necessary.     Tawny is progressing well towards her goals.   Pt prognosis is Good.     Pt will continue to benefit from skilled outpatient physical therapy to address the deficits listed in the problem list box on initial evaluation, provide pt/family education and to maximize pt's level of independence in the home and community environment.     Pt's spiritual, cultural and educational needs considered and pt agreeable to plan of care and goals.     Anticipated Barriers for therapy: sedentary lifestyle, chronicity of condition, lack of understanding of condition, adherence to treatment plan, and coping style,  Anxiety or Panic Disorders, Arthritis, Back pain, BMI over 30, Cancer, DiabetesType I or II, Prior Surgery, Sleep dysfunction      Goals:  Reviewed: 7/11/2023      LOWER BODY/BACK  Short Term Goals: In 4 weeks   1.Patient to be educated on HEP. MET 3/6/2023  2.Patient to increase knee range of motion to full extension, in order to improve available range of motion for ADL's.    3.Patient to increase bilateral LE strength by 1/2 grade, in order to improve endurance and increase ability to perform all functional activities for increased time. MET 3/6/2023  4.Patient to have pain less than 5/10 at worst, to improve QOL. MET 3/6/2023  5.Patient to improve score on the FOTO, to improve QOL. MET 3/6/2023  6. Patient to improve score on 30 sec sit to stand, 5x sit to stand  in order to decrease fall risk. MET 3/6/2023     Long Term Goals: In 8 weeks  1.Patient to improve score on the FOTO to 48% or less, to improve QOL. PROGRESSING   2. Patient to increase bilateral LE strength to 4+/5 or greater, in order to improve endurance and increase ability to perform all functional activities for increased time. PROGRESSING   3. Patient to have decreased pain to 2/10 at worst, to improve QOL. PROGRESSING   4. Patient to normalize score on 30 sec sit to  stand, 5x sit to stand, in order to improve endurance and decrease fall risk. PROGRESSING   5. Patient to perform daily activities including: PROGRESSING    Putting on your shoes or socks - A little bit of difficulty  Getting into or out of a car - A little bit of difficulty  Rolling over in bed. - A little bit of difficulty  Getting into or out of the bath - A little bit of difficulty  Sitting for 1 hour. - A little bit of difficulty  Getting up or down 10 stairs (about 1 flight of stairs) - Moderate difficulty  Standing for 1 hour - Moderate difficulty  Squatting - Quite a bit of difficulty  Walking a mile - Quite a bit of difficulty  With your usual hobbies, recreational or sporting activities - Quite a bit of difficulty.      CERVICAL:  Reviewed: 7/11/2023   Short Term Goals: In 4 weeks   1.Patient to be educated on HEP. - Met 5/12/2023  2.Patient to increase cervical range of motion to WNL's, in order to improve available range of motion for ADL's.  7/3/2023 progressing  3.Patient to increase bilateral UE strength by 1/2 grade, in order to improve endurance and increase ability to perform all functional activities for increased time. - MET 5/12/2023  4.Patient to have pain less than 7/10 at worst, to improve QOL. 7/3/2023 MET  5.Patient to improve score on the FOTO, to improve QOL. - MET 5/12/2023     Long Term Goals: In 8 weeks  1.Patient to improve score on the FOTO to 34% or less, to improve QOL. 7/11/2023 progressing  2. Patient to increase bilateral UE strength to 4+/5 or greater, in order to improve endurance and increase ability to perform all functional activities for increased time. 7/3/2023 MET  3. Patient to have decreased pain to 4/10 at worst, to improve QOL. 7/11/2023 progressing  4. Patient to perform daily activities while holding neck in neutral position, including step ups and LE activities. 7/11/2023 progressing     PLAN     7/11/2023: Patient is considered DC from PT with HEP at this time.      Updated Certification Period: 5/12/2023 to 8/10/2023   Recommended Treatment Plan: Outpatient Physical Therapy 2 times weekly for 12 more visits: to include any combination of the following interventions: virtual visits, electrical stimulation (PRN), Cervical/Lumbar Traction, Manual Therapy, Neuromuscular Re-ed, Patient Education, Self Care, Therapeutic Exercise, Therapeutic Activites, Dry Needling, and Moist Hot Pack/Cold Pack     Marisabel Amaya PT

## 2023-07-11 NOTE — PROGRESS NOTES
[facility]   Physical Therapy Treatment Note + Discharge Summary    Name: Tawny Valerio  [facility] Number: [id number]    Therapy Diagnosis:   Encounter Diagnoses   Name Primary?    Neck pain Yes    Decreased functional activity tolerance     Decreased strength, endurance, and mobility     Decreased range of motion     Gait abnormality     Abnormal posture           Physician: Maya Rivas*    Visit Date: 7/11/2023    Physician Orders: PT Eval and Treat  Medical Diagnosis from Referral: Sciatica of left side  Evaluation Date: 2/6/2023  Authorization Period Expiration: 12/31/23  Plan of Care Expiration: 8/10/2023  Progress Note Due: 8/3/2023  Visit # / Visits authorized: 21/40 (+1)  FOTO: 3/3 (last performed on 4/3/2023)  PTA Visit #: 0/5     Precautions: Diabetes, cancer, and anxiety, HTN    Time In: 1605  Time Out: 1650  Total Billable Time: 42 minutes     (Billing reflects 1 on 1 treatment time spent with patient)    SUBJECTIVE     Patient reports: that she is feeling much better today than she was yesterday. She states that night before last she slept without her cervical pillow, and she ended up waking up on her stomach with her head turned to one side. This caused her to hurt most of the day yesterday. She slept with the cervical pillow last night and is feeling better. Patient reports that she found a neurologist who in [place] who specializes in occipital neuralgia, and she is hopeful that he can help her with her s/s. She was able to get an appointment scheduled with him in mid-August. She also sees pain management here at [facility] for the first time on 7/27/23. Patient would like to make today her last day with PT until she follows up with these doctors, and she will let us know if additional PT is necessary in the future following the appointments.     She was compliant with home exercise program.    Response to previous treatment: feeling easier to move neck.      Functional change: has to think about holding     Pain: 4/10   neck  Location:bilaterally behind TMJ at ears and over upper trapezial on left AND left hip and LOWER EXTREMITY (no pain today)    OBJECTIVE     Objective Measures updated at progress report only unless specified.     AROM:  Motion: Movement Results: 7/3/23   Cervical Flexion  chin to chest Chin to chest    Cervical Extension 50% pain bilateral ears 90% decreased pain   Cervical Rotation 50% pain in upper trapezial and pulling  80%   Cervical Sidebending  50% painful in left lower cervical spine, pulling bilateral  75% with verbal cues   Upper Extremity IR reach (Medial Rotation) L2 NT   Upper Extremity ER reach (External Rotation) C/T JUNCTION NT      Strength:     U/E MMT Right  eval Left  eval Pain/Dysfunction with Movement  eval 7/3/23   Cervical Side Bending 4-/5 4-/5   4+/5   Upper trapezial  4-/5 4-/5 Difficulty holding neck in neutral position - rotates to right, with SB to right as well 4+/5   Shoulder Abduction 4-/5 4-/5 Difficulty holding neck in neutral position - rotates to right, with SB to right as well 4+/5   Shoulder IR 3+/5 3+/5 Difficulty holding neck in neutral position - rotates to right, with SB to right as well 4+/5   Shoulder ER    3+/5 3+/5 Difficulty holding neck in neutral position - rotates to right, with SB to right as well 4+/5   Elbow Flexion  4-/5 4-/5 Difficulty holding neck in neutral position - rotates to right, with SB to right as well 4+/5   Elbow Extension 4-/5 4-/5 Difficulty holding neck in neutral position - rotates to right, with SB to right as well 4+/5   Wrist Extension 4/5 4/5   NT   4th/5th Finger Intrinsics 4-/5 4-/5   NT        TREATMENT     Tawny received the treatments listed below:     MANUAL THERAPY TECHNIQUES were applied for (15) minutes, including:    Manual Intervention Performed Today    Soft Tissue Mobilization  [x] Bilateral suboccipitals, scalenes , SCM, upper trapezius, levator scapulae  , subscapularis  extensive at subcranial and sternocleidomastoid/upper trapezial on left side   Joint Mobilizations [x] Gentle upglides/downslides   Gentle manual distraction  [x] Cervical distraction    []    Functional Dry Needling  []      Plan for Next Visit: Continue as needed       THERAPEUTIC EXERCISES to develop strength, endurance, ROM, flexibility, posture, and core stabilization for (17) minutes including: including objective measurements     Intervention 7/11/2023     Re-assessment          UBE x 3 minutes forward, 3 minutes  backward    Chin tucks sitting x 10x/ 3 sets finger on sternum - lining up sternum to chin   Chin tucks with head lift supine x 5x/ 5 sec hold, 6 sets   Isometric pulls at cervical spine with band sitting x  x Extension 10x/5 seconds 2 sets  Side bending 5x/5 seconds hold 2 sets   AROM cervical spine -  - Rotation 10x 2 sets  Side bending 10x/ 2 sets   Progress Note/Plan of Care Update  See Objective section for details     Plan for Next Visit:         NEUROMUSCULAR RE-EDUCATION ACTIVITIES to improve Balance, Coordination, Kinesthetic, Sense, Proprioception, and Posture for (10) minutes.  The following were included:    Intervention Performed Today    Shoulder retraction isometrics  10x 5 seconds holds   Bilateral shoulder external rotation  x 10x/ 2 sets with yellow band   Standing rows  x 10x/ 2 sets yellow band   Shoulder rolls Backward  - 2 x 10   Open book - 10x each side 10 seconds hold    Shoulder flexion - X 30 sitting on tan mat on blue therex disk          Plan for Next Visit: progress as tolerated       PATIENT EDUCATION AND HOME EXERCISES     Home Exercises Provided and Patient Education Provided     Education provided:   PURPOSE: Patient educated on the impairments noted above and the effects of physical therapy intervention to improve overall condition and QOL.   EXERCISE: Patient was educated on all the above exercise prior/during/after for proper posture,  positioning, and execution for safe performance with home exercise program.   STRENGTH: Patient educated on the importance of improved core and extremity strength in order to improve alignment of the spine and extremities with static positions and dynamic movement.   GAIT & BALANCE: Patient educated on the importance of strong core and lower extremity musculature in order to improve both static and dynamic balance, improve gait mechanics, reduce fall risk and improve household and community mobility.   POSTURE: Patient educated on postural awareness to reduce stress and maintain optimal alignment of the spine with static positions and dynamic movement   4/20/2023: Encouraged home exercise program.    Written Home Exercises Provided: patient was instructed to continue with prior home exercise program.  Exercises were reviewed and Tawny was able to demonstrate them prior to the end of the session.  Tawny demonstrated good  understanding of the education provided. See EMR under Patient Instructions for exercises provided during therapy sessions.    ASSESSMENT     Patient tolerated treatment well. She presented with her head in a more neutral position than compared to previous visits and with reports of decreased overall pain as compared to yesterday. However, patient did report upon arrival that she was starting to feel more of a pulling in the left side of her neck and shoulder and felt like she was giving back in to the head tilt/rotation. An increase in head tilt and rotation was noted as she continued with exercises. Mirror provided for feed back with chin tucks to help keep head straight. Although she demonstrates overall improvements in UE strength, postural stability, and cervical spine AROM, she is still limited functionally by pain and tension along left cervical and subcranial musculature. Patient is independent with exercises and appropriate to be DC with [facility] as she follows up with MD regarding  remaining s/s. She will contact us in the future if additional PT is necessary.     Tawny is progressing well towards her goals.   Pt prognosis is Good.     Pt will continue to benefit from skilled outpatient physical therapy to address the deficits listed in the problem list box on initial evaluation, provide pt/family education and to maximize pt's level of independence in the home and community environment.     Pt's spiritual, cultural and educational needs considered and pt agreeable to plan of care and goals.     Anticipated Barriers for therapy: sedentary lifestyle, chronicity of condition, lack of understanding of condition, adherence to treatment plan, and coping style,  Anxiety or Panic Disorders, Arthritis, Back pain, BMI over 30, Cancer, DiabetesType I or II, Prior Surgery, Sleep dysfunction      Goals:  Reviewed: 7/11/2023      LOWER BODY/BACK  Short Term Goals: In 4 weeks   1.Patient to be educated on HEP. MET 3/6/2023  2.Patient to increase knee range of motion to full extension, in order to improve available range of motion for ADL's.    3.Patient to increase bilateral LE strength by 1/2 grade, in order to improve endurance and increase ability to perform all functional activities for increased time. MET 3/6/2023  4.Patient to have pain less than 5/10 at worst, to improve QOL. MET 3/6/2023  5.Patient to improve score on the FOTO, to improve QOL. MET 3/6/2023  6. Patient to improve score on 30 sec sit to stand, 5x sit to stand  in order to decrease fall risk. MET 3/6/2023     Long Term Goals: In 8 weeks  1.Patient to improve score on the FOTO to 48% or less, to improve QOL. PROGRESSING   2. Patient to increase bilateral LE strength to 4+/5 or greater, in order to improve endurance and increase ability to perform all functional activities for increased time. PROGRESSING   3. Patient to have decreased pain to 2/10 at worst, to improve QOL. PROGRESSING   4. Patient to normalize score on 30 sec sit to  stand, 5x sit to stand, in order to improve endurance and decrease fall risk. PROGRESSING   5. Patient to perform daily activities including: PROGRESSING    Putting on your shoes or socks - A little bit of difficulty  Getting into or out of a car - A little bit of difficulty  Rolling over in bed. - A little bit of difficulty  Getting into or out of the bath - A little bit of difficulty  Sitting for 1 hour. - A little bit of difficulty  Getting up or down 10 stairs (about 1 flight of stairs) - Moderate difficulty  Standing for 1 hour - Moderate difficulty  Squatting - Quite a bit of difficulty  Walking a mile - Quite a bit of difficulty  With your usual hobbies, recreational or sporting activities - Quite a bit of difficulty.      CERVICAL:  Reviewed: 7/11/2023   Short Term Goals: In 4 weeks   1.Patient to be educated on HEP. - Met 5/12/2023  2.Patient to increase cervical range of motion to WNL's, in order to improve available range of motion for ADL's.  7/3/2023 progressing  3.Patient to increase bilateral UE strength by 1/2 grade, in order to improve endurance and increase ability to perform all functional activities for increased time. - MET 5/12/2023  4.Patient to have pain less than 7/10 at worst, to improve QOL. 7/3/2023 MET  5.Patient to improve score on the FOTO, to improve QOL. - MET 5/12/2023     Long Term Goals: In 8 weeks  1.Patient to improve score on the FOTO to 34% or less, to improve QOL. 7/11/2023 progressing  2. Patient to increase bilateral UE strength to 4+/5 or greater, in order to improve endurance and increase ability to perform all functional activities for increased time. 7/3/2023 MET  3. Patient to have decreased pain to 4/10 at worst, to improve QOL. 7/11/2023 progressing  4. Patient to perform daily activities while holding neck in neutral position, including step ups and LE activities. 7/11/2023 progressing     PLAN     7/11/2023: Patient is considered DC from PT with HEP at this time.      Updated Certification Period: 5/12/2023 to 8/10/2023   Recommended Treatment Plan: Outpatient Physical Therapy 2 times weekly for 12 more visits: to include any combination of the following interventions: virtual visits, electrical stimulation (PRN), Cervical/Lumbar Traction, Manual Therapy, Neuromuscular Re-ed, Patient Education, Self Care, Therapeutic Exercise, Therapeutic Activites, Dry Needling, and Moist Hot Pack/Cold Pack     Marisabel Amaya PT

## 2023-07-17 ENCOUNTER — OFFICE VISIT (OUTPATIENT)
Dept: NEUROLOGY | Facility: CLINIC | Age: 63
End: 2023-07-17
Payer: COMMERCIAL

## 2023-07-17 VITALS
SYSTOLIC BLOOD PRESSURE: 166 MMHG | DIASTOLIC BLOOD PRESSURE: 69 MMHG | BODY MASS INDEX: 52.34 KG/M2 | WEIGHT: 277 LBS | HEART RATE: 89 BPM

## 2023-07-17 DIAGNOSIS — M54.2 CERVICALGIA OF OCCIPITO-ATLANTO-AXIAL REGION: Primary | ICD-10-CM

## 2023-07-17 DIAGNOSIS — G44.86 CERVICOGENIC HEADACHE: ICD-10-CM

## 2023-07-17 DIAGNOSIS — E11.9 CONTROLLED TYPE 2 DIABETES MELLITUS WITHOUT COMPLICATION, WITHOUT LONG-TERM CURRENT USE OF INSULIN: ICD-10-CM

## 2023-07-17 DIAGNOSIS — M54.81 OCCIPITAL NEURITIS: ICD-10-CM

## 2023-07-17 PROCEDURE — 3078F DIAST BP <80 MM HG: CPT | Mod: CPTII,S$GLB,, | Performed by: PSYCHIATRY & NEUROLOGY

## 2023-07-17 PROCEDURE — 99204 OFFICE O/P NEW MOD 45 MIN: CPT | Mod: S$GLB,,, | Performed by: PSYCHIATRY & NEUROLOGY

## 2023-07-17 PROCEDURE — 3008F PR BODY MASS INDEX (BMI) DOCUMENTED: ICD-10-PCS | Mod: CPTII,S$GLB,, | Performed by: PSYCHIATRY & NEUROLOGY

## 2023-07-17 PROCEDURE — 1160F PR REVIEW ALL MEDS BY PRESCRIBER/CLIN PHARMACIST DOCUMENTED: ICD-10-PCS | Mod: CPTII,S$GLB,, | Performed by: PSYCHIATRY & NEUROLOGY

## 2023-07-17 PROCEDURE — 3077F SYST BP >= 140 MM HG: CPT | Mod: CPTII,S$GLB,, | Performed by: PSYCHIATRY & NEUROLOGY

## 2023-07-17 PROCEDURE — 99999 PR PBB SHADOW E&M-EST. PATIENT-LVL III: CPT | Mod: PBBFAC,,, | Performed by: PSYCHIATRY & NEUROLOGY

## 2023-07-17 PROCEDURE — 1160F RVW MEDS BY RX/DR IN RCRD: CPT | Mod: CPTII,S$GLB,, | Performed by: PSYCHIATRY & NEUROLOGY

## 2023-07-17 PROCEDURE — 3008F BODY MASS INDEX DOCD: CPT | Mod: CPTII,S$GLB,, | Performed by: PSYCHIATRY & NEUROLOGY

## 2023-07-17 PROCEDURE — 3078F PR MOST RECENT DIASTOLIC BLOOD PRESSURE < 80 MM HG: ICD-10-PCS | Mod: CPTII,S$GLB,, | Performed by: PSYCHIATRY & NEUROLOGY

## 2023-07-17 PROCEDURE — 3077F PR MOST RECENT SYSTOLIC BLOOD PRESSURE >= 140 MM HG: ICD-10-PCS | Mod: CPTII,S$GLB,, | Performed by: PSYCHIATRY & NEUROLOGY

## 2023-07-17 PROCEDURE — 4010F ACE/ARB THERAPY RXD/TAKEN: CPT | Mod: CPTII,S$GLB,, | Performed by: PSYCHIATRY & NEUROLOGY

## 2023-07-17 PROCEDURE — 99999 PR PBB SHADOW E&M-EST. PATIENT-LVL III: ICD-10-PCS | Mod: PBBFAC,,, | Performed by: PSYCHIATRY & NEUROLOGY

## 2023-07-17 PROCEDURE — 4010F PR ACE/ARB THEARPY RXD/TAKEN: ICD-10-PCS | Mod: CPTII,S$GLB,, | Performed by: PSYCHIATRY & NEUROLOGY

## 2023-07-17 PROCEDURE — 1159F MED LIST DOCD IN RCRD: CPT | Mod: CPTII,S$GLB,, | Performed by: PSYCHIATRY & NEUROLOGY

## 2023-07-17 PROCEDURE — 99204 PR OFFICE/OUTPT VISIT, NEW, LEVL IV, 45-59 MIN: ICD-10-PCS | Mod: S$GLB,,, | Performed by: PSYCHIATRY & NEUROLOGY

## 2023-07-17 PROCEDURE — 1159F PR MEDICATION LIST DOCUMENTED IN MEDICAL RECORD: ICD-10-PCS | Mod: CPTII,S$GLB,, | Performed by: PSYCHIATRY & NEUROLOGY

## 2023-07-17 RX ORDER — METHYLPREDNISOLONE 4 MG/1
TABLET ORAL
Qty: 1 EACH | Refills: 0 | Status: SHIPPED | OUTPATIENT
Start: 2023-07-17 | End: 2023-08-09

## 2023-07-17 RX ORDER — INSULIN PUMP SYRINGE, 3 ML
EACH MISCELLANEOUS
Qty: 1 EACH | Refills: 0 | Status: SHIPPED | OUTPATIENT
Start: 2023-07-17 | End: 2024-07-16

## 2023-07-17 NOTE — PATIENT INSTRUCTIONS
Medrol dose pack prescribed. Take as instructed on package insert.  Monitor sugar while on Medrol and if goes above 200, stop the Medrol  Blood-glucose meter kit ordered  The patient was instructed to ice the occipital region for no more than 20 minutes at least once a day but may repeat this as many times as they would like.   Discussed ergonomic accommodations for occipital neuritis/neuralgia. Mainly perform all work at eye level to minimize continued neck flexion which will aggravate the nerve.  Patient was encouraged to do daily light cardio exercise but instructed to limit physical activities to walking, walking in water up to the waist only or riding a stationary bike, recumbent preferred. No weight lifting in upper body, no neck massage, no acupuncture of neck, and no dry needling of upper neck. No neck PT unless otherwise stated. Lower body strengthening with resistant bands, leg machines, and strapping weights to legs okay. No core body workouts. No running or use of cardio equipment other than stationary bike. No swimming or body surfing. No amusement park rides. No lifting more than 5-10 lbs and bend at the knees, not the waist.  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 To be referred for consultation for occipital nerve release procedure if initially clinically responsive to injections but always with a return of symptoms.  No suboccipital neck PT

## 2023-07-17 NOTE — PROGRESS NOTES
Chief Complaint: Neck Pain    Subjective:     History of Present Illness    Referring Provider: Self Referral  Accompanied by: No one    07/17/2023: Tawny Valerio is a 63 y.o. female with h/o HTN, DM, anxiety, endometrial cancer s/p resection, ASHLI not on CPAP who presents for headache evaluation. Neck pain started 5 years ago and in 4/2022 she bought a neck massager and in 12/2022 she started getting left occipital pain and in 2/2023 she felt a pop in neck that she hear in head and neck spasmed and turned her head to the right and start neck PT in 2/2023 with massage and exercise and improved and had to increase gabapentin that she was already on for neuropathy but increased due to neck pain. She feels that she needs to hold neck up and she will have episodes of involuntary right head turning. Headaches are constant with varying intensity, pressure, aching, throbbing sometimes, burning sometimes, sharp sometimes, left side, left occipital. She has left lower neck pain. She states that her eyes will start to close when pain is bad and hard to keep them open. She denies associated photophobia, phonophobia, weakness, numbness, tingling, dizziness, N/V, change in vision. She has tingling in hands that she states is from neuropathy. She denies triggers. She denies an inciting etiology including no injury, health change, or medication change when above pain symptoms started. She has tried a cervical pillow and is sleeping well and wakes up rested. She states she cannot tolerate CPAP mask and has not been told recently if snores or has apnea but she has woken up at night feeling that she cannot breath. Headache improves lying down improves headache and vasal vagal maneuvers do not significantly worsen headaches. She denies headaches waking her up and will wake up with headache. Mood is good. Gabapentin and naprosyn helps a little. She has tried heating and icing neck. Neck PT only provides temporary benefit. She has  tried neck collar, massager device. She states that she had steroids while used to be on Ozempic and tolerated them okay but does not have a home test kit.    Current Outpatient Medications on File Prior to Visit   Medication Sig Dispense Refill    acetaminophen (TYLENOL) 325 MG tablet Take 2 tablets (650 mg total) by mouth every 6 (six) hours as needed for Pain. 30 tablet 1    aspirin 81 MG Chew Take 81 mg by mouth once daily.      azelastine (ASTELIN) 137 mcg (0.1 %) nasal spray 1 spray (137 mcg total) by Nasal route 2 (two) times daily. 30 mL 11    buPROPion (WELLBUTRIN SR) 150 MG TBSR 12 hr tablet Take 1 tablet (150 mg total) by mouth 2 (two) times daily. 60 tablet 11    clindamycin (CLEOCIN T) 1 % lotion Apply topically 2 (two) times daily. 60 mL 3    empagliflozin (JARDIANCE) 10 mg tablet Take 1 tablet (10 mg total) by mouth once daily. 90 tablet 1    ergocalciferol (ERGOCALCIFEROL) 50,000 unit Cap Take 1 capsule (50,000 Units total) by mouth every 7 days. 12 capsule 3    ezetimibe (ZETIA) 10 mg tablet Take 1 tablet (10 mg total) by mouth once daily. 90 tablet 3    gabapentin (NEURONTIN) 300 MG capsule Take 1 capsule (300 mg total) by mouth 3 (three) times daily. 90 capsule 3    losartan (COZAAR) 25 MG tablet Take 1 tablet (25 mg total) by mouth once daily. 90 tablet 3    metronidazole 0.75% (METROCREAM) 0.75 % Crea Apply topically 2 (two) times daily. For rosacea on face. 45 g 3    ondansetron (ZOFRAN-ODT) 4 MG TbDL Take 4 mg by mouth every 6 (six) hours as needed.      promethazine (PHENERGAN) 25 MG tablet Take 25 mg by mouth every 6 (six) hours as needed.      tretinoin (RETIN-A) 0.025 % cream Apply topically every evening. 45 g 2    lancets Misc Check fasting glucose daily 100 each 3     No current facility-administered medications on file prior to visit.       Review of patient's allergies indicates:   Allergen Reactions    Celebrex [celecoxib] Anaphylaxis    Codeine Hives     Does not know what she can  take -usually just takes tylenol or ibuprofen    Lidocaine Hives    Vicodin [hydrocodone-acetaminophen] Hives and Swelling       Family History   Problem Relation Age of Onset    No Known Problems Mother     Diabetes Father     Stroke Father     Hypertension Father     Heart disease Sister     Hypertension Sister     Heart disease Brother     Heart attack Brother     Hypertension Brother     Heart disease Maternal Grandmother     Heart disease Maternal Grandfather     Heart disease Paternal Grandfather     Breast cancer Other     Ovarian cancer Neg Hx     Uterine cancer Neg Hx     Colon cancer Neg Hx     Heart failure Neg Hx     Hyperlipidemia Neg Hx        Social History     Tobacco Use    Smoking status: Never    Smokeless tobacco: Never   Substance Use Topics    Alcohol use: Not Currently    Drug use: No       Review of Systems  Constitutional: No fevers, no chills, no change in weight  Eye/Vision: See HPI  Ear/Nose/Mouth/Throat: See HPI; no cough, no runny nose, no sore throat  Respiratory: No shortness of breath, no problems breathing  Cardiovascular: No chest pain  Gastrointestinal: See HPI, no diarrhea, no constipation  Genitourinary: No dysuria  Musculoskeletal: See HPI  Integumentary: No skin changes  Neurologic: See HPI  Psychiatric: Denies depression, anxiety, denies SI and HI.    Objective:     Vitals:    07/17/23 1046   BP: (!) 166/69   Pulse: 89       General: Alert and awake, Well nourished, Well groomed, No acute distress, no photophobia with 60 Hz hypersensitivity.  Eyes: Pupils are equal, round and reactive to light; Extraocular movements are intact; Normal conjunctiva; no nystagmus; Visual fields are intact bilaterally in all cardinal directions; Head thrust negative bilaterally. VOR cancellation WNL  HENT: Normocephalic, Rinne test positive bilaterally, Oral mucosa is moist, No pharyngeal erythema.  Neck: Supple  No Stiffness, Patient has occipital point tenderness over the greater and lesser  "occipital nerve left side without induction of headaches: trace left side only  No high, medial cervical pain with lateral movement of C1 over C2 and with isometric neck flexion and extension  Fluid patient turnaround with concurrent neck movement in direction of torso movement.  Head turned to left and tilted with right ear towards left shoulder  Cardiovascular: Normal rate, Regular rhythm, No murmur, No edema; no carotid bruits noted.  Musculoskeletal: No swelling.  Spine/torso exam: Spine/ torso exam is within normal limits   Integumentary: Warm, Dry, Intact, No pallor, No rash.    Neurologic Exam  Mental Status: orientated to time, person, and place; good recent and remote memory; attention and concentration WNL; naming intact; adequate fund of knowledge. No aphasia or dysarthria. Repetition intact. Follows complex commands    Cranial Nerves: as above, V1-V3 temperature sensation WNL bilaterally, face symmetric, symmetrical palatal rise, SCM 5/5 bilaterally, tongue protrusion midline and movements WNL  saccadic intrusions of volitional ocular smooth pursuits  no saccadic dysmetria  no pain with sustained upgaze and convergence  no visual motion sensitivity/dizziness produced with rapid eye movements or neck movements  non-lateralizing Schilling tuning fork exam  no convergence insufficiency with no diplopia developed > 5 " accommodation    Muscle Tone/Motor Function: Normal bulk and tone throughout. No drift. Normal rapidly alternating movements. No tremors. No abnormal movements                                                                                                          Right                   Left                                  Deltoid          5/5                      5/5                                  Biceps          5/5                      5/5                                  Triceps         5/5                      5/5                                  Iliopsoas       5/5                     " 5/5                                  Quadriceps   5/5                     5/5                                  Hamstring     5/5                     5/5                                  Dorsiflexion   5/5                     5/5    Sensory: Vibration sensation WNL x4, Temperature sensation WNL x4, Negative Romberg, no falls on tandem stance    Reflexes: Symmetrical DTR's, Biceps 1+, Brachioradialis 1+, Patellar 1+, No Wartenberg or Lhermitte    Coordination: No truncal ataxia. Finger to nose WNL bilaterally    Gait: Gait WNL, Heel to toe walking WNL    Labs:    Hospital Outpatient Visit on 06/29/2023   Component Date Value Ref Range Status    Left ICA/CCA ratio 06/29/2023 1.05   Final    Right ICA/CCA ratio 06/29/2023 1.05   Final    Left Highest ICA 06/29/2023 113.00   Final    Left Highest CCA 06/29/2023 125   Final    Right Highest ICA 06/29/2023 85.00   Final    Right Highest CCA 06/29/2023 129   Final    Right Highest EDV 06/29/2023 25.00   Final    LT Highest EDV 06/29/2023 31.00   Final    Right CCA prox sys 06/29/2023 129  cm/s Final    Right CCA prox marshall 06/29/2023 10  cm/s Final    Right CCA dist sys 06/29/2023 81  cm/s Final    Right CCA dist marshall 06/29/2023 15  cm/s Final    Right ICA prox sys 06/29/2023 68  cm/s Final    Right ICA prox marshall 06/29/2023 19  cm/s Final    Right ICA mid sys 06/29/2023 85  cm/s Final    Right ICA mid marshall 06/29/2023 25  cm/s Final    Right ECA sys 06/29/2023 142  cm/s Final    Right vertebral sys 06/29/2023 39  cm/s Final    Left CCA prox sys 06/29/2023 125  cm/s Final    Left CCA prox marshall 06/29/2023 15  cm/s Final    Left CCA dist sys 06/29/2023 108  cm/s Final    Left CCA dist marshall 06/29/2023 18  cm/s Final    Left ICA prox sys 06/29/2023 113  cm/s Final    Left ICA prox marshall 06/29/2023 17  cm/s Final    Left ICA mid sys 06/29/2023 97  cm/s Final    Left ICA mid marshall 06/29/2023 26  cm/s Final    Left ICA dist sys 06/29/2023 104  cm/s Final    Left ICA dist marshall  06/29/2023 31  cm/s Final    Left ECA sys 06/29/2023 110  cm/s Final    Left vertebral sys 06/29/2023 43  cm/s Final    Right arm systolic blood pressure 06/29/2023 113  mmHg Final    Right arm diastolic blood pressure 06/29/2023 57  mmHg Final    Left arm systolic blood pressure 06/29/2023 109  mmHg Final    Left arm diastolic blood pressure 06/29/2023 51  mmHg Final    Left CCA mid sys 06/29/2023 122  cm/s Final    Left CCA mid marshall 06/29/2023 16  cm/s Final    Right CCA mid sys 06/29/2023 99  cm/s Final    Right CCA mid marshall 06/29/2023 10  cm/s Final    Right vertebral marshall 06/29/2023 5  cm/s Final    Right ECA marshall 06/29/2023 11  cm/s Final    Left vertebral marshall 06/29/2023 10  cm/s Final    Left ECA marshall 06/29/2023 8  cm/s Final    Left CBA sys 06/29/2023 93  cm/s Final    Left CBA marshall 06/29/2023 16  cm/s Final    Rigth CBA sys 06/29/2023 84  cm/s Final    Right CBA marshall 06/29/2023 14  cm/s Final   Lab Visit on 06/14/2023   Component Date Value Ref Range Status    Cholesterol 06/14/2023 179  120 - 199 mg/dL Final    Comment: The National Cholesterol Education Program (NCEP) has set the  following guidelines (reference ranges) for Cholesterol:  Optimal.....................<200 mg/dL  Borderline High.............200-239 mg/dL  High........................> or = 240 mg/dL      Triglycerides 06/14/2023 61  30 - 150 mg/dL Final    Comment: The National Cholesterol Education Program (NCEP) has set the  following guidelines (reference values) for triglycerides:  Normal......................<150 mg/dL  Borderline High.............150-199 mg/dL  High........................200-499 mg/dL      HDL 06/14/2023 57  40 - 75 mg/dL Final    Comment: The National Cholesterol Education Program (NCEP) has set the  following guidelines (reference values) for HDL Cholesterol:  Low...............<40 mg/dL  Optimal...........>60 mg/dL      LDL Cholesterol 06/14/2023 109.8  63.0 - 159.0 mg/dL Final    Comment: The National Cholesterol  Education Program (NCEP) has set the  following guidelines (reference values) for LDL Cholesterol:  Optimal.......................<130 mg/dL  Borderline High...............130-159 mg/dL  High..........................160-189 mg/dL  Very High.....................>190 mg/dL      HDL/Cholesterol Ratio 06/14/2023 31.8  20.0 - 50.0 % Final    Total Cholesterol/HDL Ratio 06/14/2023 3.1  2.0 - 5.0 Final    Non-HDL Cholesterol 06/14/2023 122  mg/dL Final    Comment: Risk category and Non-HDL cholesterol goals:  Coronary heart disease (CHD)or equivalent (10-year risk of CHD >20%):  Non-HDL cholesterol goal     <130 mg/dL  Two or more CHD risk factors and 10-year risk of CHD <= 20%:  Non-HDL cholesterol goal     <160 mg/dL  0 to 1 CHD risk factor:  Non-HDL cholesterol goal     <190 mg/dL      TSH 06/14/2023 0.636  0.400 - 4.000 uIU/mL Final    Sodium 06/14/2023 141  136 - 145 mmol/L Final    Potassium 06/14/2023 4.3  3.5 - 5.1 mmol/L Final    Chloride 06/14/2023 105  95 - 110 mmol/L Final    CO2 06/14/2023 27  23 - 29 mmol/L Final    Glucose 06/14/2023 88  70 - 110 mg/dL Final    BUN 06/14/2023 14  8 - 23 mg/dL Final    Creatinine 06/14/2023 0.8  0.5 - 1.4 mg/dL Final    Calcium 06/14/2023 9.4  8.7 - 10.5 mg/dL Final    Total Protein 06/14/2023 7.4  6.0 - 8.4 g/dL Final    Albumin 06/14/2023 3.2 (L)  3.5 - 5.2 g/dL Final    Total Bilirubin 06/14/2023 0.5  0.1 - 1.0 mg/dL Final    Comment: For infants and newborns, interpretation of results should be based  on gestational age, weight and in agreement with clinical  observations.    Premature Infant recommended reference ranges:  Up to 24 hours.............<8.0 mg/dL  Up to 48 hours............<12.0 mg/dL  3-5 days..................<15.0 mg/dL  6-29 days.................<15.0 mg/dL      Alkaline Phosphatase 06/14/2023 100  55 - 135 U/L Final    AST 06/14/2023 11  10 - 40 U/L Final    ALT 06/14/2023 14  10 - 44 U/L Final    Anion Gap 06/14/2023 9  8 - 16 mmol/L Final    eGFR  06/14/2023 >60.0  >60 mL/min/1.73 m^2 Final   Office Visit on 05/29/2023   Component Date Value Ref Range Status    Molecular Strep A, POC 05/29/2023 Negative  Negative Final     Acceptable 05/29/2023 Yes   Final   Lab Visit on 01/26/2023   Component Date Value Ref Range Status    TSH 01/26/2023 1.208  0.400 - 4.000 uIU/mL Final    T3, Total 01/26/2023 101  60 - 180 ng/dL Final    Free T4 01/26/2023 0.82  0.71 - 1.51 ng/dL Final      I personally reviewed all current labs noted in today's chart.    Imaging:    I personally reviewed all diagnostic images and reports and agree with findings in the radiographical report as noted below unless otherwise specified.    MRI C-Spine 6/5/23:  FINDINGS:  Images are mildly degraded by patient motion artifact.     Alignment: Straightening of the normal cervical lordosis.     Vertebrae: Cervical vertebral body heights are maintained.  There are mild multilevel endplate degenerative changes throughout the cervical spine with endplate edema at C4-C5, C5-C6 and C7-T1.  No evidence of an acute fracture.  Marrow signal is otherwise within normal limits.     Discs: Disc desiccation throughout the cervical spine with height loss most pronounced at C5-C6, C6-C7 and C7-T1.     Cord: Multilevel ventral cord deformity without definite cord signal abnormality.     Skull base and craniocervical junction: Within normal limits.     Degenerative findings:     C2-C3: Mild broad-based disc osteophyte complex contacts the ventral cord without significant deformity contributing to minor spinal canal stenosis.  Prominent right-sided facet arthropathy with moderate right-sided neural foraminal stenosis.     C3-C4: Asymmetric right disc osteophyte complex contacts and flattens the right ventral cord contributing to mild spinal canal stenosis.  Right-sided facet arthropathy and uncovertebral joint spurring contribute to moderate right-sided neural foraminal stenosis.     C4-C5:  Broad-based disc osteophyte complex contacts and flattens the ventral cord.  Mild facet arthropathy, ligamentum flavum hypertrophy and right greater than left uncovertebral joint spurring contribute to mild-to-moderate spinal canal stenosis with moderate right and mild left-sided neural foraminal stenosis.     C5-C6: Broad-based disc osteophyte complex contacts and flattens the ventral cord.  Ligamentum flavum hypertrophy and uncovertebral joint spurring contribute to mild-to-moderate spinal canal stenosis with mild-to-moderate bilateral neural foraminal stenosis.     C6-C7: Minimal broad-based disc osteophyte complex is asymmetric to the right.  No ventral cord contact or spinal canal stenosis.  Right-sided uncovertebral joint spurring contributes to moderate to severe right-sided neural foraminal stenosis.     C7-T1: No significant disc osteophyte complex.  Left-sided facet arthropathy with mild left-sided neural foraminal stenosis.  No spinal canal stenosis.     T1-T2: Minor asymmetric left disc osteophyte complex.  No spinal canal stenosis or significant neural foraminal stenosis.     Paraspinal muscles & soft tissues: Within normal limits.     Impression:     Multilevel degenerative changes of the cervical spine are most pronounced at C4-C5 and C5-C6 with mild-to-moderate spinal canal stenosis and mild-to-moderate neural foraminal stenosis.     Mild C3-C4 spinal canal stenosis with moderate right-sided neural foraminal stenosis.     Minor C2-C3 spinal canal stenosis with moderate right-sided neural foraminal stenosis.    My read: No acute spinal cord abnormalities. Significant stenisos at right foraminal C2-C3, right C3-C4, central C4-C5, right foraminal C4-C5, central C5-C6, right foraminal C5-C6, right foraminal C6-C7    Assessment:       ICD-10-CM ICD-9-CM    1. Cervicalgia of [id number] region  M54.2 723.1       2. Cervicogenic headache  G44.86 784.0       3. Occipital neuritis  M54.81 723.8        4. Controlled type 2 diabetes mellitus without complication, without long-term current use of insulin  E11.9 250.00          63 y.o. female with h/o HTN, DM, anxiety, endometrial cancer s/p resection, ASHLI not on CPAP who presents for headache evaluation. On exam, she has occipital point tenderness over the greater and lesser occipital nerve left side without induction of headaches and head turned to the left with tilt towards right shoulder. We discussed her description is of occipital neuralgia and of dystonia. We discussed if occipital neuralgia treatment does not help will have her see dystonia specialist. We discussed at length about the anatomy, symptomology, and exam findings of occipital neuritis. We discussed the risks vs benefits of waiting vs acute therapy for occipital neuritis in that there is a risk of waiting for treatment in that permanent nerve damage could result as the nerve is chronically scarred into the muscle but there is no way to predict whether damage has already occurred until we start the treatment plan as described below. Counseled the patient that steroids suppress the immune response, which means that they are at increased risk for tab bacterial or viral infections including COVID19 and if they were to become infected, they may have a more severe disease course. We discussed that any form of steroids including oral or injectable should not be taken within 2 weeks of COVID19 vaccination/booster. The patient has elected to take steroids. The patient's last COVID19 vaccination/booster was more than 2 weeks ago.    Plan:     Medrol dose pack prescribed. Take as instructed on package insert.  Monitor sugar while on Medrol and if goes above 200, stop the Medrol  Blood-glucose meter kit ordered  The patient was instructed to ice the occipital region for no more than 20 minutes at least once a day but may repeat this as many times as they would like.   Discussed ergonomic  accommodations for occipital neuritis/neuralgia. Mainly perform all work at eye level to minimize continued neck flexion which will aggravate the nerve.  Patient was encouraged to do daily light cardio exercise but instructed to limit physical activities to walking, walking in water up to the waist only or riding a stationary bike, recumbent preferred. No weight lifting in upper body, no neck massage, no acupuncture of neck, and no dry needling of upper neck. No neck PT unless otherwise stated. Lower body strengthening with resistant bands, leg machines, and strapping weights to legs okay. No core body workouts. No running or use of cardio equipment other than stationary bike. No swimming or body surfing. No amusement park rides. No lifting more than 5-10 lbs and bend at the knees, not the waist.  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 To be referred for consultation for occipital nerve release procedure if initially clinically responsive to injections but always with a return of symptoms.  No suboccipital neck PT    Kirill Andrade MD  Sports Neurology

## 2023-08-08 ENCOUNTER — OFFICE VISIT (OUTPATIENT)
Dept: PRIMARY CARE CLINIC | Facility: CLINIC | Age: 63
End: 2023-08-08
Payer: COMMERCIAL

## 2023-08-08 VITALS
TEMPERATURE: 98 F | RESPIRATION RATE: 18 BRPM | HEART RATE: 103 BPM | OXYGEN SATURATION: 97 % | BODY MASS INDEX: 54.31 KG/M2 | DIASTOLIC BLOOD PRESSURE: 80 MMHG | WEIGHT: 287.69 LBS | HEIGHT: 61 IN | SYSTOLIC BLOOD PRESSURE: 132 MMHG

## 2023-08-08 DIAGNOSIS — G47.33 OSA (OBSTRUCTIVE SLEEP APNEA): ICD-10-CM

## 2023-08-08 DIAGNOSIS — E11.9 CONTROLLED TYPE 2 DIABETES MELLITUS WITHOUT COMPLICATION, WITHOUT LONG-TERM CURRENT USE OF INSULIN: ICD-10-CM

## 2023-08-08 DIAGNOSIS — E66.01 CLASS 3 SEVERE OBESITY WITH SERIOUS COMORBIDITY AND BODY MASS INDEX (BMI) OF 50.0 TO 59.9 IN ADULT, UNSPECIFIED OBESITY TYPE: ICD-10-CM

## 2023-08-08 DIAGNOSIS — I10 ESSENTIAL HYPERTENSION: ICD-10-CM

## 2023-08-08 PROCEDURE — 99214 PR OFFICE/OUTPT VISIT, EST, LEVL IV, 30-39 MIN: ICD-10-PCS | Mod: S$GLB,,, | Performed by: FAMILY MEDICINE

## 2023-08-08 PROCEDURE — 3075F SYST BP GE 130 - 139MM HG: CPT | Mod: CPTII,S$GLB,, | Performed by: FAMILY MEDICINE

## 2023-08-08 PROCEDURE — 3079F PR MOST RECENT DIASTOLIC BLOOD PRESSURE 80-89 MM HG: ICD-10-PCS | Mod: CPTII,S$GLB,, | Performed by: FAMILY MEDICINE

## 2023-08-08 PROCEDURE — 3079F DIAST BP 80-89 MM HG: CPT | Mod: CPTII,S$GLB,, | Performed by: FAMILY MEDICINE

## 2023-08-08 PROCEDURE — 99999 PR PBB SHADOW E&M-EST. PATIENT-LVL V: ICD-10-PCS | Mod: PBBFAC,,, | Performed by: FAMILY MEDICINE

## 2023-08-08 PROCEDURE — 99214 OFFICE O/P EST MOD 30 MIN: CPT | Mod: S$GLB,,, | Performed by: FAMILY MEDICINE

## 2023-08-08 PROCEDURE — 3008F PR BODY MASS INDEX (BMI) DOCUMENTED: ICD-10-PCS | Mod: CPTII,S$GLB,, | Performed by: FAMILY MEDICINE

## 2023-08-08 PROCEDURE — 3008F BODY MASS INDEX DOCD: CPT | Mod: CPTII,S$GLB,, | Performed by: FAMILY MEDICINE

## 2023-08-08 PROCEDURE — 4010F ACE/ARB THERAPY RXD/TAKEN: CPT | Mod: CPTII,S$GLB,, | Performed by: FAMILY MEDICINE

## 2023-08-08 PROCEDURE — 99999 PR PBB SHADOW E&M-EST. PATIENT-LVL V: CPT | Mod: PBBFAC,,, | Performed by: FAMILY MEDICINE

## 2023-08-08 PROCEDURE — 3075F PR MOST RECENT SYSTOLIC BLOOD PRESS GE 130-139MM HG: ICD-10-PCS | Mod: CPTII,S$GLB,, | Performed by: FAMILY MEDICINE

## 2023-08-08 PROCEDURE — 4010F PR ACE/ARB THEARPY RXD/TAKEN: ICD-10-PCS | Mod: CPTII,S$GLB,, | Performed by: FAMILY MEDICINE

## 2023-08-08 RX ORDER — TRETINOIN 0.25 MG/G
CREAM TOPICAL NIGHTLY
Qty: 20 G | Refills: 6 | Status: SHIPPED | OUTPATIENT
Start: 2023-08-08

## 2023-08-08 NOTE — PROGRESS NOTES
Subjective Pmhx, fam hx, soc hx, surg hx, allergies, med list reviewed  Referring MD: Meagan  PCP: same  BMI noted 54  Diet: variable  Exercise/Activity:  Sleep: fair  Stressors: health  Anxiety/Depression Screen/PHQ-2: stable    From [place], lives in [facility] MD s in [employer] reviewed: dw pt      Pt has seen Cardiology; Dr. Flaherty    Patient Active Problem List   Diagnosis    Controlled type 2 diabetes mellitus without complication, without long-term current use of insulin    Vitamin D insufficiency    SOB (shortness of breath)    Morbid obesity due to excess calories    Edema leg    ASHLI (obstructive sleep apnea)    Essential hypertension    Uterine polyp    Simple endometrial hyperplasia    Endometrial ca    s/p RALH/BSO 8/26/2019    Decreased strength, endurance, and mobility    Decreased range of motion    Gait abnormality    Neck pain    Decreased functional activity tolerance    Abnormal posture      Patient ID: Tawny Valerio is a 63 y.o. female.    Chief Complaint:     Weight gain, diabetes    On wellbutrin and jardiance    Has seen Dr. Jefferson in [place]. Had ER visist 6/23 with vomiting and fever so stopped the ozempic. She felt better when was on lower dose ozempic and had possible reaction.   Pt goal weight 200 lb. She overall feels well when weight in 260s. This was approaching lowest weight when she had her daughter. + Fam hx of DM II, pre diabetes.    Pt has contemplated bariatric surgery but is hesitant due to the collective experiences of her family/friends. She is concerned that getting too thin will necessitate another surgery to remove excess skin.     Pt has hx of occipital neuralgia; seeing a specialist in [place]. Going to PT here. Has had trigger point injections. Not able to do upper body exercises       Pt tried glp-1 and she feels like has had reaction to ozempic. Was having constipation, cramping. Had to take stool softeners. Also had hemorrhoid. She was  on 2 mg. At that time stopped and has improved. She gained weight. Some joint pain.     ON jardiance. Tolerating. She has trouble checking her sugars/trying to figure out home glucometer. She plans to watch you tube video.    Still on low dose wellbutrin. Not a candidate for naltrexone.  No hx of CAD. She is on bp med/has been controlled. She does have a cardiologist.       Wt Readings from Last 3 Encounters:   08/08/23 1032 130.5 kg (287 lb 11.2 oz)   07/17/23 1046 125.6 kg (277 lb)   06/21/23 0939 126 kg (277 lb 10.7 oz)      Pt at times feels like sugar is lower. Possible reactive hypoglycemia.      Food recall:  Bfast: oatmeal, fruit or sandwich  Lunch: salad or vegetable, leftovers  Dinner: grilled meats  Snack: popcorn, rice cakes, veggie tray  Water: adequate  Sugar Sweetened beverages: none  Etoh: none       HPI  Review of Systems   Constitutional:  Negative for activity change, appetite change, fatigue and fever.   HENT:  Negative for mouth dryness and goiter.    Eyes:  Negative for visual disturbance.   Respiratory:  Negative for apnea, cough, chest tightness and shortness of breath.    Cardiovascular:  Negative for chest pain, palpitations and leg swelling.   Gastrointestinal:  Negative for abdominal pain, constipation and diarrhea.   Endocrine: Negative for cold intolerance, heat intolerance, polydipsia, polyphagia and polyuria.   Genitourinary:  Negative for frequency and menstrual problem.   Musculoskeletal:  Negative for arthralgias and myalgias.   Integumentary:  Negative for color change and rash.   Psychiatric/Behavioral:  Negative for sleep disturbance. The patient is not nervous/anxious.           Objective     Physical Exam  Vitals and nursing note reviewed.   Constitutional:       General: She is not in acute distress.  HENT:      Head: Normocephalic and atraumatic.      Mouth/Throat:      Pharynx: Oropharynx is clear.   Eyes:      General: No scleral icterus.     Pupils: Pupils are equal,  round, and reactive to light.   Neck:      Comments: No TM  Cardiovascular:      Rate and Rhythm: Normal rate and regular rhythm.      Pulses: Normal pulses.      Heart sounds: Normal heart sounds. No murmur heard.     No friction rub. No gallop.   Pulmonary:      Effort: Pulmonary effort is normal. No respiratory distress.      Breath sounds: Normal breath sounds. No wheezing, rhonchi or rales.   Abdominal:      General: Bowel sounds are normal. There is no distension.      Palpations: Abdomen is soft.      Tenderness: There is no abdominal tenderness.   Musculoskeletal:         General: No swelling.      Cervical back: Normal range of motion and neck supple. No tenderness.   Lymphadenopathy:      Cervical: No cervical adenopathy.   Skin:     General: Skin is warm.      Capillary Refill: Capillary refill takes less than 2 seconds.      Findings: No erythema or rash.   Neurological:      Mental Status: She is alert and oriented to person, place, and time.   Psychiatric:         Mood and Affect: Mood normal.         Behavior: Behavior normal.            Assessment and Plan     ICD-10-CM ICD-9-CM   1. Controlled type 2 diabetes mellitus without complication, without long-term current use of insulin  E11.9 250.00   2. Class 3 severe obesity with serious comorbidity and body mass index (BMI) of 50.0 to 59.9 in adult, unspecified obesity type  E66.01 278.01    Z68.43 V85.43   3. Essential hypertension  I10 401.9   4. ASHLI (obstructive sleep apnea)  G47.33 327.23          Controlled type 2 diabetes mellitus without complication, without long-term current use of insulin  -     Ambulatory Referral/Consult to Nutrition Services - [facility]; Future; Expected date: 08/15/2023  -     Ambulatory referral/consult to Diabetic Advanced Practice Providers (Medical Management); Future; Expected date: 08/17/2023  Pt had possible reaction to glp-1; could benefit from consideration of CGM as well as possibly another  class/?Mounjaro    Class 3 severe obesity with serious comorbidity and body mass index (BMI) of 50.0 to 59.9 in adult, unspecified obesity type  -     Ambulatory referral/consult to [facility]  -     Ambulatory Referral/Consult to Nutrition Services - [facility]; Future; Expected date: 08/15/2023  -     Ambulatory referral/consult to Diabetic Advanced Practice Providers (Medical Management); Future; Expected date: 08/17/2023    Essential hypertension  Chronic/stable    ASHLI (obstructive sleep apnea)  Chronic/stable     Diabetes referral  Nutrition referral     ? Dr Andrade--would pt benefit from a low dose topamax? May help with weight  DW pt defer to his expertise

## 2023-08-08 NOTE — PATIENT INSTRUCTIONS
"  Pair snacks with protein           PRODUCE  [] All fresh fruit   [] All fresh vegetables   [] All fresh herbs  [] All herb purees + pastes  [] Pre-spiralized vegetable noodles   [] Steam-In-The-Bag begetables  [] Riced cauliflower  [] Jicama sticks  [] Love Beets  all varieties  [] Wholly Guacamole  all varieties  [] Hummus  all varieties, chickpea + vegetable  [] Tofu Shirataki noodles    [] Tofu  all varieties  [] Tempeh  all varieties    PROTEIN  CHICKEN   [] Boneless, skinless breasts  [] Boneless, skinless thighs  [] Ground chicken breast, at least 93% lean  [] Chicken breast cutlet  [] Aidell's  Chicken Sausage + Chicken Meatballs    TURKEY   [] Turkey breast tenderloin   [] Ground turkey breast, at least 93% lean  [] Corea Naturals  Turkey Sausage    BEEF  [] Tenderloin  [] Sirloin  [] Top Loin  [] Flank Steak  [] Round Steak  [] Filet  [] Lean ground beef, at least 93% lean + grass-fed preferable    PORK  [] Tenderloin  [] Pork Chop  [] Center Cut  [] Sukhdeep Naturals  No-Sugar Evangelista    BISON  [] Caneadea  90 - 95% lean    SEAFOOD  [] All fresh fish + seafood; locally sourced when possible  [] Smoked salmon    HEAT + EAT ENTREES   [] Cristian's Natural Foods  Chicken, Pork, Beef  [] Tj  "All Natural" Grilled Chicken Breast + Strips, all varieties    SAUCES SPREADS + DIPS  [] Bitchin Sauce  Original, Chipotle, Cilantro [place]  [] Bibi's Kitchen  Tzatziki Yogurt Dip, Babaganoush, Hummus  [] Wholly Guacamole  all varieties  [] Hummus  all varieties  [] [place] Gringo Salsa  all varieties  [] Mrs. Luis's Salsa  all varieties  [] Stubb's All Natural BBQ Sauce  [] Primal Kitchen  Kellogg, Ketchup, BBQ Sauce  [] Primal Kitchen Pasta Sauce  Roasted Garlic, Tomato Basil, no-dairy Vodka Sauce  [] Sal & Marcella's  HeartSmart Pasta Sauce    DAIRY/DAIRY SUBSTITUTES/EGGS  EGGS   [] All eggs  cage-free, pasture-raise preferable  [] Susan  egg wraps  [] [facility]  Pasture-Raised Egg " Bites  [] JUST Egg [vegan]     CREAMERS   [] Califia  Better Half, original + vanilla unsweetened  [] NutPods  all varieties    MILK   [] Horizon Organic  all varieties except chocolate  [] Organic Valley  all varieties except chocolate  [] Organic Valley  ultra-filtered, reduced fat milk     PLANT_BASED MILK ALTERNATIVES  [] All unsweetened almond milks  original, vanilla + chocolate  [] Ripple  unsweetened   [] Milkadamia  original +_ vanilla, unsweetened   [] Forager  original + vanilla, unsweetened   [] Silk Organic  soy milk, unsweetened  [] Oatly  unsweetened  [] Califia  regular + protein-fortified oat milk, unsweetened     CHEESES  [] Regular or reduced fat cheeses  [] BelGioso  Fresh Mozzarella Snack Packs, Parmesan Power-full Snack   [] Goat cheese  [] Fresh mozzarella  [] String cheese  all varieties  [] Rin Cottage Cheese  [] Chinyere's Cultured Cottage Cheese  [] Vicenta Life 'Just Like Mozzarella'  plant-based shreds and other varieties  [] Parmela Creamery  plant-based shredded cheese    YOGURT  [] Fage  2% low-fat, plain  [] Siggi's  plain, vanilla  [] Chobani Greek  nonfat + whole milk yogurt, plain   [] Chobani Less Sugar  all flavored varieties   [] Oikos Greek  nonfat, plain  [] Two Good  all varieties   [] [facility] Provisions  plain  [] [place]aby Organic  low-fat + nonfat, plain  [] [facility]  goat milk yogurt, plain  [] Kefit  unsweetened, plain  [] Forager  Greek style unsweetened, plain [dairy-free]  [] [place]  unsweetened Greek style, plain [dairy-free]  [] [facility]  almond milk yogurt, vanilla or plain, unsweetened [dairy-free]    FREEZER SECTION  FROZEN VEGGIES  [] All plain frozen veggies + greens [e.g. broccoli, brussels, carrots, okra, mushrooms, zucchini, yellow squash, butternut squash, kale, spinach, abby greens]  [] Riced veggies [e.g. cauliflower, broccoli, butternut squash]  [] Edamame  all varieties  [] Green Giant  [] Vejeremiahie Spirals  []  Marinated Veggies [e.g. eggplant, peppers, zucchini]  [] Simply Steam Altus Sprouts  [] Birds Eye  [] Power Blend Italian Style  lentils, broccoli, kale, zucchini  []  Altus Sprouts & Carrots  [] Oven Roasters Broccoli & Cauliflower  [] California Blend  [] Tattooed   [] Green Bean Blend  [] Farmer's Market Ratatouille  [] Butter Balsamic Glazed Vegetables  [] Riced Cauliflower & Quinoa Mediterranean Style  [] Pamela's Good Life  Southern Style Greens [sauteed kale + onion]    FROZEN FRUITS  [] All unsweetened frozen fruits  all varieties  [] Dole Fruits & Veggie Blends  Berries 'n Kale  [] Dole Mix-ins  Triple Berry     FROZEN ENTREES  [] The Good Kitchen meals  all varieties [ e.g. Chili Lime Chicken Over Riced Cauliflower]  [] Premium Paleo  Not Clayton Momma's Meatloaf  [] Primal Kitchen  Chicken Pesto + Steak Fajitas w/ Peppers & Onions  [] Eating Well Frozen Entrees  Butter Chicken Masala, Steak Carne Asada, Creamy Pesto Chicken, Chicken + Wild Rice Stroganoff, Yellow Sinha Chicken, Sun-dried Tomato Chicken, Chicken Lo Mein  [] Realgood Entree Bowls  Dutch Inspired Beef Bowl over Riced Cauliflower, Chicken Burrito Bowl   [] Great Karma Coconut Sinha  [] Laine's  Tamale [place] with Black Beans, Vegetable Lasagna  [] Kashi Mayan Mountain Rest Bake  [] Healthy Choice  Simply Steamers Chicken Fried Rice  [] Basil Pesto Chicken & French Style Pork Power Bowls  [] Tattooed   Enchilada Bowl  [] Kelly Farms  Spicy Black Bean Burgers    FROZEN PIZZAS  [] Cauli'flour Foods  Cauliflower Pizza Crusts  [] Outer Aisle  Cauliflower Crust  [] Laine's  Veggie Crust Cheese Pizza  [] Quest Pizza     VEGETARIAN PRODUCTS  [] Beyond Meat  ground 'meat' + grilled 'chicken' strips  [] Tofurkey  Original Italian Sausage + Original Tempeh  [] Salvador  Beefless Ground + Meatless Meatballs  [] [facility]  Original Burger, Crumbles, Meatballs    ICE CREAMS + FROZEN DESSERTS  [] Halo  Top  regular + keto series, pops  [] Rebel  ice cream + dessert sandwiches  [] Enlightened  ice cream + bars  [] Nightfood  ice cream  [] Realgood  ice cream  [] Arctic Zero Fit  frozen pint  [] The Frozen Farmer  sorbets  [] Wholly Rollies  Protein Balls, all varieties  [] Dream Pops  Coconut Latte    FROZEN BREAKFASTS  [] Realgood  Breakfast Sandwiches on Cauliflower Cheesy Bread  [] Rebel  ice cream + dessert sandwiches  [] Enlightened  ice cream + bars  [] Nightfood  ice cream  [] Realgood  ice cream  [] Arctic Zero Fit  frozen pint  [] The Frozen Farmer  sorbets  [] Wholly Rollies  Protein Balls, all varieties  [] Dream Pops  Coconut Latte    BREADS/BUNS/WRAPS  [] Joni Bread: All Types - In Freezer Section   [] Flat Out Light Wraps - All Varieties   [] Flat Out Protein Up Carb Down Flat Bread   [] Kontos Whole Wheat Pocket Ashley   [] Jack BISHNU 100% Whole Wheat Tortillas   [] LaTortilla Factory Tortillas - Smart & Delicious; 50 or 80-calorie   [] Nature's Own 100% Whole Wheat Bread   [] Orowheat Healthful - 100% Whole Wheat Slice Bread and Golden Thins   [] Orowheat Healthful - Whole Wheat Nuts & Grain Bread; Flax & Seed Bread   [] Pepperidge Farm Natural Whole Grain 15 Bread   [] Pepperidge Farm Natural Whole Grain English Muffin - 100% Whole Wheat   [] Pepperidge Farm Very Thin 100% Whole Wheat   [] Tessy Rudolph 45 Calories and Delightful   [] Richard' 100% Whole Wheat Thin-Sliced Bagels and English Muffins   [] Western Bagel: Perfect 10     GLUTEN FREE  [] Khoi's Gluten Free Bread   [] Scurry Bakehouse 7-Grain Gluten Free Bread     LEGUME PASTA   [] Explore Asian Organic Black Bean Spaghetti   [] Modern Table   [] Tolerant Foods       NUT BUTTERS & JELLIES    NUT BUTTERS   [] Better'n Chocolate: Coconut Chocolate Peanut Butter Spread   [] Better'n Peanut Butter - All Types   [] Earth Balance Coconut and Peanut Spread   [] Lorne's Nut [place]   [] MaraNatha: All Natural Roasted Cashew Butter -  [place] or Creamy   [] MaraNatha: Roasted Peanut Butter   [] Nuts 'N More Peanut [place] - All Flavors   [] PB2 Powder - Original or Chocolate   [] Skippy Natural - Creamy, Super Chunk   [] Smart Balance Peanut Butter - [place] or Creamy   [] Peanut Butter & Company:   [] Smooth , Crunch Time, The Heat Is On, Old Fashioned Smooth, Mighty Nut- Powdered Peanut Butter, Squeeze Pack   [] Smucker's Natural Peanut Butter - [place] or Creamy   [] Sunbutter Nut Butter   [] Wild Friends Protein Peanut Butter/Medicine Bow o Butter - Vanilla or Chocolate     JELLIES  o Polaner's All Fruit   o Clearly Organic Best Choice: Strawberry Fruit Spread       SNACKS    BARS  [] Kashi Bars - Chewy or Crunchy; Honey Medicine Bow o Flax or Peanut Butter   [] KIND Bars - 5 Grams of Sugar or Less   [] KIND Protein Bars - Strong and KIND   [] Nature Valley Protein Bar - All Varieties   [] Nature Valley Roasted Nut Crunch - Medicine Bow Crunch; Peanut Crunch   [] [facility] Simple Nut Bar - Roasted Peanut & Honey   [] [facility] Simple Nut Bar - [place] Bow, Cashew & Sea Salt   [] [facility] Nut [place] Bar - Salted Caramel Peanut   [] Think Thin Protein Bars   [] Quest Bars, Power Crunch Bars, Pure Protein Bars     BEEF JERKY - NITRATE FREE   [] Game On   [] Grass Run Farms   [] Krave   [] Ostrim   [] Perky Jerky   [] Primal Strips Meatless Vegan Jerky   [] [place]     CHIPS   [] Beanitos Chips   [] Fruit Crisps - e.g. Brother's-All-Natural, Bare Fruit, Yoga Chips   [] Anne Marie's Soy Crisps: 1.3 ounce bag   [] Quest Protein Chips   [] Wasa Whole Wheat Crisp Bread     CRACKERS  [] Laura's Gone Crackers   [] Nabisco Triscuit: Regular and Thin Crisp Crackers   [] Vans Say Cheese Crackers (G-F)     POPCORN/NUTS   [] Alfred Flores's Smart Pop Popcorn - Single Serving   [] 100-Calorie Pack of Nuts - All Varieties     PROTEIN POWDERS & DRINKS  []  Protein -  Whey Protein Powder   [] Garden of Life Raw Protein Powder   [] Iconic  Ready-To-Drink Protein Drink   [] Park One Protein Powder   [] VegaSport Protein Powder     SALSA/HUMMUS/DIPS   [] Eat Well Embrace Life: Zesty Sriracha Carrot o Hummus   [] Pre-Portioned Guacamole Packs   [] Sean's   [] Tostitos Restaurant Style Salsa       SOUPS   [] Laine's Organic Soups - Lentil, Vegetable, Split Pea, Low-Sodium     CANNED GOODS   [] 100% Pure Pumpkin   [] BlueRunner Creole Cream-Style Red Beans or Navy Beans   [] Cajun Power Chicken Gumbo Base   [] Chicken of the Sea [place] Niles   [] Edna Fresh Cut Sliced Beets   [] Hormel Breast of Chicken in Water   [] LeSuer Tender Baby Whole Carrots   [] Macklbuzz Tabasco Gaines Starter   [] StarKist: Chunk Lite Tuna in Water, Gourmet Select Pouches   [] StarKist: Yellowfin Tuna Fillets   [] Trappey's: Kidney, Butter, Dixon, Black Eye, Field, and Black Beans   [] STEVE Castelan's Turnip Greens or Dionisio Spinach     CONDIMENTS/ SAUCES/SPREADS/ SPICES  [] Antoni Prescott's Magic Seasonings - Regular or Salt Free   [] Neelam Wolf's Sauces - All Flavors   [] Laughing Cow Light - All Flavors   [] Dash Salt-Free Marinade - All Flavors   [] Kale & Marcella's: Heart Smart Pasta Sauces   [] Tabasco     SALAD DRESSINGS  [] Paulina's Naturals: Lite Honey Mustard   [] Daniel's Own: Lighten Up Salad Dressing - All Varieties   [] OPA Greek Yogurt Dressings - Ranch, Blue o Cheese, Caesar, Feta Dill     SWEETENERS  [] Sweet [place] Sweetener   [] Swerve   [] Truvia     BEVERAGES  [] Coconut Water   [] Crystal Light PURE - All Flavors   [] Honest Tea: Just Green Tea, Unsweetened   [] Kombucha Tea   [] [place]   [] [place] Sisters Bloody Laura Mix   [] Metromint - Zero-Sugar; All Natural Flavored   [] Vazquez - Plain or Flavored   [] Rosie Gabriel   [] Steaz - Zero-Sugar, All-Natural, Sparkling Tea   [] Tea Bags: Any Brand - e.g. Charly, Yogi, Tazzo, Celestial   [] V8 100% Vegetable Juice   [] Vitamin Water Zero   [] Water   [] Zevia - Stevia Sweetened Soft Drink      BEER/YANN/LIQUORS  []Jimenez's Premier Light 64 Calories   [] Bud Select - 55 Calories   [] [facility] Bloody Laura Mix   [] Wilson Genuine Draft - 64 Calories   [] Red or White Wine - All Varieties     CEREALS: HOT/COLD   [] Seema's Puffin's Original Cereal  [] Maty's Mill Oat Bran Hot Cereal - Cracked Wheat, Multi-Grain  [] Kashi GoLean Cereal  [] Kashi GoLean Hot Cereal packets - Vanilla; Honey Cinnamon  [] Jossie's Special K Protein Cereal  [] Moises's Steel Cut Portuguese Oatmeal  [] Nature's Path Smart Bran  [] Muslim Instant Oatmeal packet, Original  [] Muslim Old Fashioned Muslim Oats  [] Uncle Jaison's Whole Wheat & Flaxseed Original Cereal

## 2023-08-09 ENCOUNTER — PATIENT MESSAGE (OUTPATIENT)
Dept: PRIMARY CARE CLINIC | Facility: CLINIC | Age: 63
End: 2023-08-09
Payer: COMMERCIAL

## 2023-08-09 ENCOUNTER — OFFICE VISIT (OUTPATIENT)
Dept: NEUROLOGY | Facility: CLINIC | Age: 63
End: 2023-08-09
Payer: COMMERCIAL

## 2023-08-09 VITALS — HEART RATE: 88 BPM | SYSTOLIC BLOOD PRESSURE: 130 MMHG | DIASTOLIC BLOOD PRESSURE: 78 MMHG

## 2023-08-09 DIAGNOSIS — M54.2 CERVICALGIA OF OCCIPITO-ATLANTO-AXIAL REGION: Primary | ICD-10-CM

## 2023-08-09 DIAGNOSIS — G44.86 CERVICOGENIC HEADACHE: ICD-10-CM

## 2023-08-09 DIAGNOSIS — M54.81 OCCIPITAL NEURITIS: ICD-10-CM

## 2023-08-09 PROCEDURE — 1159F MED LIST DOCD IN RCRD: CPT | Mod: CPTII,S$GLB,, | Performed by: PSYCHIATRY & NEUROLOGY

## 2023-08-09 PROCEDURE — 99999 PR PBB SHADOW E&M-EST. PATIENT-LVL III: CPT | Mod: PBBFAC,,, | Performed by: PSYCHIATRY & NEUROLOGY

## 2023-08-09 PROCEDURE — 99214 OFFICE O/P EST MOD 30 MIN: CPT | Mod: S$GLB,,, | Performed by: PSYCHIATRY & NEUROLOGY

## 2023-08-09 PROCEDURE — 3078F DIAST BP <80 MM HG: CPT | Mod: CPTII,S$GLB,, | Performed by: PSYCHIATRY & NEUROLOGY

## 2023-08-09 PROCEDURE — 1160F PR REVIEW ALL MEDS BY PRESCRIBER/CLIN PHARMACIST DOCUMENTED: ICD-10-PCS | Mod: CPTII,S$GLB,, | Performed by: PSYCHIATRY & NEUROLOGY

## 2023-08-09 PROCEDURE — 3075F PR MOST RECENT SYSTOLIC BLOOD PRESS GE 130-139MM HG: ICD-10-PCS | Mod: CPTII,S$GLB,, | Performed by: PSYCHIATRY & NEUROLOGY

## 2023-08-09 PROCEDURE — 4010F ACE/ARB THERAPY RXD/TAKEN: CPT | Mod: CPTII,S$GLB,, | Performed by: PSYCHIATRY & NEUROLOGY

## 2023-08-09 PROCEDURE — 1159F PR MEDICATION LIST DOCUMENTED IN MEDICAL RECORD: ICD-10-PCS | Mod: CPTII,S$GLB,, | Performed by: PSYCHIATRY & NEUROLOGY

## 2023-08-09 PROCEDURE — 99999 PR PBB SHADOW E&M-EST. PATIENT-LVL III: ICD-10-PCS | Mod: PBBFAC,,, | Performed by: PSYCHIATRY & NEUROLOGY

## 2023-08-09 PROCEDURE — 3078F PR MOST RECENT DIASTOLIC BLOOD PRESSURE < 80 MM HG: ICD-10-PCS | Mod: CPTII,S$GLB,, | Performed by: PSYCHIATRY & NEUROLOGY

## 2023-08-09 PROCEDURE — 3075F SYST BP GE 130 - 139MM HG: CPT | Mod: CPTII,S$GLB,, | Performed by: PSYCHIATRY & NEUROLOGY

## 2023-08-09 PROCEDURE — 99214 PR OFFICE/OUTPT VISIT, EST, LEVL IV, 30-39 MIN: ICD-10-PCS | Mod: S$GLB,,, | Performed by: PSYCHIATRY & NEUROLOGY

## 2023-08-09 PROCEDURE — 1160F RVW MEDS BY RX/DR IN RCRD: CPT | Mod: CPTII,S$GLB,, | Performed by: PSYCHIATRY & NEUROLOGY

## 2023-08-09 PROCEDURE — 4010F PR ACE/ARB THEARPY RXD/TAKEN: ICD-10-PCS | Mod: CPTII,S$GLB,, | Performed by: PSYCHIATRY & NEUROLOGY

## 2023-08-09 RX ORDER — METHYLPREDNISOLONE 4 MG/1
TABLET ORAL
Qty: 1 EACH | Refills: 0 | Status: SHIPPED | OUTPATIENT
Start: 2023-08-09 | End: 2023-10-25

## 2023-08-09 NOTE — PROGRESS NOTES
Chief Complaint: Headache    Subjective:     History of Present Illness    Referring Provider: Self Referral  Accompanied by: No one     07/17/2023: Tawny Valerio is a 63 y.o. female with h/o HTN, DM, anxiety, endometrial cancer s/p resection, ASHLI not on CPAP who presents for headache evaluation. Neck pain started 5 years ago and in 4/2022 she bought a neck massager and in 12/2022 she started getting left occipital pain and in 2/2023 she felt a pop in neck that she hear in head and neck spasmed and turned her head to the right and start neck PT in 2/2023 with massage and exercise and improved and had to increase gabapentin that she was already on for neuropathy but increased due to neck pain. She feels that she needs to hold neck up and she will have episodes of involuntary right head turning. Headaches are constant with varying intensity, pressure, aching, throbbing sometimes, burning sometimes, sharp sometimes, left side, left occipital. She has left lower neck pain. She states that her eyes will start to close when pain is bad and hard to keep them open. She denies associated photophobia, phonophobia, weakness, numbness, tingling, dizziness, N/V, change in vision. She has tingling in hands that she states is from neuropathy. She denies triggers. She denies an inciting etiology including no injury, health change, or medication change when above pain symptoms started. She has tried a cervical pillow and is sleeping well and wakes up rested. She states she cannot tolerate CPAP mask and has not been told recently if snores or has apnea but she has woken up at night feeling that she cannot breath. Headache improves lying down improves headache and vasal vagal maneuvers do not significantly worsen headaches. She denies headaches waking her up and will wake up with headache. Mood is good. Gabapentin and naprosyn helps a little. She has tried heating and icing neck. Neck PT only provides temporary benefit. She has  tried neck collar, massager device. She states that she had steroids while used to be on Ozempic and tolerated them okay but does not have a home test kit.    08/09/2023: Tawny Valerio is a 63 y.o. female with h/o HTN, DM, anxiety, endometrial cancer s/p resection, ASHLI not on CPAP, occipital neuritis s/p medrol dose pack x1 who presents for follow up. On last visit, patient was prescribed a Medrol dose pack and to monitor glucose and ordered glucometer and started on ice therapy, ergonomics, and exercise restrictions and counseled on no suboccipital PT. She states that she is doing better with head pain and has improved spasms but still has some pain. She feels better wearing a neck collar. She had a new pain that was left check to left lower jaw that was stabbing, excruciating, and lasted a few seconds. She states that she is still taking gabapentin. She states that her head will spontaneously turn to the left and when she has this head turn that she cause spasm, she will get more head pain. Headaches are every other day, left occipital to left top of head, can be bilateral aliza horn, electrical shock, pinching feeling, 10-15 mins. She has left cervical paraspinal neck pain. Head and neck pain resolve with stretching neck. She has associated photophobia, spinning, imbalance a little, feels like seeing things that has happened a few times. She has been having entire RUE pain with stabbing and burning. She denies phonophobia, weakness, numbness, tingling, N/V. She is sleeping okay and wakes up rested. Mood is good. She got glucometer and checked sugar a couple of times and was less than 200 each time she checked it. She gained fluid weight that she has lost from the Medrol and denies other side effects. The first 2 days on Medrol she felt like she had no issues and had no spasms. She iced a couple of times. She is using heating pad on neck. She has not done PT.    Current Outpatient Medications on File Prior to  Visit   Medication Sig Dispense Refill    acetaminophen (TYLENOL) 325 MG tablet Take 2 tablets (650 mg total) by mouth every 6 (six) hours as needed for Pain. 30 tablet 1    aspirin 81 MG Chew Take 81 mg by mouth once daily.      azelastine (ASTELIN) 137 mcg (0.1 %) nasal spray 1 spray (137 mcg total) by Nasal route 2 (two) times daily. 30 mL 11    blood-glucose meter kit Use as instructed 1 each 0    buPROPion (WELLBUTRIN SR) 150 MG TBSR 12 hr tablet Take 1 tablet (150 mg total) by mouth 2 (two) times daily. 60 tablet 11    clindamycin (CLEOCIN T) 1 % lotion Apply topically 2 (two) times daily. 60 mL 3    empagliflozin (JARDIANCE) 10 mg tablet Take 1 tablet (10 mg total) by mouth once daily. 90 tablet 1    ergocalciferol (ERGOCALCIFEROL) 50,000 unit Cap Take 1 capsule (50,000 Units total) by mouth every 7 days. 12 capsule 3    ezetimibe (ZETIA) 10 mg tablet Take 1 tablet (10 mg total) by mouth once daily. 90 tablet 3    gabapentin (NEURONTIN) 300 MG capsule Take 1 capsule (300 mg total) by mouth 3 (three) times daily. 90 capsule 3    lancets Misc Check fasting glucose daily 100 each 3    losartan (COZAAR) 25 MG tablet Take 1 tablet (25 mg total) by mouth once daily. 90 tablet 3    metronidazole 0.75% (METROCREAM) 0.75 % Crea Apply topically 2 (two) times daily. For rosacea on face. 45 g 3    ondansetron (ZOFRAN-ODT) 4 MG TbDL Take 4 mg by mouth every 6 (six) hours as needed.      promethazine (PHENERGAN) 25 MG tablet Take 25 mg by mouth every 6 (six) hours as needed.      tretinoin (RETIN-A) 0.025 % cream APPLY TOPICALLY EVERY EVENING. 20 g 6    [DISCONTINUED] methylPREDNISolone (MEDROL DOSEPACK) 4 mg tablet use as directed 1 each 0     No current facility-administered medications on file prior to visit.       Review of patient's allergies indicates:   Allergen Reactions    Celebrex [celecoxib] Anaphylaxis    Codeine Hives     Does not know what she can take -usually just takes tylenol or ibuprofen    Lidocaine  Hives    Vicodin [hydrocodone-acetaminophen] Hives and Swelling       Family History   Problem Relation Age of Onset    No Known Problems Mother     Diabetes Father     Stroke Father     Hypertension Father     Heart disease Sister     Hypertension Sister     Heart disease Brother     Heart attack Brother     Hypertension Brother     Heart disease Maternal Grandmother     Heart disease Maternal Grandfather     Heart disease Paternal Grandfather     Breast cancer Other     Ovarian cancer Neg Hx     Uterine cancer Neg Hx     Colon cancer Neg Hx     Heart failure Neg Hx     Hyperlipidemia Neg Hx        Social History     Tobacco Use    Smoking status: Never    Smokeless tobacco: Never   Substance Use Topics    Alcohol use: Not Currently    Drug use: No       Review of Systems  Constitutional: No fevers, no chills, no change in weight  Eye/Vision: See HPI  Ear/Nose/Mouth/Throat: See HPI; no cough, no runny nose, no sore throat; swallowing difficulties once  Respiratory: No shortness of breath, no problems breathing  Cardiovascular: No chest pain  Gastrointestinal: See HPI, no diarrhea, no constipation  Genitourinary: No dysuria  Musculoskeletal: See HPI  Integumentary: No skin changes  Neurologic: See HPI  Psychiatric: Denies depression, denies anxiety, denies SI and HI.    Objective:     Vitals:    08/09/23 1455   BP: 130/78   Pulse: 88       General: Alert and awake, Well nourished, Well groomed, No acute distress, no photophobia with 60 Hz hypersensitivity.  Eyes: Extraocular movements are intact; Normal conjunctiva; no nystagmus; Visual fields are intact bilaterally to finger counting in all cardinal directions  Neck: Supple  No Stiffness, Patient has no occipital point tenderness over the greater and lesser occipital nerve bilaterally without induction of headaches: 0+  No high, medial cervical pain with lateral movement of C1 over C2 and with isometric neck flexion and extension  Fluid patient turnaround without  "concurrent neck movement in direction of torso movement.  Spine/torso exam: Spine/ torso exam is within normal limits     Neurologic Exam  no saccadic intrusions of volitional ocular smooth pursuits  no pain with sustained upgaze and convergence  no visual motion sensitivity/dizziness produced with rapid eye movements or neck movements  no convergence insufficiency with no diplopia developed > 5 " accommodation    Sensory: Negative Romberg, no falls on tandem stance    Gait: Gait WNL, Heel to toe walking WNL    Labs:    No new labs    Imaging:    No new studies    Assessment:       ICD-10-CM ICD-9-CM    1. Cervicalgia of [id number] region  M54.2 723.1       2. Cervicogenic headache  G44.86 784.0       3. Occipital neuritis  M54.81 723.8          63 y.o. female with h/o HTN, DM, anxiety, endometrial cancer s/p resection, ASHLI not on CPAP, occipital neuritis s/p medrol dose pack x1 who presents for follow up. No focal findings on neurological exam at this time and on initial exam has head turned to the left with tilt towards right shoulder. We discussed her description is of occipital neuralgia and of dystonia. We discussed again if occipital neuralgia treatment does not help will have her see dystonia specialist. Counseled the patient that steroids suppress the immune response, which means that they are at increased risk for tab bacterial or viral infections including COVID19 and if they were to become infected, they may have a more severe disease course. We discussed that any form of steroids including oral or injectable should not be taken within 2 weeks of COVID19 vaccination/booster. The patient has elected to take steroids. The patient's last COVID19 vaccination/booster was more than 2 weeks ago. We discussed Topamax would not be curative for her headache etiology and would want to continue focus on trying to curing her occipital neuritis. Overall, her symptoms are improving.    Plan: "     Medrol dose pack prescribed if needed but do not need to take it. Take as instructed on package insert.  Monitor sugar while on Medrol and if goes above 200, stop the Medrol  Referral for lower neck/upper back to mid back PT with myofascial release (deep tissue massage, cupping, dry needling, etc). No manipulation of suboccipital region.  The patient was instructed to ice the occipital region for no more than 20 minutes at least once a day but may repeat this as many times as they would like.   Discussed ergonomic accommodations for occipital neuritis/neuralgia. Mainly perform all work at eye level to minimize continued neck flexion which will aggravate the nerve.  Patient was encouraged to do daily light cardio exercise but instructed to limit physical activities to walking, walking in water up to the waist only or riding a stationary bike, recumbent preferred. No weight lifting in upper body, no neck massage, no acupuncture of neck, and no dry needling of upper neck. No neck PT unless otherwise stated. Lower body strengthening with resistant bands, leg machines, and strapping weights to legs okay. No core body workouts. No running or use of cardio equipment other than stationary bike. No swimming or body surfing. No amusement park rides. No lifting more than 5-10 lbs and bend at the knees, not the waist.  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 To be referred for consultation for occipital nerve release procedure if initially clinically responsive to injections but always with a return of symptoms.  Please stop wearing the neck collar    Kirill Andrade MD  [facility]

## 2023-08-09 NOTE — PATIENT INSTRUCTIONS
Medrol dose pack prescribed if needed but do not need to take it. Take as instructed on package insert.  Monitor sugar while on Medrol and if goes above 200, stop the Medrol  Referral for lower neck/upper back to mid back PT with myofascial release (deep tissue massage, cupping, dry needling, etc). No manipulation of suboccipital region.  The patient was instructed to ice the occipital region for no more than 20 minutes at least once a day but may repeat this as many times as they would like.   Discussed ergonomic accommodations for occipital neuritis/neuralgia. Mainly perform all work at eye level to minimize continued neck flexion which will aggravate the nerve.  Patient was encouraged to do daily light cardio exercise but instructed to limit physical activities to walking, walking in water up to the waist only or riding a stationary bike, recumbent preferred. No weight lifting in upper body, no neck massage, no acupuncture of neck, and no dry needling of upper neck. No neck PT unless otherwise stated. Lower body strengthening with resistant bands, leg machines, and strapping weights to legs okay. No core body workouts. No running or use of cardio equipment other than stationary bike. No swimming or body surfing. No amusement park rides. No lifting more than 5-10 lbs and bend at the knees, not the waist.  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 To be referred for consultation for occipital nerve release procedure if initially clinically responsive to injections but always with a return of symptoms.  Please stop wearing the neck collar   Patient called writer and stated his chest pain was back. Telemetry monitoring showed patient heart rate in 160s-170s. EKG obtained, MD notified, pain medication administered. Vitals signs stable other than sinus tachycardia. Will continue to closely monitor.

## 2023-08-11 ENCOUNTER — TELEPHONE (OUTPATIENT)
Dept: DIABETES | Facility: CLINIC | Age: 63
End: 2023-08-11
Payer: COMMERCIAL

## 2023-08-11 ENCOUNTER — CLINICAL SUPPORT (OUTPATIENT)
Dept: REHABILITATION | Facility: HOSPITAL | Age: 63
End: 2023-08-11
Payer: COMMERCIAL

## 2023-08-11 DIAGNOSIS — R68.89 DECREASED STRENGTH, ENDURANCE, AND MOBILITY: ICD-10-CM

## 2023-08-11 DIAGNOSIS — R53.1 DECREASED STRENGTH, ENDURANCE, AND MOBILITY: ICD-10-CM

## 2023-08-11 DIAGNOSIS — R29.3 ABNORMAL POSTURE: ICD-10-CM

## 2023-08-11 DIAGNOSIS — M54.2 CERVICALGIA OF OCCIPITO-ATLANTO-AXIAL REGION: Primary | ICD-10-CM

## 2023-08-11 DIAGNOSIS — R29.898 DECREASED RANGE OF MOTION OF NECK: ICD-10-CM

## 2023-08-11 DIAGNOSIS — Z74.09 DECREASED STRENGTH, ENDURANCE, AND MOBILITY: ICD-10-CM

## 2023-08-11 DIAGNOSIS — R68.89 DECREASED FUNCTIONAL ACTIVITY TOLERANCE: ICD-10-CM

## 2023-08-11 PROCEDURE — 97162 PT EVAL MOD COMPLEX 30 MIN: CPT | Performed by: PHYSICAL THERAPIST

## 2023-08-11 PROCEDURE — 97110 THERAPEUTIC EXERCISES: CPT | Performed by: PHYSICAL THERAPIST

## 2023-08-11 NOTE — PATIENT INSTRUCTIONS
Optimal Alignment when Seated at Desk     [email or web address]          Posture - Sitting    Sit upright, head facing forward. Scoot your tailbone all the way to the back of your chair. Lean slightly forward and place a lumbar pillow or rolled up towel at your lower back. Lean back so shoulder blades lightly touch the back of the chair. Keep shoulders relaxed, and avoid rounded back. Keep hips level with knees. Avoid crossing legs for long periods.       *All images listed above obtained from [email or web address]

## 2023-08-11 NOTE — TELEPHONE ENCOUNTER
----- Message from Ariana Estrada RN sent at 8/11/2023 12:49 PM CDT -----  Contact: pt    ----- Message -----  From: Viv Gamez  Sent: 8/11/2023  12:13 PM CDT  To: #    Type:  Patient Returning Call    Who Called:  pt   Who Left Message for Patient:  armen  Does the patient know what this is regarding?:  yes  Best Call Back Number:  [phone]    Additional Information:  pt is trying to return a call. Please advise.

## 2023-08-11 NOTE — TELEPHONE ENCOUNTER
Called patient to assist with scheduling a new patient appointment Left a message   Workque referral

## 2023-08-11 NOTE — TELEPHONE ENCOUNTER
Called patient to assist with scheduling a new patient appointment Patient wanted a sooner appointment than Leola has available Patient was scheduled with Marlene

## 2023-08-11 NOTE — PLAN OF CARE
[facility]   Physical Therapy Initial Evaluation      Date: 8/11/2023   Name: Tawny Valerio  [facility] Number: [id number]    Therapy Diagnosis:    Encounter Diagnoses   Name Primary?    Cervicalgia of kymjsbvt-wtyncke-ttjzv region Yes    Decreased range of motion of neck     Decreased strength, endurance, and mobility     Decreased functional activity tolerance     Abnormal posture       Physician: Kirill Andrade MD     Physician Orders: PT Eval and Treat  Medical Diagnosis from Referral: cervicalgia of pxqdtsyr-rebiwkf-ghtjb region  Evaluation Date: 8/11/2023  Authorization Period Expiration: 10/1/2023  Plan of Care Expiration: 11/9/2023  Progress Note Due: 9/10/2023  Visit # / Visits authorized: 1/1   FOTO: 1/3 (last performed on 8/11/2023)    Precautions: Standard    Time In: 1050  Time Out: 1140  Total Billable Time (timed & untimed codes): 45 minutes    Subjective     Date of onset: December 2022/January 2023    History of current condition - Twany returns to PT for same neck pain she was treated for earlier this year. She states that she has since seen a neurologist who diagnosed her with occipital neuralgia. Patient reports that the treatment for this is three rounds of oral steroids, three rounds of steroid injections, a procedure (she thinks a nerve ablation), and botox injections as a last resort. Patient reports that she finished the first oral dose pack. She felt great for the first couple of days while on it, but then the symptoms came back. After the last day of taking the medication, she states the s/s were pretty much back to normal. Patient reports that she has had a couple of days of excruciating pain where she could not turn her head and had to squint her eyes to see, but then she has also had a couple of days of not feeling too bad at all. During the excruciating days, she would wear her neck collar because it was the only thing that helped. Patient reports that  she started taking her second round of oral steroids yesterday, and immediately (about an hour after taking) started feeling better. Patient reports occasional numbness/tingling in her arms, R>L, but she is unsure if it is from the neck or the neuropathy from diabetes.    History of current condition from 4/10/2023 - Tawny reports patient reports that she has had neck pain off and on for a while, but between December 2022/January 2023 started to get worse. She has recently been in therapy for her back and hip and has noticed that when she is performing a difficult activity her neck turns to the side for relief of neck pain, and she is unable to hold her neck in a neutral position with verbal cues.   She reports that she feels weak in her neck muscle's and feels better with support, she does on occasion wear a soft collar for about 20 min at a time. (Patient was instructed to try and stop wearing the soft collar)  Indicates pain behind ears bilateral left>right and over upper trapezial on left side.  No injury to neck that she recalls.  Feels like head is too heavy to hold up. Pain does go away if she lays down.  Does have a over the counter , which mostly seems to be helping. Have been told she grinds her teeth. Has had jaw popping and jaw has gotten stuck in wrong place during the past year but has never been diagnosed with TMJ.    Falls: none    Imaging: [] Xray [x] MRI [] CT: Performed on: 6/5/2023    Pain:  Current 2/10, worst 10/10, best 1/10   Location: [] Right   [] Left:  bilateral neck/head pain, L>R  Description: aching , burning, throbbing, tight, and sharp  Aggravating Factors: driving (turning her head), sitting at the computer for too long without breaks, stress  Easing Factors: activity avoidance, rest, medication (gabapentin & naproxen), heat, ice, laying down, wearing neck collar    Prior Therapy:   [] N/A    [x] Yes: helped some but did not eliminate s/s  Social History: Pt lives with  their spouse  Occupation: Pt is a  and sits at a desk most of the day.   Prior Level of Function: Independent and pain free with all ADL, IADL, community mobility and functional activities.   Current Level of Function: Independent with all ADL, IADL, community mobility and functional activities with reports of increased pain and need for increased time and frequent breaks.      Dominant Extremity:    [x] Right    [] Left    Pts goals: Pt reported goals are to decrease overall pain levels in order to return to prior functional level.     Medical History:   Past Medical History:   Diagnosis Date    Acute cystitis without hematuria 2019    Anxiety     Colon cancer screening 2019    Diabetes type 2, controlled 2016    Endometrial ca 2019    Essential hypertension 2018    Simple endometrial hyperplasia 3/15/2019    Uterine polyp 3/14/2019       Surgical History:   Tawny Valerio  has a past surgical history that includes lipoma removed; Dilation and curettage of uterus (2019); Hysteroscopy with dilation and curettage of uterus (N/A, 2019);  section; Robot-assisted laparoscopic salpingo-oophorectomy using da Yaya Xi (Bilateral, 2019); Robot-assisted laparoscopic abdominal hysterectomy using da Yaya Xi (N/A, 2019); and Robot-assisted laparoscopic lysis of adhesions using da Yaya Xi (N/A, 2019).    Medications:   Tawny has a current medication list which includes the following prescription(s): acetaminophen, aspirin, azelastine, blood-glucose meter, bupropion, clindamycin, empagliflozin, ergocalciferol, ezetimibe, gabapentin, lancets, losartan, methylprednisolone, metronidazole 0.75%, ondansetron, promethazine, and tretinoin.    Allergies:   Review of patient's allergies indicates:   Allergen Reactions    Celebrex [celecoxib] Anaphylaxis    Codeine Hives     Does not know what she can take -usually just takes tylenol or ibuprofen     Lidocaine Hives    Vicodin [hydrocodone-acetaminophen] Hives and Swelling        Objective        RANGE OF MOTION:   Cervical Right   (spine) Left    Pain/Dysfunction with Movement Goal   Cervical Flexion (60º) 50 ---  60   Cervical Extension (80º) 39 ---  60   Cervical Side Bending (45º) 34 30 tension 40   Cervical Rotation (75º) 60 70  70       STRENGTH:   U/E MMT Right Left Pain/Dysfunction with Movement Goal   Shoulder Flexion 4-/5 4-/5  4+/5 B   Shoulder Abduction 4-/5 4-/5 Minimal pain in left shoulder 4+/5 B   Shoulder IR 4/5 4/5  4+/5 B   Shoulder ER 4-/5 4-/5  4+/5 B   Serratus Anterior NT NT  4+/5 B   Middle Trapezius NT NT  4+/5 B   Lower Trapezius NT NT  4+/5 B   Elbow Flexion  5/5 5/5  5/5 B   Elbow Extension 4+/5 4+/5  5/5 B   Wrist Flexion 5/5 5/5  5/5 B   Wrist Extension 5/5 5/5  5/5 B   **Tension increases in neck and head throughout testing but not with any particular motion.     MUSCLE LENGTH:   Muscle Tested  Right Left  Goal   Upper Trapezius [] Normal  [x] Limited [] Normal  [x] Limited Normal B   Levator Scapular  [] Normal  [x] Limited [] Normal  [x] Limited Normal B   Scalenes [] Normal  [x] Limited [] Normal  [x] Limited Normal B   Pectoralis Minor [] Normal  [x] Limited [] Normal  [x] Limited Normal B   Pectoralis Major [] Normal  [x] Limited [] Normal  [x] Limited Normal B       JOINT MOBILITY:   Joint Motion Right Mobility  (spine) Left Mobility Goal   Subcranial:  [x] Hypo     [] Normal     [] Hyper [x] Hypo     [] Normal     [] Hyper Normal    Cervical Upglides [x] Hypo     [] Normal     [] Hyper [x] Hypo     [] Normal     [] Hyper Normal    Cervical Downglides  [x] Hypo     [] Normal     [] Hyper [x] Hypo     [] Normal     [] Hyper Normal    1st Rib  [x] Hypo     [] Normal     [] Hyper [x] Hypo     [] Normal     [] Hyper Normal    Thoracic PA Gap [x] Hypo     [] Normal     [] Hyper --- Normal        SPECIAL TESTS:    Right  (spine) Left  Goal   Cervical Distraction [x] Positive    []  Negative [x] Positive    [] Negative Negative B    Cervical Compression  [] Positive    [x] Negative [] Positive    [x] Negative Negative B    Deep Neck Flexor Endurance NT NT Men: 38.9s  Women: 29.4s       SENSATION  [] Intact to Light Touch   [x] Impaired: slight decrease in light touch sensation along lateral left upper arm as compared to the right.      PALPATION: Muscles: Increased tone and tenderness to palpation of: bilateral suboccipitals, paraspinals, scalenes , SCM, upper trapezius, levator scapulae , periscapular musculature.    POSTURE:  Pt presents with postural abnormalities which include:    [x] Forward Head   [] Increased Lumbar Lordosis   [x] Rounded Shoulder   [] Genu Recurvatum   [] Increased Thoracic Kyphosis [] Genu Valgus   [] Trunk Deviated    [] Pes Planus   [] Scapular Winging    [x] Other: head rotation to the left, head tilt (side bend) to the right      Function:     Intake Outcome Measure for FOTO Neck Survey    Therapist reviewed FOTO scores for Tawny on 8/11/2023.   FOTO documents entered into EPIC - see Media section.    Intake Score: 52%         Treatment     Total Treatment time (time-based codes) separate from Evaluation: (10) minutes     Tawny received the treatments listed below:        THERAPEUTIC EXERCISES to develop strength, endurance, ROM, flexibility, posture, and core stabilization for (10) minutes including:    Intervention Performed Today    HEP established and discussed x See Patient Instructions for details                                        Plan for Next Visit:        Patient Education and Home Exercises     Education provided:   PURPOSE: Patient educated on the impairments noted above and the effects of physical therapy intervention to improve overall condition and QOL.   EXERCISE: Patient was educated on all the above exercise prior/during/after for proper posture, positioning, and execution for safe performance with home exercise program.   STRENGTH:  Patient educated on the importance of improved core and extremity strength in order to improve alignment of the spine and extremities with static positions and dynamic movement.   POSTURE: Patient educated on postural awareness to reduce stress and maintain optimal alignment of the spine with static positions and dynamic movement   ERGONOMICS: Patient educated on proper ergonomics at the work station in order to maintain optimal alignment of the musculoskeletal system and improve efficiency in the work environment.    Written Home Exercises Provided: yes.  Exercises were reviewed and Tawny was able to demonstrate them prior to the end of the session.  Tawny demonstrated good  understanding of the education provided. See EMR under Patient Instructions for exercises provided during therapy sessions.    Assessment     Tawny is a 63 y.o. female referred to outpatient Physical Therapy with a medical diagnosis of cervicalgia of fztdflfu-ibrarqq-chtxh region. Pt presents with impairments in the following categories: IMPAIRMENTS: ROM, strength, endurance, joint mobility, muscle length, posture, core strength and stability, and functional movement patterns    Pt prognosis is Guarded  Pt will benefit from skilled outpatient Physical Therapy to address the deficits stated above and in the chart below, provide pt/family education, and to maximize pt's level of independence.     Plan of care discussed with patient: Yes  Pt's spiritual, cultural and educational needs considered and patient is agreeable to the plan of care and goals as stated below:     Anticipated Barriers for therapy: co-morbidities and chronicity of condition    Medical Necessity is demonstrated by the following  History  Co-morbidities and personal factors that may impact the plan of care [] LOW: no personal factors / co-morbidities  [] MODERATE: 1-2 personal factors / co-morbidities  [x] HIGH: 3+ personal factors / co-morbidities    Moderate / High  "Support Documentation:   Past Medical History:   Diagnosis Date    Acute cystitis without hematuria 9/4/2019    Anxiety     Colon cancer screening 9/26/2019    Diabetes type 2, controlled 4/22/2016    Endometrial ca 7/24/2019    Essential hypertension 5/14/2018    Simple endometrial hyperplasia 3/15/2019    Uterine polyp 3/14/2019        Examination  Body Structures and Functions, activity limitations and participation restrictions that may impact the plan of care [] LOW: addressing 1-2 elements  [x] MODERATE: 3+ elements  [] HIGH: 4+ elements (please support below)    Moderate / High Support Documentation: See above in "Current Level of Function"      Clinical Presentation [] LOW: stable  [x] MODERATE: Evolving  [] HIGH: Unstable     Decision Making/ Complexity Score: moderate         Short Term Goals:  6 weeks Status  Date Met   PAIN: Pt will report worst pain of 8/10 in order to progress toward max functional ability and improve quality of life. [x] Progressing  [] Met  [] Not Met    FUNCTION: Patient will demonstrate improved function as indicated by a score of greater than or equal to 55 out of 100 on FOTO. [x] Progressing  [] Met  [] Not Met    MOBILITY: Patient will improve AROM to 50% of stated goals, listed in objective measures above, in order to progress towards independence with functional activities.  [x] Progressing  [] Met  [] Not Met    STRENGTH: Patient will improve strength to 50% of stated goals, listed in objective measures above, in order to progress towards independence with functional activities. [x] Progressing  [] Met  [] Not Met    POSTURE: Patient will correct postural deviations in sitting and standing, to decrease pain and promote long term stability.  [x] Progressing  [] Met  [] Not Met    HEP: Patient will demonstrate independence with HEP in order to progress toward functional independence. [x] Progressing  [] Met  [] Not Met      Long Term Goals:  12 weeks Status Date Met   PAIN: Pt " will report worst pain of 6/10 in order to progress toward max functional ability and improve quality of life [x] Progressing  [] Met  [] Not Met    FUNCTION: Patient will demonstrate improved function as indicated by a score of greater than or equal to 58 out of 100 on FOTO. [x] Progressing  [] Met  [] Not Met    MOBILITY: Patient will improve AROM to stated goals, listed in objective measures above, in order to return to maximal functional potential and improve quality of life.  [x] Progressing  [] Met  [] Not Met    STRENGTH: Patient will improve strength to stated goals, listed in objective measures above, in order to improve functional independence and quality of life.  [x] Progressing  [] Met  [] Not Met    GAIT: Patient will demonstrate normalized gait mechanics with minimal compensation in order to return to PLOF. [x] Progressing  [] Met  [] Not Met    Patient will return to normal ADL's, IADL's, community involvement, recreational activities, and work-related activities with less than or equal to 3/10 pain and maximal function.  [x] Progressing  [] Met  [] Not Met      Plan     Plan of care Certification: 8/11/2023 to 11/9/2023.    Outpatient Physical Therapy 2 times weekly for 12 weeks to include any combination of the following interventions: virtual visits, dry needling, modalities, electrical stimulation (IFC, Pre-Mod, Attended with Functional Dry Needling), Aquatic Therapy, Cervical/Lumbar Traction, Gait Training, Manual Therapy, Neuromuscular Re-ed, Patient Education, Self Care, Therapeutic Exercise, and Therapeutic Activites     Marisabel Amaya, PT, DPT

## 2023-08-17 ENCOUNTER — OFFICE VISIT (OUTPATIENT)
Dept: DIABETES | Facility: CLINIC | Age: 63
End: 2023-08-17
Payer: COMMERCIAL

## 2023-08-17 ENCOUNTER — PATIENT MESSAGE (OUTPATIENT)
Dept: NEUROLOGY | Facility: CLINIC | Age: 63
End: 2023-08-17
Payer: COMMERCIAL

## 2023-08-17 ENCOUNTER — PATIENT MESSAGE (OUTPATIENT)
Dept: INTERNAL MEDICINE | Facility: CLINIC | Age: 63
End: 2023-08-17
Payer: COMMERCIAL

## 2023-08-17 DIAGNOSIS — I10 ESSENTIAL HYPERTENSION: Primary | ICD-10-CM

## 2023-08-17 DIAGNOSIS — E11.9 CONTROLLED TYPE 2 DIABETES MELLITUS WITHOUT COMPLICATION, WITHOUT LONG-TERM CURRENT USE OF INSULIN: ICD-10-CM

## 2023-08-17 DIAGNOSIS — Z78.9 STATIN INTOLERANCE: ICD-10-CM

## 2023-08-17 DIAGNOSIS — E66.01 CLASS 3 SEVERE OBESITY WITH SERIOUS COMORBIDITY AND BODY MASS INDEX (BMI) OF 50.0 TO 59.9 IN ADULT, UNSPECIFIED OBESITY TYPE: ICD-10-CM

## 2023-08-17 PROCEDURE — 4010F ACE/ARB THERAPY RXD/TAKEN: CPT | Mod: CPTII,95,, | Performed by: NURSE PRACTITIONER

## 2023-08-17 PROCEDURE — 4010F PR ACE/ARB THEARPY RXD/TAKEN: ICD-10-PCS | Mod: CPTII,95,, | Performed by: NURSE PRACTITIONER

## 2023-08-17 PROCEDURE — 99204 PR OFFICE/OUTPT VISIT, NEW, LEVL IV, 45-59 MIN: ICD-10-PCS | Mod: 95,,, | Performed by: NURSE PRACTITIONER

## 2023-08-17 PROCEDURE — 99204 OFFICE O/P NEW MOD 45 MIN: CPT | Mod: 95,,, | Performed by: NURSE PRACTITIONER

## 2023-08-17 RX ORDER — MELOXICAM 7.5 MG/1
7.5 TABLET ORAL DAILY
Qty: 30 TABLET | Refills: 3 | Status: SHIPPED | OUTPATIENT
Start: 2023-08-17 | End: 2023-10-25

## 2023-08-17 NOTE — PROGRESS NOTES
Telemedicine Visit    The patient location is home  The chief complaint leading to consultation is: Diabetes    Visit type: audiovisual    Face to Face time with patient: 40  60 minutes of total time spent on the encounter, which includes face to face time and non-face to face time preparing to see the patient (eg, review of tests), Obtaining and/or reviewing separately obtained history, Documenting clinical information in the electronic or other health record, Independently interpreting results (not separately reported) and communicating results to the patient/family/caregiver, or Care coordination (not separately reported).  Each patient to whom he or she provides medical services by telemedicine is:  (1) informed of the relationship between the physician and patient and the respective role of any other health care provider with respect to management of the patient; and (2) notified that he or she may decline to receive medical services by telemedicine and may withdraw from such care at any time.  Notes:         Tawny Valerio is a 63 y.o. female who  has a past medical history of Acute cystitis without hematuria (9/4/2019), Anxiety, Colon cancer screening (9/26/2019), Diabetes type 2, controlled (4/22/2016), Endometrial ca (7/24/2019), Essential hypertension (5/14/2018), Simple endometrial hyperplasia (3/15/2019), and Uterine polyp (3/14/2019)., who present for an initial evaluation of Type 2 diabetes mellitus.     CHIEF COMPLAINT: Diabetes Consultation    PCP: Varsha Rhodes MD     The patient was initially diagnosed with diabetes in 2016.     Previous failed treatments include:  Metformin - GI upset.  Ozempic - N/V/D, fecal incontinence.    Social Documentation:  Patient lives in [place] with .   Occupation:  for [employer].   Exercise: limited by occipital neuralgia.   No sweet tea or regular cold drinks.     Current monitoring regimen: capillary blood glucose monitoring with finger  "sticks.   Not checking daily.   States last checked one week ago, was "under 200".    Current Diabetes related symptoms/problems include none and have been stable.     Diabetes related complications:   none.   denies Pancreatitis  denies Gastroparesis  denies DKA  denies Hx/family Hx of MEN2/MTC  denies Frequent UTIs/yeast infections    Cardiovascular Risk Factors: dyslipidemia, family history of premature cardiovascular disease, hypertension, obesity (BMI >30 kg/m2), and sedentary lifestyle.    Any episodes of hypoglycemia?  No.   Hypoglycemia Unawareness? No  Severe hypoglycemia requiring 3rd party? No    Seen by Diabetes Education in last year? no    Diabetes Medications               empagliflozin (JARDIANCE) 10 mg tablet Take 1 tablet (10 mg total) by mouth once daily.     DIABETES DISEASE MANAGEMENT STATUS  Statin: Not taking  ACE/ARB: Taking  Screening or Prevention Patient's value Goal Complete/Controlled?   HgA1C Testing and Control   Lab Results   Component Value Date    HGBA1C 5.5 08/09/2022      Annually/Less than 8% No   Lipid profile : 06/14/2023 Annually Yes   LDL control Lab Results   Component Value Date    LDLCALC 109.8 06/14/2023    Annually/Less than 100 mg/dl  No   Nephropathy screening Lab Results   Component Value Date    LABMICR 4.0 07/09/2021     Lab Results   Component Value Date    PROTEINUA Negative 01/13/2023     No results found for: "UTPCR"   Annually Yes   Blood pressure BP Readings from Last 1 Encounters:   08/09/23 130/78    Less than 140/90 Yes   Dilated retinal exam : 01/22/2021 Annually No   Foot exam   Most Recent Foot Exam Date: Not Found Annually No   Patient's medications, allergies, past medical, surgical, social and family histories were reviewed and updated as appropriate.     Review of Systems   Constitutional:  Negative for weight loss.   Eyes:  Negative for blurred vision and double vision.   Cardiovascular:  Negative for chest pain.   Gastrointestinal:  Negative for " "nausea and vomiting.   Genitourinary:  Negative for frequency.   Musculoskeletal:  Negative for falls.   Neurological:  Negative for dizziness and weakness.   Endo/Heme/Allergies:  Negative for polydipsia.   Psychiatric/Behavioral:  Negative for depression.    All other systems reviewed and are negative.            Physical Exam  Constitutional:       Appearance: Normal appearance.   HENT:      Head: Normocephalic and atraumatic.   Pulmonary:      Effort: No respiratory distress.   Musculoskeletal:      Cervical back: Normal range of motion.   Neurological:      Mental Status: She is alert and oriented to person, place, and time.   Psychiatric:         Mood and Affect: Mood normal.         Behavior: Behavior normal.        Last menstrual period 01/15/2019.  Wt Readings from Last 3 Encounters:   08/08/23 130.5 kg (287 lb 11.2 oz)   07/17/23 125.6 kg (277 lb)   06/21/23 126 kg (277 lb 10.7 oz)       LAB REVIEW  Lab Results   Component Value Date     06/14/2023    K 4.3 06/14/2023     06/14/2023    CO2 27 06/14/2023    BUN 14 06/14/2023    CREATININE 0.8 06/14/2023    CALCIUM 9.4 06/14/2023    ANIONGAP 9 06/14/2023    EGFRNORACEVR >60.0 06/14/2023     No results found for: "CPEPTIDE", "GLUTAMICACID", "INSLNABS"  Hemoglobin A1C   Date Value Ref Range Status   08/09/2022 5.5 4.0 - 5.6 % Final     Comment:     ADA Screening Guidelines:  5.7-6.4%  Consistent with prediabetes  >or=6.5%  Consistent with diabetes    High levels of fetal hemoglobin interfere with the HbA1C  assay. Heterozygous hemoglobin variants (HbS, HgC, etc)do  not significantly interfere with this assay.   However, presence of multiple variants may affect accuracy.     01/06/2022 5.4 4.0 - 5.6 % Final     Comment:     ADA Screening Guidelines:  5.7-6.4%  Consistent with prediabetes  >or=6.5%  Consistent with diabetes    High levels of fetal hemoglobin interfere with the HbA1C  assay. Heterozygous hemoglobin variants (HbS, HgC, etc)do  not " significantly interfere with this assay.   However, presence of multiple variants may affect accuracy.     07/09/2021 5.4 4.0 - 5.6 % Final     Comment:     ADA Screening Guidelines:  5.7-6.4%  Consistent with prediabetes  >or=6.5%  Consistent with diabetes    High levels of fetal hemoglobin interfere with the HbA1C  assay. Heterozygous hemoglobin variants (HbS, HgC, etc)do  not significantly interfere with this assay.   However, presence of multiple variants may affect accuracy.         ASSESSMENT    ICD-10-CM ICD-9-CM   1. Essential hypertension  I10 401.9   2. Class 3 severe obesity with serious comorbidity and body mass index (BMI) of 50.0 to 59.9 in adult, unspecified obesity type  E66.01 278.01    Z68.43 V85.43   3. Controlled type 2 diabetes mellitus without complication, without long-term current use of insulin  E11.9 250.00   4. Statin intolerance  Z78.9 995.27        PLAN  Diagnoses and all orders for this visit:    Essential hypertension    Class 3 severe obesity with serious comorbidity and body mass index (BMI) of 50.0 to 59.9 in adult, unspecified obesity type  -     Ambulatory referral/consult to Diabetic Advanced Practice Providers (Medical Management)  -     Ambulatory referral/consult to Diabetes Education; Future    Controlled type 2 diabetes mellitus without complication, without long-term current use of insulin  -     Ambulatory referral/consult to Diabetic Advanced Practice Providers (Medical Management)  -     Ambulatory referral/consult to Diabetes Education; Future  -     Basic Metabolic Panel; Future  -     Hemoglobin A1C; Future  -     Microalbumin/Creatinine Ratio, Urine; Future  -     empagliflozin (JARDIANCE) 25 mg tablet; Take 1 tablet (25 mg total) by mouth once daily.    Statin intolerance      Reviewed pathophysiology of diabetes, complications related to the disease, importance of annual dilated eye exam and daily foot examination. Explained MOA, SE, dosage of medications. Written  instructions given and reviewed with patient and patient verbalizes understanding.     PATIENT INSTRUCTIONS    Lifestyle modification with diabetic diet and at least 30 minutes of physical activity daily recommended.  Refer to diabetes education.  - at the [facility].   Fasting labs to be done Monday in [place].   You are overdue for your yearly diabetic eye exam. Please schedule an appointment with your eye care provider at your earliest convenience. If your eye care provider is outside of the [facility] system, please have them fax a report of the exam to [facility] Fax [phone].   Diabetic Foot Exam deferred today due to virtual visit. Will plan to be done at next in-person visit.     Increase Jardiance to 25 mg by mouth daily.   This medication helps lower blood glucose by excreting sugar through your urine. It is important to drink plenty of water daily. The most common side effect for women is a vaginal yeast infection or urinary tract infection. I will send a prescription for one dose of Diflucan 150 mg by mouth to your pharmacy. You may take this if you develop any vaginal itching or irritation. If symptoms to not resolve or if you develop burning/pain with urination please contact our office.     Start Mounjaro 2.5 mg subcutaneously every 7 days.     Check blood sugar twice daily. Every morning when you wake up and 1-2 hours after main meal of the day. Keep log and upload to [employer] prior to each visit.   Blood Sugar Goals:       Fastin-130.       1-2 hours after a meal: Less than 180.      Follow up in about 4 weeks (around 2023) for VIRTUAL, SCHEDULE FASTING LABS, SCHEDULE DM EDUCATION.    Portions of this note were prepared with [employer] voice recognition transcription software. Grammatical errors, including garbled syntax, mangle pronouns, and other bizarre constructions may be attributed to that software system.

## 2023-08-17 NOTE — PATIENT INSTRUCTIONS
PATIENT INSTRUCTIONS    Lifestyle modification with diabetic diet and at least 30 minutes of physical activity daily recommended.  Refer to diabetes education.  - at the [facility].   Fasting labs to be done Monday in [place].   You are overdue for your yearly diabetic eye exam. Please schedule an appointment with your eye care provider at your earliest convenience. If your eye care provider is outside of the [facility] system, please have them fax a report of the exam to [facility] Fax [phone].   Diabetic Foot Exam deferred today due to virtual visit. Will plan to be done at next in-person visit.     Increase Jardiance to 25 mg by mouth daily.   This medication helps lower blood glucose by excreting sugar through your urine. It is important to drink plenty of water daily. The most common side effect for women is a vaginal yeast infection or urinary tract infection. I will send a prescription for one dose of Diflucan 150 mg by mouth to your pharmacy. You may take this if you develop any vaginal itching or irritation. If symptoms to not resolve or if you develop burning/pain with urination please contact our office.     Start Mounjaro 2.5 mg subcutaneously every 7 days.     Check blood sugar twice daily. Every morning when you wake up and 1-2 hours after main meal of the day. Keep log and upload to [facility] [facility] prior to each visit.   Blood Sugar Goals:       Fastin-130.       1-2 hours after a meal: Less than 180.

## 2023-08-18 ENCOUNTER — TELEPHONE (OUTPATIENT)
Dept: DIABETES | Facility: CLINIC | Age: 63
End: 2023-08-18
Payer: COMMERCIAL

## 2023-08-18 ENCOUNTER — PATIENT MESSAGE (OUTPATIENT)
Dept: DIABETES | Facility: CLINIC | Age: 63
End: 2023-08-18
Payer: COMMERCIAL

## 2023-08-18 DIAGNOSIS — E11.9 CONTROLLED TYPE 2 DIABETES MELLITUS WITHOUT COMPLICATION, WITHOUT LONG-TERM CURRENT USE OF INSULIN: Primary | ICD-10-CM

## 2023-08-21 ENCOUNTER — PATIENT OUTREACH (OUTPATIENT)
Dept: ADMINISTRATIVE | Facility: HOSPITAL | Age: 63
End: 2023-08-21
Payer: COMMERCIAL

## 2023-08-21 RX ORDER — LOSARTAN POTASSIUM 25 MG/1
25 TABLET ORAL DAILY
Qty: 90 TABLET | Refills: 3 | Status: SHIPPED | OUTPATIENT
Start: 2023-08-21

## 2023-08-21 RX ORDER — TIRZEPATIDE 2.5 MG/.5ML
2.5 INJECTION, SOLUTION SUBCUTANEOUS
Qty: 4 PEN | Refills: 0 | Status: SHIPPED | OUTPATIENT
Start: 2023-08-21 | End: 2023-09-14 | Stop reason: DRUGHIGH

## 2023-08-21 NOTE — PROGRESS NOTES
DM lab report: Per chart review, patient has a lab appointment on 8/22/23 for recheck of diabetic labs.

## 2023-08-22 ENCOUNTER — TELEPHONE (OUTPATIENT)
Dept: DIABETES | Facility: CLINIC | Age: 63
End: 2023-08-22
Payer: COMMERCIAL

## 2023-08-22 ENCOUNTER — LAB VISIT (OUTPATIENT)
Dept: LAB | Facility: HOSPITAL | Age: 63
End: 2023-08-22
Attending: FAMILY MEDICINE
Payer: COMMERCIAL

## 2023-08-22 DIAGNOSIS — E11.9 CONTROLLED TYPE 2 DIABETES MELLITUS WITHOUT COMPLICATION, WITHOUT LONG-TERM CURRENT USE OF INSULIN: ICD-10-CM

## 2023-08-22 LAB
ALBUMIN/CREAT UR: 8.8 UG/MG (ref 0–30)
ANION GAP SERPL CALC-SCNC: 10 MMOL/L (ref 8–16)
BUN SERPL-MCNC: 16 MG/DL (ref 8–23)
CALCIUM SERPL-MCNC: 9.9 MG/DL (ref 8.7–10.5)
CHLORIDE SERPL-SCNC: 107 MMOL/L (ref 95–110)
CO2 SERPL-SCNC: 24 MMOL/L (ref 23–29)
CREAT SERPL-MCNC: 0.8 MG/DL (ref 0.5–1.4)
CREAT UR-MCNC: 181 MG/DL (ref 15–325)
EST. GFR  (NO RACE VARIABLE): >60 ML/MIN/1.73 M^2
ESTIMATED AVG GLUCOSE: 128 MG/DL (ref 68–131)
GLUCOSE SERPL-MCNC: 111 MG/DL (ref 70–110)
HBA1C MFR BLD: 6.1 % (ref 4–5.6)
MICROALBUMIN UR DL<=1MG/L-MCNC: 16 UG/ML
POTASSIUM SERPL-SCNC: 4.3 MMOL/L (ref 3.5–5.1)
SODIUM SERPL-SCNC: 141 MMOL/L (ref 136–145)

## 2023-08-22 PROCEDURE — 82570 ASSAY OF URINE CREATININE: CPT | Performed by: NURSE PRACTITIONER

## 2023-08-22 PROCEDURE — 36415 COLL VENOUS BLD VENIPUNCTURE: CPT | Performed by: NURSE PRACTITIONER

## 2023-08-22 PROCEDURE — 83036 HEMOGLOBIN GLYCOSYLATED A1C: CPT | Performed by: NURSE PRACTITIONER

## 2023-08-22 PROCEDURE — 80048 BASIC METABOLIC PNL TOTAL CA: CPT | Performed by: NURSE PRACTITIONER

## 2023-08-24 ENCOUNTER — CLINICAL SUPPORT (OUTPATIENT)
Dept: DIABETES | Facility: CLINIC | Age: 63
End: 2023-08-24
Payer: COMMERCIAL

## 2023-08-24 VITALS — BODY MASS INDEX: 54.86 KG/M2 | WEIGHT: 290.38 LBS

## 2023-08-24 DIAGNOSIS — E11.9 CONTROLLED TYPE 2 DIABETES MELLITUS WITHOUT COMPLICATION, WITHOUT LONG-TERM CURRENT USE OF INSULIN: ICD-10-CM

## 2023-08-24 DIAGNOSIS — E66.01 CLASS 3 SEVERE OBESITY WITH SERIOUS COMORBIDITY AND BODY MASS INDEX (BMI) OF 50.0 TO 59.9 IN ADULT, UNSPECIFIED OBESITY TYPE: ICD-10-CM

## 2023-08-24 PROCEDURE — G0108 DIAB MANAGE TRN  PER INDIV: HCPCS | Mod: S$GLB,,,

## 2023-08-24 PROCEDURE — 99999 PR PBB SHADOW E&M-EST. PATIENT-LVL III: ICD-10-PCS | Mod: PBBFAC,,,

## 2023-08-24 PROCEDURE — G0108 PR DIAB MANAGE TRN  PER INDIV: ICD-10-PCS | Mod: S$GLB,,,

## 2023-08-24 PROCEDURE — 99999 PR PBB SHADOW E&M-EST. PATIENT-LVL III: CPT | Mod: PBBFAC,,,

## 2023-08-24 NOTE — PROGRESS NOTES
Diabetes Care Specialist Progress Note  Author: Ariana Estrada RN  Date: 8/24/2023    Program Intake  Reason for Diabetes Program Visit:: Initial Diabetes Assessment  Current diabetes risk level:: high  In the last 12 months, have you:: none  Permission to speak with others about care:: yes    Lab Results   Component Value Date    HGBA1C 6.1 (H) 08/22/2023     DM INTRO  Weight: 131.7 kg (290 lb 5.5 oz)       Body mass index is 54.86 kg/m².    CURRENT MEDS  Jardiance 25 mg daily   Mounjaro 2.5 mg weekly     Clinical    Patient Health Rating  Compared to other people your age, how would you rate your health?: Fair    Problem Review  Reviewed Problem List with Patient: yes  Active comorbidities affecting diabetes self-care.: yes  Comorbidities: Hypertension  Reviewed health maintenance: yes    Clinical Assessment  Current Diabetes Treatment: Oral Medication, Injectable  Have you ever experienced hypoglycemia (low blood sugar)?: yes  In the last month, how often have you experienced low blood sugar?: other (see comments) (2x but hasn't checked BG)  Are you able to tell when your blood sugar is low?: Yes  What symptoms do you experience?: Shaky, Sweaty, Nausea  Have you ever been hospitalized because your blood sugar was too low?: yes (see comments)  Have you ever experienced hyperglycemia (high blood sugar)?:  (Not checking.)    Medication Information  How do you obtain your medications?: Patient drives  How many days a week do you miss your medications?: Never  Do you sometimes have difficulty refilling your medications?: No  Medication adherence impacting ability to self-manage diabetes?: No    Labs  Do you have regular lab work to monitor your medications?: Yes  Type of Regular Lab Work: A1c, Cholesterol, Microalbumin, CBC, BMP  Where do you get your labs drawn?: [facility]  Lab Compliance Barriers: No    Nutritional Status  Diet: Regular  Meal Plan 24 Hour Recall: Breakfast, Lunch, Dinner, Snack  Meal Plan 24 Hour  Recall - Breakfast: Oatmeal with brown sugar; Diet Coke  Meal Plan 24 Hour Recall - Lunch: Fried shrimp & oyster poboy (no bread) & salad; Water & Diet Coke  Meal Plan 24 Hour Recall - Dinner: Grilled chicken salad & corn grits; Water  Meal Plan 24 Hour Recall - Snack: Frappucino from [employer]  Change in appetite?: No  Dentation:: Intact  Recent Changes in Weight: No Recent Weight Change  Current nutritional status an area of need that is impacting patient's ability to self-manage diabetes?: No    Additional Social History    Support  Does anyone support you with your diabetes care?: yes  Who supports you?: son/daughter, spouse, self  Who takes you to your medical appointments?: spouse  Does the current support meet the patient's needs?: Yes  Is Support an area impacting ability to self-manage diabetes?: No    Access to Mass Media & Technology  Does the patient have access to any of the following devices or technologies?: Smart phone  Media or technology needs impacting ability to self-manage diabetes?: No    Cognitive/Behavioral Health  Alert and Oriented: Yes  Difficulty Thinking: No  Requires Prompting: No  Requires assistance for routine expression?: No  Cognitive or behavioral barriers impacting ability to self-manage diabetes?: No    Culture/Buddhist  Culture or Confucianist beliefs that may impact ability to access healthcare: No    Communication  Language preference: English  Hearing Problems: No  Vision Problems: Yes  Vision problem type:: Decreased Vision  Vision Assistance: Glasses  Communication needs impacting ability to self-manage diabetes?: No    Health Literacy  Preferred Learning Method: Face to Face, Reading Materials, Demonstration, Hands On  How often do you need to have someone help you read instructions, pamphlets, or written material from your doctor or pharmacy?: Never  Health literacy needs impacting ability to self-manage diabetes?: No      Diabetes Self-Management Skills  Assessment    Diabetes Disease Process/Treatment Options  Patient/caregiver able to state what happens when someone has diabetes.: somewhat  Patient/caregiver knows what type of diabetes they have.: yes  Diabetes Type : Type II  Patient/caregiver able to identify at least three signs and symptoms of diabetes.: yes  Identified signs and symptoms:: blurred vision, fatigue, increased thirst (Increased hunger)  Patient able to identify at least three risk factors for diabetes.: yes  Identified risk factors:: history of gestational diabetes, family history, being overweight  Diabetes Disease Process/Treatment Options: Skills Assessment Completed: Yes  Assessment indicates:: Knowledge deficit  Area of need?: Yes    Nutrition/Healthy Eating  Challenges to healthy eating:: eating out, going to parties  Method of carbohydrate measurement:: carb counting/reading labels, eyeballing/guessing  Patient can identify foods that impact blood sugar.: yes  Patient-identified foods:: starches (bread, pasta, rice, cereal), sweets  Nutrition/Healthy Eating Skills Assessment Completed:: Yes  Assessment indicates:: Knowledge deficit, Instruction Needed  Area of need?: Yes    Physical Activity/Exercise  Patient's daily activity level:: sedentary  Patient formally exercises outside of work.: no  Reasons for not exercising:: physically unable to exercise currently, work schedule  Patient can identify forms of physical activity.: yes  Stated forms of physical activity:: any movement performed by muscles that uses energy, swimming, seated/chair exercises  Patient can identify reasons why exercise/physical activity is important in diabetes management.: yes  Identified reasons:: helps insulin work better, improves blood circulation, lowers blood glucose, blood pressure, and cholesterol, lowers risk of heart disease and stroke, relieves stress, strengthens heart, muscles, and bones  Physical Activity/Exercise Skills Assessment Completed: :  No  Deffered due to:: Time (Reviewed but will go into more detail at follow-up.)  Area of need?:  (Deferred.)    Medications  Patient is able to describe current diabetes management routine.: yes  Diabetes management routine:: injectable medications, oral medications  Patient is able to identify current diabetes medications, dosages, and appropriate timing of medications.: yes  Patient understands the purpose of the medications taken for diabetes.: yes  Patient reports problems or concerns with current medication regimen.: no  Medication Skills Assessment Completed:: Yes  Assessment indicates:: Knowledge deficit  Area of need?: Yes    Home Blood Glucose Monitoring  Patient states that blood sugar is checked at home daily.: no  Reasons for not monitoring:: unsure how to use equipment  Home Blood Glucose Monitoring Skills Assessment Completed: : Yes  Assessment indicates:: Knowledge deficit, Instruction Needed  Area of need?: Yes    Acute Complications  Patient is able to identify types of acute complications: No  Acute Complications Skills Assessment Completed: : Yes  Assessment indicates:: Knowledge deficit, Instruction Needed  Area of need?: Yes    Chronic Complications  Patient can identify major chronic complications of diabetes.: yes  Stated chronic complications:: heart disease/heart attack, kidney disease  Patient can identify ways to prevent or delay diabetes complications.: yes  Stated ways to prevent complications:: controlling blood sugar, healthy eating and regular activity, maintaining optimal blood glucose control  Patient is aware that having diabetes increases risk of heart disease?: Yes  Patient is aware that heart disease is the leading cause of death and disability in people with diabetes?: Yes  Patient able to state risk factors for heart disease?: Yes  Patient stated risk factors for heart disease:: High blood pressure, Diet, Limited activity, Medication non-adherance, Having diabetes  Patient is  taking statin?: No (Statin intolerance.)  Chronic Complications Skills Assessment Completed: : Yes  Assessment indicates:: Adequate understanding  Area of need?: No    Psychosocial/Coping  Patient can identify ways of coping with chronic disease.: yes  Patient-stated ways of coping with chronic disease:: support from loved ones  Psychosocial/Coping Skills Assessment Completed: : Yes  Assessment indicates:: Adequate understanding  Area of need?: No    Assessment Summary and Plan    Based on today's diabetes care assessment, the following areas of need were identified:          8/24/2023    12:01 AM   Social   Support No   Access to Mass Media/Tech No   Cognitive/Behavioral Health No   Culture/Mormonism No   Communication No   Health Literacy No            8/24/2023    12:01 AM   Clinical   Medication Adherence No   Lab Compliance No   Nutritional Status No            8/24/2023    12:01 AM   Diabetes Self-Management Skills   Diabetes Disease Process/Treatment Options Yes-Reviewed pathophysiology of type 2 diabetes, risk factors, and treatment options.     Nutrition/Healthy Eating Yes-See care plan.     Physical Activity Deferred-Briefly spoke about how to incorporate exercise such as walking in her pool, walking videos, etc. Will discuss further at follow-up visit.      Medication Yes-Discussed MOA, onset, side effects, dosage of Jardiance and Mounjaro.      Home Blood Glucose Monitoring Yes-See care plan.     Acute Complications Yes-Reviewed blood glucose goals, prevention, detection, signs and symptoms, and treatment of hypoglycemia and hyperglycemia, and when to contact the clinic.     Chronic Complications No     Psychosocial/Coping No            Today's interventions were provided through individual discussion, instruction, and written materials were provided.      Patient verbalized understanding of instruction and written materials.  Pt was able to return back demonstration of instructions today. Patient  "understood key points, needs reinforcement and further instruction.     Diabetes Self-Management Care Plan:    Today's Diabetes Self-Management Care Plan was developed with Tawny's input. Tawny has agreed to work toward the following goal(s) to improve his/her overall diabetes control.      Care Plan: Diabetes Management   Updates made since 7/25/2023 12:00 AM        Problem: Healthy Eating         Goal: Eat 3 meals daily with 30-45 grams of carbs per meal and 0-15 grams per snack.    Start Date: 8/24/2023   Expected End Date: 8/24/2024   Priority: High   Barriers: No Barriers Identified   Note:    Mrs. Valerio is familiar with carbohydrates and carbohydrate counting; she has used different weight loss programs such as Weight Watchers. She has a food scale at home and measuring cups, but she feels confident in her ability to "eyeball" appropriate serving sizes. We reviewed how to read a food label for the purpose of diabetes.  Educated on the importance of not avoiding carbohydrates, since they are needed for energy. Educated that this is especially important when she starts to notice her appetite decrease with Mounjaro. Encouraged her to still try to eat consistently, even if it is smaller meals; she understood.   Provided her with the carb reference list, meals, and snacks in the DM management guide, [place] meal plan, and 25 healthy snack ideas. Educated on how all these resources can help her meal prep.   Encouraged using the My Plate method even when dining out to help create balanced meals.        Task: Reviewed the sources and role of Carbohydrate, Protein, and Fat and how each nutrient impacts blood sugar. Completed 8/24/2023        Task: Provided visual examples using dry measuring cups, food models, and other familiar objects such as computer mouse, deck or cards, tennis ball etc. to help with visualization of portions. Completed 8/24/2023        Task: Explained how to count carbohydrates using the " food label and the use of dry measuring cups for accurate carb counting. Completed 8/24/2023        Task: Discussed strategies for choosing healthier menu options when dining out. Completed 8/24/2023        Task: Review the importance of balancing carbohydrates with each meal using portion control techniques to count servings of carbohydrate and label reading to identify serving size and amount of total carbs per serving. Completed 8/24/2023        Task: Provided Sample plate method and reviewed the use of the plate to estimate amounts of carbohydrate per meal. Completed 8/24/2023        Problem: Blood Glucose Self-Monitoring         Goal: Patient agrees to check and record blood sugars 1-2 times per day.    Start Date: 8/24/2023   Expected End Date: 8/24/2024   Priority: Medium   Barriers: No Barriers Identified   Note:    Mrs. Valerio brought her meter with her to clinic, but she does not have any strips. Demonstrated how she will use the meter once she has all supplies. Demonstrated how to use the lancets and lancet device.   Educated on goals including fastings of  mg/dL & 2 hours after meals <140 mg/dL to help maintain/achieve an A1C <6%.   Provided with logs and educated to bring to visits with NOELLE Rosario and myself.        Task: Reviewed the importance of self-monitoring blood glucose and keeping logs. Completed 8/24/2023        Task: Instructed on how to self-monitor blood glucose using a home glucometer, how to properly dispose of used strips and lancets after use, and how to appropriately store meter and supplies. Completed 8/24/2023        Task: Provided patient with blood glucose logs, reviewed appropriate timing and frequency to SMBG, education on parameters on when to notify provider and advised patient to bring logs to all appts with PCP/Endocrinologist/Diabetes Care Specialist. Completed 8/24/2023        Task: Discussed ways to minimize pain when monitoring blood glucose. Completed 8/24/2023           Follow Up Plan     Follow up in about 4 weeks (around 9/21/2023).    Today's care plan and follow up schedule was discussed with patient.  Tawny verbalized understanding of the care plan, goals, and agrees to follow up plan.        The patient was encouraged to communicate with his/her health care provider/physician and care team regarding his/her condition(s) and treatment.  I provided the patient with my contact information today and encouraged to contact me via phone or [facility] Patient Portal as needed.     Length of Visit   Total Time: 60 Minutes

## 2023-08-25 ENCOUNTER — TELEPHONE (OUTPATIENT)
Dept: NEUROLOGY | Facility: CLINIC | Age: 63
End: 2023-08-25
Payer: COMMERCIAL

## 2023-08-25 ENCOUNTER — TELEPHONE (OUTPATIENT)
Dept: DIABETES | Facility: CLINIC | Age: 63
End: 2023-08-25
Payer: COMMERCIAL

## 2023-08-25 ENCOUNTER — PATIENT MESSAGE (OUTPATIENT)
Dept: DIABETES | Facility: CLINIC | Age: 63
End: 2023-08-25
Payer: COMMERCIAL

## 2023-08-25 ENCOUNTER — CLINICAL SUPPORT (OUTPATIENT)
Dept: REHABILITATION | Facility: HOSPITAL | Age: 63
End: 2023-08-25
Payer: COMMERCIAL

## 2023-08-25 DIAGNOSIS — M54.2 CERVICALGIA OF OCCIPITO-ATLANTO-AXIAL REGION: Primary | ICD-10-CM

## 2023-08-25 DIAGNOSIS — R68.89 DECREASED FUNCTIONAL ACTIVITY TOLERANCE: ICD-10-CM

## 2023-08-25 DIAGNOSIS — Z74.09 DECREASED STRENGTH, ENDURANCE, AND MOBILITY: ICD-10-CM

## 2023-08-25 DIAGNOSIS — R29.898 DECREASED RANGE OF MOTION OF NECK: ICD-10-CM

## 2023-08-25 DIAGNOSIS — R68.89 DECREASED STRENGTH, ENDURANCE, AND MOBILITY: ICD-10-CM

## 2023-08-25 DIAGNOSIS — R29.3 ABNORMAL POSTURE: ICD-10-CM

## 2023-08-25 DIAGNOSIS — R53.1 DECREASED STRENGTH, ENDURANCE, AND MOBILITY: ICD-10-CM

## 2023-08-25 PROCEDURE — 97110 THERAPEUTIC EXERCISES: CPT

## 2023-08-25 PROCEDURE — 97112 NEUROMUSCULAR REEDUCATION: CPT

## 2023-08-25 PROCEDURE — 97530 THERAPEUTIC ACTIVITIES: CPT

## 2023-08-25 NOTE — TELEPHONE ENCOUNTER
----- Message from Judi Pena MA sent at 8/25/2023  4:36 PM CDT -----  Regarding: FW: Refill  Contact: pt [phone]    ----- Message -----  From: Lucrecia Mendiola  Sent: 8/25/2023   4:09 PM CDT  To: [facility] Staff  Subject: Refill                                           Pt calling to request refill on glucose strips. Pls send to     Yo[facility] IRINA [facility] [address] [address]  Phone: [phone] Fax: [phone]

## 2023-08-25 NOTE — TELEPHONE ENCOUNTER
Pt called regarding meter. Pt pcp sent order in 07/2023 , if lost or other issues patient needs to call insurance for coverage for a new one. Pt didn't answer, lvm.   ----- Message from Nissa Graves MA sent at 8/25/2023  3:36 PM CDT -----  Patient asking can the doctor send in a new order for blood-glucose meter kit. Please give patient a call back at [phone] (home)       Thanks   Lior

## 2023-08-25 NOTE — TELEPHONE ENCOUNTER
Pt called regarding glucose kit but meant strips for meter. Pt stated she ordered strips on [employer] that didn't match , pt was advised to contact prescribing provider. Pt was very rude and hung up phone.  ----- Message from Callie Villanueva sent at 8/25/2023  3:49 PM CDT -----  Contact: Tawny Hector is returning a missed call. Please call her at [phone]

## 2023-08-25 NOTE — PROGRESS NOTES
[facility]   Physical Therapy Treatment Note        Name: Tawny ESTRADA [facility] Number: [id number]    Therapy Diagnosis:   Encounter Diagnoses   Name Primary?    Cervicalgia of [id number] region Yes    Abnormal posture     Decreased functional activity tolerance     Decreased strength, endurance, and mobility     Decreased range of motion of neck      Physician: Kirill Andrade MD    Visit Date: 8/25/2023       Physician Orders: PT Eval and Treat  Medical Diagnosis from Referral: cervicalgia of nwebibyy-[id number]-dvzao region  Evaluation Date: 8/11/2023  Authorization Period Expiration: 10/1/2023  Plan of Care Expiration: 11/9/2023  Progress Note Due: 9/10/2023  Visit # / Visits authorized: 1/15 (+1 for Authorization)  FOTO: 1/3 (last performed on 8/11/2023)     Precautions: Standard     PTA Visit #: 0/5     Time In: 1:25  Time Out: 2:10  Total Billable Time: 40 minutes (Billing reflects 1 on 1 treatment time spent with patient)    Subjective     Patient reports: Her is feeling good today, she still has some tightness, but no reports of spasms.    He/She was compliant with home exercise program.  Response to previous treatment: Initial Eval  Functional change: N/A    Pain: 3/10     Location: Left cervical paraspinals and upper trap    Objective      Objective Measures updated at progress report or POC update only unless otherwise noted.           Treatment     Tawny received the treatments listed below:      MANUAL THERAPY TECHNIQUES were applied for (0) minutes, including:     Manual Intervention Performed Today     Soft Tissue Mobilization  []  Bilateral suboccipitals, scalenes , SCM, upper trapezius, levator scapulae , subscapularis  extensive at subcranial and sternocleidomastoid/upper trapezial on left side   Joint Mobilizations []  Gentle upglides/downslides   Gentle manual distraction  []  Cervical distraction     []      Functional Dry Needling  []         Plan  for Next Visit: Continue as needed       THERAPEUTIC EXERCISES to develop strength, endurance, ROM, flexibility, posture, and core stabilization for (20) minutes including: including objective measurements      Intervention Performed today      UBE x 3 minutes forward, 3 minutes  backward    Chin tucks sitting x 10x/ 3 sets finger on sternum - lining up sternum to chin   Chin tucks with head lift supine x 5x/ 5 sec hold, 6 sets   Isometric pulls at cervical spine with band sitting x  x Extension 10x/5 seconds 2 sets  Side bending 5x/5 seconds hold 2 sets   AROM cervical spine    Rotation 10x 2 sets  Side bending 10x/ 2 sets      Plan for Next Visit:          NEUROMUSCULAR RE-EDUCATION ACTIVITIES to improve Balance, Coordination, Kinesthetic, Sense, Proprioception, and Posture for (10) minutes.  The following were included:     Intervention Performed Today     Bilateral shoulder external rotation  x 10x/ 2 sets with  bandred   Standing rows  x 10x/ 3 sets Red Band              Plan for Next Visit: progress as tolerated          THERAPEUTIC ACTIVITIES to improve dynamic and functional  performance for (10) minutes including:    Intervention Performed Today    Suitcase Carries  x  7.5# KB walking towards mirror focused on neutral head.    Seated landmine press x  15# bar 10x/ 2 sets focus on neutral head                                    Plan for Next Visit:           Patient Education and Home Exercises       Home Exercises Provided and Patient Education Provided     Education provided: (5) minutes  PURPOSE: Patient educated on the impairments noted above and the effects of physical therapy intervention to improve overall condition and QOL.   EXERCISE: Patient was educated on all the above exercise prior/during/after for proper posture, positioning, and execution for safe performance with home exercise program.   STRENGTH: Patient educated on the importance of improved core and extremity strength in order to improve  alignment of the spine and extremities with static positions and dynamic movement.   POSTURE: Patient educated on postural awareness to reduce stress and maintain optimal alignment of the spine with static positions and dynamic movement   ERGONOMICS: Patient educated on proper ergonomics at the work station in order to maintain optimal alignment of the musculoskeletal system and improve efficiency in the work environment.    Written Home Exercises Provided: yes.  Exercises were reviewed and Tawny was able to demonstrate them prior to the end of the session.  Tawny demonstrated good  understanding of the education provided. See EMR under Patient Instructions for exercises provided during therapy sessions.    Assessment     Patient tolerated addition of suitcase carries and seated landmine presses well, with no subjective reports of pain/discomfort. Patient needed verbal and tactile cues for correcting head position during periscapular and shoulder exercises.     Tawny is progressing well towards her goals.   Patient prognosis is Good.     Patient will continue to benefit from skilled outpatient physical therapy to address the deficits listed in the problem list box on initial evaluation, provide pt/family education and to maximize patient's level of independence in the home and community environment.     Patient's spiritual, cultural and educational needs considered and pt agreeable to plan of care and goals.     Anticipated Barriers for therapy: co-morbidities and chronicity of condition          Short Term Goals:  6 weeks Status  Date Met   PAIN: Pt will report worst pain of 8/10 in order to progress toward max functional ability and improve quality of life. [x] Progressing  [] Met  [] Not Met     FUNCTION: Patient will demonstrate improved function as indicated by a score of greater than or equal to 55 out of 100 on FOTO. [x] Progressing  [] Met  [] Not Met     MOBILITY: Patient will improve AROM to 50%  of stated goals, listed in objective measures above, in order to progress towards independence with functional activities.  [x] Progressing  [] Met  [] Not Met     STRENGTH: Patient will improve strength to 50% of stated goals, listed in objective measures above, in order to progress towards independence with functional activities. [x] Progressing  [] Met  [] Not Met     POSTURE: Patient will correct postural deviations in sitting and standing, to decrease pain and promote long term stability.  [x] Progressing  [] Met  [] Not Met     HEP: Patient will demonstrate independence with HEP in order to progress toward functional independence. [x] Progressing  [] Met  [] Not Met        Long Term Goals:  12 weeks Status Date Met   PAIN: Pt will report worst pain of 6/10 in order to progress toward max functional ability and improve quality of life [x] Progressing  [] Met  [] Not Met     FUNCTION: Patient will demonstrate improved function as indicated by a score of greater than or equal to 58 out of 100 on FOTO. [x] Progressing  [] Met  [] Not Met     MOBILITY: Patient will improve AROM to stated goals, listed in objective measures above, in order to return to maximal functional potential and improve quality of life.  [x] Progressing  [] Met  [] Not Met     STRENGTH: Patient will improve strength to stated goals, listed in objective measures above, in order to improve functional independence and quality of life.  [x] Progressing  [] Met  [] Not Met     GAIT: Patient will demonstrate normalized gait mechanics with minimal compensation in order to return to PLOF. [x] Progressing  [] Met  [] Not Met     Patient will return to normal ADL's, IADL's, community involvement, recreational activities, and work-related activities with less than or equal to 3/10 pain and maximal function.  [x] Progressing  [] Met  [] Not Met          Plan     Continue Plan of Care (POC) and progress per patient tolerance. See treatment section for  details on planned progressions next session.      Rasheed Hill, PT, DPT

## 2023-08-26 ENCOUNTER — PATIENT MESSAGE (OUTPATIENT)
Dept: INTERNAL MEDICINE | Facility: CLINIC | Age: 63
End: 2023-08-26
Payer: COMMERCIAL

## 2023-08-26 DIAGNOSIS — M54.2 NECK PAIN: ICD-10-CM

## 2023-08-28 DIAGNOSIS — E11.9 CONTROLLED TYPE 2 DIABETES MELLITUS WITHOUT COMPLICATION, WITHOUT LONG-TERM CURRENT USE OF INSULIN: Primary | ICD-10-CM

## 2023-08-28 RX ORDER — LANCETS
1 EACH MISCELLANEOUS 2 TIMES DAILY
Qty: 11 EACH | Refills: 0 | Status: SHIPPED | OUTPATIENT
Start: 2023-08-28 | End: 2024-08-27

## 2023-08-28 RX ORDER — INSULIN PUMP SYRINGE, 3 ML
EACH MISCELLANEOUS
Qty: 1 EACH | Refills: 0 | Status: SHIPPED | OUTPATIENT
Start: 2023-08-28

## 2023-08-28 RX ORDER — LANCETS 26 GAUGE
1 EACH MISCELLANEOUS 2 TIMES DAILY
Qty: 1 EACH | Refills: 1 | Status: SHIPPED | OUTPATIENT
Start: 2023-08-28 | End: 2024-08-27

## 2023-09-05 RX ORDER — AZELASTINE 1 MG/ML
1 SPRAY, METERED NASAL 2 TIMES DAILY
Qty: 30 ML | Refills: 11 | Status: SHIPPED | OUTPATIENT
Start: 2023-09-05

## 2023-09-05 RX ORDER — GABAPENTIN 300 MG/1
300 CAPSULE ORAL 3 TIMES DAILY
Qty: 90 CAPSULE | Refills: 3 | Status: SHIPPED | OUTPATIENT
Start: 2023-09-05 | End: 2023-10-30

## 2023-09-07 ENCOUNTER — CLINICAL SUPPORT (OUTPATIENT)
Dept: REHABILITATION | Facility: HOSPITAL | Age: 63
End: 2023-09-07
Payer: COMMERCIAL

## 2023-09-07 DIAGNOSIS — M54.2 CERVICALGIA OF OCCIPITO-ATLANTO-AXIAL REGION: Primary | ICD-10-CM

## 2023-09-07 DIAGNOSIS — R29.3 ABNORMAL POSTURE: ICD-10-CM

## 2023-09-07 DIAGNOSIS — R29.898 DECREASED RANGE OF MOTION OF NECK: ICD-10-CM

## 2023-09-07 DIAGNOSIS — R68.89 DECREASED FUNCTIONAL ACTIVITY TOLERANCE: ICD-10-CM

## 2023-09-07 DIAGNOSIS — R68.89 DECREASED STRENGTH, ENDURANCE, AND MOBILITY: ICD-10-CM

## 2023-09-07 DIAGNOSIS — Z74.09 DECREASED STRENGTH, ENDURANCE, AND MOBILITY: ICD-10-CM

## 2023-09-07 DIAGNOSIS — R53.1 DECREASED STRENGTH, ENDURANCE, AND MOBILITY: ICD-10-CM

## 2023-09-07 PROCEDURE — 97110 THERAPEUTIC EXERCISES: CPT | Performed by: PHYSICAL THERAPIST

## 2023-09-07 PROCEDURE — 97530 THERAPEUTIC ACTIVITIES: CPT | Performed by: PHYSICAL THERAPIST

## 2023-09-07 PROCEDURE — 97112 NEUROMUSCULAR REEDUCATION: CPT | Performed by: PHYSICAL THERAPIST

## 2023-09-07 PROCEDURE — 97140 MANUAL THERAPY 1/> REGIONS: CPT | Performed by: PHYSICAL THERAPIST

## 2023-09-07 NOTE — PROGRESS NOTES
[facility]   Physical Therapy Treatment Note        Name: Tawny Valerio  [facility] Number: [id number]    Therapy Diagnosis:   Encounter Diagnoses   Name Primary?    Cervicalgia of nlnryyyd-ployscm-iuemn region Yes    Abnormal posture     Decreased functional activity tolerance     Decreased strength, endurance, and mobility     Decreased range of motion of neck      Physician: Kirill Andrade MD    Visit Date: 9/7/2023       Physician Orders: PT Eval and Treat  Medical Diagnosis from Referral: cervicalgia of uaicecia-vaqrgau-jueip region  Evaluation Date: 8/11/2023  Authorization Period Expiration: 10/1/2023  Plan of Care Expiration: 11/9/2023  Progress Note Due: 9/10/2023  Visit # / Visits authorized: 2/15 (+1 for Authorization)  FOTO: 1/3 (last performed on 8/11/2023)     Precautions: Standard     PTA Visit #: 0/5     Time In: 1132  Time Out: 1220  Total Billable Time: 43 minutes (Billing reflects 1 on 1 treatment time spent with patient)    Subjective     Patient reports: that she feels a strain in her head, behind the left ear. She states her pain levels come and go. Patient felt good for a couple of days following last visit, but then the pain returned. Mornings are the best for her still. She has been switching pillows to help with pain. Patient follows up with her MD on 9/20/23.    He/She was compliant with home exercise program.  Response to previous treatment: Initial Eval  Functional change: N/A    Pain: 4/10     Location: Left cervical paraspinals and upper trap    Objective      Objective Measures updated at progress report or POC update only unless otherwise noted.     Treatment     Tawny received the treatments listed below:      MANUAL THERAPY TECHNIQUES were applied for (8) minutes, including:     Manual Intervention Performed Today     Soft Tissue Mobilization  [x]  Bilateral suboccipitals, scalenes , SCM, upper trapezius, levator scapulae , subscapularis  extensive  at subcranial and sternocleidomastoid/upper trapezial on left side   Joint Mobilizations [x]  Gentle upglides/downslides   Gentle manual distraction  []  Cervical distraction     []      Functional Dry Needling  []         Plan for Next Visit: Continue as needed       THERAPEUTIC EXERCISES to develop strength, endurance, ROM, flexibility, posture, and core stabilization for (17) minutes including: including objective measurements      Intervention Performed today      UBE x 3 minutes forward, 3 minutes  backward    Chin tucks sitting x 10x/ 3 sets finger on sternum - lining up sternum to chin   Chin tucks with head lift supine x 5x/ 5 sec hold, 6 sets   Isometric pulls at cervical spine with band sitting x  x Extension 10x/5 seconds 2 sets  Side bending 5x/5 seconds hold 2 sets   AROM cervical spine    Rotation 10x 2 sets  Side bending 10x/ 2 sets      Plan for Next Visit:          NEUROMUSCULAR RE-EDUCATION ACTIVITIES to improve Balance, Coordination, Kinesthetic, Sense, Proprioception, and Posture for (8) minutes.  The following were included:     Intervention Performed Today     Bilateral shoulder external rotation  x 10x/ 2 sets with  bandred   Standing rows  x 10x/ 3 sets Red Band              Plan for Next Visit: progress as tolerated          THERAPEUTIC ACTIVITIES to improve dynamic and functional  performance for (10) minutes including:    Intervention Performed Today    Suitcase Carries  x  7.5# KB walking towards mirror focused on neutral head.    Seated landmine press   15# bar 10x/ 2 sets focus on neutral head    Farmer's March (added) x 2 x 10                              Plan for Next Visit:           Patient Education and Home Exercises       Home Exercises Provided and Patient Education Provided     Education provided: (included in treatment section) minutes  PURPOSE: Patient educated on the impairments noted above and the effects of physical therapy intervention to improve overall condition and  QOL.   EXERCISE: Patient was educated on all the above exercise prior/during/after for proper posture, positioning, and execution for safe performance with home exercise program.   STRENGTH: Patient educated on the importance of improved core and extremity strength in order to improve alignment of the spine and extremities with static positions and dynamic movement.   POSTURE: Patient educated on postural awareness to reduce stress and maintain optimal alignment of the spine with static positions and dynamic movement   ERGONOMICS: Patient educated on proper ergonomics at the work station in order to maintain optimal alignment of the musculoskeletal system and improve efficiency in the work environment.    Written Home Exercises Provided: yes.  Exercises were reviewed and Tawny was able to demonstrate them prior to the end of the session.  Tawny demonstrated good  understanding of the education provided. See EMR under Patient Instructions for exercises provided during therapy sessions.    Assessment     Patient did well with treatment today. She completed exercises with less difficulty and without increased complaint. Patient still requires visual cues through mirror to maintain proper posture throughout treatment. Decreased subcranial motion noted with stiffness especially during right rotation. Patient responded very well to manual therapy, reporting good relief following.     Tawny is progressing well towards her goals.   Patient prognosis is Good.     Patient will continue to benefit from skilled outpatient physical therapy to address the deficits listed in the problem list box on initial evaluation, provide pt/family education and to maximize patient's level of independence in the home and community environment.     Patient's spiritual, cultural and educational needs considered and pt agreeable to plan of care and goals.     Anticipated Barriers for therapy: co-morbidities and chronicity of  condition          Short Term Goals:  6 weeks Status  Date Met   PAIN: Pt will report worst pain of 8/10 in order to progress toward max functional ability and improve quality of life. [x] Progressing  [] Met  [] Not Met     FUNCTION: Patient will demonstrate improved function as indicated by a score of greater than or equal to 55 out of 100 on FOTO. [x] Progressing  [] Met  [] Not Met     MOBILITY: Patient will improve AROM to 50% of stated goals, listed in objective measures above, in order to progress towards independence with functional activities.  [x] Progressing  [] Met  [] Not Met     STRENGTH: Patient will improve strength to 50% of stated goals, listed in objective measures above, in order to progress towards independence with functional activities. [x] Progressing  [] Met  [] Not Met     POSTURE: Patient will correct postural deviations in sitting and standing, to decrease pain and promote long term stability.  [x] Progressing  [] Met  [] Not Met     HEP: Patient will demonstrate independence with HEP in order to progress toward functional independence. [x] Progressing  [] Met  [] Not Met        Long Term Goals:  12 weeks Status Date Met   PAIN: Pt will report worst pain of 6/10 in order to progress toward max functional ability and improve quality of life [x] Progressing  [] Met  [] Not Met     FUNCTION: Patient will demonstrate improved function as indicated by a score of greater than or equal to 58 out of 100 on FOTO. [x] Progressing  [] Met  [] Not Met     MOBILITY: Patient will improve AROM to stated goals, listed in objective measures above, in order to return to maximal functional potential and improve quality of life.  [x] Progressing  [] Met  [] Not Met     STRENGTH: Patient will improve strength to stated goals, listed in objective measures above, in order to improve functional independence and quality of life.  [x] Progressing  [] Met  [] Not Met     GAIT: Patient will demonstrate normalized  gait mechanics with minimal compensation in order to return to PLOF. [x] Progressing  [] Met  [] Not Met     Patient will return to normal ADL's, IADL's, community involvement, recreational activities, and work-related activities with less than or equal to 3/10 pain and maximal function.  [x] Progressing  [] Met  [] Not Met          Plan     Continue Plan of Care (POC) and progress per patient tolerance. See treatment section for details on planned progressions next session.      Marisabel Amaya, PT, DPT

## 2023-09-08 ENCOUNTER — CLINICAL SUPPORT (OUTPATIENT)
Dept: REHABILITATION | Facility: HOSPITAL | Age: 63
End: 2023-09-08
Payer: COMMERCIAL

## 2023-09-08 DIAGNOSIS — M54.2 CERVICALGIA OF OCCIPITO-ATLANTO-AXIAL REGION: Primary | ICD-10-CM

## 2023-09-08 DIAGNOSIS — R29.898 DECREASED RANGE OF MOTION OF NECK: ICD-10-CM

## 2023-09-08 DIAGNOSIS — R68.89 DECREASED FUNCTIONAL ACTIVITY TOLERANCE: ICD-10-CM

## 2023-09-08 DIAGNOSIS — R68.89 DECREASED STRENGTH, ENDURANCE, AND MOBILITY: ICD-10-CM

## 2023-09-08 DIAGNOSIS — Z74.09 DECREASED STRENGTH, ENDURANCE, AND MOBILITY: ICD-10-CM

## 2023-09-08 DIAGNOSIS — R53.1 DECREASED STRENGTH, ENDURANCE, AND MOBILITY: ICD-10-CM

## 2023-09-08 DIAGNOSIS — R29.3 ABNORMAL POSTURE: ICD-10-CM

## 2023-09-08 PROCEDURE — 97112 NEUROMUSCULAR REEDUCATION: CPT

## 2023-09-08 PROCEDURE — 97530 THERAPEUTIC ACTIVITIES: CPT

## 2023-09-08 PROCEDURE — 97140 MANUAL THERAPY 1/> REGIONS: CPT

## 2023-09-08 PROCEDURE — 97110 THERAPEUTIC EXERCISES: CPT

## 2023-09-08 NOTE — PROGRESS NOTES
[facility]   Physical Therapy Treatment Note        Name: Tawny ESTRADA [facility] Number: [id number]    Therapy Diagnosis:   No diagnosis found.    Physician: Kirill Andrade MD    Visit Date: 9/8/2023       Physician Orders: PT Eval and Treat  Medical Diagnosis from Referral: cervicalgia of mprcregh-vjphagv-lfgua region  Evaluation Date: 8/11/2023  Authorization Period Expiration: 10/1/2023  Plan of Care Expiration: 11/9/2023  Progress Note Due: 9/10/2023  Visit # / Visits authorized: 3/15 (+1 for Authorization)  FOTO: 1/3 (last performed on 8/11/2023)     Precautions: Standard     PTA Visit #: 0/5     Time In: 2:30 PM  Time Out: 3:15 PM  Total Billable Time: 43 minutes (Billing reflects 1 on 1 treatment time spent with patient)    Subjective     Patient reports: that she has felt more relief today than she has in the past few weeks. Following therapy last visit. She reports little to no pain in her neck today, and had improved tolerance with driving.  Patient follows up with her MD on 9/20/23.    He/She was compliant with home exercise program.  Response to previous treatment: Improved neck pain symptoms and midline orientation  Functional change: Improved tolerance with driving.     Pain: 2/10     Location: Left cervical paraspinals and upper trap    Objective      Objective Measures updated at progress report or POC update only unless otherwise noted.     Treatment     Tawny received the treatments listed below:      MANUAL THERAPY TECHNIQUES were applied for (8) minutes, including:     Manual Intervention Performed Today     Soft Tissue Mobilization  [x]  Bilateral suboccipitals, scalenes , SCM, upper trapezius, levator scapulae , subscapularis  extensive at subcranial and sternocleidomastoid/upper trapezial on left side   Joint Mobilizations [x]  Gentle upglides/downslides   Gentle manual distraction  []  Cervical distraction     []      Functional Dry Needling  []          Plan for Next Visit: Continue as needed       THERAPEUTIC EXERCISES to develop strength, endurance, ROM, flexibility, posture, and core stabilization for (17) minutes including: including objective measurements      Intervention Performed today      UBE x 3 minutes forward, 3 minutes  backward    Chin tucks sitting x 10x/ 3 sets finger on sternum - lining up sternum to chin   Chin tucks with head lift supine x 5x/ 5 sec hold, 6 sets   Isometric pulls at cervical spine with band sitting x  x Extension 10x/5 seconds 2 sets  Side bending 5x/5 seconds hold 2 sets   AROM cervical spine    Rotation 10x 2 sets  Side bending 10x/ 2 sets      Plan for Next Visit:          NEUROMUSCULAR RE-EDUCATION ACTIVITIES to improve Balance, Coordination, Kinesthetic, Sense, Proprioception, and Posture for (10) minutes.  The following were included:     Intervention Performed Today     Bilateral shoulder external rotation  x 10x/ 2 sets with  band red   Standing rows  x 10x/ 3 sets Red Band    Banded Ws  x  10x/ 2 sets yellow band             Plan for Next Visit: progress as tolerated          THERAPEUTIC ACTIVITIES to improve dynamic and functional  performance for (10) minutes including:    Intervention Performed Today    Suitcase Carries  x  7.5# KB walking towards mirror focused on neutral head.    Seated landmine press   15# bar 10x/ 2 sets focus on neutral head    Wright's March  x  3 x 10                              Plan for Next Visit:           Patient Education and Home Exercises       Home Exercises Provided and Patient Education Provided     Education provided: (included in treatment section) minutes  PURPOSE: Patient educated on the impairments noted above and the effects of physical therapy intervention to improve overall condition and QOL.   EXERCISE: Patient was educated on all the above exercise prior/during/after for proper posture, positioning, and execution for safe performance with home exercise program.    STRENGTH: Patient educated on the importance of improved core and extremity strength in order to improve alignment of the spine and extremities with static positions and dynamic movement.   POSTURE: Patient educated on postural awareness to reduce stress and maintain optimal alignment of the spine with static positions and dynamic movement   ERGONOMICS: Patient educated on proper ergonomics at the work station in order to maintain optimal alignment of the musculoskeletal system and improve efficiency in the work environment.    Written Home Exercises Provided: yes.  Exercises were reviewed and Tawny was able to demonstrate them prior to the end of the session.  Tawny demonstrated good  understanding of the education provided. See EMR under Patient Instructions for exercises provided during therapy sessions.    Assessment     Patient did well with treatment today. She completed exercises with less difficulty and without increased complaint. Patient needed verbal and tactile cues for head orientation during periscapular training.   Tawny is progressing well towards her goals.   Patient prognosis is Good.     Patient will continue to benefit from skilled outpatient physical therapy to address the deficits listed in the problem list box on initial evaluation, provide pt/family education and to maximize patient's level of independence in the home and community environment.     Patient's spiritual, cultural and educational needs considered and pt agreeable to plan of care and goals.     Anticipated Barriers for therapy: co-morbidities and chronicity of condition          Short Term Goals:  6 weeks Status  Date Met   PAIN: Pt will report worst pain of 8/10 in order to progress toward max functional ability and improve quality of life. [x] Progressing  [] Met  [] Not Met     FUNCTION: Patient will demonstrate improved function as indicated by a score of greater than or equal to 55 out of 100 on FOTO. [x]  Progressing  [] Met  [] Not Met     MOBILITY: Patient will improve AROM to 50% of stated goals, listed in objective measures above, in order to progress towards independence with functional activities.  [x] Progressing  [] Met  [] Not Met     STRENGTH: Patient will improve strength to 50% of stated goals, listed in objective measures above, in order to progress towards independence with functional activities. [x] Progressing  [] Met  [] Not Met     POSTURE: Patient will correct postural deviations in sitting and standing, to decrease pain and promote long term stability.  [x] Progressing  [] Met  [] Not Met     HEP: Patient will demonstrate independence with HEP in order to progress toward functional independence. [x] Progressing  [] Met  [] Not Met        Long Term Goals:  12 weeks Status Date Met   PAIN: Pt will report worst pain of 6/10 in order to progress toward max functional ability and improve quality of life [x] Progressing  [] Met  [] Not Met     FUNCTION: Patient will demonstrate improved function as indicated by a score of greater than or equal to 58 out of 100 on FOTO. [x] Progressing  [] Met  [] Not Met     MOBILITY: Patient will improve AROM to stated goals, listed in objective measures above, in order to return to maximal functional potential and improve quality of life.  [x] Progressing  [] Met  [] Not Met     STRENGTH: Patient will improve strength to stated goals, listed in objective measures above, in order to improve functional independence and quality of life.  [x] Progressing  [] Met  [] Not Met     GAIT: Patient will demonstrate normalized gait mechanics with minimal compensation in order to return to PLOF. [x] Progressing  [] Met  [] Not Met     Patient will return to normal ADL's, IADL's, community involvement, recreational activities, and work-related activities with less than or equal to 3/10 pain and maximal function.  [x] Progressing  [] Met  [] Not Met          Plan     Continue Plan  of Care (POC) and progress per patient tolerance. See treatment section for details on planned progressions next session.      Rasheed Hill, PT, DPT

## 2023-09-14 ENCOUNTER — OFFICE VISIT (OUTPATIENT)
Dept: DIABETES | Facility: CLINIC | Age: 63
End: 2023-09-14
Payer: COMMERCIAL

## 2023-09-14 DIAGNOSIS — E11.9 CONTROLLED TYPE 2 DIABETES MELLITUS WITHOUT COMPLICATION, WITHOUT LONG-TERM CURRENT USE OF INSULIN: Primary | ICD-10-CM

## 2023-09-14 PROCEDURE — 99214 OFFICE O/P EST MOD 30 MIN: CPT | Mod: 95,,, | Performed by: NURSE PRACTITIONER

## 2023-09-14 PROCEDURE — 3044F HG A1C LEVEL LT 7.0%: CPT | Mod: CPTII,95,, | Performed by: NURSE PRACTITIONER

## 2023-09-14 PROCEDURE — 3066F NEPHROPATHY DOC TX: CPT | Mod: CPTII,95,, | Performed by: NURSE PRACTITIONER

## 2023-09-14 PROCEDURE — 3044F PR MOST RECENT HEMOGLOBIN A1C LEVEL <7.0%: ICD-10-PCS | Mod: CPTII,95,, | Performed by: NURSE PRACTITIONER

## 2023-09-14 PROCEDURE — 3061F PR NEG MICROALBUMINURIA RESULT DOCUMENTED/REVIEW: ICD-10-PCS | Mod: CPTII,95,, | Performed by: NURSE PRACTITIONER

## 2023-09-14 PROCEDURE — 4010F ACE/ARB THERAPY RXD/TAKEN: CPT | Mod: CPTII,95,, | Performed by: NURSE PRACTITIONER

## 2023-09-14 PROCEDURE — 3061F NEG MICROALBUMINURIA REV: CPT | Mod: CPTII,95,, | Performed by: NURSE PRACTITIONER

## 2023-09-14 PROCEDURE — 4010F PR ACE/ARB THEARPY RXD/TAKEN: ICD-10-PCS | Mod: CPTII,95,, | Performed by: NURSE PRACTITIONER

## 2023-09-14 PROCEDURE — 3066F PR DOCUMENTATION OF TREATMENT FOR NEPHROPATHY: ICD-10-PCS | Mod: CPTII,95,, | Performed by: NURSE PRACTITIONER

## 2023-09-14 PROCEDURE — 99214 PR OFFICE/OUTPT VISIT, EST, LEVL IV, 30-39 MIN: ICD-10-PCS | Mod: 95,,, | Performed by: NURSE PRACTITIONER

## 2023-09-14 RX ORDER — TIRZEPATIDE 5 MG/.5ML
5 INJECTION, SOLUTION SUBCUTANEOUS
Qty: 4 PEN | Refills: 0 | Status: SHIPPED | OUTPATIENT
Start: 2023-09-14 | End: 2023-10-14

## 2023-09-14 NOTE — PATIENT INSTRUCTIONS
PATIENT INSTRUCTIONS     Lifestyle modification with diabetic diet and at least 30 minutes of physical activity daily recommended.  You are overdue for your yearly diabetic eye exam. Please schedule an appointment with your eye care provider at your earliest convenience. If your eye care provider is outside of the [facility] [employer] system, please have them fax a report of the exam to [facility] Fax [phone].   Diabetic Foot Exam deferred today due to virtual visit. Will plan to be done at next in-person visit.      Continue Jardiance to 25 mg by mouth daily.   Increase Mounjaro to 5 mg subcutaneously every 7 days.  Send message in [facility] portal when you take 4th dose of 5 mg and we will send in dosage increase to your pharmacy.       Check blood sugar twice daily. Every morning when you wake up and 1-2 hours after main meal of the day. Keep log and upload to [employer] prior to each visit.   Blood Sugar Goals:       Fastin-130.       1-2 hours after a meal: Less than 180.

## 2023-09-14 NOTE — PROGRESS NOTES
The patient location is: Home  The chief complaint leading to consultation is: Diabetes    Visit type: audiovisual    Face to Face time with patient: 15  30 minutes of total time spent on the encounter, which includes face to face time and non-face to face time preparing to see the patient (eg, review of tests), Obtaining and/or reviewing separately obtained history, Documenting clinical information in the electronic or other health record, Independently interpreting results (not separately reported) and communicating results to the patient/family/caregiver, or Care coordination (not separately reported).  Each patient to whom he or she provides medical services by telemedicine is:  (1) informed of the relationship between the physician and patient and the respective role of any other health care provider with respect to management of the patient; and (2) notified that he or she may decline to receive medical services by telemedicine and may withdraw from such care at any time.    Notes:    Tawny Valerio is a 63 y.o. female who presents for a follow up evaluation of Type 2 diabetes mellitus.     CHIEF COMPLAINT: Diabetes Consultation    PCP: Varsha Rhodes MD      Initial visit with me - 8/17/2023    The patient was initially diagnosed with diabetes in 2016.      Previous failed treatments include:  Metformin - GI upset.  Ozempic - N/V/D, fecal incontinence.     Social Documentation:  Patient lives in [place] with .   Occupation:  for [employer].   Exercise: limited by occipital neuralgia.   No sweet tea or regular cold drinks.       Diabetes related complications:   none.   denies Pancreatitis  denies Gastroparesis  denies DKA  denies Hx/family Hx of MEN2/MTC  denies Frequent UTIs/yeast infections     Cardiovascular Risk Factors: dyslipidemia, family history of premature cardiovascular disease, hypertension, obesity (BMI >30 kg/m2), and sedentary lifestyle.     Diabetes Medications         "       empagliflozin (JARDIANCE) 25 mg tablet Take 1 tablet (25 mg total) by mouth once daily.    tirzepatide (MOUNJARO) 2.5 mg/0.5 mL PnIj Inject 2.5 mg into the skin every 7 days. - TAKING EVERY TUESDAY. TOLERATING WELL..      Current monitoring regimen: capillary blood glucose monitoring with finger sticks.    Patient currently taking insulin or sulfonylurea?  NO  Recent hypoglycemic episodes: No.     Patient compliant with glucose checks and medication administration? Yes    DIABETES MANAGEMENT STATUS  Statin: Not taking  ACE/ARB: Taking  Screening or Prevention Patient's value Goal Complete/Controlled?   HgA1C Testing and Control   Lab Results   Component Value Date    HGBA1C 6.1 (H) 08/22/2023      Annually/Less than 8% Yes   Lipid profile : 06/14/2023 Annually Yes   LDL control Lab Results   Component Value Date    LDLCALC 109.8 06/14/2023    Annually/Less than 100 mg/dl  No   Nephropathy screening Lab Results   Component Value Date    LABMICR 16.0 08/22/2023     Lab Results   Component Value Date    PROTEINUA Negative 01/13/2023     No results found for: "UTPCR"   Annually Yes   Blood pressure BP Readings from Last 1 Encounters:   08/09/23 130/78    Less than 140/90 Yes   Dilated retinal exam : 01/22/2021 Annually No   Foot exam   Most Recent Foot Exam Date: Not Found Annually No   Patient's medications, allergies, surgical, social and family histories were reviewed and updated as appropriate.     Review of Systems   Constitutional:  Negative for weight loss.   Eyes:  Negative for blurred vision and double vision.   Cardiovascular:  Negative for chest pain.   Gastrointestinal:  Negative for nausea and vomiting.   Genitourinary:  Negative for frequency.   Musculoskeletal:  Negative for falls.   Neurological:  Negative for dizziness and weakness.   Endo/Heme/Allergies:  Negative for polydipsia.   Psychiatric/Behavioral:  Negative for depression.    All other systems reviewed and are negative.       Physical " "Exam  Constitutional:       Appearance: Normal appearance.   HENT:      Head: Normocephalic and atraumatic.   Pulmonary:      Effort: No respiratory distress.   Musculoskeletal:      Cervical back: Normal range of motion.   Neurological:      Mental Status: She is alert and oriented to person, place, and time.   Psychiatric:         Mood and Affect: Mood normal.         Behavior: Behavior normal.        Last menstrual period 01/15/2019.  Wt Readings from Last 3 Encounters:   08/24/23 131.7 kg (290 lb 5.5 oz)   08/08/23 130.5 kg (287 lb 11.2 oz)   07/17/23 125.6 kg (277 lb)       LAB REVIEW  Lab Results   Component Value Date     08/22/2023    K 4.3 08/22/2023     08/22/2023    CO2 24 08/22/2023    BUN 16 08/22/2023    CREATININE 0.8 08/22/2023    CALCIUM 9.9 08/22/2023    ANIONGAP 10 08/22/2023    EGFRNORACEVR >60.0 08/22/2023     No results found for: "CPEPTIDE", "GLUTAMICACID", "INSLNABS"  Hemoglobin A1C   Date Value Ref Range Status   08/22/2023 6.1 (H) 4.0 - 5.6 % Final     Comment:     ADA Screening Guidelines:  5.7-6.4%  Consistent with prediabetes  >or=6.5%  Consistent with diabetes    High levels of fetal hemoglobin interfere with the HbA1C  assay. Heterozygous hemoglobin variants (HbS, HgC, etc)do  not significantly interfere with this assay.   However, presence of multiple variants may affect accuracy.     08/09/2022 5.5 4.0 - 5.6 % Final     Comment:     ADA Screening Guidelines:  5.7-6.4%  Consistent with prediabetes  >or=6.5%  Consistent with diabetes    High levels of fetal hemoglobin interfere with the HbA1C  assay. Heterozygous hemoglobin variants (HbS, HgC, etc)do  not significantly interfere with this assay.   However, presence of multiple variants may affect accuracy.     01/06/2022 5.4 4.0 - 5.6 % Final     Comment:     ADA Screening Guidelines:  5.7-6.4%  Consistent with prediabetes  >or=6.5%  Consistent with diabetes    High levels of fetal hemoglobin interfere with the " HbA1C  assay. Heterozygous hemoglobin variants (HbS, HgC, etc)do  not significantly interfere with this assay.   However, presence of multiple variants may affect accuracy.          ASSESSMENT    ICD-10-CM ICD-9-CM   1. Controlled type 2 diabetes mellitus without complication, without long-term current use of insulin  E11.9 250.00       PLAN  Diagnoses and all orders for this visit:    Controlled type 2 diabetes mellitus without complication, without long-term current use of insulin  -     tirzepatide (MOUNJARO) 5 mg/0.5 mL PnIj; Inject 5 mg into the skin every 7 days.        Reviewed pathophysiology of diabetes, complications related to the disease, importance of annual dilated eye exam and daily foot examination. Explained MOA, SE, dosage of medications. Written instructions given and reviewed with patient and patient verbalizes understanding.     PATIENT INSTRUCTIONS     Lifestyle modification with diabetic diet and at least 30 minutes of physical activity daily recommended.  You are overdue for your yearly diabetic eye exam. Please schedule an appointment with your eye care provider at your earliest convenience. If your eye care provider is outside of the [facility] system, please have them fax a report of the exam to [email or web address] Fax [phone].   Diabetic Foot Exam deferred today due to virtual visit. Will plan to be done at next in-person visit.      Continue Jardiance to 25 mg by mouth daily.   Increase Mounjaro to 5 mg subcutaneously every 7 days.  Send message in [facility] portal when you take 4th dose of 5 mg and we will send in dosage increase to your pharmacy.       Check blood sugar twice daily. Every morning when you wake up and 1-2 hours after main meal of the day. Keep log and upload to [facility] prior to each visit.   Blood Sugar Goals:       Fastin-130.       1-2 hours after a meal: Less than 180.          No follow-ups on file.    Portions of this note were prepared with [employer] voice recognition transcription software. Grammatical errors, including garbled syntax, mangle pronouns, and other bizarre constructions may be attributed to that software system.

## 2023-09-21 ENCOUNTER — CLINICAL SUPPORT (OUTPATIENT)
Dept: REHABILITATION | Facility: HOSPITAL | Age: 63
End: 2023-09-21
Payer: COMMERCIAL

## 2023-09-21 DIAGNOSIS — R68.89 DECREASED FUNCTIONAL ACTIVITY TOLERANCE: ICD-10-CM

## 2023-09-21 DIAGNOSIS — M54.2 CERVICALGIA OF OCCIPITO-ATLANTO-AXIAL REGION: Primary | ICD-10-CM

## 2023-09-21 DIAGNOSIS — R68.89 DECREASED STRENGTH, ENDURANCE, AND MOBILITY: ICD-10-CM

## 2023-09-21 DIAGNOSIS — R29.898 DECREASED RANGE OF MOTION OF NECK: ICD-10-CM

## 2023-09-21 DIAGNOSIS — R53.1 DECREASED STRENGTH, ENDURANCE, AND MOBILITY: ICD-10-CM

## 2023-09-21 DIAGNOSIS — Z74.09 DECREASED STRENGTH, ENDURANCE, AND MOBILITY: ICD-10-CM

## 2023-09-21 DIAGNOSIS — R29.3 ABNORMAL POSTURE: ICD-10-CM

## 2023-09-21 PROCEDURE — 97140 MANUAL THERAPY 1/> REGIONS: CPT | Performed by: PHYSICAL THERAPIST

## 2023-09-21 PROCEDURE — 97110 THERAPEUTIC EXERCISES: CPT | Performed by: PHYSICAL THERAPIST

## 2023-09-21 PROCEDURE — 97112 NEUROMUSCULAR REEDUCATION: CPT | Performed by: PHYSICAL THERAPIST

## 2023-09-21 NOTE — PROGRESS NOTES
[facility]   Physical Therapy Treatment Note       Name: Tawny Valerio  [facility] Number: [id number]    Therapy Diagnosis:   Encounter Diagnoses   Name Primary?    Cervicalgia of thkvgsnh-dmwnhhp-fazpo region Yes    Abnormal posture     Decreased functional activity tolerance     Decreased strength, endurance, and mobility     Decreased range of motion of neck        Physician: Kirill Andrade MD    Visit Date: 9/21/2023       Physician Orders: PT Eval and Treat  Medical Diagnosis from Referral: cervicalgia of zttvkgzh-qkwmbhx-jomqh region  Evaluation Date: 8/11/2023  Authorization Period Expiration: 10/1/2023  Plan of Care Expiration: 11/9/2023  Progress Note Due: 9/10/2023  Visit # / Visits authorized: 4/15 (+1 for Authorization)  FOTO: 1/3 (last performed on 8/11/2023)     Precautions: Standard     PTA Visit #: 0/5     Time In: 1435  Time Out: 1510  Total Billable Time: 30 minutes (Billing reflects 1 on 1 treatment time spent with patient)    Subjective     Patient reports: that her pain levels are just not consistent. She has good days and bad days, but she feels overall she may be having more good days lately. She states that she is also taking a little less gabapentin. Patient reports that they have just recently found out her  has a tumor, and she is exhausted today. She would like to hold off on most exercises, especially weights today and resume next visit.     He/She was compliant with home exercise program.  Response to previous treatment: Improved neck pain symptoms and midline orientation  Functional change: Improved tolerance with driving.     Pain: 3/10     Location: Left cervical paraspinals and upper trap    Objective      Objective Measures updated at progress report or POC update only unless otherwise noted.     Treatment     Tawny received the treatments listed below:      MANUAL THERAPY TECHNIQUES were applied for (10) minutes, including:     Manual  Intervention Performed Today     Soft Tissue Mobilization  [x]  Bilateral SCM, upper trapezius, levator scapulae , subscapularis  extensive at sternocleidomastoid/upper trapezius on left side   Joint Mobilizations [x]  Gentle upglides/downslides   Gentle manual distraction  []  Cervical distraction     []      Functional Dry Needling  []         Plan for Next Visit: Continue as needed       THERAPEUTIC EXERCISES to develop strength, endurance, ROM, flexibility, posture, and core stabilization for (10) minutes including: including objective measurements      Intervention Performed today      UBE x 3 minutes forward, 3 minutes  backward    Chin tucks sitting x 10x/ 3 sets finger on sternum - lining up sternum to chin   Chin tucks with head lift supine  5x/ 5 sec hold, 6 sets   Isometric pulls at cervical spine with band sitting  Extension 10x/5 seconds 2 sets  Side bending 5x/5 seconds hold 2 sets   AROM cervical spine    Rotation 10x 2 sets  Side bending 10x/ 2 sets      Plan for Next Visit:          NEUROMUSCULAR RE-EDUCATION ACTIVITIES to improve Balance, Coordination, Kinesthetic, Sense, Proprioception, and Posture for (10) minutes.  The following were included:     Intervention Performed Today     Bilateral shoulder external rotation  x 10x/ 2 sets with  band red   Standing rows  x 10x/ 3 sets Red Band   Banded Ws    10x/ 2 sets yellow band             Plan for Next Visit: progress as tolerated          THERAPEUTIC ACTIVITIES to improve dynamic and functional  performance for (0) minutes including:    Intervention Performed Today    Suitcase Carries    7.5# KB walking towards mirror focused on neutral head.    Seated landmine press   15# bar 10x/ 2 sets focus on neutral head    Farmer's March    3 x 10                              Plan for Next Visit:           Patient Education and Home Exercises       Home Exercises Provided and Patient Education Provided     Education provided: (included in treatment section)  minutes  PURPOSE: Patient educated on the impairments noted above and the effects of physical therapy intervention to improve overall condition and QOL.   EXERCISE: Patient was educated on all the above exercise prior/during/after for proper posture, positioning, and execution for safe performance with home exercise program.   STRENGTH: Patient educated on the importance of improved core and extremity strength in order to improve alignment of the spine and extremities with static positions and dynamic movement.   POSTURE: Patient educated on postural awareness to reduce stress and maintain optimal alignment of the spine with static positions and dynamic movement   ERGONOMICS: Patient educated on proper ergonomics at the work station in order to maintain optimal alignment of the musculoskeletal system and improve efficiency in the work environment.    Written Home Exercises Provided: yes.  Exercises were reviewed and Tawny was able to demonstrate them prior to the end of the session.  Tawny demonstrated good  understanding of the education provided. See EMR under Patient Instructions for exercises provided during therapy sessions.    Assessment     Patient tolerated treatment well. Progress Note held this visit due to patient being exhausted and this possibly affecting measurements. Will attempt updated measurements next visit. Patient did well with exercises performed today, and she reported very good relief with manual therapy this visit. Patient left with reports of feeling better post treatment than upon arrival.      Tawny is progressing well towards her goals.   Patient prognosis is Good.     Patient will continue to benefit from skilled outpatient physical therapy to address the deficits listed in the problem list box on initial evaluation, provide pt/family education and to maximize patient's level of independence in the home and community environment.     Patient's spiritual, cultural and  educational needs considered and pt agreeable to plan of care and goals.     Anticipated Barriers for therapy: co-morbidities and chronicity of condition          Short Term Goals:  6 weeks Status  Date Met   PAIN: Pt will report worst pain of 8/10 in order to progress toward max functional ability and improve quality of life. [x] Progressing  [] Met  [] Not Met     FUNCTION: Patient will demonstrate improved function as indicated by a score of greater than or equal to 55 out of 100 on FOTO. [x] Progressing  [] Met  [] Not Met     MOBILITY: Patient will improve AROM to 50% of stated goals, listed in objective measures above, in order to progress towards independence with functional activities.  [x] Progressing  [] Met  [] Not Met     STRENGTH: Patient will improve strength to 50% of stated goals, listed in objective measures above, in order to progress towards independence with functional activities. [x] Progressing  [] Met  [] Not Met     POSTURE: Patient will correct postural deviations in sitting and standing, to decrease pain and promote long term stability.  [x] Progressing  [] Met  [] Not Met     HEP: Patient will demonstrate independence with HEP in order to progress toward functional independence. [x] Progressing  [] Met  [] Not Met        Long Term Goals:  12 weeks Status Date Met   PAIN: Pt will report worst pain of 6/10 in order to progress toward max functional ability and improve quality of life [x] Progressing  [] Met  [] Not Met     FUNCTION: Patient will demonstrate improved function as indicated by a score of greater than or equal to 58 out of 100 on FOTO. [x] Progressing  [] Met  [] Not Met     MOBILITY: Patient will improve AROM to stated goals, listed in objective measures above, in order to return to maximal functional potential and improve quality of life.  [x] Progressing  [] Met  [] Not Met     STRENGTH: Patient will improve strength to stated goals, listed in objective measures above, in  order to improve functional independence and quality of life.  [x] Progressing  [] Met  [] Not Met     GAIT: Patient will demonstrate normalized gait mechanics with minimal compensation in order to return to PLOF. [x] Progressing  [] Met  [] Not Met     Patient will return to normal ADL's, IADL's, community involvement, recreational activities, and work-related activities with less than or equal to 3/10 pain and maximal function.  [x] Progressing  [] Met  [] Not Met          Plan     Continue Plan of Care (POC) and progress per patient tolerance. See treatment section for details on planned progressions next session.      Mariasbel Amaya, PT, DPT

## 2023-10-11 ENCOUNTER — PATIENT MESSAGE (OUTPATIENT)
Dept: INTERNAL MEDICINE | Facility: CLINIC | Age: 63
End: 2023-10-11

## 2023-10-12 ENCOUNTER — PATIENT MESSAGE (OUTPATIENT)
Dept: INTERNAL MEDICINE | Facility: CLINIC | Age: 63
End: 2023-10-12
Payer: COMMERCIAL

## 2023-10-12 ENCOUNTER — OFFICE VISIT (OUTPATIENT)
Dept: FAMILY MEDICINE | Facility: CLINIC | Age: 63
End: 2023-10-12
Payer: COMMERCIAL

## 2023-10-12 DIAGNOSIS — J01.90 ACUTE BACTERIAL SINUSITIS: Primary | ICD-10-CM

## 2023-10-12 DIAGNOSIS — R05.9 COUGH, UNSPECIFIED TYPE: ICD-10-CM

## 2023-10-12 DIAGNOSIS — B96.89 ACUTE BACTERIAL SINUSITIS: Primary | ICD-10-CM

## 2023-10-12 PROCEDURE — 1159F MED LIST DOCD IN RCRD: CPT | Mod: CPTII,95,, | Performed by: PHYSICIAN ASSISTANT

## 2023-10-12 PROCEDURE — 3044F PR MOST RECENT HEMOGLOBIN A1C LEVEL <7.0%: ICD-10-PCS | Mod: CPTII,95,, | Performed by: PHYSICIAN ASSISTANT

## 2023-10-12 PROCEDURE — 3066F PR DOCUMENTATION OF TREATMENT FOR NEPHROPATHY: ICD-10-PCS | Mod: CPTII,95,, | Performed by: PHYSICIAN ASSISTANT

## 2023-10-12 PROCEDURE — 1159F PR MEDICATION LIST DOCUMENTED IN MEDICAL RECORD: ICD-10-PCS | Mod: CPTII,95,, | Performed by: PHYSICIAN ASSISTANT

## 2023-10-12 PROCEDURE — 3061F PR NEG MICROALBUMINURIA RESULT DOCUMENTED/REVIEW: ICD-10-PCS | Mod: CPTII,95,, | Performed by: PHYSICIAN ASSISTANT

## 2023-10-12 PROCEDURE — 3044F HG A1C LEVEL LT 7.0%: CPT | Mod: CPTII,95,, | Performed by: PHYSICIAN ASSISTANT

## 2023-10-12 PROCEDURE — 3061F NEG MICROALBUMINURIA REV: CPT | Mod: CPTII,95,, | Performed by: PHYSICIAN ASSISTANT

## 2023-10-12 PROCEDURE — 4010F ACE/ARB THERAPY RXD/TAKEN: CPT | Mod: CPTII,95,, | Performed by: PHYSICIAN ASSISTANT

## 2023-10-12 PROCEDURE — 4010F PR ACE/ARB THEARPY RXD/TAKEN: ICD-10-PCS | Mod: CPTII,95,, | Performed by: PHYSICIAN ASSISTANT

## 2023-10-12 PROCEDURE — 1160F RVW MEDS BY RX/DR IN RCRD: CPT | Mod: CPTII,95,, | Performed by: PHYSICIAN ASSISTANT

## 2023-10-12 PROCEDURE — 99213 OFFICE O/P EST LOW 20 MIN: CPT | Mod: 95,,, | Performed by: PHYSICIAN ASSISTANT

## 2023-10-12 PROCEDURE — 1160F PR REVIEW ALL MEDS BY PRESCRIBER/CLIN PHARMACIST DOCUMENTED: ICD-10-PCS | Mod: CPTII,95,, | Performed by: PHYSICIAN ASSISTANT

## 2023-10-12 PROCEDURE — 99213 PR OFFICE/OUTPT VISIT, EST, LEVL III, 20-29 MIN: ICD-10-PCS | Mod: 95,,, | Performed by: PHYSICIAN ASSISTANT

## 2023-10-12 PROCEDURE — 3066F NEPHROPATHY DOC TX: CPT | Mod: CPTII,95,, | Performed by: PHYSICIAN ASSISTANT

## 2023-10-12 RX ORDER — PROMETHAZINE HYDROCHLORIDE AND DEXTROMETHORPHAN HYDROBROMIDE 6.25; 15 MG/5ML; MG/5ML
5 SYRUP ORAL EVERY 6 HOURS PRN
Qty: 118 ML | Refills: 0 | Status: SHIPPED | OUTPATIENT
Start: 2023-10-12 | End: 2023-10-22

## 2023-10-12 RX ORDER — FLUTICASONE PROPIONATE 50 MCG
1 SPRAY, SUSPENSION (ML) NASAL DAILY
Qty: 16 G | Refills: 0 | Status: SHIPPED | OUTPATIENT
Start: 2023-10-12

## 2023-10-12 RX ORDER — AZITHROMYCIN 250 MG/1
TABLET, FILM COATED ORAL
Qty: 6 TABLET | Refills: 0 | Status: SHIPPED | OUTPATIENT
Start: 2023-10-12 | End: 2023-10-17

## 2023-10-12 NOTE — PROGRESS NOTES
Primary Care Telemedicine Note  The patient location is: Patient Home   The chief complaint leading to consultation is: Sinusitis   Total time spent with patient: 10 minutes    Visit type: Virtual visit with synchronous audio only and video  Each patient to whom he or she provides medical services by telemedicine is: (1) informed of the relationship between the physician and patient and the respective role of any other health care provider with respect to management of the patient; and (2) notified that he or she may decline to receive medical services by telemedicine and may withdraw from such care at any time.      Assessment/Plan:    Problem List Items Addressed This Visit    None  Visit Diagnoses       Acute bacterial sinusitis    -  Primary    Relevant Medications    azithromycin (Z-PHI) 250 MG tablet    fluticasone propionate (FLONASE) 50 mcg/actuation nasal spray    Cough, unspecified type        Relevant Medications    promethazine-dextromethorphan (PROMETHAZINE-DM) 6.25-15 mg/5 mL Syrp        -start antibiotics as prescribed  -recommend Flonase and Mucinex  -supportive care- rest, increase hydration with water, OTC Tylenol/Ibuprofen for pain/fever  -follow up if no improvement in symptoms   -ER precautions for severe or worsening of symptoms     Follow up if symptoms worsen or fail to improve.    Philomena Sykes PA-C  _____________________________________________________________________________________________________________________________________________________    CC: sinusitis     HPI: Patient is an established patient who presents today via virtual visit for sinusitis. This is a new problem. The current episode started >4 days ago. The problem is gradually worsening. Associated symptoms include congestion, sinus pressure, bilateral ear pain, productive cough, headaches, and body aches. Pertinent negatives include no fever, chills, diaphoresis, hoarse voice, neck pain, shortness of breath, sneezing,  sore throat or swollen glands. Past treatments include Benadryl, Mucinex, and Promethazine-DM. The treatment provided minimal relief. She has had no known sick contacts. No other complaints today.    Past Medical History:   Diagnosis Date    Acute cystitis without hematuria 2019    Anxiety     Colon cancer screening 2019    Diabetes type 2, controlled 2016    Endometrial ca 2019    Essential hypertension 2018    Simple endometrial hyperplasia 3/15/2019    Uterine polyp 3/14/2019     Past Surgical History:   Procedure Laterality Date     SECTION      x 1    DILATION AND CURETTAGE OF UTERUS  2019    HYSTEROSCOPY WITH DILATION AND CURETTAGE OF UTERUS N/A 2019    Procedure: HYSTEROSCOPY, WITH DILATION AND CURETTAGE OF UTERUS;  Surgeon: Bobby Frazier Jr., MD;  Location: [facility];  Service: OB/GYN;  Laterality: N/A;    lipoma removed      ROBOT-ASSISTED LAPAROSCOPIC ABDOMINAL HYSTERECTOMY USING DA AWAIS XI N/A 2019    Procedure: XI ROBOTIC HYSTERECTOMY;  Surgeon: Aneudy Almeida MD;  Location: [address];  Service: OB/GYN;  Laterality: N/A;    ROBOT-ASSISTED LAPAROSCOPIC LYSIS OF ADHESIONS USING DA AWAIS XI N/A 2019    Procedure: XI ROBOTIC LYSIS, ADHESIONS;  Surgeon: Aneudy Almeida MD;  Location: [facility] OR [address] [place] [address];  Service: OB/GYN;  Laterality: N/A;  Omental    ROBOT-ASSISTED LAPAROSCOPIC SALPINGO-OOPHORECTOMY USING DA AWAIS XI Bilateral 2019    Procedure: XI ROBOTIC SALPINGO-OOPHORECTOMY;  Surgeon: Aneudy Almeida MD;  Location: [address];  Service: OB/GYN;  Laterality: Bilateral;  Wt 299lbs     Review of patient's allergies indicates:   Allergen Reactions    Celebrex [celecoxib] Anaphylaxis    Codeine Hives     Does not know what she can take -usually just takes tylenol or ibuprofen    Lidocaine Hives    Vicodin [hydrocodone-acetaminophen] Hives and Swelling     Social History     Tobacco Use    Smoking status: Never    Smokeless tobacco: Never    Substance Use Topics    Alcohol use: Not Currently    Drug use: No     Family History   Problem Relation Age of Onset    No Known Problems Mother     Diabetes Father     Stroke Father     Hypertension Father     Heart disease Sister     Hypertension Sister     Heart disease Brother     Heart attack Brother     Hypertension Brother     Heart disease Maternal Grandmother     Heart disease Maternal Grandfather     Heart disease Paternal Grandfather     Breast cancer Other     Ovarian cancer Neg Hx     Uterine cancer Neg Hx     Colon cancer Neg Hx     Heart failure Neg Hx     Hyperlipidemia Neg Hx      Current Outpatient Medications on File Prior to Visit   Medication Sig Dispense Refill    acetaminophen (TYLENOL) 325 MG tablet Take 2 tablets (650 mg total) by mouth every 6 (six) hours as needed for Pain. 30 tablet 1    aspirin 81 MG Chew Take 81 mg by mouth once daily.      azelastine (ASTELIN) 137 mcg (0.1 %) nasal spray 1 spray (137 mcg total) by Nasal route 2 (two) times daily. 30 mL 11    blood sugar diagnostic Strp 1 each by Misc.(Non-Drug; Combo Route) route 2 (two) times a day. 11 each 0    blood-glucose meter kit Use as instructed 1 each 0    blood-glucose meter kit Use as instructed 1 each 0    buPROPion (WELLBUTRIN SR) 150 MG TBSR 12 hr tablet Take 1 tablet (150 mg total) by mouth 2 (two) times daily. 60 tablet 11    clindamycin (CLEOCIN T) 1 % lotion Apply topically 2 (two) times daily. 60 mL 3    empagliflozin (JARDIANCE) 25 mg tablet Take 1 tablet (25 mg total) by mouth once daily. 90 tablet 3    ergocalciferol (ERGOCALCIFEROL) 50,000 unit Cap Take 1 capsule (50,000 Units total) by mouth every 7 days. 12 capsule 3    ezetimibe (ZETIA) 10 mg tablet Take 1 tablet (10 mg total) by mouth once daily. 90 tablet 3    gabapentin (NEURONTIN) 300 MG capsule Take 1 capsule (300 mg total) by mouth 3 (three) times daily. 90 capsule 3    lancets (ACCU-CHEK SOFTCLIX LANCETS) Misc 1 each by Misc.(Non-Drug; Combo Route)  route 2 (two) times daily. 11 each 0    lancing device with lancets (MULTI-LANCET DEVICE 2) Kit 1 each by Misc.(Non-Drug; Combo Route) route 2 (two) times a day. 1 each 1    losartan (COZAAR) 25 MG tablet Take 1 tablet (25 mg total) by mouth once daily. 90 tablet 3    meloxicam (MOBIC) 7.5 MG tablet Take 1 tablet (7.5 mg total) by mouth once daily. 30 tablet 3    methylPREDNISolone (MEDROL DOSEPACK) 4 mg tablet use as directed 1 each 0    metronidazole 0.75% (METROCREAM) 0.75 % Crea Apply topically 2 (two) times daily. For rosacea on face. 45 g 3    ondansetron (ZOFRAN-ODT) 4 MG TbDL Take 4 mg by mouth every 6 (six) hours as needed.      promethazine (PHENERGAN) 25 MG tablet Take 25 mg by mouth every 6 (six) hours as needed.      tirzepatide (MOUNJARO) 5 mg/0.5 mL PnIj Inject 5 mg into the skin every 7 days. 4 pen 0    tretinoin (RETIN-A) 0.025 % cream APPLY TOPICALLY EVERY EVENING. 20 g 6     No current facility-administered medications on file prior to visit.       Review of Systems   Constitutional:  Positive for chills. Negative for fever, malaise/fatigue and weight loss.   HENT:  Positive for congestion, ear pain and sinus pain. Negative for sore throat.    Respiratory:  Positive for cough. Negative for hemoptysis, shortness of breath and wheezing.    Cardiovascular:  Negative for chest pain, palpitations and leg swelling.   Gastrointestinal:  Negative for abdominal pain, constipation, diarrhea and heartburn.   Genitourinary:  Negative for dysuria and frequency.   Musculoskeletal:  Positive for myalgias. Negative for back pain and joint pain.   Skin:  Negative for rash.   Neurological:  Positive for headaches.   Endo/Heme/Allergies:  Negative for environmental allergies.   Psychiatric/Behavioral:  Negative for depression. The patient is not nervous/anxious.        Physical Exam  Constitutional:       General: She is not in acute distress.     Appearance: She is well-developed. She is ill-appearing.   HENT:       Head: Normocephalic and atraumatic.      Nose: Congestion present.   Pulmonary:      Effort: Pulmonary effort is normal. No respiratory distress.   Abdominal:      General: There is no distension.   Musculoskeletal:         General: Normal range of motion.      Cervical back: Normal range of motion.   Neurological:      Mental Status: She is alert and oriented to person, place, and time.   Psychiatric:         Mood and Affect: Mood normal.         Behavior: Behavior normal.         The patient's Health Maintenance was reviewed and the following appears to be due at this time:  Health Maintenance Due   Topic Date Due    Pneumococcal Vaccines (Age 0-64) (1 - PCV) Never done    HIV Screening  Never done    Mammogram  Never done    Shingles Vaccine (1 of 2) Never done    Low Dose Statin  Never done    Colorectal Cancer Screening  Never done    Foot Exam  11/20/2019    Eye Exam  01/22/2022    Influenza Vaccine (1) Never done    COVID-19 Vaccine (5 - 2023-24 season) 09/01/2023

## 2023-10-13 ENCOUNTER — PATIENT MESSAGE (OUTPATIENT)
Dept: DIABETES | Facility: CLINIC | Age: 63
End: 2023-10-13
Payer: COMMERCIAL

## 2023-10-13 DIAGNOSIS — E11.9 CONTROLLED TYPE 2 DIABETES MELLITUS WITHOUT COMPLICATION, WITHOUT LONG-TERM CURRENT USE OF INSULIN: Primary | ICD-10-CM

## 2023-10-16 ENCOUNTER — TELEPHONE (OUTPATIENT)
Dept: INTERNAL MEDICINE | Facility: CLINIC | Age: 63
End: 2023-10-16
Payer: COMMERCIAL

## 2023-10-16 NOTE — TELEPHONE ENCOUNTER
Marlene,     Wasn't sure if you were increasing his dose, so I did not pend the refill for the 5mg  
6

## 2023-10-16 NOTE — TELEPHONE ENCOUNTER
----- Message from Varsha Rhodes MD sent at 10/16/2023 11:22 AM CDT -----  Contact: self [phone]  Pt needs to be seen for evisit or in person in order to have medications sent through.  She can also see an urgent care since I do not have any availabilities today.   -  ----- Message -----  From: Emy Elias MA  Sent: 10/12/2023  10:49 AM CDT  To: Varsha Rhodes MD      ----- Message -----  From: Estelle Mendiola  Sent: 10/12/2023  10:47 AM CDT  To: Meagan Maddox Staff    Patient called in this morning stating she has been waiting for the doctor to call her something for her cold symptoms. She said she has tried virtual and e visit with no luck. She  states she just needs something called in. Please call back [phone]. Thanks shanon          [facility]  [place] [address]  Phone: [phone] Fax: [phone]

## 2023-10-16 NOTE — TELEPHONE ENCOUNTER
.Left message on patient voicemail to return call to clinic regarding setting up an appointment to have medication sent in for symptoms./negra

## 2023-10-25 ENCOUNTER — OFFICE VISIT (OUTPATIENT)
Dept: NEUROLOGY | Facility: CLINIC | Age: 63
End: 2023-10-25
Payer: COMMERCIAL

## 2023-10-25 VITALS
DIASTOLIC BLOOD PRESSURE: 77 MMHG | WEIGHT: 284.38 LBS | BODY MASS INDEX: 53.74 KG/M2 | SYSTOLIC BLOOD PRESSURE: 110 MMHG | HEART RATE: 74 BPM

## 2023-10-25 DIAGNOSIS — G44.86 CERVICOGENIC HEADACHE: ICD-10-CM

## 2023-10-25 DIAGNOSIS — R26.89 IMBALANCE: ICD-10-CM

## 2023-10-25 DIAGNOSIS — M54.2 CERVICALGIA OF OCCIPITO-ATLANTO-AXIAL REGION: Primary | ICD-10-CM

## 2023-10-25 PROCEDURE — 3066F PR DOCUMENTATION OF TREATMENT FOR NEPHROPATHY: ICD-10-PCS | Mod: CPTII,S$GLB,, | Performed by: PSYCHIATRY & NEUROLOGY

## 2023-10-25 PROCEDURE — 4010F PR ACE/ARB THEARPY RXD/TAKEN: ICD-10-PCS | Mod: CPTII,S$GLB,, | Performed by: PSYCHIATRY & NEUROLOGY

## 2023-10-25 PROCEDURE — 3061F NEG MICROALBUMINURIA REV: CPT | Mod: CPTII,S$GLB,, | Performed by: PSYCHIATRY & NEUROLOGY

## 2023-10-25 PROCEDURE — 3061F PR NEG MICROALBUMINURIA RESULT DOCUMENTED/REVIEW: ICD-10-PCS | Mod: CPTII,S$GLB,, | Performed by: PSYCHIATRY & NEUROLOGY

## 2023-10-25 PROCEDURE — 3044F HG A1C LEVEL LT 7.0%: CPT | Mod: CPTII,S$GLB,, | Performed by: PSYCHIATRY & NEUROLOGY

## 2023-10-25 PROCEDURE — 99999 PR PBB SHADOW E&M-EST. PATIENT-LVL III: ICD-10-PCS | Mod: PBBFAC,,, | Performed by: PSYCHIATRY & NEUROLOGY

## 2023-10-25 PROCEDURE — 1160F PR REVIEW ALL MEDS BY PRESCRIBER/CLIN PHARMACIST DOCUMENTED: ICD-10-PCS | Mod: CPTII,S$GLB,, | Performed by: PSYCHIATRY & NEUROLOGY

## 2023-10-25 PROCEDURE — 3044F PR MOST RECENT HEMOGLOBIN A1C LEVEL <7.0%: ICD-10-PCS | Mod: CPTII,S$GLB,, | Performed by: PSYCHIATRY & NEUROLOGY

## 2023-10-25 PROCEDURE — 4010F ACE/ARB THERAPY RXD/TAKEN: CPT | Mod: CPTII,S$GLB,, | Performed by: PSYCHIATRY & NEUROLOGY

## 2023-10-25 PROCEDURE — 99214 PR OFFICE/OUTPT VISIT, EST, LEVL IV, 30-39 MIN: ICD-10-PCS | Mod: S$GLB,,, | Performed by: PSYCHIATRY & NEUROLOGY

## 2023-10-25 PROCEDURE — 3074F SYST BP LT 130 MM HG: CPT | Mod: CPTII,S$GLB,, | Performed by: PSYCHIATRY & NEUROLOGY

## 2023-10-25 PROCEDURE — 3078F PR MOST RECENT DIASTOLIC BLOOD PRESSURE < 80 MM HG: ICD-10-PCS | Mod: CPTII,S$GLB,, | Performed by: PSYCHIATRY & NEUROLOGY

## 2023-10-25 PROCEDURE — 3066F NEPHROPATHY DOC TX: CPT | Mod: CPTII,S$GLB,, | Performed by: PSYCHIATRY & NEUROLOGY

## 2023-10-25 PROCEDURE — 3008F PR BODY MASS INDEX (BMI) DOCUMENTED: ICD-10-PCS | Mod: CPTII,S$GLB,, | Performed by: PSYCHIATRY & NEUROLOGY

## 2023-10-25 PROCEDURE — 99999 PR PBB SHADOW E&M-EST. PATIENT-LVL III: CPT | Mod: PBBFAC,,, | Performed by: PSYCHIATRY & NEUROLOGY

## 2023-10-25 PROCEDURE — 1159F PR MEDICATION LIST DOCUMENTED IN MEDICAL RECORD: ICD-10-PCS | Mod: CPTII,S$GLB,, | Performed by: PSYCHIATRY & NEUROLOGY

## 2023-10-25 PROCEDURE — 3008F BODY MASS INDEX DOCD: CPT | Mod: CPTII,S$GLB,, | Performed by: PSYCHIATRY & NEUROLOGY

## 2023-10-25 PROCEDURE — 99214 OFFICE O/P EST MOD 30 MIN: CPT | Mod: S$GLB,,, | Performed by: PSYCHIATRY & NEUROLOGY

## 2023-10-25 PROCEDURE — 1160F RVW MEDS BY RX/DR IN RCRD: CPT | Mod: CPTII,S$GLB,, | Performed by: PSYCHIATRY & NEUROLOGY

## 2023-10-25 PROCEDURE — 3074F PR MOST RECENT SYSTOLIC BLOOD PRESSURE < 130 MM HG: ICD-10-PCS | Mod: CPTII,S$GLB,, | Performed by: PSYCHIATRY & NEUROLOGY

## 2023-10-25 PROCEDURE — 1159F MED LIST DOCD IN RCRD: CPT | Mod: CPTII,S$GLB,, | Performed by: PSYCHIATRY & NEUROLOGY

## 2023-10-25 PROCEDURE — 3078F DIAST BP <80 MM HG: CPT | Mod: CPTII,S$GLB,, | Performed by: PSYCHIATRY & NEUROLOGY

## 2023-10-25 NOTE — PATIENT INSTRUCTIONS
Increase gabapentin to 600 mg (2 capsules) in the morning and 300 mg (1 capsule) at night. Message me if need better pain control as can increase dose every 3 days  Resume lower neck/upper back to mid back PT with myofascial release (deep tissue massage, cupping, dry needling, etc). No manipulation of suboccipital region.  The patient was instructed to ice the occipital region for no more than 20 minutes at least once a day but may repeat this as many times as they would like.   Discussed ergonomic accommodations for occipital neuritis/neuralgia. Mainly perform all work at eye level to minimize continued neck flexion which will aggravate the nerve.  Patient was encouraged to do daily light cardio exercise but instructed to limit physical activities to walking, walking in water up to the waist only or riding a stationary bike, recumbent preferred. No weight lifting in upper body, no neck massage, no acupuncture of neck, and no dry needling of upper neck. No neck PT unless otherwise stated. Lower body strengthening with resistant bands, leg machines, and strapping weights to legs okay. No core body workouts. No running or use of cardio equipment other than stationary bike. No swimming or body surfing. No amusement park rides. No lifting more than 5-10 lbs and bend at the knees, not the waist.  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 To be referred for consultation for occipital nerve release procedure if initially clinically responsive to injections but always with a return of symptoms.  Please stop wearing the neck collar  Discontinue meloxicam

## 2023-10-25 NOTE — PROGRESS NOTES
Chief Complaint: Headache    Subjective:     History of Present Illness    Referring Provider: Self Referral  Accompanied by: No one     07/17/2023: Tawny Valerio is a 63 y.o. female with h/o HTN, DM, anxiety, endometrial cancer s/p resection, ASHLI not on CPAP who presents for headache evaluation. Neck pain started 5 years ago and in 4/2022 she bought a neck massager and in 12/2022 she started getting left occipital pain and in 2/2023 she felt a pop in neck that she hear in head and neck spasmed and turned her head to the right and start neck PT in 2/2023 with massage and exercise and improved and had to increase gabapentin that she was already on for neuropathy but increased due to neck pain. She feels that she needs to hold neck up and she will have episodes of involuntary right head turning. Headaches are constant with varying intensity, pressure, aching, throbbing sometimes, burning sometimes, sharp sometimes, left side, left occipital. She has left lower neck pain. She states that her eyes will start to close when pain is bad and hard to keep them open. She denies associated photophobia, phonophobia, weakness, numbness, tingling, dizziness, N/V, change in vision. She has tingling in hands that she states is from neuropathy. She denies triggers. She denies an inciting etiology including no injury, health change, or medication change when above pain symptoms started. She has tried a cervical pillow and is sleeping well and wakes up rested. She states she cannot tolerate CPAP mask and has not been told recently if snores or has apnea but she has woken up at night feeling that she cannot breath. Headache improves lying down improves headache and vasal vagal maneuvers do not significantly worsen headaches. She denies headaches waking her up and will wake up with headache. Mood is good. Gabapentin and naprosyn helps a little. She has tried heating and icing neck. Neck PT only provides temporary benefit. She has  tried neck collar, massager device. She states that she had steroids while used to be on Ozempic and tolerated them okay but does not have a home test kit.     08/09/2023: Tawny Valerio is a 63 y.o. female with h/o HTN, DM, anxiety, endometrial cancer s/p resection, ASHLI not on CPAP, occipital neuritis s/p medrol dose pack x1 who presents for follow up. On last visit, patient was prescribed a Medrol dose pack and to monitor glucose and ordered glucometer and started on ice therapy, ergonomics, and exercise restrictions and counseled on no suboccipital PT. She states that she is doing better with head pain and has improved spasms but still has some pain. She feels better wearing a neck collar. She had a new pain that was left check to left lower jaw that was stabbing, excruciating, and lasted a few seconds. She states that she is still taking gabapentin. She states that her head will spontaneously turn to the left and when she has this head turn that she cause spasm, she will get more head pain. Headaches are every other day, left occipital to left top of head, can be bilateral aliza horn, electrical shock, pinching feeling, 10-15 mins. She has left cervical paraspinal neck pain. Head and neck pain resolve with stretching neck. She has associated photophobia, spinning, imbalance a little, feels like seeing things that has happened a few times. She has been having entire RUE pain with stabbing and burning. She denies phonophobia, weakness, numbness, tingling, N/V. She is sleeping okay and wakes up rested. Mood is good. She got glucometer and checked sugar a couple of times and was less than 200 each time she checked it. She gained fluid weight that she has lost from the Medrol and denies other side effects. The first 2 days on Medrol she felt like she had no issues and had no spasms. She iced a couple of times. She is using heating pad on neck. She has not done PT.    10/25/2023: Tawny Valerio is a 63 y.o.  female with h/o HTN, DM, anxiety, endometrial cancer s/p resection, ASHLI not on CPAP, occipital neuritis s/p medrol dose pack x1 who presents for follow up. On last visit, patient was prescribed a Medrol dose pack and to monitor glucose and continued on ice therapy, ergonomics, and exercise restrictions and referred for lower neck/upper back to mid back PT with myofascial release and counseled on neck collar. In the interim, started meloxicam. She is doing better overall and has more good days than bad days but does have bad days. She is taking gabapentin. Icing helps with headache and heating helps with shoulders. She still has to come home from work 3-4 times a week to lay down. She does wear neck collar at work sometimes and in the morning and in the afternoon. She states neck spasm resolves neck spasms. Headaches are 70% better, 3 times a week, left aliza horn, stabbing, sharp, aching, unclear on duration as it comes and goes. She has left > right cervical paraspinal neck pain and gets a little relief when neck clicks or pops. She stopped neck PT as  has been hospitalized and does home exercises a lot and states therapy helped and massage really helped. She has associated photophobia to sunshine and with driving, numbness/tingling in all finger tips, spinning when lies down sometimes. She is unsure if has vision changes. She denies phonophobia, weakness, N/V. She is sleeping alright and tries to sleep on back and has woken up from LUE if does not take her gabapentin and also helps if wears glove on left wrist and wakes up rested. Mood is okay and has a lot of family obligations. She denies side effects to Medrol and highest glucose was 140 and got real hot one time on them. She takes gabapentin 300 mg BID. She states meloxicam did not help so took naprosyn instead and denies side effects to meloxicam.    Current Outpatient Medications on File Prior to Visit   Medication Sig Dispense Refill    acetaminophen  (TYLENOL) 325 MG tablet Take 2 tablets (650 mg total) by mouth every 6 (six) hours as needed for Pain. 30 tablet 1    aspirin 81 MG Chew Take 81 mg by mouth once daily.      azelastine (ASTELIN) 137 mcg (0.1 %) nasal spray 1 spray (137 mcg total) by Nasal route 2 (two) times daily. 30 mL 11    blood sugar diagnostic Strp 1 each by Misc.(Non-Drug; Combo Route) route 2 (two) times a day. 11 each 0    blood-glucose meter kit Use as instructed 1 each 0    blood-glucose meter kit Use as instructed 1 each 0    buPROPion (WELLBUTRIN SR) 150 MG TBSR 12 hr tablet Take 1 tablet (150 mg total) by mouth 2 (two) times daily. 60 tablet 11    clindamycin (CLEOCIN T) 1 % lotion Apply topically 2 (two) times daily. 60 mL 3    empagliflozin (JARDIANCE) 25 mg tablet Take 1 tablet (25 mg total) by mouth once daily. 90 tablet 3    ergocalciferol (ERGOCALCIFEROL) 50,000 unit Cap Take 1 capsule (50,000 Units total) by mouth every 7 days. 12 capsule 3    ezetimibe (ZETIA) 10 mg tablet Take 1 tablet (10 mg total) by mouth once daily. 90 tablet 3    fluticasone propionate (FLONASE) 50 mcg/actuation nasal spray 1 spray (50 mcg total) by Each Nostril route once daily. 16 g 0    lancets (ACCU-CHEK SOFTCLIX LANCETS) Misc 1 each by Misc.(Non-Drug; Combo Route) route 2 (two) times daily. 11 each 0    lancing device with lancets (MULTI-LANCET DEVICE 2) Kit 1 each by Misc.(Non-Drug; Combo Route) route 2 (two) times a day. 1 each 1    losartan (COZAAR) 25 MG tablet Take 1 tablet (25 mg total) by mouth once daily. 90 tablet 3    ondansetron (ZOFRAN-ODT) 4 MG TbDL Take 4 mg by mouth every 6 (six) hours as needed.      promethazine (PHENERGAN) 25 MG tablet Take 25 mg by mouth every 6 (six) hours as needed.      tirzepatide 7.5 mg/0.5 mL PnIj Inject 7.5 mg into the skin every 7 days. 4 pen 1    tretinoin (RETIN-A) 0.025 % cream APPLY TOPICALLY EVERY EVENING. 20 g 6    gabapentin (NEURONTIN) 300 MG capsule Take 1 capsule (300 mg total) by mouth 3 (three)  times daily. 90 capsule 3    metronidazole 0.75% (METROCREAM) 0.75 % Crea Apply topically 2 (two) times daily. For rosacea on face. 45 g 3    [DISCONTINUED] meloxicam (MOBIC) 7.5 MG tablet Take 1 tablet (7.5 mg total) by mouth once daily. 30 tablet 3    [DISCONTINUED] methylPREDNISolone (MEDROL DOSEPACK) 4 mg tablet use as directed (Patient not taking: Reported on 10/25/2023) 1 each 0     No current facility-administered medications on file prior to visit.       Review of patient's allergies indicates:   Allergen Reactions    Celebrex [celecoxib] Anaphylaxis    Codeine Hives     Does not know what she can take -usually just takes tylenol or ibuprofen    Lidocaine Hives    Vicodin [hydrocodone-acetaminophen] Hives and Swelling       Family History   Problem Relation Age of Onset    No Known Problems Mother     Diabetes Father     Stroke Father     Hypertension Father     Heart disease Sister     Hypertension Sister     Heart disease Brother     Heart attack Brother     Hypertension Brother     Heart disease Maternal Grandmother     Heart disease Maternal Grandfather     Heart disease Paternal Grandfather     Breast cancer Other     Ovarian cancer Neg Hx     Uterine cancer Neg Hx     Colon cancer Neg Hx     Heart failure Neg Hx     Hyperlipidemia Neg Hx        Social History     Tobacco Use    Smoking status: Never    Smokeless tobacco: Never   Substance Use Topics    Alcohol use: Not Currently    Drug use: No       Review of Systems  Constitutional: No fevers, no chills, no change in weight  Eye/Vision: See HPI  Ear/Nose/Mouth/Throat: See HPI; no cough, no runny nose, no sore throat  Respiratory: No shortness of breath, no problems breathing  Cardiovascular: No chest pain  Gastrointestinal: See HPI, no diarrhea, no constipation  Genitourinary: No dysuria  Musculoskeletal: See HPI  Integumentary: No skin changes  Neurologic: See HPI  Psychiatric: Denies depression, anxiety, denies SI and HI.    Objective:     Vitals:  "   10/25/23 1630   BP: 110/77   Pulse: 74       General: Alert and awake, Well nourished, Well groomed, No acute distress, no photophobia with 60 Hz hypersensitivity.  Eyes: Extraocular movements are intact; Normal conjunctiva; no nystagmus; Visual fields are intact bilaterally to finger counting in all cardinal directions  Neck: Supple  No Stiffness  Patient has occipital point tenderness over the left lesser occipital nerve without induction of headaches with no jump sign and no twitch response and no referred pain: trace   No high, medial cervical pain with lateral movement of C1 over C2 and with isometric neck flexion and extension  Fluid patient turnaround without concurrent neck movement in direction of torso movement.  Bilateral paraspinal cervical muscle spasm present  Spine/torso exam: Spine/ torso exam is within normal limits     Neurologic Exam  no saccadic intrusions of volitional ocular smooth pursuits  no pain with sustained upgaze and convergence  no visual motion sensitivity/dizziness produced with rapid eye movements or neck movements  no convergence insufficiency with no diplopia developed > 5 " accommodation    Sensory: Negative Romberg, unsteady on tandem stance    Gait: Gait WNL, Heel to toe walking impaired    Labs:    No visits with results within 1 Month(s) from this visit.   Latest known visit with results is:   Lab Visit on 08/22/2023   Component Date Value Ref Range Status    Sodium 08/22/2023 141  136 - 145 mmol/L Final    Potassium 08/22/2023 4.3  3.5 - 5.1 mmol/L Final    Chloride 08/22/2023 107  95 - 110 mmol/L Final    CO2 08/22/2023 24  23 - 29 mmol/L Final    Glucose 08/22/2023 111 (H)  70 - 110 mg/dL Final    BUN 08/22/2023 16  8 - 23 mg/dL Final    Creatinine 08/22/2023 0.8  0.5 - 1.4 mg/dL Final    Calcium 08/22/2023 9.9  8.7 - 10.5 mg/dL Final    Anion Gap 08/22/2023 10  8 - 16 mmol/L Final    eGFR 08/22/2023 >60.0  >60 mL/min/1.73 m^2 Final    Hemoglobin A1C 08/22/2023 6.1 (H)  " 4.0 - 5.6 % Final    Comment: ADA Screening Guidelines:  5.7-6.4%  Consistent with prediabetes  >or=6.5%  Consistent with diabetes    High levels of fetal hemoglobin interfere with the HbA1C  assay. Heterozygous hemoglobin variants (HbS, HgC, etc)do  not significantly interfere with this assay.   However, presence of multiple variants may affect accuracy.      Estimated Avg Glucose 08/22/2023 128  68 - 131 mg/dL Final    Microalbumin, Urine 08/22/2023 16.0  ug/mL Final    Creatinine, Urine 08/22/2023 181.0  15.0 - 325.0 mg/dL Final    Microalb/Creat Ratio 08/22/2023 8.8  0.0 - 30.0 ug/mg Final     I personally reviewed all current labs noted in today's chart.    Imaging:    No new studies    Assessment:       ICD-10-CM ICD-9-CM    1. Cervicalgia of [id number] region  M54.2 723.1       2. Cervicogenic headache  G44.86 784.0       3. Imbalance  R26.89 781.2          63 y.o. female with h/o HTN, DM, anxiety, endometrial cancer s/p resection, ASHLI not on CPAP, occipital neuritis s/p medrol dose pack x2 who presents for follow up. On exam, she has occipital point tenderness over the left lesser occipital nerve without induction of headaches with no jump sign and no twitch response and no referred pain and has head turned to the left with tilt towards right shoulder. We discussed previously her description is of occipital neuralgia and of dystonia. We discussed previously if occipital neuralgia treatment does not help will have her see dystonia specialist. We discussed Topamax would not be curative for her headache etiology and would want to continue focus on trying to curing her occipital neuritis. Overall, her symptoms are improving and are now mainly muscular.    Plan:     Increase gabapentin to 600 mg (2 capsules) in the morning and 300 mg (1 capsule) at night. Message me if need better pain control as can increase dose every 3 days  Resume lower neck/upper back to mid back PT with myofascial release (deep  tissue massage, cupping, dry needling, etc). No manipulation of suboccipital region.  The patient was instructed to ice the occipital region for no more than 20 minutes at least once a day but may repeat this as many times as they would like.   Discussed ergonomic accommodations for occipital neuritis/neuralgia. Mainly perform all work at eye level to minimize continued neck flexion which will aggravate the nerve.  Patient was encouraged to do daily light cardio exercise but instructed to limit physical activities to walking, walking in water up to the waist only or riding a stationary bike, recumbent preferred. No weight lifting in upper body, no neck massage, no acupuncture of neck, and no dry needling of upper neck. No neck PT unless otherwise stated. Lower body strengthening with resistant bands, leg machines, and strapping weights to legs okay. No core body workouts. No running or use of cardio equipment other than stationary bike. No swimming or body surfing. No amusement park rides. No lifting more than 5-10 lbs and bend at the knees, not the waist.  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 To be referred for consultation for occipital nerve release procedure if initially clinically responsive to injections but always with a return of symptoms.  Please stop wearing the neck collar  Discontinue meloxicam    Kirill Andrade MD  [facility]

## 2023-10-26 ENCOUNTER — OFFICE VISIT (OUTPATIENT)
Dept: INTERNAL MEDICINE | Facility: CLINIC | Age: 63
End: 2023-10-26
Payer: COMMERCIAL

## 2023-10-26 VITALS
HEIGHT: 61 IN | SYSTOLIC BLOOD PRESSURE: 124 MMHG | RESPIRATION RATE: 20 BRPM | OXYGEN SATURATION: 98 % | TEMPERATURE: 98 F | WEIGHT: 278.25 LBS | DIASTOLIC BLOOD PRESSURE: 68 MMHG | BODY MASS INDEX: 52.53 KG/M2 | HEART RATE: 80 BPM

## 2023-10-26 DIAGNOSIS — Z12.31 ENCOUNTER FOR SCREENING MAMMOGRAM FOR MALIGNANT NEOPLASM OF BREAST: ICD-10-CM

## 2023-10-26 DIAGNOSIS — E11.9 CONTROLLED TYPE 2 DIABETES MELLITUS WITHOUT COMPLICATION, WITHOUT LONG-TERM CURRENT USE OF INSULIN: ICD-10-CM

## 2023-10-26 DIAGNOSIS — Z78.9 STATIN INTOLERANCE: ICD-10-CM

## 2023-10-26 DIAGNOSIS — E78.5 HYPERLIPIDEMIA LDL GOAL <70: ICD-10-CM

## 2023-10-26 DIAGNOSIS — E66.01 MORBID OBESITY WITH BMI OF 50.0-59.9, ADULT: ICD-10-CM

## 2023-10-26 DIAGNOSIS — Z11.4 SCREENING FOR HIV (HUMAN IMMUNODEFICIENCY VIRUS): ICD-10-CM

## 2023-10-26 DIAGNOSIS — F41.9 ANXIETY: ICD-10-CM

## 2023-10-26 DIAGNOSIS — Z12.11 COLON CANCER SCREENING: ICD-10-CM

## 2023-10-26 DIAGNOSIS — I10 HYPERTENSION GOAL BP (BLOOD PRESSURE) < 130/80: Primary | ICD-10-CM

## 2023-10-26 LAB
BACTERIA #/AREA URNS AUTO: ABNORMAL /HPF
BILIRUB UR QL STRIP: NEGATIVE
CLARITY UR REFRACT.AUTO: ABNORMAL
COLOR UR AUTO: YELLOW
GLUCOSE UR QL STRIP: ABNORMAL
HGB UR QL STRIP: NEGATIVE
KETONES UR QL STRIP: NEGATIVE
LEUKOCYTE ESTERASE UR QL STRIP: ABNORMAL
MICROSCOPIC COMMENT: ABNORMAL
NITRITE UR QL STRIP: POSITIVE
PH UR STRIP: 6 [PH] (ref 5–8)
PROT UR QL STRIP: NEGATIVE
RBC #/AREA URNS AUTO: 1 /HPF (ref 0–4)
SP GR UR STRIP: >1.03 (ref 1–1.03)
SQUAMOUS #/AREA URNS AUTO: 7 /HPF
URN SPEC COLLECT METH UR: ABNORMAL
WBC #/AREA URNS AUTO: 54 /HPF (ref 0–5)
YEAST UR QL AUTO: ABNORMAL

## 2023-10-26 PROCEDURE — 3074F SYST BP LT 130 MM HG: CPT | Mod: CPTII,S$GLB,, | Performed by: INTERNAL MEDICINE

## 2023-10-26 PROCEDURE — 4010F ACE/ARB THERAPY RXD/TAKEN: CPT | Mod: CPTII,S$GLB,, | Performed by: INTERNAL MEDICINE

## 2023-10-26 PROCEDURE — 1159F PR MEDICATION LIST DOCUMENTED IN MEDICAL RECORD: ICD-10-PCS | Mod: CPTII,S$GLB,, | Performed by: INTERNAL MEDICINE

## 2023-10-26 PROCEDURE — 3061F PR NEG MICROALBUMINURIA RESULT DOCUMENTED/REVIEW: ICD-10-PCS | Mod: CPTII,S$GLB,, | Performed by: INTERNAL MEDICINE

## 2023-10-26 PROCEDURE — 3008F BODY MASS INDEX DOCD: CPT | Mod: CPTII,S$GLB,, | Performed by: INTERNAL MEDICINE

## 2023-10-26 PROCEDURE — 3061F NEG MICROALBUMINURIA REV: CPT | Mod: CPTII,S$GLB,, | Performed by: INTERNAL MEDICINE

## 2023-10-26 PROCEDURE — 3044F PR MOST RECENT HEMOGLOBIN A1C LEVEL <7.0%: ICD-10-PCS | Mod: CPTII,S$GLB,, | Performed by: INTERNAL MEDICINE

## 2023-10-26 PROCEDURE — 3066F NEPHROPATHY DOC TX: CPT | Mod: CPTII,S$GLB,, | Performed by: INTERNAL MEDICINE

## 2023-10-26 PROCEDURE — 3066F PR DOCUMENTATION OF TREATMENT FOR NEPHROPATHY: ICD-10-PCS | Mod: CPTII,S$GLB,, | Performed by: INTERNAL MEDICINE

## 2023-10-26 PROCEDURE — 1159F MED LIST DOCD IN RCRD: CPT | Mod: CPTII,S$GLB,, | Performed by: INTERNAL MEDICINE

## 2023-10-26 PROCEDURE — 3008F PR BODY MASS INDEX (BMI) DOCUMENTED: ICD-10-PCS | Mod: CPTII,S$GLB,, | Performed by: INTERNAL MEDICINE

## 2023-10-26 PROCEDURE — 3078F PR MOST RECENT DIASTOLIC BLOOD PRESSURE < 80 MM HG: ICD-10-PCS | Mod: CPTII,S$GLB,, | Performed by: INTERNAL MEDICINE

## 2023-10-26 PROCEDURE — 3074F PR MOST RECENT SYSTOLIC BLOOD PRESSURE < 130 MM HG: ICD-10-PCS | Mod: CPTII,S$GLB,, | Performed by: INTERNAL MEDICINE

## 2023-10-26 PROCEDURE — 99999 PR PBB SHADOW E&M-EST. PATIENT-LVL V: CPT | Mod: PBBFAC,,, | Performed by: INTERNAL MEDICINE

## 2023-10-26 PROCEDURE — 4010F PR ACE/ARB THEARPY RXD/TAKEN: ICD-10-PCS | Mod: CPTII,S$GLB,, | Performed by: INTERNAL MEDICINE

## 2023-10-26 PROCEDURE — 1160F PR REVIEW ALL MEDS BY PRESCRIBER/CLIN PHARMACIST DOCUMENTED: ICD-10-PCS | Mod: CPTII,S$GLB,, | Performed by: INTERNAL MEDICINE

## 2023-10-26 PROCEDURE — 1160F RVW MEDS BY RX/DR IN RCRD: CPT | Mod: CPTII,S$GLB,, | Performed by: INTERNAL MEDICINE

## 2023-10-26 PROCEDURE — 99999 PR PBB SHADOW E&M-EST. PATIENT-LVL V: ICD-10-PCS | Mod: PBBFAC,,, | Performed by: INTERNAL MEDICINE

## 2023-10-26 PROCEDURE — 81001 URINALYSIS AUTO W/SCOPE: CPT | Performed by: INTERNAL MEDICINE

## 2023-10-26 PROCEDURE — 99214 PR OFFICE/OUTPT VISIT, EST, LEVL IV, 30-39 MIN: ICD-10-PCS | Mod: S$GLB,,, | Performed by: INTERNAL MEDICINE

## 2023-10-26 PROCEDURE — 99214 OFFICE O/P EST MOD 30 MIN: CPT | Mod: S$GLB,,, | Performed by: INTERNAL MEDICINE

## 2023-10-26 PROCEDURE — 3078F DIAST BP <80 MM HG: CPT | Mod: CPTII,S$GLB,, | Performed by: INTERNAL MEDICINE

## 2023-10-26 PROCEDURE — 3044F HG A1C LEVEL LT 7.0%: CPT | Mod: CPTII,S$GLB,, | Performed by: INTERNAL MEDICINE

## 2023-10-26 RX ORDER — BUPROPION HYDROCHLORIDE 150 MG/1
150 TABLET, EXTENDED RELEASE ORAL 2 TIMES DAILY
Qty: 60 TABLET | Refills: 11 | Status: SHIPPED | OUTPATIENT
Start: 2023-10-26

## 2023-10-26 NOTE — PROGRESS NOTES
Tawny Valerio  63 y.o. White female  [id number]    Chief Complaint:  Chief Complaint   Patient presents with    [facility]       History of Present Illness:  New patient to me.   HTN--stable.   DM--controlled. Under the care of diabetes management.   HLD--on ezetimibe. Unable to tolerate statins.   Anxiety--stable on bupropion.     Laboratory   Lab Results   Component Value Date    WBC 9.55 2023    HGB 14.0 2023    HCT 44.2 2023     2023    CHOL 179 2023    TRIG 61 2023    HDL 57 2023    ALT 14 2023    AST 11 2023     2023    K 4.3 2023     2023    CREATININE 0.8 2023    BUN 16 2023    CO2 24 2023    TSH 0.636 2023    HGBA1C 6.1 (H) 2023     Lab Results   Component Value Date    LDLCALC 109.8 2023     Review of Systems   Constitutional:  Negative for fever.   Respiratory:  Negative for shortness of breath.    Cardiovascular:  Negative for chest pain.   Gastrointestinal:  Negative for abdominal pain.   Musculoskeletal:  Positive for neck pain.   Neurological:  Negative for dizziness.   Psychiatric/Behavioral:  The patient is nervous/anxious.        The following were reviewed: Active problem list, medication list, allergies, family history, social history, and Health Maintenance.     History:  Past Medical History:   Diagnosis Date    Acute cystitis without hematuria 2019    Anxiety     Colon cancer screening 2019    Diabetes mellitus, type 2 2016    Endometrial ca 2019    Essential hypertension 2018    Simple endometrial hyperplasia 03/15/2019    Uterine polyp 2019     Past Surgical History:   Procedure Laterality Date     SECTION      x 1    DILATION AND CURETTAGE OF UTERUS  2019    HYSTERECTOMY  19    HYSTEROSCOPY WITH DILATION AND CURETTAGE OF UTERUS N/A 2019    Procedure: HYSTEROSCOPY, WITH DILATION AND CURETTAGE OF UTERUS;   Surgeon: Bobby Frazier Jr., MD;  Location: [facility] OR;  Service: OB/GYN;  Laterality: N/A;    lipoma removed      ROBOT-ASSISTED LAPAROSCOPIC ABDOMINAL HYSTERECTOMY USING DA AWAIS XI N/A 08/26/2019    Procedure: XI ROBOTIC HYSTERECTOMY;  Surgeon: Aneudy Almeida MD;  Location: [facility] OR [facility];  Service: OB/GYN;  Laterality: N/A;    ROBOT-ASSISTED LAPAROSCOPIC LYSIS OF ADHESIONS USING DA AWAIS XI N/A 08/26/2019    Procedure: XI ROBOTIC LYSIS, ADHESIONS;  Surgeon: Aneudy Almeida MD;  Location: [facility] OR [facility]LR;  Service: OB/GYN;  Laterality: N/A;  Omental    ROBOT-ASSISTED LAPAROSCOPIC SALPINGO-OOPHORECTOMY USING DA AWAIS XI Bilateral 08/26/2019    Procedure: XI ROBOTIC SALPINGO-OOPHORECTOMY;  Surgeon: Aneudy Almeida MD;  [facility];  Service: OB/GYN;  Laterality: Bilateral;  Wt 299lbs     Family History   Problem Relation Age of Onset    Arthritis Mother     Diabetes Father     Stroke Father     Hypertension Father     Depression Father     Early death Father     Heart disease Father     Vision loss Father     Heart disease Sister     Hypertension Sister     Depression Sister     Diabetes Sister     Early death Sister     Heart disease Sister     Hypertension Sister     Heart disease Brother     Heart attack Brother     Hypertension Brother     Early death Brother     Heart disease Maternal Grandmother     Heart disease Maternal Grandfather     Heart disease Paternal Grandfather     Breast cancer Other     Ovarian cancer Neg Hx     Uterine cancer Neg Hx     Colon cancer Neg Hx     Heart failure Neg Hx     Hyperlipidemia Neg Hx      Social History     Socioeconomic History    Marital status:    Occupational History     Employer: [employer]     Tobacco Use    Smoking status: Never    Smokeless tobacco: Never   Substance and Sexual Activity    Alcohol use: Never    Drug use: No    Sexual activity: Yes     Partners: Male     Birth control/protection: Post-menopausal, None     Comment:  Hysterectomy     Social Determinants of Health     Financial Resource Strain: Low Risk  (10/23/2023)    Overall Financial Resource Strain (CARDIA)     Difficulty of Paying Living Expenses: Not hard at all   Food Insecurity: No Food Insecurity (10/23/2023)    Hunger Vital Sign     Worried About Running Out of Food in the Last Year: Never true     Ran Out of Food in the Last Year: Never true   Transportation Needs: No Transportation Needs (10/23/2023)    PRAPARE - Transportation     Lack of Transportation (Medical): No     Lack of Transportation (Non-Medical): No   Physical Activity: Insufficiently Active (10/23/2023)    Exercise Vital Sign     Days of Exercise per Week: 1 day     Minutes of Exercise per Session: 20 min   Stress: Stress Concern Present (10/23/2023)    [place] [employer] - Occupational Stress Questionnaire     Feeling of Stress : To some extent   Social Connections: Unknown (10/23/2023)    Social Connection and Isolation Panel [NHANES]     Frequency of Communication with Friends and Family: Never     Frequency of Social Gatherings with Friends and Family: Once a week     Active Member of Clubs or Organizations: No     Attends Club or Organization Meetings: Never     Marital Status:    Housing Stability: Low Risk  (10/23/2023)    Housing Stability Vital Sign     Unable to Pay for Housing in the Last Year: No     Number of Places Lived in the Last Year: 1     Unstable Housing in the Last Year: No     Patient Active Problem List   Diagnosis    Controlled type 2 diabetes mellitus without complication, without long-term current use of insulin    Vitamin D insufficiency    SOB (shortness of breath)    Class 3 severe obesity due to excess calories with serious comorbidity and body mass index (BMI) of 50.0 to 59.9 in adult    Edema leg    ASHLI (obstructive sleep apnea)    Essential hypertension    Uterine polyp    Simple endometrial hyperplasia    Endometrial ca    s/p RALH/BSO  8/26/2019    Decreased strength, endurance, and mobility    Decreased range of motion    Gait abnormality    Neck pain    Decreased functional activity tolerance    Abnormal posture    Cervicalgia of dnzhemyb-ynfihju-spzsm region    Decreased range of motion of neck    Statin intolerance     Review of patient's allergies indicates:   Allergen Reactions    Celebrex [celecoxib] Anaphylaxis    Codeine Hives     Does not know what she can take -usually just takes tylenol or ibuprofen    Lidocaine Hives    Vicodin [hydrocodone-acetaminophen] Hives and Swelling       Health Maintenance  Health Maintenance Topics with due status: Not Due       Topic Last Completion Date    TETANUS VACCINE 04/08/2016    Lipid Panel 06/14/2023    Diabetes Urine Screening 08/22/2023    Hemoglobin A1c 08/22/2023    Foot Exam 10/26/2023     Health Maintenance Due   Topic Date Due    Pneumococcal Vaccines (Age 0-64) (1 - PCV) Never done    HIV Screening  Never done    Mammogram  Never done    Shingles Vaccine (1 of 2) Never done    Low Dose Statin  Never done    Colorectal Cancer Screening  Never done    Eye Exam  01/22/2022    RSV Vaccine (Age 60+) (1 - 1-dose 60+ series) Never done    Influenza Vaccine (1) Never done    COVID-19 Vaccine (5 - 2023-24 season) 09/01/2023       Medications:  Current Outpatient Medications on File Prior to Visit   Medication Sig Dispense Refill    acetaminophen (TYLENOL) 325 MG tablet Take 2 tablets (650 mg total) by mouth every 6 (six) hours as needed for Pain. 30 tablet 1    aspirin 81 MG Chew Take 81 mg by mouth once daily.      azelastine (ASTELIN) 137 mcg (0.1 %) nasal spray 1 spray (137 mcg total) by Nasal route 2 (two) times daily. 30 mL 11    blood sugar diagnostic Strp 1 each by Misc.(Non-Drug; Combo Route) route 2 (two) times a day. 11 each 0    blood-glucose meter kit Use as instructed 1 each 0    blood-glucose meter kit Use as instructed 1 each 0    clindamycin (CLEOCIN T) 1 % lotion Apply topically 2  (two) times daily. 60 mL 3    empagliflozin (JARDIANCE) 25 mg tablet Take 1 tablet (25 mg total) by mouth once daily. 90 tablet 3    ergocalciferol (ERGOCALCIFEROL) 50,000 unit Cap Take 1 capsule (50,000 Units total) by mouth every 7 days. 12 capsule 3    ezetimibe (ZETIA) 10 mg tablet Take 1 tablet (10 mg total) by mouth once daily. 90 tablet 3    fluticasone propionate (FLONASE) 50 mcg/actuation nasal spray 1 spray (50 mcg total) by Each Nostril route once daily. 16 g 0    lancets (ACCU-CHEK SOFTCLIX LANCETS) Misc 1 each by Misc.(Non-Drug; Combo Route) route 2 (two) times daily. 11 each 0    lancing device with lancets (MULTI-LANCET DEVICE 2) Kit 1 each by Misc.(Non-Drug; Combo Route) route 2 (two) times a day. 1 each 1    losartan (COZAAR) 25 MG tablet Take 1 tablet (25 mg total) by mouth once daily. 90 tablet 3    ondansetron (ZOFRAN-ODT) 4 MG TbDL Take 4 mg by mouth every 6 (six) hours as needed.      promethazine (PHENERGAN) 25 MG tablet Take 25 mg by mouth every 6 (six) hours as needed.      tirzepatide 7.5 mg/0.5 mL PnIj Inject 7.5 mg into the skin every 7 days. 4 pen 1    tretinoin (RETIN-A) 0.025 % cream APPLY TOPICALLY EVERY EVENING. 20 g 6    metronidazole 0.75% (METROCREAM) 0.75 % Crea Apply topically 2 (two) times daily. For rosacea on face. 45 g 3       Medications have been reviewed and reconciled with patient at visit today.    Exam:  Vitals:    10/26/23 1519   BP: 124/68   Pulse: 80   Resp: 20   Temp: 98 °F (36.7 °C)     Weight: 126.2 kg (278 lb 3.5 oz)   Body mass index is 52.57 kg/m².      Physical Exam  Vitals reviewed.   Constitutional:       General: She is not in acute distress.     Appearance: She is well-developed. She is not ill-appearing.   Eyes:      General: No scleral icterus.  Cardiovascular:      Rate and Rhythm: Normal rate and regular rhythm.      Pulses:           Dorsalis pedis pulses are 2+ on the right side and 2+ on the left side.      Heart sounds: Normal heart sounds.    Pulmonary:      Effort: Pulmonary effort is normal. No respiratory distress.      Breath sounds: Normal breath sounds.   Abdominal:      General: Bowel sounds are normal.      Palpations: Abdomen is soft.   Feet:      Right foot:      Protective Sensation: 5 sites tested.  5 sites sensed.      Skin integrity: No ulcer.      Left foot:      Protective Sensation: 5 sites tested.  5 sites sensed.      Skin integrity: No ulcer.   Neurological:      Mental Status: She is alert and oriented to person, place, and time.   Psychiatric:         Behavior: Behavior normal.       Assessment:  The primary encounter diagnosis was Hypertension goal BP (blood pressure) < 130/80. Diagnoses of Controlled type 2 diabetes mellitus without complication, without long-term current use of insulin, Hyperlipidemia LDL goal <70, Statin intolerance, Anxiety, Morbid obesity with BMI of 50.0-59.9, adult, Screening for HIV (human immunodeficiency virus), Encounter for screening mammogram for malignant neoplasm of breast, and Colon cancer screening were also pertinent to this visit.    Plan:  Hypertension goal BP (blood pressure) < 130/80  -     Continue losartan  -     Comprehensive Metabolic Panel; Standing  -     Lipid Panel; Standing  -     Urinalysis    Controlled type 2 diabetes mellitus without complication, without long-term current use of insulin  -     Continue empagliflozin and tirzepatide  -     f/u with diabetes management  -     Comprehensive Metabolic Panel; Standing  -     Hemoglobin A1C; Standing  -     Lipid Panel; Standing    Hyperlipidemia LDL goal <70  -     continue ezetimibe    Statin intolerance    Anxiety  -     buPROPion (WELLBUTRIN SR) 150 MG TBSR 12 hr tablet; Take 1 tablet (150 mg total) by mouth 2 (two) times daily.  Dispense: 60 tablet; Refill: 11    Morbid obesity with BMI of 50.0-59.9, adult  -     Lifestyle modifications discussed    Screening for HIV (human immunodeficiency virus)  -     HIV 1/2 Ag/Ab (4th Gen);  Future; Expected date: 04/26/2024    Encounter for screening mammogram for malignant neoplasm of breast  -     Mammo Digital Screening Bilat w/ [facility]; Future; Expected date: 11/02/2023    Colon cancer screening  -     Ambulatory referral/consult to Endo Procedure ; Future; Expected date: 10/26/2023    Follow up in about 6 months (around 4/26/2024).

## 2023-10-27 ENCOUNTER — PATIENT MESSAGE (OUTPATIENT)
Dept: INTERNAL MEDICINE | Facility: CLINIC | Age: 63
End: 2023-10-27
Payer: COMMERCIAL

## 2023-10-30 ENCOUNTER — TELEPHONE (OUTPATIENT)
Dept: INTERNAL MEDICINE | Facility: CLINIC | Age: 63
End: 2023-10-30
Payer: COMMERCIAL

## 2023-10-30 PROBLEM — E78.5 HYPERLIPIDEMIA LDL GOAL <70: Status: ACTIVE | Noted: 2023-10-30

## 2023-10-30 RX ORDER — CIPROFLOXACIN 500 MG/1
500 TABLET ORAL 2 TIMES DAILY
Qty: 14 TABLET | Refills: 0 | Status: SHIPPED | OUTPATIENT
Start: 2023-10-30 | End: 2023-11-06

## 2023-11-27 ENCOUNTER — DOCUMENTATION ONLY (OUTPATIENT)
Dept: REHABILITATION | Facility: HOSPITAL | Age: 63
End: 2023-11-27
Payer: COMMERCIAL

## 2023-11-27 DIAGNOSIS — Z74.09 DECREASED STRENGTH, ENDURANCE, AND MOBILITY: ICD-10-CM

## 2023-11-27 DIAGNOSIS — R68.89 DECREASED FUNCTIONAL ACTIVITY TOLERANCE: ICD-10-CM

## 2023-11-27 DIAGNOSIS — R29.898 DECREASED RANGE OF MOTION OF NECK: ICD-10-CM

## 2023-11-27 DIAGNOSIS — R53.1 DECREASED STRENGTH, ENDURANCE, AND MOBILITY: ICD-10-CM

## 2023-11-27 DIAGNOSIS — R68.89 DECREASED STRENGTH, ENDURANCE, AND MOBILITY: ICD-10-CM

## 2023-11-27 DIAGNOSIS — R29.3 ABNORMAL POSTURE: ICD-10-CM

## 2023-11-27 DIAGNOSIS — M54.2 CERVICALGIA OF OCCIPITO-ATLANTO-AXIAL REGION: Primary | ICD-10-CM

## 2023-11-27 NOTE — PROGRESS NOTES
[facility]  Physical Therapy Discharge Note    Name: Tawny Valerio  [facility] Number: [id number]    Therapy Diagnosis:   Encounter Diagnoses   Name Primary?    Cervicalgia of xjhwaxyu-qlxjbvc-odowu region Yes    Abnormal posture     Decreased functional activity tolerance     Decreased strength, endurance, and mobility     Decreased range of motion of neck      Physician: Kirill Andrade MD     Physician Orders: PT Eval and Treat  Medical Diagnosis from Referral: cervicalgia of ttrzyzil-hvwmrgl-qykyw region  Evaluation Date: 8/11/2023      Date of Last visit: 9/21/2023  Total Visits Received: 5    ASSESSMENT      Patient did not return for final assessment.    Discharge reason: Patient has not attended therapy since 9/21/2023    Discharge FOTO Score: N/A    Goals:   Short Term Goals:  6 weeks Status  Date Met   PAIN: Pt will report worst pain of 8/10 in order to progress toward max functional ability and improve quality of life. [x] Progressing  [] Met  [] Not Met     FUNCTION: Patient will demonstrate improved function as indicated by a score of greater than or equal to 55 out of 100 on FOTO. [x] Progressing  [] Met  [] Not Met     MOBILITY: Patient will improve AROM to 50% of stated goals, listed in objective measures above, in order to progress towards independence with functional activities.  [x] Progressing  [] Met  [] Not Met     STRENGTH: Patient will improve strength to 50% of stated goals, listed in objective measures above, in order to progress towards independence with functional activities. [x] Progressing  [] Met  [] Not Met     POSTURE: Patient will correct postural deviations in sitting and standing, to decrease pain and promote long term stability.  [x] Progressing  [] Met  [] Not Met     HEP: Patient will demonstrate independence with HEP in order to progress toward functional independence. [x] Progressing  [] Met  [] Not Met        Long Term Goals:  12 weeks Status  Date Met   PAIN: Pt will report worst pain of 6/10 in order to progress toward max functional ability and improve quality of life [x] Progressing  [] Met  [] Not Met     FUNCTION: Patient will demonstrate improved function as indicated by a score of greater than or equal to 58 out of 100 on FOTO. [x] Progressing  [] Met  [] Not Met     MOBILITY: Patient will improve AROM to stated goals, listed in objective measures above, in order to return to maximal functional potential and improve quality of life.  [x] Progressing  [] Met  [] Not Met     STRENGTH: Patient will improve strength to stated goals, listed in objective measures above, in order to improve functional independence and quality of life.  [x] Progressing  [] Met  [] Not Met     GAIT: Patient will demonstrate normalized gait mechanics with minimal compensation in order to return to PLOF. [x] Progressing  [] Met  [] Not Met     Patient will return to normal ADL's, IADL's, community involvement, recreational activities, and work-related activities with less than or equal to 3/10 pain and maximal function.  [x] Progressing  [] Met  [] Not Met          PLAN   This patient is discharged from Physical Therapy      Marisabel Amaya, PT

## 2023-12-06 ENCOUNTER — PATIENT MESSAGE (OUTPATIENT)
Dept: NEUROLOGY | Facility: CLINIC | Age: 63
End: 2023-12-06
Payer: COMMERCIAL

## 2023-12-06 RX ORDER — METHYLPREDNISOLONE 4 MG/1
TABLET ORAL
Qty: 1 EACH | Refills: 0 | Status: SHIPPED | OUTPATIENT
Start: 2023-12-06 | End: 2024-01-17

## 2023-12-08 NOTE — PROGRESS NOTES
"Subjective:       Patient ID: Tawny Valerio is a 58 y.o. female.    Chief Complaint: Endometrial polyps (Consult )    HPI     58yr old referred from Dr. Rosales for recurrent endometrial polyps with concern for underlying cancer.      7/2016, hysteroscopy/polypectomy w D&C with focal patterns of atypical glandular hyperplasia. She was lost to follow up.  Continued with intermittent spotting every 6 months.     Dec 2018: had heavier bleeding and saw Dr. Rosales.     3/1/2019 repeat hysteroscopy/D&C with numerous polyps but pathology was simple cystic hyperplasia.      Last pap smear, 1/31/2019, NILM    Referred for further management.       Review of Systems   Constitutional: Negative for chills, fatigue and fever.   Respiratory: Negative for cough, shortness of breath and wheezing.    Cardiovascular: Negative for chest pain, palpitations and leg swelling.   Gastrointestinal: Negative for abdominal pain, constipation, diarrhea, nausea and vomiting.   Genitourinary: Negative for difficulty urinating, dysuria, frequency, genital sores, hematuria, urgency, vaginal bleeding, vaginal discharge and vaginal pain.   Neurological: Negative for weakness.   Hematological: Negative for adenopathy. Does not bruise/bleed easily.   Psychiatric/Behavioral: The patient is not nervous/anxious.        Objective:   BP (!) 127/58   Pulse 69   Ht 5' 1" (1.549 m)   Wt 134.4 kg (296 lb 4.8 oz)   BMI 55.99 kg/m²      Physical Exam   Constitutional: She is oriented to person, place, and time. She appears well-developed and well-nourished.   HENT:   Head: Normocephalic.   Cardiovascular: Normal rate and regular rhythm.   Pulmonary/Chest: Effort normal and breath sounds normal.   Abdominal: Soft. She exhibits no distension and no mass. There is no tenderness. There is no rebound and no guarding.   Multiple healed skin abscess sites.   Genitourinary:   Genitourinary Comments: I was unable to insert a speculum due to severe vaginismus.  I was " Done    Marlo Oliveros PA-C     also unable to do a bimanual exam for the same reason.    Vulva has multiple sebaceous cysts.   Neurological: She is alert and oriented to person, place, and time.   Skin: Skin is warm and dry.   Psychiatric: She has a normal mood and affect. Her behavior is normal. Judgment and thought content normal.       Assessment:       1. Uterine polyp    2. Simple endometrial hyperplasia        Plan:   Uterine polyp    Simple endometrial hyperplasia     I reviewed the patient's operative dictation note in Care everywhere from [facility].  Dr. Rosales noted four polyps that were present within the endometrial cavity. Would appear that these were not completely removed.    I do not think the patient is the best candidate for a robotic hysterectomy given her obesity.    Additionally, if all she has is simple hyperplasia then this can be treated medically.    I discussed proceeding with a hysteroscopic polypectomy with Dr. Rosales who does not believe that this can be accomplished at [facility].    I suggested to her that I could refer her to Dr. Bobby Frazier and see if he is able to perform a hysteroscopic removal of all of the polyps.  If there is no evidence for atypical endometrial hyperplasia or cancer and only simple or complex hyperplasia then this could be treated with progesterone.     agreed with this plan.  Distress Screening Results: Psychosocial Distress screening score of Distress Score: 8 referral was not placed at this time as patient's distress was improved after discussion of diagnosis and the fact that I do not believe that she has uterine cancer.  SCORE:[id number]}

## 2023-12-14 ENCOUNTER — OFFICE VISIT (OUTPATIENT)
Dept: DIABETES | Facility: CLINIC | Age: 63
End: 2023-12-14
Payer: COMMERCIAL

## 2023-12-14 DIAGNOSIS — E11.9 CONTROLLED TYPE 2 DIABETES MELLITUS WITHOUT COMPLICATION, WITHOUT LONG-TERM CURRENT USE OF INSULIN: Primary | ICD-10-CM

## 2023-12-14 PROCEDURE — 99214 OFFICE O/P EST MOD 30 MIN: CPT | Mod: 95,,, | Performed by: NURSE PRACTITIONER

## 2023-12-14 PROCEDURE — 3066F NEPHROPATHY DOC TX: CPT | Mod: CPTII,95,, | Performed by: NURSE PRACTITIONER

## 2023-12-14 PROCEDURE — 4010F PR ACE/ARB THEARPY RXD/TAKEN: ICD-10-PCS | Mod: CPTII,95,, | Performed by: NURSE PRACTITIONER

## 2023-12-14 PROCEDURE — 99214 PR OFFICE/OUTPT VISIT, EST, LEVL IV, 30-39 MIN: ICD-10-PCS | Mod: 95,,, | Performed by: NURSE PRACTITIONER

## 2023-12-14 PROCEDURE — 3061F PR NEG MICROALBUMINURIA RESULT DOCUMENTED/REVIEW: ICD-10-PCS | Mod: CPTII,95,, | Performed by: NURSE PRACTITIONER

## 2023-12-14 PROCEDURE — 3044F HG A1C LEVEL LT 7.0%: CPT | Mod: CPTII,95,, | Performed by: NURSE PRACTITIONER

## 2023-12-14 PROCEDURE — 3066F PR DOCUMENTATION OF TREATMENT FOR NEPHROPATHY: ICD-10-PCS | Mod: CPTII,95,, | Performed by: NURSE PRACTITIONER

## 2023-12-14 PROCEDURE — 3044F PR MOST RECENT HEMOGLOBIN A1C LEVEL <7.0%: ICD-10-PCS | Mod: CPTII,95,, | Performed by: NURSE PRACTITIONER

## 2023-12-14 PROCEDURE — 4010F ACE/ARB THERAPY RXD/TAKEN: CPT | Mod: CPTII,95,, | Performed by: NURSE PRACTITIONER

## 2023-12-14 PROCEDURE — 3061F NEG MICROALBUMINURIA REV: CPT | Mod: CPTII,95,, | Performed by: NURSE PRACTITIONER

## 2023-12-14 NOTE — PROGRESS NOTES
The patient location is: Home  The chief complaint leading to consultation is: Diabetes    Visit type: audiovisual    Face to Face time with patient: 15  30 minutes of total time spent on the encounter, which includes face to face time and non-face to face time preparing to see the patient (eg, review of tests), Obtaining and/or reviewing separately obtained history, Documenting clinical information in the electronic or other health record, Independently interpreting results (not separately reported) and communicating results to the patient/family/caregiver, or Care coordination (not separately reported).  Each patient to whom he or she provides medical services by telemedicine is:  (1) informed of the relationship between the physician and patient and the respective role of any other health care provider with respect to management of the patient; and (2) notified that he or she may decline to receive medical services by telemedicine and may withdraw from such care at any time.    Notes:    Tawny Valerio is a 63 y.o. female who presents for a follow up evaluation of Type 2 diabetes mellitus.     CHIEF COMPLAINT: Diabetes Consultation    PCP: Geovanna South DO      Initial visit with me - 8/17/2023    The patient was initially diagnosed with diabetes in 2016.      Previous failed treatments include:  Metformin - GI upset.  Ozempic - N/V/D, fecal incontinence.     Social Documentation:  Patient lives in [place] with .   Occupation:  for [employer].   Exercise: limited by occipital neuralgia.   No sweet tea or regular cold drinks.       Diabetes related complications:   none.   denies Pancreatitis  denies Gastroparesis  denies DKA  denies Hx/family Hx of MEN2/MTC  denies Frequent UTIs/yeast infections     Cardiovascular Risk Factors: dyslipidemia, family history of premature cardiovascular disease, hypertension, obesity (BMI >30 kg/m2), and sedentary lifestyle.    Diabetes Medications          "      empagliflozin (JARDIANCE) 25 mg tablet Take 1 tablet (25 mg total) by mouth once daily.    tirzepatide 7.5 mg/0.5 mL PnIj Inject 7.5 mg into the skin every 7 days.     Current monitoring regimen: capillary blood glucose monitoring with finger sticks.    Patient currently taking insulin or sulfonylurea?  NO  Recent hypoglycemic episodes: No.     Patient compliant with glucose checks and medication administration? Yes    DIABETES MANAGEMENT STATUS  Statin: Not taking  ACE/ARB: Taking  Screening or Prevention Patient's value Goal Complete/Controlled?   HgA1C Testing and Control   Lab Results   Component Value Date    HGBA1C 6.1 (H) 08/22/2023      Annually/Less than 8% Yes   Lipid profile : 06/14/2023 Annually Yes   LDL control Lab Results   Component Value Date    LDLCALC 109.8 06/14/2023    Annually/Less than 100 mg/dl  No   Nephropathy screening Lab Results   Component Value Date    LABMICR 16.0 08/22/2023     Lab Results   Component Value Date    PROTEINUA Negative 10/26/2023     No results found for: "UTPCR"   Annually Yes   Blood pressure BP Readings from Last 1 Encounters:   10/26/23 124/68    Less than 140/90 Yes   Dilated retinal exam : 01/22/2021 Annually No   Foot exam   : 10/26/2023 Annually No   Patient's medications, allergies, surgical, social and family histories were reviewed and updated as appropriate.     Review of Systems   Constitutional:  Negative for weight loss.   Eyes:  Negative for blurred vision and double vision.   Cardiovascular:  Negative for chest pain.   Gastrointestinal:  Negative for nausea and vomiting.   Genitourinary:  Negative for frequency.   Musculoskeletal:  Negative for falls.   Neurological:  Negative for dizziness and weakness.   Endo/Heme/Allergies:  Negative for polydipsia.   Psychiatric/Behavioral:  Negative for depression.    All other systems reviewed and are negative.       Physical Exam  Constitutional:       Appearance: Normal appearance.   HENT:      Head: " "Normocephalic and atraumatic.   Pulmonary:      Effort: No respiratory distress.   Musculoskeletal:      Cervical back: Normal range of motion.   Neurological:      Mental Status: She is alert and oriented to person, place, and time.   Psychiatric:         Mood and Affect: Mood normal.         Behavior: Behavior normal.        Last menstrual period 01/15/2019.  Wt Readings from Last 3 Encounters:   10/26/23 126.2 kg (278 lb 3.5 oz)   10/25/23 129 kg (284 lb 6.3 oz)   08/24/23 131.7 kg (290 lb 5.5 oz)       LAB REVIEW  Lab Results   Component Value Date     08/22/2023    K 4.3 08/22/2023     08/22/2023    CO2 24 08/22/2023    BUN 16 08/22/2023    CREATININE 0.8 08/22/2023    CALCIUM 9.9 08/22/2023    ANIONGAP 10 08/22/2023    EGFRNORACEVR >60.0 08/22/2023     No results found for: "CPEPTIDE", "GLUTAMICACID", "INSLNABS"  Hemoglobin A1C   Date Value Ref Range Status   08/22/2023 6.1 (H) 4.0 - 5.6 % Final     Comment:     ADA Screening Guidelines:  5.7-6.4%  Consistent with prediabetes  >or=6.5%  Consistent with diabetes    High levels of fetal hemoglobin interfere with the HbA1C  assay. Heterozygous hemoglobin variants (HbS, HgC, etc)do  not significantly interfere with this assay.   However, presence of multiple variants may affect accuracy.     08/09/2022 5.5 4.0 - 5.6 % Final     Comment:     ADA Screening Guidelines:  5.7-6.4%  Consistent with prediabetes  >or=6.5%  Consistent with diabetes    High levels of fetal hemoglobin interfere with the HbA1C  assay. Heterozygous hemoglobin variants (HbS, HgC, etc)do  not significantly interfere with this assay.   However, presence of multiple variants may affect accuracy.     01/06/2022 5.4 4.0 - 5.6 % Final     Comment:     ADA Screening Guidelines:  5.7-6.4%  Consistent with prediabetes  >or=6.5%  Consistent with diabetes    High levels of fetal hemoglobin interfere with the HbA1C  assay. Heterozygous hemoglobin variants (HbS, HgC, etc)do  not significantly " interfere with this assay.   However, presence of multiple variants may affect accuracy.          ASSESSMENT    ICD-10-CM ICD-9-CM   1. Controlled type 2 diabetes mellitus without complication, without long-term current use of insulin  E11.9 250.00       PLAN  Diagnoses and all orders for this visit:    Controlled type 2 diabetes mellitus without complication, without long-term current use of insulin        Reviewed pathophysiology of diabetes, complications related to the disease, importance of annual dilated eye exam and daily foot examination. Explained MOA, SE, dosage of medications. Written instructions given and reviewed with patient and patient verbalizes understanding.     PATIENT INSTRUCTIONS     Lifestyle modification with diabetic diet and at least 30 minutes of physical activity daily recommended.  You are overdue for your yearly diabetic eye exam. Please schedule an appointment with your eye care provider at your earliest convenience. If your eye care provider is outside of the [facility] system, please have them fax a report of the exam to [facility] Fax [phone].   Diabetic Foot Exam deferred today due to virtual visit. Will plan to be done at next in-person visit.      Continue Jardiance to 25 mg by mouth daily.   Increase Mounjaro to 10 mg subcutaneously every 7 days.     Check blood sugar twice daily. Every morning when you wake up and 1-2 hours after main meal of the day. Keep log and upload to [employer] prior to each visit.   Blood Sugar Goals:       Fastin-130.       1-2 hours after a meal: Less than 180.          No follow-ups on file.    Portions of this note were prepared with [employer] Naturally Speaking voice recognition transcription software. Grammatical errors, including garbled syntax, mangle pronouns, and other bizarre constructions may be attributed to that software system.

## 2023-12-14 NOTE — PATIENT INSTRUCTIONS
PATIENT INSTRUCTIONS     Lifestyle modification with diabetic diet and at least 30 minutes of physical activity daily recommended.  You are overdue for your yearly diabetic eye exam. Please schedule an appointment with your eye care provider at your earliest convenience. If your eye care provider is outside of the [facility] system, please have them fax a report of the exam to [facility] Fax [phone].   Diabetic Foot Exam deferred today due to virtual visit. Will plan to be done at next in-person visit.      Continue Jardiance to 25 mg by mouth daily.   Increase Mounjaro to 10 mg subcutaneously every 7 days.     Check blood sugar twice daily. Every morning when you wake up and 1-2 hours after main meal of the day. Keep log and upload to [employer] prior to each visit.   Blood Sugar Goals:       Fastin-130.       1-2 hours after a meal: Less than 180.

## 2023-12-26 ENCOUNTER — PATIENT MESSAGE (OUTPATIENT)
Dept: NEUROLOGY | Facility: CLINIC | Age: 63
End: 2023-12-26
Payer: COMMERCIAL

## 2023-12-26 DIAGNOSIS — L01.00 IMPETIGO: ICD-10-CM

## 2023-12-26 DIAGNOSIS — L71.9 ROSACEA: ICD-10-CM

## 2023-12-26 RX ORDER — METHYLPREDNISOLONE 4 MG/1
TABLET ORAL
Qty: 1 EACH | Refills: 0 | Status: SHIPPED | OUTPATIENT
Start: 2023-12-26 | End: 2024-01-17

## 2023-12-27 RX ORDER — METRONIDAZOLE 7.5 MG/G
CREAM TOPICAL 2 TIMES DAILY
Qty: 45 G | Refills: 0 | Status: SHIPPED | OUTPATIENT
Start: 2023-12-27 | End: 2024-12-26

## 2023-12-27 RX ORDER — CLINDAMYCIN PHOSPHATE 10 UG/ML
LOTION TOPICAL 2 TIMES DAILY
Qty: 60 ML | Refills: 0 | Status: SHIPPED | OUTPATIENT
Start: 2023-12-27

## 2024-01-08 ENCOUNTER — TELEPHONE (OUTPATIENT)
Dept: NEUROLOGY | Facility: CLINIC | Age: 64
End: 2024-01-08
Payer: COMMERCIAL

## 2024-01-08 NOTE — TELEPHONE ENCOUNTER
Spoke with patient and relayed message that MD will further discuss with patient about iovera/cryoablation. Educated to the patient its a long process to get iovera/cyroablation approved. Patient verbally understood and will discuss further with MD at next upcoming appt

## 2024-01-08 NOTE — TELEPHONE ENCOUNTER
----- Message from Kirill Andrade MD sent at 1/8/2024 12:41 PM CST -----  Regarding: RE: appt access; pt advice  Contact: pt  @  [phone]  Please advise that we will discuss what she means at follow up appointment that is scheduled for next week. Thanks!   ----- Message -----  From: Adelfo Faulkner MA  Sent: 1/8/2024  11:45 AM CST  To: Kirill Andrade MD  Subject: FW: appt access; pt advice                       Please advise.  ----- Message -----  From: Reyes Roth  Sent: 1/8/2024  10:44 AM CST  To: Bony Mccollum  Subject: appt access; pt advice                           Pt was calling to be advised freezing treatment for occipital neuralgia.  Please call to advise further as pt would like to know if she would be a candidate for procedure. Thank you for all you are doing.

## 2024-01-16 ENCOUNTER — TELEPHONE (OUTPATIENT)
Dept: NEUROLOGY | Facility: CLINIC | Age: 64
End: 2024-01-16
Payer: COMMERCIAL

## 2024-01-16 NOTE — TELEPHONE ENCOUNTER
Spoke to Pt about switching her in person appt to virtual for tomorrow due to the weather. Pt accepted to switch. Pt's appt has been switched to virtual.

## 2024-01-17 ENCOUNTER — OFFICE VISIT (OUTPATIENT)
Dept: NEUROLOGY | Facility: CLINIC | Age: 64
End: 2024-01-17
Payer: COMMERCIAL

## 2024-01-17 DIAGNOSIS — G47.9 FATIGUE DUE TO SLEEP PATTERN DISTURBANCE: ICD-10-CM

## 2024-01-17 DIAGNOSIS — M54.81 OCCIPITAL NEURITIS: ICD-10-CM

## 2024-01-17 DIAGNOSIS — M54.2 CERVICALGIA OF OCCIPITO-ATLANTO-AXIAL REGION: Primary | ICD-10-CM

## 2024-01-17 DIAGNOSIS — G24.9 DYSTONIA: ICD-10-CM

## 2024-01-17 DIAGNOSIS — G44.86 CERVICOGENIC HEADACHE: ICD-10-CM

## 2024-01-17 DIAGNOSIS — R53.83 FATIGUE DUE TO SLEEP PATTERN DISTURBANCE: ICD-10-CM

## 2024-01-17 PROCEDURE — 1159F MED LIST DOCD IN RCRD: CPT | Mod: CPTII,95,, | Performed by: PSYCHIATRY & NEUROLOGY

## 2024-01-17 PROCEDURE — 1160F RVW MEDS BY RX/DR IN RCRD: CPT | Mod: CPTII,95,, | Performed by: PSYCHIATRY & NEUROLOGY

## 2024-01-17 PROCEDURE — 99214 OFFICE O/P EST MOD 30 MIN: CPT | Mod: 95,,, | Performed by: PSYCHIATRY & NEUROLOGY

## 2024-01-17 RX ORDER — GABAPENTIN 300 MG/1
CAPSULE ORAL
Qty: 120 CAPSULE | Refills: 11 | Status: SHIPPED | OUTPATIENT
Start: 2024-01-17

## 2024-01-17 RX ORDER — TIZANIDINE 4 MG/1
4 TABLET ORAL NIGHTLY
Qty: 30 TABLET | Refills: 5 | Status: SHIPPED | OUTPATIENT
Start: 2024-01-17 | End: 2024-02-07 | Stop reason: SDUPTHER

## 2024-01-17 NOTE — PATIENT INSTRUCTIONS
Increase gabapentin to 600 mg (2 capsules) in the morning and 300 mg (1 capsule) in the afternoon and 300 mg (1 capsule) at night. Message me if need better pain control as can increase dose every 3 days  Referral for lower neck/upper back to mid back PT with myofascial release placed again (therapist may choose from and do a combination of: deep tissue massage, cupping, dry needling, etc). No soft tissue manipulation of suboccipital region if you start to feel worse. All joint manipulation is fine.  The patient was instructed to ice the occipital region for no more than 20 minutes at least once a day but may repeat this as many times as they would like.   Discussed ergonomic accommodations for occipital neuritis/neuralgia. Mainly perform all work at eye level to minimize continued neck flexion which will aggravate the nerve.  Patient was encouraged to do daily light cardio exercise but instructed to limit physical activities to walking, walking in water up to the waist only or riding a stationary bike, recumbent preferred. No weight lifting in upper body, no neck massage, no acupuncture of neck, and no dry needling of upper neck. No neck PT unless otherwise stated. Lower body strengthening with resistant bands, leg machines, and strapping weights to legs okay. No core body workouts. No running or use of cardio equipment other than stationary bike. No swimming or body surfing. No amusement park rides. No lifting more than 5-10 lbs and bend at the knees, not the waist.  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 To be referred for consultation for occipital nerve release procedure if initially clinically responsive to injections but always with a return of symptoms.  Please stop wearing the neck collar  Start tizanidine 4 mg nightly. Take 30 minutes before bed. Never drink  alcohol or drive after taking this medication  May continue Tylenol but would stop using it daily if neck PT is helping

## 2024-01-17 NOTE — PROGRESS NOTES
Patient verified their name and date of birth prior to the visit.    This is a telemedicine visit that was performed with the originating site at home in [place] and the distant site at Dr. Kirill Andrade's office in [place]. Verbal consent to participate in video visit was obtained. I discussed with the patient the nature of our telemedicine visits, that:      I would evaluate the patient and recommend diagnostics and treatments based on my assessment    Our sessions are not being recorded and that personal health information is protected and is documented in the their electronic medical record    Our team would provide follow up care in person if/when the patient needs it    Chief Complaint: Headache    Subjective:     History of Present Illness    Referring Provider: Self Referral  Accompanied by: No one     07/17/2023: Tawny Valerio is a 63 y.o. female with h/o HTN, DM, anxiety, endometrial cancer s/p resection, ASHLI not on CPAP who presents for headache evaluation. Neck pain started 5 years ago and in 4/2022 she bought a neck massager and in 12/2022 she started getting left occipital pain and in 2/2023 she felt a pop in neck that she hear in head and neck spasmed and turned her head to the right and start neck PT in 2/2023 with massage and exercise and improved and had to increase gabapentin that she was already on for neuropathy but increased due to neck pain. She feels that she needs to hold neck up and she will have episodes of involuntary right head turning. Headaches are constant with varying intensity, pressure, aching, throbbing sometimes, burning sometimes, sharp sometimes, left side, left occipital. She has left lower neck pain. She states that her eyes will start to close when pain is bad and hard to keep them open. She denies associated photophobia, phonophobia, weakness, numbness, tingling, dizziness, N/V, change in vision. She has tingling in hands that she states is from neuropathy. She  denies triggers. She denies an inciting etiology including no injury, health change, or medication change when above pain symptoms started. She has tried a cervical pillow and is sleeping well and wakes up rested. She states she cannot tolerate CPAP mask and has not been told recently if snores or has apnea but she has woken up at night feeling that she cannot breath. Headache improves lying down improves headache and vasal vagal maneuvers do not significantly worsen headaches. She denies headaches waking her up and will wake up with headache. Mood is good. Gabapentin and naprosyn helps a little. She has tried heating and icing neck. Neck PT only provides temporary benefit. She has tried neck collar, massager device. She states that she had steroids while used to be on Ozempic and tolerated them okay but does not have a home test kit.     08/09/2023: Tawny Valerio is a 63 y.o. female with h/o HTN, DM, anxiety, endometrial cancer s/p resection, ASHLI not on CPAP, occipital neuritis s/p medrol dose pack x1 who presents for follow up. On last visit, patient was prescribed a Medrol dose pack and to monitor glucose and ordered glucometer and started on ice therapy, ergonomics, and exercise restrictions and counseled on no suboccipital PT. She states that she is doing better with head pain and has improved spasms but still has some pain. She feels better wearing a neck collar. She had a new pain that was left check to left lower jaw that was stabbing, excruciating, and lasted a few seconds. She states that she is still taking gabapentin. She states that her head will spontaneously turn to the left and when she has this head turn that she cause spasm, she will get more head pain. Headaches are every other day, left occipital to left top of head, can be bilateral aliza horn, electrical shock, pinching feeling, 10-15 mins. She has left cervical paraspinal neck pain. Head and neck pain resolve with stretching neck. She has  associated photophobia, spinning, imbalance a little, feels like seeing things that has happened a few times. She has been having entire RUE pain with stabbing and burning. She denies phonophobia, weakness, numbness, tingling, N/V. She is sleeping okay and wakes up rested. Mood is good. She got glucometer and checked sugar a couple of times and was less than 200 each time she checked it. She gained fluid weight that she has lost from the Medrol and denies other side effects. The first 2 days on Medrol she felt like she had no issues and had no spasms. She iced a couple of times. She is using heating pad on neck. She has not done PT.     10/25/2023: Tawny Valerio is a 63 y.o. female with h/o HTN, DM, anxiety, endometrial cancer s/p resection, ASHLI not on CPAP, occipital neuritis s/p medrol dose pack x1 who presents for follow up. On last visit, patient was prescribed a Medrol dose pack and to monitor glucose and continued on ice therapy, ergonomics, and exercise restrictions and referred for lower neck/upper back to mid back PT with myofascial release and counseled on neck collar. In the interim, started meloxicam. She is doing better overall and has more good days than bad days but does have bad days. She is taking gabapentin. Icing helps with headache and heating helps with shoulders. She still has to come home from work 3-4 times a week to lay down. She does wear neck collar at work sometimes and in the morning and in the afternoon. She states neck spasm resolves neck spasms. Headaches are 70% better, 3 times a week, left aliza horn, stabbing, sharp, aching, unclear on duration as it comes and goes. She has left > right cervical paraspinal neck pain and gets a little relief when neck clicks or pops. She stopped neck PT as  has been hospitalized and does home exercises a lot and states therapy helped and massage really helped. She has associated photophobia to sunshine and with driving, numbness/tingling  in all finger tips, spinning when lies down sometimes. She is unsure if has vision changes. She denies phonophobia, weakness, N/V. She is sleeping alright and tries to sleep on back and has woken up from LUE if does not take her gabapentin and also helps if wears glove on left wrist and wakes up rested. Mood is okay and has a lot of family obligations. She denies side effects to Medrol and highest glucose was 140 and got real hot one time on them. She takes gabapentin 300 mg BID. She states meloxicam did not help so took naprosyn instead and denies side effects to meloxicam.    01/17/2024: Tawny Valerio is a 63 y.o. female with h/o HTN, DM, anxiety, endometrial cancer s/p resection, ASHLI not on CPAP, occipital neuritis s/p medrol dose pack x3 who presents for follow up. On last visit, patient was continued on ice therapy, ergonomics, and exercise restrictions and to resume lower neck/upper back to mid back PT with myofascial release and counseled on neck collar and increased gabapentin and discontinued meloxicam. In the interim, presribed Medrol dose pack. She states she has ups and downs and really bad days. She states last Friday that she could not turn her neck and had a lot of pain on left side of neck and she would wear collar as it would help a little. She tried her Benadryl, meloxicam that she ran out of, gabapentin and finally took 2-500 mg Tylenol that broke the bad episode of neck pain so that she could turn her neck. She gets sharp left sided neck pain if she turns it a certain way. She has since been taking Tylenol daily that is helping and is wearing collar at times still. Medrol did not help with most recent dose and denies side effects. She was not able to restart neck PT and needs a new referral and notes neck stretches help and if hears a click it will relieve pain and tension. She is icing that helps. She sometimes heats. She is noticing now she is waking up with headache, left side, can start  top left or left occipital, realizes now she has probably had more headaches for years that were not with the nerve pain, resolves with Tylenol or gabapentin, pressure, throbbing, sometimes sharp pain from left side of neck will shoot to left side of head and cause heavy feeling on top of head. She has associated photophobia to the point it is impacting her driving with the glare and she will wear her sunglasses with driving, tingling in all fingers and in feet and unsure if these are from her diabetic neuropathy, sometimes sees red things when closes her eyes. She denies phonophobia, weakness, numbness, N/V, dizziness. She is sleeping okay and cervical pillow helps and wakes up a little tired. Mood is typically good and finds is getting a little short when in a lot of pain and is tired with the pain. Gabapentin is helping at higher dose and can make her feel a little more sluggish and denies side effects.    Current Outpatient Medications on File Prior to Visit   Medication Sig Dispense Refill    acetaminophen (TYLENOL) 325 MG tablet Take 2 tablets (650 mg total) by mouth every 6 (six) hours as needed for Pain. 30 tablet 1    aspirin 81 MG Chew Take 81 mg by mouth once daily.      azelastine (ASTELIN) 137 mcg (0.1 %) nasal spray 1 spray (137 mcg total) by Nasal route 2 (two) times daily. 30 mL 11    blood sugar diagnostic Strp 1 each by Misc.(Non-Drug; Combo Route) route 2 (two) times a day. 11 each 0    blood-glucose meter kit Use as instructed 1 each 0    blood-glucose meter kit Use as instructed 1 each 0    buPROPion (WELLBUTRIN SR) 150 MG TBSR 12 hr tablet Take 1 tablet (150 mg total) by mouth 2 (two) times daily. 60 tablet 11    clindamycin (CLEOCIN T) 1 % lotion Apply topically 2 (two) times daily. 60 mL 0    empagliflozin (JARDIANCE) 25 mg tablet Take 1 tablet (25 mg total) by mouth once daily. 90 tablet 3    ergocalciferol (ERGOCALCIFEROL) 50,000 unit Cap Take 1 capsule (50,000 Units total) by mouth every 7  days. 12 capsule 3    ezetimibe (ZETIA) 10 mg tablet Take 1 tablet (10 mg total) by mouth once daily. 90 tablet 3    fluticasone propionate (FLONASE) 50 mcg/actuation nasal spray 1 spray (50 mcg total) by Each Nostril route once daily. 16 g 0    lancets (ACCU-CHEK SOFTCLIX LANCETS) Misc 1 each by Misc.(Non-Drug; Combo Route) route 2 (two) times daily. 11 each 0    lancing device with lancets (MULTI-LANCET DEVICE 2) Kit 1 each by Misc.(Non-Drug; Combo Route) route 2 (two) times a day. 1 each 1    losartan (COZAAR) 25 MG tablet Take 1 tablet (25 mg total) by mouth once daily. 90 tablet 3    metronidazole 0.75% (METROCREAM) 0.75 % Crea Apply topically 2 (two) times daily. For rosacea on face. 45 g 0    ondansetron (ZOFRAN-ODT) 4 MG TbDL Take 4 mg by mouth every 6 (six) hours as needed.      promethazine (PHENERGAN) 25 MG tablet Take 25 mg by mouth every 6 (six) hours as needed.      tirzepatide 10 mg/0.5 mL PnIj Inject 10 mg into the skin every 7 days. 4 pen 2    tretinoin (RETIN-A) 0.025 % cream APPLY TOPICALLY EVERY EVENING. 20 g 6    [DISCONTINUED] methylPREDNISolone (MEDROL DOSEPACK) 4 mg tablet use as directed 1 each 0    [DISCONTINUED] methylPREDNISolone (MEDROL DOSEPACK) 4 mg tablet use as directed 1 each 0     No current facility-administered medications on file prior to visit.       Review of patient's allergies indicates:   Allergen Reactions    Celebrex [celecoxib] Anaphylaxis    Codeine Hives     Does not know what she can take -usually just takes tylenol or ibuprofen    Lidocaine Hives    Vicodin [hydrocodone-acetaminophen] Hives and Swelling       Family History   Problem Relation Age of Onset    Arthritis Mother     Diabetes Father     Stroke Father     Hypertension Father     Depression Father     Early death Father     Heart disease Father     Vision loss Father     Heart disease Sister     Hypertension Sister     Depression Sister     Diabetes Sister     Early death Sister     Heart disease Sister      Hypertension Sister     Heart disease Brother     Heart attack Brother     Hypertension Brother     Early death Brother     Heart disease Maternal Grandmother     Heart disease Maternal Grandfather     Heart disease Paternal Grandfather     Breast cancer Other     Ovarian cancer Neg Hx     Uterine cancer Neg Hx     Colon cancer Neg Hx     Heart failure Neg Hx     Hyperlipidemia Neg Hx        Social History     Tobacco Use    Smoking status: Never    Smokeless tobacco: Never   Substance Use Topics    Alcohol use: Never    Drug use: No       Review of Systems  Constitutional: No fevers, no chills, no change in weight  Eye/Vision: See HPI  Ear/Nose/Mouth/Throat: See HPI; no cough, no runny nose, no sore throat  Respiratory: No shortness of breath, no problems breathing  Cardiovascular: No chest pain  Gastrointestinal: See HPI, diarrhea, no constipation  Genitourinary: No dysuria  Musculoskeletal: See HPI  Integumentary: No skin changes  Neurologic: See HPI  Psychiatric: Denies depression, anxiety, denies SI and HI.    Objective:     There were no vitals filed for this visit.    General: Alert and awake, no acute distress, well groomed, well nourished  Eyes: Pupils are equal, round as best can be evaluated by camera; Extraocular movements are intact; Normal conjunctiva  HENT: Normocephalic, Oral mucosa is moist    Neurologic Exam  Mental Status: orientated to time, person, and place; No aphasia or dysarthria.     Cranial Nerves: as above, V1-V3 LT sensation WNL bilaterally per patient report when patient self performs, face symmetric, tongue protrusion midline and movements WNL    Muscle Tone/Motor Function: No drift. Normal rapidly alternating movements bilateral hands. No tremors. No abnormal movements per what is observable on camera    At least 4-/5 x4 per what is observable on camera    Sensory: LT WNL x4 per patient report when patient self performs    Reflexes: Unable to assess 2/2 limitations of  telemedicine    Coordination: Finger to nose WNL bilaterally.    Gait: Gait WNL per what is observable on camera    Labs:    No visits with results within 1 Month(s) from this visit.   Latest known visit with results is:   Office Visit on 10/26/2023   Component Date Value Ref Range Status    Specimen UA 10/26/2023 Urine, Clean Catch   Final    Color, UA 10/26/2023 Yellow  Yellow, Straw, Adela Final    Appearance, UA 10/26/2023 Hazy (A)  Clear Final    pH, UA 10/26/2023 6.0  5.0 - 8.0 Final    Specific Gravity, UA 10/26/2023 >1.030 (A)  1.005 - 1.030 Final    Protein, UA 10/26/2023 Negative  Negative Final    Comment: Recommend a 24 hour urine protein or a urine   protein/creatinine ratio if globulin induced proteinuria is  clinically suspected.      Glucose, UA 10/26/2023 4+ (A)  Negative Final    Ketones, UA 10/26/2023 Negative  Negative Final    Bilirubin (UA) 10/26/2023 Negative  Negative Final    Occult Blood UA 10/26/2023 Negative  Negative Final    Nitrite, UA 10/26/2023 Positive (A)  Negative Final    Leukocytes, UA 10/26/2023 1+ (A)  Negative Final    RBC, UA 10/26/2023 1  0 - 4 /hpf Final    WBC, UA 10/26/2023 54 (H)  0 - 5 /hpf Final    Bacteria 10/26/2023 Moderate (A)  None-Occ /hpf Final    Yeast, UA 10/26/2023 None  None Final    Squam Epithel, UA 10/26/2023 7  /hpf Final    Microscopic Comment 10/26/2023 SEE COMMENT   Final    Comment: Other formed elements not mentioned in the report are not   present in the microscopic examination.        I personally reviewed all current labs noted in today's chart.    Imaging:    No new studies    Assessment:       ICD-10-CM ICD-9-CM    1. Cervicalgia of [id number] region  M54.2 723.1       2. Cervicogenic headache  G44.86 784.0       3. Occipital neuritis  M54.81 723.8       4. Fatigue due to sleep pattern disturbance  R53.83 780.79     G47.9 780.50          63 y.o. female with h/o HTN, DM, anxiety, endometrial cancer s/p resection, ASHLI not on CPAP,  occipital neuritis s/p medrol dose pack x3 who presents for follow up. Neurologic exam limited by the use of telemedicine but no observed focal neurological deficits at this time. On previous exam, she has occipital point tenderness over the left lesser occipital nerve without induction of headaches with no jump sign and no twitch response and no referred pain and has head turned to the left with tilt towards right shoulder. We discussed previously her description is of occipital neuralgia and of dystonia. We discussed previously if occipital neuralgia treatment does not help will have her see dystonia specialist. Overall, her symptoms are persistent and are now mainly muscular.     Plan:     Increase gabapentin to 600 mg (2 capsules) in the morning and 300 mg (1 capsule) in the afternoon and 300 mg (1 capsule) at night. Message me if need better pain control as can increase dose every 3 days  Referral for lower neck/upper back to mid back PT with myofascial release placed again (therapist may choose from and do a combination of: deep tissue massage, cupping, dry needling, etc). No soft tissue manipulation of suboccipital region if you start to feel worse. All joint manipulation is fine.  The patient was instructed to ice the occipital region for no more than 20 minutes at least once a day but may repeat this as many times as they would like.   Discussed ergonomic accommodations for occipital neuritis/neuralgia. Mainly perform all work at eye level to minimize continued neck flexion which will aggravate the nerve.  Patient was encouraged to do daily light cardio exercise but instructed to limit physical activities to walking, walking in water up to the waist only or riding a stationary bike, recumbent preferred. No weight lifting in upper body, no neck massage, no acupuncture of neck, and no dry needling of upper neck. No neck PT unless otherwise stated. Lower body strengthening with resistant bands, leg machines,  and strapping weights to legs okay. No core body workouts. No running or use of cardio equipment other than stationary bike. No swimming or body surfing. No amusement park rides. No lifting more than 5-10 lbs and bend at the knees, not the waist.  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 To be referred for consultation for occipital nerve release procedure if initially clinically responsive to injections but always with a return of symptoms.  Please stop wearing the neck collar  Start tizanidine 4 mg nightly. Take 30 minutes before bed. Never drink alcohol or drive after taking this medication  May continue Tylenol but would stop using it daily if neck PT is helping    Kirill Andrade MD  [facility]

## 2024-01-23 ENCOUNTER — CLINICAL SUPPORT (OUTPATIENT)
Dept: REHABILITATION | Facility: HOSPITAL | Age: 64
End: 2024-01-23
Payer: COMMERCIAL

## 2024-01-23 DIAGNOSIS — R29.898 DECREASED RANGE OF MOTION OF NECK: ICD-10-CM

## 2024-01-23 DIAGNOSIS — R29.3 ABNORMAL POSTURE: ICD-10-CM

## 2024-01-23 DIAGNOSIS — M54.2 CERVICALGIA OF OCCIPITO-ATLANTO-AXIAL REGION: ICD-10-CM

## 2024-01-23 DIAGNOSIS — R68.89 DECREASED STRENGTH, ENDURANCE, AND MOBILITY: ICD-10-CM

## 2024-01-23 DIAGNOSIS — R53.1 DECREASED STRENGTH, ENDURANCE, AND MOBILITY: ICD-10-CM

## 2024-01-23 DIAGNOSIS — M54.2 NECK PAIN: Primary | ICD-10-CM

## 2024-01-23 DIAGNOSIS — Z74.09 DECREASED STRENGTH, ENDURANCE, AND MOBILITY: ICD-10-CM

## 2024-01-23 PROCEDURE — 97140 MANUAL THERAPY 1/> REGIONS: CPT | Performed by: PHYSICAL THERAPIST

## 2024-01-23 PROCEDURE — 97162 PT EVAL MOD COMPLEX 30 MIN: CPT | Performed by: PHYSICAL THERAPIST

## 2024-01-23 NOTE — PLAN OF CARE
[facility]   Physical Therapy Initial Evaluation      Date: 1/23/2024   Name: Tawny Valerio  [facility] Number: [id number]    Therapy Diagnosis:    Encounter Diagnoses   Name Primary?    Neck pain Yes    Decreased range of motion of neck     Cervicalgia of tmzguavk-wkljszr-etrqr region     Abnormal posture     Decreased strength, endurance, and mobility       Physician: Kirill Andrade MD     Physician Orders: PT Eval and Treat  Referral for lower neck/upper back to mid back PT with myofascial release (therapist may choose from and do a combination of: deep tissue massage, cupping, dry needling, etc). No soft tissue manipulation of suboccipital region if you start to feel worse. All joint manipulation is fine.   Medical Diagnosis from Referral: cervicalgia of azwjowzh-odqgsmx-traql region, cervicogenic headache  Evaluation Date: 1/23/2024  Authorization Period Expiration: 12/31/2024  Plan of Care Expiration: 4/22/2024  Progress Note Due: 2/22/2024  Visit # / Visits authorized: 1/1   FOTO: 1/3 (last performed on 1/23/2024)    Precautions: Standard    Time In: 1615  Time Out: 1715  Total Billable Time (timed & untimed codes): 55 minutes    Subjective     Date of onset: Approximately one year ago    History of current condition - Tawny returns for same neck pain and headaches. Patient states that she gets to the point where her neck feels so stiff, she cannot move it for a couple of days. Patient states that she still takes gabapentin, tylenol, and benadryl. She feels that the benadryl seems to be the only thing that allows her to move her neck better. She feels this is what allows her to hold her head up straight and not tilted. The downside is that it does not seem to last very long. Patient reports that she takes a muscle relaxer at night and in the morning. She notices that recently she has been getting more pain in her anterior neck and throat. She will feel shooting pains and feel  a tightness in her throat. Sometimes she feels like she even has trouble swallowing. Patient reports that she wears a neck brace to help with pain. She knows her doctor does not like her doing it, but she feels she has to at times to get relief.     Falls: none    Imaging: [] Xray [x] MRI [] CT: Performed on: 6/5/2023    Pain:  Current 3/10, worst 10/10, best 3/10   Location: [] Right   [] Left:  neck, upper back, and headaches, L>R  Description: aching , burning, throbbing, and sharp  Aggravating Factors: prolonged sitting, driving, sitting in certain positions, waking up in the mornings  Easing Factors: activity avoidance, rest, medications, wearing a neck collar, heat, ice, at home traction unit    Prior Therapy:   [] N/A    [x] Yes: provides temporary relief  Social History: Pt lives with their spouse  Occupation: Pt is a  and does desk/office work.   Prior Level of Function: Independent and pain free with all ADL, IADL, community mobility and functional activities.   Current Level of Function: Independent with all ADL, IADL, community mobility and functional activities with reports of increased pain and need for increased time and frequent breaks.      Dominant Extremity:    [x] Right    [] Left    Pts goals: Pt reported goals are to decrease overall pain levels in order to return to prior functional level.     Medical History:   Past Medical History:   Diagnosis Date    Acute cystitis without hematuria 09/04/2019    Anxiety     Colon cancer screening 09/26/2019    Diabetes mellitus, type 2 2016    Endometrial ca 07/24/2019    Essential hypertension 05/14/2018    Hyperlipidemia LDL goal <70 10/30/2023    Simple endometrial hyperplasia 03/15/2019    Uterine polyp 03/14/2019       Surgical History:   Tawny Valerio  has a past surgical history that includes lipoma removed; Dilation and curettage of uterus (03/01/2019); Hysteroscopy with dilation and curettage of uterus (N/A, 07/16/2019);   section; Robot-assisted laparoscopic salpingo-oophorectomy using da Yaya Xi (Bilateral, 2019); Robot-assisted laparoscopic abdominal hysterectomy using da Yaya Xi (N/A, 2019); Robot-assisted laparoscopic lysis of adhesions using da Yaya Xi (N/A, 2019); and Hysterectomy (19).    Medications:   Tawny has a current medication list which includes the following prescription(s): acetaminophen, aspirin, azelastine, blood sugar diagnostic, blood-glucose meter, blood-glucose meter, bupropion, clindamycin, empagliflozin, ergocalciferol, ezetimibe, fluticasone propionate, gabapentin, lancets, lancing device with lancets, losartan, metronidazole 0.75%, ondansetron, promethazine, tirzepatide, tizanidine, and tretinoin.    Allergies:   Review of patient's allergies indicates:   Allergen Reactions    Celebrex [celecoxib] Anaphylaxis    Codeine Hives     Does not know what she can take -usually just takes tylenol or ibuprofen    Lidocaine Hives    Vicodin [hydrocodone-acetaminophen] Hives and Swelling        Objective      RANGE OF MOTION:   Cervical Right   (spine) Left    Pain/Dysfunction with Movement Goal   Cervical Flexion (60º) 45 --- Slight pull on left side of neck 60   Cervical Extension (80º) 39 ---  60   Cervical Side Bending (45º) 24 19 Pulling and tension in neck and upper back 35   Cervical Rotation (75º) 36 56  60       STRENGTH:   U/E MMT Right Left Pain/Dysfunction with Movement Goal   Shoulder Flexion 4/5 4/5 Mild increase in pain in R shoulder blade  4+/5 B   Shoulder Abduction 4/5 4/5 Mild increase in pain in R shoulder blade  4+/5 B   Shoulder IR 4+/5 4+/5  4+/5 B   Shoulder ER 3+/5 4-/5 Minimal pain in R shoulder 4+/5 B   Serratus Anterior NT NT  4+/5 B   Middle Trapezius NT NT  4+/5 B   Lower Trapezius NT NT  4+/5 B   Elbow Flexion  5/5 5/5  5/5 B   Elbow Extension 4+/5 4+/5  5/5 B   Wrist Flexion 5/5 5/5  5/5 B   Wrist Extension 5/5 5/5  5/5 B        MUSCLE LENGTH:    Muscle Tested  Right Left  Goal   Upper Trapezius [] Normal  [x] Limited [] Normal  [x] Limited Normal B   Levator Scapular  [] Normal  [x] Limited [] Normal  [x] Limited Normal B   Scalenes [] Normal  [x] Limited [] Normal  [x] Limited Normal B   Pectoralis Minor [] Normal  [x] Limited [] Normal  [x] Limited Normal B   Pectoralis Major [] Normal  [x] Limited [] Normal  [x] Limited Normal B       JOINT MOBILITY:   Joint Motion Right Mobility  (spine) Left Mobility Goal   Subcranial:  [x] Hypo     [] Normal     [] Hyper [x] Hypo     [] Normal     [] Hyper Normal    Cervical Upglides [x] Hypo     [] Normal     [] Hyper [x] Hypo     [] Normal     [] Hyper Normal    Cervical Downglides  [x] Hypo     [] Normal     [] Hyper [x] Hypo     [] Normal     [] Hyper Normal    Thoracic PA Gap [x] Hypo     [] Normal     [] Hyper --- Normal        SPECIAL TESTS:    Right  (spine) Left  Goal   Cervical Distraction [x] Positive    [] Negative [x] Positive    [] Negative Negative B    Cervical Compression  [] Positive    [x] Negative [] Positive    [x] Negative Negative B        SENSATION  [x] Intact to Light Touch   [] Impaired:      PALPATION: Muscles: Increased tone and tenderness to palpation of: bilateral suboccipitals, paraspinals, scalenes , SCM, upper trapezius, levator scapulae , periscapular musculature.      POSTURE:  Pt presents with postural abnormalities which include:    [x] Forward Head   [x] Increased Lumbar Lordosis   [x] Rounded Shoulder   [] Genu Recurvatum   [] Increased Thoracic Kyphosis [] Genu Valgus   [] Trunk Deviated    [] Pes Planus   [] Scapular Winging    [x] Other: Side bending right with left rotation      Function:     Intake Outcome Measure for FOTO Neck Survey    Therapist reviewed FOTO scores for [place] on 1/23/2024.   FOTO report - see Media section or FOTO account for episode details    Intake Score: 46%         Treatment     Total Treatment time (time-based codes) separate from Evaluation:  (15) minutes     Tawny received the treatments listed below:        MANUAL THERAPY TECHNIQUES were applied for (15) minutes, including:    Manual Intervention Performed Today    Soft Tissue Mobilization [x] bilateral paraspinals, SCM, upper trapezius, levator scapulae     Joint Mobilizations [x] Gentle joint mobilizations during assessment    []     []    Functional Dry Needling  []      Plan for Next Visit: Continue as needed         Patient Education and Home Exercises     Education provided: (included in treatment section) minutes  PURPOSE: Patient educated on the impairments noted above and the effects of physical therapy intervention to improve overall condition and QOL.   EXERCISE: Patient was educated on all the above exercise prior/during/after for proper posture, positioning, and execution for safe performance with home exercise program.   STRENGTH: Patient educated on the importance of improved core and extremity strength in order to improve alignment of the spine and extremities with static positions and dynamic movement.   GAIT & BALANCE: Patient educated on the importance of strong core and lower extremity musculature in order to improve both static and dynamic balance, improve gait mechanics, reduce fall risk and improve household and community mobility.   POSTURE: Patient educated on postural awareness to reduce stress and maintain optimal alignment of the spine with static positions and dynamic movement     Written Home Exercises Provided: no. Treatment today focused on manual therapy, but instructed patient to continue with current HEP. Will update HEP in upcoming visits.     Assessment     Tawny is a 63 y.o. female referred to outpatient Physical Therapy with a medical diagnosis of cervicalgia of syclhwdc-ogkesuk-cmsdj region, cervicogenic headache. Pt presents with impairments in the following categories: IMPAIRMENTS: ROM, strength, joint mobility, muscle length, posture, core strength and  "stability, and functional movement patterns    Pt prognosis is Guarded  Pt will benefit from skilled outpatient Physical Therapy to address the deficits stated above and in the chart below, provide pt/family education, and to maximize pt's level of independence.     Plan of care discussed with patient: Yes  Pt's spiritual, cultural and educational needs considered and patient is agreeable to the plan of care and goals as stated below:     Anticipated Barriers for therapy: co-morbidities and chronicity of condition    Medical Necessity is demonstrated by the following  History  Co-morbidities and personal factors that may impact the plan of care [] LOW: no personal factors / co-morbidities  [x] MODERATE: 1-2 personal factors / co-morbidities  [] HIGH: 3+ personal factors / co-morbidities    Moderate / High Support Documentation:   Past Medical History:   Diagnosis Date    Acute cystitis without hematuria 09/04/2019    Anxiety     Colon cancer screening 09/26/2019    Diabetes mellitus, type 2 2016    Endometrial ca 07/24/2019    Essential hypertension 05/14/2018    Hyperlipidemia LDL goal <70 10/30/2023    Simple endometrial hyperplasia 03/15/2019    Uterine polyp 03/14/2019        Examination  Body Structures and Functions, activity limitations and participation restrictions that may impact the plan of care [] LOW: addressing 1-2 elements  [x] MODERATE: 3+ elements  [] HIGH: 4+ elements (please support below)    Moderate / High Support Documentation: See above in "Current Level of Function"      Clinical Presentation [] LOW: stable  [x] MODERATE: Evolving  [] HIGH: Unstable     Decision Making/ Complexity Score: moderate         Short Term Goals:  6 weeks Status  Date Met   PAIN: Pt will report worst pain of 8/10 in order to progress toward max functional ability and improve quality of life. [x] Progressing  [] Met  [] Not Met    FUNCTION: Patient will demonstrate improved function as indicated by a score of greater " than or equal to 51 out of 100 on FOTO. [x] Progressing  [] Met  [] Not Met    MOBILITY: Patient will improve AROM to 50% of stated goals, listed in objective measures above, in order to progress towards independence with functional activities.  [x] Progressing  [] Met  [] Not Met    STRENGTH: Patient will improve strength to 50% of stated goals, listed in objective measures above, in order to progress towards independence with functional activities. [x] Progressing  [] Met  [] Not Met    POSTURE: Patient will correct postural deviations in sitting and standing, to decrease pain and promote long term stability.  [x] Progressing  [] Met  [] Not Met    HEP: Patient will demonstrate independence with HEP in order to progress toward functional independence. [x] Progressing  [] Met  [] Not Met      Long Term Goals:  12 weeks Status Date Met   PAIN: Pt will report worst pain of 6/10 in order to progress toward max functional ability and improve quality of life [x] Progressing  [] Met  [] Not Met    FUNCTION: Patient will demonstrate improved function as indicated by a score of greater than or equal to 56 out of 100 on FOTO. [x] Progressing  [] Met  [] Not Met    MOBILITY: Patient will improve AROM to stated goals, listed in objective measures above, in order to return to maximal functional potential and improve quality of life.  [x] Progressing  [] Met  [] Not Met    STRENGTH: Patient will improve strength to stated goals, listed in objective measures above, in order to improve functional independence and quality of life.  [x] Progressing  [] Met  [] Not Met    GAIT: Patient will demonstrate normalized gait mechanics with minimal compensation in order to return to PLOF. [x] Progressing  [] Met  [] Not Met    Patient will return to normal ADL's, IADL's, community involvement, recreational activities, and work-related activities with less than or equal to 3/10 pain and maximal function.  [x] Progressing  [] Met  [] Not Met       Plan     Plan of care Certification: 1/23/2024 to 4/22/2024.    Outpatient Physical Therapy 2 times weekly for 12 weeks to include any combination of the following interventions: virtual visits, dry needling, modalities, electrical stimulation (IFC, Pre-Mod, Attended with Functional Dry Needling), Aquatic Therapy, Cervical/Lumbar Traction, Gait Training, Manual Therapy, Neuromuscular Re-ed, Patient Education, Self Care, Therapeutic Exercise, and Therapeutic Activites     Marisabel Egan, PT, DPT

## 2024-01-30 ENCOUNTER — CLINICAL SUPPORT (OUTPATIENT)
Dept: REHABILITATION | Facility: HOSPITAL | Age: 64
End: 2024-01-30
Payer: COMMERCIAL

## 2024-01-30 DIAGNOSIS — R68.89 DECREASED STRENGTH, ENDURANCE, AND MOBILITY: Primary | ICD-10-CM

## 2024-01-30 DIAGNOSIS — R29.3 ABNORMAL POSTURE: ICD-10-CM

## 2024-01-30 DIAGNOSIS — R29.898 DECREASED RANGE OF MOTION OF NECK: ICD-10-CM

## 2024-01-30 DIAGNOSIS — Z74.09 DECREASED STRENGTH, ENDURANCE, AND MOBILITY: Primary | ICD-10-CM

## 2024-01-30 DIAGNOSIS — M54.2 CERVICALGIA OF OCCIPITO-ATLANTO-AXIAL REGION: ICD-10-CM

## 2024-01-30 DIAGNOSIS — R53.1 DECREASED STRENGTH, ENDURANCE, AND MOBILITY: Primary | ICD-10-CM

## 2024-01-30 DIAGNOSIS — M54.2 NECK PAIN: ICD-10-CM

## 2024-01-30 PROCEDURE — 97140 MANUAL THERAPY 1/> REGIONS: CPT | Performed by: PHYSICAL THERAPIST

## 2024-01-30 PROCEDURE — 97110 THERAPEUTIC EXERCISES: CPT | Performed by: PHYSICAL THERAPIST

## 2024-01-30 PROCEDURE — 97112 NEUROMUSCULAR REEDUCATION: CPT | Performed by: PHYSICAL THERAPIST

## 2024-01-30 NOTE — PROGRESS NOTES
[facility]   Physical Therapy Treatment Note        Name: Tawny ESTRADA [facility] Number: [id number]    Therapy Diagnosis:   Encounter Diagnoses   Name Primary?    Decreased strength, endurance, and mobility Yes    Neck pain     Abnormal posture     Cervicalgia of piiqrryi-kemkcrw-entxn region     Decreased range of motion of neck      Physician: Kirill Andrade MD    Visit Date: 1/30/2024    Physician Orders: PT Eval and Treat  Referral for lower neck/upper back to mid back PT with myofascial release (therapist may choose from and do a combination of: deep tissue massage, cupping, dry needling, etc). No soft tissue manipulation of suboccipital region if you start to feel worse. All joint manipulation is fine.   Medical Diagnosis from Referral: cervicalgia of fcbcmbxc-vcebfse-nuyzo region, cervicogenic headache  Evaluation Date: 1/23/2024  Authorization Period Expiration: 12/31/2024  Plan of Care Expiration: 4/22/2024  Progress Note Due: 2/22/2024  Visit # / Visits authorized: 1/25 (+eval)  FOTO: 1/3 (last performed on 1/23/2024)     Precautions: Standard    PTA Visit #: 0/5     Time In: 1600  Time Out: 1658  Total Billable Time: 50 minutes (Billing reflects 1 on 1 treatment time spent with patient)    Subjective     Patient reports: that today is a good day. She felt really good following last visit. She states that she had a good 24 hours, but then the pain came back and gave her two bad days. She also realized that she had fallen asleep on her neck massager, so this may have increased levels.     He/She N/A compliant with home exercise program.  Response to previous treatment: good relief  Functional change: none noted yet    Pain: 2/10     Location: neck, upper back, and headaches, L>R     Objective      Objective Measures updated at progress report or POC update only unless otherwise noted.       Treatment     Tawny received the treatments listed below:       MANUAL THERAPY  "TECHNIQUES were applied for (15) minutes, including:    Manual Intervention Performed Today    Soft Tissue Mobilization [x] bilateral lower cervical paraspinals, upper trapezius, levator scapulae     Joint Mobilizations - performed by Rasheed Hill PT, JOHNT [x]  [x] C1/C2 HVLAT **cleared for any red flags prior  Upglides & downglides of cervical spine    []     []    Functional Dry Needling  []      Plan for Next Visit: Continue as needed           THERAPEUTIC EXERCISES to develop strength, endurance, ROM, flexibility, posture, and core stabilization for (12) minutes including:    Intervention Performed Today    Chin tucks - supine x 2 x 10   Chin tuck with lift x 5 x 5" holds   Cervical rotation - supine x 2 x 10 each side   Seated cervical side bending x 2 x 10 each side                              Plan for Next Visit:        NEUROMUSCULAR RE-EDUCATION ACTIVITIES to improve Balance, Coordination, Kinesthetic, Sense, Proprioception, and Posture for (23) minutes.  The following were included:    Intervention Performed Today    Series 6 x 1' each, 2' pec stretch   Isometric pulls at cervical spine with band sitting  x 1 x 10, 5" holds, extension and SB bilaterally    Scapular Retractions x 3 x 10, red theraband    Shoulder Extensions x 3 x 10, red theraband, resting between each rep to reset neck/psoture                         Plan for Next Visit:        Patient Education and Home Exercises       Home Exercises Provided and Patient Education Provided     Education provided: (included in treatment section) minutes  PURPOSE: Patient educated on the impairments noted above and the effects of physical therapy intervention to improve overall condition and QOL.   EXERCISE: Patient was educated on all the above exercise prior/during/after for proper posture, positioning, and execution for safe performance with home exercise program.   STRENGTH: Patient educated on the importance of improved core and extremity strength " in order to improve alignment of the spine and extremities with static positions and dynamic movement.   POSTURE: Patient educated on postural awareness to reduce stress and maintain optimal alignment of the spine with static positions and dynamic movement     Written Home Exercises Provided: yes.  Exercises were reviewed and Tawny was able to demonstrate them prior to the end of the session.  Tawny demonstrated good  understanding of the education provided. See EMR under Patient Instructions for exercises provided during therapy sessions.    Assessment     Patient tolerated treatment well. She completed exercises with only minimal difficulty and without increased complaint. Mirror required for isometric pulls to maintain proper posture, and resting between each rep for shoulder extension to reset posture required. Joint mobilizations performed by Rasheed Hill, PT, DPT, today. Positive cavitation noted with HVLAT, and good relief noted by patient following. Patient reported feeling better post treatment today but frustrated by inability to keep neck straight.    Tawny is progressing well towards her goals.   Patient prognosis is Guarded.     Patient will continue to benefit from skilled outpatient physical therapy to address the deficits listed in the problem list box on initial evaluation, provide pt/family education and to maximize patient's level of independence in the home and community environment.     Patient's spiritual, cultural and educational needs considered and pt agreeable to plan of care and goals.     Anticipated Barriers for therapy: co-morbidities and chronicity of condition     Goals:    Short Term Goals:  6 weeks Status  Date Met   PAIN: Pt will report worst pain of 8/10 in order to progress toward max functional ability and improve quality of life. [x] Progressing  [] Met  [] Not Met     FUNCTION: Patient will demonstrate improved function as indicated by a score of greater than or  equal to 51 out of 100 on FOTO. [x] Progressing  [] Met  [] Not Met     MOBILITY: Patient will improve AROM to 50% of stated goals, listed in objective measures above, in order to progress towards independence with functional activities.  [x] Progressing  [] Met  [] Not Met     STRENGTH: Patient will improve strength to 50% of stated goals, listed in objective measures above, in order to progress towards independence with functional activities. [x] Progressing  [] Met  [] Not Met     POSTURE: Patient will correct postural deviations in sitting and standing, to decrease pain and promote long term stability.  [x] Progressing  [] Met  [] Not Met     HEP: Patient will demonstrate independence with HEP in order to progress toward functional independence. [x] Progressing  [] Met  [] Not Met        Long Term Goals:  12 weeks Status Date Met   PAIN: Pt will report worst pain of 6/10 in order to progress toward max functional ability and improve quality of life [x] Progressing  [] Met  [] Not Met     FUNCTION: Patient will demonstrate improved function as indicated by a score of greater than or equal to 56 out of 100 on FOTO. [x] Progressing  [] Met  [] Not Met     MOBILITY: Patient will improve AROM to stated goals, listed in objective measures above, in order to return to maximal functional potential and improve quality of life.  [x] Progressing  [] Met  [] Not Met     STRENGTH: Patient will improve strength to stated goals, listed in objective measures above, in order to improve functional independence and quality of life.  [x] Progressing  [] Met  [] Not Met     GAIT: Patient will demonstrate normalized gait mechanics with minimal compensation in order to return to PLOF. [x] Progressing  [] Met  [] Not Met     Patient will return to normal ADL's, IADL's, community involvement, recreational activities, and work-related activities with less than or equal to 3/10 pain and maximal function.  [x] Progressing  [] Met  [] Not  Met          Plan     Continue Plan of Care (POC) and progress per patient tolerance. See treatment section for details on planned progressions next session.    1/23/2024 (evaluation): Outpatient Physical Therapy 2 times weekly for 12 weeks to include any combination of the following interventions: virtual visits, dry needling, modalities, electrical stimulation (IFC, Pre-Mod, Attended with Functional Dry Needling), Aquatic Therapy, Cervical/Lumbar Traction, Gait Training, Manual Therapy, Neuromuscular Re-ed, Patient Education, Self Care, Therapeutic Exercise, and Therapeutic Activites      Marisabel Egan, PT

## 2024-02-03 ENCOUNTER — PATIENT MESSAGE (OUTPATIENT)
Dept: NEUROLOGY | Facility: CLINIC | Age: 64
End: 2024-02-03
Payer: COMMERCIAL

## 2024-02-06 ENCOUNTER — PATIENT MESSAGE (OUTPATIENT)
Dept: REHABILITATION | Facility: HOSPITAL | Age: 64
End: 2024-02-06

## 2024-02-07 RX ORDER — TIZANIDINE 4 MG/1
4 TABLET ORAL 2 TIMES DAILY
Qty: 60 TABLET | Refills: 5 | Status: SHIPPED | OUTPATIENT
Start: 2024-02-07

## 2024-02-12 ENCOUNTER — CLINICAL SUPPORT (OUTPATIENT)
Dept: REHABILITATION | Facility: HOSPITAL | Age: 64
End: 2024-02-12
Payer: COMMERCIAL

## 2024-02-12 DIAGNOSIS — M54.2 NECK PAIN: ICD-10-CM

## 2024-02-12 DIAGNOSIS — R29.898 DECREASED RANGE OF MOTION OF NECK: ICD-10-CM

## 2024-02-12 DIAGNOSIS — R53.1 DECREASED STRENGTH, ENDURANCE, AND MOBILITY: Primary | ICD-10-CM

## 2024-02-12 DIAGNOSIS — M54.2 CERVICALGIA OF OCCIPITO-ATLANTO-AXIAL REGION: ICD-10-CM

## 2024-02-12 DIAGNOSIS — R68.89 DECREASED STRENGTH, ENDURANCE, AND MOBILITY: Primary | ICD-10-CM

## 2024-02-12 DIAGNOSIS — Z74.09 DECREASED STRENGTH, ENDURANCE, AND MOBILITY: Primary | ICD-10-CM

## 2024-02-12 DIAGNOSIS — R29.3 ABNORMAL POSTURE: ICD-10-CM

## 2024-02-12 PROCEDURE — 97140 MANUAL THERAPY 1/> REGIONS: CPT

## 2024-02-12 PROCEDURE — 97110 THERAPEUTIC EXERCISES: CPT

## 2024-02-12 PROCEDURE — 97112 NEUROMUSCULAR REEDUCATION: CPT

## 2024-02-12 NOTE — PROGRESS NOTES
[facility]   Physical Therapy Treatment Note        Name: Tawny ESTRADA [facility] Number: [id number]    Therapy Diagnosis:   Encounter Diagnoses   Name Primary?    Decreased strength, endurance, and mobility Yes    Neck pain     Abnormal posture     Cervicalgia of kkzrzmpb-rpqapsy-qvtlv region     Decreased range of motion of neck      Physician: Kirill Andrade MD    Visit Date: 2/12/2024    Physician Orders: PT Eval and Treat  Referral for lower neck/upper back to mid back PT with myofascial release (therapist may choose from and do a combination of: deep tissue massage, cupping, dry needling, etc). No soft tissue manipulation of suboccipital region if you start to feel worse. All joint manipulation is fine.   Medical Diagnosis from Referral: cervicalgia of xdepzcsx-smymxmr-bitmp region, cervicogenic headache  Evaluation Date: 1/23/2024  Authorization Period Expiration: 12/31/2024  Plan of Care Expiration: 4/22/2024  Progress Note Due: 2/22/2024  Visit # / Visits authorized: 2/25 (+eval)  FOTO: 1/3 (last performed on 1/23/2024)     Precautions: Standard    PTA Visit #: 0/5     Time In: 4:00 PM  Time Out: 5:00 PM  Total Billable Time: 50 minutes (Billing reflects 1 on 1 treatment time spent with patient)    Subjective     Patient reports: that today is a good day, she feels good after therapy, and her headaches have occurred less often her MD told her it was okay to do DN and she is curious about it .     He/She N/A compliant with home exercise program.  Response to previous treatment: good relief  Functional change: none noted yet    Pain: 2/10     Location: neck, upper back, and headaches, L>R     Objective      Objective Measures updated at progress report or POC update only unless otherwise noted.       Treatment     Tawny received the treatments listed below:       MANUAL THERAPY TECHNIQUES were applied for (15) minutes, including:    Manual Intervention Performed Today   "  Soft Tissue Mobilization [x] bilateral lower cervical paraspinals, upper trapezius, levator scapulae     Joint Mobilizations  [x]  [x] C1/C2 HVLAT **cleared for any red flags prior  Upglides & downglides of cervical spine    []     []    Functional Dry Needling  [x]  Bilateral UT   See EMR under MEDIA for written consent provided 2/12/2024  Palpation Assessment to determine the necessity for Functional Dry Needling   Plan for Next Visit: Continue as needed           THERAPEUTIC EXERCISES to develop strength, endurance, ROM, flexibility, posture, and core stabilization for (12) minutes including:    Intervention Performed Today    Chin tucks - supine x 2 x 10   Chin tuck with lift x 5 x 5" holds   Cervical rotation - supine x 2 x 10 each side   Seated cervical side bending x 2 x 10 each side                              Plan for Next Visit:        NEUROMUSCULAR RE-EDUCATION ACTIVITIES to improve Balance, Coordination, Kinesthetic, Sense, Proprioception, and Posture for (23) minutes.  The following were included:    Intervention Performed Today    Series 6 x 1' each, 2' pec stretch   Isometric pulls at cervical spine with band sitting  x 1 x 10, 5" holds, extension and SB bilaterally    Scapular Retractions x 3 x 10, red theraband    Shoulder Extensions x 3 x 10, red theraband, resting between each rep to reset neck/psoture                         Plan for Next Visit:        Patient Education and Home Exercises       Home Exercises Provided and Patient Education Provided     Education provided: (included in treatment section) minutes  PURPOSE: Patient educated on the impairments noted above and the effects of physical therapy intervention to improve overall condition and QOL.   EXERCISE: Patient was educated on all the above exercise prior/during/after for proper posture, positioning, and execution for safe performance with home exercise program.   STRENGTH: Patient educated on the importance of improved core and " extremity strength in order to improve alignment of the spine and extremities with static positions and dynamic movement.   POSTURE: Patient educated on postural awareness to reduce stress and maintain optimal alignment of the spine with static positions and dynamic movement     Written Home Exercises Provided: yes.  Exercises were reviewed and Tawny was able to demonstrate them prior to the end of the session.  Tawny demonstrated good  understanding of the education provided. See EMR under Patient Instructions for exercises provided during therapy sessions.    Assessment     Patient tolerated treatment well. She completed exercises with only minimal difficulty and without increased complaint. Added in FDN today to address some trigger point findings in the patients UT and to relieve some muscular tension. Patient had a good tolerance to DN and instructed her to follow up in two days to report any symptom changes.     Tawny is progressing well towards her goals.   Patient prognosis is Guarded.     Patient will continue to benefit from skilled outpatient physical therapy to address the deficits listed in the problem list box on initial evaluation, provide pt/family education and to maximize patient's level of independence in the home and community environment.     Patient's spiritual, cultural and educational needs considered and pt agreeable to plan of care and goals.     Anticipated Barriers for therapy: co-morbidities and chronicity of condition     Goals:    Short Term Goals:  6 weeks Status  Date Met   PAIN: Pt will report worst pain of 8/10 in order to progress toward max functional ability and improve quality of life. [x] Progressing  [] Met  [] Not Met     FUNCTION: Patient will demonstrate improved function as indicated by a score of greater than or equal to 51 out of 100 on FOTO. [x] Progressing  [] Met  [] Not Met     MOBILITY: Patient will improve AROM to 50% of stated goals, listed in objective  measures above, in order to progress towards independence with functional activities.  [x] Progressing  [] Met  [] Not Met     STRENGTH: Patient will improve strength to 50% of stated goals, listed in objective measures above, in order to progress towards independence with functional activities. [x] Progressing  [] Met  [] Not Met     POSTURE: Patient will correct postural deviations in sitting and standing, to decrease pain and promote long term stability.  [x] Progressing  [] Met  [] Not Met     HEP: Patient will demonstrate independence with HEP in order to progress toward functional independence. [x] Progressing  [] Met  [] Not Met        Long Term Goals:  12 weeks Status Date Met   PAIN: Pt will report worst pain of 6/10 in order to progress toward max functional ability and improve quality of life [x] Progressing  [] Met  [] Not Met     FUNCTION: Patient will demonstrate improved function as indicated by a score of greater than or equal to 56 out of 100 on FOTO. [x] Progressing  [] Met  [] Not Met     MOBILITY: Patient will improve AROM to stated goals, listed in objective measures above, in order to return to maximal functional potential and improve quality of life.  [x] Progressing  [] Met  [] Not Met     STRENGTH: Patient will improve strength to stated goals, listed in objective measures above, in order to improve functional independence and quality of life.  [x] Progressing  [] Met  [] Not Met     GAIT: Patient will demonstrate normalized gait mechanics with minimal compensation in order to return to PLOF. [x] Progressing  [] Met  [] Not Met     Patient will return to normal ADL's, IADL's, community involvement, recreational activities, and work-related activities with less than or equal to 3/10 pain and maximal function.  [x] Progressing  [] Met  [] Not Met          Plan     Continue Plan of Care (POC) and progress per patient tolerance. See treatment section for details on planned progressions next  session.    1/23/2024 (evaluation): Outpatient Physical Therapy 2 times weekly for 12 weeks to include any combination of the following interventions: virtual visits, dry needling, modalities, electrical stimulation (IFC, Pre-Mod, Attended with Functional Dry Needling), Aquatic Therapy, Cervical/Lumbar Traction, Gait Training, Manual Therapy, Neuromuscular Re-ed, Patient Education, Self Care, Therapeutic Exercise, and Therapeutic Activites      Rasheed Hill, PT

## 2024-02-19 ENCOUNTER — PATIENT MESSAGE (OUTPATIENT)
Dept: ADMINISTRATIVE | Facility: HOSPITAL | Age: 64
End: 2024-02-19
Payer: COMMERCIAL

## 2024-02-19 ENCOUNTER — CLINICAL SUPPORT (OUTPATIENT)
Dept: REHABILITATION | Facility: HOSPITAL | Age: 64
End: 2024-02-19
Payer: COMMERCIAL

## 2024-02-19 DIAGNOSIS — R68.89 DECREASED STRENGTH, ENDURANCE, AND MOBILITY: Primary | ICD-10-CM

## 2024-02-19 DIAGNOSIS — R53.1 DECREASED STRENGTH, ENDURANCE, AND MOBILITY: Primary | ICD-10-CM

## 2024-02-19 DIAGNOSIS — R29.3 ABNORMAL POSTURE: ICD-10-CM

## 2024-02-19 DIAGNOSIS — Z74.09 DECREASED STRENGTH, ENDURANCE, AND MOBILITY: Primary | ICD-10-CM

## 2024-02-19 DIAGNOSIS — M54.2 CERVICALGIA OF OCCIPITO-ATLANTO-AXIAL REGION: ICD-10-CM

## 2024-02-19 DIAGNOSIS — R29.898 DECREASED RANGE OF MOTION OF NECK: ICD-10-CM

## 2024-02-19 DIAGNOSIS — M54.2 NECK PAIN: ICD-10-CM

## 2024-02-19 PROCEDURE — 97112 NEUROMUSCULAR REEDUCATION: CPT

## 2024-02-19 PROCEDURE — 97140 MANUAL THERAPY 1/> REGIONS: CPT

## 2024-02-19 PROCEDURE — 97110 THERAPEUTIC EXERCISES: CPT

## 2024-02-19 NOTE — PROGRESS NOTES
[facility]   Physical Therapy Treatment Note        Name: Tawny Valerio  [facility] Number: [id number]    Therapy Diagnosis:   Encounter Diagnoses   Name Primary?    Decreased strength, endurance, and mobility Yes    Neck pain     Abnormal posture     Cervicalgia of crwyumsp-remasin-hqbvs region     Decreased range of motion of neck      Physician: Kirill Andrade MD    Visit Date: 2/19/2024    Physician Orders: PT Eval and Treat  Referral for lower neck/upper back to mid back PT with myofascial release (therapist may choose from and do a combination of: deep tissue massage, cupping, dry needling, etc). No soft tissue manipulation of suboccipital region if you start to feel worse. All joint manipulation is fine.   Medical Diagnosis from Referral: cervicalgia of veawftuq-nqgtwzt-vsocd region, cervicogenic headache  Evaluation Date: 1/23/2024  Authorization Period Expiration: 12/31/2024  Plan of Care Expiration: 4/22/2024  Progress Note Due: 2/22/2024  Visit # / Visits authorized: 3/25 (+eval)  FOTO: 1/3 (last performed on 1/23/2024)     Precautions: Standard    PTA Visit #: 0/5     Time In: 3:00 PM  Time Out: 4:00 PM  Total Billable Time: 53 minutes (Billing reflects 1 on 1 treatment time spent with patient)    Subjective     Patient reports: that she is feeling tighter today than last visit due to having a rough weekend.     He/She N/A compliant with home exercise program.  Response to previous treatment: good relief  Functional change: none noted yet    Pain: 2/10     Location: neck, upper back, and headaches, L>R     Objective      Objective Measures updated at progress report or POC update only unless otherwise noted.       Treatment     Tawny received the treatments listed below:       MANUAL THERAPY TECHNIQUES were applied for (10) minutes, including:    Manual Intervention Performed Today    Soft Tissue Mobilization [x] bilateral lower cervical paraspinals, upper trapezius,  "levator scapulae     Joint Mobilizations  [x]  [x] C1/C2 HVLAT **cleared for any red flags prior  Upglides & downglides of cervical spine    []     []    Functional Dry Needling  []  Bilateral UT   See EMR under MEDIA for written consent provided 2/12/2024  Palpation Assessment to determine the necessity for Functional Dry Needling   Plan for Next Visit: Continue as needed           THERAPEUTIC EXERCISES to develop strength, endurance, ROM, flexibility, posture, and core stabilization for (20) minutes including:    Intervention Performed Today    UBE x 3 min fwd and 3 min back   Chin tucks - supine x 2 x 10   Chin tuck with lift x 5 x 5" holds   Cervical rotation - supine x 2 x 10 each side   Seated cervical side bending x 2 x 10 each side   Landmine mat press x 2x10 each   Shoulder DB shrugs x 2x10 each                     Plan for Next Visit:        NEUROMUSCULAR RE-EDUCATION ACTIVITIES to improve Balance, Coordination, Kinesthetic, Sense, Proprioception, and Posture for (23) minutes.  The following were included:    Intervention Performed Today    Series 6 x 1' each, 2' pec stretch   Isometric pulls at cervical spine with band sitting  x 1 x 10, 5" holds, extension and SB bilaterally    Scapular Retractions x 3 x 10, red theraband    Shoulder Extensions x 3 x 10, red theraband, resting between each rep to reset neck/psoture   Chin tucks against wall with ball x 2x10                      Plan for Next Visit:        Patient Education and Home Exercises       Home Exercises Provided and Patient Education Provided     Education provided: (included in treatment section) minutes  PURPOSE: Patient educated on the impairments noted above and the effects of physical therapy intervention to improve overall condition and QOL.   EXERCISE: Patient was educated on all the above exercise prior/during/after for proper posture, positioning, and execution for safe performance with home exercise program.   STRENGTH: Patient " educated on the importance of improved core and extremity strength in order to improve alignment of the spine and extremities with static positions and dynamic movement.   POSTURE: Patient educated on postural awareness to reduce stress and maintain optimal alignment of the spine with static positions and dynamic movement     Written Home Exercises Provided: yes.  Exercises were reviewed and Tawny was able to demonstrate them prior to the end of the session.  Tawny demonstrated good  understanding of the education provided. See EMR under Patient Instructions for exercises provided during therapy sessions.    Assessment     Patient tolerated treatment well. She completed exercises with only minimal difficulty and without increased complaint. Added in landmine presses and DB shoulder shrug to improve shoulder and UT strength.      Tawny is progressing well towards her goals.   Patient prognosis is Guarded.     Patient will continue to benefit from skilled outpatient physical therapy to address the deficits listed in the problem list box on initial evaluation, provide pt/family education and to maximize patient's level of independence in the home and community environment.     Patient's spiritual, cultural and educational needs considered and pt agreeable to plan of care and goals.     Anticipated Barriers for therapy: co-morbidities and chronicity of condition     Goals:    Short Term Goals:  6 weeks Status  Date Met   PAIN: Pt will report worst pain of 8/10 in order to progress toward max functional ability and improve quality of life. [x] Progressing  [] Met  [] Not Met     FUNCTION: Patient will demonstrate improved function as indicated by a score of greater than or equal to 51 out of 100 on FOTO. [x] Progressing  [] Met  [] Not Met     MOBILITY: Patient will improve AROM to 50% of stated goals, listed in objective measures above, in order to progress towards independence with functional activities.  [x]  Progressing  [] Met  [] Not Met     STRENGTH: Patient will improve strength to 50% of stated goals, listed in objective measures above, in order to progress towards independence with functional activities. [x] Progressing  [] Met  [] Not Met     POSTURE: Patient will correct postural deviations in sitting and standing, to decrease pain and promote long term stability.  [x] Progressing  [] Met  [] Not Met     HEP: Patient will demonstrate independence with HEP in order to progress toward functional independence. [x] Progressing  [] Met  [] Not Met        Long Term Goals:  12 weeks Status Date Met   PAIN: Pt will report worst pain of 6/10 in order to progress toward max functional ability and improve quality of life [x] Progressing  [] Met  [] Not Met     FUNCTION: Patient will demonstrate improved function as indicated by a score of greater than or equal to 56 out of 100 on FOTO. [x] Progressing  [] Met  [] Not Met     MOBILITY: Patient will improve AROM to stated goals, listed in objective measures above, in order to return to maximal functional potential and improve quality of life.  [x] Progressing  [] Met  [] Not Met     STRENGTH: Patient will improve strength to stated goals, listed in objective measures above, in order to improve functional independence and quality of life.  [x] Progressing  [] Met  [] Not Met     GAIT: Patient will demonstrate normalized gait mechanics with minimal compensation in order to return to PLOF. [x] Progressing  [] Met  [] Not Met     Patient will return to normal ADL's, IADL's, community involvement, recreational activities, and work-related activities with less than or equal to 3/10 pain and maximal function.  [x] Progressing  [] Met  [] Not Met          Plan     Continue Plan of Care (POC) and progress per patient tolerance. See treatment section for details on planned progressions next session.    1/23/2024 (evaluation): Outpatient Physical Therapy 2 times weekly for 12 weeks to  include any combination of the following interventions: virtual visits, dry needling, modalities, electrical stimulation (IFC, Pre-Mod, Attended with Functional Dry Needling), Aquatic Therapy, Cervical/Lumbar Traction, Gait Training, Manual Therapy, Neuromuscular Re-ed, Patient Education, Self Care, Therapeutic Exercise, and Therapeutic Activites      Rasheed Hill, PT

## 2024-02-20 ENCOUNTER — PATIENT OUTREACH (OUTPATIENT)
Dept: ADMINISTRATIVE | Facility: HOSPITAL | Age: 64
End: 2024-02-20
Payer: COMMERCIAL

## 2024-02-20 NOTE — PROGRESS NOTES
Pt responded to portal message.  Pt has labs scheduled, 4/19/24 prior to follow up appt on 4/26/24.

## 2024-03-02 ENCOUNTER — PATIENT MESSAGE (OUTPATIENT)
Dept: DIABETES | Facility: CLINIC | Age: 64
End: 2024-03-02
Payer: COMMERCIAL

## 2024-03-02 DIAGNOSIS — E11.9 CONTROLLED TYPE 2 DIABETES MELLITUS WITHOUT COMPLICATION, WITHOUT LONG-TERM CURRENT USE OF INSULIN: Primary | ICD-10-CM

## 2024-03-04 ENCOUNTER — PATIENT MESSAGE (OUTPATIENT)
Dept: REHABILITATION | Facility: HOSPITAL | Age: 64
End: 2024-03-04
Payer: COMMERCIAL

## 2024-03-04 ENCOUNTER — TELEPHONE (OUTPATIENT)
Dept: DIABETES | Facility: CLINIC | Age: 64
End: 2024-03-04
Payer: COMMERCIAL

## 2024-03-04 DIAGNOSIS — E11.9 CONTROLLED TYPE 2 DIABETES MELLITUS WITHOUT COMPLICATION, WITHOUT LONG-TERM CURRENT USE OF INSULIN: ICD-10-CM

## 2024-03-04 NOTE — TELEPHONE ENCOUNTER
Spoke with [employer] in regards to patient mounjaro medication. Stated patient insulin was approved but the quantity requesting exceeds plan, patient is only approved 2 per 28 days.  ----- Message from Annette Enriquez sent at 3/4/2024 11:57 AM CST -----  Contact: anselmo Bansal With [employer] is requesting a call back in regards to the mounjaro quantity.   Please call back @ [phone]

## 2024-03-11 ENCOUNTER — CLINICAL SUPPORT (OUTPATIENT)
Dept: REHABILITATION | Facility: HOSPITAL | Age: 64
End: 2024-03-11
Payer: COMMERCIAL

## 2024-03-11 DIAGNOSIS — M54.2 CERVICALGIA OF OCCIPITO-ATLANTO-AXIAL REGION: ICD-10-CM

## 2024-03-11 DIAGNOSIS — R68.89 DECREASED STRENGTH, ENDURANCE, AND MOBILITY: Primary | ICD-10-CM

## 2024-03-11 DIAGNOSIS — R53.1 DECREASED STRENGTH, ENDURANCE, AND MOBILITY: Primary | ICD-10-CM

## 2024-03-11 DIAGNOSIS — Z74.09 DECREASED STRENGTH, ENDURANCE, AND MOBILITY: Primary | ICD-10-CM

## 2024-03-11 DIAGNOSIS — M54.2 NECK PAIN: ICD-10-CM

## 2024-03-11 DIAGNOSIS — R29.3 ABNORMAL POSTURE: ICD-10-CM

## 2024-03-11 DIAGNOSIS — R29.898 DECREASED RANGE OF MOTION OF NECK: ICD-10-CM

## 2024-03-11 PROCEDURE — 97110 THERAPEUTIC EXERCISES: CPT

## 2024-03-11 PROCEDURE — 97112 NEUROMUSCULAR REEDUCATION: CPT

## 2024-03-11 PROCEDURE — 97140 MANUAL THERAPY 1/> REGIONS: CPT

## 2024-03-11 NOTE — PROGRESS NOTES
[facility]   Physical Therapy Treatment Note + Progress Note       Name: Tawny Valerio  Clinic Number: [id number]    Therapy Diagnosis:   Encounter Diagnoses   Name Primary?    Decreased strength, endurance, and mobility Yes    Neck pain     Abnormal posture     Cervicalgia of kbriwmio-qujzwhk-ifrfo region     Decreased range of motion of neck      Physician: Kirill Andrade MD    Visit Date: 3/11/2024    Physician Orders: PT Eval and Treat  Referral for lower neck/upper back to mid back PT with myofascial release (therapist may choose from and do a combination of: deep tissue massage, cupping, dry needling, etc). No soft tissue manipulation of suboccipital region if you start to feel worse. All joint manipulation is fine.   Medical Diagnosis from Referral: cervicalgia of hybrbezd-odiblvl-goxym region, cervicogenic headache  Evaluation Date: 1/23/2024  Authorization Period Expiration: 12/31/2024  Plan of Care Expiration: 4/22/2024  Progress Note Due:  4/9/2024  Visit # / Visits authorized: 3/25 (+eval)  FOTO: 1/3 (last performed on 1/23/2024)     Precautions: Standard    PTA Visit #: 0/5     Time In: 4:00 PM  Time Out: 5:00 PM  Total Billable Time: 54 minutes (Billing reflects 1 on 1 treatment time spent with patient)    Subjective     Patient reports: that since the start of therapy she reports decreased overall pain, and following her session she feels like she can move her neck more and has better control of it. However she continues to have the head rotation that is out side of her control and she continues to have feelings of stiffness in her neck from session to session.     He/She N/A compliant with home exercise program.  Response to previous treatment: good relief  Functional change: none noted yet    Pain: 2/10     Location: neck, upper back, and headaches, L>R     Objective      Objective Measures updated at progress report or POC update only unless otherwise noted.    RANGE OF MOTION:   Cervical Right   (spine) Left     Pain/Dysfunction with Movement Goal   Cervical Flexion (60º) 51 --- Slight pull on left side of neck 60   Cervical Extension (80º) 49 ---   60   Cervical Side Bending (45º) 30 24 Pulling and tension in neck and upper back 35   Cervical Rotation (75º) 55 60   60         STRENGTH:   U/E MMT Right Left Pain/Dysfunction with Movement Goal   Shoulder Flexion 4/5 4/5  4+/5 B   Shoulder Abduction 4/5 4/5  4+/5 B   Shoulder IR 4+/5 4+/5  4+/5 B   Shoulder ER 3+/5 4-/5  4+/5 B   Serratus Anterior NT NT   4+/5 B   Middle Trapezius NT NT   4+/5 B   Lower Trapezius NT NT   4+/5 B   Elbow Flexion  5/5 5/5   5/5 B   Elbow Extension 4+/5 4+/5   5/5 B   Wrist Flexion 5/5 5/5   5/5 B   Wrist Extension 5/5 5/5   5/5 B         MUSCLE LENGTH:   Muscle Tested  Right Left  Goal   Upper Trapezius [] Normal  [x] Limited [] Normal  [x] Limited Normal B   Levator Scapular  [] Normal  [x] Limited [] Normal  [x] Limited Normal B   Scalenes [] Normal  [x] Limited [] Normal  [x] Limited Normal B   Pectoralis Minor [] Normal  [x] Limited [] Normal  [x] Limited Normal B   Pectoralis Major [] Normal  [x] Limited [] Normal  [x] Limited Normal B         JOINT MOBILITY:   Joint Motion Right Mobility  (spine) Left Mobility Goal   Subcranial:  [x] Hypo     [] Normal     [] Hyper [x] Hypo     [] Normal     [] Hyper Normal    Cervical Upglides [x] Hypo     [] Normal     [] Hyper [x] Hypo     [] Normal     [] Hyper Normal    Cervical Downglides  [x] Hypo     [] Normal     [] Hyper [x] Hypo     [] Normal     [] Hyper Normal    Thoracic PA Gap [x] Hypo     [] Normal     [] Hyper --- Normal          SPECIAL TESTS:     Right  (spine) Left  Goal   Cervical Distraction [x] Positive    [] Negative [x] Positive    [] Negative Negative B    Cervical Compression  [] Positive    [x] Negative [] Positive    [x] Negative Negative B          SENSATION  [x] Intact to Light Touch                       [] Impaired:    "     PALPATION: Muscles: Increased tone and tenderness to palpation of: bilateral suboccipitals, paraspinals, scalenes , SCM, upper trapezius, levator scapulae , periscapular musculature.        POSTURE:  Pt presents with postural abnormalities which include:               [x] Forward Head                                [x] Increased Lumbar Lordosis              [x] Rounded Shoulder                         [] Genu Recurvatum              [] Increased Thoracic Kyphosis        [] Genu Valgus              [] Trunk Deviated                              [] Pes Planus              [] Scapular Winging                          [x] Other: Side bending right with left rotation     Treatment     Tawny received the treatments listed below:       MANUAL THERAPY TECHNIQUES were applied for (24) minutes, including:    Manual Intervention Performed Today    Soft Tissue Mobilization [x] bilateral lower cervical paraspinals, upper trapezius, levator scapulae     Joint Mobilizations  [x]  [x] C1/C2 HVLAT C2-C3, CT junction Prone**cleared for any red flags prior  Upglides & downglides of cervical spine    []     []    Functional Dry Needling  []  Bilateral UT   See EMR under MEDIA for written consent provided 2/12/2024  Palpation Assessment to determine the necessity for Functional Dry Needling   Plan for Next Visit: Continue as needed           THERAPEUTIC EXERCISES to develop strength, endurance, ROM, flexibility, posture, and core stabilization for (20) minutes including:    Intervention Performed Today    UBE x 3 min fwd and 3 min back   Chin tucks - supine x 2 x 10   Chin tuck with lift x 5 x 5" holds   Cervical rotation - supine x 2 x 10 each side   Seated cervical side bending  2 x 10 each side   Landmine mat press  2x10 each   Shoulder DB shrugs  2x10 each    Cervical Self Snag x 20x  with towel    Updated Test and Measures x Visit 4           Plan for Next Visit:        NEUROMUSCULAR RE-EDUCATION ACTIVITIES to improve " "Balance, Coordination, Kinesthetic, Sense, Proprioception, and Posture for (10) minutes.  The following were included:    Intervention Performed Today    Series 6  1' each, 2' pec stretch   Isometric pulls at cervical spine with band sitting   1 x 10, 5" holds, extension and SB bilaterally    Scapular Retractions x 3 x 10, red theraband    Shoulder Extensions x 3 x 10, red theraband, resting between each rep to reset neck/psoture   Chin tucks against wall with ball  2x10     Laser Wall  x  ABCD standing off to the left forcing more right rotation               Plan for Next Visit:        Patient Education and Home Exercises       Home Exercises Provided and Patient Education Provided     Education provided: (included in treatment section) minutes  PURPOSE: Patient educated on the impairments noted above and the effects of physical therapy intervention to improve overall condition and QOL.   EXERCISE: Patient was educated on all the above exercise prior/during/after for proper posture, positioning, and execution for safe performance with home exercise program.   STRENGTH: Patient educated on the importance of improved core and extremity strength in order to improve alignment of the spine and extremities with static positions and dynamic movement.   POSTURE: Patient educated on postural awareness to reduce stress and maintain optimal alignment of the spine with static positions and dynamic movement     Written Home Exercises Provided: yes.  Exercises were reviewed and Tawny was able to demonstrate them prior to the end of the session.  Tawny demonstrated good  understanding of the education provided. See EMR under Patient Instructions for exercises provided during therapy sessions.    Assessment     Since the start of therapy Mrs. Hector has made improvements in pain, strength, active and passive ROM, functional mobility and tolerance to ADL's. However she continues to be limited with neck stiffness and " uncontrollable head tilt that forces her head to rotate to her left when she Is not actively fighting it. She has made improvements in her ability to combat this but reports it get fatiguing doing so. She was progressed on this update with some manual techniques and follow up exercises to address joint stiffness and restrictions. Patient will continue to benefit from skilled care to address her deficits and progress return to PLOF.    Tawny is progressing well towards her goals.   Patient prognosis is Guarded.     Patient will continue to benefit from skilled outpatient physical therapy to address the deficits listed in the problem list box on initial evaluation, provide pt/family education and to maximize patient's level of independence in the home and community environment.     Patient's spiritual, cultural and educational needs considered and pt agreeable to plan of care and goals.     Anticipated Barriers for therapy: co-morbidities and chronicity of condition     Goals:    Short Term Goals:  6 weeks Status  Date Met   PAIN: Pt will report worst pain of 8/10 in order to progress toward max functional ability and improve quality of life. [] Progressing  [x] Met  [] Not Met 3/11/2024    FUNCTION: Patient will demonstrate improved function as indicated by a score of greater than or equal to 51 out of 100 on FOTO. [x] Progressing  [] Met  [] Not Met     MOBILITY: Patient will improve AROM to 50% of stated goals, listed in objective measures above, in order to progress towards independence with functional activities.  [] Progressing  [x] Met  [] Not Met 3/11/2024     STRENGTH: Patient will improve strength to 50% of stated goals, listed in objective measures above, in order to progress towards independence with functional activities. [x] Progressing  [] Met  [] Not Met     POSTURE: Patient will correct postural deviations in sitting and standing, to decrease pain and promote long term stability.  [x]  Progressing  [] Met  [] Not Met     HEP: Patient will demonstrate independence with HEP in order to progress toward functional independence. [] Progressing  [x] Met  [] Not Met 3/11/2024        Long Term Goals:  12 weeks Status Date Met   PAIN: Pt will report worst pain of 6/10 in order to progress toward max functional ability and improve quality of life [x] Progressing  [] Met  [] Not Met     FUNCTION: Patient will demonstrate improved function as indicated by a score of greater than or equal to 56 out of 100 on FOTO. [x] Progressing  [] Met  [] Not Met     MOBILITY: Patient will improve AROM to stated goals, listed in objective measures above, in order to return to maximal functional potential and improve quality of life.  [x] Progressing  [] Met  [] Not Met     STRENGTH: Patient will improve strength to stated goals, listed in objective measures above, in order to improve functional independence and quality of life.  [x] Progressing  [] Met  [] Not Met     GAIT: Patient will demonstrate normalized gait mechanics with minimal compensation in order to return to PLOF. [x] Progressing  [] Met  [] Not Met     Patient will return to normal ADL's, IADL's, community involvement, recreational activities, and work-related activities with less than or equal to 3/10 pain and maximal function.  [x] Progressing  [] Met  [] Not Met          Plan     Continue Plan of Care (POC) and progress per patient tolerance. See treatment section for details on planned progressions next session.    1/23/2024 (evaluation): Outpatient Physical Therapy 2 times weekly for 12 weeks to include any combination of the following interventions: virtual visits, dry needling, modalities, electrical stimulation (IFC, Pre-Mod, Attended with Functional Dry Needling), Aquatic Therapy, Cervical/Lumbar Traction, Gait Training, Manual Therapy, Neuromuscular Re-ed, Patient Education, Self Care, Therapeutic Exercise, and Therapeutic Activites      Rasheed  Liz, PT

## 2024-03-13 ENCOUNTER — OFFICE VISIT (OUTPATIENT)
Dept: FAMILY MEDICINE | Facility: CLINIC | Age: 64
End: 2024-03-13
Payer: COMMERCIAL

## 2024-03-13 DIAGNOSIS — R50.9 FEVER, UNSPECIFIED FEVER CAUSE: ICD-10-CM

## 2024-03-13 DIAGNOSIS — J06.9 UPPER RESPIRATORY TRACT INFECTION, UNSPECIFIED TYPE: Primary | ICD-10-CM

## 2024-03-13 PROCEDURE — 99213 OFFICE O/P EST LOW 20 MIN: CPT | Mod: 95,,, | Performed by: NURSE PRACTITIONER

## 2024-03-13 PROCEDURE — 4010F ACE/ARB THERAPY RXD/TAKEN: CPT | Mod: CPTII,95,, | Performed by: NURSE PRACTITIONER

## 2024-03-13 PROCEDURE — 1159F MED LIST DOCD IN RCRD: CPT | Mod: CPTII,95,, | Performed by: NURSE PRACTITIONER

## 2024-03-13 PROCEDURE — 1160F RVW MEDS BY RX/DR IN RCRD: CPT | Mod: CPTII,95,, | Performed by: NURSE PRACTITIONER

## 2024-03-13 RX ORDER — AZITHROMYCIN 250 MG/1
TABLET, FILM COATED ORAL
Qty: 6 TABLET | Refills: 0 | Status: SHIPPED | OUTPATIENT
Start: 2024-03-13 | End: 2024-03-18

## 2024-03-13 RX ORDER — BENZONATATE 200 MG/1
200 CAPSULE ORAL 3 TIMES DAILY PRN
Qty: 30 CAPSULE | Refills: 0 | Status: SHIPPED | OUTPATIENT
Start: 2024-03-13 | End: 2024-03-23

## 2024-03-13 NOTE — PROGRESS NOTES
Subjective     Patient ID: Tawny Valerio is a 63 y.o. female.    Chief Complaint: Nasal Congestion, Sore Throat, and Fever  The patient location is: [place]  The chief complaint leading to consultation is: Sinus problem, fever    Visit type: audiovisual    Face to Face time with patient: 15 min   minutes of total time spent on the encounter, which includes face to face time and non-face to face time preparing to see the patient (eg, review of tests), Obtaining and/or reviewing separately obtained history, Documenting clinical information in the electronic or other health record, Independently interpreting results (not separately reported) and communicating results to the patient/family/caregiver, or Care coordination (not separately reported).         Each patient to whom he or she provides medical services by telemedicine is:  (1) informed of the relationship between the physician and patient and the respective role of any other health care provider with respect to management of the patient; and (2) notified that he or she may decline to receive medical services by telemedicine and may withdraw from such care at any time.    Notes:     Sinus Problem  This is a new problem. The current episode started in the past 7 days. The problem is unchanged. The maximum temperature recorded prior to her arrival was 100.4 - 100.9 F. The fever has been present for Less than 1 day. The pain is mild. Associated symptoms include congestion, coughing, sinus pressure and a sore throat. Pertinent negatives include no chills, diaphoresis, ear pain, headaches, hoarse voice, neck pain, shortness of breath, sneezing or swollen glands. Past treatments include acetaminophen (benadryl). The treatment provided mild relief.     Past Medical History:   Diagnosis Date    Acute cystitis without hematuria 09/04/2019    Anxiety     Colon cancer screening 09/26/2019    Diabetes mellitus, type 2 2016    Endometrial ca 07/24/2019    Essential  hypertension 05/14/2018    Hyperlipidemia LDL goal <70 10/30/2023    Simple endometrial hyperplasia 03/15/2019    Uterine polyp 03/14/2019     Social History     Socioeconomic History    Marital status:    Occupational History     Employer: [employer]     Tobacco Use    Smoking status: Never    Smokeless tobacco: Never   Substance and Sexual Activity    Alcohol use: Never    Drug use: No    Sexual activity: Yes     Partners: Male     Birth control/protection: Post-menopausal, None     Comment: Hysterectomy     Social Determinants of Health     Financial Resource Strain: Low Risk  (3/12/2024)    Overall Financial Resource Strain (CARDIA)     Difficulty of Paying Living Expenses: Not very hard   Food Insecurity: No Food Insecurity (3/12/2024)    Hunger Vital Sign     Worried About Running Out of Food in the Last Year: Never true     Ran Out of Food in the Last Year: Never true   Transportation Needs: No Transportation Needs (3/12/2024)    PRAPARE - Transportation     Lack of Transportation (Medical): No     Lack of Transportation (Non-Medical): No   Physical Activity: Insufficiently Active (3/12/2024)    Exercise Vital Sign     Days of Exercise per Week: 1 day     Minutes of Exercise per Session: 20 min   Stress: Stress Concern Present (3/12/2024)    Citizen of the [place] of Occupational Health - Occupational Stress Questionnaire     Feeling of Stress : To some extent   Social Connections: Unknown (3/12/2024)    Social Connection and Isolation Panel [NHANES]     Frequency of Communication with Friends and Family: More than three times a week     Frequency of Social Gatherings with Friends and Family: Once a week     Active Member of Clubs or Organizations: No     Attends Club or Organization Meetings: Never     Marital Status:    Housing Stability: Low Risk  (3/12/2024)    Housing Stability Vital Sign     Unable to Pay for Housing in the Last Year: No     Number of Places Lived in the Last Year: 1      Unstable Housing in the Last Year: No     Past Surgical History:   Procedure Laterality Date     SECTION      x 1    DILATION AND CURETTAGE OF UTERUS  2019    HYSTERECTOMY  19    HYSTEROSCOPY WITH DILATION AND CURETTAGE OF UTERUS N/A 2019    Procedure: HYSTEROSCOPY, WITH DILATION AND CURETTAGE OF UTERUS;  Surgeon: Bobby Frazier Jr., MD;  Location: [facility];  Service: OB/GYN;  Laterality: N/A;    lipoma removed      ROBOT-ASSISTED LAPAROSCOPIC ABDOMINAL HYSTERECTOMY USING DA AWAIS XI N/A 2019    Procedure: XI ROBOTIC HYSTERECTOMY;  Surgeon: Aneudy Almeida MD;  Location: [facility] OR [address] [place] [address];  Service: OB/GYN;  Laterality: N/A;    ROBOT-ASSISTED LAPAROSCOPIC LYSIS OF ADHESIONS USING DA AWAIS XI N/A 2019    Procedure: XI ROBOTIC LYSIS, ADHESIONS;  Surgeon: Aneudy Almeida MD;  Location: [facility] OR [address] [place] [address];  Service: OB/GYN;  Laterality: N/A;  Omental    ROBOT-ASSISTED LAPAROSCOPIC SALPINGO-OOPHORECTOMY USING DA AWAIS XI Bilateral 2019    Procedure: XI ROBOTIC SALPINGO-OOPHORECTOMY;  Surgeon: Aneudy Almeida MD;  Location: [facility] OR [address] [place] [address];  Service: OB/GYN;  Laterality: Bilateral;  Wt 299lbs       Review of Systems   Constitutional: Negative.  Negative for chills and diaphoresis.   HENT:  Positive for nasal congestion, postnasal drip, sinus pressure/congestion and sore throat. Negative for ear pain, hoarse voice and sneezing.    Eyes: Negative.    Respiratory:  Positive for cough. Negative for shortness of breath.    Cardiovascular: Negative.    Gastrointestinal: Negative.    Endocrine: Negative.    Genitourinary: Negative.    Musculoskeletal: Negative.  Negative for neck pain.   Integumentary:  Negative.   Allergic/Immunologic: Negative.    Neurological: Negative.  Negative for headaches.   Psychiatric/Behavioral: Negative.            Objective     Physical Exam  Constitutional:       Appearance: Normal appearance.   Neurological:      Mental Status: She is alert.             Assessment and Plan     1. Upper respiratory tract infection, unspecified type  2. Fever, unspecified fever cause         -     azithromycin (Z-PHI) 250 MG tablet; Take 2 tablets by mouth on day 1; Take 1 tablet by mouth on days 2-5  Dispense: 6 tablet; Refill: 0  -     benzonatate (TESSALON) 200 MG capsule; Take 1 capsule (200 mg total) by mouth 3 (three) times daily as needed.  Dispense: 30 capsule; Refill: 0  Hydrate well  Rest  Tylenol OTC as directed  Flonase as prescribed  Recommend COVID-19, influenza testing  F/U with PCP ASAP  Report to ER immediately if symptoms worsen or persist               No follow-ups on file.

## 2024-03-13 NOTE — PATIENT INSTRUCTIONS
Hydrate well  Rest  Tylenol OTC as directed  Flonase as prescribed  Recommend COVID-19, influenza testing  F/U with PCP ASAP  Report to ER immediately if symptoms worsen or persist    Irwin Hector,     If you are due for any health screening(s) below please notify me so we can arrange them to be ordered and scheduled. Most healthy patients at your age complete them, but you are free to accept or refuse.     If you can't do it, I'll definitely understand. If you can, I'd certainly appreciate it!    Tests to Keep You Healthy    Mammogram: SCHEDULED  Eye Exam: DUE  Colon Cancer Screening: DUE  Last Blood Pressure <= 139/89 (10/26/2023): Yes  Last HbA1c < 8 (08/22/2023): Yes      Schedule your breast cancer screening today     Breast cancer is the second most common cancer in women,  and the second leading cause of death from cancer. Mammograms can detect breast cancer early, which significantly increases the chances of curing the cancer.       Our records indicate that you may be overdue for breast cancer screening. Cancer screenings save lives, so schedule yours today to stay healthy.     If you recently had a mammogram performed outside of [facility], please let your Health care team know so that they can update your health record.        Its time for your colon cancer screening     Colorectal cancer is one of the leading causes of cancer death for men and women but it doesnt have to be. Screenings can prevent colorectal cancer or find it early enough to treat and cure the disease.     Our records indicate that you may be overdue for colon cancer screening. A colonoscopy or stool screening test can help identify patients at risk for developing colon cancer. Cancer screenings save lives, so schedule yours today to stay healthy.     A colonoscopy is the preferred test for detecting colon cancer. It is needed only once every 10 years if results are negative. While you are sedated, a flexible, lighted tube  with a tiny camera is inserted into the rectum and advanced through the colon to look for cancers.     An alternative screening test that is used at home and returned to the lab may also be used. It detects hidden blood in bowel movements which could indicate cancer in the colon. If results are positive, you will need a colonoscopy to determine if the blood is a sign of cancer. This type of follow up (diagnostic) colonoscopy usually requires additional copays as required by your insurance provider.     If you recently had your colon cancer screening performed outside of [facility], please let your Health care team know so that they can update your health record. Please contact your PCP if you have any questions.    Your diabetic retinal eye exam is due     Diabetes is the #1 cause of blindness in the [place] - early detection before signs or symptoms develop can prevent debilitating blindness.     Our records indicate that you may be overdue for your annual diabetic eye exam. Eye screening can help identify patients at risk for developing vision loss which is common in diabetes. This simple screening is an important step to keeping you healthy and preventing complications from diabetes.     This recommended diabetic eye exam should take place once per year and can prevent and treat diabetes complications in the eye before developing symptoms. This can be done with a special camera is used to take photographs of the back of your eye without having to dilate them, or you can see an eye doctor for a full dilated exam.     If you recently had your yearly diabetic eye exam performed outside of [facility], please let your Health care team know so that they can update your health record.

## 2024-03-17 ENCOUNTER — PATIENT MESSAGE (OUTPATIENT)
Dept: NEUROLOGY | Facility: CLINIC | Age: 64
End: 2024-03-17
Payer: COMMERCIAL

## 2024-03-17 ENCOUNTER — PATIENT MESSAGE (OUTPATIENT)
Dept: REHABILITATION | Facility: HOSPITAL | Age: 64
End: 2024-03-17
Payer: COMMERCIAL

## 2024-03-18 ENCOUNTER — TELEPHONE (OUTPATIENT)
Dept: NEUROLOGY | Facility: CLINIC | Age: 64
End: 2024-03-18
Payer: COMMERCIAL

## 2024-03-18 NOTE — TELEPHONE ENCOUNTER
----- Message from Annabelle Ramos MA sent at 3/18/2024  4:24 PM CDT -----  Regarding: FW: No Availability  Contact: Pt @[phone]    ----- Message -----  From: Mehreen Shankar  Sent: 3/18/2024   1:48 PM CDT  To: [facility] Staff  Subject: No Availability                                  Pt called in to schedule an appt; no available appts in Epic. Pt is asking for a call back soon to schedule. Thanks.         Reason appt not schedule: no availability          Patient's DX: Cervical Dystonia          Patient requesting call back or [facility] [phone]

## 2024-03-18 NOTE — TELEPHONE ENCOUNTER
----- Message from Annabelle Ramos MA sent at 3/18/2024  4:24 PM CDT -----  Regarding: FW: No Availability  Contact: Pt @[phone]    ----- Message -----  From: Mehreen Shankar  Sent: 3/18/2024   1:48 PM CDT  To: [facility] Staff  Subject: No Availability                                  Pt called in to schedule an appt; no available appts in Epic. Pt is asking for a call back soon to schedule. Thanks.         Reason appt not schedule: no availability          Patient's DX: Cervical Dystonia          Patient requesting call back or [facility] [phone]

## 2024-03-25 ENCOUNTER — TELEPHONE (OUTPATIENT)
Dept: NEUROLOGY | Facility: CLINIC | Age: 64
End: 2024-03-25
Payer: COMMERCIAL

## 2024-03-25 ENCOUNTER — OFFICE VISIT (OUTPATIENT)
Dept: NEUROLOGY | Facility: CLINIC | Age: 64
End: 2024-03-25
Payer: COMMERCIAL

## 2024-03-25 VITALS
SYSTOLIC BLOOD PRESSURE: 139 MMHG | BODY MASS INDEX: 53.9 KG/M2 | WEIGHT: 285.5 LBS | HEART RATE: 90 BPM | HEIGHT: 61 IN | DIASTOLIC BLOOD PRESSURE: 80 MMHG

## 2024-03-25 DIAGNOSIS — E11.618 CONTROLLED TYPE 2 DIABETES MELLITUS WITH OTHER DIABETIC ARTHROPATHY, WITHOUT LONG-TERM CURRENT USE OF INSULIN: ICD-10-CM

## 2024-03-25 DIAGNOSIS — G24.3 CERVICAL DYSTONIA: Primary | ICD-10-CM

## 2024-03-25 DIAGNOSIS — E66.01 MORBID OBESITY WITH BMI OF 50.0-59.9, ADULT: ICD-10-CM

## 2024-03-25 PROCEDURE — 3079F DIAST BP 80-89 MM HG: CPT | Mod: CPTII,S$GLB,, | Performed by: PSYCHIATRY & NEUROLOGY

## 2024-03-25 PROCEDURE — 3008F BODY MASS INDEX DOCD: CPT | Mod: CPTII,S$GLB,, | Performed by: PSYCHIATRY & NEUROLOGY

## 2024-03-25 PROCEDURE — 4010F ACE/ARB THERAPY RXD/TAKEN: CPT | Mod: CPTII,S$GLB,, | Performed by: PSYCHIATRY & NEUROLOGY

## 2024-03-25 PROCEDURE — 3075F SYST BP GE 130 - 139MM HG: CPT | Mod: CPTII,S$GLB,, | Performed by: PSYCHIATRY & NEUROLOGY

## 2024-03-25 PROCEDURE — 1159F MED LIST DOCD IN RCRD: CPT | Mod: CPTII,S$GLB,, | Performed by: PSYCHIATRY & NEUROLOGY

## 2024-03-25 PROCEDURE — 99999 PR PBB SHADOW E&M-EST. PATIENT-LVL V: CPT | Mod: PBBFAC,,, | Performed by: PSYCHIATRY & NEUROLOGY

## 2024-03-25 PROCEDURE — 1160F RVW MEDS BY RX/DR IN RCRD: CPT | Mod: CPTII,S$GLB,, | Performed by: PSYCHIATRY & NEUROLOGY

## 2024-03-25 PROCEDURE — 99214 OFFICE O/P EST MOD 30 MIN: CPT | Mod: S$GLB,,, | Performed by: PSYCHIATRY & NEUROLOGY

## 2024-03-25 NOTE — PROGRESS NOTES
"Subjective:      Patient ID: Tawny Valerio is a 63 y.o. female.    Chief Complaint:  No chief complaint on file.      History of Present Illness  HPI 63 years old Male with PMHx ASHLI / Obesity / HTN / HLD and many others Medical issues came for the evaluation and recommendation of " possible Dystonia ".      Started: a year or so.   Describes: head tilted.   Timing: intermittent.  Frequency: daily.   Pain:  0 to 3/ 10.   Location: head tilt to the right.   Family: none with Dystonia.   Medications: Zanaflex / Phenergan / Zofran / Neurontin.  Worsen:  looking to the right.   Alleviated: none.   Associated symptoms:   Spasm neck. / head movements   Triggers: head turns.   Prodrome symptoms: none.      Saw a Neurologist 2023 - was diagnosed with Occipital Neuralgia.      Review of Systems  Review of Systems   Allergic/Immunologic:        Celebrex [Celecoxib]Anaphylaxis  CodeineHives  LidocaineHives  Vicodin [Hydrocodone-acetaminophen]Hives, Swelling     Neurological:         Head tilt.    All other systems reviewed and are negative.    Objective:     Neurologic Exam     Mental Status   Oriented to person, place, and time.   Registration: recalls 3 of 3 objects. Recall at 5 minutes: recalls 3 of 3 objects. Follows 3 step commands.   Attention: normal. Concentration: normal.   Speech: speech is normal   Level of consciousness: alert  Knowledge: good. Able to perform simple calculations.   Able to name object. Able to read. Able to repeat. Able to write. Normal comprehension.     Cranial Nerves     CN II   Visual fields full to confrontation.     CN III, IV, VI   Pupils are equal, round, and reactive to light.  Extraocular motions are normal.   Upgaze: normal  Downgaze: normal  Conjugate gaze: present  Vestibulo-ocular reflex: present    CN V   Facial sensation intact.     CN VII   Facial expression full, symmetric.     CN VIII   CN VIII normal.     CN IX, X   CN IX normal.   CN X normal.     CN XI   CN XI normal. "     CN XII   CN XII normal.     Motor Exam   Muscle bulk: normal  Overall muscle tone: normal    Strength   Strength 5/5 throughout.   Right neck flexion: 5/5  Left neck flexion: 5/5  Right neck extension: 5/5  Left neck extension: 5/5  Right deltoid: 5/5  Left deltoid: 5/5  Right biceps: 5/5  Left biceps: 5/5  Right triceps: 5/5  Left triceps: 5/5  Right wrist flexion: 5/5  Left wrist flexion: 5/5  Right wrist extension: 5/5  Left wrist extension: 5/5  Right interossei: 5/5  Left interossei: 5/5  Right abdominals: 5/5  Left abdominals: 5/5  Right iliopsoas: 5/5  Left iliopsoas: 5/5  Right quadriceps: 5/5  Left quadriceps: 5/5  Right hamstrin/5  Left hamstrin/5  Right glutei: 5/5  Left glutei: 5/5  Right anterior tibial: 5/5  Left anterior tibial: 5/5  Right posterior tibial: 5/5  Left posterior tibial: 5/5  Right peroneal: 5/5  Left peroneal: 5/5  Right gastroc: 5/5  Left gastroc: 5/5    Sensory Exam   Light touch normal.   Vibration normal.   Proprioception normal.   Pinprick normal.   Graphesthesia: normal  Stereognosis: normal    Gait, Coordination, and Reflexes     Gait  Gait: normal    Coordination   Romberg: negative  Finger to nose coordination: normal  Heel to shin coordination: normal  Tandem walking coordination: normal    Tremor   Resting tremor: absent  Intention tremor: absent  Action tremor: absent    Reflexes   Right brachioradialis: 2+  Left brachioradialis: 2+  Right biceps: 2+  Left biceps: 2+  Right triceps: 2+  Left triceps: 2+  Right patellar: 2+  Left patellar: 2+  Right achilles: 2+  Left achilles: 2+  Right : 2+  Left : 2+  Right plantar: normal  Left plantar: normal  Right Reilly: absent  Left Reilly: absent  Right ankle clonus: absent  Left ankle clonus: absent  Right pendular knee jerk: absent  Left pendular knee jerk: absent      Physical Exam  Vitals and nursing note reviewed.   Constitutional:       Appearance: Normal appearance. She is obese.   HENT:      Head:  Normocephalic and atraumatic.      Right Ear: Tympanic membrane normal.      Left Ear: Tympanic membrane normal.      Nose: Nose normal.      Mouth/Throat:      Mouth: Mucous membranes are moist.      Pharynx: Oropharynx is clear.   Eyes:      Extraocular Movements: Extraocular movements intact and EOM normal.      Conjunctiva/sclera: Conjunctivae normal.      Pupils: Pupils are equal, round, and reactive to light.   Neck:        Comments: Sternocleidomastoid contraction.    Cardiovascular:      Rate and Rhythm: Normal rate and regular rhythm.      Pulses: Normal pulses.      Heart sounds: Normal heart sounds.   Pulmonary:      Effort: Pulmonary effort is normal.      Breath sounds: Normal breath sounds.   Abdominal:      General: Abdomen is flat. Bowel sounds are normal.      Palpations: Abdomen is soft.   Genitourinary:     Comments: Deferred.   Musculoskeletal:         General: Normal range of motion.      Cervical back: Normal range of motion and neck supple.   Skin:     General: Skin is warm and dry.      Capillary Refill: Capillary refill takes less than 2 seconds.   Neurological:      Mental Status: She is alert and oriented to person, place, and time. Mental status is at baseline.      Motor: Motor strength is normal.     Coordination: Finger-Nose-Finger Test, Heel to Shin Test and Romberg Test normal.      Gait: Gait is intact. Tandem walk normal.      Deep Tendon Reflexes:      Reflex Scores:       Tricep reflexes are 2+ on the right side and 2+ on the left side.       Bicep reflexes are 2+ on the right side and 2+ on the left side.       Brachioradialis reflexes are 2+ on the right side and 2+ on the left side.       Patellar reflexes are 2+ on the right side and 2+ on the left side.       Achilles reflexes are 2+ on the right side and 2+ on the left side.  Psychiatric:         Mood and Affect: Mood normal.         Speech: Speech normal.         Behavior: Behavior normal.         Thought Content: Thought  content normal.         Judgment: Judgment normal.        Assessment:   63 Years old C. Female with PMHX as above  came of the evaluation of right head tilting.  - Dystonia.     Plan:   Patient Neurological Assessment is remarkable for head tilt to the right / Sternocleidomastoid contraction.     Increase Zanaflex 4 mg to one in the AM and two at night.   Continue Benadryl PRN.  Referral to Botox clinic.    Labs: CMP / TSH / Hgb A 1 C / Lipids panel - done 06/ 2023 - non significant abnormalities.    Personally reviewed MRI Cervical spine - done 06/ 2023 - Multi DGD's changes with moderate canal stenosis.       Please do not hesitate to contact me with any updates, questions or concerns.        Renzo Mitchell MD.  General Neurologist.

## 2024-03-25 NOTE — TELEPHONE ENCOUNTER
----- Message from Vi Fernandez sent at 3/25/2024 12:31 PM CDT -----  Regarding: Sooner Appointment Request  Contact: patient at [phone]  Type:  Sooner Appointment Request    Name of Caller:  patient at [phone]    When is the first available appointment?  none  Symptoms:  dystonia    Additional Information:  Please call and advise. Thank you

## 2024-03-27 ENCOUNTER — PATIENT MESSAGE (OUTPATIENT)
Dept: NEUROLOGY | Facility: CLINIC | Age: 64
End: 2024-03-27
Payer: COMMERCIAL

## 2024-04-09 ENCOUNTER — TELEPHONE (OUTPATIENT)
Dept: NEUROLOGY | Facility: CLINIC | Age: 64
End: 2024-04-09
Payer: COMMERCIAL

## 2024-04-09 NOTE — TELEPHONE ENCOUNTER
----- Message from Kaylyn Ko PA-C sent at 4/9/2024 11:48 AM CDT -----  Do I have anything sooner for this patient?   ----- Message -----  From: Afsaneh Salmon MD  Sent: 4/9/2024  11:18 AM CDT  To: MARINE Wilcox,     This is a patient who I believe would benefit from being evaluated for cervical dystonia versus MG versus functional neurologic disorder. She mistakenly booked herself onto my schedule at  ([facility]--note should be available in care everywhere). Are you able to see her any sooner than the August appointment she currently has with Dr. Castillo?    Thanks,   Afsaneh

## 2024-04-12 ENCOUNTER — PATIENT MESSAGE (OUTPATIENT)
Dept: INTERNAL MEDICINE | Facility: CLINIC | Age: 64
End: 2024-04-12
Payer: COMMERCIAL

## 2024-04-17 ENCOUNTER — OFFICE VISIT (OUTPATIENT)
Dept: DIABETES | Facility: CLINIC | Age: 64
End: 2024-04-17
Payer: COMMERCIAL

## 2024-04-17 ENCOUNTER — LAB VISIT (OUTPATIENT)
Dept: LAB | Facility: HOSPITAL | Age: 64
End: 2024-04-17
Attending: NURSE PRACTITIONER
Payer: COMMERCIAL

## 2024-04-17 VITALS
HEART RATE: 74 BPM | SYSTOLIC BLOOD PRESSURE: 113 MMHG | BODY MASS INDEX: 53.86 KG/M2 | WEIGHT: 285.25 LBS | DIASTOLIC BLOOD PRESSURE: 71 MMHG | HEIGHT: 61 IN

## 2024-04-17 DIAGNOSIS — E11.9 CONTROLLED TYPE 2 DIABETES MELLITUS WITHOUT COMPLICATION, WITHOUT LONG-TERM CURRENT USE OF INSULIN: ICD-10-CM

## 2024-04-17 DIAGNOSIS — E11.9 CONTROLLED TYPE 2 DIABETES MELLITUS WITHOUT COMPLICATION, WITHOUT LONG-TERM CURRENT USE OF INSULIN: Primary | ICD-10-CM

## 2024-04-17 LAB
ESTIMATED AVG GLUCOSE: 117 MG/DL (ref 68–131)
GLUCOSE SERPL-MCNC: 101 MG/DL (ref 70–110)
HBA1C MFR BLD: 5.7 % (ref 4–5.6)

## 2024-04-17 PROCEDURE — 1159F MED LIST DOCD IN RCRD: CPT | Mod: CPTII,S$GLB,, | Performed by: NURSE PRACTITIONER

## 2024-04-17 PROCEDURE — 4010F ACE/ARB THERAPY RXD/TAKEN: CPT | Mod: CPTII,S$GLB,, | Performed by: NURSE PRACTITIONER

## 2024-04-17 PROCEDURE — 82962 GLUCOSE BLOOD TEST: CPT | Mod: S$GLB,,, | Performed by: NURSE PRACTITIONER

## 2024-04-17 PROCEDURE — 36415 COLL VENOUS BLD VENIPUNCTURE: CPT | Performed by: NURSE PRACTITIONER

## 2024-04-17 PROCEDURE — 83036 HEMOGLOBIN GLYCOSYLATED A1C: CPT | Performed by: NURSE PRACTITIONER

## 2024-04-17 PROCEDURE — 3074F SYST BP LT 130 MM HG: CPT | Mod: CPTII,S$GLB,, | Performed by: NURSE PRACTITIONER

## 2024-04-17 PROCEDURE — 99214 OFFICE O/P EST MOD 30 MIN: CPT | Mod: S$GLB,,, | Performed by: NURSE PRACTITIONER

## 2024-04-17 PROCEDURE — 3008F BODY MASS INDEX DOCD: CPT | Mod: CPTII,S$GLB,, | Performed by: NURSE PRACTITIONER

## 2024-04-17 PROCEDURE — 3078F DIAST BP <80 MM HG: CPT | Mod: CPTII,S$GLB,, | Performed by: NURSE PRACTITIONER

## 2024-04-17 PROCEDURE — 99999 PR PBB SHADOW E&M-EST. PATIENT-LVL V: CPT | Mod: PBBFAC,,, | Performed by: NURSE PRACTITIONER

## 2024-04-17 RX ORDER — ALPRAZOLAM 0.5 MG/1
TABLET ORAL
COMMUNITY

## 2024-04-17 NOTE — PATIENT INSTRUCTIONS
PATIENT INSTRUCTIONS     Labs today.      Continue Jardiance to 25 mg by mouth daily.   Increase Mounjaro to 10 mg subcutaneously every 7 days.     Check blood sugar twice daily. Every morning when you wake up and 1-2 hours after main meal of the day. Keep log and upload to [facility] prior to each visit.   Blood Sugar Goals:       Fastin-130.       1-2 hours after a meal: Less than 180.

## 2024-04-17 NOTE — PROGRESS NOTES
"Tawny Valerio is a 63 y.o. female who presents for a follow up evaluation of Type 2 diabetes mellitus.     CHIEF COMPLAINT: Diabetes Consultation    PCP: Geovanna South DO      Initial visit with me - 8/17/2023    The patient was initially diagnosed with diabetes in 2016.      Previous failed treatments include:  Metformin - GI upset.  Ozempic - N/V/D, fecal incontinence.     Social Documentation:  Patient lives in [place] with .   Occupation:  for [employer].   Exercise: limited by occipital neuralgia.   No sweet tea or regular cold drinks.       Diabetes related complications:   none.   denies Pancreatitis  denies Gastroparesis  denies DKA  denies Hx/family Hx of MEN2/MTC  denies Frequent UTIs/yeast infections     Cardiovascular Risk Factors: dyslipidemia, family history of premature cardiovascular disease, hypertension, obesity (BMI >30 kg/m2), and sedentary lifestyle.    Diabetes Medications               empagliflozin (JARDIANCE) 25 mg tablet Take 1 tablet (25 mg total) by mouth once daily.    tirzepatide 7.5 mg/0.5 mL PnIj Inject 7.5 mg into the skin every 7 days.     Current monitoring regimen: not checking.     Patient currently taking insulin or sulfonylurea?  NO  Recent hypoglycemic episodes: No.     Patient compliant with glucose checks and medication administration? Yes    DIABETES MANAGEMENT STATUS  Statin: Not taking  ACE/ARB: Taking  Screening or Prevention Patient's value Goal Complete/Controlled?   HgA1C Testing and Control   Lab Results   Component Value Date    HGBA1C 6.1 (H) 08/22/2023      Annually/Less than 8% Yes   Lipid profile : 06/14/2023 Annually Yes   LDL control Lab Results   Component Value Date    LDLCALC 109.8 06/14/2023    Annually/Less than 100 mg/dl  No   Nephropathy screening Lab Results   Component Value Date    LABMICR 16.0 08/22/2023     Lab Results   Component Value Date    PROTEINUA Negative 10/26/2023     No results found for: "UTPCR"   Annually Yes " "  Blood pressure BP Readings from Last 1 Encounters:   04/17/24 113/71    Less than 140/90 Yes   Dilated retinal exam : 01/22/2021 Annually No   Foot exam   : 10/26/2023 Annually No   Patient's medications, allergies, surgical, social and family histories were reviewed and updated as appropriate.     Review of Systems   Constitutional:  Negative for weight loss.   Eyes:  Negative for blurred vision and double vision.   Cardiovascular:  Negative for chest pain.   Gastrointestinal:  Negative for nausea and vomiting.   Genitourinary:  Negative for frequency.   Musculoskeletal:  Negative for falls.   Neurological:  Negative for dizziness and weakness.   Endo/Heme/Allergies:  Negative for polydipsia.   Psychiatric/Behavioral:  Negative for depression.    All other systems reviewed and are negative.       Physical Exam  Constitutional:       Appearance: Normal appearance.   HENT:      Head: Normocephalic and atraumatic.   Cardiovascular:      Pulses:           Dorsalis pedis pulses are 2+ on the right side and 2+ on the left side.        Posterior tibial pulses are 2+ on the right side and 2+ on the left side.   Pulmonary:      Effort: No respiratory distress.   Musculoskeletal:      Cervical back: Normal range of motion.   Feet:      Right foot:      Protective Sensation: 8 sites tested.  8 sites sensed.      Skin integrity: Skin integrity normal.      Toenail Condition: Right toenails are normal.      Left foot:      Protective Sensation: 8 sites tested.  8 sites sensed.      Skin integrity: Skin integrity normal.      Toenail Condition: Left toenails are normal.   Neurological:      Mental Status: She is alert and oriented to person, place, and time.   Psychiatric:         Mood and Affect: Mood normal.         Behavior: Behavior normal.        Blood pressure 113/71, pulse 74, height 5' 1" (1.549 m), weight 129.4 kg (285 lb 4.4 oz), last menstrual period 01/15/2019.  Wt Readings from Last 3 Encounters:   04/17/24 129.4 " "kg (285 lb 4.4 oz)   03/25/24 129.5 kg (285 lb 7.9 oz)   10/26/23 126.2 kg (278 lb 3.5 oz)       LAB REVIEW  Lab Results   Component Value Date     08/22/2023    K 4.3 08/22/2023     08/22/2023    CO2 24 08/22/2023    BUN 16 08/22/2023    CREATININE 0.8 08/22/2023    CALCIUM 9.9 08/22/2023    ANIONGAP 10 08/22/2023    EGFRNORACEVR >60.0 08/22/2023     No results found for: "CPEPTIDE", "GLUTAMICACID", "INSLNABS"  Hemoglobin A1C   Date Value Ref Range Status   08/22/2023 6.1 (H) 4.0 - 5.6 % Final     Comment:     ADA Screening Guidelines:  5.7-6.4%  Consistent with prediabetes  >or=6.5%  Consistent with diabetes    High levels of fetal hemoglobin interfere with the HbA1C  assay. Heterozygous hemoglobin variants (HbS, HgC, etc)do  not significantly interfere with this assay.   However, presence of multiple variants may affect accuracy.     08/09/2022 5.5 4.0 - 5.6 % Final     Comment:     ADA Screening Guidelines:  5.7-6.4%  Consistent with prediabetes  >or=6.5%  Consistent with diabetes    High levels of fetal hemoglobin interfere with the HbA1C  assay. Heterozygous hemoglobin variants (HbS, HgC, etc)do  not significantly interfere with this assay.   However, presence of multiple variants may affect accuracy.     01/06/2022 5.4 4.0 - 5.6 % Final     Comment:     ADA Screening Guidelines:  5.7-6.4%  Consistent with prediabetes  >or=6.5%  Consistent with diabetes    High levels of fetal hemoglobin interfere with the HbA1C  assay. Heterozygous hemoglobin variants (HbS, HgC, etc)do  not significantly interfere with this assay.   However, presence of multiple variants may affect accuracy.          ASSESSMENT    ICD-10-CM ICD-9-CM   1. Controlled type 2 diabetes mellitus without complication, without long-term current use of insulin  E11.9 250.00       PLAN  Diagnoses and all orders for this visit:    Controlled type 2 diabetes mellitus without complication, without long-term current use of insulin  -     POCT " Glucose, Hand-Held Device        Reviewed pathophysiology of diabetes, complications related to the disease, importance of annual dilated eye exam and daily foot examination. Explained MOA, SE, dosage of medications. Written instructions given and reviewed with patient and patient verbalizes understanding.     PATIENT INSTRUCTIONS     Labs today.      Continue Jardiance to 25 mg by mouth daily.   Increase Mounjaro to 10 mg subcutaneously every 7 days.     Check blood sugar twice daily. Every morning when you wake up and 1-2 hours after main meal of the day. Keep log and upload to [facility] prior to each visit.   Blood Sugar Goals:       Fastin-130.       1-2 hours after a meal: Less than 180.          No follow-ups on file.    Portions of this note were prepared with [employer] Naturally Speaking voice recognition transcription software. Grammatical errors, including garbled syntax, mangle pronouns, and other bizarre constructions may be attributed to that software system.

## 2024-04-26 ENCOUNTER — OFFICE VISIT (OUTPATIENT)
Dept: INTERNAL MEDICINE | Facility: CLINIC | Age: 64
End: 2024-04-26
Payer: COMMERCIAL

## 2024-04-26 VITALS
TEMPERATURE: 98 F | SYSTOLIC BLOOD PRESSURE: 124 MMHG | OXYGEN SATURATION: 97 % | RESPIRATION RATE: 20 BRPM | HEART RATE: 82 BPM | WEIGHT: 287.25 LBS | BODY MASS INDEX: 54.23 KG/M2 | DIASTOLIC BLOOD PRESSURE: 70 MMHG | HEIGHT: 61 IN

## 2024-04-26 DIAGNOSIS — I10 ESSENTIAL HYPERTENSION: ICD-10-CM

## 2024-04-26 DIAGNOSIS — Z23 NEED FOR VACCINATION FOR STREP PNEUMONIAE: ICD-10-CM

## 2024-04-26 DIAGNOSIS — Z12.11 SCREEN FOR COLON CANCER: ICD-10-CM

## 2024-04-26 DIAGNOSIS — E11.9 CONTROLLED TYPE 2 DIABETES MELLITUS WITHOUT COMPLICATION, WITHOUT LONG-TERM CURRENT USE OF INSULIN: ICD-10-CM

## 2024-04-26 DIAGNOSIS — Z00.00 ANNUAL PHYSICAL EXAM: Primary | ICD-10-CM

## 2024-04-26 DIAGNOSIS — E55.9 VITAMIN D INSUFFICIENCY: ICD-10-CM

## 2024-04-26 DIAGNOSIS — E78.5 HYPERLIPIDEMIA LDL GOAL <70: ICD-10-CM

## 2024-04-26 PROCEDURE — 3008F BODY MASS INDEX DOCD: CPT | Mod: CPTII,S$GLB,, | Performed by: PHYSICIAN ASSISTANT

## 2024-04-26 PROCEDURE — 3044F HG A1C LEVEL LT 7.0%: CPT | Mod: CPTII,S$GLB,, | Performed by: PHYSICIAN ASSISTANT

## 2024-04-26 PROCEDURE — 99396 PREV VISIT EST AGE 40-64: CPT | Mod: S$GLB,,, | Performed by: PHYSICIAN ASSISTANT

## 2024-04-26 PROCEDURE — 1159F MED LIST DOCD IN RCRD: CPT | Mod: CPTII,S$GLB,, | Performed by: PHYSICIAN ASSISTANT

## 2024-04-26 PROCEDURE — 99999 PR PBB SHADOW E&M-EST. PATIENT-LVL V: CPT | Mod: PBBFAC,,, | Performed by: PHYSICIAN ASSISTANT

## 2024-04-26 PROCEDURE — 3078F DIAST BP <80 MM HG: CPT | Mod: CPTII,S$GLB,, | Performed by: PHYSICIAN ASSISTANT

## 2024-04-26 PROCEDURE — 1160F RVW MEDS BY RX/DR IN RCRD: CPT | Mod: CPTII,S$GLB,, | Performed by: PHYSICIAN ASSISTANT

## 2024-04-26 PROCEDURE — 3074F SYST BP LT 130 MM HG: CPT | Mod: CPTII,S$GLB,, | Performed by: PHYSICIAN ASSISTANT

## 2024-04-26 PROCEDURE — 4010F ACE/ARB THERAPY RXD/TAKEN: CPT | Mod: CPTII,S$GLB,, | Performed by: PHYSICIAN ASSISTANT

## 2024-04-26 RX ORDER — ERGOCALCIFEROL 1.25 MG/1
50000 CAPSULE ORAL
Qty: 12 CAPSULE | Refills: 1 | Status: SHIPPED | OUTPATIENT
Start: 2024-04-26

## 2024-04-26 NOTE — PROGRESS NOTES
"Subjective:      Patient ID: Tawny Valerio is a 63 y.o. female.    Chief Complaint: Annual Exam    Patient is new to me, being seen today for annual exam.     HTN- losartan 25mg  Lasix 20mg prn, rarely, last Rx 2021 w previous PCP  HLD- on zetia   DM- A1c 5.7%, jardiance 25mg, mounjaro 10mg, followed by Diabetes     Due for fasting labs     Last visit Oct 2023 with PCP.       Review of Systems   Constitutional:  Negative for chills, diaphoresis and fever.   HENT:  Negative for congestion, rhinorrhea and sore throat.    Respiratory:  Negative for cough, shortness of breath and wheezing.    Cardiovascular:  Positive for leg swelling (admits increased salt intake, takes furosemide prn which helps).   Gastrointestinal:  Negative for abdominal pain, constipation, diarrhea, nausea and vomiting.   Skin:  Negative for rash.   Neurological:  Negative for dizziness, light-headedness and headaches.        Followed by Neuro for occipital neuralgia, dystonia, possible MG  Referred to additional specialist by Neuro       Objective:   /70   Pulse 82   Temp 98.2 °F (36.8 °C) (Tympanic)   Resp 20   Ht 5' 1" (1.549 m)   Wt 130.3 kg (287 lb 4.2 oz)   LMP 01/15/2019 (Approximate)   SpO2 97%   BMI 54.28 kg/m²   Physical Exam  Constitutional:       General: She is not in acute distress.     Appearance: Normal appearance. She is well-developed. She is not ill-appearing.   HENT:      Head: Normocephalic and atraumatic.      Right Ear: Hearing, tympanic membrane, ear canal and external ear normal.      Left Ear: Hearing, tympanic membrane, ear canal and external ear normal.      Nose: Nose normal.      Mouth/Throat:      Mouth: Mucous membranes are moist.      Pharynx: Oropharynx is clear.   Cardiovascular:      Rate and Rhythm: Normal rate and regular rhythm.      Heart sounds: Normal heart sounds. No murmur heard.  Pulmonary:      Effort: Pulmonary effort is normal. No respiratory distress.      Breath sounds: Normal " breath sounds. No decreased breath sounds.   Musculoskeletal:      Right lower leg: Swelling present. No edema.      Left lower leg: Swelling present. No edema.   Skin:     General: Skin is warm and dry.      Findings: No rash.   Psychiatric:         Speech: Speech normal.         Behavior: Behavior normal.         Thought Content: Thought content normal.       Assessment:      1. Annual physical exam    2. Essential hypertension    3. Hyperlipidemia LDL goal <70    4. Controlled type 2 diabetes mellitus without complication, without long-term current use of insulin    5. Vitamin D insufficiency    6. Screen for colon cancer    7. Need for vaccination for Strep pneumoniae       Plan:   Annual physical exam    Essential hypertension  -     CBC Auto Differential; Future; Expected date: 04/26/2024  -     Comprehensive Metabolic Panel; Future; Expected date: 04/26/2024    Hyperlipidemia LDL goal <70  -     Lipid Panel; Future; Expected date: 04/26/2024    Controlled type 2 diabetes mellitus without complication, without long-term current use of insulin   Controlled    Vitamin D insufficiency  -     ergocalciferol (ERGOCALCIFEROL) 50,000 unit Cap; Take 1 capsule (50,000 Units total) by mouth every 7 days.  Dispense: 12 capsule; Refill: 1    Screen for colon cancer  -     Ambulatory referral/consult to Endo Procedure ; Future; Expected date: 04/26/2024    Need for vaccination for Strep pneumoniae  -     (In Office Administered) Pneumococcal Conjugate Vaccine (20 Valent) (IM) (Preferred); Future      Lasix prn ok, if she begins taking more frequently recommend f/u for further eval of swelling and monitoring of medication     Fasting labs     6mth f/u PCP

## 2024-05-03 ENCOUNTER — PATIENT OUTREACH (OUTPATIENT)
Dept: ADMINISTRATIVE | Facility: HOSPITAL | Age: 64
End: 2024-05-03
Payer: COMMERCIAL

## 2024-05-03 NOTE — PROGRESS NOTES
VBHM Score: 1     Colon Cancer Screening    Pneumonia Vaccine  Shingles/Zoster Vaccine  RSV Vaccine          Health Maintenance Topic(s) Outreach Outcomes & Actions Taken:    Colorectal Cancer Screening - Outreach Outcomes & Actions Taken  : colonoscopy referral entered by primary care 4/26/24     Additional Notes:  Per chart review, patient has upcoming appointments scheduled for lab, mammo and eye. PCP appointment is scheduled on 10/28/24 for follow up.             Care Management, Digital Medicine, and/or Education Referrals    OPCM Risk Score: 46.3         Next Steps - Referral Actions: no referrals entered        Additional Notes:  SDOH reviewed 4/8/24. Patient had appointment with diabetes ed dept on 4/17/24, follow up is scheduled on 10/22/24

## 2024-05-06 ENCOUNTER — ON-DEMAND VIRTUAL (OUTPATIENT)
Dept: URGENT CARE | Facility: CLINIC | Age: 64
End: 2024-05-06
Payer: COMMERCIAL

## 2024-05-06 ENCOUNTER — PATIENT MESSAGE (OUTPATIENT)
Dept: INTERNAL MEDICINE | Facility: CLINIC | Age: 64
End: 2024-05-06
Payer: COMMERCIAL

## 2024-05-06 DIAGNOSIS — R05.9 COUGH, UNSPECIFIED TYPE: Primary | ICD-10-CM

## 2024-05-06 PROCEDURE — 99202 OFFICE O/P NEW SF 15 MIN: CPT | Mod: 95,,, | Performed by: PHYSICIAN ASSISTANT

## 2024-05-06 NOTE — PATIENT INSTRUCTIONS
I recommend with the cough being present for 2 weeks and worsening that she follow up in person with PCP or urgent care tomorrow for an in person evaluation. She understands and agrees with above plan.

## 2024-05-06 NOTE — PROGRESS NOTES
Subjective:      Patient ID: Tawny Valerio is a 63 y.o. female.    Vitals:  vitals were not taken for this visit.     Chief Complaint: Cough      Visit Type: TELE AUDIOVISUAL    Present with the patient at the time of consultation: TELEMED PRESENT WITH PATIENT: None Patient is at home in [place]    Past Medical History:   Diagnosis Date    Acute cystitis without hematuria 2019    Anxiety     Colon cancer screening 2019    Diabetes mellitus, type 2 2016    Endometrial ca 2019    Essential hypertension 2018    Hyperlipidemia LDL goal <70 10/30/2023    Simple endometrial hyperplasia 03/15/2019    Uterine polyp 2019     Past Surgical History:   Procedure Laterality Date     SECTION      x 1    DILATION AND CURETTAGE OF UTERUS  2019    HYSTERECTOMY  19    HYSTEROSCOPY WITH DILATION AND CURETTAGE OF UTERUS N/A 2019    Procedure: HYSTEROSCOPY, WITH DILATION AND CURETTAGE OF UTERUS;  Surgeon: Bobby Frazier Jr., MD;  Location: [facility];  Service: OB/GYN;  Laterality: N/A;    lipoma removed      ROBOT-ASSISTED LAPAROSCOPIC ABDOMINAL HYSTERECTOMY USING DA AWAIS XI N/A 2019    Procedure: XI ROBOTIC HYSTERECTOMY;  Surgeon: Aneudy Almeida MD;  [facility];  Service: OB/GYN;  Laterality: N/A;    ROBOT-ASSISTED LAPAROSCOPIC LYSIS OF ADHESIONS USING DA AWAIS XI N/A 2019    Procedure: XI ROBOTIC LYSIS, ADHESIONS;  Surgeon: Aneudy Almeida MD;  [facility];  Service: OB/GYN;  Laterality: N/A;  Omental    ROBOT-ASSISTED LAPAROSCOPIC SALPINGO-OOPHORECTOMY USING DA AWAIS XI Bilateral 2019    Procedure: XI ROBOTIC SALPINGO-OOPHORECTOMY;  Surgeon: Aneudy Almeida MD;  [facility];  Service: OB/GYN;  Laterality: Bilateral;  Wt 299lbs     Review of patient's allergies indicates:   Allergen Reactions    Celebrex [celecoxib] Anaphylaxis    Codeine Hives     Does not know what she can take -usually just takes tylenol or  ibuprofen    Lidocaine Hives    Vicodin [hydrocodone-acetaminophen] Hives and Swelling     Current Outpatient Medications on File Prior to Visit   Medication Sig Dispense Refill    acetaminophen (TYLENOL) 325 MG tablet Take 2 tablets (650 mg total) by mouth every 6 (six) hours as needed for Pain. 30 tablet 1    ALPRAZolam (XANAX) 0.5 MG tablet 1 tab(s) orally 3 times a day      aspirin 81 MG Chew Take 81 mg by mouth once daily.      azelastine (ASTELIN) 137 mcg (0.1 %) nasal spray 1 spray (137 mcg total) by Nasal route 2 (two) times daily. 30 mL 11    blood sugar diagnostic Strp 1 each by Misc.(Non-Drug; Combo Route) route 2 (two) times a day. 11 each 0    blood-glucose meter kit Use as instructed 1 each 0    blood-glucose meter kit Use as instructed 1 each 0    buPROPion (WELLBUTRIN SR) 150 MG TBSR 12 hr tablet Take 1 tablet (150 mg total) by mouth 2 (two) times daily. 60 tablet 11    clindamycin (CLEOCIN T) 1 % lotion Apply topically 2 (two) times daily. 60 mL 0    empagliflozin (JARDIANCE) 25 mg tablet Take 1 tablet (25 mg total) by mouth once daily. 90 tablet 3    ergocalciferol (ERGOCALCIFEROL) 50,000 unit Cap Take 1 capsule (50,000 Units total) by mouth every 7 days. 12 capsule 1    ezetimibe (ZETIA) 10 mg tablet Take 1 tablet (10 mg total) by mouth once daily. 90 tablet 3    fluticasone propionate (FLONASE) 50 mcg/actuation nasal spray 1 spray (50 mcg total) by Each Nostril route once daily. 16 g 0    gabapentin (NEURONTIN) 300 MG capsule Take 600 mg (2 caps) QAM and 300 mg (1 cap) in afternoon and 300 mg (1 cap)  capsule 11    lancets (ACCU-CHEK SOFTCLIX LANCETS) Misc 1 each by Misc.(Non-Drug; Combo Route) route 2 (two) times daily. 11 each 0    lancing device with lancets (MULTI-LANCET DEVICE 2) Kit 1 each by Misc.(Non-Drug; Combo Route) route 2 (two) times a day. 1 each 1    losartan (COZAAR) 25 MG tablet Take 1 tablet (25 mg total) by mouth once daily. 90 tablet 3    metronidazole 0.75%  (METROCREAM) 0.75 % Crea Apply topically 2 (two) times daily. For rosacea on face. 45 g 0    ondansetron (ZOFRAN-ODT) 4 MG TbDL Take 4 mg by mouth every 6 (six) hours as needed.      promethazine (PHENERGAN) 25 MG tablet Take 25 mg by mouth every 6 (six) hours as needed.      tirzepatide 10 mg/0.5 mL PnIj Inject 10 mg into the skin every 7 days. 4 Pen 5    tiZANidine (ZANAFLEX) 4 MG tablet Take 1 tablet (4 mg total) by mouth 2 (two) times a day. 60 tablet 5    tretinoin (RETIN-A) 0.025 % cream APPLY TOPICALLY EVERY EVENING. 20 g 6     No current facility-administered medications on file prior to visit.     Family History   Problem Relation Name Age of Onset    Arthritis Mother Pura Mariano     Diabetes Father Luan Mariano     Stroke Father Luan Mariano     Hypertension Father Luan Mariano     Depression Father Luan Mariano     Early death Father Luan Mariano     Heart disease Father Luan Mariano     Vision loss Father Luan Mariano     Heart disease Sister      Hypertension Sister      Depression Sister Apolonia Pascualford     Diabetes Sister Apolonia Pascualford     Early death Sister Apolonia Pascualford     Heart disease Sister Apolonia Pascualford     Hypertension Sister Apolonia Pascualford     Heart disease Brother Luan Mariano     Heart attack Brother Luan Mariano     Hypertension Brother Luan Mariano     Early death Brother Luan Mariano     Heart disease Maternal Grandmother Talya Prince Grapuso     Heart disease Maternal Grandfather Simon Mariano     Heart disease Paternal Grandfather Nikolas Prince     Breast cancer Other mat great GM     Ovarian cancer Neg Hx      Uterine cancer Neg Hx      Colon cancer Neg Hx      Heart failure Neg Hx      Hyperlipidemia Neg Hx             Ohs Peq Odvv Intake    5/6/2024 12:32 AM CDT - Filed by Luan Valerio (Proxy)   What is your current physical address in the event of a medical emergency? [id number] [place] [address]   Are you able  to take your vital signs? No   Please attach any relevant images or files          Patient is a 64 y/o female that calls in for a cough x 2 week. She states it started as a dry cough but now it is productive. She denies SOB. She is coughing every 2 minutes in the evening. She uses Astalin and flonase for her rhinorrhea and allergies. She denies sinus congestion and pressure.     Cough  Pertinent negatives include no fever.       Constitution: Negative for fever.   Respiratory:  Positive for cough and sputum production.         Objective:   The physical exam was conducted virtually.  Physical Exam   Constitutional: She is oriented to person, place, and time. No distress.      Comments:She is laying in bed in no distress     HENT:   Head: Normocephalic and atraumatic.   Mouth/Throat: Oropharynx is clear and moist and mucous membranes are normal.   Eyes: Conjunctivae are normal. No scleral icterus.   Pulmonary/Chest: Effort normal. No respiratory distress.   Musculoskeletal: Normal range of motion.         General: Normal range of motion.   Neurological: She is alert and oriented to person, place, and time.   Skin: Skin is not diaphoretic.   Psychiatric: Her behavior is normal. Judgment and thought content normal.   Vitals reviewed.      Assessment:     1. Cough, unspecified type    Cough x 2 weeks and worsening. recommend in person evaluation to have someone listen to lungs and possible chest xray.     Plan:   I recommend with the cough being present for 2 weeks and worsening that she follow up in person with PCP or urgent care tomorrow for an in person evaluation. She understands and agrees with above plan.     Cough, unspecified type

## 2024-05-07 ENCOUNTER — OFFICE VISIT (OUTPATIENT)
Dept: FAMILY MEDICINE | Facility: CLINIC | Age: 64
End: 2024-05-07
Payer: COMMERCIAL

## 2024-05-07 ENCOUNTER — PATIENT MESSAGE (OUTPATIENT)
Dept: FAMILY MEDICINE | Facility: CLINIC | Age: 64
End: 2024-05-07

## 2024-05-07 ENCOUNTER — HOSPITAL ENCOUNTER (OUTPATIENT)
Dept: RADIOLOGY | Facility: HOSPITAL | Age: 64
Discharge: HOME | End: 2024-05-07
Attending: NURSE PRACTITIONER
Payer: COMMERCIAL

## 2024-05-07 VITALS
HEART RATE: 77 BPM | DIASTOLIC BLOOD PRESSURE: 79 MMHG | BODY MASS INDEX: 54.19 KG/M2 | SYSTOLIC BLOOD PRESSURE: 132 MMHG | WEIGHT: 287 LBS | TEMPERATURE: 99 F | RESPIRATION RATE: 14 BRPM | HEIGHT: 61 IN

## 2024-05-07 DIAGNOSIS — R06.2 WHEEZING: ICD-10-CM

## 2024-05-07 DIAGNOSIS — J06.9 UPPER RESPIRATORY TRACT INFECTION, UNSPECIFIED TYPE: Primary | ICD-10-CM

## 2024-05-07 DIAGNOSIS — R05.9 COUGH, UNSPECIFIED TYPE: ICD-10-CM

## 2024-05-07 LAB
CTP QC/QA: YES
CTP QC/QA: YES
POC MOLECULAR INFLUENZA A AGN: NEGATIVE
POC MOLECULAR INFLUENZA B AGN: NEGATIVE
SARS-COV-2 RDRP RESP QL NAA+PROBE: NEGATIVE

## 2024-05-07 PROCEDURE — 3078F DIAST BP <80 MM HG: CPT | Mod: CPTII,S$GLB,, | Performed by: NURSE PRACTITIONER

## 2024-05-07 PROCEDURE — 71046 X-RAY EXAM CHEST 2 VIEWS: CPT | Mod: TC,PO

## 2024-05-07 PROCEDURE — 71046 X-RAY EXAM CHEST 2 VIEWS: CPT | Mod: 26,,, | Performed by: RADIOLOGY

## 2024-05-07 PROCEDURE — 1159F MED LIST DOCD IN RCRD: CPT | Mod: CPTII,S$GLB,, | Performed by: NURSE PRACTITIONER

## 2024-05-07 PROCEDURE — 99213 OFFICE O/P EST LOW 20 MIN: CPT | Mod: S$GLB,,, | Performed by: NURSE PRACTITIONER

## 2024-05-07 PROCEDURE — 99999 PR PBB SHADOW E&M-EST. PATIENT-LVL V: CPT | Mod: PBBFAC,,, | Performed by: NURSE PRACTITIONER

## 2024-05-07 PROCEDURE — 3008F BODY MASS INDEX DOCD: CPT | Mod: CPTII,S$GLB,, | Performed by: NURSE PRACTITIONER

## 2024-05-07 PROCEDURE — 4010F ACE/ARB THERAPY RXD/TAKEN: CPT | Mod: CPTII,S$GLB,, | Performed by: NURSE PRACTITIONER

## 2024-05-07 PROCEDURE — 3075F SYST BP GE 130 - 139MM HG: CPT | Mod: CPTII,S$GLB,, | Performed by: NURSE PRACTITIONER

## 2024-05-07 PROCEDURE — 1160F RVW MEDS BY RX/DR IN RCRD: CPT | Mod: CPTII,S$GLB,, | Performed by: NURSE PRACTITIONER

## 2024-05-07 PROCEDURE — 87635 SARS-COV-2 COVID-19 AMP PRB: CPT | Mod: QW,S$GLB,, | Performed by: NURSE PRACTITIONER

## 2024-05-07 PROCEDURE — 87502 INFLUENZA DNA AMP PROBE: CPT | Mod: QW,S$GLB,, | Performed by: NURSE PRACTITIONER

## 2024-05-07 PROCEDURE — 3044F HG A1C LEVEL LT 7.0%: CPT | Mod: CPTII,S$GLB,, | Performed by: NURSE PRACTITIONER

## 2024-05-07 RX ORDER — BENZONATATE 200 MG/1
200 CAPSULE ORAL 3 TIMES DAILY PRN
Qty: 30 CAPSULE | Refills: 0 | Status: SHIPPED | OUTPATIENT
Start: 2024-05-07 | End: 2024-05-17

## 2024-05-07 RX ORDER — ALBUTEROL SULFATE 90 UG/1
2 AEROSOL, METERED RESPIRATORY (INHALATION) EVERY 6 HOURS PRN
Qty: 18 G | Refills: 0 | Status: SHIPPED | OUTPATIENT
Start: 2024-05-07

## 2024-05-07 RX ORDER — CEFDINIR 300 MG/1
300 CAPSULE ORAL 2 TIMES DAILY
Qty: 20 CAPSULE | Refills: 0 | Status: SHIPPED | OUTPATIENT
Start: 2024-05-07 | End: 2024-05-17

## 2024-05-07 NOTE — PATIENT INSTRUCTIONS
"Hydrate well  Rest  Albuterol can cause jitteriness, palpitations  F/U with PCP ASAP  Report to ER immediately if symptoms worsen or persist    Albuterol (salbutamol): Patient drug information  2024© UpToDate, Inc. and its affiliates and/or licensors. All Rights Reserved.  Access [employer] for additional drug information, tools, and databases.  Contributor Disclosures  For additional information see "Albuterol (salbutamol): Drug information" and "Albuterol (salbutamol): Pediatric drug information"    You must carefully read the "Consumer Information Use and Disclaimer" below in order to understand and correctly use this information.  Brand Names: [place]  ProAir Digihaler;     ProAir HFA [DSC];     ProAir RespiClick;     Proventil HFA;     Ventolin HFA    Brand Names: Clive  Airomir;     APO-Salbutamol HFA;     DOM-Salbutamol;     PMS-Salbutamol;     TEVA-Salbutamol;     TEVA-Salbutamol HFA;     Ventolin;     Ventolin Diskus;     Ventolin HFA;     Ventolin PF [DSC]    What is this drug used for?  It is used to open the airways in lung diseases where spasm may cause breathing problems.  It is used to prevent breathing problems that happen with exercise.    What do I need to tell my doctor BEFORE I take this drug?  All products:  If you are allergic to this drug; any part of this drug; or any other drugs, foods, or substances. Tell your doctor about the allergy and what signs you had.  If you are using another drug like this one. If you are not sure, ask your doctor or pharmacist.  All dry powder inhaler products:  If you have a milk allergy.  This is not a list of all drugs or health problems that interact with this drug.  Tell your doctor and pharmacist about all of your drugs (prescription or OTC, natural products, vitamins) and health problems. You must check to make sure that it is safe for you to take this drug with all of your drugs and health problems. Do not start, stop, or change the dose of any drug " without checking with your doctor.    What are some things I need to know or do while I take this drug?  Tell all of your health care providers that you take this drug. This includes your doctors, nurses, pharmacists, and dentists.  Do not take more of this drug or use it more often than you have been told. Deaths have happened when too much of this drug was taken. Talk with your doctor.  Call your doctor right away if your normal dose does not work well, if your signs get worse, or if you need to use this drug more often than normal.  If you have high blood sugar (diabetes), you will need to watch your blood sugar closely.  Tell your doctor if you are pregnant, plan on getting pregnant, or are breast-feeding. You will need to talk about the benefits and risks to you and the baby.    What are some side effects that I need to call my doctor about right away?  WARNING/CAUTION: Even though it may be rare, some people may have very bad and sometimes deadly side effects when taking a drug. Tell your doctor or get medical help right away if you have any of the following signs or symptoms that may be related to a very bad side effect:  All products:  Signs of an allergic reaction, like rash; hives; itching; red, swollen, blistered, or peeling skin with or without fever; wheezing; tightness in the chest or throat; trouble breathing, swallowing, or talking; unusual hoarseness; or swelling of the mouth, face, lips, tongue, or throat.  Signs of high blood pressure like very bad headache or dizziness, passing out, or change in eyesight.  Signs of low potassium levels like muscle pain or weakness, muscle cramps, or a heartbeat that does not feel normal.  Chest pain or pressure, a fast heartbeat, or an abnormal heartbeat.  This drug may sometimes cause severe breathing problems. This may be life-threatening. When this happens with an inhaler or with liquid for breathing in, most of the time it happens right after a dose and after  the first use of a new canister or vial of this drug. If you have trouble breathing, breathing that is worse, wheezing, or coughing, get medical help right away.  All dry powder inhaler products:  Pain when passing urine.  Trouble passing urine.  All oral products:  Signs of a severe skin reaction (Bruno-Olayinka syndrome, erythema multiforme) like red, swollen, blistered, or peeling skin (with or without fever); skin reaction that looks like rings; red or irritated eyes; or sores in the mouth, throat, nose, or eyes.    What are some other side effects of this drug?  All drugs may cause side effects. However, many people have no side effects or only have minor side effects. Call your doctor or get medical help if any of these side effects or any other side effects bother you or do not go away:  All products:  Feeling nervous and excitable.  Dizziness or headache.  Upset stomach or throwing up.  Shakiness.  All products for breathing in:  Throat irritation.  Runny nose.  All dry powder inhaler products:  Back pain.  Aches and pains. Mild pain drugs may help.  These are not all of the side effects that may occur. If you have questions about side effects, call your doctor. Call your doctor for medical advice about side effects.  You may report side effects to your national health agency.    How is this drug best taken?  Use this drug as ordered by your doctor. Read all information given to you. Follow all instructions closely.  All inhaler products:  For breathing into the lungs.  If you are using more than 1 inhaled drug, ask the doctor which drug to use first.  If using this drug to prevent breathing problems that happen with exercise, use 15 to 30 minutes before exercise.  Follow how to clean carefully.  Put the cap back on after you are done using your dose.  Inhaler (aerosol):  Shake well before use.  You will need to prime the inhaler before first use. You will also need to prime the inhaler if it has not been  "used for some time. Be sure you know when you need to prime the inhaler and how to do it. Talk with your doctor or pharmacist if you have questions.  Some inhalers need to be primed if dropped. Ask your doctor or pharmacist if you are not sure.  A spacer may be used with the inhaler for easy use.  This inhaler may have a dose counter to keep track of how many doses are left. If it does, throw the inhaler away when the dose counter reaches "0".  All dry powder inhaler products:  Close the device after each dose. Do not open the device unless a dose is being used.  Do not take the device apart or wash it. Do not use it with a spacer. Do not breathe out into the device.  Clean mouthpiece by wiping with a dry tissue or cloth. Do not wash or put in water.  This inhaler has a dose counter to keep track of how many doses are left. Throw away the inhaler when the dose counter reaches "0," 13 months after opening the foil pouch, or after the expiration date, whichever comes first.  Digital dry powder inhaler:  This product keeps track of inhaler event information and may be used with a smartphone steve. This product can be used without being connected to the smartphone steve. Be sure you know how to use this product and the smartphone steve. Follow what the doctor has told you to do. Read the package insert for details. If you have questions, talk with the doctor.  Liquid for breathing in:  For breathing in only as a liquid (solution) by a special machine (nebulizer) into the lungs.  Do not use if the solution is cloudy, leaking, or has particles.  Do not use if solution changes color.  Do not mix other drugs in nebulizer.  All oral products:  Take with or without food.  Oral liquid:  Measure liquid doses carefully. Use the measuring device that comes with this drug. If there is none, ask the pharmacist for a device to measure this drug.    What do I do if I miss a dose?  If you use this drug on a regular basis, use a missed dose " as soon as you think about it.  If it is close to the time for your next dose, skip the missed dose and go back to your normal time.  Do not use 2 doses at the same time or extra doses.  Many times this drug is used on an as needed basis. Do not use more often than told by the doctor.    How do I store and/or throw out this drug?  All products:  Store at room temperature protected from light. Store in a dry place. Do not store in a bathroom.  Keep all drugs in a safe place. Keep all drugs out of the reach of children and pets.  Throw away unused or  drugs. Do not flush down a toilet or pour down a drain unless you are told to do so. Check with your pharmacist if you have questions about the best way to throw out drugs. There may be drug take-back programs in your area.  All inhaler products:  Protect from cold.  If the inhaler comes in a foil pouch, store in the foil pouch until ready for use.  Inhaler (aerosol):  Protect from heat and sunlight. Do not puncture or burn even if it seems empty.  Throw away the inhaler after the most number of sprays have been used, even if it feels like there is more drug in the canister.  Store with the mouthpiece down.  All dry powder inhaler products:  Protect from heat.  Liquid for breathing in:  Store unused containers in foil pouch until use.  Check how long vials may be stored once the pouch has been opened.    General drug facts  If your symptoms or health problems do not get better or if they become worse, call your doctor.  Do not share your drugs with others and do not take anyone else's drugs.  Some drugs may have another patient information leaflet. If you have any questions about this drug, please talk with your doctor, nurse, pharmacist, or other health care provider.  If you think there has been an overdose, call your poison control center or get medical care right away. Be ready to tell or show what was taken, how much, and when it happened.     Statement Selected

## 2024-05-07 NOTE — PROGRESS NOTES
Subjective     Patient ID: Tawny Valerio is a 63 y.o. female.    Chief Complaint: Cough    Cough  This is a new problem. The current episode started 1 to 4 weeks ago. The problem has been gradually worsening. The problem occurs every few minutes. The cough is Non-productive. Associated symptoms include rhinorrhea and wheezing. Pertinent negatives include no chest pain, chills, ear congestion, ear pain, fever, headaches, heartburn, hemoptysis, myalgias, nasal congestion, postnasal drip, rash, sore throat, shortness of breath, sweats or weight loss. The symptoms are aggravated by lying down. The treatment provided mild relief. Her past medical history is significant for bronchitis. There is no history of asthma, bronchiectasis, COPD, emphysema, environmental allergies or pneumonia.     Past Medical History:   Diagnosis Date    Acute cystitis without hematuria 09/04/2019    Anxiety     Colon cancer screening 09/26/2019    Diabetes mellitus, type 2 2016    Endometrial ca 07/24/2019    Essential hypertension 05/14/2018    Hyperlipidemia LDL goal <70 10/30/2023    Simple endometrial hyperplasia 03/15/2019    Uterine polyp 03/14/2019     Social History     Socioeconomic History    Marital status:    Occupational History     Employer: [employer]     Tobacco Use    Smoking status: Never    Smokeless tobacco: Never   Substance and Sexual Activity    Alcohol use: Never    Drug use: No    Sexual activity: Yes     Partners: Male     Birth control/protection: Post-menopausal, None     Comment: Hysterectomy     Social Determinants of Health     Financial Resource Strain: Low Risk  (4/8/2024)    Received from [facility]    Overall Financial Resource Strain (CARDIA)     Difficulty of Paying Living Expenses: Not hard at all   Food Insecurity: No Food Insecurity (4/8/2024)    Received from [facility]    Hunger Vital Sign     Worried About Running Out of Food in the Last Year: Never true     Ran Out of Food in the  Last Year: Never true   Transportation Needs: No Transportation Needs (2024)    Received from [facility]    PRAPARE - Transportation     Lack of Transportation (Medical): No     Lack of Transportation (Non-Medical): No   Physical Activity: Inactive (2024)    Received from [facility]    Exercise Vital Sign     Days of Exercise per Week: 0 days     Minutes of Exercise per Session: 0 min   Stress: Stress Concern Present (2024)    Received from [facility]    [place] [employer] - Occupational Stress Questionnaire     Feeling of Stress : To some extent   Housing Stability: Low Risk  (3/12/2024)    Housing Stability Vital Sign     Unable to Pay for Housing in the Last Year: No     Number of Places Lived in the Last Year: 1     Unstable Housing in the Last Year: No     Past Surgical History:   Procedure Laterality Date     SECTION      x 1    DILATION AND CURETTAGE OF UTERUS  2019    HYSTERECTOMY  19    HYSTEROSCOPY WITH DILATION AND CURETTAGE OF UTERUS N/A 2019    Procedure: HYSTEROSCOPY, WITH DILATION AND CURETTAGE OF UTERUS;  Surgeon: Bobby Frazier Jr., MD;  Location: [facility];  Service: OB/GYN;  Laterality: N/A;    lipoma removed      ROBOT-ASSISTED LAPAROSCOPIC ABDOMINAL HYSTERECTOMY USING DA AWAIS XI N/A 2019    Procedure: XI ROBOTIC HYSTERECTOMY;  Surgeon: Aneudy Almeida MD;  Location: [address];  Service: OB/GYN;  Laterality: N/A;    ROBOT-ASSISTED LAPAROSCOPIC LYSIS OF ADHESIONS USING DA AWAIS XI N/A 2019    Procedure: XI ROBOTIC LYSIS, ADHESIONS;  Surgeon: Aneudy Almeida MD;  [address];  Service: OB/GYN;  Laterality: N/A;  Omental    ROBOT-ASSISTED LAPAROSCOPIC SALPINGO-OOPHORECTOMY USING DA AWAIS XI Bilateral 2019    Procedure: XI ROBOTIC SALPINGO-OOPHORECTOMY;  Surgeon: Aneudy Almeida MD;  Location: [address];  Service: OB/GYN;  Laterality: Bilateral;  Wt 299lbs       Review of Systems    Constitutional: Negative.  Negative for chills, fever and weight loss.   HENT:  Positive for rhinorrhea. Negative for ear pain, postnasal drip and sore throat.    Eyes: Negative.    Respiratory:  Positive for cough and wheezing. Negative for hemoptysis and shortness of breath.    Cardiovascular: Negative.  Negative for chest pain.   Gastrointestinal: Negative.  Negative for heartburn.   Endocrine: Negative.    Genitourinary: Negative.    Musculoskeletal: Negative.  Negative for myalgias.   Integumentary:  Negative for rash. Negative.   Allergic/Immunologic: Negative.  Negative for environmental allergies.   Neurological: Negative.  Negative for headaches.   Psychiatric/Behavioral: Negative.            Objective     Physical Exam  Vitals and nursing note reviewed.   Constitutional:       Appearance: Normal appearance. She is obese.   HENT:      Head: Normocephalic.      Right Ear: Tympanic membrane, ear canal and external ear normal.      Left Ear: Tympanic membrane, ear canal and external ear normal.      Nose: Rhinorrhea present.      Mouth/Throat:      Mouth: Mucous membranes are moist.      Pharynx: Oropharynx is clear.   Eyes:      Conjunctiva/sclera: Conjunctivae normal.      Pupils: Pupils are equal, round, and reactive to light.   Cardiovascular:      Rate and Rhythm: Normal rate and regular rhythm.      Pulses: Normal pulses.      Heart sounds: Normal heart sounds.   Pulmonary:      Effort: Pulmonary effort is normal.      Breath sounds: Examination of the right-upper field reveals wheezing. Examination of the left-upper field reveals wheezing. Wheezing present.   Abdominal:      General: Bowel sounds are normal.      Palpations: Abdomen is soft.   Musculoskeletal:         General: Normal range of motion.      Cervical back: Normal range of motion and neck supple.   Skin:     General: Skin is warm and dry.      Capillary Refill: Capillary refill takes 2 to 3 seconds.   Neurological:      Mental Status: She  is alert and oriented to person, place, and time.   Psychiatric:         Mood and Affect: Mood normal.         Behavior: Behavior normal.         Thought Content: Thought content normal.         Judgment: Judgment normal.            Assessment and Plan     1. Upper respiratory tract infection, unspecified type  2. Wheezing  3. Cough, unspecified type  -     POCT COVID-19 Rapid Screening  -     POCT Influenza A/B Molecular  -     X-Ray Chest PA And Lateral; Future; Expected date: 05/07/2024        -     albuterol (PROVENTIL/VENTOLIN HFA) 90 mcg/actuation inhaler; Inhale 2 puffs into the lungs every 6 (six) hours as needed for Wheezing or Shortness of Breath.  Dispense: 18 g; Refill: 0  -     benzonatate (TESSALON) 200 MG capsule; Take 1 capsule (200 mg total) by mouth 3 (three) times daily as needed.  Dispense: 30 capsule; Refill: 0  -     cefdinir (OMNICEF) 300 MG capsule; Take 1 capsule (300 mg total) by mouth 2 (two) times daily. for 10 days  Hydrate well  Rest  F/U with PCP ASAP  Report to ER immediately if symptoms worsen or persist                 No follow-ups on file.

## 2024-05-29 ENCOUNTER — PATIENT MESSAGE (OUTPATIENT)
Dept: INTERNAL MEDICINE | Facility: CLINIC | Age: 64
End: 2024-05-29
Payer: COMMERCIAL

## 2024-05-30 ENCOUNTER — HOSPITAL ENCOUNTER (OUTPATIENT)
Facility: HOSPITAL | Age: 64
Discharge: HOME | End: 2024-05-31
Attending: EMERGENCY MEDICINE | Admitting: STUDENT IN AN ORGANIZED HEALTH CARE EDUCATION/TRAINING PROGRAM
Payer: COMMERCIAL

## 2024-05-30 DIAGNOSIS — K35.30 ACUTE APPENDICITIS WITH LOCALIZED PERITONITIS, WITHOUT PERFORATION, ABSCESS, OR GANGRENE: Primary | ICD-10-CM

## 2024-05-30 LAB
ALBUMIN SERPL BCP-MCNC: 3.3 G/DL (ref 3.5–5.2)
ALP SERPL-CCNC: 99 U/L (ref 55–135)
ALT SERPL W/O P-5'-P-CCNC: 19 U/L (ref 10–44)
ANION GAP SERPL CALC-SCNC: 10 MMOL/L (ref 8–16)
AST SERPL-CCNC: 26 U/L (ref 10–40)
BACTERIA #/AREA URNS AUTO: ABNORMAL /HPF
BASOPHILS # BLD AUTO: 0.04 K/UL (ref 0–0.2)
BASOPHILS NFR BLD: 0.3 % (ref 0–1.9)
BILIRUB SERPL-MCNC: 0.6 MG/DL (ref 0.1–1)
BILIRUB UR QL STRIP: NEGATIVE
BUN SERPL-MCNC: 11 MG/DL (ref 8–23)
CALCIUM SERPL-MCNC: 9.7 MG/DL (ref 8.7–10.5)
CHLORIDE SERPL-SCNC: 103 MMOL/L (ref 95–110)
CLARITY UR REFRACT.AUTO: CLEAR
CO2 SERPL-SCNC: 23 MMOL/L (ref 23–29)
COLOR UR AUTO: YELLOW
CREAT SERPL-MCNC: 0.9 MG/DL (ref 0.5–1.4)
DIFFERENTIAL METHOD BLD: ABNORMAL
EOSINOPHIL # BLD AUTO: 0.2 K/UL (ref 0–0.5)
EOSINOPHIL NFR BLD: 1 % (ref 0–8)
ERYTHROCYTE [DISTWIDTH] IN BLOOD BY AUTOMATED COUNT: 16 % (ref 11.5–14.5)
EST. GFR  (NO RACE VARIABLE): >60 ML/MIN/1.73 M^2
GLUCOSE SERPL-MCNC: 100 MG/DL (ref 70–110)
GLUCOSE UR QL STRIP: ABNORMAL
HCT VFR BLD AUTO: 45.8 % (ref 37–48.5)
HCV AB SERPL QL IA: NORMAL
HGB BLD-MCNC: 14.5 G/DL (ref 12–16)
HGB UR QL STRIP: ABNORMAL
HIV 1+2 AB+HIV1 P24 AG SERPL QL IA: NORMAL
IMM GRANULOCYTES # BLD AUTO: 0.06 K/UL (ref 0–0.04)
IMM GRANULOCYTES NFR BLD AUTO: 0.4 % (ref 0–0.5)
KETONES UR QL STRIP: ABNORMAL
LEUKOCYTE ESTERASE UR QL STRIP: NEGATIVE
LIPASE SERPL-CCNC: 8 U/L (ref 4–60)
LYMPHOCYTES # BLD AUTO: 2.8 K/UL (ref 1–4.8)
LYMPHOCYTES NFR BLD: 17.5 % (ref 18–48)
MCH RBC QN AUTO: 27.7 PG (ref 27–31)
MCHC RBC AUTO-ENTMCNC: 31.7 G/DL (ref 32–36)
MCV RBC AUTO: 88 FL (ref 82–98)
MICROSCOPIC COMMENT: ABNORMAL
MONOCYTES # BLD AUTO: 1 K/UL (ref 0.3–1)
MONOCYTES NFR BLD: 6.3 % (ref 4–15)
NEUTROPHILS # BLD AUTO: 11.7 K/UL (ref 1.8–7.7)
NEUTROPHILS NFR BLD: 74.5 % (ref 38–73)
NITRITE UR QL STRIP: NEGATIVE
NRBC BLD-RTO: 0 /100 WBC
PH UR STRIP: 6 [PH] (ref 5–8)
PLATELET # BLD AUTO: 298 K/UL (ref 150–450)
PMV BLD AUTO: 10 FL (ref 9.2–12.9)
POTASSIUM SERPL-SCNC: 4.9 MMOL/L (ref 3.5–5.1)
PROT SERPL-MCNC: 8.5 G/DL (ref 6–8.4)
PROT UR QL STRIP: ABNORMAL
RBC # BLD AUTO: 5.23 M/UL (ref 4–5.4)
RBC #/AREA URNS AUTO: 10 /HPF (ref 0–4)
SODIUM SERPL-SCNC: 136 MMOL/L (ref 136–145)
SP GR UR STRIP: >=1.03 (ref 1–1.03)
SQUAMOUS #/AREA URNS AUTO: 1 /HPF
URN SPEC COLLECT METH UR: ABNORMAL
WBC # BLD AUTO: 15.67 K/UL (ref 3.9–12.7)
WBC #/AREA URNS AUTO: 11 /HPF (ref 0–5)
YEAST UR QL AUTO: ABNORMAL

## 2024-05-30 PROCEDURE — 96361 HYDRATE IV INFUSION ADD-ON: CPT

## 2024-05-30 PROCEDURE — 87086 URINE CULTURE/COLONY COUNT: CPT | Performed by: EMERGENCY MEDICINE

## 2024-05-30 PROCEDURE — 25500020 PHARM REV CODE 255: Performed by: EMERGENCY MEDICINE

## 2024-05-30 PROCEDURE — 80053 COMPREHEN METABOLIC PANEL: CPT | Performed by: EMERGENCY MEDICINE

## 2024-05-30 PROCEDURE — 87186 SC STD MICRODIL/AGAR DIL: CPT | Performed by: EMERGENCY MEDICINE

## 2024-05-30 PROCEDURE — 87077 CULTURE AEROBIC IDENTIFY: CPT | Performed by: EMERGENCY MEDICINE

## 2024-05-30 PROCEDURE — 99285 EMERGENCY DEPT VISIT HI MDM: CPT | Mod: 25

## 2024-05-30 PROCEDURE — 81001 URINALYSIS AUTO W/SCOPE: CPT | Performed by: EMERGENCY MEDICINE

## 2024-05-30 PROCEDURE — 96375 TX/PRO/DX INJ NEW DRUG ADDON: CPT

## 2024-05-30 PROCEDURE — 86803 HEPATITIS C AB TEST: CPT | Performed by: PHYSICIAN ASSISTANT

## 2024-05-30 PROCEDURE — 83690 ASSAY OF LIPASE: CPT | Performed by: EMERGENCY MEDICINE

## 2024-05-30 PROCEDURE — 96365 THER/PROPH/DIAG IV INF INIT: CPT

## 2024-05-30 PROCEDURE — 63600175 PHARM REV CODE 636 W HCPCS: Performed by: EMERGENCY MEDICINE

## 2024-05-30 PROCEDURE — 87088 URINE BACTERIA CULTURE: CPT | Performed by: EMERGENCY MEDICINE

## 2024-05-30 PROCEDURE — 87389 HIV-1 AG W/HIV-1&-2 AB AG IA: CPT | Performed by: PHYSICIAN ASSISTANT

## 2024-05-30 PROCEDURE — 85025 COMPLETE CBC W/AUTO DIFF WBC: CPT | Performed by: EMERGENCY MEDICINE

## 2024-05-30 PROCEDURE — 25000003 PHARM REV CODE 250: Performed by: EMERGENCY MEDICINE

## 2024-05-30 RX ORDER — ONDANSETRON HYDROCHLORIDE 2 MG/ML
4 INJECTION, SOLUTION INTRAVENOUS
Status: COMPLETED | OUTPATIENT
Start: 2024-05-30 | End: 2024-05-30

## 2024-05-30 RX ORDER — MORPHINE SULFATE 4 MG/ML
4 INJECTION, SOLUTION INTRAMUSCULAR; INTRAVENOUS
Status: COMPLETED | OUTPATIENT
Start: 2024-05-30 | End: 2024-05-30

## 2024-05-30 RX ADMIN — MORPHINE SULFATE 4 MG: 4 INJECTION INTRAVENOUS at 09:05

## 2024-05-30 RX ADMIN — IOHEXOL 100 ML: 350 INJECTION, SOLUTION INTRAVENOUS at 09:05

## 2024-05-30 RX ADMIN — ONDANSETRON 4 MG: 2 INJECTION INTRAMUSCULAR; INTRAVENOUS at 09:05

## 2024-05-30 RX ADMIN — PIPERACILLIN SODIUM AND TAZOBACTAM SODIUM 4.5 G: 4; .5 INJECTION, POWDER, FOR SOLUTION INTRAVENOUS at 11:05

## 2024-05-30 RX ADMIN — SODIUM CHLORIDE 1000 ML: 9 INJECTION, SOLUTION INTRAVENOUS at 09:05

## 2024-05-31 ENCOUNTER — NURSE TRIAGE (OUTPATIENT)
Dept: ADMINISTRATIVE | Facility: CLINIC | Age: 64
End: 2024-05-31
Payer: COMMERCIAL

## 2024-05-31 ENCOUNTER — ANESTHESIA EVENT (OUTPATIENT)
Dept: SURGERY | Facility: HOSPITAL | Age: 64
End: 2024-05-31
Payer: COMMERCIAL

## 2024-05-31 ENCOUNTER — ANESTHESIA (OUTPATIENT)
Dept: SURGERY | Facility: HOSPITAL | Age: 64
End: 2024-05-31
Payer: COMMERCIAL

## 2024-05-31 VITALS
BODY MASS INDEX: 53.81 KG/M2 | HEIGHT: 61 IN | HEART RATE: 78 BPM | SYSTOLIC BLOOD PRESSURE: 124 MMHG | RESPIRATION RATE: 12 BRPM | DIASTOLIC BLOOD PRESSURE: 59 MMHG | WEIGHT: 285 LBS | TEMPERATURE: 98 F | OXYGEN SATURATION: 95 %

## 2024-05-31 PROBLEM — K35.80 ACUTE APPENDICITIS: Status: ACTIVE | Noted: 2024-05-31

## 2024-05-31 LAB
ALBUMIN SERPL BCP-MCNC: 3.1 G/DL (ref 3.5–5.2)
ALP SERPL-CCNC: 99 U/L (ref 55–135)
ALT SERPL W/O P-5'-P-CCNC: 18 U/L (ref 10–44)
ANION GAP SERPL CALC-SCNC: 12 MMOL/L (ref 8–16)
AST SERPL-CCNC: 13 U/L (ref 10–40)
BASOPHILS # BLD AUTO: 0.05 K/UL (ref 0–0.2)
BASOPHILS NFR BLD: 0.4 % (ref 0–1.9)
BILIRUB SERPL-MCNC: 0.6 MG/DL (ref 0.1–1)
BUN SERPL-MCNC: 13 MG/DL (ref 8–23)
CALCIUM SERPL-MCNC: 8.6 MG/DL (ref 8.7–10.5)
CHLORIDE SERPL-SCNC: 105 MMOL/L (ref 95–110)
CO2 SERPL-SCNC: 19 MMOL/L (ref 23–29)
CREAT SERPL-MCNC: 0.9 MG/DL (ref 0.5–1.4)
DIFFERENTIAL METHOD BLD: ABNORMAL
EOSINOPHIL # BLD AUTO: 0.1 K/UL (ref 0–0.5)
EOSINOPHIL NFR BLD: 0.7 % (ref 0–8)
ERYTHROCYTE [DISTWIDTH] IN BLOOD BY AUTOMATED COUNT: 15.9 % (ref 11.5–14.5)
EST. GFR  (NO RACE VARIABLE): >60 ML/MIN/1.73 M^2
GLUCOSE SERPL-MCNC: 138 MG/DL (ref 70–110)
HCT VFR BLD AUTO: 42.1 % (ref 37–48.5)
HGB BLD-MCNC: 13 G/DL (ref 12–16)
IMM GRANULOCYTES # BLD AUTO: 0.07 K/UL (ref 0–0.04)
IMM GRANULOCYTES NFR BLD AUTO: 0.6 % (ref 0–0.5)
LYMPHOCYTES # BLD AUTO: 1.7 K/UL (ref 1–4.8)
LYMPHOCYTES NFR BLD: 13.9 % (ref 18–48)
MAGNESIUM SERPL-MCNC: 2.3 MG/DL (ref 1.6–2.6)
MCH RBC QN AUTO: 27.4 PG (ref 27–31)
MCHC RBC AUTO-ENTMCNC: 30.9 G/DL (ref 32–36)
MCV RBC AUTO: 89 FL (ref 82–98)
MONOCYTES # BLD AUTO: 0.6 K/UL (ref 0.3–1)
MONOCYTES NFR BLD: 5.2 % (ref 4–15)
NEUTROPHILS # BLD AUTO: 9.7 K/UL (ref 1.8–7.7)
NEUTROPHILS NFR BLD: 79.2 % (ref 38–73)
NRBC BLD-RTO: 0 /100 WBC
PHOSPHATE SERPL-MCNC: 3 MG/DL (ref 2.7–4.5)
PLATELET # BLD AUTO: 253 K/UL (ref 150–450)
PMV BLD AUTO: 9.9 FL (ref 9.2–12.9)
POCT GLUCOSE: 119 MG/DL (ref 70–110)
POCT GLUCOSE: 151 MG/DL (ref 70–110)
POTASSIUM SERPL-SCNC: 4.1 MMOL/L (ref 3.5–5.1)
PROT SERPL-MCNC: 7.4 G/DL (ref 6–8.4)
RBC # BLD AUTO: 4.74 M/UL (ref 4–5.4)
SODIUM SERPL-SCNC: 136 MMOL/L (ref 136–145)
WBC # BLD AUTO: 12.28 K/UL (ref 3.9–12.7)

## 2024-05-31 PROCEDURE — 25000003 PHARM REV CODE 250: Performed by: ANESTHESIOLOGY

## 2024-05-31 PROCEDURE — 71000044 HC DOSC ROUTINE RECOVERY FIRST HOUR: Performed by: STUDENT IN AN ORGANIZED HEALTH CARE EDUCATION/TRAINING PROGRAM

## 2024-05-31 PROCEDURE — 83735 ASSAY OF MAGNESIUM: CPT | Performed by: STUDENT IN AN ORGANIZED HEALTH CARE EDUCATION/TRAINING PROGRAM

## 2024-05-31 PROCEDURE — 25000003 PHARM REV CODE 250: Performed by: NURSE ANESTHETIST, CERTIFIED REGISTERED

## 2024-05-31 PROCEDURE — G0378 HOSPITAL OBSERVATION PER HR: HCPCS

## 2024-05-31 PROCEDURE — 25000003 PHARM REV CODE 250: Performed by: STUDENT IN AN ORGANIZED HEALTH CARE EDUCATION/TRAINING PROGRAM

## 2024-05-31 PROCEDURE — 82962 GLUCOSE BLOOD TEST: CPT | Performed by: STUDENT IN AN ORGANIZED HEALTH CARE EDUCATION/TRAINING PROGRAM

## 2024-05-31 PROCEDURE — 63600175 PHARM REV CODE 636 W HCPCS: Performed by: EMERGENCY MEDICINE

## 2024-05-31 PROCEDURE — 37000008 HC ANESTHESIA 1ST 15 MINUTES: Performed by: STUDENT IN AN ORGANIZED HEALTH CARE EDUCATION/TRAINING PROGRAM

## 2024-05-31 PROCEDURE — D9220A PRA ANESTHESIA: Mod: ANES,,, | Performed by: ANESTHESIOLOGY

## 2024-05-31 PROCEDURE — 63600175 PHARM REV CODE 636 W HCPCS: Performed by: STUDENT IN AN ORGANIZED HEALTH CARE EDUCATION/TRAINING PROGRAM

## 2024-05-31 PROCEDURE — 99223 1ST HOSP IP/OBS HIGH 75: CPT | Mod: 57,,, | Performed by: STUDENT IN AN ORGANIZED HEALTH CARE EDUCATION/TRAINING PROGRAM

## 2024-05-31 PROCEDURE — 27201423 OPTIME MED/SURG SUP & DEVICES STERILE SUPPLY: Performed by: STUDENT IN AN ORGANIZED HEALTH CARE EDUCATION/TRAINING PROGRAM

## 2024-05-31 PROCEDURE — 84100 ASSAY OF PHOSPHORUS: CPT | Performed by: STUDENT IN AN ORGANIZED HEALTH CARE EDUCATION/TRAINING PROGRAM

## 2024-05-31 PROCEDURE — 71000016 HC POSTOP RECOV ADDL HR: Performed by: STUDENT IN AN ORGANIZED HEALTH CARE EDUCATION/TRAINING PROGRAM

## 2024-05-31 PROCEDURE — 71000015 HC POSTOP RECOV 1ST HR: Performed by: STUDENT IN AN ORGANIZED HEALTH CARE EDUCATION/TRAINING PROGRAM

## 2024-05-31 PROCEDURE — 96376 TX/PRO/DX INJ SAME DRUG ADON: CPT

## 2024-05-31 PROCEDURE — 36000708 HC OR TIME LEV III 1ST 15 MIN: Performed by: STUDENT IN AN ORGANIZED HEALTH CARE EDUCATION/TRAINING PROGRAM

## 2024-05-31 PROCEDURE — 63600175 PHARM REV CODE 636 W HCPCS: Performed by: NURSE ANESTHETIST, CERTIFIED REGISTERED

## 2024-05-31 PROCEDURE — 88304 TISSUE EXAM BY PATHOLOGIST: CPT | Performed by: PATHOLOGY

## 2024-05-31 PROCEDURE — 88304 TISSUE EXAM BY PATHOLOGIST: CPT | Mod: 26,,, | Performed by: PATHOLOGY

## 2024-05-31 PROCEDURE — 63600175 PHARM REV CODE 636 W HCPCS: Mod: JZ,JG | Performed by: STUDENT IN AN ORGANIZED HEALTH CARE EDUCATION/TRAINING PROGRAM

## 2024-05-31 PROCEDURE — 80053 COMPREHEN METABOLIC PANEL: CPT | Performed by: STUDENT IN AN ORGANIZED HEALTH CARE EDUCATION/TRAINING PROGRAM

## 2024-05-31 PROCEDURE — 63600175 PHARM REV CODE 636 W HCPCS: Performed by: ANESTHESIOLOGY

## 2024-05-31 PROCEDURE — 36000709 HC OR TIME LEV III EA ADD 15 MIN: Performed by: STUDENT IN AN ORGANIZED HEALTH CARE EDUCATION/TRAINING PROGRAM

## 2024-05-31 PROCEDURE — 85025 COMPLETE CBC W/AUTO DIFF WBC: CPT | Performed by: STUDENT IN AN ORGANIZED HEALTH CARE EDUCATION/TRAINING PROGRAM

## 2024-05-31 PROCEDURE — 37000009 HC ANESTHESIA EA ADD 15 MINS: Performed by: STUDENT IN AN ORGANIZED HEALTH CARE EDUCATION/TRAINING PROGRAM

## 2024-05-31 PROCEDURE — D9220A PRA ANESTHESIA: Mod: CRNA,,, | Performed by: NURSE ANESTHETIST, CERTIFIED REGISTERED

## 2024-05-31 PROCEDURE — 44970 LAPAROSCOPY APPENDECTOMY: CPT | Mod: ,,, | Performed by: STUDENT IN AN ORGANIZED HEALTH CARE EDUCATION/TRAINING PROGRAM

## 2024-05-31 RX ORDER — METHOCARBAMOL 500 MG/1
500 TABLET, FILM COATED ORAL 4 TIMES DAILY
Qty: 40 TABLET | Refills: 0 | Status: SHIPPED | OUTPATIENT
Start: 2024-05-31 | End: 2024-06-10

## 2024-05-31 RX ORDER — PROPOFOL 10 MG/ML
VIAL (ML) INTRAVENOUS
Status: DISCONTINUED | OUTPATIENT
Start: 2024-05-31 | End: 2024-05-31

## 2024-05-31 RX ORDER — TALC
6 POWDER (GRAM) TOPICAL NIGHTLY PRN
Status: DISCONTINUED | OUTPATIENT
Start: 2024-05-31 | End: 2024-05-31 | Stop reason: HOSPADM

## 2024-05-31 RX ORDER — SODIUM CHLORIDE 0.9 % (FLUSH) 0.9 %
10 SYRINGE (ML) INJECTION
Status: DISCONTINUED | OUTPATIENT
Start: 2024-05-31 | End: 2024-05-31 | Stop reason: HOSPADM

## 2024-05-31 RX ORDER — MIDAZOLAM HYDROCHLORIDE 1 MG/ML
INJECTION INTRAMUSCULAR; INTRAVENOUS
Status: DISCONTINUED | OUTPATIENT
Start: 2024-05-31 | End: 2024-05-31

## 2024-05-31 RX ORDER — ONDANSETRON HYDROCHLORIDE 2 MG/ML
INJECTION, SOLUTION INTRAVENOUS
Status: DISCONTINUED | OUTPATIENT
Start: 2024-05-31 | End: 2024-05-31

## 2024-05-31 RX ORDER — INSULIN ASPART 100 [IU]/ML
0-10 INJECTION, SOLUTION INTRAVENOUS; SUBCUTANEOUS
Status: DISCONTINUED | OUTPATIENT
Start: 2024-05-31 | End: 2024-05-31 | Stop reason: HOSPADM

## 2024-05-31 RX ORDER — DEXAMETHASONE SODIUM PHOSPHATE 4 MG/ML
INJECTION, SOLUTION INTRA-ARTICULAR; INTRALESIONAL; INTRAMUSCULAR; INTRAVENOUS; SOFT TISSUE
Status: DISCONTINUED | OUTPATIENT
Start: 2024-05-31 | End: 2024-05-31

## 2024-05-31 RX ORDER — SODIUM CHLORIDE, SODIUM LACTATE, POTASSIUM CHLORIDE, CALCIUM CHLORIDE 600; 310; 30; 20 MG/100ML; MG/100ML; MG/100ML; MG/100ML
INJECTION, SOLUTION INTRAVENOUS CONTINUOUS
Status: DISCONTINUED | OUTPATIENT
Start: 2024-05-31 | End: 2024-05-31 | Stop reason: HOSPADM

## 2024-05-31 RX ORDER — ONDANSETRON HYDROCHLORIDE 2 MG/ML
INJECTION, SOLUTION INTRAVENOUS
Status: DISCONTINUED
Start: 2024-05-31 | End: 2024-05-31 | Stop reason: HOSPADM

## 2024-05-31 RX ORDER — SUCCINYLCHOLINE CHLORIDE 20 MG/ML
INJECTION INTRAMUSCULAR; INTRAVENOUS
Status: DISCONTINUED | OUTPATIENT
Start: 2024-05-31 | End: 2024-05-31

## 2024-05-31 RX ORDER — ONDANSETRON 8 MG/1
8 TABLET, ORALLY DISINTEGRATING ORAL EVERY 8 HOURS PRN
Status: DISCONTINUED | OUTPATIENT
Start: 2024-05-31 | End: 2024-05-31 | Stop reason: HOSPADM

## 2024-05-31 RX ORDER — NAPROXEN SODIUM 220 MG/1
81 TABLET, FILM COATED ORAL DAILY
Status: DISCONTINUED | OUTPATIENT
Start: 2024-05-31 | End: 2024-05-31 | Stop reason: HOSPADM

## 2024-05-31 RX ORDER — ACETAMINOPHEN 325 MG/1
650 TABLET ORAL EVERY 8 HOURS PRN
Status: DISCONTINUED | OUTPATIENT
Start: 2024-05-31 | End: 2024-05-31 | Stop reason: HOSPADM

## 2024-05-31 RX ORDER — IBUPROFEN 200 MG
24 TABLET ORAL
Status: DISCONTINUED | OUTPATIENT
Start: 2024-05-31 | End: 2024-05-31 | Stop reason: HOSPADM

## 2024-05-31 RX ORDER — SCOLOPAMINE TRANSDERMAL SYSTEM 1 MG/1
1 PATCH, EXTENDED RELEASE TRANSDERMAL
Status: DISCONTINUED | OUTPATIENT
Start: 2024-05-31 | End: 2024-05-31 | Stop reason: HOSPADM

## 2024-05-31 RX ORDER — ROCURONIUM BROMIDE 10 MG/ML
INJECTION, SOLUTION INTRAVENOUS
Status: DISCONTINUED | OUTPATIENT
Start: 2024-05-31 | End: 2024-05-31

## 2024-05-31 RX ORDER — MORPHINE SULFATE 4 MG/ML
4 INJECTION, SOLUTION INTRAMUSCULAR; INTRAVENOUS
Status: COMPLETED | OUTPATIENT
Start: 2024-05-31 | End: 2024-05-31

## 2024-05-31 RX ORDER — ACETAMINOPHEN 500 MG
1000 TABLET ORAL EVERY 8 HOURS
Status: DISCONTINUED | OUTPATIENT
Start: 2024-05-31 | End: 2024-05-31 | Stop reason: HOSPADM

## 2024-05-31 RX ORDER — IBUPROFEN 200 MG
16 TABLET ORAL
Status: DISCONTINUED | OUTPATIENT
Start: 2024-05-31 | End: 2024-05-31 | Stop reason: HOSPADM

## 2024-05-31 RX ORDER — ONDANSETRON 4 MG/1
8 TABLET, ORALLY DISINTEGRATING ORAL EVERY 8 HOURS PRN
Qty: 30 TABLET | Refills: 0 | Status: SHIPPED | OUTPATIENT
Start: 2024-05-31

## 2024-05-31 RX ORDER — OXYCODONE HYDROCHLORIDE 5 MG/1
5 TABLET ORAL EVERY 6 HOURS PRN
Status: DISCONTINUED | OUTPATIENT
Start: 2024-05-31 | End: 2024-05-31 | Stop reason: HOSPADM

## 2024-05-31 RX ORDER — BUPROPION HYDROCHLORIDE 75 MG/1
150 TABLET ORAL 2 TIMES DAILY
Status: DISCONTINUED | OUTPATIENT
Start: 2024-05-31 | End: 2024-05-31 | Stop reason: HOSPADM

## 2024-05-31 RX ORDER — GLUCAGON 1 MG
1 KIT INJECTION
Status: DISCONTINUED | OUTPATIENT
Start: 2024-05-31 | End: 2024-05-31 | Stop reason: HOSPADM

## 2024-05-31 RX ORDER — SODIUM CHLORIDE 9 MG/ML
INJECTION, SOLUTION INTRAVENOUS CONTINUOUS
Status: DISCONTINUED | OUTPATIENT
Start: 2024-05-31 | End: 2024-05-31

## 2024-05-31 RX ORDER — ONDANSETRON HYDROCHLORIDE 2 MG/ML
8 INJECTION, SOLUTION INTRAVENOUS ONCE
Status: COMPLETED | OUTPATIENT
Start: 2024-05-31 | End: 2024-05-31

## 2024-05-31 RX ORDER — OXYCODONE HYDROCHLORIDE 5 MG/1
5 TABLET ORAL EVERY 4 HOURS PRN
Qty: 20 TABLET | Refills: 0 | Status: SHIPPED | OUTPATIENT
Start: 2024-05-31

## 2024-05-31 RX ORDER — CEFAZOLIN SODIUM 1 G/3ML
INJECTION, POWDER, FOR SOLUTION INTRAMUSCULAR; INTRAVENOUS
Status: DISCONTINUED | OUTPATIENT
Start: 2024-05-31 | End: 2024-05-31

## 2024-05-31 RX ORDER — GABAPENTIN 300 MG/1
300 CAPSULE ORAL 3 TIMES DAILY
Status: DISCONTINUED | OUTPATIENT
Start: 2024-05-31 | End: 2024-05-31 | Stop reason: HOSPADM

## 2024-05-31 RX ORDER — DEXMEDETOMIDINE HYDROCHLORIDE 100 UG/ML
INJECTION, SOLUTION INTRAVENOUS
Status: DISCONTINUED | OUTPATIENT
Start: 2024-05-31 | End: 2024-05-31

## 2024-05-31 RX ORDER — FENTANYL CITRATE 50 UG/ML
INJECTION, SOLUTION INTRAMUSCULAR; INTRAVENOUS
Status: DISCONTINUED | OUTPATIENT
Start: 2024-05-31 | End: 2024-05-31

## 2024-05-31 RX ORDER — LIDOCAINE HYDROCHLORIDE 10 MG/ML
1 INJECTION, SOLUTION EPIDURAL; INFILTRATION; INTRACAUDAL; PERINEURAL ONCE AS NEEDED
Status: DISCONTINUED | OUTPATIENT
Start: 2024-05-31 | End: 2024-05-31 | Stop reason: HOSPADM

## 2024-05-31 RX ORDER — BUPIVACAINE HYDROCHLORIDE 2.5 MG/ML
INJECTION, SOLUTION EPIDURAL; INFILTRATION; INTRACAUDAL
Status: DISCONTINUED | OUTPATIENT
Start: 2024-05-31 | End: 2024-05-31 | Stop reason: HOSPADM

## 2024-05-31 RX ORDER — ACETAMINOPHEN 500 MG
500 TABLET ORAL EVERY 8 HOURS
COMMUNITY
Start: 2024-05-31

## 2024-05-31 RX ORDER — LIDOCAINE HYDROCHLORIDE 20 MG/ML
INJECTION INTRAVENOUS
Status: DISCONTINUED | OUTPATIENT
Start: 2024-05-31 | End: 2024-05-31

## 2024-05-31 RX ORDER — KETAMINE HCL IN 0.9 % NACL 50 MG/5 ML
SYRINGE (ML) INTRAVENOUS
Status: DISCONTINUED | OUTPATIENT
Start: 2024-05-31 | End: 2024-05-31

## 2024-05-31 RX ORDER — HYDROMORPHONE HYDROCHLORIDE 1 MG/ML
0.5 INJECTION, SOLUTION INTRAMUSCULAR; INTRAVENOUS; SUBCUTANEOUS EVERY 4 HOURS PRN
Status: DISCONTINUED | OUTPATIENT
Start: 2024-05-31 | End: 2024-05-31 | Stop reason: HOSPADM

## 2024-05-31 RX ADMIN — MORPHINE SULFATE 4 MG: 4 INJECTION INTRAVENOUS at 12:05

## 2024-05-31 RX ADMIN — Medication 10 MG: at 10:05

## 2024-05-31 RX ADMIN — ACETAMINOPHEN 1000 MG: 500 TABLET ORAL at 05:05

## 2024-05-31 RX ADMIN — SODIUM CHLORIDE: 0.9 INJECTION, SOLUTION INTRAVENOUS at 05:05

## 2024-05-31 RX ADMIN — CEFAZOLIN 3 G: 330 INJECTION, POWDER, FOR SOLUTION INTRAMUSCULAR; INTRAVENOUS at 09:05

## 2024-05-31 RX ADMIN — FENTANYL CITRATE 50 MCG: 50 INJECTION, SOLUTION INTRAMUSCULAR; INTRAVENOUS at 09:05

## 2024-05-31 RX ADMIN — LIDOCAINE HYDROCHLORIDE 100 MG: 20 INJECTION INTRAVENOUS at 09:05

## 2024-05-31 RX ADMIN — HYDROMORPHONE HYDROCHLORIDE 0.5 MG: 1 INJECTION, SOLUTION INTRAMUSCULAR; INTRAVENOUS; SUBCUTANEOUS at 12:05

## 2024-05-31 RX ADMIN — SCOPALAMINE 1 PATCH: 1 PATCH, EXTENDED RELEASE TRANSDERMAL at 08:05

## 2024-05-31 RX ADMIN — SODIUM CHLORIDE, POTASSIUM CHLORIDE, SODIUM LACTATE AND CALCIUM CHLORIDE: 600; 310; 30; 20 INJECTION, SOLUTION INTRAVENOUS at 01:05

## 2024-05-31 RX ADMIN — ROCURONIUM BROMIDE 20 MG: 10 INJECTION, SOLUTION INTRAVENOUS at 10:05

## 2024-05-31 RX ADMIN — DEXAMETHASONE SODIUM PHOSPHATE 4 MG: 4 INJECTION, SOLUTION INTRAMUSCULAR; INTRAVENOUS at 09:05

## 2024-05-31 RX ADMIN — ROCURONIUM BROMIDE 10 MG: 10 INJECTION, SOLUTION INTRAVENOUS at 09:05

## 2024-05-31 RX ADMIN — ONDANSETRON 4 MG: 2 INJECTION INTRAMUSCULAR; INTRAVENOUS at 11:05

## 2024-05-31 RX ADMIN — PROPOFOL 180 MG: 10 INJECTION, EMULSION INTRAVENOUS at 09:05

## 2024-05-31 RX ADMIN — MIDAZOLAM 2 MG: 1 INJECTION INTRAMUSCULAR; INTRAVENOUS at 08:05

## 2024-05-31 RX ADMIN — SUGAMMADEX 400 MG: 100 INJECTION, SOLUTION INTRAVENOUS at 11:05

## 2024-05-31 RX ADMIN — SUCCINYLCHOLINE CHLORIDE 190 MG: 20 INJECTION, SOLUTION INTRAMUSCULAR; INTRAVENOUS at 09:05

## 2024-05-31 RX ADMIN — DEXMEDETOMIDINE 20 MCG: 100 INJECTION, SOLUTION, CONCENTRATE INTRAVENOUS at 11:05

## 2024-05-31 RX ADMIN — SODIUM CHLORIDE: 9 INJECTION, SOLUTION INTRAVENOUS at 06:05

## 2024-05-31 RX ADMIN — Medication 30 MG: at 09:05

## 2024-05-31 RX ADMIN — ONDANSETRON 8 MG: 2 INJECTION INTRAMUSCULAR; INTRAVENOUS at 08:05

## 2024-05-31 RX ADMIN — ROCURONIUM BROMIDE 10 MG: 10 INJECTION, SOLUTION INTRAVENOUS at 10:05

## 2024-05-31 RX ADMIN — ROCURONIUM BROMIDE 20 MG: 10 INJECTION, SOLUTION INTRAVENOUS at 09:05

## 2024-05-31 RX ADMIN — SODIUM CHLORIDE, SODIUM GLUCONATE, SODIUM ACETATE, POTASSIUM CHLORIDE, MAGNESIUM CHLORIDE, SODIUM PHOSPHATE, DIBASIC, AND POTASSIUM PHOSPHATE: .53; .5; .37; .037; .03; .012; .00082 INJECTION, SOLUTION INTRAVENOUS at 09:05

## 2024-05-31 RX ADMIN — ROCURONIUM BROMIDE 40 MG: 10 INJECTION, SOLUTION INTRAVENOUS at 09:05

## 2024-05-31 RX ADMIN — PIPERACILLIN SODIUM AND TAZOBACTAM SODIUM 4.5 G: 4; .5 INJECTION, POWDER, LYOPHILIZED, FOR SOLUTION INTRAVENOUS at 09:05

## 2024-05-31 RX ADMIN — OXYCODONE 5 MG: 5 TABLET ORAL at 11:05

## 2024-05-31 NOTE — TRANSFER OF CARE
"Anesthesia Transfer of Care Note    Patient: Tawny Valerio    Procedure(s) Performed: Procedure(s) (LRB):  APPENDECTOMY, LAPAROSCOPIC (N/A)    Patient location: PACU    Anesthesia Type: general    Transport from OR: Transported from OR on 6-10 L/min O2 by face mask with adequate spontaneous ventilation    Post pain: adequate analgesia    Post assessment: no apparent anesthetic complications and tolerated procedure well    Post vital signs: stable    Level of consciousness: awake, alert and oriented    Nausea/Vomiting: no nausea/vomiting    Complications: none    Transfer of care protocol was followed      Last vitals: Visit Vitals  BP (!) 117/58 (BP Location: Left arm)   Pulse 76   Temp 36.5 °C (97.7 °F) (Temporal)   Resp 16   Ht 5' 1" (1.549 m)   Wt 129.3 kg (285 lb)   LMP 01/15/2019 (Approximate)   SpO2 97%   Breastfeeding No   BMI 53.85 kg/m²     "

## 2024-05-31 NOTE — ED PROVIDER NOTES
Encounter Date: 2024       History     Chief Complaint   Patient presents with    Abdominal Pain     1 episode of emesis yesterday. + nausea. + gas pain and RLQ pain.      64-year-old female with a history of obesity presents with right lower quadrant abdominal pain.  Started last night.  Worse today.  She had associated nausea and vomiting x1.  On Mounjaro.  She is still has her gallbladder and her appendix.  Patient denies diarrhea, fever, cough, shortness of breath, chest pain, or dysuria.  The patients available PMH, PSH, Social History, medications, allergies, and triage vital signs were reviewed just prior to their medical evaluation.            Review of patient's allergies indicates:   Allergen Reactions    Celebrex [celecoxib] Anaphylaxis    Codeine Hives     Does not know what she can take -usually just takes tylenol or ibuprofen    Lidocaine Hives    Vicodin [hydrocodone-acetaminophen] Hives and Swelling     Past Medical History:   Diagnosis Date    Acute cystitis without hematuria 2019    Anxiety     Colon cancer screening 2019    Diabetes mellitus, type 2 2016    Endometrial ca 2019    Essential hypertension 2018    Hyperlipidemia LDL goal <70 10/30/2023    Simple endometrial hyperplasia 03/15/2019    Uterine polyp 2019     Past Surgical History:   Procedure Laterality Date     SECTION      x 1    DILATION AND CURETTAGE OF UTERUS  2019    HYSTERECTOMY  19    HYSTEROSCOPY WITH DILATION AND CURETTAGE OF UTERUS N/A 2019    Procedure: HYSTEROSCOPY, WITH DILATION AND CURETTAGE OF UTERUS;  Surgeon: Bobby Frazier Jr., MD;  [facility];  Service: OB/GYN;  Laterality: N/A;    lipoma removed      ROBOT-ASSISTED LAPAROSCOPIC ABDOMINAL HYSTERECTOMY USING DA AWAIS XI N/A 2019    Procedure: XI ROBOTIC HYSTERECTOMY;  Surgeon: Aneudy Almeida MD;  Location: [facility] OR [address] [place] [address];  Service: OB/GYN;  Laterality: N/A;    ROBOT-ASSISTED LAPAROSCOPIC LYSIS  OF ADHESIONS USING DA AWAIS XI N/A 08/26/2019    Procedure: XI ROBOTIC LYSIS, ADHESIONS;  Surgeon: Aneudy Almeida MD;  Location: [facility] OR 2ND FLR;  Service: OB/GYN;  Laterality: N/A;  Omental    ROBOT-ASSISTED LAPAROSCOPIC SALPINGO-OOPHORECTOMY USING DA AWAIS XI Bilateral 08/26/2019    Procedure: XI ROBOTIC SALPINGO-OOPHORECTOMY;  Surgeon: Aneudy Almeida MD;  Location: [facility] OR 2ND FLR;  Service: OB/GYN;  Laterality: Bilateral;  Wt 299lbs     Family History   Problem Relation Name Age of Onset    Arthritis Mother Pura Mariano     Diabetes Father Luan Mariano     Stroke Father Luan Mariano     Hypertension Father Luan Mariano     Depression Father Luan Mariano     Early death Father Luan Mariano     Heart disease Father Luan Mariano     Vision loss Father Luan Mariano     Heart disease Sister      Hypertension Sister      Depression Sister Apolonia Mariano     Diabetes Sister Apolonia Mariano     Early death Sister Apolonia Mariano     Heart disease Sister Apolonia Mariano     Hypertension Sister Apolonia Mariano     Heart disease Brother Luan Mariano     Heart attack Brother Luan Mariano     Hypertension Brother Luan Mariano     Early death Brother Luan Mariano     Heart disease Maternal Grandmother Talya Prince Grapuso     Heart disease Maternal Grandfather Simon Mariano     Heart disease Paternal Grandfather Nikolas Prince     Breast cancer Other mat great GM     Ovarian cancer Neg Hx      Uterine cancer Neg Hx      Colon cancer Neg Hx      Heart failure Neg Hx      Hyperlipidemia Neg Hx       Social History     Tobacco Use    Smoking status: Never    Smokeless tobacco: Never   Substance Use Topics    Alcohol use: Never    Drug use: No     Review of Systems   Constitutional:  Negative for fever.   Respiratory:  Negative for cough and shortness of breath.    Cardiovascular:  Negative for chest pain.   Gastrointestinal:  Positive for abdominal pain, nausea and vomiting.  Negative for diarrhea.   Genitourinary:  Negative for dysuria.       Physical Exam     Initial Vitals [05/30/24 1910]   BP Pulse Resp Temp SpO2   124/74 92 18 98.4 °F (36.9 °C) 95 %      MAP       --         Physical Exam    Nursing note and vitals reviewed.  Constitutional: She appears well-developed and well-nourished. She is not diaphoretic. No distress.   HENT:   Head: Normocephalic and atraumatic.   Nose: Nose normal.   Eyes: Conjunctivae are normal. Right eye exhibits no discharge. Left eye exhibits no discharge.   Neck: Neck supple.   Normal range of motion.  Cardiovascular:  Normal rate, regular rhythm and normal heart sounds.     Exam reveals no gallop and no friction rub.       No murmur heard.  Pulmonary/Chest: Breath sounds normal. No respiratory distress. She has no wheezes. She has no rhonchi. She has no rales.   Abdominal: Abdomen is soft. She exhibits no distension. There is abdominal tenderness.   RLQ ttp There is no rebound and no guarding.   Musculoskeletal:         General: No tenderness or edema. Normal range of motion.      Cervical back: Normal range of motion and neck supple.     Neurological: She is alert and oriented to person, place, and time. She has normal strength. GCS score is 15. GCS eye subscore is 4. GCS verbal subscore is 5. GCS motor subscore is 6.   Skin: Skin is warm and dry. No rash noted. No erythema.   Psychiatric: She has a normal mood and affect. Her behavior is normal. Judgment and thought content normal.         ED Course   Procedures  Labs Reviewed   CBC W/ AUTO DIFFERENTIAL - Abnormal; Notable for the following components:       Result Value    WBC 15.67 (*)     MCHC 31.7 (*)     RDW 16.0 (*)     Gran # (ANC) 11.7 (*)     Immature Grans (Abs) 0.06 (*)     Gran % 74.5 (*)     Lymph % 17.5 (*)     All other components within normal limits    Narrative:     For upper or mid abdominal pain.   COMPREHENSIVE METABOLIC PANEL - Abnormal; Notable for the following components:     Total Protein 8.5 (*)     Albumin 3.3 (*)     All other components within normal limits    Narrative:     For upper or mid abdominal pain.   HIV 1 / 2 ANTIBODY    Narrative:     Release to patient->Immediate   HEPATITIS C ANTIBODY    Narrative:     Release to patient->Immediate   LIPASE    Narrative:     For upper or mid abdominal pain.   URINALYSIS, REFLEX TO URINE CULTURE   URINALYSIS MICROSCOPIC          Imaging Results              CT Abdomen Pelvis With IV Contrast NO Oral Contrast (In process)                      Medications   piperacillin-tazobactam (ZOSYN) 4.5 g in dextrose 5 % in water (D5W) 100 mL IVPB (MB+) (has no administration in time range)   ondansetron injection 4 mg (4 mg Intravenous Given 5/30/24 2129)   morphine injection 4 mg (4 mg Intravenous Given 5/30/24 2129)   sodium chloride 0.9% bolus 1,000 mL 1,000 mL (0 mLs Intravenous Stopped 5/30/24 2230)   iohexoL (OMNIPAQUE 350) injection 100 mL (100 mLs Intravenous Given 5/30/24 2147)     Medical Decision Making  64-year-old female presents with right lower quadrant abdominal pain.  Vitals with hypertension.  Physical exam as above.  Labs with elevated white blood cell count.  CT with acute appy.  Gave iv abx.  Consulted GS who will admit.  Did bedside teaching.  All questions answered.  Patient acknowledges understanding.     Amount and/or Complexity of Data Reviewed  Independent Historian: spouse  Labs: ordered. Decision-making details documented in ED Course.  Radiology: ordered. Decision-making details documented in ED Course.  ECG/medicine tests: ordered and independent interpretation performed. Decision-making details documented in ED Course.    Risk  Prescription drug management.  Emergency major surgery.                                      Clinical Impression:  Final diagnoses:  [K35.30] Acute appendicitis with localized peritonitis, without perforation, abscess, or gangrene (Primary)          ED Disposition Condition    Observation  Taurus Suarez MD  05/30/24 [id number]

## 2024-05-31 NOTE — CONSULTS
[facility] - [facility] Dept  General Surgery  Consult Note    Patient Name: Tawny Valerio  MRN: [id number]  Code Status: Full Code  Admission Date: 5/30/2024  Hospital Length of Stay: 0 days  Attending Physician: Hector Harrell MD  Primary Care Provider: Geovanna South DO    Patient information was obtained from patient and past medical records.     Inpatient consult to General surgery  Consult performed by: Michael Aleman MD  Consult ordered by: Taurus Ramirez MD        Subjective:     Principal Problem: Acute appendicitis    History of Present Illness: Tawny Valerio is a 64 y.o. lady with obesity (BMI53, on Mounjaro), DM, endometrial cancer s/p hysterectomy, and HLD who presents with a 1 day history of abdominal pain. Her pain started generalized, she describes as gas pain. However, her pain traveled to her RLQ. Had an episode of emesis. Denies fevers, reports chills. Vitals stable in the ED. Labs significant for WBC of 15 with a left shift. CT was performed which showed a mildly dilated appendix with associated fat stranding, no imaging findings concerning for perforation. General surgery consulted for evaluation.          No current facility-administered medications on file prior to encounter.     Current Outpatient Medications on File Prior to Encounter   Medication Sig    acetaminophen (TYLENOL) 325 MG tablet Take 2 tablets (650 mg total) by mouth every 6 (six) hours as needed for Pain.    albuterol (PROVENTIL/VENTOLIN HFA) 90 mcg/actuation inhaler Inhale 2 puffs into the lungs every 6 (six) hours as needed for Wheezing or Shortness of Breath.    ALPRAZolam (XANAX) 0.5 MG tablet 1 tab(s) orally 3 times a day    aspirin 81 MG Chew Take 81 mg by mouth once daily.    azelastine (ASTELIN) 137 mcg (0.1 %) nasal spray 1 spray (137 mcg total) by Nasal route 2 (two) times daily.    blood sugar diagnostic Strp 1 each by Misc.(Non-Drug; Combo Route) route 2 (two) times a day.    blood-glucose meter kit Use  as instructed    blood-glucose meter kit Use as instructed    buPROPion (WELLBUTRIN SR) 150 MG TBSR 12 hr tablet Take 1 tablet (150 mg total) by mouth 2 (two) times daily.    clindamycin (CLEOCIN T) 1 % lotion Apply topically 2 (two) times daily.    empagliflozin (JARDIANCE) 25 mg tablet Take 1 tablet (25 mg total) by mouth once daily.    ergocalciferol (ERGOCALCIFEROL) 50,000 unit Cap Take 1 capsule (50,000 Units total) by mouth every 7 days.    ezetimibe (ZETIA) 10 mg tablet Take 1 tablet (10 mg total) by mouth once daily.    fluticasone propionate (FLONASE) 50 mcg/actuation nasal spray 1 spray (50 mcg total) by Each Nostril route once daily.    gabapentin (NEURONTIN) 300 MG capsule Take 600 mg (2 caps) QAM and 300 mg (1 cap) in afternoon and 300 mg (1 cap) QPM    lancets (ACCU-CHEK SOFTCLIX LANCETS) Misc 1 each by Misc.(Non-Drug; Combo Route) route 2 (two) times daily.    lancing device with lancets (MULTI-LANCET DEVICE 2) Kit 1 each by Misc.(Non-Drug; Combo Route) route 2 (two) times a day.    losartan (COZAAR) 25 MG tablet Take 1 tablet (25 mg total) by mouth once daily.    metronidazole 0.75% (METROCREAM) 0.75 % Crea Apply topically 2 (two) times daily. For rosacea on face.    ondansetron (ZOFRAN-ODT) 4 MG TbDL Take 4 mg by mouth every 6 (six) hours as needed.    promethazine (PHENERGAN) 25 MG tablet Take 25 mg by mouth every 6 (six) hours as needed.    tirzepatide 10 mg/0.5 mL PnIj Inject 10 mg into the skin every 7 days.    tiZANidine (ZANAFLEX) 4 MG tablet Take 1 tablet (4 mg total) by mouth 2 (two) times a day.    tretinoin (RETIN-A) 0.025 % cream APPLY TOPICALLY EVERY EVENING.       Review of patient's allergies indicates:   Allergen Reactions    Celebrex [celecoxib] Anaphylaxis    Codeine Hives     Does not know what she can take -usually just takes tylenol or ibuprofen    Lidocaine Hives    Vicodin [hydrocodone-acetaminophen] Hives and Swelling       Past Medical History:   Diagnosis Date    Acute  cystitis without hematuria 2019    Anxiety     Colon cancer screening 2019    Diabetes mellitus, type 2 2016    Endometrial ca 2019    Essential hypertension 2018    Hyperlipidemia LDL goal <70 10/30/2023    Simple endometrial hyperplasia 03/15/2019    Uterine polyp 2019     Past Surgical History:   Procedure Laterality Date     SECTION      x 1    DILATION AND CURETTAGE OF UTERUS  2019    HYSTERECTOMY  19    HYSTEROSCOPY WITH DILATION AND CURETTAGE OF UTERUS N/A 2019    Procedure: HYSTEROSCOPY, WITH DILATION AND CURETTAGE OF UTERUS;  Surgeon: Bobby Frazier Jr., MD;  Location: [facility];  Service: OB/GYN;  Laterality: N/A;    lipoma removed      ROBOT-ASSISTED LAPAROSCOPIC ABDOMINAL HYSTERECTOMY USING DA AWAIS XI N/A 2019    Procedure: XI ROBOTIC HYSTERECTOMY;  Surgeon: Aneudy Almeida MD;  Location: [facility] OR [address] [place] [address];  Service: OB/GYN;  Laterality: N/A;    ROBOT-ASSISTED LAPAROSCOPIC LYSIS OF ADHESIONS USING DA AWAIS XI N/A 2019    Procedure: XI ROBOTIC LYSIS, ADHESIONS;  Surgeon: Aneudy Almeida MD;  [facility];  Service: OB/GYN;  Laterality: N/A;  Omental    ROBOT-ASSISTED LAPAROSCOPIC SALPINGO-OOPHORECTOMY USING DA AWAIS XI Bilateral 2019    Procedure: XI ROBOTIC SALPINGO-OOPHORECTOMY;  Surgeon: Aneudy Almeida MD;  [facility];  Service: OB/GYN;  Laterality: Bilateral;  Wt 299lbs     Family History       Problem Relation (Age of Onset)    Arthritis Mother    Breast cancer Other    Depression Father, Sister    Diabetes Father, Sister    Early death Father, Sister, Brother    Heart attack Brother    Heart disease Father, Sister, Sister, Brother, Maternal Grandmother, Maternal Grandfather, Paternal Grandfather    Hypertension Father, Sister, Sister, Brother    Stroke Father    Vision loss Father          Tobacco Use    Smoking status: Never    Smokeless tobacco: Never   Substance and Sexual Activity    Alcohol  use: Never    Drug use: No    Sexual activity: Yes     Partners: Male     Birth control/protection: Post-menopausal, None     Comment: Hysterectomy     Review of Systems   Constitutional:  Positive for chills. Negative for fever.   HENT:  Negative for hearing loss and trouble swallowing.    Eyes:  Negative for discharge and visual disturbance.   Respiratory:  Negative for chest tightness and shortness of breath.    Cardiovascular:  Negative for chest pain and palpitations.   Gastrointestinal:  Positive for abdominal pain. Negative for abdominal distention, nausea and vomiting.   Genitourinary:  Negative for difficulty urinating and hematuria.   Musculoskeletal:  Negative for arthralgias and back pain.   Skin:  Negative for rash and wound.   Neurological:  Negative for dizziness and headaches.     Objective:     Vital Signs (Most Recent):  Temp: 98.4 °F (36.9 °C) (05/30/24 2339)  Pulse: 74 (05/30/24 2339)  Resp: 16 (05/31/24 0006)  BP: 118/78 (05/30/24 2339)  SpO2: 97 % (05/30/24 2339) Vital Signs (24h Range):  Temp:  [98.4 °F (36.9 °C)] 98.4 °F (36.9 °C)  Pulse:  [74-92] 74  Resp:  [16-18] 16  SpO2:  [95 %-97 %] 97 %  BP: (118-145)/(74-78) 118/78     Weight: 129.3 kg (285 lb)  Body mass index is 53.85 kg/m².     Physical Exam  Constitutional:       General: She is not in acute distress.     Appearance: She is obese.   HENT:      Head: Normocephalic and atraumatic.   Cardiovascular:      Rate and Rhythm: Normal rate and regular rhythm.   Pulmonary:      Effort: Pulmonary effort is normal. No respiratory distress.   Abdominal:      General: There is distension.      Palpations: Abdomen is soft.      Tenderness: There is abdominal tenderness.      Comments: RLQ tenderness  +rebound   Neurological:      General: No focal deficit present.      Mental Status: She is alert and oriented to person, place, and time.   Psychiatric:         Behavior: Behavior normal.         Thought Content: Thought content normal.            I  have reviewed all pertinent lab results within the past 24 hours.  CBC:   Recent Labs   Lab 05/30/24 2022   WBC 15.67*   RBC 5.23   HGB 14.5   HCT 45.8      MCV 88   MCH 27.7   MCHC 31.7*     CMP:   Recent Labs   Lab 05/30/24 2022      CALCIUM 9.7   ALBUMIN 3.3*   PROT 8.5*      K 4.9   CO2 23      BUN 11   CREATININE 0.9   ALKPHOS 99   ALT 19   AST 26   BILITOT 0.6       Significant Diagnostics:  I have reviewed all pertinent imaging results/findings within the past 24 hours.    Assessment/Plan:     * Acute appendicitis  Tawny Valerio is a 64 y.o. lady with acute appendicitis    - admit to general surgery under obs  - zosyn  - NPO  - OR tomorrow for lap appy  - PRN nausea and pain control  - daily labs        VTE Risk Mitigation (From admission, onward)           Ordered     IP VTE HIGH RISK PATIENT  Once         05/31/24 0103     Place sequential compression device  Until discontinued         05/31/24 0103                    Thank you for your consult. I will follow-up with patient. Please contact us if you have any additional questions.    Michael Aleman MD  General Surgery  [facility]

## 2024-05-31 NOTE — SUBJECTIVE & OBJECTIVE
No current facility-administered medications on file prior to encounter.     Current Outpatient Medications on File Prior to Encounter   Medication Sig    acetaminophen (TYLENOL) 325 MG tablet Take 2 tablets (650 mg total) by mouth every 6 (six) hours as needed for Pain.    albuterol (PROVENTIL/VENTOLIN HFA) 90 mcg/actuation inhaler Inhale 2 puffs into the lungs every 6 (six) hours as needed for Wheezing or Shortness of Breath.    ALPRAZolam (XANAX) 0.5 MG tablet 1 tab(s) orally 3 times a day    aspirin 81 MG Chew Take 81 mg by mouth once daily.    azelastine (ASTELIN) 137 mcg (0.1 %) nasal spray 1 spray (137 mcg total) by Nasal route 2 (two) times daily.    blood sugar diagnostic Strp 1 each by Misc.(Non-Drug; Combo Route) route 2 (two) times a day.    blood-glucose meter kit Use as instructed    blood-glucose meter kit Use as instructed    buPROPion (WELLBUTRIN SR) 150 MG TBSR 12 hr tablet Take 1 tablet (150 mg total) by mouth 2 (two) times daily.    clindamycin (CLEOCIN T) 1 % lotion Apply topically 2 (two) times daily.    empagliflozin (JARDIANCE) 25 mg tablet Take 1 tablet (25 mg total) by mouth once daily.    ergocalciferol (ERGOCALCIFEROL) 50,000 unit Cap Take 1 capsule (50,000 Units total) by mouth every 7 days.    ezetimibe (ZETIA) 10 mg tablet Take 1 tablet (10 mg total) by mouth once daily.    fluticasone propionate (FLONASE) 50 mcg/actuation nasal spray 1 spray (50 mcg total) by Each Nostril route once daily.    gabapentin (NEURONTIN) 300 MG capsule Take 600 mg (2 caps) QAM and 300 mg (1 cap) in afternoon and 300 mg (1 cap) QPM    lancets (ACCU-CHEK SOFTCLIX LANCETS) Misc 1 each by Misc.(Non-Drug; Combo Route) route 2 (two) times daily.    lancing device with lancets (MULTI-LANCET DEVICE 2) Kit 1 each by Misc.(Non-Drug; Combo Route) route 2 (two) times a day.    losartan (COZAAR) 25 MG tablet Take 1 tablet (25 mg total) by mouth once daily.    metronidazole 0.75% (METROCREAM) 0.75 % Crea Apply topically  2 (two) times daily. For rosacea on face.    ondansetron (ZOFRAN-ODT) 4 MG TbDL Take 4 mg by mouth every 6 (six) hours as needed.    promethazine (PHENERGAN) 25 MG tablet Take 25 mg by mouth every 6 (six) hours as needed.    tirzepatide 10 mg/0.5 mL PnIj Inject 10 mg into the skin every 7 days.    tiZANidine (ZANAFLEX) 4 MG tablet Take 1 tablet (4 mg total) by mouth 2 (two) times a day.    tretinoin (RETIN-A) 0.025 % cream APPLY TOPICALLY EVERY EVENING.       Review of patient's allergies indicates:   Allergen Reactions    Celebrex [celecoxib] Anaphylaxis    Codeine Hives     Does not know what she can take -usually just takes tylenol or ibuprofen    Lidocaine Hives    Vicodin [hydrocodone-acetaminophen] Hives and Swelling       Past Medical History:   Diagnosis Date    Acute cystitis without hematuria 2019    Anxiety     Colon cancer screening 2019    Diabetes mellitus, type 2 2016    Endometrial ca 2019    Essential hypertension 2018    Hyperlipidemia LDL goal <70 10/30/2023    Simple endometrial hyperplasia 03/15/2019    Uterine polyp 2019     Past Surgical History:   Procedure Laterality Date     SECTION      x 1    DILATION AND CURETTAGE OF UTERUS  2019    HYSTERECTOMY  19    HYSTEROSCOPY WITH DILATION AND CURETTAGE OF UTERUS N/A 2019    Procedure: HYSTEROSCOPY, WITH DILATION AND CURETTAGE OF UTERUS;  Surgeon: Bobby Frazier Jr., MD;  Location: [facility];  Service: OB/GYN;  Laterality: N/A;    lipoma removed      ROBOT-ASSISTED LAPAROSCOPIC ABDOMINAL HYSTERECTOMY USING DA AWAIS XI N/A 2019    Procedure: XI ROBOTIC HYSTERECTOMY;  Surgeon: Aneudy Almeida MD;  [facility];  Service: OB/GYN;  Laterality: N/A;    ROBOT-ASSISTED LAPAROSCOPIC LYSIS OF ADHESIONS USING DA AWAIS XI N/A 2019    Procedure: XI ROBOTIC LYSIS, ADHESIONS;  Surgeon: Aneudy Almeida MD;  Location: [facility] OR 2ND FLR;  Service: OB/GYN;  Laterality: N/A;  Omental     ROBOT-ASSISTED LAPAROSCOPIC SALPINGO-OOPHORECTOMY USING DA AWAIS XI Bilateral 08/26/2019    Procedure: XI ROBOTIC SALPINGO-OOPHORECTOMY;  Surgeon: Aneudy Almeida MD;  Location: [facility] OR [address] [place] [address];  Service: OB/GYN;  Laterality: Bilateral;  Wt 299lbs     Family History       Problem Relation (Age of Onset)    Arthritis Mother    Breast cancer Other    Depression Father, Sister    Diabetes Father, Sister    Early death Father, Sister, Brother    Heart attack Brother    Heart disease Father, Sister, Sister, Brother, Maternal Grandmother, Maternal Grandfather, Paternal Grandfather    Hypertension Father, Sister, Sister, Brother    Stroke Father    Vision loss Father          Tobacco Use    Smoking status: Never    Smokeless tobacco: Never   Substance and Sexual Activity    Alcohol use: Never    Drug use: No    Sexual activity: Yes     Partners: Male     Birth control/protection: Post-menopausal, None     Comment: Hysterectomy     Review of Systems   Constitutional:  Positive for chills. Negative for fever.   HENT:  Negative for hearing loss and trouble swallowing.    Eyes:  Negative for discharge and visual disturbance.   Respiratory:  Negative for chest tightness and shortness of breath.    Cardiovascular:  Negative for chest pain and palpitations.   Gastrointestinal:  Positive for abdominal pain. Negative for abdominal distention, nausea and vomiting.   Genitourinary:  Negative for difficulty urinating and hematuria.   Musculoskeletal:  Negative for arthralgias and back pain.   Skin:  Negative for rash and wound.   Neurological:  Negative for dizziness and headaches.     Objective:     Vital Signs (Most Recent):  Temp: 98.4 °F (36.9 °C) (05/30/24 2339)  Pulse: 74 (05/30/24 2339)  Resp: 16 (05/31/24 0006)  BP: 118/78 (05/30/24 2339)  SpO2: 97 % (05/30/24 2339) Vital Signs (24h Range):  Temp:  [98.4 °F (36.9 °C)] 98.4 °F (36.9 °C)  Pulse:  [74-92] 74  Resp:  [16-18] 16  SpO2:  [95 %-97 %] 97 %  BP: (118-145)/(74-78)  118/78     Weight: 129.3 kg (285 lb)  Body mass index is 53.85 kg/m².     Physical Exam  Constitutional:       General: She is not in acute distress.     Appearance: She is obese.   HENT:      Head: Normocephalic and atraumatic.   Cardiovascular:      Rate and Rhythm: Normal rate and regular rhythm.   Pulmonary:      Effort: Pulmonary effort is normal. No respiratory distress.   Abdominal:      General: There is distension.      Palpations: Abdomen is soft.      Tenderness: There is abdominal tenderness.      Comments: RLQ tenderness  +rebound   Neurological:      General: No focal deficit present.      Mental Status: She is alert and oriented to person, place, and time.   Psychiatric:         Behavior: Behavior normal.         Thought Content: Thought content normal.            I have reviewed all pertinent lab results within the past 24 hours.  CBC:   Recent Labs   Lab 05/30/24 2022   WBC 15.67*   RBC 5.23   HGB 14.5   HCT 45.8      MCV 88   MCH 27.7   MCHC 31.7*     CMP:   Recent Labs   Lab 05/30/24 2022      CALCIUM 9.7   ALBUMIN 3.3*   PROT 8.5*      K 4.9   CO2 23      BUN 11   CREATININE 0.9   ALKPHOS 99   ALT 19   AST 26   BILITOT 0.6       Significant Diagnostics:  I have reviewed all pertinent imaging results/findings within the past 24 hours.

## 2024-05-31 NOTE — TELEPHONE ENCOUNTER
Pt calling in, had lap appendectomy today. Asking what was used to cover incision because she doesn't see any bandages. Advised it appears dermabond was used. This will come off on it's own. Pt having some moderate pain. Fluctuates 5-8/10. Just took oxycodone. Advised to take as prescribed. Having some mild itching. Dispo is home care. Gave care advice. Advised to call back with further concerns.   Reason for Disposition   Health Information question, no triage required and triager able to answer question   Other post-op symptom or question    Additional Information   Negative: Sounds like a life-threatening emergency to the triager   Negative: [1] Widespread rash AND [2] bright red, sunburn-like   Negative: [1] SEVERE headache AND [2] after spinal (epidural) anesthesia   Negative: [1] Vomiting AND [2] persists > 4 hours   Negative: [1] Vomiting AND [2] abdomen looks much more swollen than usual   Negative: [1] Drinking very little AND [2] dehydration suspected (e.g., no urine > 12 hours, very dry mouth, very lightheaded)   Negative: Patient sounds very sick or weak to the triager   Negative: Sounds like a serious complication to the triager   Negative: Fever > 100.4 F (38.0 C)   Negative: [1] SEVERE post-op pain (e.g., excruciating, pain scale 8-10) AND [2] not controlled with pain medications   Negative: [1] Caller has URGENT question AND [2] triager unable to answer question   Negative: [1] Headache AND [2] after spinal (epidural) anesthesia AND [3] not severe   Negative: Fever present > 3 days (72 hours)   Negative: [1] MILD-MODERATE post-op pain (e.g., pain scale 1-7) AND [2] not controlled with pain medications   Negative: [1] Caller has NON-URGENT question AND [2] triager unable to answer question    Protocols used: Information Only Call-A-, Post-Op Symptoms and [id number]

## 2024-05-31 NOTE — H&P
General Surgery H&P    Please see consult note from 5/31 for full details.    Michael Aleman MD  [facility] General Surgery PGY4

## 2024-05-31 NOTE — HPI
Tawny Valerio is a 64 y.o. lady with obesity (BMI53, on Mounjaro), DM, endometrial cancer s/p hysterectomy, and HLD who presents with a 1 day history of abdominal pain. Her pain started generalized, she describes as gas pain. However, her pain traveled to her RLQ. Had an episode of emesis. Denies fevers, reports chills. Vitals stable in the ED. Labs significant for WBC of 15 with a left shift. CT was performed which showed a mildly dilated appendix with associated fat stranding, no imaging findings concerning for perforation. General surgery consulted for evaluation.

## 2024-05-31 NOTE — ED TRIAGE NOTES
"Pt. States that she recently returned from vacation and "ate horrible" while on her Mounjaro. States that she started with gas pains around 2 am today and had 1 episode of vomiting when gas pains started. Complaining of 10/10 RLQ pain that worsens with movement. LBM: 5/28/24.   "

## 2024-05-31 NOTE — FIRST PROVIDER EVALUATION
"Medical screening examination initiated.  I have conducted a focused provider triage encounter, findings are as follows:    Brief history of present illness:  Pt presents with RLQ pain.    Vitals:    05/30/24 1910   BP: 124/74   Pulse: 92   Resp: 18   Temp: 98.4 °F (36.9 °C)   TempSrc: Oral   SpO2: 95%   Weight: 129.3 kg (285 lb)   Height: 5' 1" (1.549 m)       Pertinent physical exam:  +RLQ tenderness to palpation.    Brief workup plan:  Abdominal labs and CT abdomen.    Preliminary workup initiated; this workup will be continued and followed by the physician or advanced practice provider that is assigned to the patient when roomed.  "

## 2024-05-31 NOTE — ANESTHESIA PREPROCEDURE EVALUATION
[facility]  Anesthesia Pre-Operative Evaluation   05/31/2024        Tawny Valerio, 1960  [id number]  Procedure(s) (LRB):  APPENDECTOMY, LAPAROSCOPIC (N/A)    Subjective    Tawny Valerio is a 64 y.o. female w/ a significant PMHx of obesity (BMI 53, on Mounjaro last taken Tuesday), DM, endometrial cancer s/p hysterectomy, admitted with nausea/vomiting and acute appendicitis.    Patient now presents for above procedure(s).     Patient has multiple allergies listed including lidocaine and opioids. She had no issues with lidocaine IV and fentanyl IV during her hysterectomy in 2019.    She also has limited neck extension secondary to occipital neuralgia.    Level of Care: Emergency Room   Hemodynamics: No vasopressor or inotropic support.  Respiratory Status: Room air  IV Access: PIV 22G left hand  NPO: Last ate broth at 1200 on 5/30      Prev Airway: 2019 - Easy mask ventilation, Grade I view with Wilson 2    LDA:        Peripheral IV - Single Lumen 05/30/24 2022 22 G Left;Posterior Hand (Active)   Site Assessment Clean;Dry;Intact 05/30/24 2022   Extremity Assessment Distal to IV No abnormal discoloration;No redness;No swelling;No warmth 05/30/24 2022   Line Status Blood return noted;Saline locked;Flushed 05/30/24 2022   Dressing Status Clean;Dry;Intact 05/30/24 2022   Dressing Intervention First dressing 05/30/24 2022   Number of days: 0       Drips:    lactated ringers   Intravenous Continuous 125 mL/hr at 05/31/24 0159 New Bag at 05/31/24 0159       Patient Active Problem List   Diagnosis    Controlled type 2 diabetes mellitus without complication, without long-term current use of insulin    Vitamin D insufficiency    SOB (shortness of breath)    Class 3 severe obesity due to excess calories with serious comorbidity and body mass index (BMI) of 50.0 to 59.9 in adult    Edema leg    ASHLI (obstructive sleep apnea)    Essential hypertension    Uterine polyp    Simple endometrial hyperplasia     Endometrial ca    s/p RALH/BSO 8/26/2019    Decreased strength, endurance, and mobility    Decreased range of motion    Gait abnormality    Neck pain    Decreased functional activity tolerance    Abnormal posture    Cervicalgia of jfslaamy-tyrusep-ehyaz region    Decreased range of motion of neck    Statin intolerance    Hyperlipidemia LDL goal <70    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, without long-term current use of insulin    Acute appendicitis       Review of patient's allergies indicates:   Allergen Reactions    Celebrex [celecoxib] Anaphylaxis    Codeine Hives     Does not know what she can take -usually just takes tylenol or ibuprofen    Lidocaine Hives    Vicodin [hydrocodone-acetaminophen] Hives and Swelling       Current Inpatient Medications:    acetaminophen  1,000 mg Oral Q8H    aspirin  81 mg Oral Daily    buPROPion  150 mg Oral BID    gabapentin  300 mg Oral TID    piperacillin-tazobactam (Zosyn) IV (PEDS and ADULTS) (extended infusion is not appropriate)  4.5 g Intravenous Q8H       No current facility-administered medications on file prior to encounter.     Current Outpatient Medications on File Prior to Encounter   Medication Sig Dispense Refill    acetaminophen (TYLENOL) 325 MG tablet Take 2 tablets (650 mg total) by mouth every 6 (six) hours as needed for Pain. 30 tablet 1    albuterol (PROVENTIL/VENTOLIN HFA) 90 mcg/actuation inhaler Inhale 2 puffs into the lungs every 6 (six) hours as needed for Wheezing or Shortness of Breath. 18 g 0    ALPRAZolam (XANAX) 0.5 MG tablet 1 tab(s) orally 3 times a day      aspirin 81 MG Chew Take 81 mg by mouth once daily.      azelastine (ASTELIN) 137 mcg (0.1 %) nasal spray 1 spray (137 mcg total) by Nasal route 2 (two) times daily. 30 mL 11    blood sugar diagnostic Strp 1 each by Misc.(Non-Drug; Combo Route) route 2 (two) times a day. 11 each 0    blood-glucose meter kit Use as instructed 1 each 0    blood-glucose meter  kit Use as instructed 1 each 0    buPROPion (WELLBUTRIN SR) 150 MG TBSR 12 hr tablet Take 1 tablet (150 mg total) by mouth 2 (two) times daily. 60 tablet 11    clindamycin (CLEOCIN T) 1 % lotion Apply topically 2 (two) times daily. 60 mL 0    empagliflozin (JARDIANCE) 25 mg tablet Take 1 tablet (25 mg total) by mouth once daily. 90 tablet 3    ergocalciferol (ERGOCALCIFEROL) 50,000 unit Cap Take 1 capsule (50,000 Units total) by mouth every 7 days. 12 capsule 1    ezetimibe (ZETIA) 10 mg tablet Take 1 tablet (10 mg total) by mouth once daily. 90 tablet 3    fluticasone propionate (FLONASE) 50 mcg/actuation nasal spray 1 spray (50 mcg total) by Each Nostril route once daily. 16 g 0    gabapentin (NEURONTIN) 300 MG capsule Take 600 mg (2 caps) QAM and 300 mg (1 cap) in afternoon and 300 mg (1 cap)  capsule 11    lancets (ACCU-CHEK SOFTCLIX LANCETS) Misc 1 each by Misc.(Non-Drug; Combo Route) route 2 (two) times daily. 11 each 0    lancing device with lancets (MULTI-LANCET DEVICE 2) Kit 1 each by Misc.(Non-Drug; Combo Route) route 2 (two) times a day. 1 each 1    losartan (COZAAR) 25 MG tablet Take 1 tablet (25 mg total) by mouth once daily. 90 tablet 3    metronidazole 0.75% (METROCREAM) 0.75 % Crea Apply topically 2 (two) times daily. For rosacea on face. 45 g 0    ondansetron (ZOFRAN-ODT) 4 MG TbDL Take 4 mg by mouth every 6 (six) hours as needed.      promethazine (PHENERGAN) 25 MG tablet Take 25 mg by mouth every 6 (six) hours as needed.      tirzepatide 10 mg/0.5 mL PnIj Inject 10 mg into the skin every 7 days. 4 Pen 5    tiZANidine (ZANAFLEX) 4 MG tablet Take 1 tablet (4 mg total) by mouth 2 (two) times a day. 60 tablet 5    tretinoin (RETIN-A) 0.025 % cream APPLY TOPICALLY EVERY EVENING. 20 g 6       Past Surgical History:   Procedure Laterality Date     SECTION      x 1    DILATION AND CURETTAGE OF UTERUS  2019    HYSTERECTOMY  19    HYSTEROSCOPY WITH DILATION AND CURETTAGE OF UTERUS  N/A 07/16/2019    Procedure: HYSTEROSCOPY, WITH DILATION AND CURETTAGE OF UTERUS;  Surgeon: Bobby Frazier Jr., MD;  Location: [facility];  Service: OB/GYN;  Laterality: N/A;    lipoma removed      ROBOT-ASSISTED LAPAROSCOPIC ABDOMINAL HYSTERECTOMY USING DA AWAIS XI N/A 08/26/2019    Procedure: XI ROBOTIC HYSTERECTOMY;  Surgeon: Aneudy Almeida MD;  Location: [facility];  Service: OB/GYN;  Laterality: N/A;    ROBOT-ASSISTED LAPAROSCOPIC LYSIS OF ADHESIONS USING DA AWAIS XI N/A 08/26/2019    Procedure: XI ROBOTIC LYSIS, ADHESIONS;  Surgeon: Aneudy Almeida MD;  Location: [facility];  Service: OB/GYN;  Laterality: N/A;  Omental    ROBOT-ASSISTED LAPAROSCOPIC SALPINGO-OOPHORECTOMY USING DA AWAIS XI Bilateral 08/26/2019    Procedure: XI ROBOTIC SALPINGO-OOPHORECTOMY;  Surgeon: Aneudy Almeida MD;  Location: [facility];  Service: OB/GYN;  Laterality: Bilateral;  Wt 299lbs       Social History:  Tobacco Use: Low Risk  (5/7/2024)    Patient History     Smoking Tobacco Use: Never     Smokeless Tobacco Use: Never     Passive Exposure: Not on file       Alcohol Use: Not At Risk (4/8/2024)    Received from [facility]    AUDIT-C     Frequency of Alcohol Consumption: Monthly or less     Average Number of Drinks: 1 or 2     Frequency of Binge Drinking: Never       Objective    Vital Signs Range:  BMI Readings from Last 1 Encounters:   05/30/24 53.85 kg/m²       Temp:  [36.7 °C (98.1 °F)-36.9 °C (98.4 °F)]   Pulse:  [74-92]   Resp:  [16-18]   BP: (118-145)/(74-78)   SpO2:  [95 %-97 %]        Significant Labs:        Component Value Date/Time    WBC 12.28 05/31/2024 0442    HGB 13.0 05/31/2024 0442    HCT 42.1 05/31/2024 0442     05/31/2024 0442     05/31/2024 0319    K 4.1 05/31/2024 0319     05/31/2024 0319    CO2 19 (L) 05/31/2024 0319     (H) 05/31/2024 0319    BUN 13 05/31/2024 0319    CREATININE 0.9 05/31/2024 0319    MG 2.3 05/31/2024 0319    PHOS 3.0 05/31/2024 0319    CALCIUM 8.6  "(L) 05/31/2024 0319    ALBUMIN 3.1 (L) 05/31/2024 0319    PROT 7.4 05/31/2024 0319    ALKPHOS 99 05/31/2024 0319    BILITOT 0.6 05/31/2024 0319    AST 13 05/31/2024 0319    ALT 18 05/31/2024 0319    HGBA1C 5.7 (H) 04/17/2024 1506        Please see Results Review for additional labs.     Diagnostic Studies: All relevant studies, reviewed.      EKG:   Results for orders placed or performed in visit on 11/15/21   IN OFFICE EKG 12-LEAD (to [place])    Collection Time: 11/15/21 11:22 AM    Narrative    Test Reason : Z82.49,I10,G47.33,E66.01,Z68.44,E11.618,    Vent. Rate : 069 BPM     Atrial Rate : 069 BPM     P-R Int : 152 ms          QRS Dur : 114 ms      QT Int : 402 ms       P-R-T Axes : 027 -44 033 degrees     QTc Int : 430 ms    Normal sinus rhythm  Left anterior fascicular block  Abnormal ECG  When compared with ECG of 15-AUG-2019 15:08,  No significant change was found    Confirmed by Josie Bah MD ([id number]) on 11/16/2021 12:22:20 PM    Referred By: KULDIP DE LOS SANTOS           Confirmed By:Josie Bah MD       ECHO:  No results found for this or any previous visit.            Pre-op Assessment    I have reviewed the Patient Summary Reports.     I have reviewed the Nursing Notes. I have reviewed the NPO Status.   I have reviewed the Medications.     Review of Systems  Anesthesia Hx:    7/16/19 had PANIC ATTACK prior to D & C-required extra Versed and reassurance. States she was also given Phenergan postop to help her "relax". History of prior surgery of interest to airway management or planning:  Previous anesthesia: General 7/16/19 D & C with general anesthesia.  Procedure performed at an [facility].       Denies Family Hx of Anesthesia complications.    Denies Personal Hx of Anesthesia complications.                    Social:  Non-Smoker       Hematology/Oncology:  Hematology Normal                     Current/Recent Cancer. (endometrial)                Cardiovascular:     Hypertension       Denies Angina.      "    EF 60% stress test 8/15/19 Functional Capacity 4 METS                   Hypertension  , Recent typical clinic B/P of 140/70       Pulmonary:      Shortness of breath  Denies Recent URI. Sleep Apnea  Pulmonary Symptoms:  are shortness of breath with activity.      Obstructive Sleep Apnea (ASHLI), CPAP used.           Renal/:  Renal/ Normal                 Hepatic/GI:  Hepatic/GI Normal                 Musculoskeletal:         Spine Disorders: cervical  Degenerative Joint Disease      Cervical Spine Disorder, Cervical Disc Disease     Neurological:        Peripheral Neuropathy                          Endocrine:  Diabetes    Diabetes, Type 2 Diabetes   , controlled by oral hypoglycemics, diet.  , most recent HgA1c value was 6.7 on 7/2019.              Metabolic Disorders, Morbid Obesity / BMI > 40  Psych:   Anxiety Disorder and Panic Attacks (prior to 7/16/19 D & C).               Physical Exam  General: Well nourished, Cooperative, Alert and Oriented    Airway:  Mallampati: III   Mouth Opening: Normal  TM Distance: Normal  Tongue: Normal  Neck ROM: Extension Decreased  Neck: Girth Increased    Dental:  Periodontal disease  She denies any loose teeth      Anesthesia Plan  Type of Anesthesia, risks & benefits discussed:    Anesthesia Type: Gen ETT  Intra-op Monitoring Plan: Standard ASA Monitors  Post Op Pain Control Plan: multimodal analgesia and IV/PO Opioids PRN  Induction:  rapid sequence  Airway Plan: Video, Post-Induction  Informed Consent: Informed consent signed with the Patient and all parties understand the risks and agree with anesthesia plan.  All questions answered.   ASA Score: 3  Day of Surgery Review of History & Physical: H&P Update referred to the surgeon/provider.    Ready For Surgery From Anesthesia Perspective.     .

## 2024-05-31 NOTE — BRIEF OP NOTE
[facility] - Surgery ([facility])  Brief Operative Note    Surgery Date: 5/31/2024     Surgeons and Role:     * Hector Harrell MD - Primary     * Rosanna Johnston MD - Resident - Assisting     * Kirill Razo MD - Resident - Assisting        Pre-op Diagnosis:  Acute appendicitis with localized peritonitis, without perforation, abscess, or gangrene [K35.30]    Post-op Diagnosis:  Post-Op Diagnosis Codes:     * Acute appendicitis with localized peritonitis, without perforation, abscess, or gangrene [K35.30]    Procedure(s) (LRB):  APPENDECTOMY, LAPAROSCOPIC (N/A)    Anesthesia: General    Operative Findings: Acute appendicitis. Right colon mobilized to facilitate exposure. Lysis of adhesions inferior to the umbilicus. Surgicel powder left in the RLQ.     Estimated Blood Loss: 25 cc         Specimens:   Specimen (24h ago, onward)       Start     Ordered    05/31/24 1103  Specimen to Pathology, Surgery General Surgery  Once        Comments: Pre-op Diagnosis: Acute appendicitis with localized peritonitis, without perforation, abscess, or gangrene [K35.30]Procedure(s):APPENDECTOMY, LAPAROSCOPIC Number of specimens: 1Name of specimens: 1. Appendix - perm     References:    Click here for ordering Quick Tip   Question Answer Comment   Procedure Type: General Surgery    Specimen Class: Known or suspected malignancy    Release to patient Immediate        05/31/24 1103

## 2024-05-31 NOTE — DISCHARGE INSTRUCTIONS
Appendicitis Surgery Discharge Instructions    Woundcare:  - Do not pick at surgical glue. This will fall off on its own over the next 1-3 weeks or we will remove it at your post op follow up appointment.    - You may shower starting 24 hours after surgery allowing warm soapy water to run over incisions, but do not scrub or submerge (ie no baths) you incisions.     Activity:  - Do not lift anything heavier than 15 lbs for 6 weeks following surgery, or until you are cleared at your post-operative appointment   - No swimming in pools, [employer], [employer] etc. for 6 weeks after your surgery    Medications  - Some pain and soreness around your incisions is expected and will improve with time.  - In many cases, over the counter pain medications (Tylenol/Ibupofen) are all that is needed for adequate for pain control.   - For more severe pain that is not adequately controlled with over the counter medications, you have been provided with a prescription pain medications. You should not drive or operate machinery while taking these and these medications can cause, or worsen constipation.  If taking narcotics you will need to increase your fluid intake, eat high fiber foods (such as fruits and bran) and make sure that you are up and walking. You may need to take an over the counter stool softener for constipation.    Diet:  -Resume your pre-operative home diet    Follow up:  -Refer to follow up instructions     Call the Office if you experience:  -Increased redness, warmth, tenderness, or draining pus at your incision(s)  -Worsening fevers, chills, nausea/vomiting  -Pain, weakness, coldness, or numbness in your hand  -Uncontrolled pain  -Your call will be returned within 24 hours and further instructions will be provided    Go to ER/Urgent Care if you experience:  -Worsening shortness of breath or chest pain

## 2024-05-31 NOTE — ANESTHESIA PROCEDURE NOTES
Intubation    Date/Time: 5/31/2024 9:08 AM    Performed by: Venecia Varner CRNA  Authorized by: Alan Seay MD    Intubation:     Induction:  Rapid sequence induction    Intubated:  Postinduction    Mask Ventilation:  Easy mask    Attempts:  1    Attempted By:  CRNA    Method of Intubation:  Video laryngoscopy    Blade:  Ji 3    Laryngeal View Grade: Grade I - full view of cords      Difficult Airway Encountered?: No      Complications:  None    Airway Device:  Oral endotracheal tube    Airway Device Size:  7.0    Style/Cuff Inflation:  Cuffed (inflated to minimal occlusive pressure)    Tube secured:  21    Secured at:  The lips    Placement Verified By:  Capnometry    Complicating Factors:  None    Findings Post-Intubation:  BS equal bilateral and atraumatic/condition of teeth unchanged

## 2024-06-01 ENCOUNTER — NURSE TRIAGE (OUTPATIENT)
Dept: ADMINISTRATIVE | Facility: CLINIC | Age: 64
End: 2024-06-01
Payer: COMMERCIAL

## 2024-06-01 NOTE — TELEPHONE ENCOUNTER
Pt had her appendix out yesterday. Pt is asking about her other medication previously prescribed (gabapentin) and if she can continue taking it with new pain medication.  Per pt's AVS, she has robaxin and oxycodone as new medications and she is to continue her gabapentin. Read over medications on pt's AVS and she verbalized understanding. Instructed to call back with additional questions or concerns. Pt verbalized understanding.   Reason for Disposition   Health Information question, no triage required and triager able to answer question    Protocols used: Information Only Call-A-AH

## 2024-06-02 ENCOUNTER — NURSE TRIAGE (OUTPATIENT)
Dept: ADMINISTRATIVE | Facility: CLINIC | Age: 64
End: 2024-06-02
Payer: COMMERCIAL

## 2024-06-02 LAB — BACTERIA UR CULT: ABNORMAL

## 2024-06-02 RX ORDER — CIPROFLOXACIN 500 MG/1
500 TABLET ORAL EVERY 12 HOURS
Qty: 10 TABLET | Refills: 0 | Status: SHIPPED | OUTPATIENT
Start: 2024-06-02 | End: 2024-06-07

## 2024-06-02 NOTE — TELEPHONE ENCOUNTER
Reason for Disposition   Caller requesting lab results  (Exception: Routine or non-urgent lab result.)     Pt wants to speak with MD now    Protocols used: PCP Call - No Triage-A-  Pt states she received results in her portal for her urine culture. States wants to know if she needs and antibiotic. Spoke with Provider on call Dr. Boston. Dr. Boston states pt has a UTI and she will send an antibiotic to pt's preferred pharmacy. Pt requested the [facility] [place] on [address] at [phone]. Pt advised Dr. Boston will send the order now. Pt verbalized understanding.

## 2024-06-03 ENCOUNTER — PATIENT OUTREACH (OUTPATIENT)
Dept: ADMINISTRATIVE | Facility: CLINIC | Age: 64
End: 2024-06-03
Payer: COMMERCIAL

## 2024-06-03 DIAGNOSIS — Z90.49 S/P LAPAROSCOPIC APPENDECTOMY: Primary | ICD-10-CM

## 2024-06-03 NOTE — PROGRESS NOTES
C3 nurse spoke with Tawny Valerio for a TCC post hospital discharge follow up call. The patient does not have a scheduled HOSFU appointment with her PCP, Geovanna South DO within 5-7 days post hospital discharge date of 5/31/24. C3 nurse was unable to schedule HOSFU appointment in [facility] with her PCP, and a [facility] was not available on a day that worked for the pt. NPH[facility] referral placed per pt request. No messages routed at this time.

## 2024-06-03 NOTE — PROGRESS NOTES
Pt called yesterday requesting Abx after seeing a positive UCx. I reviewed the pt's chart and sent Cipro 500 BID for her preferred pharmacy.

## 2024-06-03 NOTE — PROGRESS NOTES
C3 nurse spoke with Tawny Valerio for a TCC post hospital discharge follow up call. Pt requested callback to complete the call. The patient does not have a scheduled HOSFU appointment with her PCP, Geovanna South DO within 5-7 days post hospital discharge date of 5/31/24. No messages routed at this time.

## 2024-06-04 ENCOUNTER — DOCUMENTATION ONLY (OUTPATIENT)
Dept: REHABILITATION | Facility: HOSPITAL | Age: 64
End: 2024-06-04
Payer: COMMERCIAL

## 2024-06-04 NOTE — PROGRESS NOTES
[facility]  Physical Therapy Discharge Note    Name: Tawny Valerio  [facility] Number: [id number]    Therapy Diagnosis:        Encounter Diagnoses   Name Primary?    Decreased strength, endurance, and mobility Yes    Neck pain      Abnormal posture      Cervicalgia of udtdsmbm-xyztafq-kdccg region      Decreased range of motion of neck        Physician: Kirill Andrade MD     Physician Orders: PT Eval and Treat  Referral for lower neck/upper back to mid back PT with myofascial release (therapist may choose from and do a combination of: deep tissue massage, cupping, dry needling, etc). No soft tissue manipulation of suboccipital region if you start to feel worse. All joint manipulation is fine.   Medical Diagnosis from Referral: cervicalgia of yjwtnwrd-iwwhmpm-mivnj region, cervicogenic headache    Date of Last visit:  3/11/2024  Total Visits Received: 5    ASSESSMENT      See daily note    Discharge reason: Patient has not attended therapy since 3/11/2024    Discharge FOTO Score: see daily note    Goals: see daily note    PLAN   This patient is discharged from Physical Therapy      Rasheed Hill, PT

## 2024-06-05 NOTE — OP NOTE
[facility]  General Surgery  Operative Note    DATE: 5/31/2024    PREOPERATIVE DIAGNOSIS: Acute appendicitis with localized peritonitis, without perforation, abscess, or gangrene [K35.30]     POSTOPERATIVE DIAGNOSIS: Acute appendicitis with localized peritonitis, without perforation, abscess, or gangrene [K35.30]     Procedure(s):  APPENDECTOMY, LAPAROSCOPIC     Surgeons and Role:     * Hector Harrell MD - Primary     * Rosanna Johnston MD - Resident - Assisting     * Kirill Razo MD - Resident - Assisting    ANESTHESIA: General endotracheal anesthesia    FINDINGS: Acute appendicitis without perforation and surrounding inflammatory tissue.    INDICATION: Ms. Valerio is a 64 y.o. female who presented to [facility] with complaints of anorexia, generalized malaise, and lower abdominal pain. The history and exam were consistent with acute appendicitis, which was confirmed by laboratory studies and a CT scan. After discussing the findings with the patient, we recommended laparoscopic appendectomy for management. After discussing the alternatives, benefits, and risks for the proposed operation, Ms. Valerio wished to proceed. All of Ms. Valerio questions concerning the operation were answered, she expressed full understanding of the procedure and the risks/benefits associated, and signed informed consent was obtained.     PROCEDURE IN DETAIL: Ms. Valerio was brought to the operating room where she was placed in supine position on the operating room table and underwent general anesthesia with endotracheal intubation without complication. The field was sterilely prepped out and draped in standard fashion, and a timeout identifying the correct patient, placement, procedure, and preoperative antibiotics was called with everyone in agreement.  Using a #15 scalpel, an infraumbilical skin incision was made through the epidermis and dermis, and the incision was deepened through the subcutaneous tissue with  electrocautery to the fascia. The fascia was grasped and elevated and sharply incised. The preperitoneal space was opened to reveal the intraabdominal space. An 0 vicryl was placed in the fascia as a stay/closure suture. A 12mm chris trochar was placed in the abdomen, and the abdomen was insufflated to 15-mm Hg without issue. The abdomen was inspected and no abnormalities were noted apart from inflamed tissue in the RLQ. Under direct vision, two additional 5-mm trocars were placed in the lower midline and left lower quadrant in standard fashion. The appendix was identified and noted to have inflammatory changes with surrounding inflammatory tissue, without evidence of perforation. It traversed lateral under the peritoneal reflection. The white line of Toldt was opened and the ascending colon was rotated medially to fully visualize the appendix. The tip of the appendix was identified, grasped, and mobilized medially.  Using an atraumatic grasper, the appendix was elevated and blunt dissection was used to free surrounding peritoneal attachments. Once freed from surrounding tissue, a mesenteric defect was made at the base of the appendix using the [place] dissector. The appendix was divided at the base with a blue load Endo-ARLENE stapler in standard fashion. The mesoappendix was then divided with Endo-ARLENE using a white load. The appendix was placed into an Endocatch bag, was removed, and sent to Pathology. The staple lines on the mesoappendix and cecum were examined and were well sealed, viable, and without bleeding or leak. Hemostasis was confirmed. The two 5mm ports were removed under visualization without issue. The 12-mm port was removed and the fascia was closed with the stay 0 Vicryl suture. Skin was then closed with subcuticular 4-0 Monocryl and Dermabond was applied.   This completed the proposed operation. All instruments, needles, and sponge counts were reported as correct x2 by the nursing staff. Patient was  extubated and awakened from general anesthesia without complication. She was sent to the recovery unit in stable condition.     EBL: 10mL    COMPLICATIONS: none apparent.    SPECIMEN: Appendix for permanent    DRAINS: None    DISPOSITION: PACU.    ATTESTATION:  I was present and scrubbed for the entire procedure including all critical portions of the procedure.    Hector Harrell MD, FACS  Acute Care Surgery and Surgical Critical Care  [facility]  5/31/2024

## 2024-06-06 ENCOUNTER — PATIENT MESSAGE (OUTPATIENT)
Dept: INTERNAL MEDICINE | Facility: CLINIC | Age: 64
End: 2024-06-06
Payer: COMMERCIAL

## 2024-06-06 NOTE — DISCHARGE SUMMARY
[facility] - Surgery (2nd Fl)  Discharge Note  Short Stay    Procedure(s) (LRB):  APPENDECTOMY, LAPAROSCOPIC (N/A)      OUTCOME: Patient tolerated treatment/procedure well without complication and is now ready for discharge.    DISPOSITION: Home or Self Care    FINAL DIAGNOSIS:  Acute appendicitis    FOLLOWUP: In clinic    DISCHARGE INSTRUCTIONS:    Discharge Procedure Orders   Diet Adult Regular     No driving until:   Order Comments: No driving while still taking pain medications daily.   Take a stool softener while taking pain medications daily.     Lifting restrictions   Order Comments: No lifting more than 10 lbs for 6 weeks.     Notify your health care provider if you experience any of the following:  increased confusion or weakness     Notify your health care provider if you experience any of the following:  persistent dizziness, light-headedness, or visual disturbances     Notify your health care provider if you experience any of the following:  worsening rash     Notify your health care provider if you experience any of the following:  severe persistent headache     Notify your health care provider if you experience any of the following:  difficulty breathing or increased cough     Notify your health care provider if you experience any of the following:  redness, tenderness, or signs of infection (pain, swelling, redness, odor or green/yellow discharge around incision site)     Notify your health care provider if you experience any of the following:  severe uncontrolled pain     Notify your health care provider if you experience any of the following:  persistent nausea and vomiting or diarrhea     Notify your health care provider if you experience any of the following:  temperature >100.4     No dressing needed   Order Comments: Okay to take shower and get incision wet in 48 hours. Do NOT soak/submerge incision in water (aka no bathing, swimming, etc) for 2 weeks. Skin glue overlying incision will fall off on  its own in 2-3 weeks.     Activity as tolerated        TIME SPENT ON DISCHARGE: 20 minutes

## 2024-06-10 LAB
FINAL PATHOLOGIC DIAGNOSIS: NORMAL
GROSS: NORMAL
Lab: NORMAL

## 2024-06-10 NOTE — ANESTHESIA POSTPROCEDURE EVALUATION
Anesthesia Post Evaluation    Patient: Tawny Valerio    Procedure(s) Performed: Procedure(s) (LRB):  APPENDECTOMY, LAPAROSCOPIC (N/A)    Final Anesthesia Type: general      Patient location during evaluation: PACU  Patient participation: Yes- Able to Participate  Level of consciousness: awake and alert  Post-procedure vital signs: reviewed and stable  Pain management: adequate  Airway patency: patent    PONV status at discharge: No PONV  Anesthetic complications: no      Cardiovascular status: blood pressure returned to baseline  Respiratory status: unassisted  Hydration status: euvolemic  Follow-up not needed.              Vitals Value Taken Time   /59 05/31/24 1330   Temp 36.7 °C (98 °F) 05/31/24 1330   Pulse 78 05/31/24 1330   Resp 12 05/31/24 1330   SpO2 95 % 05/31/24 1330         No case tracking events are documented in the log.      Pain/Segun Score: No data recorded

## 2024-06-10 NOTE — PROGRESS NOTES
Patient Portal Message:    Your pathology results from surgery are as expected and do not require any change in the plan.    We are happy to continue as planned and see you in clinic for postoperative followup. If you have no additional questions, are otherwise doing well since the procedure, and would instead prefer a phone check up, please contact us at [phone] during normal clinic hours.    Have a great day,  Hector Harrell MD & Joselin England RN    Final Pathologic Diagnosis (no units)       Date                     Value                 05/31/2024                                 Appendix (appendectomy):   Acute appendicitis with periappendicitis/serositis and microabscess formation      ----------

## 2024-06-12 ENCOUNTER — LAB VISIT (OUTPATIENT)
Dept: LAB | Facility: HOSPITAL | Age: 64
End: 2024-06-12
Attending: PHYSICIAN ASSISTANT
Payer: COMMERCIAL

## 2024-06-12 DIAGNOSIS — E78.5 HYPERLIPIDEMIA LDL GOAL <70: ICD-10-CM

## 2024-06-12 DIAGNOSIS — I10 ESSENTIAL HYPERTENSION: ICD-10-CM

## 2024-06-12 LAB
ALBUMIN SERPL BCP-MCNC: 3.1 G/DL (ref 3.5–5.2)
ALP SERPL-CCNC: 89 U/L (ref 55–135)
ALT SERPL W/O P-5'-P-CCNC: 16 U/L (ref 10–44)
ANION GAP SERPL CALC-SCNC: 6 MMOL/L (ref 8–16)
AST SERPL-CCNC: 12 U/L (ref 10–40)
BASOPHILS # BLD AUTO: 0.07 K/UL (ref 0–0.2)
BASOPHILS NFR BLD: 0.7 % (ref 0–1.9)
BILIRUB SERPL-MCNC: 0.4 MG/DL (ref 0.1–1)
BUN SERPL-MCNC: 16 MG/DL (ref 8–23)
CALCIUM SERPL-MCNC: 9.3 MG/DL (ref 8.7–10.5)
CHLORIDE SERPL-SCNC: 106 MMOL/L (ref 95–110)
CHOLEST SERPL-MCNC: 169 MG/DL (ref 120–199)
CHOLEST/HDLC SERPL: 3.4 {RATIO} (ref 2–5)
CO2 SERPL-SCNC: 28 MMOL/L (ref 23–29)
CREAT SERPL-MCNC: 0.9 MG/DL (ref 0.5–1.4)
DIFFERENTIAL METHOD BLD: ABNORMAL
EOSINOPHIL # BLD AUTO: 0.5 K/UL (ref 0–0.5)
EOSINOPHIL NFR BLD: 4.9 % (ref 0–8)
ERYTHROCYTE [DISTWIDTH] IN BLOOD BY AUTOMATED COUNT: 15.4 % (ref 11.5–14.5)
EST. GFR  (NO RACE VARIABLE): >60 ML/MIN/1.73 M^2
GLUCOSE SERPL-MCNC: 95 MG/DL (ref 70–110)
HCT VFR BLD AUTO: 39.8 % (ref 37–48.5)
HDLC SERPL-MCNC: 50 MG/DL (ref 40–75)
HDLC SERPL: 29.6 % (ref 20–50)
HGB BLD-MCNC: 12.1 G/DL (ref 12–16)
IMM GRANULOCYTES # BLD AUTO: 0.02 K/UL (ref 0–0.04)
IMM GRANULOCYTES NFR BLD AUTO: 0.2 % (ref 0–0.5)
LDLC SERPL CALC-MCNC: 106.6 MG/DL (ref 63–159)
LYMPHOCYTES # BLD AUTO: 2.7 K/UL (ref 1–4.8)
LYMPHOCYTES NFR BLD: 28.5 % (ref 18–48)
MCH RBC QN AUTO: 26.8 PG (ref 27–31)
MCHC RBC AUTO-ENTMCNC: 30.4 G/DL (ref 32–36)
MCV RBC AUTO: 88 FL (ref 82–98)
MONOCYTES # BLD AUTO: 0.6 K/UL (ref 0.3–1)
MONOCYTES NFR BLD: 6.4 % (ref 4–15)
NEUTROPHILS # BLD AUTO: 5.7 K/UL (ref 1.8–7.7)
NEUTROPHILS NFR BLD: 59.3 % (ref 38–73)
NONHDLC SERPL-MCNC: 119 MG/DL
NRBC BLD-RTO: 0 /100 WBC
PLATELET # BLD AUTO: 335 K/UL (ref 150–450)
PMV BLD AUTO: 9.8 FL (ref 9.2–12.9)
POTASSIUM SERPL-SCNC: 4.2 MMOL/L (ref 3.5–5.1)
PROT SERPL-MCNC: 7.6 G/DL (ref 6–8.4)
RBC # BLD AUTO: 4.51 M/UL (ref 4–5.4)
SODIUM SERPL-SCNC: 140 MMOL/L (ref 136–145)
TRIGL SERPL-MCNC: 62 MG/DL (ref 30–150)
WBC # BLD AUTO: 9.57 K/UL (ref 3.9–12.7)

## 2024-06-12 PROCEDURE — 36415 COLL VENOUS BLD VENIPUNCTURE: CPT | Mod: PO | Performed by: PHYSICIAN ASSISTANT

## 2024-06-12 PROCEDURE — 85025 COMPLETE CBC W/AUTO DIFF WBC: CPT | Performed by: PHYSICIAN ASSISTANT

## 2024-06-12 PROCEDURE — 80061 LIPID PANEL: CPT | Performed by: PHYSICIAN ASSISTANT

## 2024-06-12 PROCEDURE — 80053 COMPREHEN METABOLIC PANEL: CPT | Performed by: PHYSICIAN ASSISTANT

## 2024-06-13 ENCOUNTER — TELEPHONE (OUTPATIENT)
Dept: SURGERY | Facility: CLINIC | Age: 64
End: 2024-06-13
Payer: COMMERCIAL

## 2024-06-13 NOTE — TELEPHONE ENCOUNTER
Conducted telephone PO f/u s/p 5/31 Lap Cara W/ Dr. Hector Harrell. Pt denies any s/s of infection, pain or problems with po food or liquid intake.Regular BMS. Site healing well. With most of Dermabond still in place. Advised that it is ok to peel off at this point.6/14 in clinic appt canceled .

## 2024-07-02 NOTE — PROGRESS NOTES
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 (H)  1.8 - 7.7 K/uL Final    Immature Grans (Abs) 05/31/2024 0.07 (H)  0.00 - 0.04 K/uL Final    Lymph # 05/31/2024 1.7  1.0 - 4.8 K/uL Final    Mono # 05/31/2024 0.6  0.3 - 1.0 K/uL Final    Eos # 05/31/2024 0.1  0.0 - 0.5 K/uL Final    Baso # 05/31/2024 0.05  0.00 - 0.20 K/uL Final    nRBC 05/31/2024 0  0 /100 WBC Final    Gran % 05/31/2024 79.2 (H)  38.0 - 73.0 % Final    Lymph % 05/31/2024 13.9 (L)  18.0 - 48.0 % Final    Mono % 05/31/2024 5.2  4.0 - 15.0 % Final    Eosinophil % 05/31/2024 0.7  0.0 - 8.0 % Final    Basophil % 05/31/2024 0.4  0.0 - 1.9 % Final    Differential Method 05/31/2024 Automated   Final    POCT Glucose 05/31/2024 119 (H)  70 - 110 mg/dL Final    Final Pathologic Diagnosis 05/31/2024    Final                    Value:Appendix (appendectomy):  Acute appendicitis with periappendicitis/serositis and microabscess formation       Gross 05/31/2024    Final                    Value:Pathology ID# [id number]  Pathology MRN # [id number]  Hospital/Clinic label MRN# [id number]  CSN:  [id number]      Received in formalin labeled with the patient's name,  medical record number , and &quot;appendix&quot; is a vermiform appendix measuring 5.0 in length and ranging from 0.8 to 1.0 cm in diameter.  The serosal surface is dull with dilated blood vessels.    The mesoappendix measures 3.0 x 1.5 x 0.5 cm.  The resection margin is stapled closed, inked orange.  On section, the wall thickness ranges from 0.2 cm to 0.4 cm and presents areas of hemorrhage.  The mucosal surface is hemorrhagic and disrupted across   most of the specimen.  The lumen ranges from  pinpoint to 0.1 cm in diameter and contains hemorrhagic debris.  The specimen is entirely submitted as follows:     [id number]- bisected tip  [id number]- margin and proximal appendix  [id number]- medial and distal appendix    VINCENZO Coats, MSCLRA      Disclaimer 05/31/2024 Unless the case is a 'gross only' or additional testing only, the final diagnosis for each specimen is based on a microscopic examination of appropriate tissue sections.   Final    POCT Glucose 05/31/2024 151 (H)  70 - 110 mg/dL Final   Office Visit on 05/07/2024   Component Date Value Ref Range Status    POC Rapid COVID 05/07/2024 Negative  Negative Final     Acceptable 05/07/2024 Yes   Final    POC Molecular Influenza A Ag 05/07/2024 Negative  Negative Final    POC Molecular Influenza B Ag 05/07/2024 Negative  Negative Final     Acceptable 05/07/2024 Yes   Final   Lab Visit on 04/17/2024   Component Date Value Ref Range Status    Hemoglobin A1C 04/17/2024 5.7 (H)  4.0 - 5.6 % Final    Estimated Avg Glucose 04/17/2024 117  68 - 131 mg/dL Final   Office Visit on 04/17/2024   Component Date Value Ref Range Status    POC Glucose 04/17/2024 101  70 - 110 MG/DL Final       - Independent review of images:      - Independent review of consultant's notes: Raymond     ASSESSMENT/PLAN:  Cervical dystonia  - R tilt, L tort , restricted ROM when turning the R    Geste antagoniste present on exam  today   Currently taking benadryl twice a day which has been beneficial for dystonia   Some dry eyes, rec'd use of artificial tears   Switching to artane 1 mg BID (in benadryl)   Consider switching zanaflex to baclofen next       She reports that her head feels weak and that she has to hold it up. Will do MG labs.         The patient has a documented history of hypertension, DMII, obesity, hyperlipidemia and/or hypercholesteremia with long-term complications such as cerebrovascular disease, peripheral vascular disease, and/or aortic atherosclerosis. Collectively these risk factors may contribute to cerebral atherosclerosis, and cerebral hypoperfusion compounded neurocognitive disorder. Rec'd maximizing cerebrovascular-related medical therapy, including but not limited to cholesterol medications and antiplatelet agents. Rec'd controlling vascular risk factors like hypertension, hyperlipidemia, and Diabetes SBP<130, LDL<100, and A1C<7.0. Rec'd optimizing lifestyle choices, including a heart-healthy diet (e.g., Mediterranean or DASH), increased cardiovascular exercise (goal 150 minutes of moderate-intensity per week), and staying cognitively and socially active.      Orders Placed This Encounter    Myasthenia Gravis Eval With [facility] Reflex    trihexyphenidyL (ARTANE) 2 MG tablet         Follow up: in 3 months with [facility]     Collaborating Physician, Dr. White, was available during today's encounter. Any change to plan along with cosign to appear in the EMR.       Total time spent with the patient: 47 minutes.  36 minutes of face-to-face consultation and 11 minutes of chart review and coordination of care, on the day of the visit. This includes face to face time and non-face to face time preparing to see the patient (eg, review of tests), obtaining and/or reviewing separately obtained history, documenting clinical information in the electronic or other health record, independently interpreting resultsand communicating  results to the patient/family/caregiver, or care coordination.         Kaylyn Ko PA-C   [facility]  Department of Neurology  Movement Disorders

## 2024-07-03 ENCOUNTER — LAB VISIT (OUTPATIENT)
Dept: LAB | Facility: HOSPITAL | Age: 64
End: 2024-07-03
Payer: COMMERCIAL

## 2024-07-03 ENCOUNTER — OFFICE VISIT (OUTPATIENT)
Dept: NEUROLOGY | Facility: CLINIC | Age: 64
End: 2024-07-03
Payer: COMMERCIAL

## 2024-07-03 VITALS
DIASTOLIC BLOOD PRESSURE: 79 MMHG | BODY MASS INDEX: 53.24 KG/M2 | HEART RATE: 84 BPM | SYSTOLIC BLOOD PRESSURE: 130 MMHG | HEIGHT: 61 IN | WEIGHT: 282 LBS

## 2024-07-03 DIAGNOSIS — G24.3 CERVICAL DYSTONIA: Primary | ICD-10-CM

## 2024-07-03 DIAGNOSIS — E11.9 CONTROLLED TYPE 2 DIABETES MELLITUS WITHOUT COMPLICATION, WITHOUT LONG-TERM CURRENT USE OF INSULIN: ICD-10-CM

## 2024-07-03 DIAGNOSIS — E11.618 CONTROLLED TYPE 2 DIABETES MELLITUS WITH OTHER DIABETIC ARTHROPATHY, WITHOUT LONG-TERM CURRENT USE OF INSULIN: ICD-10-CM

## 2024-07-03 DIAGNOSIS — R53.1 WEAKNESS: ICD-10-CM

## 2024-07-03 DIAGNOSIS — Z71.89 COUNSELING REGARDING GOALS OF CARE: ICD-10-CM

## 2024-07-03 DIAGNOSIS — I10 ESSENTIAL HYPERTENSION: ICD-10-CM

## 2024-07-03 DIAGNOSIS — M54.2 CERVICALGIA OF OCCIPITO-ATLANTO-AXIAL REGION: ICD-10-CM

## 2024-07-03 DIAGNOSIS — E66.01 MORBID OBESITY WITH BMI OF 50.0-59.9, ADULT: ICD-10-CM

## 2024-07-03 DIAGNOSIS — E78.5 HYPERLIPIDEMIA LDL GOAL <70: ICD-10-CM

## 2024-07-03 PROCEDURE — 36415 COLL VENOUS BLD VENIPUNCTURE: CPT | Performed by: PHYSICIAN ASSISTANT

## 2024-07-03 PROCEDURE — 86041 ACETYLCHOLN RCPTR BNDNG ANTB: CPT | Performed by: PHYSICIAN ASSISTANT

## 2024-07-03 PROCEDURE — 99999 PR PBB SHADOW E&M-EST. PATIENT-LVL IV: CPT | Mod: PBBFAC,,, | Performed by: PHYSICIAN ASSISTANT

## 2024-07-03 PROCEDURE — 86366 MUSCLE-SPECIFIC KINASE ANTB: CPT | Performed by: PHYSICIAN ASSISTANT

## 2024-07-03 RX ORDER — TRIHEXYPHENIDYL HYDROCHLORIDE 2 MG/1
1 TABLET ORAL 2 TIMES DAILY
Qty: 30 TABLET | Refills: 11 | Status: SHIPPED | OUTPATIENT
Start: 2024-07-03 | End: 2025-07-03

## 2024-07-08 ENCOUNTER — PATIENT MESSAGE (OUTPATIENT)
Dept: NEUROLOGY | Facility: CLINIC | Age: 64
End: 2024-07-08
Payer: COMMERCIAL

## 2024-07-08 ENCOUNTER — TELEPHONE (OUTPATIENT)
Dept: NEUROLOGY | Facility: CLINIC | Age: 64
End: 2024-07-08
Payer: COMMERCIAL

## 2024-07-08 LAB — MUSK ANTIBODY TEST: 0 NMOL/L (ref 0–0.02)

## 2024-07-09 LAB
ACHR BIND AB SER-SCNC: 0 NMOL/L
MG INTERPRETIVE COMMENTS: NORMAL

## 2024-07-09 RX ORDER — MECLIZINE HCL 12.5 MG 12.5 MG/1
12.5 TABLET ORAL 2 TIMES DAILY PRN
Qty: 30 TABLET | Refills: 0 | Status: SHIPPED | OUTPATIENT
Start: 2024-07-09

## 2024-07-11 DIAGNOSIS — I10 ESSENTIAL HYPERTENSION: ICD-10-CM

## 2024-07-11 DIAGNOSIS — E66.01 MORBID OBESITY DUE TO EXCESS CALORIES: ICD-10-CM

## 2024-07-11 DIAGNOSIS — E11.9 CONTROLLED TYPE 2 DIABETES MELLITUS WITHOUT COMPLICATION, WITHOUT LONG-TERM CURRENT USE OF INSULIN: ICD-10-CM

## 2024-07-11 DIAGNOSIS — R68.89 DECREASED FUNCTIONAL ACTIVITY TOLERANCE: ICD-10-CM

## 2024-07-11 DIAGNOSIS — G47.33 OSA (OBSTRUCTIVE SLEEP APNEA): ICD-10-CM

## 2024-07-11 DIAGNOSIS — R06.02 SOB (SHORTNESS OF BREATH): ICD-10-CM

## 2024-07-11 DIAGNOSIS — E55.9 VITAMIN D INSUFFICIENCY: ICD-10-CM

## 2024-07-11 DIAGNOSIS — M25.60 DECREASED RANGE OF MOTION: ICD-10-CM

## 2024-07-11 DIAGNOSIS — M54.2 NECK PAIN: ICD-10-CM

## 2024-07-11 RX ORDER — EZETIMIBE 10 MG/1
10 TABLET ORAL DAILY
Qty: 90 TABLET | Refills: 3 | Status: SHIPPED | OUTPATIENT
Start: 2024-07-11 | End: 2025-07-11

## 2024-07-12 ENCOUNTER — PATIENT MESSAGE (OUTPATIENT)
Dept: DIABETES | Facility: CLINIC | Age: 64
End: 2024-07-12
Payer: COMMERCIAL

## 2024-07-15 DIAGNOSIS — E11.9 CONTROLLED TYPE 2 DIABETES MELLITUS WITHOUT COMPLICATION, WITHOUT LONG-TERM CURRENT USE OF INSULIN: ICD-10-CM

## 2024-07-16 ENCOUNTER — TELEPHONE (OUTPATIENT)
Dept: DIABETES | Facility: CLINIC | Age: 64
End: 2024-07-16
Payer: COMMERCIAL

## 2024-07-16 NOTE — TELEPHONE ENCOUNTER
----- Message from Joanne Drake sent at 7/16/2024  2:05 PM CDT -----  Regarding: [facility]/Demetrius  States she is calling regarding the prior authorization on her mounjaro, auth# [id number]. Please call Demetrius [phone]. Thank you

## 2024-07-16 NOTE — TELEPHONE ENCOUNTER
[id number]. MOUNJARO INJ 10MG/0.5 is approved through 07/15/2025. Your patient may now fill this prescription and it will be covered.

## 2024-07-17 ENCOUNTER — PATIENT MESSAGE (OUTPATIENT)
Dept: CARDIOLOGY | Facility: CLINIC | Age: 64
End: 2024-07-17

## 2024-07-17 ENCOUNTER — OFFICE VISIT (OUTPATIENT)
Dept: CARDIOLOGY | Facility: CLINIC | Age: 64
End: 2024-07-17
Payer: COMMERCIAL

## 2024-07-17 VITALS
DIASTOLIC BLOOD PRESSURE: 69 MMHG | SYSTOLIC BLOOD PRESSURE: 113 MMHG | HEIGHT: 61 IN | HEART RATE: 69 BPM | BODY MASS INDEX: 52.82 KG/M2 | OXYGEN SATURATION: 97 % | WEIGHT: 279.75 LBS

## 2024-07-17 DIAGNOSIS — I25.10 CORONARY ARTERY DISEASE, UNSPECIFIED VESSEL OR LESION TYPE, UNSPECIFIED WHETHER ANGINA PRESENT, UNSPECIFIED WHETHER NATIVE OR TRANSPLANTED HEART: ICD-10-CM

## 2024-07-17 DIAGNOSIS — Z78.9 STATIN INTOLERANCE: Primary | ICD-10-CM

## 2024-07-17 DIAGNOSIS — G47.33 OSA (OBSTRUCTIVE SLEEP APNEA): ICD-10-CM

## 2024-07-17 DIAGNOSIS — I10 ESSENTIAL HYPERTENSION: ICD-10-CM

## 2024-07-17 DIAGNOSIS — E78.5 HYPERLIPIDEMIA LDL GOAL <70: ICD-10-CM

## 2024-07-17 PROCEDURE — 4010F ACE/ARB THERAPY RXD/TAKEN: CPT | Mod: CPTII,S$GLB,, | Performed by: PHYSICIAN ASSISTANT

## 2024-07-17 PROCEDURE — 3074F SYST BP LT 130 MM HG: CPT | Mod: CPTII,S$GLB,, | Performed by: PHYSICIAN ASSISTANT

## 2024-07-17 PROCEDURE — 99999 PR PBB SHADOW E&M-EST. PATIENT-LVL III: CPT | Mod: PBBFAC,,, | Performed by: PHYSICIAN ASSISTANT

## 2024-07-17 PROCEDURE — 1159F MED LIST DOCD IN RCRD: CPT | Mod: CPTII,S$GLB,, | Performed by: PHYSICIAN ASSISTANT

## 2024-07-17 PROCEDURE — 3044F HG A1C LEVEL LT 7.0%: CPT | Mod: CPTII,S$GLB,, | Performed by: PHYSICIAN ASSISTANT

## 2024-07-17 PROCEDURE — 99214 OFFICE O/P EST MOD 30 MIN: CPT | Mod: S$GLB,,, | Performed by: PHYSICIAN ASSISTANT

## 2024-07-17 PROCEDURE — 3008F BODY MASS INDEX DOCD: CPT | Mod: CPTII,S$GLB,, | Performed by: PHYSICIAN ASSISTANT

## 2024-07-17 PROCEDURE — 3078F DIAST BP <80 MM HG: CPT | Mod: CPTII,S$GLB,, | Performed by: PHYSICIAN ASSISTANT

## 2024-07-17 NOTE — PROGRESS NOTES
Cardiology Clinic Note  Reason for Visit: Annual visit, HLD  General Cardiologist: Dr. Flaherty     HPI:     PMHx:  Nonobstructive CAD per PET stress ()  HTN  HLD  Statin intolerance   DMT2  ASHLI      Tawny Valerio is a 64 y.o. F, who presents for annual follow-up.  She denies any acute complaints today.  Recently her  has had acute heart failure.  They are making concerted effort to change their diet and lifestyle.  She has been exercising in the pool and is feeling well.  A PET stress in  showed plaque formation and suggested nonobstructive CAD.  Her LDL on Zetia alone is 106.  She has been intolerant to both atorvastatin and rosuvastatin secondary to myalgias in the past.  She has significant family history of CAD.    The 10-year ASCVD risk score (Anneliese MAC, et al., 2019) is: 7.6%    Values used to calculate the score:      Age: 64 years      Sex: Female      Is Non- : No      Diabetic: Yes      Tobacco smoker: No      Systolic Blood Pressure: 113 mmHg      Is BP treated: Yes      HDL Cholesterol: 50 mg/dL      Total Cholesterol: 169 mg/dL      ROS:    Pertinent ROS included in HPI and below.  PMH:     Past Medical History:   Diagnosis Date    Acute cystitis without hematuria 2019    Anxiety     Colon cancer screening 2019    Diabetes mellitus, type 2 2016    Endometrial ca 2019    Essential hypertension 2018    Hyperlipidemia LDL goal <70 10/30/2023    Simple endometrial hyperplasia 03/15/2019    Uterine polyp 2019     Past Surgical History:   Procedure Laterality Date     SECTION      x 1    DILATION AND CURETTAGE OF UTERUS  2019    HYSTERECTOMY  19    HYSTEROSCOPY WITH DILATION AND CURETTAGE OF UTERUS N/A 2019    Procedure: HYSTEROSCOPY, WITH DILATION AND CURETTAGE OF UTERUS;  Surgeon: Bobby Frazier Jr., MD;  Location: [facility];  Service: OB/GYN;  Laterality: N/A;    LAPAROSCOPIC APPENDECTOMY N/A 2024     Procedure: APPENDECTOMY, LAPAROSCOPIC;  Surgeon: Hector Harrell MD;  Location: [facility] OR 2ND FLR;  Service: General;  Laterality: N/A;    lipoma removed      ROBOT-ASSISTED LAPAROSCOPIC ABDOMINAL HYSTERECTOMY USING DA AWAIS XI N/A 08/26/2019    Procedure: XI ROBOTIC HYSTERECTOMY;  Surgeon: Aneudy Almeida MD;  Location: [facility] OR 2ND FLR;  Service: OB/GYN;  Laterality: N/A;    ROBOT-ASSISTED LAPAROSCOPIC LYSIS OF ADHESIONS USING DA AWAIS XI N/A 08/26/2019    Procedure: XI ROBOTIC LYSIS, ADHESIONS;  Surgeon: Aneudy Almeida MD;  Location: [facility] OR 2ND FLR;  Service: OB/GYN;  Laterality: N/A;  Omental    ROBOT-ASSISTED LAPAROSCOPIC SALPINGO-OOPHORECTOMY USING DA AWAIS XI Bilateral 08/26/2019    Procedure: XI ROBOTIC SALPINGO-OOPHORECTOMY;  Surgeon: Aneudy Almeida MD;  [facility];  Service: OB/GYN;  Laterality: Bilateral;  Wt 299lbs     Allergies:     Review of patient's allergies indicates:   Allergen Reactions    Celebrex [celecoxib] Anaphylaxis    Codeine Hives     Does not know what she can take -usually just takes tylenol or ibuprofen    Lidocaine Hives    Vicodin [hydrocodone-acetaminophen] Hives and Swelling     Medications:     Current Outpatient Medications on File Prior to Visit   Medication Sig Dispense Refill    acetaminophen (TYLENOL) 325 MG tablet Take 2 tablets (650 mg total) by mouth every 6 (six) hours as needed for Pain. 30 tablet 1    acetaminophen (TYLENOL) 500 MG tablet Take 1 tablet (500 mg total) by mouth every 8 (eight) hours.      albuterol (PROVENTIL/VENTOLIN HFA) 90 mcg/actuation inhaler Inhale 2 puffs into the lungs every 6 (six) hours as needed for Wheezing or Shortness of Breath. 18 g 0    ALPRAZolam (XANAX) 0.5 MG tablet 1 tab(s) orally 3 times a day      aspirin 81 MG Chew Take 81 mg by mouth once daily.      azelastine (ASTELIN) 137 mcg (0.1 %) nasal spray 1 spray (137 mcg total) by Nasal route 2 (two) times daily. 30 mL 11    blood sugar diagnostic Strp 1 each by  Misc.(Non-Drug; Combo Route) route 2 (two) times a day. 11 each 0    blood-glucose meter kit Use as instructed 1 each 0    buPROPion (WELLBUTRIN SR) 150 MG TBSR 12 hr tablet Take 1 tablet (150 mg total) by mouth 2 (two) times daily. 60 tablet 11    clindamycin (CLEOCIN T) 1 % lotion Apply topically 2 (two) times daily. 60 mL 0    empagliflozin (JARDIANCE) 25 mg tablet Take 1 tablet (25 mg total) by mouth once daily. 90 tablet 3    ergocalciferol (ERGOCALCIFEROL) 50,000 unit Cap Take 1 capsule (50,000 Units total) by mouth every 7 days. 12 capsule 1    ezetimibe (ZETIA) 10 mg tablet Take 1 tablet (10 mg total) by mouth once daily. 90 tablet 3    fluticasone propionate (FLONASE) 50 mcg/actuation nasal spray 1 spray (50 mcg total) by Each Nostril route once daily. 16 g 0    gabapentin (NEURONTIN) 300 MG capsule Take 600 mg (2 caps) QAM and 300 mg (1 cap) in afternoon and 300 mg (1 cap)  capsule 11    lancets (ACCU-CHEK SOFTCLIX LANCETS) Misc 1 each by Misc.(Non-Drug; Combo Route) route 2 (two) times daily. 11 each 0    lancing device with lancets (MULTI-LANCET DEVICE 2) Kit 1 each by Misc.(Non-Drug; Combo Route) route 2 (two) times a day. 1 each 1    losartan (COZAAR) 25 MG tablet Take 1 tablet (25 mg total) by mouth once daily. 90 tablet 3    meclizine (ANTIVERT) 12.5 mg tablet Take 1 tablet (12.5 mg total) by mouth 2 (two) times daily as needed for Dizziness. 30 tablet 0    metronidazole 0.75% (METROCREAM) 0.75 % Crea Apply topically 2 (two) times daily. For rosacea on face. 45 g 0    ondansetron (ZOFRAN-ODT) 4 MG TbDL Take 4 mg by mouth every 6 (six) hours as needed.      ondansetron (ZOFRAN-ODT) 4 MG TbDL Dissolve 2 tablets (8 mg total) by mouth every 8 (eight) hours as needed (nausea). 30 tablet 0    oxyCODONE (ROXICODONE) 5 MG immediate release tablet Take 1 tablet (5 mg total) by mouth every 4 (four) hours as needed for Pain. 20 tablet 0    promethazine (PHENERGAN) 25 MG tablet Take 25 mg by mouth every 6  "(six) hours as needed.      tirzepatide 10 mg/0.5 mL PnIj Inject 10 mg into the skin every 7 days. 4 Pen 5    tiZANidine (ZANAFLEX) 4 MG tablet Take 1 tablet (4 mg total) by mouth 2 (two) times a day. 60 tablet 5    tretinoin (RETIN-A) 0.025 % cream APPLY TOPICALLY EVERY EVENING. 20 g 6    trihexyphenidyL (ARTANE) 2 MG tablet Take 0.5 tablets (1 mg total) by mouth 2 (two) times a day. 30 tablet 11    blood-glucose meter kit Use as instructed 1 each 0     No current facility-administered medications on file prior to visit.     Social History:     Social History     Tobacco Use    Smoking status: Never    Smokeless tobacco: Never   Substance Use Topics    Alcohol use: Never     Family History:     Family History   Problem Relation Name Age of Onset    Arthritis Mother Pura Mariano     Diabetes Father Luan Mariano     Stroke Father Luan Mariano     Hypertension Father Luan Mariano     Depression Father Luan Mariano     Early death Father Luan Mariano     Heart disease Father Luan Mariano     Vision loss Father Luan Mariano     Heart disease Sister      Hypertension Sister      Depression Sister Apolonia Pascualford     Diabetes Sister Apolonia Pascualford     Early death Sister Apolonia Pascualford     Heart disease Sister Apolonia Pascualford     Hypertension Sister Apolonia Pascualford     Heart disease Brother Luan Mariano     Heart attack Brother Luan Mariano     Hypertension Brother Luan Mariano     Early death Brother Luan Pascualford     Heart disease Maternal Grandmother Talya Prince Grapuso     Heart disease Maternal Grandfather Simon Mariano     Heart disease Paternal Grandfather Nikolas Prince     Breast cancer Other mat great GM     Ovarian cancer Neg Hx      Uterine cancer Neg Hx      Colon cancer Neg Hx      Heart failure Neg Hx      Hyperlipidemia Neg Hx       Physical Exam:   /69   Pulse 69   Ht 5' 1" (1.549 m)   Wt 126.9 kg (279 lb 12.2 oz)   LMP 01/15/2019 (Approximate)  "  SpO2 97%   BMI 52.86 kg/m²      Physical Exam  Vitals and nursing note reviewed.   Constitutional:       Appearance: Normal appearance.   HENT:      Head: Normocephalic and atraumatic.   Neck:      Vascular: Normal carotid pulses. No carotid bruit or hepatojugular reflux.   Cardiovascular:      Rate and Rhythm: Normal rate and regular rhythm.      Chest Wall: PMI is not displaced.      Pulses:           Radial pulses are 2+ on the right side and 2+ on the left side.        Dorsalis pedis pulses are 2+ on the right side and 2+ on the left side.        Posterior tibial pulses are 2+ on the right side and 2+ on the left side.      Heart sounds: No murmur heard.  Pulmonary:      Effort: Pulmonary effort is normal.      Breath sounds: Normal breath sounds. No wheezing, rhonchi or rales.   Abdominal:      General: Bowel sounds are normal. There is no abdominal bruit.      Palpations: Abdomen is soft. There is no pulsatile mass.      Tenderness: There is no abdominal tenderness.   Feet:      Right foot:      Skin integrity: Skin integrity normal.      Left foot:      Skin integrity: Skin integrity normal.   Skin:     Capillary Refill: Capillary refill takes less than 2 seconds.   Neurological:      General: No focal deficit present.      Mental Status: She is alert.   Psychiatric:         Mood and Affect: Mood and affect normal.         Speech: Speech normal.         Behavior: Behavior is cooperative.         Thought Content: Thought content normal.          Labs:     Blood Tests:  Lab Results   Component Value Date     06/12/2024    K 4.2 06/12/2024     06/12/2024    CO2 28 06/12/2024    BUN 16 06/12/2024    CREATININE 0.9 06/12/2024    GLU 95 06/12/2024    HGBA1C 5.7 (H) 04/17/2024    MG 2.3 05/31/2024    AST 12 06/12/2024    ALT 16 06/12/2024    ALBUMIN 3.1 (L) 06/12/2024    PROT 7.6 06/12/2024    BILITOT 0.4 06/12/2024    WBC 9.57 06/12/2024    HGB 12.1 06/12/2024    HCT 39.8 06/12/2024    MCV 88  06/12/2024     06/12/2024    TSH 0.636 06/14/2023       Lab Results   Component Value Date    CHOL 169 06/12/2024    HDL 50 06/12/2024    TRIG 62 06/12/2024       Lab Results   Component Value Date    LDLCALC 106.6 06/12/2024       Assessment:     1. Statin intolerance    2. Hyperlipidemia LDL goal <70    3. Coronary artery disease, unspecified vessel or lesion type, unspecified whether angina present, unspecified whether native or transplanted heart    4. Essential hypertension    5. ASHLI (obstructive sleep apnea)    6. BMI 50.0-59.9, adult        Plan:     Statin intolerance  -     evolocumab (REPATHA SURECLICK) 140 mg/mL PnIj; Inject 1 mL (140 mg total) into the skin every 14 (fourteen) days.  Dispense: 6 mL; Refill: 3  -     Lipid Panel; Future; Expected date: 08/17/2024    Hyperlipidemia LDL goal <70  -     evolocumab (REPATHA SURECLICK) 140 mg/mL PnIj; Inject 1 mL (140 mg total) into the skin every 14 (fourteen) days.  Dispense: 6 mL; Refill: 3  -     Lipid Panel; Future; Expected date: 08/17/2024    Coronary artery disease, unspecified vessel or lesion type, unspecified whether angina present, unspecified whether native or transplanted heart  -     evolocumab (REPATHA SURECLICK) 140 mg/mL PnIj; Inject 1 mL (140 mg total) into the skin every 14 (fourteen) days.  Dispense: 6 mL; Refill: 3    Essential hypertension  Well-controlled, continue current medications  Recommend low-sodium diet and monitoring at home    ASHLI (obstructive sleep apnea)  Encouraged CPAP compliance     BMI 50.0-59.9, adult  Weight loss through a combination of diet and exercise encouraged  Recommend 150 minutes a week (30 minutes per day, 5 days per week) of moderate intensity exercise        Signed:  Ceirra Bruno PA-C  Cardiology     7/17/2024 3:45 PM    Follow-up:     Future Appointments   Date Time Provider Department Center   8/8/2024 10:00 AM Selene Castillo MD [facility]   8/20/2024  9:25 AM LABORATORY,  [facility] [place]   8/21/2024  9:40 AM Natalie Grider OD [facility] OPTOMTY [place]   10/14/2024  9:00 AM Kaylyn Ko PA-C [facility] NEURO8 [facility]   10/22/2024  3:00 PM Marlene Bess, [facility]   10/28/2024  1:00 PM Geovanna South DO [facility] IM [facility]   10/31/2024  3:40 PM Geovanna South DO [facility]  [facility]

## 2024-07-24 ENCOUNTER — PATIENT MESSAGE (OUTPATIENT)
Dept: CARDIOLOGY | Facility: CLINIC | Age: 64
End: 2024-07-24
Payer: COMMERCIAL

## 2024-07-25 ENCOUNTER — PATIENT MESSAGE (OUTPATIENT)
Dept: NEUROLOGY | Facility: CLINIC | Age: 64
End: 2024-07-25
Payer: COMMERCIAL

## 2024-07-25 DIAGNOSIS — G24.3 CERVICAL DYSTONIA: ICD-10-CM

## 2024-07-25 RX ORDER — TRIHEXYPHENIDYL HYDROCHLORIDE 2 MG/1
1 TABLET ORAL 3 TIMES DAILY
Qty: 45 TABLET | Refills: 11 | Status: SHIPPED | OUTPATIENT
Start: 2024-07-25 | End: 2025-07-25

## 2024-08-12 ENCOUNTER — PATIENT MESSAGE (OUTPATIENT)
Dept: NEUROLOGY | Facility: CLINIC | Age: 64
End: 2024-08-12
Payer: COMMERCIAL

## 2024-08-12 RX ORDER — BACLOFEN 10 MG/1
10 TABLET ORAL 3 TIMES DAILY
Qty: 90 TABLET | Refills: 11 | Status: SHIPPED | OUTPATIENT
Start: 2024-08-12 | End: 2025-08-12

## 2024-08-28 DIAGNOSIS — E11.9 TYPE 2 DIABETES MELLITUS WITHOUT COMPLICATION: ICD-10-CM

## 2024-08-29 ENCOUNTER — PATIENT MESSAGE (OUTPATIENT)
Dept: NEUROLOGY | Facility: CLINIC | Age: 64
End: 2024-08-29
Payer: COMMERCIAL

## 2024-08-29 DIAGNOSIS — G24.3 CERVICAL DYSTONIA: ICD-10-CM

## 2024-08-30 RX ORDER — TRIHEXYPHENIDYL HYDROCHLORIDE 2 MG/1
1 TABLET ORAL 3 TIMES DAILY
Qty: 45 TABLET | Refills: 11 | Status: SHIPPED | OUTPATIENT
Start: 2024-08-30 | End: 2025-08-30

## 2024-09-06 ENCOUNTER — PATIENT MESSAGE (OUTPATIENT)
Dept: INTERNAL MEDICINE | Facility: CLINIC | Age: 64
End: 2024-09-06
Payer: COMMERCIAL

## 2024-09-06 DIAGNOSIS — B96.89 ACUTE BACTERIAL SINUSITIS: ICD-10-CM

## 2024-09-06 DIAGNOSIS — J01.90 ACUTE BACTERIAL SINUSITIS: ICD-10-CM

## 2024-09-06 NOTE — TELEPHONE ENCOUNTER
No care due was identified.  [facility] Embedded Care Due Messages. Reference number: [id number].   9/06/2024 2:05:08 PM CDT

## 2024-09-07 RX ORDER — AZELASTINE 1 MG/ML
1 SPRAY, METERED NASAL 2 TIMES DAILY
Qty: 30 ML | Refills: 11 | OUTPATIENT
Start: 2024-09-07

## 2024-09-08 NOTE — TELEPHONE ENCOUNTER
Refill Routing Note   Medication(s) are not appropriate for processing by [facility] for the following reason(s):        No active prescription written by provider    [facility] action(s):  Defer             Appointments  past 12m or future 3m with PCP    Date Provider   Last Visit   10/26/2023 Geovanna South, DO   Next Visit   9/6/2024 Geovanna South DO   ED visits in past 90 days: 0        Note composed:9:24 PM 09/07/2024

## 2024-09-08 NOTE — TELEPHONE ENCOUNTER
Refill Routing Note   Medication(s) are not appropriate for processing by [facility] for the following reason(s):        No active prescription written by provider    [facility] action(s):  Defer             Appointments  past 12m or future 3m with PCP    Date Provider   Last Visit   10/26/2023 Geovanna South, DO   Next Visit   9/6/2024 Geovanna South DO   ED visits in past 90 days: 0        Note composed:9:14 PM 09/07/2024

## 2024-09-08 NOTE — TELEPHONE ENCOUNTER
No care due was identified.  [facility] Embedded Care Due Messages. Reference number: [id number].   9/07/2024 9:25:08 PM CDT

## 2024-09-08 NOTE — TELEPHONE ENCOUNTER
Quick DC. Duplicate Request-  med pended in previous encounter, awaiting assessment    Refill Authorization Note   Tawny Valerio  is requesting a refill authorization.  Brief Assessment and Rationale for Refill:  Quick Discontinue  Medication Therapy Plan:       Medication Reconciliation Completed:  No      Comments:     Note composed:9:24 PM 09/07/2024

## 2024-09-11 RX ORDER — AZELASTINE 1 MG/ML
1 SPRAY, METERED NASAL 2 TIMES DAILY
Qty: 90 ML | Refills: 0 | Status: SHIPPED | OUTPATIENT
Start: 2024-09-11

## 2024-09-11 RX ORDER — FLUTICASONE PROPIONATE 50 MCG
1 SPRAY, SUSPENSION (ML) NASAL DAILY
Qty: 32 G | Refills: 0 | Status: SHIPPED | OUTPATIENT
Start: 2024-09-11

## 2024-09-14 ENCOUNTER — OFFICE VISIT (OUTPATIENT)
Dept: URGENT CARE | Facility: CLINIC | Age: 64
End: 2024-09-14
Payer: COMMERCIAL

## 2024-09-14 VITALS
DIASTOLIC BLOOD PRESSURE: 83 MMHG | HEIGHT: 61 IN | WEIGHT: 270 LBS | HEART RATE: 69 BPM | TEMPERATURE: 98 F | BODY MASS INDEX: 50.98 KG/M2 | RESPIRATION RATE: 18 BRPM | SYSTOLIC BLOOD PRESSURE: 142 MMHG | OXYGEN SATURATION: 97 %

## 2024-09-14 DIAGNOSIS — J06.9 UPPER RESPIRATORY TRACT INFECTION, UNSPECIFIED TYPE: Primary | ICD-10-CM

## 2024-09-14 DIAGNOSIS — R05.9 COUGH, UNSPECIFIED TYPE: ICD-10-CM

## 2024-09-14 LAB
CTP QC/QA: YES
SARS-COV-2 AG RESP QL IA.RAPID: NEGATIVE

## 2024-09-14 PROCEDURE — 87811 SARS-COV-2 COVID19 W/OPTIC: CPT | Mod: QW,S$GLB,, | Performed by: PHYSICIAN ASSISTANT

## 2024-09-14 PROCEDURE — 99213 OFFICE O/P EST LOW 20 MIN: CPT | Mod: S$GLB,,, | Performed by: PHYSICIAN ASSISTANT

## 2024-09-14 RX ORDER — PROMETHAZINE HYDROCHLORIDE 6.25 MG/5ML
SYRUP ORAL
Qty: 120 ML | Refills: 1 | Status: SHIPPED | OUTPATIENT
Start: 2024-09-14

## 2024-09-14 RX ORDER — PREDNISONE 20 MG/1
20 TABLET ORAL DAILY
Qty: 5 TABLET | Refills: 0 | Status: SHIPPED | OUTPATIENT
Start: 2024-09-14 | End: 2024-09-19

## 2024-09-14 RX ORDER — FAMOTIDINE 20 MG/1
20 TABLET, FILM COATED ORAL 2 TIMES DAILY
Qty: 20 TABLET | Refills: 0 | Status: SHIPPED | OUTPATIENT
Start: 2024-09-14 | End: 2024-09-24

## 2024-09-14 NOTE — PROGRESS NOTES
"Subjective:      Patient ID: Tawny Valerio is a 64 y.o. female.    Vitals:  height is 5' 1" (1.549 m) and weight is 122.5 kg (270 lb). Her oral temperature is 98.1 °F (36.7 °C). Her blood pressure is 142/83 (abnormal) and her pulse is 69. Her respiration is 18 and oxygen saturation is 97%.     Chief Complaint: Cough (Coughing yellow phlegm - Entered by patient)    Pt presents with c/o cough, sore throat, coughing up yellow mucus, pain in left side x 3 days. Pt states has taken otc pain reliever, no relief. Pain score 6/10    Cough  This is a new problem. The current episode started in the past 7 days. The problem has been unchanged. The cough is Productive of sputum. Associated symptoms include myalgias, postnasal drip and a sore throat. Pertinent negatives include no chest pain, chills, ear congestion, ear pain, eye redness, fever, headaches, heartburn, hemoptysis, nasal congestion, rash, rhinorrhea, shortness of breath, sweats, weight loss or wheezing. Nothing aggravates the symptoms. Treatments tried: pain meds. The treatment provided no relief. Her past medical history is significant for bronchitis. There is no history of asthma, bronchiectasis, COPD, emphysema, environmental allergies or pneumonia.       Constitution: Positive for fatigue. Negative for chills, sweating and fever.   HENT:  Positive for congestion, postnasal drip and sore throat. Negative for ear pain, drooling, trouble swallowing and voice change.    Neck: Negative for neck pain, neck stiffness, painful lymph nodes and neck swelling.   Cardiovascular:  Negative for chest pain, leg swelling, palpitations, sob on exertion and passing out.   Eyes:  Negative for eye pain, eye redness, photophobia, double vision, blurred vision and eyelid swelling.   Respiratory:  Positive for cough. Negative for chest tightness, sputum production, bloody sputum, shortness of breath, stridor and wheezing.    Gastrointestinal:  Negative for abdominal pain, " abdominal bloating, nausea, vomiting, constipation, diarrhea and heartburn.   Musculoskeletal:  Positive for muscle ache. Negative for joint pain, joint swelling, abnormal ROM of joint, back pain and muscle cramps.   Skin:  Negative for rash and hives.   Allergic/Immunologic: Negative for environmental allergies, seasonal allergies, food allergies, hives, itching and sneezing.   Neurological:  Negative for dizziness, light-headedness, passing out, loss of balance, headaches, altered mental status, loss of consciousness and seizures.   Hematologic/Lymphatic: Negative for swollen lymph nodes.   Psychiatric/Behavioral:  Negative for altered mental status and nervous/anxious. The patient is not nervous/anxious.       Objective:     Physical Exam   Constitutional: She is oriented to person, place, and time. She appears well-developed. She is cooperative.  Non-toxic appearance. She does not appear ill. No distress.   HENT:   Head: Normocephalic and atraumatic.   Ears:   Right Ear: Hearing, tympanic membrane, external ear and ear canal normal.   Left Ear: Hearing, tympanic membrane, external ear and ear canal normal.   Nose: Mucosal edema and rhinorrhea present. No nasal deformity. No epistaxis. Right sinus exhibits no maxillary sinus tenderness and no frontal sinus tenderness. Left sinus exhibits no maxillary sinus tenderness and no frontal sinus tenderness.   Mouth/Throat: Uvula is midline and mucous membranes are normal. No trismus in the jaw. Normal dentition. No uvula swelling. Posterior oropharyngeal erythema and cobblestoning present. No oropharyngeal exudate, posterior oropharyngeal edema or tonsillar abscesses. No tonsillar exudate.   Eyes: Conjunctivae and lids are normal. No scleral icterus.   Neck: Trachea normal and phonation normal. Neck supple. No edema present. No erythema present. No neck rigidity present.   Cardiovascular: Normal rate, regular rhythm, normal heart sounds and normal pulses.    Pulmonary/Chest: Effort normal and breath sounds normal. No accessory muscle usage or stridor. No respiratory distress. She has no decreased breath sounds. She has no wheezes. She has no rhonchi. She has no rales.   Abdominal: Normal appearance.   Musculoskeletal: Normal range of motion.         General: No deformity or edema. Normal range of motion.   Lymphadenopathy:     She has no cervical adenopathy.   Neurological: She is alert and oriented to person, place, and time. She exhibits normal muscle tone. Coordination normal.   Skin: Skin is warm, dry, intact, not diaphoretic, not pale and no rash. Capillary refill takes less than 2 seconds.   Psychiatric: Her speech is normal and behavior is normal. Judgment and thought content normal.   Nursing note and vitals reviewed.    Results for orders placed or performed in visit on 09/14/24   SARS Coronavirus 2 Antigen, POCT Manual Read   Result Value Ref Range    SARS Coronavirus 2 Antigen Negative Negative     Acceptable Yes      *Note: Due to a large number of results and/or encounters for the requested time period, some results have not been displayed. A complete set of results can be found in Results Review.       Assessment:     1. Upper respiratory tract infection, unspecified type    2. Cough, unspecified type        Plan:       Upper respiratory tract infection, unspecified type    Cough, unspecified type  -     SARS Coronavirus 2 Antigen, POCT Manual Read    Other orders  -     promethazine (PHENERGAN) 6.25 mg/5 mL syrup; Take 10 mLs at night as needed.  Dispense: 120 mL; Refill: 1  -     predniSONE (DELTASONE) 20 MG tablet; Take 1 tablet (20 mg total) by mouth once daily. for 5 days  Dispense: 5 tablet; Refill: 0  -     famotidine (PEPCID) 20 MG tablet; Take 1 tablet (20 mg total) by mouth 2 (two) times daily. for 10 days  Dispense: 20 tablet; Refill: 0      Patient Instructions   INSTRUCTIONS:  - Rest.  - Drink plenty of fluids.  - Take Tylenol  and/or Ibuprofen as directed as needed for fever/pain.  Do not take more than the recommended dose.  - follow up with your PCP within the next 1-2 weeks as needed.  - You must understand that you have received an Urgent Care treatment only and that you may be released before all of your medical problems are known or treated.   - You, the patient, will arrange for follow up care as instructed.   - If your condition worsens or fails to improve we recommend that you receive another evaluation at the ER immediately or contact your PCP to discuss your concerns.   - You can call [phone] or [phone] to help schedule an appointment with the appropriate provider.     -If you smoke cigarettes, it would be beneficial for you to stop.    OVER THE COUNTER RECOMMENDATIONS/SUGGESTIONS.     Make sure to stay well hydrated.     Use Nasal Saline to mechanically move any post nasal drip from your eustachian tube or from the back of your throat.     Use warm salt water gargles to ease your throat pain. Warm salt water gargles as needed for sore throat-  1/2 tsp salt to 1 cup warm water, gargle as desired.     Use an antihistamine such as Claritin, Zyrtec or Allegra to dry you out.      Use pseudoephedrine (behind the counter) to decongest. Pseudoephedrine  30 mg up to 240 mg /day. It can raise your blood pressure and give you palpitations.     Use mucinex (guaifenisin) to break up mucous up to 2400mg/day to loosen any mucous.   The mucinex DM pill has a cough suppressant that can be sedating. It can be used at night to stop the tickle at the back of your throat.  You can use Mucinex D (it has guaifenesin and a high dose of pseudoephedrine) in the mornings to help decongest.        Use Flonase 1-2 sprays/nostril per day. It is a local acting steroid nasal spray, if you develop a bloody nose, stop using the medication immediately.     Sometimes Nyquil at night is beneficial to help you get some rest, however it is sedating  and it does have an antihistamine, and tylenol.     Honey is a natural cough suppressant that can be used.     Tylenol up to 4,000 mg a day is safe for short periods and can be used for body aches, pain, and fever. However in high doses and prolonged use it can cause liver irritation.     Ibuprofen is a non-steroidal anti-inflammatory that can be used for body aches, pain, and fever.However it can also cause stomach irritation if over used.

## 2024-09-14 NOTE — PATIENT INSTRUCTIONS
INSTRUCTIONS:  - Rest.  - Drink plenty of fluids.  - Take Tylenol and/or Ibuprofen as directed as needed for fever/pain.  Do not take more than the recommended dose.  - follow up with your PCP within the next 1-2 weeks as needed.  - You must understand that you have received an Urgent Care treatment only and that you may be released before all of your medical problems are known or treated.   - You, the patient, will arrange for follow up care as instructed.   - If your condition worsens or fails to improve we recommend that you receive another evaluation at the ER immediately or contact your PCP to discuss your concerns.   - You can call [phone] or [phone] to help schedule an appointment with the appropriate provider.     -If you smoke cigarettes, it would be beneficial for you to stop.    OVER THE COUNTER RECOMMENDATIONS/SUGGESTIONS.     Make sure to stay well hydrated.     Use Nasal Saline to mechanically move any post nasal drip from your eustachian tube or from the back of your throat.     Use warm salt water gargles to ease your throat pain. Warm salt water gargles as needed for sore throat-  1/2 tsp salt to 1 cup warm water, gargle as desired.     Use an antihistamine such as Claritin, Zyrtec or Allegra to dry you out.      Use pseudoephedrine (behind the counter) to decongest. Pseudoephedrine  30 mg up to 240 mg /day. It can raise your blood pressure and give you palpitations.     Use mucinex (guaifenisin) to break up mucous up to 2400mg/day to loosen any mucous.   The mucinex DM pill has a cough suppressant that can be sedating. It can be used at night to stop the tickle at the back of your throat.  You can use Mucinex D (it has guaifenesin and a high dose of pseudoephedrine) in the mornings to help decongest.        Use Flonase 1-2 sprays/nostril per day. It is a local acting steroid nasal spray, if you develop a bloody nose, stop using the medication immediately.     Sometimes Nyquil at night  is beneficial to help you get some rest, however it is sedating and it does have an antihistamine, and tylenol.     Honey is a natural cough suppressant that can be used.     Tylenol up to 4,000 mg a day is safe for short periods and can be used for body aches, pain, and fever. However in high doses and prolonged use it can cause liver irritation.     Ibuprofen is a non-steroidal anti-inflammatory that can be used for body aches, pain, and fever.However it can also cause stomach irritation if over used.

## 2024-09-17 ENCOUNTER — OFFICE VISIT (OUTPATIENT)
Dept: INTERNAL MEDICINE | Facility: CLINIC | Age: 64
End: 2024-09-17
Payer: COMMERCIAL

## 2024-09-17 DIAGNOSIS — S39.011A STRAIN OF ABDOMINAL MUSCLE, INITIAL ENCOUNTER: Primary | ICD-10-CM

## 2024-09-17 DIAGNOSIS — M54.2 CERVICALGIA OF OCCIPITO-ATLANTO-AXIAL REGION: ICD-10-CM

## 2024-09-17 PROBLEM — K35.80 ACUTE APPENDICITIS: Status: RESOLVED | Noted: 2024-05-31 | Resolved: 2024-09-17

## 2024-09-17 PROCEDURE — 1159F MED LIST DOCD IN RCRD: CPT | Mod: CPTII,95,, | Performed by: INTERNAL MEDICINE

## 2024-09-17 PROCEDURE — 4010F ACE/ARB THERAPY RXD/TAKEN: CPT | Mod: CPTII,95,, | Performed by: INTERNAL MEDICINE

## 2024-09-17 PROCEDURE — 99214 OFFICE O/P EST MOD 30 MIN: CPT | Mod: 95,,, | Performed by: INTERNAL MEDICINE

## 2024-09-17 PROCEDURE — 3044F HG A1C LEVEL LT 7.0%: CPT | Mod: CPTII,95,, | Performed by: INTERNAL MEDICINE

## 2024-09-17 PROCEDURE — 1160F RVW MEDS BY RX/DR IN RCRD: CPT | Mod: CPTII,95,, | Performed by: INTERNAL MEDICINE

## 2024-09-17 RX ORDER — NAPROXEN 250 MG/1
250 TABLET ORAL EVERY 6 HOURS PRN
Qty: 30 TABLET | Refills: 0 | Status: SHIPPED | OUTPATIENT
Start: 2024-09-17

## 2024-09-17 NOTE — PATIENT INSTRUCTIONS
Over-the-counter NSAIDs such as Aleve, ibuprofen, naproxen, Motrin, goodies powder.    I recommend plenty of rest and drinking plenty of fluids.  Tylenol and NSAIDs, such as ibuprofen, Motrin, naproxen can be helpful for sore throat, headache, ear pain, muscle and joint pain, sneezing, fatigue.  Chloroseptic spray, lozenges can also soothe a sore throat.  Mucinex twice a day as needed for cough, chest congestion.  Over-the-counter antihistamines (Allegra, Claritin, Zyrtec) for runny nose.  Nasal saline once to twice a day.  Hand washing can help prevent the spread of respiratory illnesses, especially from younger children.    Avoid the D's.  If you have high blood pressure you can not take Sudafed, pseudoephedrine, Zyrtec-D because it will elevate your blood pressure. You may take Coricidin HBP over the counter.     Along with wearing a mask, you should also:  use proper hand hygiene to prevent the spread of germs.  cover your mouth and nose with a tissue or your elbow when you sneeze.  clean frequently touched surfaces often.  bring as much fresh air into your home as possible.     Tests to Keep You Healthy    Mammogram: ORDERED BUT NOT SCHEDULED  Eye Exam: DUE  Colon Cancer Screening: DUE  Last Blood Pressure <= 139/89 (7/17/2024): Yes  Last HbA1c < 8 (04/17/2024): Yes      Irwin Hector,     If you are due for any health screening(s) below please notify me so we can arrange them to be ordered and scheduled to maintain your health. Most healthy patients complete it. Don't lose out on improving your health.     Tests to Keep You Healthy    Mammogram: ORDERED BUT NOT SCHEDULED  Eye Exam: DUE  Colon Cancer Screening: DUE  Last Blood Pressure <= 139/89 (7/17/2024): Yes  Last HbA1c < 8 (04/17/2024): Yes      Breast Cancer Screening    Breast cancer is the second most common cancer in women after skin cancer, and the second leading cause of death from cancer after lung cancer. Mammograms can detect breast cancer early,  which significantly increases the chances of curing the cancer.      A screening mammogram is an x-ray image of the breasts used for early breast cancer detection. It can help reduce the number of deaths from breast cancer among women. To get a clear image, the breast is placed between two plastic plates to make it flat. How often a mammogram is needed depends on your age and your breast cancer risk.    Colon Cancer Screening    Of cancers affecting both men and women, colorectal cancer is the third leading cancer killer in the [place]. But it doesnt have to be. Screening can prevent colorectal cancer or find it at an early stage when treatment often leads to a cure.    A colonoscopy is the preferred test for detecting colon cancer. It is needed only once every 10 years if results are negative. While sedated, a flexible, lighted tube with a tiny camera is inserted into the rectum and advanced through the colon to look for cancers. An alternative screening test that is used at home and returned to the lab may also be used. It detects hidden blood in bowel movements which could indicate cancer in the colon. If results are positive, you will need a colonoscopy to determine if the blood is a sign of cancer. This type of follow up (diagnostic) colonoscopy usually requires additional copays as required by your insurance provider. Please contact your PCP if you have any questions.         Diabetic Retinal Eye Exam    Diabetes is the #1 cause of blindness in the [place] - early detection before signs or symptoms develop can prevent debilitating blindness.    Once-a-year screening is recommended for all diabetic patients. This exam can prevent and treat diabetes complications in the eye before developing symptoms. This can be done with a special camera is used to take photographs of the back of your eye without having to dilate them, or you can see an eye doctor for a full dilated exam.

## 2024-09-17 NOTE — PROGRESS NOTES
The patient location is: [place]  The chief complaint leading to consultation is: Flank Pain  Visit type: Virtual visit with synchronous audio and video  Contact time spent with patient: 18 minutes  Each patient to whom he or she provides medical services by telemedicine is:  (1) informed of the relationship between the physician and patient and the respective role of any other health care provider with respect to management of the patient; and (2) notified that he or she may decline to receive medical services by telemedicine and may withdraw from such care at any time.    Subjective:       Patient ID: Tawny Valerio is a 64 y.o. female who  has a past medical history of Anxiety, Diabetes mellitus, type 2 (2016), Endometrial ca (07/24/2019), Essential hypertension (05/14/2018), Hyperlipidemia LDL goal <70 (10/30/2023), Simple endometrial hyperplasia (03/15/2019), and Uterine polyp (03/14/2019).    Chief Complaint: Flank Pain     History was obtained from the patient and supplemented through chart review.  She is a patient of Geovanna South DO.  Was last seen  for annual.    Abdominal Pain  This is a new problem. The current episode started in the past 7 days. The onset quality is sudden. The problem occurs daily. The most recent episode lasted 7 days. The problem has been gradually worsening. The pain is located in the left flank. The pain is at a severity of 10/10. The pain is moderate. The quality of the pain is cramping, sharp and tearing. Associated symptoms include arthralgias, belching, flatus and myalgias. Pertinent negatives include no anorexia, constipation, diarrhea, dysuria, fever, frequency, headaches, hematochezia, hematuria, melena, nausea, vomiting or weight loss. The pain is aggravated by certain positions, deep breathing and movement. The pain is relieved by Belching, certain positions and movement. She has tried acetaminophen for the symptoms. The treatment provided mild relief. Prior  "diagnostic workup includes CT scan and surgery. Her past medical history is significant for abdominal surgery and GERD. There is no history of colon cancer, Crohn's disease, gallstones, irritable bowel syndrome, pancreatitis, PUD or ulcerative colitis. Patient's medical history includes UTI. Patient's medical history does not include kidney stones.     Symptoms started 3 days ago with sore throat, coughing, hoarseness.   She went to Urgent Care and did mention the left side pain as well.  TMs were normal.  +Mucosal edema, rhinorrhea.  No sinus TTP. +OP erythema, cobblestoning.  No edema.  Lungs CTAB.  No documented abdominal exam.  COVID test was negative.  Prescribed promethazine cough syrup, prednisone, Pepcid. Completed.     Persistent L side pain.  L side and under L breast, along bra line.  No radiation to the back.  She is concerned for slipped rib syndrome.     Pain gradually worsened in frequency, severity.    Worse when raising her arm up. Throbbing.  Was reaching overhead and felt a "tug" as if something flipped.   Sharp pain when turning.  Pain worse when coughing.  Has had coughing spells.  No CP.  No tense/tightness.    Exercises/twists at her waist; started doing it last week.  Too cold to do in the pool now, but is doing this on land.    No recent trauma, injury.    H/o hysterectomy c BSO.     CT abdomen pelvis :  Gallbladder distention, but no gallstone, wall thickening.  No hydronephrosis or nephrolithiasis.  Appendicitis, diffuse hepatic steatosis.    CXR  without acute abnormality.  Multilevel spinal DDD.    Takes Baclofen TID, gabapentin for cervical dystonia.  Some improvement of side pain with Baclofen.  Has been to PT before.    Allergy to Celebrex, but has tolerated NSAIDs before.  No CKD.    Review of Systems   Constitutional:  Negative for fever and weight loss.   Gastrointestinal:  Positive for abdominal pain and flatus. Negative for anorexia, constipation, diarrhea, " hematochezia, melena, nausea and vomiting.   Genitourinary:  Negative for dysuria, frequency and hematuria.   Musculoskeletal:  Positive for arthralgias and myalgias.   Neurological:  Negative for headaches.       Past Medical History:   Diagnosis Date    Anxiety     Diabetes mellitus, type 2 2016    Endometrial ca 2019    Essential hypertension 2018    Hyperlipidemia LDL goal <70 10/30/2023    Simple endometrial hyperplasia 03/15/2019    Uterine polyp 2019     Past Surgical History:   Procedure Laterality Date     SECTION      x 1    DILATION AND CURETTAGE OF UTERUS  2019    HYSTERECTOMY  19    HYSTEROSCOPY WITH DILATION AND CURETTAGE OF UTERUS N/A 2019    Procedure: HYSTEROSCOPY, WITH DILATION AND CURETTAGE OF UTERUS;  Surgeon: Bobby Frazier Jr., MD;  Location: [facility];  Service: OB/GYN;  Laterality: N/A;    LAPAROSCOPIC APPENDECTOMY N/A 2024    Procedure: APPENDECTOMY, LAPAROSCOPIC;  Surgeon: Hector Harrell MD;  Location: [address];  Service: General;  Laterality: N/A;    lipoma removed      ROBOT-ASSISTED LAPAROSCOPIC ABDOMINAL HYSTERECTOMY USING DA AWAIS XI N/A 2019    Procedure: XI ROBOTIC HYSTERECTOMY;  Surgeon: Aneudy Almeida MD;  Location: [facility] OR [address] [place] [address];  Service: OB/GYN;  Laterality: N/A;    ROBOT-ASSISTED LAPAROSCOPIC LYSIS OF ADHESIONS USING DA AWAIS XI N/A 2019    Procedure: XI ROBOTIC LYSIS, ADHESIONS;  Surgeon: Aneudy Almeida MD;  [facility];  Service: OB/GYN;  Laterality: N/A;  Omental    ROBOT-ASSISTED LAPAROSCOPIC SALPINGO-OOPHORECTOMY USING DA AWAIS XI Bilateral 2019    Procedure: XI ROBOTIC SALPINGO-OOPHORECTOMY;  Surgeon: Aneudy Almeida MD;  Location: [facility] OR [address] [place] [address];  Service: OB/GYN;  Laterality: Bilateral;  Wt 299lbs     Family History   Problem Relation Name Age of Onset    Arthritis Mother Puraluis Mariano     Diabetes Father Luan Mariano     Stroke Father Luan Mariano     Hypertension Father  Luan Mariano     Depression Father Luan Mariano     Early death Father Luan Mariano     Heart disease Father Luan Mariano     Vision loss Father Luan Mariano     Heart disease Sister      Hypertension Sister      Depression Sister Apolonia Mariano     Diabetes Sister Apolonia Mariano     Early death Sister Apolonia Mariano     Heart disease Sister Apolonia Mariano     Hypertension Sister Apolonia Mariano     Heart disease Brother Luan Mariano     Heart attack Brother Luan Mariano     Hypertension Brother Luan Mariano     Early death Brother Luan Mariano     Heart disease Maternal Grandmother Talya Prince Grapuso     Heart disease Maternal Grandfather Simon Mariano     Heart disease Paternal Grandfather Nikolas Prince     Breast cancer Other mat great GM     Ovarian cancer Neg Hx      Uterine cancer Neg Hx      Colon cancer Neg Hx      Heart failure Neg Hx      Hyperlipidemia Neg Hx       Social History     Socioeconomic History    Marital status:    Occupational History     Employer: [employer]     Tobacco Use    Smoking status: Never    Smokeless tobacco: Never   Substance and Sexual Activity    Alcohol use: Never    Drug use: No    Sexual activity: Yes     Partners: Male     Birth control/protection: Post-menopausal, None     Comment: Hysterectomy     Social Determinants of Health     Financial Resource Strain: Low Risk  (4/8/2024)    Received from [facility]    Overall Financial Resource Strain (CARDIA)     Difficulty of Paying Living Expenses: Not hard at all   Food Insecurity: No Food Insecurity (4/8/2024)    Received from [facility]    Hunger Vital Sign     Worried About Running Out of Food in the Last Year: Never true     Ran Out of Food in the Last Year: Never true   Transportation Needs: No Transportation Needs (4/8/2024)    Received from [facility]    PRAPARE - Transportation     Lack of Transportation (Medical): No     Lack of Transportation  (Non-Medical): No   Physical Activity: Inactive (4/8/2024)    Received from [facility]    Exercise Vital Sign     Days of Exercise per Week: 0 days     Minutes of Exercise per Session: 0 min   Stress: Stress Concern Present (4/8/2024)    Received from [facility]    [employer] - Occupational Stress Questionnaire     Feeling of Stress : To some extent   Housing Stability: Low Risk  (3/12/2024)    Housing Stability Vital Sign     Unable to Pay for Housing in the Last Year: No     Number of Places Lived in the Last Year: 1     Unstable Housing in the Last Year: No     Objective:      There were no vitals filed for this visit.   Physical Exam  Constitutional:       General: She is not in acute distress.     Appearance: She is well-developed. She is not ill-appearing or diaphoretic.   HENT:      Head: Normocephalic.   Eyes:      General: No scleral icterus.        Right eye: No discharge.         Left eye: No discharge.   Pulmonary:      Effort: Pulmonary effort is normal. No respiratory distress.   Skin:     Coloration: Skin is not pale.      Findings: No erythema.   Neurological:      Mental Status: She is alert.   Psychiatric:         Behavior: Behavior normal.         Lab Results   Component Value Date    WBC 9.57 06/12/2024    HGB 12.1 06/12/2024    HCT 39.8 06/12/2024     06/12/2024    CHOL 169 06/12/2024    TRIG 62 06/12/2024    HDL 50 06/12/2024    ALT 16 06/12/2024    AST 12 06/12/2024     06/12/2024    K 4.2 06/12/2024     06/12/2024    CREATININE 0.9 06/12/2024    BUN 16 06/12/2024    CO2 28 06/12/2024    TSH 0.636 06/14/2023    HGBA1C 5.7 (H) 04/17/2024       The 10-year ASCVD risk score (Anneliese DK, et al., 2019) is: 11.8%    Values used to calculate the score:      Age: 64 years      Sex: Female      Is Non- : No      Diabetic: Yes      Tobacco smoker: No      Systolic Blood Pressure: 142 mmHg      Is BP treated: Yes      HDL  Cholesterol: 50 mg/dL      Total Cholesterol: 169 mg/dL    (Imaging have been independently reviewed)  CT abdomen pelvis :  Gallbladder distention, but no gallstone, wall thickening.  No hydronephrosis or nephrolithiasis.  Appendicitis, diffuse hepatic steatosis.    Assessment:       1. Strain of abdominal muscle, initial encounter    2. Cervicalgia of hhoigbbi-guhpdue-yluet region      Plan:       Tawny was seen today for flank pain.    Diagnoses and all orders for this visit:    Strain of abdominal muscle, initial encounter  Comments:  Likely triggered by recent coughing spell. Rec NSAID PRN. Icy hot, warm compresses. Gentle stretches.  Would benefit from PT; referred.  Orders:  -     naproxen (NAPROSYN) 250 MG tablet; Take 1 tablet (250 mg total) by mouth every 6 (six) hours as needed (pain). Initial: 500 mg once, followed 250 mg every 6 to 8 hours as needed.  Maximum dose: 1.25 g on day 1, then 1 g/day thereafter.  -     Ambulatory referral/consult to Physical/Occupational Therapy; Future    Cervicalgia of serlmsuc-phumghu-jjkmg region  Comments:  Takes baclofen, gabapentin.  Follows with Neurology.  Would benefit from PT; referred.  Orders:  -     Ambulatory referral/consult to Physical/Occupational Therapy; Future     Side effects of medication(s) were discussed in detail and patient voiced understanding.  Patient will call back for any issues or complications.     RTC PRN.

## 2024-09-18 ENCOUNTER — HOSPITAL ENCOUNTER (EMERGENCY)
Facility: HOSPITAL | Age: 64
Discharge: HOME | End: 2024-09-18
Attending: EMERGENCY MEDICINE
Payer: COMMERCIAL

## 2024-09-18 VITALS
OXYGEN SATURATION: 95 % | RESPIRATION RATE: 18 BRPM | DIASTOLIC BLOOD PRESSURE: 59 MMHG | WEIGHT: 270 LBS | HEIGHT: 61 IN | TEMPERATURE: 98 F | SYSTOLIC BLOOD PRESSURE: 109 MMHG | HEART RATE: 64 BPM | BODY MASS INDEX: 50.98 KG/M2

## 2024-09-18 DIAGNOSIS — T14.8XXA MUSCLE STRAIN: Primary | ICD-10-CM

## 2024-09-18 DIAGNOSIS — R10.9 LEFT FLANK PAIN: ICD-10-CM

## 2024-09-18 LAB
ALBUMIN SERPL BCP-MCNC: 3.3 G/DL (ref 3.5–5.2)
ALP SERPL-CCNC: 119 U/L (ref 55–135)
ALT SERPL W/O P-5'-P-CCNC: 21 U/L (ref 10–44)
ANION GAP SERPL CALC-SCNC: 9 MMOL/L (ref 8–16)
AST SERPL-CCNC: 17 U/L (ref 10–40)
BACTERIA #/AREA URNS AUTO: NORMAL /HPF
BASOPHILS # BLD AUTO: 0.05 K/UL (ref 0–0.2)
BASOPHILS NFR BLD: 0.4 % (ref 0–1.9)
BILIRUB SERPL-MCNC: 0.3 MG/DL (ref 0.1–1)
BILIRUB UR QL STRIP: NEGATIVE
BUN SERPL-MCNC: 20 MG/DL (ref 8–23)
BUN SERPL-MCNC: 21 MG/DL (ref 6–30)
CALCIUM SERPL-MCNC: 9 MG/DL (ref 8.7–10.5)
CHLORIDE SERPL-SCNC: 104 MMOL/L (ref 95–110)
CHLORIDE SERPL-SCNC: 104 MMOL/L (ref 95–110)
CLARITY UR REFRACT.AUTO: CLEAR
CO2 SERPL-SCNC: 25 MMOL/L (ref 23–29)
COLOR UR AUTO: YELLOW
CREAT SERPL-MCNC: 0.8 MG/DL (ref 0.5–1.4)
CREAT SERPL-MCNC: 0.8 MG/DL (ref 0.5–1.4)
DIFFERENTIAL METHOD BLD: ABNORMAL
EOSINOPHIL # BLD AUTO: 0 K/UL (ref 0–0.5)
EOSINOPHIL NFR BLD: 0.2 % (ref 0–8)
ERYTHROCYTE [DISTWIDTH] IN BLOOD BY AUTOMATED COUNT: 16.5 % (ref 11.5–14.5)
EST. GFR  (NO RACE VARIABLE): >60 ML/MIN/1.73 M^2
GLUCOSE SERPL-MCNC: 145 MG/DL (ref 70–110)
GLUCOSE SERPL-MCNC: 151 MG/DL (ref 70–110)
GLUCOSE UR QL STRIP: ABNORMAL
HCT VFR BLD AUTO: 43.9 % (ref 37–48.5)
HCT VFR BLD CALC: 44 %PCV (ref 36–54)
HGB BLD-MCNC: 13.3 G/DL (ref 12–16)
HGB UR QL STRIP: NEGATIVE
IMM GRANULOCYTES # BLD AUTO: 0.07 K/UL (ref 0–0.04)
IMM GRANULOCYTES NFR BLD AUTO: 0.6 % (ref 0–0.5)
KETONES UR QL STRIP: NEGATIVE
LEUKOCYTE ESTERASE UR QL STRIP: NEGATIVE
LYMPHOCYTES # BLD AUTO: 1.5 K/UL (ref 1–4.8)
LYMPHOCYTES NFR BLD: 12.3 % (ref 18–48)
MCH RBC QN AUTO: 25.6 PG (ref 27–31)
MCHC RBC AUTO-ENTMCNC: 30.3 G/DL (ref 32–36)
MCV RBC AUTO: 84 FL (ref 82–98)
MICROSCOPIC COMMENT: NORMAL
MONOCYTES # BLD AUTO: 0.3 K/UL (ref 0.3–1)
MONOCYTES NFR BLD: 2.4 % (ref 4–15)
NEUTROPHILS # BLD AUTO: 10 K/UL (ref 1.8–7.7)
NEUTROPHILS NFR BLD: 84.1 % (ref 38–73)
NITRITE UR QL STRIP: NEGATIVE
NRBC BLD-RTO: 0 /100 WBC
PH UR STRIP: 7 [PH] (ref 5–8)
PLATELET # BLD AUTO: 328 K/UL (ref 150–450)
PMV BLD AUTO: 9.7 FL (ref 9.2–12.9)
POC IONIZED CALCIUM: 1.13 MMOL/L (ref 1.06–1.42)
POC TCO2 (MEASURED): 27 MMOL/L (ref 23–29)
POTASSIUM BLD-SCNC: 4.4 MMOL/L (ref 3.5–5.1)
POTASSIUM SERPL-SCNC: 4.4 MMOL/L (ref 3.5–5.1)
PROT SERPL-MCNC: 7.8 G/DL (ref 6–8.4)
PROT UR QL STRIP: NEGATIVE
RBC # BLD AUTO: 5.2 M/UL (ref 4–5.4)
RBC #/AREA URNS AUTO: 1 /HPF (ref 0–4)
SAMPLE: ABNORMAL
SODIUM BLD-SCNC: 141 MMOL/L (ref 136–145)
SODIUM SERPL-SCNC: 138 MMOL/L (ref 136–145)
SP GR UR STRIP: 1.02 (ref 1–1.03)
SQUAMOUS #/AREA URNS AUTO: 2 /HPF
TROPONIN I SERPL DL<=0.01 NG/ML-MCNC: <0.006 NG/ML (ref 0–0.03)
URN SPEC COLLECT METH UR: ABNORMAL
WBC # BLD AUTO: 11.84 K/UL (ref 3.9–12.7)
WBC #/AREA URNS AUTO: 0 /HPF (ref 0–5)
YEAST UR QL AUTO: NORMAL

## 2024-09-18 PROCEDURE — 84484 ASSAY OF TROPONIN QUANT: CPT | Performed by: EMERGENCY MEDICINE

## 2024-09-18 PROCEDURE — 93010 ELECTROCARDIOGRAM REPORT: CPT | Mod: ,,, | Performed by: INTERNAL MEDICINE

## 2024-09-18 PROCEDURE — 63600175 PHARM REV CODE 636 W HCPCS: Performed by: PHYSICIAN ASSISTANT

## 2024-09-18 PROCEDURE — 80047 BASIC METABLC PNL IONIZED CA: CPT

## 2024-09-18 PROCEDURE — 85025 COMPLETE CBC W/AUTO DIFF WBC: CPT | Performed by: EMERGENCY MEDICINE

## 2024-09-18 PROCEDURE — 80053 COMPREHEN METABOLIC PANEL: CPT | Performed by: EMERGENCY MEDICINE

## 2024-09-18 PROCEDURE — 93005 ELECTROCARDIOGRAM TRACING: CPT

## 2024-09-18 PROCEDURE — 25000003 PHARM REV CODE 250: Performed by: PHYSICIAN ASSISTANT

## 2024-09-18 PROCEDURE — 99285 EMERGENCY DEPT VISIT HI MDM: CPT | Mod: 25

## 2024-09-18 PROCEDURE — 96374 THER/PROPH/DIAG INJ IV PUSH: CPT

## 2024-09-18 PROCEDURE — 81001 URINALYSIS AUTO W/SCOPE: CPT | Performed by: EMERGENCY MEDICINE

## 2024-09-18 RX ORDER — CAPSAICIN 0.03 G/100G
CREAM TOPICAL
Status: COMPLETED | OUTPATIENT
Start: 2024-09-18 | End: 2024-09-18

## 2024-09-18 RX ORDER — OXYCODONE AND ACETAMINOPHEN 10; 325 MG/1; MG/1
1 TABLET ORAL EVERY 6 HOURS PRN
Qty: 6 TABLET | Refills: 0 | Status: SHIPPED | OUTPATIENT
Start: 2024-09-18

## 2024-09-18 RX ORDER — KETOROLAC TROMETHAMINE 30 MG/ML
15 INJECTION, SOLUTION INTRAMUSCULAR; INTRAVENOUS
Status: COMPLETED | OUTPATIENT
Start: 2024-09-18 | End: 2024-09-18

## 2024-09-18 RX ORDER — OXYCODONE AND ACETAMINOPHEN 10; 325 MG/1; MG/1
1 TABLET ORAL
Status: COMPLETED | OUTPATIENT
Start: 2024-09-18 | End: 2024-09-18

## 2024-09-18 RX ORDER — CAPSAICIN 0 G/G
CREAM TOPICAL
Qty: 42.5 G | Refills: 0 | Status: SHIPPED | OUTPATIENT
Start: 2024-09-18

## 2024-09-18 RX ADMIN — KETOROLAC TROMETHAMINE 15 MG: 30 INJECTION, SOLUTION INTRAMUSCULAR; INTRAVENOUS at 06:09

## 2024-09-18 RX ADMIN — CAPSAICIN: 0.25 CREAM TOPICAL at 07:09

## 2024-09-18 RX ADMIN — OXYCODONE AND ACETAMINOPHEN 1 TABLET: 10; 325 TABLET ORAL at 06:09

## 2024-09-18 NOTE — FIRST PROVIDER EVALUATION
"Medical screening examination initiated.  I have conducted a focused provider triage encounter, findings are as follows:    Brief history of present illness:  64-year-old female with a history of anxiety, type 2 diabetes, endometrial cancer, hypertension, hyperlipidemia presenting with left-sided flank pain radiating to the upper back.  Pain is severe, rated at 10/10.  Patient was exacerbated by turning and movement.    Vitals:    09/18/24 1638   BP: (!) 180/81   BP Location: Left arm   Pulse: 83   Resp: 20   Temp: 97.6 °F (36.4 °C)   TempSrc: Oral   SpO2: 96%   Weight: 122.5 kg (270 lb)   Height: 5' 1" (1.549 m)       Pertinent physical exam:  He was uncomfortable secondary to pain    Brief workup plan:  ECG, chest x-ray, basic labs, evaluate for shingles or Neurology pain, UA, broad workup    Preliminary workup initiated; this workup will be continued and followed by the physician or advanced practice provider that is assigned to the patient when roomed.    Jason Gibson DO, [facility]  Emergency Staff Physician   Dept of Emergency Medicine   [facility]  Spectralink: [id number]        Disclaimer: This note has been generated using voice-recognition software. There may be typographical errors that have been missed during proof-reading.    "

## 2024-09-18 NOTE — Clinical Note
"Tawny"Tawnykristie Valerio was seen and treated in our emergency department on 9/18/2024.  She may return to work on 09/20/2024.       If you have any questions or concerns, please don't hesitate to call.      Elizabeth Medina PA-C"

## 2024-09-18 NOTE — ED PROVIDER NOTES
"Encounter Date: 2024       History     Chief Complaint   Patient presents with    Back Pain     Sharp excruciating pain in left back bra line and "feels twisted"     64-year-old female with a history of endometrial cancer, hypertension, diabetes presents ER for evaluation of left-sided chest and back pain.  Patient reports pain to the left chest and back pain that is been ongoing for the last 3-4 days.  Symptoms initially started with URI about a week ago.  She states that she was having a productive cough at the time.  States that over last couple days she has noticed worsening pain to the left side of her upper back with movement and cough.  She denies any associated shortness of breath.  No leg swelling.  No previous history of PE or DVT.  Denies any fever or chills.  Was seen by primary care physician yesterday through a virtual visit.  Was prescribed naproxen but did not start taking it.  She tried icy hot with some improvement.  No previous cardiac history including stent placement.  Does not use any blood thinners.    The history is provided by the patient.     Review of patient's allergies indicates:   Allergen Reactions    Celebrex [celecoxib] Anaphylaxis     Is able to tolerate Mobic, Aspirin, other NSAIDs.    Codeine Hives     Does not know what she can take -usually just takes tylenol or ibuprofen    Lidocaine Hives    Vicodin [hydrocodone-acetaminophen] Hives and Swelling     Past Medical History:   Diagnosis Date    Anxiety     Diabetes mellitus, type 2 2016    Endometrial ca 2019    Essential hypertension 2018    Hyperlipidemia LDL goal <70 10/30/2023    Simple endometrial hyperplasia 03/15/2019    Uterine polyp 2019     Past Surgical History:   Procedure Laterality Date     SECTION      x 1    DILATION AND CURETTAGE OF UTERUS  2019    HYSTERECTOMY  19    HYSTEROSCOPY WITH DILATION AND CURETTAGE OF UTERUS N/A 2019    Procedure: HYSTEROSCOPY, WITH " DILATION AND CURETTAGE OF UTERUS;  Surgeon: Bobby Frazier Jr., MD;  Location: [facility];  Service: OB/GYN;  Laterality: N/A;    LAPAROSCOPIC APPENDECTOMY N/A 5/31/2024    Procedure: APPENDECTOMY, LAPAROSCOPIC;  Surgeon: Hector Harrell MD;  [facility];  Service: General;  Laterality: N/A;    lipoma removed      ROBOT-ASSISTED LAPAROSCOPIC ABDOMINAL HYSTERECTOMY USING DA AWAIS XI N/A 08/26/2019    Procedure: XI ROBOTIC HYSTERECTOMY;  Surgeon: Aneudy Almeida MD;  [facility];  Service: OB/GYN;  Laterality: N/A;    ROBOT-ASSISTED LAPAROSCOPIC LYSIS OF ADHESIONS USING DA AWAIS XI N/A 08/26/2019    Procedure: XI ROBOTIC LYSIS, ADHESIONS;  Surgeon: Aneudy Almeida MD;  [facility];  Service: OB/GYN;  Laterality: N/A;  Omental    ROBOT-ASSISTED LAPAROSCOPIC SALPINGO-OOPHORECTOMY USING DA AWAIS XI Bilateral 08/26/2019    Procedure: XI ROBOTIC SALPINGO-OOPHORECTOMY;  Surgeon: Aneudy Almeida MD;  [facility];  Service: OB/GYN;  Laterality: Bilateral;  Wt 299lbs     Family History   Problem Relation Name Age of Onset    Arthritis Mother Puraluis Mariano     Diabetes Father Luan Pascualford     Stroke Father Luan Pascualford     Hypertension Father Luan Mariano     Depression Father Luan Mariano     Early death Father Luan Pascualford     Heart disease Father Luan Mariano     Vision loss Father Luan Rodríguezkford     Heart disease Sister      Hypertension Sister      Depression Sister Apolonia Rodríguezkford     Diabetes Sister Apolonia García     Early death Sister Apolonia García     Heart disease Sister Apolonia García     Hypertension Sister Apolonia García     Heart disease Brother Luan R. García     Heart attack Brother Luan R. García     Hypertension Brother Luan R. García     Early death Brother Luan R. García     Heart disease Maternal Grandmother Talya Prince Grapuso     Heart disease Maternal Grandfather Simon Mariano     Heart disease Paternal  Grandfather Nikolas Prince     Breast cancer Other mat [facility]     Ovarian cancer Neg Hx      Uterine cancer Neg Hx      Colon cancer Neg Hx      Heart failure Neg Hx      Hyperlipidemia Neg Hx       Social History     Tobacco Use    Smoking status: Never    Smokeless tobacco: Never   Substance Use Topics    Alcohol use: Never    Drug use: No     Review of Systems   Constitutional:  Negative for chills and fever.   HENT:  Positive for congestion.    Eyes:  Negative for visual disturbance.   Respiratory:  Positive for cough. Negative for shortness of breath.    Cardiovascular:  Negative for chest pain.   Gastrointestinal:  Negative for nausea and vomiting.   Genitourinary:  Negative for dysuria and flank pain.   Musculoskeletal:  Positive for back pain. Negative for myalgias.   Skin:  Negative for rash.   Allergic/Immunologic: Negative for immunocompromised state.   Neurological:  Negative for weakness and numbness.   Hematological:  Does not bruise/bleed easily.   Psychiatric/Behavioral:  Negative for confusion.        Physical Exam     Initial Vitals [09/18/24 1638]   BP Pulse Resp Temp SpO2   (!) 180/81 83 20 97.6 °F (36.4 °C) 96 %      MAP       --         Physical Exam    Vitals reviewed.  Constitutional: She appears well-developed and well-nourished. She is not diaphoretic. No distress.   HENT:   Head: Normocephalic and atraumatic.   Eyes: Conjunctivae and EOM are normal.   Neck: Neck supple.   Cardiovascular:  Normal rate, regular rhythm, normal heart sounds and intact distal pulses.           Pulmonary/Chest: Breath sounds normal. No respiratory distress. She exhibits tenderness (left posterior lateral 7th through 9th rib region). She exhibits no bony tenderness and no deformity.   Abdominal: Abdomen is soft. She exhibits no distension. There is no abdominal tenderness. There is no rebound and no guarding.   Musculoskeletal:         General: Normal range of motion.      Cervical back: Neck supple.      Neurological: She is alert and oriented to person, place, and time.   Skin: Skin is warm.         ED Course   Procedures  Labs Reviewed   CBC W/ AUTO DIFFERENTIAL - Abnormal       Result Value    WBC 11.84      RBC 5.20      Hemoglobin 13.3      Hematocrit 43.9      MCV 84      MCH 25.6 (*)     MCHC 30.3 (*)     RDW 16.5 (*)     Platelets 328      MPV 9.7      Immature Granulocytes 0.6 (*)     Gran # (ANC) 10.0 (*)     Immature Grans (Abs) 0.07 (*)     Lymph # 1.5      Mono # 0.3      Eos # 0.0      Baso # 0.05      nRBC 0      Gran % 84.1 (*)     Lymph % 12.3 (*)     Mono % 2.4 (*)     Eosinophil % 0.2      Basophil % 0.4      Differential Method Automated     COMPREHENSIVE METABOLIC PANEL - Abnormal    Sodium 138      Potassium 4.4      Chloride 104      CO2 25      Glucose 145 (*)     BUN 20      Creatinine 0.8      Calcium 9.0      Total Protein 7.8      Albumin 3.3 (*)     Total Bilirubin 0.3      Alkaline Phosphatase 119      AST 17      ALT 21      eGFR >60.0      Anion Gap 9     URINALYSIS, REFLEX TO URINE CULTURE - Abnormal    Specimen UA Urine, Clean Catch      Color, UA Yellow      Appearance, UA Clear      pH, UA 7.0      Specific Gravity, UA 1.020      Protein, UA Negative      Glucose, UA 4+ (*)     Ketones, UA Negative      Bilirubin (UA) Negative      Occult Blood UA Negative      Nitrite, UA Negative      Leukocytes, UA Negative      Narrative:     Specimen Source->Urine   ISTAT PROCEDURE - Abnormal    POC Glucose 151 (*)     POC BUN 21      POC Creatinine 0.8      POC Sodium 141      POC Potassium 4.4      POC Chloride 104      POC TCO2 (MEASURED) 27      POC Ionized Calcium 1.13      POC Hematocrit 44      Sample VASU     TROPONIN I    Troponin I <0.006     URINALYSIS MICROSCOPIC    RBC, UA 1      WBC, UA 0      Bacteria None      Yeast, UA None      Squam Epithel, UA 2      Microscopic Comment SEE COMMENT      Narrative:     Specimen Source->Urine          Imaging Results              X-Ray  Chest AP Portable (Final result)  Result time 09/18/24 18:30:29      Final result by Horacio Jessica MD (09/18/24 18:30:29)                   Impression:      No detrimental change or radiographic acute intrathoracic process seen on this single view.      Electronically signed by: Horacio Jessica MD  Date:    09/18/2024  Time:    18:30               Narrative:    EXAMINATION:  XR CHEST AP PORTABLE    CLINICAL HISTORY:  Left flank and chest wall pain;    TECHNIQUE:  Single frontal view of the chest was performed.    COMPARISON:  Chest radiograph 05/07/2024, CT abdomen and pelvis 05/30/2024    FINDINGS:  Patient is slightly rotated.  Resolution is limited by body habitus with underpenetration.    Cardiomediastinal silhouette is midline and stable without evidence of failure.  Bibasilar minimal platelike scarring versus atelectasis.  Pulmonary vasculature and hilar contours are within normal limits.  No acute osseous process seen.  PA and lateral views can be obtained.                                       Medications   oxyCODONE-acetaminophen  mg per tablet 1 tablet (1 tablet Oral Given 9/18/24 1816)   capsaicin 0.025 % cream ( Topical (Top) Given 9/18/24 1911)   ketorolac injection 15 mg (15 mg Intravenous Given 9/18/24 1834)     Medical Decision Making  Differential diagnosis:    Pneumonia, costochondritis, pleurisy, muscular strain, muscular spasm    Risk  OTC drugs.  Prescription drug management.         APC / Resident Notes:   Patient seen in the ER promptly upon arrival.  She is afebrile, no acute distress.  Physical examination reveals normal heart lung sounds.  Tenderness on palpation to the left posterolateral 7th rib region.  No retractions or crepitus noted.        ED Course as of 09/19/24 0156   Wed Sep 18, 2024   1712 EKG shows normal sinus rhythm at a rate of 69 beats per minute [AJ]   1801 Lab work shows normal white count of 11.8 [AJ]   1806 Hemoglobin is stable at 13.3.  Chemistries unremarkable.   Cardiac workup essentially unremarkable [AJ]   1903 X-ray of chest is unremarkable.  No evidence of acute pneumonia. [AJ]   1903 Suspect symptoms secondary to muscular strain versus costochondritis. [AJ]   1903 Patient has had significant improvement with the pain medication provided to her. [AJ]   Thu Sep 19, 2024   0155 Urinalysis was unremarkable.  No evidence of infection or blood.  Less concerning for pyelonephritis or nephrolithiasis.    Suspect symptoms secondary to muscular strain likely secondary to the excessive coughing.  Patient prescribed home with Percocet and capsaicin cream.  She does have naproxen at home as well.  Advised to have close follow-up with primary doctor.  Given strict precautions ED which was agreeable to.  She is otherwise stable for discharge. [AJ]      ED Course User Index  [AJ] Elizabeth Medina PA-C                           Clinical Impression:  Final diagnoses:  [R10.9] Left flank pain  [T14.8XXA] Muscle strain (Primary)          ED Disposition Condition    Discharge Stable          ED Prescriptions       Medication Sig Dispense Start Date End Date Auth. Provider    oxyCODONE-acetaminophen (PERCOCET)  mg per tablet Take 1 tablet by mouth every 6 (six) hours as needed for Pain. 6 tablet 9/18/2024 -- Elizabeth Medina PA-C    capsaicin 0.1 % Crea Apply topically three times a day as needed 42.5 g 9/18/2024 -- Elizabeth Medina PA-C          Follow-up Information       Follow up With Specialties Details Why Contact Info    Geovanna South DO Internal Medicine   [address] [place] [address] DR Rich [place]VALO [id number] [phone]               Elizabeth Medina PA-C  09/19/24 0156

## 2024-09-18 NOTE — ED NOTES
Patient comes into the emergency department by POV with complaints of back pain. Patient states that she started having these spasms and tension-like pulling under her bra line that makes it uncomfortable to move.     LOC: The patient is awake, alert and aware of environment with an appropriate affect, the patient is oriented x 3 and speaking appropriately.   APPEARANCE: Patient appears comfortable and in no acute distress, patient is clean and well groomed.  SKIN: The skin is warm and dry, color consistent with ethnicity, patient has normal skin turgor and moist mucus membranes, skin intact, no breakdown or bruising noted.   MUSCULOSKELETAL: Patient moving all extremities spontaneously, no swelling noted. Tenderness to RUQ following pt's bra line.  RESPIRATORY: Airway is open and patent, respirations are spontaneous, patient has a normal effort and rate, no accessory muscle.  CARDIAC: Pt placed on cardiac monitor. Patient has a normal rate and regular rhythm, no edema noted, capillary refill < 3 seconds.   GASTRO: Soft and non tender to palpation, no distention noted.  : Pt denies any pain or frequency with urination.  NEURO: Pt opens eyes spontaneously, behavior appropriate to situation, follows commands, facial expression symmetrical, bilateral hand grasp equal and even, purposeful motor response noted, normal sensation in all extremities when touched with a finger.

## 2024-09-18 NOTE — ED NOTES
I-STAT Chem-8+ Results:   Value Reference Range   Sodium 141 136-145 mmol/L   Potassium  4.4 3.5-5.1 mmol/L   Chloride 104  mmol/L   Ionized Calcium 1.13 1.06-1.42 mmol/L   CO2 (measured) 27 23-29 mmol/L   Glucose 151  mg/dL   BUN 21 6-30 mg/dL   Creatinine 0.8 0.5-1.4 mg/dL   Hematocrit 44 36-54%

## 2024-09-19 ENCOUNTER — TELEPHONE (OUTPATIENT)
Dept: NEUROLOGY | Facility: CLINIC | Age: 64
End: 2024-09-19
Payer: COMMERCIAL

## 2024-09-19 ENCOUNTER — NURSE TRIAGE (OUTPATIENT)
Dept: ADMINISTRATIVE | Facility: CLINIC | Age: 64
End: 2024-09-19
Payer: COMMERCIAL

## 2024-09-19 ENCOUNTER — PATIENT MESSAGE (OUTPATIENT)
Dept: NEUROLOGY | Facility: CLINIC | Age: 64
End: 2024-09-19
Payer: COMMERCIAL

## 2024-09-19 LAB
OHS QRS DURATION: 100 MS
OHS QTC CALCULATION: 432 MS

## 2024-09-19 NOTE — TELEPHONE ENCOUNTER
Pt was seen in ER yesterday. Still reporting pain despite being seen in ER and receiving treatment. She is inquiring if it's related to her neurological issues.    Attempted to call. Unable to leave messge.  Routed to provider

## 2024-09-19 NOTE — TELEPHONE ENCOUNTER
Did not speak with patient. Pt calling back, spoke to triage RNTiffany. Scheduling sent pt over to NIRANJAN Allen. Alerted NIRANJAN Allen that patient is calling her back, scheduling routed call to NIRANJAN Allen.       Reason for Disposition   Caller has already spoken with another triager or PCP AND has further questions AND triager able to answer questions.     Call sent to NIRANJAN Allen    Protocols used: No Contact or Duplicate Contact Call-A-AH

## 2024-09-19 NOTE — TELEPHONE ENCOUNTER
Pt is calling back regarding previous triage encounter as she has not had follow up with PCP office. I have advised she should be evaluated with a provider for symptoms. She verbalizes understanding. Prefers to try utilizing [facility] to see if antibiotics would be beneficial.    Reason for Disposition   Nursing judgment    Protocols used: No Guideline or Reference [id number]

## 2024-09-19 NOTE — TELEPHONE ENCOUNTER
Pt reports ED visit yesterday. Reports Saturday she began having a sore throat with green phlegm and sharp/shooting pain on her side. Reports yesterday she was having pain w/ deep breaths but today she is having discomfort with every breath she takes. No relief w/ prescribed medication.   Advised, per protocol and informed I would route a message to provider for follow up in this regard.    Reason for Disposition   SEVERE pain (e.g., excruciating, pain scale 8-10) and not improved after pain medications    Additional Information   Negative: Shock suspected (e.g., cold/pale/clammy skin, too weak to stand, low BP, rapid pulse)   Negative: Difficult to awaken or acting confused (e.g., disoriented, slurred speech)   Negative: Sounds like a life-threatening emergency to the triager   Negative: Drinking very little and dehydration suspected (e.g., no urine > 12 hours, very dry mouth, very lightheaded)   Negative: Patient sounds very sick or weak to the triager   Negative: Fever > 104 F  (40 C)   Negative: Caller has URGENT question and triager unable to answer question    Protocols used: Recent Medical Visit for Illness Follow-up Call-A-[facility]

## 2024-09-19 NOTE — DISCHARGE INSTRUCTIONS
Please take medication as prescribed.  You may use the capsaicin cream to the site.  I suspect your symptoms are due to muscular strain and spasm.  Follow up with your family doctor in the next 2-3 days.  Reduce heavy lifting or strenuous activity.  Only take Percocet for breakthrough pain.  You may take naproxen during the daytime

## 2024-09-20 ENCOUNTER — TELEPHONE (OUTPATIENT)
Dept: INTERNAL MEDICINE | Facility: CLINIC | Age: 64
End: 2024-09-20
Payer: COMMERCIAL

## 2024-09-20 ENCOUNTER — OFFICE VISIT (OUTPATIENT)
Dept: INTERNAL MEDICINE | Facility: CLINIC | Age: 64
End: 2024-09-20
Payer: COMMERCIAL

## 2024-09-20 DIAGNOSIS — J06.9 UPPER RESPIRATORY TRACT INFECTION, UNSPECIFIED TYPE: Primary | ICD-10-CM

## 2024-09-20 DIAGNOSIS — R07.81 RIB PAIN: ICD-10-CM

## 2024-09-20 PROCEDURE — 4010F ACE/ARB THERAPY RXD/TAKEN: CPT | Mod: CPTII,95,, | Performed by: PHYSICIAN ASSISTANT

## 2024-09-20 PROCEDURE — 99213 OFFICE O/P EST LOW 20 MIN: CPT | Mod: 95,,, | Performed by: PHYSICIAN ASSISTANT

## 2024-09-20 PROCEDURE — 3044F HG A1C LEVEL LT 7.0%: CPT | Mod: CPTII,95,, | Performed by: PHYSICIAN ASSISTANT

## 2024-09-20 NOTE — PROGRESS NOTES
INTERNAL MEDICINE URGENT VISIT NOTE    CHIEF COMPLAINT     No chief complaint on file.      HPI     Tawny Valerio is a 64 y.o. female who presents for an urgent visit today.    PCP is Geovanna South DO, patient is new to me.     Patient has left sided rib pain - has had UC, VV and ED visit - wants Abx,  I do not feel abx are warranted at this time.   Follow-up in clinic for in person exam next week as prescribed.     VV  Patient is in [place] during this OV  Face Time is 15 mins     Past Medical History:  Past Medical History:   Diagnosis Date    Anxiety     Diabetes mellitus, type 2 2016    Endometrial ca 07/24/2019    Essential hypertension 05/14/2018    Hyperlipidemia LDL goal <70 10/30/2023    Simple endometrial hyperplasia 03/15/2019    Uterine polyp 03/14/2019       Home Medications:  Prior to Admission medications    Medication Sig Start Date End Date Taking? Authorizing Provider   acetaminophen (TYLENOL) 325 MG tablet Take 2 tablets (650 mg total) by mouth every 6 (six) hours as needed for Pain. 7/16/19   Makayla Kamara MD   acetaminophen (TYLENOL) 500 MG tablet Take 1 tablet (500 mg total) by mouth every 8 (eight) hours.  Patient not taking: Reported on 9/14/2024 5/31/24   Rosanna Johnston MD   albuterol (PROVENTIL/VENTOLIN HFA) 90 mcg/actuation inhaler Inhale 2 puffs into the lungs every 6 (six) hours as needed for Wheezing or Shortness of Breath.  Patient not taking: Reported on 9/14/2024 5/7/24   Maria Elena Jacobs, RADHA   ALPRAZolam (XANAX) 0.5 MG tablet 1 tab(s) orally 3 times a day    Provider, Historical   aspirin 81 MG Chew Take 81 mg by mouth once daily.    Provider, Historical   azelastine (ASTELIN) 137 mcg (0.1 %) nasal spray 1 spray (137 mcg total) by Nasal route 2 (two) times daily. 9/11/24   Geovanna South DO   baclofen (LIORESAL) 10 MG tablet Take 1 tablet (10 mg total) by mouth 3 (three) times daily. 8/12/24 8/12/25  Kaylyn Ko, PAKalpanaC   blood-glucose meter kit  Use as instructed 8/28/23   Marlene Bess FNP   buPROPion (WELLBUTRIN SR) 150 MG TBSR 12 hr tablet Take 1 tablet (150 mg total) by mouth 2 (two) times daily. 10/26/23   Geovanna South DO   capsaicin 0.1 % Crea Apply topically three times a day as needed 9/18/24   Elizabeth Medina PA-C   clindamycin (CLEOCIN T) 1 % lotion Apply topically 2 (two) times daily. 12/27/23   Goran Pickering MD   ergocalciferol (ERGOCALCIFEROL) 50,000 unit Cap Take 1 capsule (50,000 Units total) by mouth every 7 days. 4/26/24   Laura Doty PA-C   evolocumab (REPATHA SURECLICK) 140 mg/mL PnIj Inject 1 mL (140 mg total) into the skin every 14 (fourteen) days. 7/17/24 7/17/25  Cierra Bruno PA-C   ezetimibe (ZETIA) 10 mg tablet Take 1 tablet (10 mg total) by mouth once daily. 7/11/24 7/11/25  Elise Flaherty MD   famotidine (PEPCID) 20 MG tablet Take 1 tablet (20 mg total) by mouth 2 (two) times daily. for 10 days 9/14/24 9/24/24  hCasity Platt PA   fluticasone propionate (FLONASE) 50 mcg/actuation nasal spray 1 spray (50 mcg total) by Each Nostril route once daily. 9/11/24   Geovanna South DO   gabapentin (NEURONTIN) 300 MG capsule Take 600 mg (2 caps) QAM and 300 mg (1 cap) in afternoon and 300 mg (1 cap) QPM 1/17/24   Kirill Andrade MD   lancing device with lancets (MULTI-LANCET DEVICE 2) Kit 1 each by Misc.(Non-Drug; Combo Route) route 2 (two) times a day. 8/28/23 8/27/24  Marlene Bess FNP   losartan (COZAAR) 25 MG tablet Take 1 tablet (25 mg total) by mouth once daily. 8/21/23   Varsha Rhodes MD   meclizine (ANTIVERT) 12.5 mg tablet Take 1 tablet (12.5 mg total) by mouth 2 (two) times daily as needed for Dizziness. 7/9/24   Kaylyn Ko PA-C   metronidazole 0.75% (METROCREAM) 0.75 % Crea Apply topically 2 (two) times daily. For rosacea on face. 12/27/23 12/26/24  Goran Pickering MD   naproxen (NAPROSYN) 250 MG tablet Take 1 tablet (250 mg total) by mouth  every 6 (six) hours as needed (pain). Initial: 500 mg once, followed 250 mg every 6 to 8 hours as needed.  Maximum dose: 1.25 g on day 1, then 1 g/day thereafter. 9/17/24   Marquita Norton MD   ondansetron (ZOFRAN-ODT) 4 MG TbDL Take 4 mg by mouth every 6 (six) hours as needed.  Patient not taking: Reported on 9/14/2024 6/2/23   Provider, Historical   ondansetron (ZOFRAN-ODT) 4 MG TbDL Dissolve 2 tablets (8 mg total) by mouth every 8 (eight) hours as needed (nausea).  Patient not taking: Reported on 9/14/2024 5/31/24   Rosanna Johnston MD   oxyCODONE (ROXICODONE) 5 MG immediate release tablet Take 1 tablet (5 mg total) by mouth every 4 (four) hours as needed for Pain.  Patient not taking: Reported on 9/14/2024 5/31/24   Rosanna Johnston MD   oxyCODONE-acetaminophen (PERCOCET)  mg per tablet Take 1 tablet by mouth every 6 (six) hours as needed for Pain. 9/18/24   Elizabeth Medina PA-C   predniSONE (DELTASONE) 20 MG tablet Take 1 tablet (20 mg total) by mouth once daily. for 5 days 9/14/24 9/19/24  Chasity Platt PA   promethazine (PHENERGAN) 25 MG tablet Take 25 mg by mouth every 6 (six) hours as needed.  Patient not taking: Reported on 9/14/2024 6/2/23   Provider, Historical   promethazine (PHENERGAN) 6.25 mg/5 mL syrup Take 10 mLs at night as needed. 9/14/24   Chasity Platt PA   tirzepatide 10 mg/0.5 mL PnIj Inject 10 mg into the skin every 7 days. 7/15/24 1/11/25  Marlene Bess FNP   tretinoin (RETIN-A) 0.025 % cream APPLY TOPICALLY EVERY EVENING. 8/8/23   Goran Pickering MD   trihexyphenidyL (ARTANE) 2 MG tablet Take 0.5 tablets (1 mg total) by mouth 3 (three) times daily. 8/30/24 8/30/25  Johnathan White MD       Review of Systems:  Review of Systems   Constitutional:  Negative for chills and fever.   HENT:  Positive for postnasal drip and sore throat. Negative for ear pain and rhinorrhea.    Respiratory:  Positive for cough. Negative for shortness of breath and  wheezing.    Cardiovascular:  Negative for chest pain.   Musculoskeletal:  Positive for myalgias.   Skin:  Negative for rash.   Allergic/Immunologic: Positive for environmental allergies.   Neurological:  Negative for headaches.       Health Maintainence:   Immunizations:  Health Maintenance         Date Due Completion Date    Pneumococcal Vaccines (Age 0-64) (1 of 2 - PCV) Never done ---    Mammogram Never done ---    Shingles Vaccine (1 of 2) Never done ---    Low Dose Statin Never done ---    Colorectal Cancer Screening Never done ---    Eye Exam 01/22/2022 1/22/2021    RSV Vaccine (Age 60+ and Pregnant patients) (1 - 1-dose 60+ series) Never done ---    Diabetes Urine Screening 08/22/2024 8/22/2023    Influenza Vaccine (1) Never done ---    COVID-19 Vaccine (5 - 2023-24 season) 09/01/2024 6/5/2022    Hemoglobin A1c 10/17/2024 4/17/2024    Foot Exam 04/17/2025 4/17/2024    Lipid Panel 06/12/2025 6/12/2024    TETANUS VACCINE 04/08/2026 4/8/2016             PHYSICAL EXAM     LMP 01/15/2019 (Approximate)     Physical Exam  Constitutional:       Comments: Healthy appearing female in NAD or apparent pain. She makes good eye contact, speaks in clear full sentences and ambulates with ease on this vv. She has no coughing during this virtual visit.               LABS     Lab Results   Component Value Date    HGBA1C 5.7 (H) 04/17/2024     CMP  Sodium   Date Value Ref Range Status   09/18/2024 138 136 - 145 mmol/L Final     Potassium   Date Value Ref Range Status   09/18/2024 4.4 3.5 - 5.1 mmol/L Final     Chloride   Date Value Ref Range Status   09/18/2024 104 95 - 110 mmol/L Final     CO2   Date Value Ref Range Status   09/18/2024 25 23 - 29 mmol/L Final     Glucose   Date Value Ref Range Status   09/18/2024 145 (H) 70 - 110 mg/dL Final     BUN   Date Value Ref Range Status   09/18/2024 20 8 - 23 mg/dL Final     Creatinine   Date Value Ref Range Status   09/18/2024 0.8 0.5 - 1.4 mg/dL Final     Calcium   Date Value Ref  Range Status   09/18/2024 9.0 8.7 - 10.5 mg/dL Final     Total Protein   Date Value Ref Range Status   09/18/2024 7.8 6.0 - 8.4 g/dL Final     Albumin   Date Value Ref Range Status   09/18/2024 3.3 (L) 3.5 - 5.2 g/dL Final     Total Bilirubin   Date Value Ref Range Status   09/18/2024 0.3 0.1 - 1.0 mg/dL Final     Comment:     For infants and newborns, interpretation of results should be based  on gestational age, weight and in agreement with clinical  observations.    Premature Infant recommended reference ranges:  Up to 24 hours.............<8.0 mg/dL  Up to 48 hours............<12.0 mg/dL  3-5 days..................<15.0 mg/dL  6-29 days.................<15.0 mg/dL       Alkaline Phosphatase   Date Value Ref Range Status   09/18/2024 119 55 - 135 U/L Final     AST   Date Value Ref Range Status   09/18/2024 17 10 - 40 U/L Final     ALT   Date Value Ref Range Status   09/18/2024 21 10 - 44 U/L Final     Anion Gap   Date Value Ref Range Status   09/18/2024 9 8 - 16 mmol/L Final     eGFR if    Date Value Ref Range Status   01/06/2022 >60.0 >60 mL/min/1.73 m^2 Final     eGFR if non    Date Value Ref Range Status   01/06/2022 >60.0 >60 mL/min/1.73 m^2 Final     Comment:     Calculation used to obtain the estimated glomerular filtration  rate (eGFR) is the CKD-EPI equation.        Lab Results   Component Value Date    WBC 11.84 09/18/2024    HGB 13.3 09/18/2024    HCT 44 09/18/2024    MCV 84 09/18/2024     09/18/2024     Lab Results   Component Value Date    CHOL 169 06/12/2024    CHOL 179 06/14/2023    CHOL 152 08/09/2022     Lab Results   Component Value Date    HDL 50 06/12/2024    HDL 57 06/14/2023    HDL 55 08/09/2022     Lab Results   Component Value Date    LDLCALC 106.6 06/12/2024    LDLCALC 109.8 06/14/2023    LDLCALC 80.0 08/09/2022     Lab Results   Component Value Date    TRIG 62 06/12/2024    TRIG 61 06/14/2023    TRIG 85 08/09/2022     Lab Results   Component Value  Date    CHOLHDL 29.6 06/12/2024    CHOLHDL 31.8 06/14/2023    CHOLHDL 36.2 08/09/2022     Lab Results   Component Value Date    TSH 0.636 06/14/2023    [id number] 101 01/26/2023       ASSESSMENT/PLAN     Tawny Valerio is a 64 y.o. female     Diagnoses and all orders for this visit:    Upper respiratory tract infection, unspecified type   -continue current regimen    -need in person exam if symptoms do not improve as expected    Rib pain       Patient was counseled on when and how to seek emergent care.       Rosanna Connor PA-C   Department of Internal Medicine - [facility]   4:01 PM     Answers submitted by the patient for this visit:  Cough Questionnaire (Submitted on 9/19/2024)  Chief Complaint: Cough  Chronicity: recurrent  Onset: in the past 7 days  Progression since onset: gradually worsening  Frequency: every few minutes  Cough characteristics: productive of sputum  ear congestion: No  heartburn: Yes  hemoptysis: No  nasal congestion: No  sweats: No  weight loss: No  Aggravated by: lying down, stress  asthma: No  bronchiectasis: No  bronchitis: Yes  COPD: No  emphysema: No  pneumonia: No  Treatments tried: OTC cough suppressant, a beta-agonist inhaler, body position changes, oral steroids, prescription cough suppressant, rest, steroid inhaler  Improvement on treatment: mild

## 2024-09-20 NOTE — TELEPHONE ENCOUNTER
Chart and apt notes reviewed - pt is expecting antibiotics, recommend she be seen in person. Staff, please call the patient as soon as possible to offer her an in person apt with the next available provider in her current location. -KELBY HAWTHORNE

## 2024-09-20 NOTE — TELEPHONE ENCOUNTER
Spoke to patient she did not want to make an appt she just wanted an antibiotic called out she was upset the they did not give her one at the ER

## 2024-09-24 ENCOUNTER — TELEPHONE (OUTPATIENT)
Dept: INTERNAL MEDICINE | Facility: CLINIC | Age: 64
End: 2024-09-24
Payer: COMMERCIAL

## 2024-09-24 NOTE — TELEPHONE ENCOUNTER
----- Message from Lorraine Hoyos sent at 9/24/2024  2:41 PM CDT -----  Contact: [phone]  Returning a phone call.    Who left a message for the patient:  Nurse    Do they know what this is regarding:  yes    Would they like a phone call back or a response via [employer]:  call

## 2024-09-26 ENCOUNTER — OFFICE VISIT (OUTPATIENT)
Dept: INTERNAL MEDICINE | Facility: CLINIC | Age: 64
End: 2024-09-26
Payer: COMMERCIAL

## 2024-09-26 VITALS
BODY MASS INDEX: 51.4 KG/M2 | DIASTOLIC BLOOD PRESSURE: 66 MMHG | WEIGHT: 272.25 LBS | SYSTOLIC BLOOD PRESSURE: 112 MMHG | OXYGEN SATURATION: 99 % | HEART RATE: 68 BPM | HEIGHT: 61 IN

## 2024-09-26 DIAGNOSIS — R22.2 LOCALIZED SWELLING, MASS AND LUMP, TRUNK: ICD-10-CM

## 2024-09-26 DIAGNOSIS — R07.81 RIB PAIN: ICD-10-CM

## 2024-09-26 DIAGNOSIS — M79.89 MASS OF SOFT TISSUE: ICD-10-CM

## 2024-09-26 PROCEDURE — 3044F HG A1C LEVEL LT 7.0%: CPT | Mod: CPTII,S$GLB,, | Performed by: PHYSICIAN ASSISTANT

## 2024-09-26 PROCEDURE — 4010F ACE/ARB THERAPY RXD/TAKEN: CPT | Mod: CPTII,S$GLB,, | Performed by: PHYSICIAN ASSISTANT

## 2024-09-26 PROCEDURE — 3074F SYST BP LT 130 MM HG: CPT | Mod: CPTII,S$GLB,, | Performed by: PHYSICIAN ASSISTANT

## 2024-09-26 PROCEDURE — 1159F MED LIST DOCD IN RCRD: CPT | Mod: CPTII,S$GLB,, | Performed by: PHYSICIAN ASSISTANT

## 2024-09-26 PROCEDURE — 99999 PR PBB SHADOW E&M-EST. PATIENT-LVL V: CPT | Mod: PBBFAC,,, | Performed by: PHYSICIAN ASSISTANT

## 2024-09-26 PROCEDURE — 3078F DIAST BP <80 MM HG: CPT | Mod: CPTII,S$GLB,, | Performed by: PHYSICIAN ASSISTANT

## 2024-09-26 PROCEDURE — 3008F BODY MASS INDEX DOCD: CPT | Mod: CPTII,S$GLB,, | Performed by: PHYSICIAN ASSISTANT

## 2024-09-26 PROCEDURE — 99214 OFFICE O/P EST MOD 30 MIN: CPT | Mod: S$GLB,,, | Performed by: PHYSICIAN ASSISTANT

## 2024-09-26 NOTE — PROGRESS NOTES
"Internal Medicine Annual Exam       CHIEF COMPLAINT     The patient, Tawny Valerio, who is a 64 y.o. female presents for an annual exam.    HPI     PCP is Geovanna South DO, patient is new to me.     Patient presents for in clinic re-check of left rib and flank pain   Likely was shingles and she is experiencing herpetic neuralgia. She showed me a photo of a left flank rash that developed a few days before her left flank pain began.   She was seen virtually twice, the ED and at . Pain was described as muscle strain, she was diagnosed with costochondritis. She has been taking the prescribed medications, lidocaine patch, NSAIDs and short course of opioids. She reports that now pain is still present but is improved. She admits that she is still concerned there is "something wrong" - she is adamant about getting imaging of her left flank and back.     She has no fever, chills, nausea, vomiting. She reports that rash is resolved.     Now with herpetic neuralgia but patient has left lided ridge of concerning tisse -   Past Medical History:  Past Medical History:   Diagnosis Date    Anxiety     Diabetes mellitus, type 2 2016    Endometrial ca 2019    Essential hypertension 2018    Hyperlipidemia LDL goal <70 10/30/2023    Simple endometrial hyperplasia 03/15/2019    Uterine polyp 2019       Past Surgical History:   Procedure Laterality Date     SECTION      x 1    DILATION AND CURETTAGE OF UTERUS  2019    HYSTERECTOMY  19    HYSTEROSCOPY WITH DILATION AND CURETTAGE OF UTERUS N/A 2019    Procedure: HYSTEROSCOPY, WITH DILATION AND CURETTAGE OF UTERUS;  Surgeon: Bobby Frazier Jr., MD;  Location: [facility];  Service: OB/GYN;  Laterality: N/A;    LAPAROSCOPIC APPENDECTOMY N/A 2024    Procedure: APPENDECTOMY, LAPAROSCOPIC;  Surgeon: Hector Harrell MD;  Location: [facility] OR [address] [place] [address];  Service: General;  Laterality: N/A;    lipoma removed      ROBOT-ASSISTED LAPAROSCOPIC ABDOMINAL " HYSTERECTOMY USING DA AWAIS XI N/A 08/26/2019    Procedure: XI ROBOTIC HYSTERECTOMY;  Surgeon: Aneudy Almeida MD;  Location: [facility] OR 2ND FLR;  Service: OB/GYN;  Laterality: N/A;    ROBOT-ASSISTED LAPAROSCOPIC LYSIS OF ADHESIONS USING DA AWAIS XI N/A 08/26/2019    Procedure: XI ROBOTIC LYSIS, ADHESIONS;  Surgeon: Aneudy Almeida MD;  Location: [facility] OR 2ND FLR;  Service: OB/GYN;  Laterality: N/A;  Omental    ROBOT-ASSISTED LAPAROSCOPIC SALPINGO-OOPHORECTOMY USING DA AWAIS XI Bilateral 08/26/2019    Procedure: XI ROBOTIC SALPINGO-OOPHORECTOMY;  Surgeon: Aneudy Almeida MD;  Location: [facility] OR 2ND FLR;  Service: OB/GYN;  Laterality: Bilateral;  Wt 299lbs        Family History   Problem Relation Name Age of Onset    Arthritis Mother Puranelda Mariano     Diabetes Father Luan Mariano     Stroke Father Luan Mariano     Hypertension Father Luan Mariano     Depression Father Luan Mariano     Early death Father Luan Mariano     Heart disease Father Luan Mariano     Vision loss Father Luan Mariano     Heart disease Sister      Hypertension Sister      Depression Sister Apolonia Pascualford     Diabetes Sister Apolonia Pascualford     Early death Sister Apolonia Pascualford     Heart disease Sister Apolonia Rodríguezkford     Hypertension Sister Apolonia Pascualford     Heart disease Brother Luan Rodríguezkford     Heart attack Brother Luan Pascualford     Hypertension Brother Luan Mariano     Early death Brother Luan Rodríguezkford     Heart disease Maternal Grandmother Talya Prince Grapuso     Heart disease Maternal Grandfather Simon Mariano     Heart disease Paternal Grandfather Nikolas Prince     Breast cancer Other mat great GM     Ovarian cancer Neg Hx      Uterine cancer Neg Hx      Colon cancer Neg Hx      Heart failure Neg Hx      Hyperlipidemia Neg Hx          Social History     Socioeconomic History    Marital status:    Occupational History     Employer: [employer]     Tobacco Use    Smoking status:  Never    Smokeless tobacco: Never   Substance and Sexual Activity    Alcohol use: Never    Drug use: No    Sexual activity: Yes     Partners: Male     Birth control/protection: Post-menopausal, None     Comment: Hysterectomy     Social Determinants of Health     Financial Resource Strain: Low Risk  (4/8/2024)    Received from [facility]    Overall Financial Resource Strain (CARDIA)     Difficulty of Paying Living Expenses: Not hard at all   Food Insecurity: No Food Insecurity (4/8/2024)    Received from [facility]    Hunger Vital Sign     Worried About Running Out of Food in the Last Year: Never true     Ran Out of Food in the Last Year: Never true   Transportation Needs: No Transportation Needs (4/8/2024)    Received from [facility]    PRAPARE - Transportation     Lack of Transportation (Medical): No     Lack of Transportation (Non-Medical): No   Physical Activity: Inactive (4/8/2024)    Received from [facility]    Exercise Vital Sign     Days of Exercise per Week: 0 days     Minutes of Exercise per Session: 0 min   Stress: Stress Concern Present (4/8/2024)    Received from [facility]    [employer] - Occupational Stress Questionnaire     Feeling of Stress : To some extent   Housing Stability: Low Risk  (3/12/2024)    Housing Stability Vital Sign     Unable to Pay for Housing in the Last Year: No     Number of Places Lived in the Last Year: 1     Unstable Housing in the Last Year: No        Social History     Tobacco Use   Smoking Status Never   Smokeless Tobacco Never        Allergies as of 09/26/2024 - Reviewed 09/26/2024   Allergen Reaction Noted    Celebrex [celecoxib] Anaphylaxis 06/27/2014    Codeine Hives 06/15/2016    Lidocaine Hives 06/27/2014    Vicodin [hydrocodone-acetaminophen] Hives and Swelling 06/27/2014          Home Medications:  Prior to Admission medications    Medication Sig Start Date End Date Taking? Authorizing Provider   acetaminophen (TYLENOL) 325 MG  tablet Take 2 tablets (650 mg total) by mouth every 6 (six) hours as needed for Pain. 7/16/19   Makayla Kamara MD   acetaminophen (TYLENOL) 500 MG tablet Take 1 tablet (500 mg total) by mouth every 8 (eight) hours.  Patient not taking: Reported on 9/14/2024 5/31/24   Rosanna Johnston MD   albuterol (PROVENTIL/VENTOLIN HFA) 90 mcg/actuation inhaler Inhale 2 puffs into the lungs every 6 (six) hours as needed for Wheezing or Shortness of Breath.  Patient not taking: Reported on 9/14/2024 5/7/24   Maria Elena Jacobs DNP   ALPRAZolam (XANAX) 0.5 MG tablet 1 tab(s) orally 3 times a day    Provider, Historical   aspirin 81 MG Chew Take 81 mg by mouth once daily.    Provider, Historical   azelastine (ASTELIN) 137 mcg (0.1 %) nasal spray 1 spray (137 mcg total) by Nasal route 2 (two) times daily. 9/11/24   Geovanna South DO   baclofen (LIORESAL) 10 MG tablet Take 1 tablet (10 mg total) by mouth 3 (three) times daily. 8/12/24 8/12/25  Kaylyn Ko PA-C   blood-glucose meter kit Use as instructed 8/28/23   Marlene Bess FNP   buPROPion (WELLBUTRIN SR) 150 MG TBSR 12 hr tablet Take 1 tablet (150 mg total) by mouth 2 (two) times daily. 10/26/23   Geovanna South DO   capsaicin 0.1 % Crea Apply topically three times a day as needed 9/18/24   Elizabeth Medina PA-C   clindamycin (CLEOCIN T) 1 % lotion Apply topically 2 (two) times daily. 12/27/23   Goran Pickering MD   ergocalciferol (ERGOCALCIFEROL) 50,000 unit Cap Take 1 capsule (50,000 Units total) by mouth every 7 days. 4/26/24   Laura Doty PA-C   evolocumab (REPATHA SURECLICK) 140 mg/mL PnIj Inject 1 mL (140 mg total) into the skin every 14 (fourteen) days. 7/17/24 7/17/25  Cierra Bruno, MARINE   ezetimibe (ZETIA) 10 mg tablet Take 1 tablet (10 mg total) by mouth once daily. 7/11/24 7/11/25  Elise Flaherty MD   famotidine (PEPCID) 20 MG tablet Take 1 tablet (20 mg total) by mouth 2 (two) times daily. for 10 days  9/14/24 9/24/24  Chasity Platt PA   fluticasone propionate (FLONASE) 50 mcg/actuation nasal spray 1 spray (50 mcg total) by Each Nostril route once daily. 9/11/24   Geovanna South DO   gabapentin (NEURONTIN) 300 MG capsule Take 600 mg (2 caps) QAM and 300 mg (1 cap) in afternoon and 300 mg (1 cap) QPM 1/17/24   Kirill Andrade MD   lancing device with lancets (MULTI-LANCET DEVICE 2) Kit 1 each by Misc.(Non-Drug; Combo Route) route 2 (two) times a day. 8/28/23 8/27/24  Marlene Bess FNP   losartan (COZAAR) 25 MG tablet Take 1 tablet (25 mg total) by mouth once daily. 8/21/23   Varsha Rhodes MD   meclizine (ANTIVERT) 12.5 mg tablet Take 1 tablet (12.5 mg total) by mouth 2 (two) times daily as needed for Dizziness. 7/9/24   Kaylyn Ko PA-C   metronidazole 0.75% (METROCREAM) 0.75 % Crea Apply topically 2 (two) times daily. For rosacea on face. 12/27/23 12/26/24  Goran Pickering MD   naproxen (NAPROSYN) 250 MG tablet Take 1 tablet (250 mg total) by mouth every 6 (six) hours as needed (pain). Initial: 500 mg once, followed 250 mg every 6 to 8 hours as needed.  Maximum dose: 1.25 g on day 1, then 1 g/day thereafter. 9/17/24   Marquita Norton MD   ondansetron (ZOFRAN-ODT) 4 MG TbDL Take 4 mg by mouth every 6 (six) hours as needed.  Patient not taking: Reported on 9/14/2024 6/2/23   Provider, Historical   ondansetron (ZOFRAN-ODT) 4 MG TbDL Dissolve 2 tablets (8 mg total) by mouth every 8 (eight) hours as needed (nausea).  Patient not taking: Reported on 9/14/2024 5/31/24   Rosanna Johnston MD   oxyCODONE (ROXICODONE) 5 MG immediate release tablet Take 1 tablet (5 mg total) by mouth every 4 (four) hours as needed for Pain.  Patient not taking: Reported on 9/14/2024 5/31/24   Rosanna Johnston MD   oxyCODONE-acetaminophen (PERCOCET)  mg per tablet Take 1 tablet by mouth every 6 (six) hours as needed for Pain. 9/18/24   Elizabeth Medina PA-C   promethazine (PHENERGAN) 25 MG  tablet Take 25 mg by mouth every 6 (six) hours as needed.  Patient not taking: Reported on 9/14/2024 6/2/23   Provider, Historical   promethazine (PHENERGAN) 6.25 mg/5 mL syrup Take 10 mLs at night as needed. 9/14/24   Chasity Platt PA   tirzepatide 10 mg/0.5 mL PnIj Inject 10 mg into the skin every 7 days. 7/15/24 1/11/25  Marlene Bess FNP   tretinoin (RETIN-A) 0.025 % cream APPLY TOPICALLY EVERY EVENING. 8/8/23   Goran Pickering MD   trihexyphenidyL (ARTANE) 2 MG tablet Take 0.5 tablets (1 mg total) by mouth 3 (three) times daily. 8/30/24 8/30/25  Johnathan White MD       Review of Systems:  Review of Systems   Constitutional:  Negative for chills and fever.   HENT:  Negative for sore throat and trouble swallowing.    Eyes:  Negative for visual disturbance.   Respiratory:  Negative for cough and shortness of breath.    Cardiovascular:  Negative for chest pain.   Gastrointestinal:  Negative for abdominal pain, constipation, diarrhea, nausea and vomiting.   Genitourinary:  Negative for dysuria and flank pain.   Musculoskeletal:  Negative for back pain, neck pain and neck stiffness.        Flank pain   Rib pain   Back pain      Skin:  Negative for rash.   Neurological:  Negative for dizziness, syncope, weakness and headaches.   Psychiatric/Behavioral:  Negative for confusion.        Health Maintainence:   Immunizations:  Health Maintenance         Date Due Completion Date    Pneumococcal Vaccines (Age 0-64) (1 of 2 - PCV) Never done ---    Mammogram Never done ---    Shingles Vaccine (1 of 2) Never done ---    Low Dose Statin Never done ---    Colorectal Cancer Screening Never done ---    Eye Exam 01/22/2022 1/22/2021    RSV Vaccine (Age 60+ and Pregnant patients) (1 - 1-dose 60+ series) Never done ---    Diabetes Urine Screening 08/22/2024 8/22/2023    Influenza Vaccine (1) Never done ---    COVID-19 Vaccine (5 - 2023-24 season) 09/01/2024 6/5/2022    Hemoglobin A1c 10/17/2024 4/17/2024  "   Foot Exam 04/17/2025 4/17/2024    Lipid Panel 06/12/2025 6/12/2024    TETANUS VACCINE 04/08/2026 4/8/2016             PHYSICAL EXAM     /66 (BP Location: Left arm, Patient Position: Sitting, BP Method: Large (Manual))   Pulse 68   Ht 5' 1" (1.549 m)   Wt 123.5 kg (272 lb 4.3 oz)   LMP 01/15/2019 (Approximate)   SpO2 99%   BMI 51.44 kg/m²  Body mass index is 51.44 kg/m².    Physical Exam  Vitals and nursing note reviewed.   Constitutional:       Appearance: Normal appearance.      Comments: Morbidly obese female in NAD or apparent pain. She makes good eye contact, speaks in clear full sentences and ambulates with ease.        HENT:      Head: Normocephalic and atraumatic.      Nose: Nose normal.      Mouth/Throat:      Pharynx: Oropharynx is clear.   Eyes:      Conjunctiva/sclera: Conjunctivae normal.   Cardiovascular:      Rate and Rhythm: Normal rate and regular rhythm.      Pulses: Normal pulses.   Pulmonary:      Effort: No respiratory distress.   Abdominal:      Tenderness: There is no abdominal tenderness.   Musculoskeletal:         General: Normal range of motion.      Cervical back: No rigidity.   Skin:     General: Skin is warm and dry.      Capillary Refill: Capillary refill takes less than 2 seconds.      Findings: No rash.      Comments: No rash on left flank or back   No reproducible skin pain or sensitivities    Neurological:      General: No focal deficit present.      Mental Status: She is alert.      Gait: Gait normal.   Psychiatric:         Mood and Affect: Mood normal.         LABS     Lab Results   Component Value Date    HGBA1C 5.7 (H) 04/17/2024     CMP  Sodium   Date Value Ref Range Status   09/18/2024 138 136 - 145 mmol/L Final     Potassium   Date Value Ref Range Status   09/18/2024 4.4 3.5 - 5.1 mmol/L Final     Chloride   Date Value Ref Range Status   09/18/2024 104 95 - 110 mmol/L Final     CO2   Date Value Ref Range Status   09/18/2024 25 23 - 29 mmol/L Final     Glucose "   Date Value Ref Range Status   09/18/2024 145 (H) 70 - 110 mg/dL Final     BUN   Date Value Ref Range Status   09/18/2024 20 8 - 23 mg/dL Final     Creatinine   Date Value Ref Range Status   09/18/2024 0.8 0.5 - 1.4 mg/dL Final     Calcium   Date Value Ref Range Status   09/18/2024 9.0 8.7 - 10.5 mg/dL Final     Total Protein   Date Value Ref Range Status   09/18/2024 7.8 6.0 - 8.4 g/dL Final     Albumin   Date Value Ref Range Status   09/18/2024 3.3 (L) 3.5 - 5.2 g/dL Final     Total Bilirubin   Date Value Ref Range Status   09/18/2024 0.3 0.1 - 1.0 mg/dL Final     Comment:     For infants and newborns, interpretation of results should be based  on gestational age, weight and in agreement with clinical  observations.    Premature Infant recommended reference ranges:  Up to 24 hours.............<8.0 mg/dL  Up to 48 hours............<12.0 mg/dL  3-5 days..................<15.0 mg/dL  6-29 days.................<15.0 mg/dL       Alkaline Phosphatase   Date Value Ref Range Status   09/18/2024 119 55 - 135 U/L Final     AST   Date Value Ref Range Status   09/18/2024 17 10 - 40 U/L Final     ALT   Date Value Ref Range Status   09/18/2024 21 10 - 44 U/L Final     Anion Gap   Date Value Ref Range Status   09/18/2024 9 8 - 16 mmol/L Final     eGFR if    Date Value Ref Range Status   01/06/2022 >60.0 >60 mL/min/1.73 m^2 Final     eGFR if non    Date Value Ref Range Status   01/06/2022 >60.0 >60 mL/min/1.73 m^2 Final     Comment:     Calculation used to obtain the estimated glomerular filtration  rate (eGFR) is the CKD-EPI equation.        Lab Results   Component Value Date    WBC 11.84 09/18/2024    HGB 13.3 09/18/2024    HCT 44 09/18/2024    MCV 84 09/18/2024     09/18/2024     Lab Results   Component Value Date    CHOL 169 06/12/2024    CHOL 179 06/14/2023    CHOL 152 08/09/2022     Lab Results   Component Value Date    HDL 50 06/12/2024    HDL 57 06/14/2023    HDL 55 08/09/2022      Lab Results   Component Value Date    LDLCALC 106.6 06/12/2024    LDLCALC 109.8 06/14/2023    LDLCALC 80.0 08/09/2022     Lab Results   Component Value Date    TRIG 62 06/12/2024    TRIG 61 06/14/2023    TRIG 85 08/09/2022     Lab Results   Component Value Date    CHOLHDL 29.6 06/12/2024    CHOLHDL 31.8 06/14/2023    CHOLHDL 36.2 08/09/2022     Lab Results   Component Value Date    TSH 0.636 06/14/2023    Q1YLWDH 101 01/26/2023       ASSESSMENT/PLAN     Tawny Valerio is a 64 y.o. female    Tawny was seen today for flank pain. Will get CT for reassurance as per pt's request. Will get preceding labs. She is encouraged to continue her previously prescribed gabapentin for pain control.     Diagnoses and all orders for this visit:    Localized swelling, mass and lump, trunk  -     Comprehensive Metabolic Panel; Future  -     CT Chest With Contrast; Future    Rib pain  -     Comprehensive Metabolic Panel; Future  -     CT Chest With Contrast; Future    Mass of soft tissue  -     Comprehensive Metabolic Panel; Future  -     CT Chest With Contrast; Future    Other orders      Rosanna Connor PA-C  Department of Internal Medicine - [facility]   8:09 AM

## 2024-09-28 ENCOUNTER — PATIENT MESSAGE (OUTPATIENT)
Dept: INTERNAL MEDICINE | Facility: CLINIC | Age: 64
End: 2024-09-28
Payer: COMMERCIAL

## 2024-09-29 ENCOUNTER — PATIENT MESSAGE (OUTPATIENT)
Dept: CARDIOLOGY | Facility: CLINIC | Age: 64
End: 2024-09-29
Payer: COMMERCIAL

## 2024-10-01 ENCOUNTER — OFFICE VISIT (OUTPATIENT)
Dept: INTERNAL MEDICINE | Facility: CLINIC | Age: 64
End: 2024-10-01
Payer: COMMERCIAL

## 2024-10-01 VITALS
HEIGHT: 61 IN | OXYGEN SATURATION: 98 % | DIASTOLIC BLOOD PRESSURE: 82 MMHG | WEIGHT: 274.69 LBS | SYSTOLIC BLOOD PRESSURE: 122 MMHG | HEART RATE: 83 BPM | BODY MASS INDEX: 51.86 KG/M2

## 2024-10-01 DIAGNOSIS — L98.9 SKIN LESIONS: ICD-10-CM

## 2024-10-01 DIAGNOSIS — M54.2 CERVICALGIA OF OCCIPITO-ATLANTO-AXIAL REGION: Primary | ICD-10-CM

## 2024-10-01 PROCEDURE — 3008F BODY MASS INDEX DOCD: CPT | Mod: CPTII,S$GLB,, | Performed by: PHYSICIAN ASSISTANT

## 2024-10-01 PROCEDURE — 99214 OFFICE O/P EST MOD 30 MIN: CPT | Mod: S$GLB,,, | Performed by: PHYSICIAN ASSISTANT

## 2024-10-01 PROCEDURE — 3074F SYST BP LT 130 MM HG: CPT | Mod: CPTII,S$GLB,, | Performed by: PHYSICIAN ASSISTANT

## 2024-10-01 PROCEDURE — 4010F ACE/ARB THERAPY RXD/TAKEN: CPT | Mod: CPTII,S$GLB,, | Performed by: PHYSICIAN ASSISTANT

## 2024-10-01 PROCEDURE — 99999 PR PBB SHADOW E&M-EST. PATIENT-LVL V: CPT | Mod: PBBFAC,,, | Performed by: PHYSICIAN ASSISTANT

## 2024-10-01 PROCEDURE — 3044F HG A1C LEVEL LT 7.0%: CPT | Mod: CPTII,S$GLB,, | Performed by: PHYSICIAN ASSISTANT

## 2024-10-01 PROCEDURE — 3079F DIAST BP 80-89 MM HG: CPT | Mod: CPTII,S$GLB,, | Performed by: PHYSICIAN ASSISTANT

## 2024-10-04 ENCOUNTER — TELEPHONE (OUTPATIENT)
Dept: PAIN MEDICINE | Facility: CLINIC | Age: 64
End: 2024-10-04
Payer: COMMERCIAL

## 2024-10-04 NOTE — TELEPHONE ENCOUNTER
----- Message from Wilbert sent at 10/4/2024 12:58 PM CDT -----  .Type:  Needs Medical Advice    Who Called: pt    Would the patient rather a call back or a response via [facility]? Call back  Best Call Back Number: [phone]  Additional Information:     Pt stated she would like a call back regarding her appt today

## 2024-10-07 ENCOUNTER — PATIENT MESSAGE (OUTPATIENT)
Dept: DIABETES | Facility: CLINIC | Age: 64
End: 2024-10-07
Payer: COMMERCIAL

## 2024-10-07 ENCOUNTER — PATIENT MESSAGE (OUTPATIENT)
Dept: CARDIOLOGY | Facility: CLINIC | Age: 64
End: 2024-10-07
Payer: COMMERCIAL

## 2024-10-07 ENCOUNTER — PATIENT OUTREACH (OUTPATIENT)
Dept: ADMINISTRATIVE | Facility: HOSPITAL | Age: 64
End: 2024-10-07
Payer: COMMERCIAL

## 2024-10-07 DIAGNOSIS — E11.9 CONTROLLED TYPE 2 DIABETES MELLITUS WITHOUT COMPLICATION, WITHOUT LONG-TERM CURRENT USE OF INSULIN: Primary | ICD-10-CM

## 2024-10-11 ENCOUNTER — OFFICE VISIT (OUTPATIENT)
Dept: PAIN MEDICINE | Facility: CLINIC | Age: 64
End: 2024-10-11
Payer: COMMERCIAL

## 2024-10-11 VITALS
DIASTOLIC BLOOD PRESSURE: 62 MMHG | HEART RATE: 97 BPM | BODY MASS INDEX: 53.03 KG/M2 | SYSTOLIC BLOOD PRESSURE: 116 MMHG | HEIGHT: 61 IN | WEIGHT: 280.88 LBS

## 2024-10-11 DIAGNOSIS — M54.2 CERVICALGIA OF OCCIPITO-ATLANTO-AXIAL REGION: ICD-10-CM

## 2024-10-11 PROCEDURE — 99999 PR PBB SHADOW E&M-EST. PATIENT-LVL V: CPT | Mod: PBBFAC,,, | Performed by: EMERGENCY MEDICINE

## 2024-10-11 NOTE — PROGRESS NOTES
[facility] - New Patient Evaluation    Referred by: Rosanna Connor   Reason for referral: Cervicalgia of qizihofo-qdjqjxo-llctj region     CC:   Chief Complaint   Patient presents with    Neck Pain    Hand Pain         10/11/2024     3:12 PM   Last 3 PDI Scores   Pain Disability Index (PDI) 47       Subjective 10/11/2024:   Tawny Valerio is a 64 y.o. female who presents complaining of neck pain for over one year. She reports that she does on occasion drop things. She does on occasion feel pain radiating to her left forehead    Initial Pain Assessment:  Location: neck   Onset: 1 year  Current Pain Score: 7/10  Daily Pain of Range: 0-10/10  Quality: Burning and Tight  Radiation: Occ to left trap   Worsened by:  driving, sitting and moving her neck  Improved by: rubs her posterior scalp / neck and her medications     Patient denies night fever/night sweats, urinary incontinence, bowel incontinence, significant weight loss, significant motor weakness, and loss of sensations.      Previous Interventions:   NA    Previous Therapies:  PT/OT: yes   Chiropractor:   HEP:   Relevant Participating  Surgery: no     Current Pain Medications:  Xanax 0.5 mg PRN  Baclofen 10 mg BID - TID  Gabapentin 600 / 300 / 300   Percocet 10 mg for appendix  Oxycodone 5 mg q.4 hours for appendix    Anticoagulation:   Aspirin 81 mg    Review of Systems:  GENERAL:  No weight loss, malaise or fevers.  HEENT:   No recent changes in vision or hearing  All other reviewed and negative other than HPI.    History:  Current medications, allergies, medical history, surgical history,   family history, and social history were reviewed in the chart as marked.    Full Medication List:    Current Outpatient Medications:     albuterol (PROVENTIL/VENTOLIN HFA) 90 mcg/actuation inhaler, Inhale 2 puffs into the lungs every 6 (six) hours as needed for Wheezing or Shortness of Breath., Disp: 18 g, Rfl: 0    ALPRAZolam (XANAX) 0.5 MG  tablet, 1 tab(s) orally 3 times a day, Disp: , Rfl:     aspirin 81 MG Chew, Take 81 mg by mouth once daily., Disp: , Rfl:     azelastine (ASTELIN) 137 mcg (0.1 %) nasal spray, 1 spray (137 mcg total) by Nasal route 2 (two) times daily., Disp: 90 mL, Rfl: 0    baclofen (LIORESAL) 10 MG tablet, Take 1 tablet (10 mg total) by mouth 3 (three) times daily., Disp: 90 tablet, Rfl: 11    blood-glucose meter kit, Use as instructed, Disp: 1 each, Rfl: 0    buPROPion (WELLBUTRIN SR) 150 MG TBSR 12 hr tablet, Take 1 tablet (150 mg total) by mouth 2 (two) times daily., Disp: 60 tablet, Rfl: 11    capsaicin 0.1 % Crea, Apply topically three times a day as needed, Disp: 42.5 g, Rfl: 0    clindamycin (CLEOCIN T) 1 % lotion, Apply topically 2 (two) times daily., Disp: 60 mL, Rfl: 0    empagliflozin (JARDIANCE) 25 mg tablet, Take 1 tablet (25 mg total) by mouth once daily., Disp: 90 tablet, Rfl: 3    ergocalciferol (ERGOCALCIFEROL) 50,000 unit Cap, Take 1 capsule (50,000 Units total) by mouth every 7 days., Disp: 12 capsule, Rfl: 1    evolocumab (REPATHA SURECLICK) 140 mg/mL PnIj, Inject 1 mL (140 mg total) into the skin every 14 (fourteen) days., Disp: 6 mL, Rfl: 3    ezetimibe (ZETIA) 10 mg tablet, Take 1 tablet (10 mg total) by mouth once daily., Disp: 90 tablet, Rfl: 3    fluticasone propionate (FLONASE) 50 mcg/actuation nasal spray, 1 spray (50 mcg total) by Each Nostril route once daily., Disp: 32 g, Rfl: 0    gabapentin (NEURONTIN) 300 MG capsule, Take 600 mg (2 caps) QAM and 300 mg (1 cap) in afternoon and 300 mg (1 cap) QPM, Disp: 120 capsule, Rfl: 11    losartan (COZAAR) 25 MG tablet, Take 1 tablet (25 mg total) by mouth once daily., Disp: 90 tablet, Rfl: 3    meclizine (ANTIVERT) 12.5 mg tablet, Take 1 tablet (12.5 mg total) by mouth 2 (two) times daily as needed for Dizziness., Disp: 30 tablet, Rfl: 0    metronidazole 0.75% (METROCREAM) 0.75 % Crea, Apply topically 2 (two) times daily. For rosacea on face., Disp: 45 g,  Rfl: 0    ondansetron (ZOFRAN-ODT) 4 MG TbDL, Take 4 mg by mouth every 6 (six) hours as needed., Disp: , Rfl:     ondansetron (ZOFRAN-ODT) 4 MG TbDL, Dissolve 2 tablets (8 mg total) by mouth every 8 (eight) hours as needed (nausea)., Disp: 30 tablet, Rfl: 0    promethazine (PHENERGAN) 25 MG tablet, Take 25 mg by mouth every 6 (six) hours as needed., Disp: , Rfl:     promethazine (PHENERGAN) 6.25 mg/5 mL syrup, Take 10 mLs at night as needed., Disp: 120 mL, Rfl: 1    tirzepatide 10 mg/0.5 mL PnIj, Inject 10 mg into the skin every 7 days., Disp: 4 Pen, Rfl: 5    tretinoin (RETIN-A) 0.025 % cream, APPLY TOPICALLY EVERY EVENING., Disp: 20 g, Rfl: 6    trihexyphenidyL (ARTANE) 2 MG tablet, Take 0.5 tablets (1 mg total) by mouth 3 (three) times daily. (Patient taking differently: Take 1 mg by mouth 3 (three) times daily. For diabetes), Disp: 45 tablet, Rfl: 11    acetaminophen (TYLENOL) 325 MG tablet, Take 2 tablets (650 mg total) by mouth every 6 (six) hours as needed for Pain. (Patient not taking: Reported on 10/11/2024), Disp: 30 tablet, Rfl: 1    acetaminophen (TYLENOL) 500 MG tablet, Take 1 tablet (500 mg total) by mouth every 8 (eight) hours. (Patient not taking: Reported on 10/11/2024), Disp: , Rfl:     famotidine (PEPCID) 20 MG tablet, Take 1 tablet (20 mg total) by mouth 2 (two) times daily. for 10 days (Patient not taking: Reported on 10/1/2024), Disp: 20 tablet, Rfl: 0    lancing device with lancets (MULTI-LANCET DEVICE 2) Kit, 1 each by Misc.(Non-Drug; Combo Route) route 2 (two) times a day. (Patient not taking: Reported on 10/1/2024), Disp: 1 each, Rfl: 1    naproxen (NAPROSYN) 250 MG tablet, Take 1 tablet (250 mg total) by mouth every 6 (six) hours as needed (pain). Initial: 500 mg once, followed 250 mg every 6 to 8 hours as needed.  Maximum dose: 1.25 g on day 1, then 1 g/day thereafter. (Patient not taking: Reported on 10/11/2024), Disp: 30 tablet, Rfl: 0    oxyCODONE (ROXICODONE) 5 MG immediate release  tablet, Take 1 tablet (5 mg total) by mouth every 4 (four) hours as needed for Pain. (Patient not taking: Reported on 10/11/2024), Disp: 20 tablet, Rfl: 0    oxyCODONE-acetaminophen (PERCOCET)  mg per tablet, Take 1 tablet by mouth every 6 (six) hours as needed for Pain. (Patient not taking: Reported on 10/11/2024), Disp: 6 tablet, Rfl: 0     Allergies:  Celebrex [celecoxib], Codeine, Lidocaine, and Vicodin [hydrocodone-acetaminophen]     Medical History:   has a past medical history of Anxiety, Diabetes mellitus, type 2 (), Endometrial ca (2019), Essential hypertension (2018), Hyperlipidemia LDL goal <70 (10/30/2023), Simple endometrial hyperplasia (03/15/2019), and Uterine polyp (2019).    Surgical History:   has a past surgical history that includes lipoma removed; Dilation and curettage of uterus (2019); Hysteroscopy with dilation and curettage of uterus (N/A, 2019);  section; Robot-assisted laparoscopic salpingo-oophorectomy using da Yaya Xi (Bilateral, 2019); Robot-assisted laparoscopic abdominal hysterectomy using da Yaya Xi (N/A, 2019); Robot-assisted laparoscopic lysis of adhesions using da Yaya Xi (N/A, 2019); Hysterectomy (19); and Laparoscopic appendectomy (N/A, 2024).    Family History:  family history includes Arthritis in her mother; Breast cancer in an other family member; Depression in her father and sister; Diabetes in her father and sister; Early death in her brother, father, and sister; Heart attack in her brother; Heart disease in her brother, father, maternal grandfather, maternal grandmother, paternal grandfather, sister, and sister; Hypertension in her brother, father, sister, and sister; Stroke in her father; Vision loss in her father.    Social History:   reports that she has never smoked. She has never used smokeless tobacco. She reports that she does not drink alcohol and does not use drugs.    Physical  "Exam:  Vitals:    10/11/24 1516   BP: 116/62   Pulse: 97   Weight: 127.4 kg (280 lb 13.9 oz)   Height: 5' 1" (1.549 m)   PainSc:   7   PainLoc: Head       PHYSICAL EXAM:   GENERAL: Well appearing, in no acute distress, alert and oriented x3.  PSYCH:  Mood and affect appropriate.  SKIN: Skin color, texture, turgor normal, no rashes or lesions.  HEENT:  Normocephalic, atraumatic. Cranial nerves grossly intact.  NECK: Dystonia noted left, negative Spurling's.  Pain with axial loading bilaterally  PULM: No evidence of respiratory difficulty, symmetric chest rise.  GI:  Non-distended  EXTREMITIES: No deformities, edema, or skin discoloration.   MUSCULOSKELETAL: Shoulder, hip, and knee provocative maneuvers are negative. No atrophy is noted.  NEURO: Sensation is equal and appropriate bilaterally. Bilateral upper and lower extremity strength is normal and symmetric. Bilateral upper and lower extremity coordination and muscle stretch reflexes are physiologic and symmetric. Plantar response are downgoing. Straight leg raising in the supine position is negative to radicular pain. Negative Reilly's bilaterally   GAIT: normal.      Imaging:  MRI CERVICAL SPINE WITHOUT CONTRAST   06/06/2023     C2/3: Mild broad-based disc osteophyte complex contacts the ventral cord without significant deformity contributing to minor spinal canal stenosis.  Prominent right-sided facet arthropathy with moderate right-sided neural foraminal stenosis.  C3/4: Asymmetric right DOC contacts & flattens right ventral cord=>mild spinal canal stenosis. Right facet arthropathy & uncovertebral joint spurring=>mod right neural foraminal stenosis.  C4/5: BBDOC contacts & flattens ventral cord.  Mild facet arthropathy, LF hypertrophy & R>L uncovertebral joint spurring=>mild-mod spinal canal stenosis w/mod right /mild left neural foraminal stenosis.  C5/6: BBDOC contacts & flattens ventral cord. LF hypertrophy & uncovertebral joint spurring=>mild-mod spinal " canal stenosis w/mild-mod BL neural foraminal stenosis.  C6/7: Min BBDOC asymmetric right. Right-sided uncovertebral joint spurring=>mod-severe right-sided neural foraminal stenosis.  C7-T1:  Left-sided facet arthropathy with mild left-sided neural foraminal stenosis.    T1-T2: Minor asymmetric left disc osteophyte complex.      Labs:  Lab Results   Component Value Date    CREATININE 0.8 09/18/2024        Assessment:  Problem List Items Addressed This Visit          Orthopedic    Cervicalgia of eguzlajr-sknnibj-iywlj region         10/11/2024 - Tawny Valerio is a 64 y.o. female who  By history and examination this patient has chronic neck pain with radiculopathy.  The underlying cause is facet arthritis, degenerative disc disease, foraminal stenosis, and central canal stenosis.  Pathology is confirmed by imaging.  We discussed the underlying diagnoses and multiple treatment options including interventional procedures.  The risks and benefits of each treatment option were discussed and all questions were answered.        - Interventions: Schedule for cervical interlaminar epidural steroid injection at C7-T1 to help with pain and to progress with a home exercise program. Explained the risks and benefits of the procedure in detail with the patient today in clinic along with alternative treatment options, and the patient deferred the intervention at this time.    - Imaging: Reviewed available imaging with patient and answered any questions they had regarding study.  - The patient's pathophysiology was explained in detail with reference to x-rays, models, other visual aids as appropriate.   - Follow up visit: return to clinic NIYA Berry MD  [facility] Pain Medicine     Disclaimer: This note was partly generated using dictation software which may occasionally result in transcription errors.

## 2024-10-28 ENCOUNTER — PATIENT OUTREACH (OUTPATIENT)
Dept: ADMINISTRATIVE | Facility: HOSPITAL | Age: 64
End: 2024-10-28
Payer: COMMERCIAL

## 2024-11-07 ENCOUNTER — PATIENT OUTREACH (OUTPATIENT)
Dept: ADMINISTRATIVE | Facility: HOSPITAL | Age: 64
End: 2024-11-07
Payer: COMMERCIAL

## 2024-11-07 NOTE — PROGRESS NOTES
VBHM Score: 5     Colon Cancer Screening  Urine Screening  Eye Exam  Hemoglobin A1c  Mammogram    Influenza Vaccine  Pneumonia Vaccine  Shingles/Zoster Vaccine  RSV Vaccine            LM offering to schedule follow up, mammo and asking about when/where last dm eye exam completed.
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
## 2024-11-12 ENCOUNTER — OFFICE VISIT (OUTPATIENT)
Dept: NEUROLOGY | Facility: CLINIC | Age: 64
End: 2024-11-12
Payer: COMMERCIAL

## 2024-11-12 VITALS
SYSTOLIC BLOOD PRESSURE: 135 MMHG | HEIGHT: 61 IN | HEART RATE: 89 BPM | BODY MASS INDEX: 53.24 KG/M2 | WEIGHT: 282 LBS | DIASTOLIC BLOOD PRESSURE: 78 MMHG

## 2024-11-12 DIAGNOSIS — E78.5 HYPERLIPIDEMIA LDL GOAL <70: ICD-10-CM

## 2024-11-12 DIAGNOSIS — E11.618 CONTROLLED TYPE 2 DIABETES MELLITUS WITH OTHER DIABETIC ARTHROPATHY, WITHOUT LONG-TERM CURRENT USE OF INSULIN: ICD-10-CM

## 2024-11-12 DIAGNOSIS — I10 ESSENTIAL HYPERTENSION: ICD-10-CM

## 2024-11-12 DIAGNOSIS — E11.9 CONTROLLED TYPE 2 DIABETES MELLITUS WITHOUT COMPLICATION, WITHOUT LONG-TERM CURRENT USE OF INSULIN: ICD-10-CM

## 2024-11-12 DIAGNOSIS — E66.01 MORBID OBESITY WITH BMI OF 50.0-59.9, ADULT: ICD-10-CM

## 2024-11-12 DIAGNOSIS — Z71.89 COUNSELING REGARDING GOALS OF CARE: ICD-10-CM

## 2024-11-12 DIAGNOSIS — G24.3 CERVICAL DYSTONIA: Primary | ICD-10-CM

## 2024-11-12 DIAGNOSIS — M54.2 CERVICALGIA OF OCCIPITO-ATLANTO-AXIAL REGION: ICD-10-CM

## 2024-11-12 PROCEDURE — 4010F ACE/ARB THERAPY RXD/TAKEN: CPT | Mod: CPTII,S$GLB,, | Performed by: PHYSICIAN ASSISTANT

## 2024-11-12 PROCEDURE — 3044F HG A1C LEVEL LT 7.0%: CPT | Mod: CPTII,S$GLB,, | Performed by: PHYSICIAN ASSISTANT

## 2024-11-12 PROCEDURE — 3078F DIAST BP <80 MM HG: CPT | Mod: CPTII,S$GLB,, | Performed by: PHYSICIAN ASSISTANT

## 2024-11-12 PROCEDURE — 99214 OFFICE O/P EST MOD 30 MIN: CPT | Mod: S$GLB,,, | Performed by: PHYSICIAN ASSISTANT

## 2024-11-12 PROCEDURE — G2211 COMPLEX E/M VISIT ADD ON: HCPCS | Mod: S$GLB,,, | Performed by: PHYSICIAN ASSISTANT

## 2024-11-12 PROCEDURE — 1160F RVW MEDS BY RX/DR IN RCRD: CPT | Mod: CPTII,S$GLB,, | Performed by: PHYSICIAN ASSISTANT

## 2024-11-12 PROCEDURE — 99999 PR PBB SHADOW E&M-EST. PATIENT-LVL V: CPT | Mod: PBBFAC,,, | Performed by: PHYSICIAN ASSISTANT

## 2024-11-12 PROCEDURE — 1159F MED LIST DOCD IN RCRD: CPT | Mod: CPTII,S$GLB,, | Performed by: PHYSICIAN ASSISTANT

## 2024-11-12 PROCEDURE — 3008F BODY MASS INDEX DOCD: CPT | Mod: CPTII,S$GLB,, | Performed by: PHYSICIAN ASSISTANT

## 2024-11-12 PROCEDURE — 3075F SYST BP GE 130 - 139MM HG: CPT | Mod: CPTII,S$GLB,, | Performed by: PHYSICIAN ASSISTANT

## 2024-11-12 RX ORDER — TRIHEXYPHENIDYL HYDROCHLORIDE 2 MG/1
3 TABLET ORAL 2 TIMES DAILY
Qty: 90 TABLET | Refills: 11 | Status: SHIPPED | OUTPATIENT
Start: 2024-11-12 | End: 2025-11-12

## 2024-11-15 ENCOUNTER — PATIENT MESSAGE (OUTPATIENT)
Dept: DERMATOLOGY | Facility: CLINIC | Age: 64
End: 2024-11-15
Payer: COMMERCIAL

## 2024-11-15 ENCOUNTER — OFFICE VISIT (OUTPATIENT)
Dept: FAMILY MEDICINE | Facility: CLINIC | Age: 64
End: 2024-11-15
Payer: COMMERCIAL

## 2024-11-15 VITALS
WEIGHT: 277.56 LBS | DIASTOLIC BLOOD PRESSURE: 78 MMHG | SYSTOLIC BLOOD PRESSURE: 138 MMHG | HEART RATE: 80 BPM | HEIGHT: 61 IN | BODY MASS INDEX: 52.4 KG/M2 | OXYGEN SATURATION: 98 %

## 2024-11-15 DIAGNOSIS — G24.3 CERVICAL DYSTONIA: ICD-10-CM

## 2024-11-15 DIAGNOSIS — Z23 IMMUNIZATION DUE: ICD-10-CM

## 2024-11-15 DIAGNOSIS — Z12.11 COLON CANCER SCREENING: ICD-10-CM

## 2024-11-15 DIAGNOSIS — Z12.31 ENCOUNTER FOR SCREENING MAMMOGRAM FOR MALIGNANT NEOPLASM OF BREAST: ICD-10-CM

## 2024-11-15 DIAGNOSIS — J30.9 ALLERGIC RHINITIS, UNSPECIFIED SEASONALITY, UNSPECIFIED TRIGGER: ICD-10-CM

## 2024-11-15 DIAGNOSIS — M54.2 CERVICALGIA OF OCCIPITO-ATLANTO-AXIAL REGION: ICD-10-CM

## 2024-11-15 DIAGNOSIS — E66.813 CLASS 3 SEVERE OBESITY DUE TO EXCESS CALORIES WITH SERIOUS COMORBIDITY AND BODY MASS INDEX (BMI) OF 50.0 TO 59.9 IN ADULT: ICD-10-CM

## 2024-11-15 DIAGNOSIS — L01.00 CHRONIC SYMMETRICAL IMPETIGO: ICD-10-CM

## 2024-11-15 DIAGNOSIS — I10 ESSENTIAL HYPERTENSION: ICD-10-CM

## 2024-11-15 DIAGNOSIS — F41.1 GENERALIZED ANXIETY DISORDER: ICD-10-CM

## 2024-11-15 DIAGNOSIS — G47.33 OSA (OBSTRUCTIVE SLEEP APNEA): ICD-10-CM

## 2024-11-15 DIAGNOSIS — E66.01 CLASS 3 SEVERE OBESITY DUE TO EXCESS CALORIES WITH SERIOUS COMORBIDITY AND BODY MASS INDEX (BMI) OF 50.0 TO 59.9 IN ADULT: ICD-10-CM

## 2024-11-15 DIAGNOSIS — E78.5 HYPERLIPIDEMIA LDL GOAL <70: ICD-10-CM

## 2024-11-15 DIAGNOSIS — E55.9 VITAMIN D INSUFFICIENCY: ICD-10-CM

## 2024-11-15 DIAGNOSIS — Z85.42 HISTORY OF ENDOMETRIAL CANCER: ICD-10-CM

## 2024-11-15 DIAGNOSIS — E11.9 CONTROLLED TYPE 2 DIABETES MELLITUS WITHOUT COMPLICATION, WITHOUT LONG-TERM CURRENT USE OF INSULIN: Primary | ICD-10-CM

## 2024-11-15 PROBLEM — F41.9 ANXIETY DISORDER: Status: ACTIVE | Noted: 2024-11-15

## 2024-11-15 PROBLEM — R68.89 DECREASED STRENGTH, ENDURANCE, AND MOBILITY: Status: RESOLVED | Noted: 2023-02-08 | Resolved: 2024-11-15

## 2024-11-15 PROBLEM — G72.0 STATIN MYOPATHY: Status: ACTIVE | Noted: 2023-08-17

## 2024-11-15 PROBLEM — C54.1 ENDOMETRIAL CA: Status: RESOLVED | Noted: 2019-07-24 | Resolved: 2024-11-15

## 2024-11-15 PROBLEM — R29.3 ABNORMAL POSTURE: Status: RESOLVED | Noted: 2023-04-13 | Resolved: 2024-11-15

## 2024-11-15 PROBLEM — N84.0 UTERINE POLYP: Status: RESOLVED | Noted: 2019-03-14 | Resolved: 2024-11-15

## 2024-11-15 PROBLEM — E11.618: Status: RESOLVED | Noted: 2024-03-25 | Resolved: 2024-11-15

## 2024-11-15 PROBLEM — T46.6X5A STATIN MYOPATHY: Status: ACTIVE | Noted: 2023-08-17

## 2024-11-15 PROBLEM — Z74.09 DECREASED STRENGTH, ENDURANCE, AND MOBILITY: Status: RESOLVED | Noted: 2023-02-08 | Resolved: 2024-11-15

## 2024-11-15 PROBLEM — R68.89 DECREASED FUNCTIONAL ACTIVITY TOLERANCE: Status: RESOLVED | Noted: 2023-04-13 | Resolved: 2024-11-15

## 2024-11-15 PROBLEM — N85.01 SIMPLE ENDOMETRIAL HYPERPLASIA: Status: RESOLVED | Noted: 2019-03-15 | Resolved: 2024-11-15

## 2024-11-15 PROBLEM — R53.1 DECREASED STRENGTH, ENDURANCE, AND MOBILITY: Status: RESOLVED | Noted: 2023-02-08 | Resolved: 2024-11-15

## 2024-11-15 PROBLEM — M25.60 DECREASED RANGE OF MOTION: Status: RESOLVED | Noted: 2023-02-08 | Resolved: 2024-11-15

## 2024-11-15 PROCEDURE — 1160F RVW MEDS BY RX/DR IN RCRD: CPT | Mod: CPTII,S$GLB,, | Performed by: FAMILY MEDICINE

## 2024-11-15 PROCEDURE — G0009 ADMIN PNEUMOCOCCAL VACCINE: HCPCS | Mod: S$GLB,,, | Performed by: FAMILY MEDICINE

## 2024-11-15 PROCEDURE — 99999 PR PBB SHADOW E&M-EST. PATIENT-LVL V: CPT | Mod: PBBFAC,,, | Performed by: FAMILY MEDICINE

## 2024-11-15 PROCEDURE — 3008F BODY MASS INDEX DOCD: CPT | Mod: CPTII,S$GLB,, | Performed by: FAMILY MEDICINE

## 2024-11-15 PROCEDURE — 99215 OFFICE O/P EST HI 40 MIN: CPT | Mod: S$GLB,,, | Performed by: FAMILY MEDICINE

## 2024-11-15 PROCEDURE — 3044F HG A1C LEVEL LT 7.0%: CPT | Mod: CPTII,S$GLB,, | Performed by: FAMILY MEDICINE

## 2024-11-15 PROCEDURE — 3075F SYST BP GE 130 - 139MM HG: CPT | Mod: CPTII,S$GLB,, | Performed by: FAMILY MEDICINE

## 2024-11-15 PROCEDURE — 4010F ACE/ARB THERAPY RXD/TAKEN: CPT | Mod: CPTII,S$GLB,, | Performed by: FAMILY MEDICINE

## 2024-11-15 PROCEDURE — 1159F MED LIST DOCD IN RCRD: CPT | Mod: CPTII,S$GLB,, | Performed by: FAMILY MEDICINE

## 2024-11-15 PROCEDURE — 90677 PCV20 VACCINE IM: CPT | Mod: S$GLB,,, | Performed by: FAMILY MEDICINE

## 2024-11-15 PROCEDURE — 3078F DIAST BP <80 MM HG: CPT | Mod: CPTII,S$GLB,, | Performed by: FAMILY MEDICINE

## 2024-11-15 RX ORDER — CAPSAICIN 0 G/G
CREAM TOPICAL
Qty: 42.5 G | Refills: 11 | Status: SHIPPED | OUTPATIENT
Start: 2024-11-15

## 2024-11-15 RX ORDER — DOXYCYCLINE 100 MG/1
100 CAPSULE ORAL EVERY 12 HOURS
Qty: 20 CAPSULE | Refills: 0 | Status: SHIPPED | OUTPATIENT
Start: 2024-11-15 | End: 2024-11-25

## 2024-11-15 RX ORDER — NAPROXEN 500 MG/1
500 TABLET ORAL 2 TIMES DAILY WITH MEALS
Qty: 180 TABLET | Refills: 1 | Status: SHIPPED | OUTPATIENT
Start: 2024-11-15

## 2024-11-15 RX ORDER — FLUTICASONE PROPIONATE 50 MCG
1 SPRAY, SUSPENSION (ML) NASAL DAILY
Qty: 32 G | Refills: 11 | Status: SHIPPED | OUTPATIENT
Start: 2024-11-15

## 2024-11-15 RX ORDER — METRONIDAZOLE 7.5 MG/G
CREAM TOPICAL 2 TIMES DAILY
Qty: 45 G | Refills: 0 | Status: CANCELLED | OUTPATIENT
Start: 2024-11-15 | End: 2025-11-15

## 2024-11-15 RX ORDER — BUPROPION HYDROCHLORIDE 150 MG/1
150 TABLET, EXTENDED RELEASE ORAL 2 TIMES DAILY
Qty: 180 TABLET | Refills: 3 | Status: SHIPPED | OUTPATIENT
Start: 2024-11-15

## 2024-11-15 RX ORDER — ERGOCALCIFEROL 1.25 MG/1
50000 CAPSULE ORAL
Qty: 12 CAPSULE | Refills: 3 | Status: SHIPPED | OUTPATIENT
Start: 2024-11-15

## 2024-11-15 RX ORDER — AZELASTINE 1 MG/ML
1 SPRAY, METERED NASAL 2 TIMES DAILY
Qty: 90 ML | Refills: 3 | Status: SHIPPED | OUTPATIENT
Start: 2024-11-15

## 2024-11-15 RX ORDER — TIZANIDINE 4 MG/1
4 TABLET ORAL
COMMUNITY
Start: 2024-11-09 | End: 2024-11-15

## 2024-11-18 PROBLEM — F41.1 GENERALIZED ANXIETY DISORDER: Status: ACTIVE | Noted: 2024-11-15

## 2024-11-18 NOTE — PROGRESS NOTES
.Subjective     Patient ID: Tawny Valerio is a 64 y.o. female.    Chief Complaint: Establish Care    HPI  Tawny presents for an initial visit to establish care with a new PCP and to discuss ongoing neck pain and cervical dystonia.    HPI:  Tawny reports a history of neck pain and cervical dystonia that began in January 2023. She describes severe pain with intense burning sensations in her head. Tawny has a history of neck manipulation, but the pain is a new development. She has abnormal neck movement, with her face and head tilting to one side. Attempts to correct her head position result in severe pain. She reports difficulty with ambulation, maintaining proper head posture, and challenges while driving due to involuntary neck rotation.    Tawny initially sought treatment for occipital neuralgia, presenting as pain radiating from the occipital region around and upward. During physical therapy for sciatic pain, the therapist observed her facial positioning and referred her to a neurologist. She was under the care of Dr. Andrade, a neurologist, for approximately 1 year before being referred to a movement disorder specialist at [facility].    Currently, the patient is under the care of Kaylyn Ruelas, a PA specializing in movement disorders, for her dystonia. Her treatment regimen includes baclofen, gabapentin, and trihexyphenidyl, with a recent increase in trihexyphenidyl dosage. Tawyn reports that the medications are not fully effective, as she still has pain and movement issues.    Tawny also mentions having recurrent impetigo, occurring approximately every other year. She currently has it on her back and had it in childhood as well. She consulted a dermatologist in [place] about a year ago who prescribed antibiotics for this condition, but it has recurred.    Tawny reports dizziness as a side effect of one of her medications, for which she takes meclizine.    Tawny denies  any recent trauma, falls, or car accidents related to her neck issues. She also denies any complications from her diabetes.    MEDICATIONS:  Tawny is on Baclofen 300 mg twice daily (morning and afternoon) and 300 mg at night before bed for cervical dystonia. She is also on Gabapentin 300 mg twice in the morning, 300 mg in the afternoon, and 300 mg at night before bed for nerve pain. Trihexyphenidyl is taken for cervical dystonia. Tawny is on Losartan 25 mg for high blood pressure and Jardiance for diabetes and heart benefits. She takes Vitamin D 50,000 units once weekly for vitamin D deficiency. Zetia is taken for high cholesterol and Wellbutrin for anxiety. Tawny has been on Mounjaro for diabetes for over a year. For allergies, she takes Astelin and Flonase at night before bedtime. Naproxen 250 mg is taken for neck pain and inflammation, while Aspirin is used as needed for pain. Tawny applies Capsaicin cream topically for neck pain and uses Retina for dark spots.    MEDICAL HISTORY:  Tawny has a history of cervical dystonia and occipital neuralgia since 2023. She has been diagnosed with diabetes approxima  tely 10 years ago. Tawny has a history of hypertension, high cholesterol, sleep apnea, and vitamin D deficiency. She has had anxiety for 30 years. Tawny has experienced recurrent impetigo since childhood. She had a one-time episode of gout in her left foot and ankle in . Tawny has undergone a hysterectomy.    FAMILY HISTORY:  Family history is significant for two siblings who  fairly early.    SURGICAL HISTORY:  Tawny underwent a robotic-assisted laparoscopic hysterectomy with bilateral salpingo-oophorectomy 5 years ago for endometrial cancer. The outcome was positive, with cancer found in one small area and no further treatment required. This year, she had an appendectomy for appendicitis.    TEST RESULTS:  Tawny's A1C level is 5.7. Tawny underwent a sleep study  which resulted in a diagnosis of sleep apnea.    ALLERGIES:  Tawny is allergic to Celebrex, experiencing an anaphylaxis-like reaction with severe symptoms including emesis and feeling her life was in danger. She also has an allergy to statins, which causes generalized joint pain.    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    ROS:  General: -fever, -chills, -fatigue, -weight gain, -weight loss  Eyes: -vision changes, -redness, -discharge  ENT: -ear pain, -nasal congestion, -sore throat  Cardiovascular: -chest pain, -palpitations, -lower extremity edema  Respiratory: -cough, -shortness of breath  Gastrointestinal: -abdominal pain, -nausea, +vomiting, -diarrhea, -constipation, -blood in stool  Genitourinary: -dysuria, -hematuria, -frequency  Musculoskeletal: +joint pain, +muscle pain, +neck pain, -joint swelling  Skin: +rash, -lesion  Neurological: -headache, +dizziness, -numbness, -tingling  Psychiatric: +anxiety, -depression, +sleep difficulty            Objective     Vitals:    11/15/24 1507   BP: 138/78   Pulse: 80        Current Outpatient Medications   Medication Sig Dispense Refill    acetaminophen (TYLENOL) 500 MG tablet Take 1 tablet (500 mg total) by mouth every 8 (eight) hours.      aspirin 81 MG Chew Take 81 mg by mouth once daily.      baclofen (LIORESAL) 10 MG tablet Take 1 tablet (10 mg total) by mouth 3 (three) times daily. 90 tablet 11    blood-glucose meter kit Use as instructed 1 each 0    clindamycin (CLEOCIN T) 1 % lotion Apply topically 2 (two) times daily. 60 mL 0    empagliflozin (JARDIANCE) 25 mg tablet Take 1 tablet (25 mg total) by mouth once daily. 90 tablet 3    evolocumab (REPATHA SURECLICK) 140 mg/mL PnIj Inject 1 mL (140 mg total) into the skin every 14 (fourteen) days. 6 mL 3    ezetimibe (ZETIA) 10 mg tablet Take 1 tablet (10 mg total) by mouth once daily. 90 tablet 3    gabapentin (NEURONTIN) 300 MG capsule Take 600 mg (2 caps) QAM  and 300 mg (1 cap) in afternoon and 300 mg (1 cap)  capsule 11    losartan (COZAAR) 25 MG tablet Take 1 tablet (25 mg total) by mouth once daily. 90 tablet 3    meclizine (ANTIVERT) 12.5 mg tablet Take 1 tablet (12.5 mg total) by mouth 2 (two) times daily as needed for Dizziness. 30 tablet 0    tirzepatide 10 mg/0.5 mL PnIj Inject 10 mg into the skin every 7 days. 4 Pen 5    tretinoin (RETIN-A) 0.025 % cream APPLY TOPICALLY EVERY EVENING. 20 g 6    trihexyphenidyL (ARTANE) 2 MG tablet Take 1.5 tablets (3 mg total) by mouth 2 (two) times a day. 90 tablet 11    azelastine (ASTELIN) 137 mcg (0.1 %) nasal spray 1 spray (137 mcg total) by Nasal route 2 (two) times daily. 90 mL 3    buPROPion (WELLBUTRIN SR) 150 MG TBSR 12 hr tablet Take 1 tablet (150 mg total) by mouth 2 (two) times daily. 180 tablet 3    capsaicin 0.1 % Crea Apply topically three times a day as needed 42.5 g 11    doxycycline (VIBRAMYCIN) 100 MG Cap Take 1 capsule (100 mg total) by mouth every 12 (twelve) hours. for 10 days 20 capsule 0    ergocalciferol (ERGOCALCIFEROL) 50,000 unit Cap Take 1 capsule (50,000 Units total) by mouth every 7 days. 12 capsule 3    fluticasone propionate (FLONASE) 50 mcg/actuation nasal spray 1 spray (50 mcg total) by Each Nostril route once daily. 32 g 11    naproxen (NAPROSYN) 500 MG tablet Take 1 tablet (500 mg total) by mouth 2 (two) times daily with meals. 180 tablet 1     No current facility-administered medications for this visit.        Physical Exam    General: No acute distress. Well-developed. Well-nourished.  Eyes: EOMI. Sclerae anicteric.  HENT: Normocephalic. Atraumatic. Nares patent. Moist oral mucosa.  Ears: Bilateral TMs clear. Bilateral EACs clear.  Cardiovascular: Regular rate. Regular rhythm. No murmurs. No rubs. No gallops. Normal S1, S2.  Respiratory: Normal respiratory effort. Clear to auscultation bilaterally. No rales. No rhonchi. No wheezing.  Abdomen: Soft. Non-tender. Non-distended.  Normoactive bowel sounds.  Musculoskeletal: No  obvious deformity.  Extremities: 1-2+ pitting edema bilateral lower extremities.  Neurological: Alert & oriented x3. No slurred speech. Normal gait.  Psychiatric: Normal mood. Normal affect. Good insight. Good judgment.  Skin: Warm. Dry. No rash.            Assessment and Plan     Controlled type 2 diabetes mellitus without complication, without long-term current use of insulin  - Teaching on DMT2 including minimizing risk factors, diabetic diet, medications, exercise >150 minutes per week, and wt management.   - Continue Mounjaro and Jardiance.  -     CBC Auto Differential; Future; Expected date: 11/15/2024  -     Comprehensive Metabolic Panel; Future; Expected date: 11/15/2024  -     Hemoglobin A1C; Future; Expected date: 11/15/2024  -     TSH; Future; Expected date: 11/15/2024  -     Urinalysis, Reflex to Urine Culture Urine, Clean Catch; Future; Expected date: 11/15/2024  -     Microalbumin/Creatinine Ratio, Urine; Future; Expected date: 11/15/2024  -     CBC Auto Differential; Future; Expected date: 05/15/2025  -     Comprehensive Metabolic Panel; Future; Expected date: 05/15/2025  -     Hemoglobin A1C; Future; Expected date: 05/15/2025    Essential hypertension BP at goal        - Teaching on HTN including minimizing risk factors, low sodium diet, avoid salty foods such as processed meats/frozen meals/fast foods, exercise of at least 150 minutes per week and healthy weight/BMI management with wt loss recommended.       - Continue Losartan 25mg daily.    Hyperlipidemia LDL goal <70  - Teaching on Hyperlipidemia including diet changes, exercise and wt loss.  Continue Zetia.  Can't tolerate statins due to pain side effect.   -     Lipid Panel; Future; Expected date: 11/15/2024  -     Lipid Panel; Future; Expected date: 05/15/2025    Allergic rhinitis, unspecified seasonality, unspecified trigger  -     azelastine (ASTELIN) 137 mcg (0.1 %) nasal spray; 1 spray (137  mcg total) by Nasal route 2 (two) times daily.  Dispense: 90 mL; Refill: 3  -     fluticasone propionate (FLONASE) 50 mcg/actuation nasal spray; 1 spray (50 mcg total) by Each Nostril route once daily.  Dispense: 32 g; Refill: 11    Vitamin D insufficiency  -     ergocalciferol (ERGOCALCIFEROL) 50,000 unit Cap; Take 1 capsule (50,000 Units total) by mouth every 7 days.  Dispense: 12 capsule; Refill: 3    Generalized anxiety disorder  - Teaching reviewed on BALAJI including coping skills, safety plan and treatment options.  Continue Wellbutrin daily.  Declines offer for counseling.   -     buPROPion (WELLBUTRIN SR) 150 MG TBSR 12 hr tablet; Take 1 tablet (150 mg total) by mouth 2 (two) times daily.  Dispense: 180 tablet; Refill: 3    Chronic symmetrical impetigo  -     doxycycline (VIBRAMYCIN) 100 MG Cap; Take 1 capsule (100 mg total) by mouth every 12 (twelve) hours. for 10 days  Dispense: 20 capsule; Refill: 0    ASHLI (obstructive sleep apnea)    Class 3 severe obesity due to excess calories with serious comorbidity and body mass index (BMI) of 50.0 to 59.9 in adult    Cervical dystonia  -     naproxen (NAPROSYN) 500 MG tablet; Take 1 tablet (500 mg total) by mouth 2 (two) times daily with meals.  Dispense: 180 tablet; Refill: 1    Cervicalgia of ttqfhygm-phcydoi-zzuix region  -     naproxen (NAPROSYN) 500 MG tablet; Take 1 tablet (500 mg total) by mouth 2 (two) times daily with meals.  Dispense: 180 tablet; Refill: 1  -     capsaicin 0.1 % Crea; Apply topically three times a day as needed  Dispense: 42.5 g; Refill: 11    History of endometrial cancer    Encounter for screening mammogram for malignant neoplasm of breast  -     Mammo Digital Screening Bilat w/ Edison; Future; Expected date: 11/15/2024    Immunization due  -     pneumoc 20-kalie conj-dip cr(PF) (PREVNAR-20 (PF)) injection Syrg 0.5 mL    Colon cancer screening  -     Cologuard Screening (Multitarget Stool DNA); Future; Expected date:  11/15/2024      Assessment & Plan    Assessed recurrent impetigo and prescribed oral antibiotic (Keflex, Bactrim, or doxycycline) to address potential MRSA infection  Reviewed complex medication regimen, including multiple specialists' prescriptions for cervical dystonia, diabetes management, and cardiovascular health  Evaluated effectiveness of current anti-inflammatory medications for neck pain; considered increasing naproxen dosage for improved pain control  Identified need for pneumonia vaccination due to patient's diabetic status  Determined colorectal cancer screening necessary; opted for Cologuard test as initial screening method    TYPE 2 DIABETES MELLITUS:  Explained importance of pneumonia vaccination for diabetic patients to prevent potential ICU admission.  Pneumonia vaccination administered in office.    POSTNASAL DRIP:  Tawny to take Zyrtec daily for postnasal drip.  Tawny to use nasal sprays (Astelin and Flonase) by tipping head to the side for better absorption.  Advised on proper use of nasal sprays, instructing to tip head to the side for better absorption.  Refilled Astelin nasal spray.    BULLOUS IMPETIGO:  Started oral antibiotic (Keflex, Bactrim, or doxycycline) for impetigo treatment.    GENERALIZED ANXIETY DISORDER:  Continued Wellbutrin with refills for 3 months at a time.    PAIN MANAGEMENT:  Increased Naproxen from 250mg to 500mg 2 times daily with food.    PREVENTIVE CARE:  Discussed differences between pneumonia and shingles vaccines in terms of potential side effects and health risks.  Fasting lab work ordered for next Friday.  Cologuard colorectal cancer screening test ordered to be mailed to patient.    FOLLOW-UP:  Follow up in 6 months on a Friday morning.  Complete fasting lab work prior to next appointment in 6 months.  Contact the office if unable to tolerate new antibiotic prescription.         This note was generated with the assistance of ambient listening technology.  Verbal consent was obtained by the patient and accompanying visitor(s) for the recording of patient appointment to facilitate this note. I attest to having reviewed and edited the generated note for accuracy, though some syntax or spelling errors may persist. Please contact the author of this note for any clarification.

## 2024-11-19 ENCOUNTER — PATIENT OUTREACH (OUTPATIENT)
Dept: ADMINISTRATIVE | Facility: HOSPITAL | Age: 64
End: 2024-11-19
Payer: COMMERCIAL

## 2024-11-19 NOTE — PROGRESS NOTES
Lab visit report: Per chart review, patient has upcoming lab appointment on 11/22/24 for recheck of diabetic labs. Patient has est care with a new [facility] PCP, care team updated.

## 2024-11-19 NOTE — TELEPHONE ENCOUNTER
Can we have her send pictures and we can also get her scheduled for an appointment at next available. Thanks.

## 2024-11-20 ENCOUNTER — PATIENT MESSAGE (OUTPATIENT)
Dept: FAMILY MEDICINE | Facility: CLINIC | Age: 64
End: 2024-11-20
Payer: COMMERCIAL

## 2024-11-21 DIAGNOSIS — E11.9 TYPE 2 DIABETES MELLITUS WITHOUT COMPLICATION, UNSPECIFIED WHETHER LONG TERM INSULIN USE: ICD-10-CM

## 2024-11-22 ENCOUNTER — TELEPHONE (OUTPATIENT)
Dept: NEUROLOGY | Facility: CLINIC | Age: 64
End: 2024-11-22
Payer: COMMERCIAL

## 2024-11-22 ENCOUNTER — PATIENT MESSAGE (OUTPATIENT)
Dept: NEUROLOGY | Facility: CLINIC | Age: 64
End: 2024-11-22
Payer: COMMERCIAL

## 2024-11-22 DIAGNOSIS — M54.2 CERVICALGIA: Primary | ICD-10-CM

## 2024-11-22 DIAGNOSIS — G96.00 CSF LEAK: ICD-10-CM

## 2024-11-22 NOTE — TELEPHONE ENCOUNTER
----- Message from [employer] sent at 11/22/2024  4:04 PM CST -----  Regarding: Missed Call  Contact: [phone]  Type:  Patient Returning Call    Who Called: The pt   Who Left Message for Patient:Malina Esqueda MA  Does the patient know what this is regarding?:Schedule MRI  Would the patient rather a call back or a response via [facility]? Call back   Best Call Back Number: [phone]  Additional Information: Thank you.

## 2024-11-26 ENCOUNTER — HOSPITAL ENCOUNTER (OUTPATIENT)
Dept: RADIOLOGY | Facility: HOSPITAL | Age: 64
Discharge: HOME | End: 2024-11-26
Attending: PHYSICIAN ASSISTANT
Payer: COMMERCIAL

## 2024-11-26 DIAGNOSIS — M54.2 CERVICALGIA: ICD-10-CM

## 2024-11-26 DIAGNOSIS — G96.00 CSF LEAK: ICD-10-CM

## 2024-11-26 PROCEDURE — 70551 MRI BRAIN STEM W/O DYE: CPT | Mod: TC

## 2024-11-26 PROCEDURE — 70551 MRI BRAIN STEM W/O DYE: CPT | Mod: 26,,, | Performed by: RADIOLOGY

## 2024-11-26 PROCEDURE — 70553 MRI BRAIN STEM W/O & W/DYE: CPT | Mod: 26,,, | Performed by: RADIOLOGY

## 2024-11-26 PROCEDURE — 72156 MRI NECK SPINE W/O & W/DYE: CPT | Mod: 26,,, | Performed by: RADIOLOGY

## 2024-11-26 PROCEDURE — 25500020 PHARM REV CODE 255: Performed by: PHYSICIAN ASSISTANT

## 2024-11-26 PROCEDURE — 72156 MRI NECK SPINE W/O & W/DYE: CPT | Mod: TC

## 2024-11-26 PROCEDURE — 70553 MRI BRAIN STEM W/O & W/DYE: CPT | Mod: TC

## 2024-11-26 PROCEDURE — A9585 GADOBUTROL INJECTION: HCPCS | Performed by: PHYSICIAN ASSISTANT

## 2024-11-26 RX ORDER — GADOBUTROL 604.72 MG/ML
10 INJECTION INTRAVENOUS
Status: COMPLETED | OUTPATIENT
Start: 2024-11-26 | End: 2024-11-26

## 2024-11-26 RX ORDER — GADOBUTROL 604.72 MG/ML
10 INJECTION INTRAVENOUS
Status: DISCONTINUED | OUTPATIENT
Start: 2024-11-26 | End: 2024-11-27 | Stop reason: HOSPADM

## 2024-11-26 RX ADMIN — GADOBUTROL 10 ML: 604.72 INJECTION INTRAVENOUS at 04:11

## 2024-11-27 ENCOUNTER — PATIENT MESSAGE (OUTPATIENT)
Dept: NEUROLOGY | Facility: CLINIC | Age: 64
End: 2024-11-27
Payer: COMMERCIAL

## 2024-11-28 ENCOUNTER — PATIENT MESSAGE (OUTPATIENT)
Dept: NEUROLOGY | Facility: CLINIC | Age: 64
End: 2024-11-28
Payer: COMMERCIAL

## 2024-11-28 DIAGNOSIS — G24.9 DYSTONIA: Primary | ICD-10-CM

## 2024-11-28 DIAGNOSIS — G24.3 CERVICAL DYSTONIA: ICD-10-CM

## 2024-11-28 DIAGNOSIS — R26.89 IMBALANCE: ICD-10-CM

## 2024-12-10 ENCOUNTER — OFFICE VISIT (OUTPATIENT)
Dept: DIABETES | Facility: CLINIC | Age: 64
End: 2024-12-10
Payer: COMMERCIAL

## 2024-12-10 ENCOUNTER — LAB VISIT (OUTPATIENT)
Dept: LAB | Facility: HOSPITAL | Age: 64
End: 2024-12-10
Attending: NURSE PRACTITIONER
Payer: COMMERCIAL

## 2024-12-10 ENCOUNTER — PATIENT MESSAGE (OUTPATIENT)
Dept: DIABETES | Facility: CLINIC | Age: 64
End: 2024-12-10

## 2024-12-10 VITALS
HEART RATE: 69 BPM | WEIGHT: 283.75 LBS | BODY MASS INDEX: 53.61 KG/M2 | DIASTOLIC BLOOD PRESSURE: 72 MMHG | SYSTOLIC BLOOD PRESSURE: 119 MMHG

## 2024-12-10 DIAGNOSIS — E11.9 CONTROLLED TYPE 2 DIABETES MELLITUS WITHOUT COMPLICATION, WITHOUT LONG-TERM CURRENT USE OF INSULIN: ICD-10-CM

## 2024-12-10 DIAGNOSIS — E11.9 CONTROLLED TYPE 2 DIABETES MELLITUS WITHOUT COMPLICATION, WITHOUT LONG-TERM CURRENT USE OF INSULIN: Primary | ICD-10-CM

## 2024-12-10 LAB
ANION GAP SERPL CALC-SCNC: 6 MMOL/L (ref 8–16)
BUN SERPL-MCNC: 12 MG/DL (ref 8–23)
CALCIUM SERPL-MCNC: 8.9 MG/DL (ref 8.7–10.5)
CHLORIDE SERPL-SCNC: 109 MMOL/L (ref 95–110)
CO2 SERPL-SCNC: 26 MMOL/L (ref 23–29)
CREAT SERPL-MCNC: 0.8 MG/DL (ref 0.5–1.4)
EST. GFR  (NO RACE VARIABLE): >60 ML/MIN/1.73 M^2
ESTIMATED AVG GLUCOSE: 114 MG/DL (ref 68–131)
GLUCOSE SERPL-MCNC: 111 MG/DL (ref 70–110)
GLUCOSE SERPL-MCNC: 112 MG/DL (ref 70–110)
HBA1C MFR BLD: 5.6 % (ref 4–5.6)
POTASSIUM SERPL-SCNC: 4.3 MMOL/L (ref 3.5–5.1)
SODIUM SERPL-SCNC: 141 MMOL/L (ref 136–145)

## 2024-12-10 PROCEDURE — 83036 HEMOGLOBIN GLYCOSYLATED A1C: CPT | Performed by: NURSE PRACTITIONER

## 2024-12-10 PROCEDURE — 3008F BODY MASS INDEX DOCD: CPT | Mod: CPTII,S$GLB,, | Performed by: NURSE PRACTITIONER

## 2024-12-10 PROCEDURE — 99999 PR PBB SHADOW E&M-EST. PATIENT-LVL IV: CPT | Mod: PBBFAC,,, | Performed by: NURSE PRACTITIONER

## 2024-12-10 PROCEDURE — G2211 COMPLEX E/M VISIT ADD ON: HCPCS | Mod: S$GLB,,, | Performed by: NURSE PRACTITIONER

## 2024-12-10 PROCEDURE — 3074F SYST BP LT 130 MM HG: CPT | Mod: CPTII,S$GLB,, | Performed by: NURSE PRACTITIONER

## 2024-12-10 PROCEDURE — 80048 BASIC METABOLIC PNL TOTAL CA: CPT | Performed by: NURSE PRACTITIONER

## 2024-12-10 PROCEDURE — 4010F ACE/ARB THERAPY RXD/TAKEN: CPT | Mod: CPTII,S$GLB,, | Performed by: NURSE PRACTITIONER

## 2024-12-10 PROCEDURE — 99214 OFFICE O/P EST MOD 30 MIN: CPT | Mod: S$GLB,,, | Performed by: NURSE PRACTITIONER

## 2024-12-10 PROCEDURE — 82962 GLUCOSE BLOOD TEST: CPT | Mod: S$GLB,,, | Performed by: NURSE PRACTITIONER

## 2024-12-10 PROCEDURE — 36415 COLL VENOUS BLD VENIPUNCTURE: CPT | Performed by: NURSE PRACTITIONER

## 2024-12-10 PROCEDURE — 1159F MED LIST DOCD IN RCRD: CPT | Mod: CPTII,S$GLB,, | Performed by: NURSE PRACTITIONER

## 2024-12-10 PROCEDURE — 3078F DIAST BP <80 MM HG: CPT | Mod: CPTII,S$GLB,, | Performed by: NURSE PRACTITIONER

## 2024-12-10 PROCEDURE — 3044F HG A1C LEVEL LT 7.0%: CPT | Mod: CPTII,S$GLB,, | Performed by: NURSE PRACTITIONER

## 2024-12-10 NOTE — PATIENT INSTRUCTIONS
PATIENT INSTRUCTIONS     Labs today.   Lifestyle modification with well balanced diet and at least 30 minutes of physical activity daily recommended.   Increase water intake.   Increase protein and non-starchy vegetable servings with meals.   Decrease carbohydrate servings with meals.   Take 10 minute walk after each meal.   Avoid snacking on carbohydrates, choose low carb, high protein snacks.   Incorporate resistance training with weights or resistance bands with exercise regimen at least 3 days a week.       Continue Jardiance to 25 mg by mouth daily.   Increase Mounjaro to 12.5 mg subcutaneously every 7 days.     Check blood sugar twice daily. Every morning when you wake up and 1-2 hours after main meal of the day. Keep log and upload to [employer] prior to each visit.   Blood Sugar Goals:       Fastin-130.       1-2 hours after a meal: Less than 180.

## 2024-12-10 NOTE — PROGRESS NOTES
"Tawny Valerio is a 64 y.o. female who presents for a follow up evaluation of Type 2 diabetes mellitus.     CHIEF COMPLAINT: Diabetes Consultation    PCP: Jodie Hyatt MD      Initial visit with me - 8/17/2023    The patient was initially diagnosed with diabetes in 2016.      Previous failed treatments include:  Metformin - GI upset.  Ozempic - N/V/D, fecal incontinence.     Social Documentation:  Patient lives in [place] with .   Occupation:  for [employer].   Exercise: limited by occipital neuralgia.   No sweet tea or regular cold drinks.       Diabetes related complications:   none.   denies Pancreatitis  denies Gastroparesis  denies DKA  denies Hx/family Hx of MEN2/MTC  denies Frequent UTIs/yeast infections     Diabetes Medications               empagliflozin (JARDIANCE) 25 mg tablet Take 1 tablet (25 mg total) by mouth once daily.    tirzepatide 10 mg/0.5 mL PnIj Inject 10 mg into the skin every 7 days.     Current monitoring regimen: not checking.     Patient currently taking insulin or sulfonylurea?  NO  Recent hypoglycemic episodes: No.     Patient compliant with glucose checks and medication administration? Yes    DIABETES MANAGEMENT STATUS  Statin: Not taking  ACE/ARB: Taking  Screening or Prevention Patient's value Goal Complete/Controlled?   HgA1C Testing and Control   Lab Results   Component Value Date    HGBA1C 5.7 (H) 04/17/2024      Annually/Less than 8% Yes   Lipid profile : 06/12/2024 Annually Yes   LDL control Lab Results   Component Value Date    LDLCALC 106.6 06/12/2024    Annually/Less than 100 mg/dl  No   Nephropathy screening Lab Results   Component Value Date    LABMICR 16.0 08/22/2023     Lab Results   Component Value Date    PROTEINUA Negative 09/18/2024     No results found for: "UTPCR"   Annually Yes   Blood pressure BP Readings from Last 1 Encounters:   12/10/24 119/72    Less than 140/90 Yes   Dilated retinal exam : 01/22/2021 Annually No   Foot exam   : " 12/10/2024 Annually No   Patient's medications, allergies, surgical, social and family histories were reviewed and updated as appropriate.     Review of Systems   Constitutional:  Negative for weight loss.   Eyes:  Negative for blurred vision and double vision.   Cardiovascular:  Negative for chest pain.   Gastrointestinal:  Negative for nausea and vomiting.   Genitourinary:  Negative for frequency.   Musculoskeletal:  Negative for falls.   Neurological:  Negative for dizziness and weakness.   Endo/Heme/Allergies:  Negative for polydipsia.   Psychiatric/Behavioral:  Negative for depression.    All other systems reviewed and are negative.       Physical Exam  Constitutional:       Appearance: Normal appearance.   HENT:      Head: Normocephalic and atraumatic.   Eyes:      Extraocular Movements: Extraocular movements intact.      Pupils: Pupils are equal, round, and reactive to light.   Cardiovascular:      Rate and Rhythm: Normal rate and regular rhythm.      Heart sounds: Normal heart sounds.   Pulmonary:      Effort: Pulmonary effort is normal. No respiratory distress.      Breath sounds: Normal breath sounds.   Musculoskeletal:         General: No swelling.      Cervical back: Normal range of motion.   Skin:     General: Skin is warm and dry.   Neurological:      Mental Status: She is alert and oriented to person, place, and time.   Psychiatric:         Mood and Affect: Mood normal.         Behavior: Behavior normal.        Blood pressure 119/72, pulse 69, weight 128.7 kg (283 lb 11.7 oz), last menstrual period 01/15/2019.  Wt Readings from Last 3 Encounters:   12/10/24 128.7 kg (283 lb 11.7 oz)   11/15/24 125.9 kg (277 lb 9 oz)   11/12/24 127.9 kg (282 lb)       LAB REVIEW  Lab Results   Component Value Date     09/18/2024    K 4.4 09/18/2024     09/18/2024    CO2 25 09/18/2024    BUN 20 09/18/2024    CREATININE 0.8 09/18/2024    CALCIUM 9.0 09/18/2024    ANIONGAP 9 09/18/2024    EGFRNORACEVR >60.0  "09/18/2024     No results found for: "CPEPTIDE", "GLUTAMICACID", "INSLNABS"  Hemoglobin A1C   Date Value Ref Range Status   04/17/2024 5.7 (H) 4.0 - 5.6 % Final     Comment:     ADA Screening Guidelines:  5.7-6.4%  Consistent with prediabetes  >or=6.5%  Consistent with diabetes    High levels of fetal hemoglobin interfere with the HbA1C  assay. Heterozygous hemoglobin variants (HbS, HgC, etc)do  not significantly interfere with this assay.   However, presence of multiple variants may affect accuracy.     08/22/2023 6.1 (H) 4.0 - 5.6 % Final     Comment:     ADA Screening Guidelines:  5.7-6.4%  Consistent with prediabetes  >or=6.5%  Consistent with diabetes    High levels of fetal hemoglobin interfere with the HbA1C  assay. Heterozygous hemoglobin variants (HbS, HgC, etc)do  not significantly interfere with this assay.   However, presence of multiple variants may affect accuracy.     08/09/2022 5.5 4.0 - 5.6 % Final     Comment:     ADA Screening Guidelines:  5.7-6.4%  Consistent with prediabetes  >or=6.5%  Consistent with diabetes    High levels of fetal hemoglobin interfere with the HbA1C  assay. Heterozygous hemoglobin variants (HbS, HgC, etc)do  not significantly interfere with this assay.   However, presence of multiple variants may affect accuracy.        Lab Results   Component Value Date    POCGLU 112 (A) 12/10/2024      ASSESSMENT    ICD-10-CM ICD-9-CM   1. Controlled type 2 diabetes mellitus without complication, without long-term current use of insulin  E11.9 250.00       PLAN  Diagnoses and all orders for this visit:    Controlled type 2 diabetes mellitus without complication, without long-term current use of insulin  -     POCT Glucose, Hand-Held Device  -     Hemoglobin A1C; Future  -     Basic Metabolic Panel; Future  -     Microalbumin/Creatinine Ratio, Urine; Future  -     tirzepatide 12.5 mg/0.5 mL PnIj; Inject 12.5 mg into the skin every 7 days.        Reviewed pathophysiology of diabetes, " complications related to the disease, importance of annual dilated eye exam and daily foot examination. Explained MOA, SE, dosage of medications. Written instructions given and reviewed with patient and patient verbalizes understanding.     PATIENT INSTRUCTIONS     Labs today.      Continue Jardiance to 25 mg by mouth daily.   Increase Mounjaro to 12.5 mg subcutaneously every 7 days.     Check blood sugar twice daily. Every morning when you wake up and 1-2 hours after main meal of the day. Keep log and upload to [employer] prior to each visit.   Blood Sugar Goals:       Fastin-130.       1-2 hours after a meal: Less than 180.          Follow up in about 3 months (around 3/10/2025) for No Device, Schedule non-fasting labs.

## 2024-12-14 ENCOUNTER — PATIENT MESSAGE (OUTPATIENT)
Dept: FAMILY MEDICINE | Facility: CLINIC | Age: 64
End: 2024-12-14
Payer: COMMERCIAL

## 2024-12-14 ENCOUNTER — PATIENT MESSAGE (OUTPATIENT)
Dept: CARDIOLOGY | Facility: CLINIC | Age: 64
End: 2024-12-14
Payer: COMMERCIAL

## 2024-12-16 RX ORDER — LOSARTAN POTASSIUM 25 MG/1
25 TABLET ORAL DAILY
Qty: 90 TABLET | Refills: 3 | Status: SHIPPED | OUTPATIENT
Start: 2024-12-16

## 2024-12-16 RX ORDER — LOSARTAN POTASSIUM 25 MG/1
25 TABLET ORAL
Qty: 90 TABLET | Refills: 3 | OUTPATIENT
Start: 2024-12-16

## 2024-12-16 RX ORDER — MECLIZINE HCL 12.5 MG 12.5 MG/1
12.5 TABLET ORAL 2 TIMES DAILY PRN
Qty: 30 TABLET | Refills: 3 | Status: SHIPPED | OUTPATIENT
Start: 2024-12-16

## 2024-12-16 NOTE — TELEPHONE ENCOUNTER
Care Due:                  Date            Visit Type   Department     Provider  --------------------------------------------------------------------------------                                             [facility]  Last Visit: 11-      [facility]          АНДРЕЙ Hyatt                               -                              PRIMARY      [facility]  Next Visit: 05-      [facility] ([facility])   [facility]       [facility]                                                            Last  Test          Frequency    Reason                     Performed    Due Date  --------------------------------------------------------------------------------    Vitamin D...  12 months..  ergocalciferol...........  Not Found    Overdue    [employer] Embedded Care Due Messages. Reference number: [id number].   12/16/2024 7:46:41 AM CST

## 2024-12-19 ENCOUNTER — CLINICAL SUPPORT (OUTPATIENT)
Dept: REHABILITATION | Facility: HOSPITAL | Age: 64
End: 2024-12-19
Payer: COMMERCIAL

## 2024-12-19 DIAGNOSIS — R26.89 IMBALANCE: ICD-10-CM

## 2024-12-19 DIAGNOSIS — R26.89 BALANCE PROBLEM: ICD-10-CM

## 2024-12-19 DIAGNOSIS — M54.2 NECK PAIN: Primary | ICD-10-CM

## 2024-12-19 DIAGNOSIS — G24.3 CERVICAL DYSTONIA: ICD-10-CM

## 2024-12-19 PROCEDURE — 97161 PT EVAL LOW COMPLEX 20 MIN: CPT

## 2024-12-19 NOTE — PLAN OF CARE
[facility]   Physical Therapy Initial Evaluation      Name: Tawny [facility] Number: [id number]    Therapy Diagnosis:   Encounter Diagnoses   Name Primary?    Imbalance     Cervical dystonia     Neck pain Yes    Balance problem         Physician: Kaylyn Ko P*    Physician Orders: PT Eval and Treat   Medical Diagnosis from Referral:   R26.89 (ICD-10-CM) - Imbalance   G24.3 (ICD-10-CM) - Cervical dystonia     Evaluation Date: 12/19/2024  Authorization Period Expiration: 12/31/24  Plan of Care Expiration: 2/7/25  Progress Note Due: 1/19/25  Visit # / Visits authorized: 1/ 1   FOTO: 1/ 3    Precautions: Standard and Diabetes     Time In: 1:45 pm   Time Out: 1:23 pm   Total Billable Time: 38 minutes    Subjective     Date of onset: chronic     History of current condition - Tawny reports: when she worked in [employer] she did PT starting in April 2023 when she was diagnosed with occipital neuralgia. Pt stated that she also did some PT visits earlier this year. Pt stated that she had difficulty turning her head to the right and was stuck looking to the left. Pt stated that Dr. Andrade told her she may have cervical dystonia and wanted her to see someone for movement disorder. Pt stated that she saw Kaylyn Ko PA-C with neurology and is giving her some different kinds of medicine and increased her Gabapentin. Pt stated that neck pain is primarily on (L) side. Pt stated that she has not had a lot of neuralgia lately.  Pt stated that if she rubs hands on back of her head her neck will feel better and neck will crack and then she can move neck better. Pt stated that she can go about the day if taking medicine except driving and walking is painful. Pt stated that she has to hold head on (L) side turning to (R) side when driving. Pt stated that if she does not take Naproxen 2x/day she will be in a lot of pain. Pt denies pain radiating into (B) UE. Pt stated that she had  MRI recently because she told PA when her neck cracks she gets something flowing down her throat like a post nasal drip, but does not taste like post nasal drip, tastes velvety, so thought may have fluid leak. Pt stated that MRI did not show fluid leak. Pt stated that she follows up with PA again in February. Pt stated that she was swimming over the summer and felt less tight. Pt stated that she gets dizzy from the medicine and has medicine to take for dizziness. Pt stated that she does sometimes feel wobbly when walking due to not feeling like she has support in legs/core. Pt stated that she has to ascend/descend steps on at a time due to balance.     Falls: about 6 months ago fell in shower - slipped on foam on floor    Imaging: see imaging section in pt chart review    Prior Therapy: yes and reports that she still does some exercises from past PT  Social History:  lives with their spouse, no steps to enter [facility], does have steps to enter daughter's home   Occupation: works in sale - mostly sitting at desk/computer   Prior Level of Function: chronic neck pain   Current Level of Function: report of pain/difficulty performing aggravating factors listed below     Pain:  Current 2/10, worst 10/10, best 2/10   Location: (L) neck, occasionally (R) neck, sometimes pain will shoot up from (L) neck to (L) head, (L) neck extending to (L) UT  Description: Aching, Sharp, pulling, stiffness  Aggravating Factors: driving, walking, weather changes, when she has a lot of inflammation, sometimes turning head to (R)   Easing Factors: medicine, massager, heat and ice, pillows, neck warmer     Patients goals: to help rid her of pain     Medical History:   Past Medical History:   Diagnosis Date    Anxiety     Diabetes mellitus, type 2 2016    Endometrial ca 07/24/2019    Essential hypertension 05/14/2018    Hyperlipidemia LDL goal <70 10/30/2023    Simple endometrial hyperplasia 03/15/2019    Uterine polyp 03/14/2019       Surgical  History:   Tawny Valerio  has a past surgical history that includes lipoma removed; Dilation and curettage of uterus (2019); Hysteroscopy with dilation and curettage of uterus (N/A, 2019);  section; Robot-assisted laparoscopic salpingo-oophorectomy using da Yaya Xi (Bilateral, 2019); Robot-assisted laparoscopic abdominal hysterectomy using da Yaya Xi (N/A, 2019); Robot-assisted laparoscopic lysis of adhesions using da Yyaa Xi (N/A, 2019); Hysterectomy (19); and Laparoscopic appendectomy (N/A, 2024).    Medications:   Tawny has a current medication list which includes the following prescription(s): acetaminophen, aspirin, azelastine, baclofen, blood-glucose meter, bupropion, capsaicin, clindamycin, empagliflozin, ergocalciferol, ezetimibe, fluticasone propionate, gabapentin, losartan, losartan, meclizine, naproxen, tirzepatide, tretinoin, and trihexyphenidyl.    Allergies:   Review of patient's allergies indicates:   Allergen Reactions    Celebrex [celecoxib] Anaphylaxis     Is able to tolerate Mobic, Aspirin, other NSAIDs.    Codeine Hives     Does not know what she can take -usually just takes tylenol or ibuprofen    Lidocaine Hives    Vicodin [hydrocodone-acetaminophen] Hives and Swelling        Objective      Cervical AROM: Pain/Dysfunction with Movement:   Flexion 40 degrees Report of experiencing pull in (L) neck extending to head    Extension 30 degrees Report of experiencing a stretch in (B) neck    Right side bending 25 degrees Report of pain in (R) neck    Left side bending 20 degrees Report of pain in (L) neck    Right rotation 35 degrees Report of pain in (L) neck    Left rotation 65 degrees Report of feeling tight in (L) neck      Shoulder Right  Left  Pain/Dysfunction with Movement    AROM MMT AROM MMT NT = not tested   flexion WFL 5/5 WFL 5/5    abduction WFL 5/5 WFL 5/5    Internal rotation WFL 4/5 WFL 4/5    ER at 90° abd WFL NT WFL NT    ER  at 0° abd NT 4/5 NT 4/5      Hip Right  Left  Pain/Dysfunction with Movement    AROM MMT AROM MMT    Flexion WFL 4/5 WFL 4/5    Extension NT NT NT NT Pt performed fair bridge against gravity   Abduction WFL 4-/5 WFL 4-/5       Knee Right  Left  Pain/Dysfunction with Movement    AROM MMT AROM MMT    Flexion WFL 5/5 WFL 4/5    Extension WFL 4+/5 WFL 4+/5      Ankle Right  Left  Pain/Dysfunction with Movement    AROM MMT AROM MMT    Plantarflexion WFL 4+/5 WFL 4+/5    Dorsiflexion WFL 5/5 WFL 5/5      SLS Test: (R) =  1 second  , (L) = 1 second    Posture: flexed trunk in sitting, intermittently leaning to (R) side in sitting    Intake Outcome Measure for FOTO Neck Survey    Therapist reviewed FOTO scores for Tawny Valerio on 12/19/2024.   FOTO report - see Media section or FOTO account episode details.    Intake Score: 36         Treatment       Patient Education and Home Exercises     Education provided:   - Role of PT - pt verbalized understanding        Assessment     Tawny is a 64 y.o. female referred to outpatient Physical Therapy with a medical diagnosis of Imbalance and Cervical dystonia. Patient presents with report of pain when performing cervical AROM noted above, decreased (R) cervical rotation AROM, decreased UE MMT scores noted above, decreased LE MMT scores noted above, impaired posture, and impaired (B) SL balance. Pt will benefit from skilled PT.     Patient prognosis is Fair.   Patient will benefit from skilled outpatient Physical Therapy to address the deficits stated above and in the chart below, provide patient /family education, and to maximize patientt's level of independence.     Plan of care discussed with patient: Yes  Patient's spiritual, cultural and educational needs considered and patient is agreeable to the plan of care and goals as stated below:     Anticipated Barriers for therapy: chronicity of symptoms     Medical Necessity is demonstrated by the  following  History  Co-morbidities and personal factors that may impact the plan of care [] LOW: no personal factors / co-morbidities  [x] MODERATE: 1-2 personal factors / co-morbidities  [] HIGH: 3+ personal factors / co-morbidities    Moderate / High Support Documentation:   Co-morbidities affecting plan of care: diabetes, HTN    Personal Factors:   no deficits     Examination  Body Structures and Functions, activity limitations and participation restrictions that may impact the plan of care [] LOW: addressing 1-2 elements  [] MODERATE: 3+ elements  [x] HIGH: 4+ elements (please support below)    Moderate / High Support Documentation: ROM, strength, balance,posture     Clinical Presentation [x] LOW: stable  [] MODERATE: Evolving  [] HIGH: Unstable     Decision Making/ Complexity Score: low       Goals:  Short Term Goals: 3 weeks   Pt will be compliant with HEP to supplement PT with decreasing pain and improving functional mobility  Pt will improve (R) cervical rotation AROM by at least 10 degrees in order to be able to turn head when driving with less difficulty    Long Term Goals: 6 weeks   Pt will improve FOTO score to at least 49 in order to demo improved functional mobility  Pt will improve UE MMT scores by at least 1/3 grade where deficits noted in order to improve strength for functional tasks  Pt will improve LE MMT scores by at least 1/3 grade where deficits noted in order to improve strength for functional tasks  Pt will improve (R) cervical rotation AROM by at least 20 degrees in order to turn head when driving with less difficulty  Pt will report neck pain </= 5/10 at worst in order to be able to perform ADLs with less difficulty  Plan     Plan of care Certification: 12/19/2024 to 2/7/25.    Outpatient Physical Therapy 1 times weekly for 6 weeks to include the following interventions: Gait Training, Manual Therapy, Moist Heat/ Ice, Neuromuscular Re-ed, Patient Education, Therapeutic Activities,  Therapeutic Exercise, and IASTM, dry needling, and modalities prn .     Heena Pickering PT        Physician's Signature: _________________________________________ Date: ________________

## 2025-01-03 ENCOUNTER — PATIENT OUTREACH (OUTPATIENT)
Dept: ADMINISTRATIVE | Facility: HOSPITAL | Age: 65
End: 2025-01-03
Payer: COMMERCIAL

## 2025-01-06 RX ORDER — GABAPENTIN 300 MG/1
CAPSULE ORAL
Qty: 120 CAPSULE | Refills: 0 | Status: SHIPPED | OUTPATIENT
Start: 2025-01-06

## 2025-01-06 NOTE — TELEPHONE ENCOUNTER
Will send in 1 time refill as she is due for follow up and will need a follow before any further refills. Thanks!

## 2025-01-07 ENCOUNTER — TELEPHONE (OUTPATIENT)
Dept: NEUROLOGY | Facility: CLINIC | Age: 65
End: 2025-01-07
Payer: COMMERCIAL

## 2025-01-07 ENCOUNTER — PATIENT MESSAGE (OUTPATIENT)
Facility: CLINIC | Age: 65
End: 2025-01-07
Payer: COMMERCIAL

## 2025-01-07 NOTE — TELEPHONE ENCOUNTER
----- Message from Kirill Andrade MD sent at 1/7/2025 10:47 AM CST -----  It looks like the patient had wanted to check in with Kaylyn Ko about the prescription per yesterday's correspondence. Please advise that if she is trying to fill early, she would need to follow up with one of us to discuss the use of the gabapentin. Thanks!  ----- Message -----  From: Judi Pena MA  Sent: 1/7/2025  10:38 AM CST  To: Darling Ellis RN; Kirill Andrade MD      ----- Message -----  From: Marce Baer  Sent: 1/7/2025   9:30 AM CST  To: Raymond Roy Staff    Name of Who is Calling: SHAILESH WEBB [[id number]] [place] ([facility])       What is the request in detail:  pt is filling the gabapentin (NEURONTIN) 300 MG capsule early. Pharmacy is calling for approval. Please advise       Can the clinic reply by [facility]: No       What Number to Call Back if not in [facility]: [phone]

## 2025-01-13 ENCOUNTER — PATIENT OUTREACH (OUTPATIENT)
Dept: ADMINISTRATIVE | Facility: HOSPITAL | Age: 65
End: 2025-01-13
Payer: COMMERCIAL

## 2025-01-29 ENCOUNTER — TELEPHONE (OUTPATIENT)
Dept: DIABETES | Facility: CLINIC | Age: 65
End: 2025-01-29
Payer: COMMERCIAL

## 2025-01-29 NOTE — TELEPHONE ENCOUNTER
I called  to assist with rescheduling her appointment with Marlene Bess NP on 3/13/25. I received no answer so I left a detailed voicemail with my name and the [facility] callback number. I will attempt to call again at later time.     Statement Selected

## 2025-01-30 NOTE — TELEPHONE ENCOUNTER
I called  to assist with rescheduling her 10:30am appointment with Marlene Bess NP on 3/13/25. I received no answer so I left a detailed voicemail with my name and the [facility] callback number to allow  to return the call.

## 2025-02-20 ENCOUNTER — CLINICAL SUPPORT (OUTPATIENT)
Dept: REHABILITATION | Facility: HOSPITAL | Age: 65
End: 2025-02-20
Payer: COMMERCIAL

## 2025-02-20 DIAGNOSIS — R26.89 BALANCE PROBLEM: ICD-10-CM

## 2025-02-20 DIAGNOSIS — M54.2 NECK PAIN: Primary | ICD-10-CM

## 2025-02-20 PROCEDURE — 97110 THERAPEUTIC EXERCISES: CPT

## 2025-02-20 PROCEDURE — 97112 NEUROMUSCULAR REEDUCATION: CPT

## 2025-02-20 NOTE — PROGRESS NOTES
Physical Therapy Daily Treatment Note/Updated POC     Name: Tawny Valerio  [facility] Number: [id number]    Therapy Diagnosis:   Encounter Diagnoses   Name Primary?    Neck pain Yes    Balance problem      Physician: Kaylyn Ko P*    Visit Date: 2/20/2025    Physician Orders: PT Eval and Treat   Medical Diagnosis from Referral:   R26.89 (ICD-10-CM) - Imbalance   G24.3 (ICD-10-CM) - Cervical dystonia      Evaluation Date: 12/19/2024  Authorization Period Expiration: 12/31/24  Plan of Care Expiration: 2/7/25  Progress Note Due: 1/19/25  Visit # / Visits authorized: 1/ 1   FOTO: 1/ 3    Time In: 5:30 pm   Time Out: 6:24 pm   Total Billable Time: 54 minutes    Precautions: Standard and Diabetes    Subjective     Pt reports: that today is a bad day. Pt stated she had a couple of days when her neck was fine and she didn't even take her medicine, but for the last few days her neck has been hurting. Pt stated that she figured out it was inflammation so took two Naproxen which seemed to help inflammation. Pt stated that if she does not take Benadryl she can't move her neck. Pt stated that when sitting in a meeting this morning she had to hold her head to look straight due to head wanting to turn to left side until medicine kicked in. Pt stated that if she takes muscle relaxer then will start hurting worse initially and then starts feeling better. Pt stated that she has been doing neck stretches at home, supine chin tucks, supine chin tucks w/lift, and alternating hot/cold. Pt stated that if she does not take medicine for dizziness she will run into walls, but still also has trouble controlling her balance. Pt stated that when walking she cannot keep her head straight and this also makes her feel off balance Pt stated that sometimes she experiences blurry vision even with driving glasses on and has difficulty with depth perception. Pt stated that she thinks side effect of other medicine is dizziness and also takes  "medicine for dizziness. Pt stated that she also read that occipital neuralgia sometimes effects eyes. Pt stated that her neurologist is aware of all of these symptoms and that she has a virtual visit on Monday with neurologist. Pt stated that she thinks she has tendonitis along (L) Achilles tendon and thinks it is inflamed, has been bothering her for about 3 weeks.     Response to previous treatment: last session was initial evaluation  Functional change: see assessment     Pain: 5/10 (L) neck       Objective     Cervical AROM: Pain/Dysfunction with Movement:   Flexion 40 degrees    Extension 40 degrees    Right side bending 25 degrees Report of pulling/tightness    Left side bending 25 degrees Report of tightness in (L) neck    Right rotation 45 degrees    Left rotation 75 degrees Report of feeling tight in (L) neck       Shoulder Right   Left   Pain/Dysfunction with Movement     AROM MMT AROM MMT NT = not tested   flexion WFL 5/5 WFL 5/5     abduction WFL 5/5 WFL 5/5     Internal rotation WFL 4+/5 WFL 4+/5     ER at 90° abd WFL NT WFL NT     ER at 0° abd NT 4/5 NT 4/5        Hip Right   Left   Pain/Dysfunction with Movement     AROM MMT AROM MMT     Flexion WFL 4+/5 WFL 4+/5     Extension NT NT NT NT Pt performed fair bridge against gravity   Abduction WFL 4/5 WFL 4/5        Knee Right   Left   Pain/Dysfunction with Movement     AROM MMT AROM MMT     Flexion WFL 5/5 WFL 4/5     Extension WFL 4+/5 WFL 4+/5        Ankle Right   Left   Pain/Dysfunction with Movement     AROM MMT AROM MMT     Plantarflexion WFL 4+/5 WFL 4+/5     Dorsiflexion WFL 5/5 WFL 5/5           Posture: flexed trunk in sitting, head turned to (L)    Tawny received subjective assessment, objective measurements, and  therapeutic exercises to develop strength, ROM, and flexibility for 45 minutes including:  Bridges x15   Gastroc stretch w/strap 3x30" B   Soleus stretch w/strap 3x30" B  SLR 2x10 B   SL hip abduction 2x10 R, x5 L then held " "    Tawny participated in neuromuscular re-education activities to improve: Posture and stabilization, motor control for 9 minutes. The following activities were included:  Seated chin tucks 1x10 3"   (B) UE ER w/scap retraction 2x10 3" RTB  Rows w/scap retraction 3x10 3" RTB     Home Exercises Provided and Patient Education Provided     Education provided:   - HEP       Written Home Exercises Provided: yes.  Exercises were reviewed and Tawny was able to demonstrate them prior to the end of the session.  Tawny demonstrated good  understanding of the education provided.     See EMR under Patient Instructions for exercises provided 2/20/2025.      Assessment     Today was pt's first visit since initial evaluation on 12/19/24. Updated measurements taken and pt does demo improvements in cervical extension AROM, (L) SB AROM, (B) cervical rotation AROM, (B) IR MMT scores, (B) hip flexion and abduction MMT scores compared to measurements taken on initial evaluation. Pt continues to present with report of neck pain, decreased (R) cervical rotation AROM, decreased UE MMT scores noted above, decreased LE MMT scores noted above, impaired posture, and impaired balance. Pt will benefit from skilled PT.     Pt prognosis is Fair.     Pt will continue to benefit from skilled outpatient physical therapy to address the deficits listed in the problem list box on initial evaluation, provide pt/family education and to maximize pt's level of independence in the home and community environment.     Pt's spiritual, cultural and educational needs considered and pt agreeable to plan of care and goals.    Short Term Goals: 3 weeks   Pt will be compliant with HEP to supplement PT with decreasing pain and improving functional mobility - Met  Pt will improve (R) cervical rotation AROM by at least 10 degrees in order to be able to turn head when driving with less difficulty - Met      Long Term Goals: 6 weeks   Pt will improve FOTO score " to at least 49 in order to demo improved functional mobility - progressing not met   Pt will improve UE MMT scores by at least 1/3 grade where deficits noted in order to improve strength for functional tasks - progressing not met   Pt will improve LE MMT scores by at least 1/3 grade where deficits noted in order to improve strength for functional tasks - progressing not met  Pt will improve (R) cervical rotation AROM by at least 20 degrees in order to turn head when driving with less difficulty - progressing not met  Pt will report neck pain </= 5/10 at worst in order to be able to perform ADLs with less difficulty - progressing not met     Plan     Continue per POC, extend POC for 1x/week for 4 more weeks: New POC dates: 2/20/25 to 3/21/25    Heena Pickering, PT

## 2025-02-24 ENCOUNTER — OFFICE VISIT (OUTPATIENT)
Facility: CLINIC | Age: 65
End: 2025-02-24
Payer: COMMERCIAL

## 2025-02-24 DIAGNOSIS — E66.01 CLASS 3 SEVERE OBESITY DUE TO EXCESS CALORIES WITH SERIOUS COMORBIDITY AND BODY MASS INDEX (BMI) OF 50.0 TO 59.9 IN ADULT: ICD-10-CM

## 2025-02-24 DIAGNOSIS — M54.2 CERVICALGIA: ICD-10-CM

## 2025-02-24 DIAGNOSIS — E78.5 HYPERLIPIDEMIA LDL GOAL <70: ICD-10-CM

## 2025-02-24 DIAGNOSIS — E66.01 MORBID OBESITY WITH BMI OF 50.0-59.9, ADULT: ICD-10-CM

## 2025-02-24 DIAGNOSIS — I10 ESSENTIAL HYPERTENSION: ICD-10-CM

## 2025-02-24 DIAGNOSIS — E66.813 CLASS 3 SEVERE OBESITY DUE TO EXCESS CALORIES WITH SERIOUS COMORBIDITY AND BODY MASS INDEX (BMI) OF 50.0 TO 59.9 IN ADULT: ICD-10-CM

## 2025-02-24 DIAGNOSIS — G24.3 CERVICAL DYSTONIA: Primary | ICD-10-CM

## 2025-02-24 DIAGNOSIS — G24.9 DYSTONIA: ICD-10-CM

## 2025-02-24 DIAGNOSIS — Z71.89 COUNSELING REGARDING GOALS OF CARE: ICD-10-CM

## 2025-02-24 DIAGNOSIS — E11.9 CONTROLLED TYPE 2 DIABETES MELLITUS WITHOUT COMPLICATION, WITHOUT LONG-TERM CURRENT USE OF INSULIN: ICD-10-CM

## 2025-02-24 DIAGNOSIS — E11.618 CONTROLLED TYPE 2 DIABETES MELLITUS WITH OTHER DIABETIC ARTHROPATHY, WITHOUT LONG-TERM CURRENT USE OF INSULIN: ICD-10-CM

## 2025-02-24 RX ORDER — CARBIDOPA AND LEVODOPA 25; 100 MG/1; MG/1
0.5 TABLET ORAL 2 TIMES DAILY
Qty: 30 TABLET | Refills: 11 | Status: SHIPPED | OUTPATIENT
Start: 2025-02-24 | End: 2026-02-24

## 2025-02-24 NOTE — PROGRESS NOTES
Neurology Telemedicine Note     Name: Tawny Valerio  MRN: [id number]   CSN: [id number]      Date: 02/24/2025    The patient location is: Home  The chief complaint leading to consultation is: dystonia   Visit type: Virtual visit with synchronous audio and video      Each patient to whom I provide medical services by telemedicine is:  (1) informed of the relationship between the physician and patient and the respective role of any other health care provider with respect to management of the patient; and (2) notified that they may decline to receive medical services by telemedicine and may withdraw from such care at any time.    Interval History:  - 3 mg BID -- not all the time   - still finds that benadryl works better   - but makes her sleepy  - feels like artane takes longer to work   - takes baclofen TID   - has some trouble with her memory, loses train of thought   - happening daily   - still finds that 3 mg is better than 2 mg of artane   - she would be interested in botox but has some reservations   - has fear of dysphagia       From Nov 2024  - trial of artane last visit   - 2 mg BID which has been helpful   - she takes baclofen in the morning, 1 mg artane   - an hour later she takes benadryl, if not she feels like the neck tension is worse   - dry mouth is an issue   - baclofen 10 mg BID, takes last dose before bed  - fell in the shower, went to sit and slipped on foam on the bottom of the shower     Nonmotor/Premotor ROS: as per HPI, and all other systems are negative    Past Medical History: The patient  has a past medical history of Anxiety, Diabetes mellitus, type 2 (2016), Endometrial ca (07/24/2019), Essential hypertension (05/14/2018), Hyperlipidemia LDL goal <70 (10/30/2023), Simple endometrial hyperplasia (03/15/2019), and Uterine polyp (03/14/2019).    Social History: The patient  reports that she has never smoked. She has never used smokeless tobacco. She reports that she does not drink alcohol and  does not use drugs.    Family History: Their family history includes Arthritis in her mother; Breast cancer in an other family member; Depression in her father and sister; Diabetes in her father and sister; Early death in her brother, father, and sister; Heart attack in her brother; Heart disease in her brother, father, maternal grandfather, maternal grandmother, paternal grandfather, sister, and sister; Hypertension in her brother, father, sister, and sister; Stroke in her father; Vision loss in her father.    Allergies: Celebrex [celecoxib], Codeine, Lidocaine, and Vicodin [hydrocodone-acetaminophen]     Meds: Medications Ordered Prior to Encounter[1]    Exam:  Vital Signs deferred with home visit    Constitutional  Well-developed, well-nourished, appears stated age   Eyes  No scleral icterus   ENT  Moist oral mucosa   Cardiovascular  No lower extremity edema    Respiratory  No labored breathing    Skin  No rashes   Hematologic  No bruising   Psychiatric  Normal mood and affect   Other  GI/ deferred    Neurological     * Mental status  Alert and oriented to person, place, time, and situation; no dysarthria; no aphasia; normal recent and remote memory; follows commands   * Cranial nerves     - CN II  Pupils midposition and symmetric   - CN III, IV, VI  Extraocular movements full, no nystagmus visualized   - CN V  Unable to test   - CN VII  Face strong and symmetric bilaterally    - CN VIII  Hearing grossly intact with conversation    - CN IX, X  Palate raises midline and symmetric    - CN XI  Strong shoulder shrug B    - CN XII  Tongue appears midline    * Motor  Normal bulk by appearance, no drift    * Sensory  Not tested objectively, no changes described by the patient   * Deep tendon reflexes  Not tested   * Coord/Movemt/Gait No hypophonic speech.  No facial masking.  No B bradykinesia.  No tremor with rest, posture, kinesis, or intention.   No chorea, athetosis, dystonia, myoclonus, or tics.   No motor  impersistence.  Gait is deferred for safety.   Dev cook      Medical Record Review:  - Lab Results:  Lab Visit on 12/10/2024   Component Date Value Ref Range Status    Hemoglobin A1C 12/10/2024 5.6  4.0 - 5.6 % Final    Estimated Avg Glucose 12/10/2024 114  68 - 131 mg/dL Final    Sodium 12/10/2024 141  136 - 145 mmol/L Final    Potassium 12/10/2024 4.3  3.5 - 5.1 mmol/L Final    Chloride 12/10/2024 109  95 - 110 mmol/L Final    CO2 12/10/2024 26  23 - 29 mmol/L Final    Glucose 12/10/2024 111 (H)  70 - 110 mg/dL Final    BUN 12/10/2024 12  8 - 23 mg/dL Final    Creatinine 12/10/2024 0.8  0.5 - 1.4 mg/dL Final    Calcium 12/10/2024 8.9  8.7 - 10.5 mg/dL Final    Anion Gap 12/10/2024 6 (L)  8 - 16 mmol/L Final    eGFR 12/10/2024 >60.0  >60 mL/min/1.73 m^2 Final   Lab Visit on 12/10/2024   Component Date Value Ref Range Status    Microalbumin, Urine 12/10/2024 <5.0  ug/mL Final    Creatinine, Urine 12/10/2024 40.0  15.0 - 325.0 mg/dL Final    Microalb/Creat Ratio 12/10/2024 Unable to calculate  0.0 - 30.0 ug/mg Final   Office Visit on 12/10/2024   Component Date Value Ref Range Status    POC Glucose 12/10/2024 112 (A)  70 - 110 MG/DL Final            ASSESSMENT/PLAN:  Cervical dystonia  - R tilt, L tort , restricted ROM when turning the R    Dev cook present on exam today  Some dry eyes, rec'd use of artificial tears   Some improvement with artane but still has issue and needs benadryl -- still finds that benadryl is better than artane    Taking artane 3 mg BID, short term memory issues likely from anti-cholinergic burden. Discussed botox for cervical dystonia. Will send message to Dr. Huitron. If we can do botox and there is improvement, we will be able to decrease/stop artane +/- diphenhydramine   - discussed trial of clonazepam vs low dose cd/ld. Will send in cd/ld 25/100 1/2 tab BID    Continue baclofen 10 mg BID           The patient has a documented history of hypertension, DMII, obesity,  hyperlipidemia and/or hypercholesteremia with long-term complications such as cerebrovascular disease, peripheral vascular disease, and/or aortic atherosclerosis. Collectively these risk factors may contribute to cerebral atherosclerosis, and cerebral hypoperfusion compounded neurocognitive disorder. Rec'd maximizing cerebrovascular-related medical therapy, including but not limited to cholesterol medications and antiplatelet agents. Rec'd controlling vascular risk factors like hypertension, hyperlipidemia, and Diabetes SBP<130, LDL<100, and A1C<7.0. Rec'd optimizing lifestyle choices, including a heart-healthy diet (e.g., Mediterranean or DASH), increased cardiovascular exercise (goal 150 minutes of moderate-intensity per week), and staying cognitively and socially active.        This is a patient with a chronic and complex neurologic diagnosis whose overall, ongoing care is being managed and monitored by me and our Neurology clinic.   As such, since 2024,  is the appropriate add-on code to accompany the other E/M billing for this visit.      Collaborating Physician, Dr. White, was available during today's encounter. Any change to plan along with cosign to appear in the EMR.            Kaylyn Ko PA-C   [facility]  Department of Neurology  Movement Disorders           [1]   Current Outpatient Medications on File Prior to Visit   Medication Sig Dispense Refill    acetaminophen (TYLENOL) 500 MG tablet Take 1 tablet (500 mg total) by mouth every 8 (eight) hours.      aspirin 81 MG Chew Take 81 mg by mouth once daily.      azelastine (ASTELIN) 137 mcg (0.1 %) nasal spray 1 spray (137 mcg total) by Nasal route 2 (two) times daily. 90 mL 3    baclofen (LIORESAL) 10 MG tablet Take 1 tablet (10 mg total) by mouth 3 (three) times daily. 90 tablet 11    blood-glucose meter kit Use as instructed 1 each 0    buPROPion (WELLBUTRIN SR) 150 MG TBSR 12 hr tablet Take 1 tablet (150 mg total) by mouth 2  (two) times daily. 180 tablet 3    capsaicin 0.1 % Crea Apply topically three times a day as needed 42.5 g 11    clindamycin (CLEOCIN T) 1 % lotion Apply topically 2 (two) times daily. 60 mL 0    empagliflozin (JARDIANCE) 25 mg tablet Take 1 tablet (25 mg total) by mouth once daily. 90 tablet 3    ergocalciferol (ERGOCALCIFEROL) 50,000 unit Cap Take 1 capsule (50,000 Units total) by mouth every 7 days. 12 capsule 3    ezetimibe (ZETIA) 10 mg tablet Take 1 tablet (10 mg total) by mouth once daily. 90 tablet 3    fluticasone propionate (FLONASE) 50 mcg/actuation nasal spray 1 spray (50 mcg total) by Each Nostril route once daily. 32 g 11    gabapentin (NEURONTIN) 300 MG capsule Take 600 mg (2 caps) QAM and 300 mg (1 cap) in afternoon and 300 mg (1 cap)  capsule 0    losartan (COZAAR) 25 MG tablet Take 1 tablet (25 mg total) by mouth once daily. 90 tablet 3    losartan (COZAAR) 25 MG tablet Take 1 tablet (25 mg total) by mouth once daily. 90 tablet 3    meclizine (ANTIVERT) 12.5 mg tablet Take 1 tablet (12.5 mg total) by mouth 2 (two) times daily as needed for Dizziness. 30 tablet 3    naproxen (NAPROSYN) 500 MG tablet Take 1 tablet (500 mg total) by mouth 2 (two) times daily with meals. 180 tablet 1    tirzepatide 12.5 mg/0.5 mL PnIj Inject 12.5 mg into the skin every 7 days. 2 mL 11    tretinoin (RETIN-A) 0.025 % cream APPLY TOPICALLY EVERY EVENING. 20 g 6    trihexyphenidyL (ARTANE) 2 MG tablet Take 1.5 tablets (3 mg total) by mouth 2 (two) times a day. 90 tablet 11     No current facility-administered medications on file prior to visit.

## 2025-02-26 ENCOUNTER — TELEPHONE (OUTPATIENT)
Facility: CLINIC | Age: 65
End: 2025-02-26
Payer: COMMERCIAL

## 2025-02-26 NOTE — TELEPHONE ENCOUNTER
----- Message from Carlos Huitron MD sent at 2/25/2025 10:39 AM CST -----  Regarding: Botox  Happy to! Lets get her set up for video visit to get her approved and after that we can set up the procedure appt.   ----- Message -----  From: Kaylyn Ko PA-C  Sent: 2/24/2025  11:33 AM CST  To: Carlos Huitron MD    Hi! Would you be open to doing botox for her cervical dystonia?

## 2025-03-01 ENCOUNTER — PATIENT MESSAGE (OUTPATIENT)
Dept: FAMILY MEDICINE | Facility: CLINIC | Age: 65
End: 2025-03-01
Payer: COMMERCIAL

## 2025-03-01 ENCOUNTER — PATIENT MESSAGE (OUTPATIENT)
Dept: REHABILITATION | Facility: HOSPITAL | Age: 65
End: 2025-03-01
Payer: COMMERCIAL

## 2025-03-11 ENCOUNTER — PATIENT MESSAGE (OUTPATIENT)
Dept: REHABILITATION | Facility: HOSPITAL | Age: 65
End: 2025-03-11
Payer: COMMERCIAL

## 2025-03-12 ENCOUNTER — PATIENT MESSAGE (OUTPATIENT)
Facility: CLINIC | Age: 65
End: 2025-03-12

## 2025-03-12 ENCOUNTER — OFFICE VISIT (OUTPATIENT)
Facility: CLINIC | Age: 65
End: 2025-03-12
Payer: COMMERCIAL

## 2025-03-12 DIAGNOSIS — G24.3 CERVICAL DYSTONIA: Primary | ICD-10-CM

## 2025-03-12 NOTE — PROGRESS NOTES
"MOVEMENT DISORDERS CLINIC NEW CONSULT NOTE    PCP/Referring Provider:   Maikel Kaylyn ESTRADAObi, PAKalpanaC  [address] [place] [address]  Date of Service: 3/12/2025      Virtual Visit: Patient currently located in [place]     Chief Complaint: dystonia    HPI: Tawny Valerio is a 64 y.o. female with PMH most notable for CD , presenting for evaluation.    Describes that she first had symptoms in . Notes that she has always had "tightness," but that year she developed abnormal postures. Developed an acute neck pain on the left side. Was doing PT for sciatic pain around that time and the PT noted she was using her hand to hold up her chin. Was seeing Dr. Andrade and tried a course of steroids for pain and later was on gabapentin but noticed her head was still leaning to the left.     Ultimately saw Mrs. Ko who started her on Artane, modest benefit but also gets benefit from Benadryl. Also added C/L at her last visit and it was extremely helpful.     Relief with Sinemet is nearly 90%.     Current Medications:  Encounter Medications[1]    Past Medical History:  Problem List[2]    Past Surgical History:  Past Surgical History:   Procedure Laterality Date     SECTION      x 1    DILATION AND CURETTAGE OF UTERUS  2019    HYSTERECTOMY  19    HYSTEROSCOPY WITH DILATION AND CURETTAGE OF UTERUS N/A 2019    Procedure: HYSTEROSCOPY, WITH DILATION AND CURETTAGE OF UTERUS;  Surgeon: Bobby Frazier Jr., MD;  Location: [facility];  Service: OB/GYN;  Laterality: N/A;    LAPAROSCOPIC APPENDECTOMY N/A 2024    Procedure: APPENDECTOMY, LAPAROSCOPIC;  Surgeon: Hector Harrell MD;  Location: [facility] OR [address] [place] [address];  Service: General;  Laterality: N/A;    lipoma removed      ROBOT-ASSISTED LAPAROSCOPIC ABDOMINAL HYSTERECTOMY USING DA AWAIS XI N/A 2019    Procedure: XI ROBOTIC HYSTERECTOMY;  Surgeon: Aneudy Almeida MD;  Location: [facility] OR [address] [place] [address];  Service: OB/GYN;  Laterality: N/A;    " ROBOT-ASSISTED LAPAROSCOPIC LYSIS OF ADHESIONS USING DA AWAIS XI N/A 08/26/2019    Procedure: XI ROBOTIC LYSIS, ADHESIONS;  Surgeon: Aneudy Almeida MD;  [facility];  Service: OB/GYN;  Laterality: N/A;  Omental    ROBOT-ASSISTED LAPAROSCOPIC SALPINGO-OOPHORECTOMY USING DA AWAIS XI Bilateral 08/26/2019    Procedure: XI ROBOTIC SALPINGO-OOPHORECTOMY;  Surgeon: Aneudy Almeida MD;  [facility];  Service: OB/GYN;  Laterality: Bilateral;  Wt 299lbs       Social:  Social History[3]    Family History:  Family History   Problem Relation Name Age of Onset    Arthritis Mother Puraluis Mariano     Diabetes Father Luan Mariano     Stroke Father Luan Mariano     Hypertension Father Luan Mariano     Depression Father Luan Mariano     Early death Father Luan Mariano     Heart disease Father Luan Mariano     Vision loss Father Luan Mariano     Heart disease Sister      Hypertension Sister      Depression Sister Apolonia Pascualford     Diabetes Sister Apolonia Pascualford     Early death Sister Apolonia Pascualford     Heart disease Sister Apolonia Mariano     Hypertension Sister Apolonia Pascualford     Heart disease Brother Luan Mariano     Heart attack Brother Luan Mariano     Hypertension Brother Luan Mariano     Early death Brother Luan Mariano     Heart disease Maternal Grandmother Talya Prince Grapuso     Heart disease Maternal Grandfather Simon Mariano     Heart disease Paternal Grandfather Nikolas Prince     Breast cancer Other mat great GM     Ovarian cancer Neg Hx      Uterine cancer Neg Hx      Colon cancer Neg Hx      Heart failure Neg Hx      Hyperlipidemia Neg Hx         PHYSICAL:  Virtual - no vitals      General Medical Examination:  General: Good hygiene, appropriate appearance.  HEENT: Normocephalic, atraumatic.   Chest: Unlabored breathing.   Ext: No clubbing, cyanosis, or edema.     Mental Status:  Mood/Affect: Appropriate/congruent.  Level of  consciousness: Awake, alert.  Orientation: Oriented to person, place, time and situation.  Language: Normal fluency, able to name, repeat, and follow complex multi-step commands    Cranial nerves:  III, IV, VI: EOMI with conjugate gaze and no nystagmus on end gaze  VII: Facial muscle activation intact and symmetric over the bilateral upper and lower face    Motor:   BUE without drift  Bradykinesia: none  Moderate left turn, mild right tilt    Gait:  -Arises from chair without use of hands.  Normal gait    Laboratory Data:    Lab Results   Component Value Date    TSH 0.636 06/14/2023    X1VWTFN 101 01/26/2023    FREET4 0.82 01/26/2023     Lab Results   Component Value Date    KOKMZXQX63 525 02/12/2020         Imaging:  No results found. However, due to the size of the patient record, not all encounters were searched. Please check Results Review for a complete set of results.    Assessment//Plan:   Problem List Items Addressed This Visit          Neuro    Cervical dystonia - Primary    Overview   >>OVERVIEW FOR NECK PAIN WRITTEN ON 6/13/2023  8:43 AM BY FELIX GALARZA MD    Significant abnormalities of cervical stenosis and foraminal stenosis on recent MRI          Patient with CD. Here to discuss botox. She has noted significant improvement with Sinemet. Discussed that we will want her to reduce Artane, benadryl, and baclofen use in the long term, starting with Artane.    Gave her a titration schedule (currently using 1 mg BID, will have her drop to 1 mg daily for one week then STOP).     Discussed that she can increase the Sinemet to a full tab and take TID if needed.     We will hold off on chemodenervation for now while we determine how much benefit she can get from Sinemet.      RTC PRN       I spent 35 minutes on patient care.              Carlos Huitron MD  [facility]  Department of Neurology  Movement Disorders             [1]   Outpatient Encounter Medications as of 3/12/2025   Medication Sig  Dispense Refill    acetaminophen (TYLENOL) 500 MG tablet Take 1 tablet (500 mg total) by mouth every 8 (eight) hours.      aspirin 81 MG Chew Take 81 mg by mouth once daily.      azelastine (ASTELIN) 137 mcg (0.1 %) nasal spray 1 spray (137 mcg total) by Nasal route 2 (two) times daily. 90 mL 3    baclofen (LIORESAL) 10 MG tablet Take 1 tablet (10 mg total) by mouth 3 (three) times daily. 90 tablet 11    blood-glucose meter kit Use as instructed 1 each 0    buPROPion (WELLBUTRIN SR) 150 MG TBSR 12 hr tablet Take 1 tablet (150 mg total) by mouth 2 (two) times daily. 180 tablet 3    capsaicin 0.1 % Crea Apply topically three times a day as needed 42.5 g 11    carbidopa-levodopa  mg (SINEMET)  mg per tablet Take 0.5 tablets by mouth 2 (two) times a day. 30 tablet 11    clindamycin (CLEOCIN T) 1 % lotion Apply topically 2 (two) times daily. 60 mL 0    empagliflozin (JARDIANCE) 25 mg tablet Take 1 tablet (25 mg total) by mouth once daily. 90 tablet 3    ergocalciferol (ERGOCALCIFEROL) 50,000 unit Cap Take 1 capsule (50,000 Units total) by mouth every 7 days. 12 capsule 3    ezetimibe (ZETIA) 10 mg tablet Take 1 tablet (10 mg total) by mouth once daily. 90 tablet 3    fluticasone propionate (FLONASE) 50 mcg/actuation nasal spray 1 spray (50 mcg total) by Each Nostril route once daily. 32 g 11    gabapentin (NEURONTIN) 300 MG capsule Take 600 mg (2 caps) QAM and 300 mg (1 cap) in afternoon and 300 mg (1 cap)  capsule 0    losartan (COZAAR) 25 MG tablet Take 1 tablet (25 mg total) by mouth once daily. 90 tablet 3    losartan (COZAAR) 25 MG tablet Take 1 tablet (25 mg total) by mouth once daily. 90 tablet 3    meclizine (ANTIVERT) 12.5 mg tablet Take 1 tablet (12.5 mg total) by mouth 2 (two) times daily as needed for Dizziness. 30 tablet 3    naproxen (NAPROSYN) 500 MG tablet Take 1 tablet (500 mg total) by mouth 2 (two) times daily with meals. 180 tablet 1    tirzepatide 12.5 mg/0.5 mL PnIj Inject 12.5 mg  into the skin every 7 days. 2 mL 11    tretinoin (RETIN-A) 0.025 % cream APPLY TOPICALLY EVERY EVENING. 20 g 6    trihexyphenidyL (ARTANE) 2 MG tablet Take 1.5 tablets (3 mg total) by mouth 2 (two) times a day. 90 tablet 11     No facility-administered encounter medications on file as of 3/12/2025.   [2]   Patient Active Problem List  Diagnosis    Controlled type 2 diabetes mellitus without complication, without long-term current use of insulin    Vitamin D insufficiency    Class 3 severe obesity due to excess calories with serious comorbidity and body mass index (BMI) of 50.0 to 59.9 in adult    Edema leg    ASHLI (obstructive sleep apnea)    Essential hypertension    s/p RALH/BSO 8/26/2019    Gait abnormality    Cervicalgia of vgoxzuuz-raambwz-cweaq region    Statin myopathy    Hyperlipidemia LDL goal <70    Cervical dystonia    History of endometrial cancer    Allergic rhinitis    Generalized anxiety disorder    Neck pain    Balance problem   [3]   Social History  Socioeconomic History    Marital status:    Occupational History     Employer: [employer]     Tobacco Use    Smoking status: Never    Smokeless tobacco: Never   Substance and Sexual Activity    Alcohol use: Never    Drug use: No    Sexual activity: Yes     Partners: Male     Birth control/protection: Post-menopausal, None     Comment: Hysterectomy     Social Drivers of Health     Financial Resource Strain: Low Risk  (4/8/2024)    Received from [facility]    Overall Financial Resource Strain (CARDIA)     Difficulty of Paying Living Expenses: Not hard at all   Food Insecurity: No Food Insecurity (4/8/2024)    Received from [facility]    Hunger Vital Sign     Worried About Running Out of Food in the Last Year: Never true     Ran Out of Food in the Last Year: Never true   Transportation Needs: No Transportation Needs (4/8/2024)    Received from [facility]    PRAPARE - Transportation     Lack of Transportation (Medical): No     Lack of  Transportation (Non-Medical): No   Physical Activity: Inactive (4/8/2024)    Received from [facility]    Exercise Vital Sign     Days of Exercise per Week: 0 days     Minutes of Exercise per Session: 0 min   Stress: Stress Concern Present (4/8/2024)    Received from [facility] - Occupational Stress Questionnaire     Feeling of Stress : To some extent   Housing Stability: Low Risk  (3/12/2024)    Housing Stability Vital Sign     Unable to Pay for Housing in the Last Year: No     Number of Places Lived in the Last Year: 1     Unstable Housing in the Last Year: No

## 2025-03-13 RX ORDER — CARBIDOPA AND LEVODOPA 25; 100 MG/1; MG/1
TABLET ORAL
Qty: 270 TABLET | Refills: 3 | Status: SHIPPED | OUTPATIENT
Start: 2025-03-13

## 2025-03-13 RX ORDER — GABAPENTIN 300 MG/1
CAPSULE ORAL
Qty: 120 CAPSULE | Refills: 11 | Status: SHIPPED | OUTPATIENT
Start: 2025-03-13

## 2025-03-20 ENCOUNTER — PATIENT OUTREACH (OUTPATIENT)
Dept: ADMINISTRATIVE | Facility: HOSPITAL | Age: 65
End: 2025-03-20
Payer: COMMERCIAL

## 2025-03-20 NOTE — PROGRESS NOTES
03/20/2025  VB chart audit performed. Care Everywhere updates requested and reviewed  Overdue HM topic chart audit and/or requested. LINKS triggered and reconciled. Media reviewed Lvm/portal sent regarding overdue health topics.

## 2025-03-21 ENCOUNTER — PATIENT OUTREACH (OUTPATIENT)
Dept: ADMINISTRATIVE | Facility: HOSPITAL | Age: 65
End: 2025-03-21
Payer: COMMERCIAL

## 2025-05-01 ENCOUNTER — PATIENT MESSAGE (OUTPATIENT)
Dept: CARDIOLOGY | Facility: CLINIC | Age: 65
End: 2025-05-01
Payer: COMMERCIAL

## 2025-05-07 ENCOUNTER — ON-DEMAND VIRTUAL (OUTPATIENT)
Dept: URGENT CARE | Facility: CLINIC | Age: 65
End: 2025-05-07
Payer: COMMERCIAL

## 2025-05-07 DIAGNOSIS — J01.90 ACUTE RHINOSINUSITIS: Primary | ICD-10-CM

## 2025-05-07 RX ORDER — PREDNISONE 10 MG/1
10 TABLET ORAL DAILY
Qty: 3 TABLET | Refills: 0 | Status: SHIPPED | OUTPATIENT
Start: 2025-05-07 | End: 2025-05-10

## 2025-05-07 RX ORDER — LEVOCETIRIZINE DIHYDROCHLORIDE 5 MG/1
5 TABLET, FILM COATED ORAL NIGHTLY
Qty: 30 TABLET | Refills: 0 | Status: SHIPPED | OUTPATIENT
Start: 2025-05-07 | End: 2025-06-06

## 2025-05-07 NOTE — PATIENT INSTRUCTIONS

## 2025-05-07 NOTE — PROGRESS NOTES
Subjective:      Patient ID: Tawny Valerio is a 64 y.o. female.    Vitals:  vitals were not taken for this visit.     Chief Complaint: Sore Throat      Visit Type: TELE AUDIOVISUAL    Patient Location: [place]     Present with the patient at the time of consultation: TELEMED PRESENT WITH PATIENT: None    Past Medical History:   Diagnosis Date    Anxiety     Diabetes mellitus, type 2 2016    Endometrial ca 2019    Essential hypertension 2018    Hyperlipidemia LDL goal <70 10/30/2023    Simple endometrial hyperplasia 03/15/2019    Uterine polyp 2019     Past Surgical History:   Procedure Laterality Date     SECTION      x 1    DILATION AND CURETTAGE OF UTERUS  2019    HYSTERECTOMY  19    HYSTEROSCOPY WITH DILATION AND CURETTAGE OF UTERUS N/A 2019    Procedure: HYSTEROSCOPY, WITH DILATION AND CURETTAGE OF UTERUS;  Surgeon: Bobby Frazier Jr., MD;  Location: [facility];  Service: OB/GYN;  Laterality: N/A;    LAPAROSCOPIC APPENDECTOMY N/A 2024    Procedure: APPENDECTOMY, LAPAROSCOPIC;  Surgeon: Hector Harrell MD;  Location: [address];  Service: General;  Laterality: N/A;    lipoma removed      ROBOT-ASSISTED LAPAROSCOPIC ABDOMINAL HYSTERECTOMY USING DA AWAIS XI N/A 2019    Procedure: XI ROBOTIC HYSTERECTOMY;  Surgeon: Aneudy Almeida MD;  Location: [facility] OR [address] [place] [address];  Service: OB/GYN;  Laterality: N/A;    ROBOT-ASSISTED LAPAROSCOPIC LYSIS OF ADHESIONS USING DA AWAIS XI N/A 2019    Procedure: XI ROBOTIC LYSIS, ADHESIONS;  Surgeon: Aneudy Almeida MD;  [facility];  Service: OB/GYN;  Laterality: N/A;  Omental    ROBOT-ASSISTED LAPAROSCOPIC SALPINGO-OOPHORECTOMY USING DA AWAIS XI Bilateral 2019    Procedure: XI ROBOTIC SALPINGO-OOPHORECTOMY;  Surgeon: Aneudy Almeida MD;  Location: [facility] OR [address] [place] [address];  Service: OB/GYN;  Laterality: Bilateral;  Wt 299lbs     Review of patient's allergies indicates:   Allergen Reactions    Celebrex  [celecoxib] Anaphylaxis     Is able to tolerate Mobic, Aspirin, other NSAIDs.    Codeine Hives     Does not know what she can take -usually just takes tylenol or ibuprofen    Lidocaine Hives    Vicodin [hydrocodone-acetaminophen] Hives and Swelling     Medications Ordered Prior to Encounter[1]  Family History   Problem Relation Name Age of Onset    Arthritis Mother Pura Mariano     Diabetes Father Luan Mariano     Stroke Father Luan Mariano     Hypertension Father Luan Mariano     Depression Father Luan Mariano     Early death Father Luan Mariano     Heart disease Father Luan Mariano     Vision loss Father Luan Mariano     Heart disease Sister      Hypertension Sister      Depression Sister Apolonia Mariano     Diabetes Sister Apolonia Mariano     Early death Sister Apolonia Mariano     Heart disease Sister Apolonia Mariano     Hypertension Sister Apolonia Mariano     Heart disease Brother Luan Mariano     Heart attack Brother Luan Mariano     Hypertension Brother Luan Mariano     Early death Brother Luan Mariano     Heart disease Maternal Grandmother Talya Prince Grapuso     Heart disease Maternal Grandfather Simon Mariano     Heart disease Paternal Grandfather Nikolas Prince     Breast cancer Other mat great GM     Ovarian cancer Neg Hx      Uterine cancer Neg Hx      Colon cancer Neg Hx      Heart failure Neg Hx      Hyperlipidemia Neg Hx         Medications Ordered                [facility] [place] [facility] [address] [facility]   [address] [facility] [address]    Telephone: [phone]   Fax: [phone]   Hours: Not open 24 hours                         E-Prescribed (2 of 2)              levocetirizine (XYZAL) 5 MG tablet    Sig: Take 1 tablet (5 mg total) by mouth every evening.       Start: 5/7/25     Quantity: 30 tablet Refills: 0                         predniSONE (DELTASONE) 10 MG tablet    Sig: Take 1 tablet (10 mg total) by mouth once daily. for  3 days       Start: 5/7/25     Quantity: 3 tablet Refills: 0                           Ohs Peq Odvv Intake    5/7/2025  6:21 AM CDT - Filed by Luan Valerio (Proxy)   What is your current physical address in the event of a medical emergency? [address] [place] [address]   Are you able to take your vital signs? No   Please attach any relevant images or files    Is your employer contracted with [facility]? No         Pt presents with c/o sinus pressure with sneezing, with throat clearing x1 day with sore throat. Temp of 99.0  Taking flonase, robitussin.  Symptoms have caused more pain in her neck with her cervicle dystonia.  Denies CP, SOB, dizziness.   Requesting work excuse.     Sore Throat   This is a new problem. The current episode started yesterday. The problem has been gradually worsening. Associated symptoms include congestion, coughing, headaches and neck pain. Pertinent negatives include no shortness of breath, swollen glands or trouble swallowing.       Constitution: Positive for fever.   HENT:  Positive for congestion and sore throat. Negative for trouble swallowing.    Neck: Positive for neck pain. Negative for neck stiffness.   Cardiovascular:  Negative for chest pain.   Respiratory:  Positive for cough. Negative for shortness of breath.    Neurological:  Positive for headaches.        Objective:   The physical exam was conducted virtually.  Physical Exam   Constitutional: She is oriented to person, place, and time. No distress.   HENT:   Head: Normocephalic.   Ears:   Right Ear: External ear normal.   Left Ear: External ear normal.   Nose: No rhinorrhea or congestion.   Eyes: Conjunctivae are normal.   Neck: Neck supple.   Pulmonary/Chest: Effort normal. No respiratory distress.   Neurological: She is alert and oriented to person, place, and time.   Skin: Skin is no rash.         Lab Results   Component Value Date    HGBA1C 5.6 12/10/2024       Assessment:     1. Acute  rhinosinusitis        Plan:   Increase fluids and rest  Pedialyte, powerade, gatorade  Take meds as directed  Warm salt water gargles, tea with honey, chlorseptic spray, throat lozenges   Limit foods that are salty, spicy food, limit carbonated drinks, limit acidic drinks  Tylenol or Motrin as directed for fever or body aches    Continue antihistamine (zyrtec or Claritin), Flonase and saline nasal spray  Okay to take Robitussin DM, Mucinex DM, Dayquil/Nyquil as directed    Continue baclofen for cervical dystonia    If increased Shortness of breath or difficulty breathing go to the Urgent Care or ER  If symptoms worsening or not improved f/u in Urgent Care or with PCP    Work excuse placed     Acute rhinosinusitis  -     levocetirizine (XYZAL) 5 MG tablet; Take 1 tablet (5 mg total) by mouth every evening.  Dispense: 30 tablet; Refill: 0  -     predniSONE (DELTASONE) 10 MG tablet; Take 1 tablet (10 mg total) by mouth once daily. for 3 days  Dispense: 3 tablet; Refill: 0      We appreciate you trusting us with your medical care. We hope you feel better soon. We will be happy to take care of you for all of your future medical needs.     You must understand that you've received Virtual treatment only and that you may be released before all your medical problems are known or treated. You, the patient, will arrange for follow up care as instructed.     Follow up with your PCP or specialty clinic as directed in the next 1-2 weeks if not improved or as needed. You can call [phone] to schedule an appointment with the appropriate provider.     If your condition worsens we recommend that you receive another evaluation in person, with your primary care provider, urgent care or at the emergency room immediately or contact your primary medical clinics after hours call service to discuss your concerns.                   [1]   Current Outpatient Medications on File Prior to Visit   Medication Sig Dispense Refill     acetaminophen (TYLENOL) 500 MG tablet Take 1 tablet (500 mg total) by mouth every 8 (eight) hours.      aspirin 81 MG Chew Take 81 mg by mouth once daily.      azelastine (ASTELIN) 137 mcg (0.1 %) nasal spray 1 spray (137 mcg total) by Nasal route 2 (two) times daily. 90 mL 3    baclofen (LIORESAL) 10 MG tablet Take 1 tablet (10 mg total) by mouth 3 (three) times daily. 90 tablet 11    blood-glucose meter kit Use as instructed 1 each 0    buPROPion (WELLBUTRIN SR) 150 MG TBSR 12 hr tablet Take 1 tablet (150 mg total) by mouth 2 (two) times daily. 180 tablet 3    capsaicin 0.1 % Crea Apply topically three times a day as needed 42.5 g 11    carbidopa-levodopa  mg (SINEMET)  mg per tablet Take 1/2-1 tablet two to three times daily. 270 tablet 3    clindamycin (CLEOCIN T) 1 % lotion Apply topically 2 (two) times daily. 60 mL 0    empagliflozin (JARDIANCE) 25 mg tablet Take 1 tablet (25 mg total) by mouth once daily. 90 tablet 3    ergocalciferol (ERGOCALCIFEROL) 50,000 unit Cap Take 1 capsule (50,000 Units total) by mouth every 7 days. 12 capsule 3    ezetimibe (ZETIA) 10 mg tablet Take 1 tablet (10 mg total) by mouth once daily. 90 tablet 3    fluticasone propionate (FLONASE) 50 mcg/actuation nasal spray 1 spray (50 mcg total) by Each Nostril route once daily. 32 g 11    gabapentin (NEURONTIN) 300 MG capsule Take 600 mg (2 caps) QAM and 300 mg (1 cap) in afternoon and 300 mg (1 cap)  capsule 11    losartan (COZAAR) 25 MG tablet Take 1 tablet (25 mg total) by mouth once daily. 90 tablet 3    losartan (COZAAR) 25 MG tablet Take 1 tablet (25 mg total) by mouth once daily. 90 tablet 3    meclizine (ANTIVERT) 12.5 mg tablet Take 1 tablet (12.5 mg total) by mouth 2 (two) times daily as needed for Dizziness. 30 tablet 3    naproxen (NAPROSYN) 500 MG tablet Take 1 tablet (500 mg total) by mouth 2 (two) times daily with meals. 180 tablet 1    tirzepatide 12.5 mg/0.5 mL PnIj Inject 12.5 mg into the skin every  7 days. 2 mL 11    tretinoin (RETIN-A) 0.025 % cream APPLY TOPICALLY EVERY EVENING. 20 g 6    trihexyphenidyL (ARTANE) 2 MG tablet Take 1.5 tablets (3 mg total) by mouth 2 (two) times a day. 90 tablet 11     No current facility-administered medications on file prior to visit.

## 2025-05-07 NOTE — LETTER
May 7, 2025    Tawny Valerio  [address] [place] [address]             Virtual Visit - Urgent Care  Urgent Care  [address] [facility] [address]   May 7, 2025     Patient: Tawny Valerio   YOB: 1960   Date of Visit: 5/7/2025       To Whom it May Concern:    Tawny Valerio was seen virtually on 5/7/2025. She may return to work on 5/8/2025.    Please excuse her from any classes or work missed.    If you have any questions or concerns, please don't hesitate to call.    Sincerely,         Sophia Mendiola, NP

## 2025-05-16 ENCOUNTER — DOCUMENTATION ONLY (OUTPATIENT)
Dept: REHABILITATION | Facility: HOSPITAL | Age: 65
End: 2025-05-16
Payer: COMMERCIAL

## 2025-05-16 NOTE — PROGRESS NOTES
[facility]   Discharge Note    Name: Tawny Valerio  [facility] Number: [id number]    Therapy Diagnosis: No diagnosis found.  Physician: No ref. provider found    Physician Orders: PT Eval and Treat   Medical Diagnosis from Referral:   R26.89 (ICD-10-CM) - Imbalance   G24.3 (ICD-10-CM) - Cervical dystonia      Evaluation Date: 12/19/2024      Date of Last visit: 2/20/24  Total Visits Received: 2    ASSESSMENT          Discharge reason: Patient has not attended therapy since 2/20/24 and pt sent me a message stating that she wanted to take a step back and wait to do PT and wanted to talk to her neurologist       Goals: not able to determine goal status     PLAN   This patient is discharged from Physical Therapy      Heena Pickering, PT

## 2025-05-22 ENCOUNTER — PATIENT OUTREACH (OUTPATIENT)
Dept: ADMINISTRATIVE | Facility: HOSPITAL | Age: 65
End: 2025-05-22
Payer: COMMERCIAL

## 2025-05-29 ENCOUNTER — PATIENT MESSAGE (OUTPATIENT)
Dept: ADMINISTRATIVE | Facility: HOSPITAL | Age: 65
End: 2025-05-29
Payer: COMMERCIAL

## 2025-06-06 ENCOUNTER — PATIENT MESSAGE (OUTPATIENT)
Facility: CLINIC | Age: 65
End: 2025-06-06
Payer: COMMERCIAL

## 2025-06-06 ENCOUNTER — PATIENT OUTREACH (OUTPATIENT)
Dept: ADMINISTRATIVE | Facility: HOSPITAL | Age: 65
End: 2025-06-06
Payer: COMMERCIAL

## 2025-06-09 ENCOUNTER — TELEPHONE (OUTPATIENT)
Dept: FAMILY MEDICINE | Facility: CLINIC | Age: 65
End: 2025-06-09
Payer: COMMERCIAL

## 2025-06-09 NOTE — TELEPHONE ENCOUNTER
Spoke to pt, she scheduled an appt with primary care in [facility] for the headaches and neck pain

## 2025-06-09 NOTE — TELEPHONE ENCOUNTER
Pt is scheduled incorrectly with our office tomorrow. When I called to notify her, she stated she needs to be seen tomorrow due to her symptoms. She stated she has reached out to both her PCP and Neuro. I have sent a message to both teams, to assist with scheduling.

## 2025-06-10 ENCOUNTER — PATIENT MESSAGE (OUTPATIENT)
Facility: CLINIC | Age: 65
End: 2025-06-10
Payer: COMMERCIAL

## 2025-06-10 ENCOUNTER — OFFICE VISIT (OUTPATIENT)
Dept: FAMILY MEDICINE | Facility: CLINIC | Age: 65
End: 2025-06-10
Payer: COMMERCIAL

## 2025-06-10 ENCOUNTER — PATIENT MESSAGE (OUTPATIENT)
Dept: FAMILY MEDICINE | Facility: CLINIC | Age: 65
End: 2025-06-10
Payer: COMMERCIAL

## 2025-06-10 ENCOUNTER — LAB VISIT (OUTPATIENT)
Dept: LAB | Facility: HOSPITAL | Age: 65
End: 2025-06-10
Attending: STUDENT IN AN ORGANIZED HEALTH CARE EDUCATION/TRAINING PROGRAM
Payer: COMMERCIAL

## 2025-06-10 VITALS
BODY MASS INDEX: 52.27 KG/M2 | WEIGHT: 276.88 LBS | RESPIRATION RATE: 20 BRPM | OXYGEN SATURATION: 97 % | SYSTOLIC BLOOD PRESSURE: 138 MMHG | HEART RATE: 100 BPM | DIASTOLIC BLOOD PRESSURE: 78 MMHG | HEIGHT: 61 IN

## 2025-06-10 DIAGNOSIS — E55.9 VITAMIN D INSUFFICIENCY: ICD-10-CM

## 2025-06-10 DIAGNOSIS — Z12.31 ENCOUNTER FOR SCREENING MAMMOGRAM FOR MALIGNANT NEOPLASM OF BREAST: ICD-10-CM

## 2025-06-10 DIAGNOSIS — Z76.89 ENCOUNTER TO ESTABLISH CARE: Primary | ICD-10-CM

## 2025-06-10 DIAGNOSIS — E11.9 CONTROLLED TYPE 2 DIABETES MELLITUS WITHOUT COMPLICATION, WITHOUT LONG-TERM CURRENT USE OF INSULIN: ICD-10-CM

## 2025-06-10 DIAGNOSIS — M54.2 CERVICALGIA OF OCCIPITO-ATLANTO-AXIAL REGION: ICD-10-CM

## 2025-06-10 DIAGNOSIS — Z12.11 COLON CANCER SCREENING: ICD-10-CM

## 2025-06-10 DIAGNOSIS — I10 ESSENTIAL HYPERTENSION: ICD-10-CM

## 2025-06-10 PROBLEM — R26.9 GAIT ABNORMALITY: Status: RESOLVED | Noted: 2023-02-08 | Resolved: 2025-06-10

## 2025-06-10 LAB
25(OH)D3+25(OH)D2 SERPL-MCNC: 27 NG/ML (ref 30–96)
CHOLEST SERPL-MCNC: 172 MG/DL (ref 120–199)
CHOLEST/HDLC SERPL: 3.4 {RATIO} (ref 2–5)
EAG (OHS): 114 MG/DL (ref 68–131)
HBA1C MFR BLD: 5.6 % (ref 4–5.6)
HDLC SERPL-MCNC: 51 MG/DL (ref 40–75)
HDLC SERPL: 29.7 % (ref 20–50)
LDLC SERPL CALC-MCNC: 109.2 MG/DL (ref 63–159)
NONHDLC SERPL-MCNC: 121 MG/DL
TRIGL SERPL-MCNC: 59 MG/DL (ref 30–150)

## 2025-06-10 PROCEDURE — 80061 LIPID PANEL: CPT

## 2025-06-10 PROCEDURE — 3078F DIAST BP <80 MM HG: CPT | Mod: CPTII,S$GLB,, | Performed by: STUDENT IN AN ORGANIZED HEALTH CARE EDUCATION/TRAINING PROGRAM

## 2025-06-10 PROCEDURE — 4010F ACE/ARB THERAPY RXD/TAKEN: CPT | Mod: CPTII,S$GLB,, | Performed by: STUDENT IN AN ORGANIZED HEALTH CARE EDUCATION/TRAINING PROGRAM

## 2025-06-10 PROCEDURE — 82306 VITAMIN D 25 HYDROXY: CPT

## 2025-06-10 PROCEDURE — 99999 PR PBB SHADOW E&M-EST. PATIENT-LVL V: CPT | Mod: PBBFAC,,, | Performed by: STUDENT IN AN ORGANIZED HEALTH CARE EDUCATION/TRAINING PROGRAM

## 2025-06-10 PROCEDURE — 3008F BODY MASS INDEX DOCD: CPT | Mod: CPTII,S$GLB,, | Performed by: STUDENT IN AN ORGANIZED HEALTH CARE EDUCATION/TRAINING PROGRAM

## 2025-06-10 PROCEDURE — 1159F MED LIST DOCD IN RCRD: CPT | Mod: CPTII,S$GLB,, | Performed by: STUDENT IN AN ORGANIZED HEALTH CARE EDUCATION/TRAINING PROGRAM

## 2025-06-10 PROCEDURE — 99214 OFFICE O/P EST MOD 30 MIN: CPT | Mod: S$GLB,,, | Performed by: STUDENT IN AN ORGANIZED HEALTH CARE EDUCATION/TRAINING PROGRAM

## 2025-06-10 PROCEDURE — 3075F SYST BP GE 130 - 139MM HG: CPT | Mod: CPTII,S$GLB,, | Performed by: STUDENT IN AN ORGANIZED HEALTH CARE EDUCATION/TRAINING PROGRAM

## 2025-06-10 PROCEDURE — 83036 HEMOGLOBIN GLYCOSYLATED A1C: CPT

## 2025-06-10 PROCEDURE — 36415 COLL VENOUS BLD VENIPUNCTURE: CPT | Mod: PO

## 2025-06-10 RX ORDER — IBUPROFEN 800 MG/1
800 TABLET, FILM COATED ORAL 3 TIMES DAILY PRN
Qty: 60 TABLET | Refills: 0 | Status: SHIPPED | OUTPATIENT
Start: 2025-06-10

## 2025-06-10 NOTE — PROGRESS NOTES
"Chief Complaint   Patient presents with    Establish Care     Establish care/ Discuss options foe medicine to deal with inflammation.        Subjective   Patient ID: Tawny Valerio is a 65 y.o. female.    HPI  Tawny Valerio is a 65 y.o. with a PMHx cervical dystonia, cervicalgia of occipito region (followed by neurology), type 2 diabetes, hypertension, BMI 52, vitamin d deficiency, hx of endometrial cancer s/p RALH/SBO, allergies, anxiety  who presents today to establish care with provider.   The patients previous PCP was Dr Hyatt and they last saw them on 2024.     Concerns today: she reports flare of her "inflammation" around her neck in the setting of her cervical dsytonia for which she is followed by neuorlogy. She has used naproxen, ASA 325mg, tylenol extra strength and mobic for her symptoms before       Current medication list: tylenol PRN, ASA, astelin, flonase, baclofen, wellbutrin 150mg BID, sinemet, jardiance 25mg, vitamin D, zetia 10mg, gabapentin 300nmg, losartan 25mg (?50mg), naproxen, tirzepatide 12.5mg weekly, retina-A, artane 2mg  Allergies: celebrex, codeine, lidocaine, vicodin  PSHx: RALH/SBO, , lipoma removal  PFHx: father - diabetes, stroke, hypertension, early death; sister - diabetes, early death, heart disease; brother - [place], hypertension, early death     Social  Tobacco use: reports no use or hx  Alcohol use: reports no use  Illicit drug use: reports no use or hx  Lives with:    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   Any children: 2    Lifestyle  Current exercise regimen: swims 1hr about 2-3 days per week  Current diet: mixture of eating out and cooking     OB/GYN Hx   LMP: hysterectomy   GsPs:   Contraception: NA  Cervical cancer screening - Last PAP: NA  Breast cancer screening - Last mammogram: never had one, fam hx: great grandmother     Screening Hx:  Dental care: 1mo go  Annual eye exam: due    PHQ9 screen:       6/10/2025   PHQ-9 " "Depression Patient Health Questionnaire   Over the last two weeks how often have you been bothered by little interest or pleasure in doing things 0   Over the last two weeks how often have you been bothered by feeling down, depressed or hopeless 0        Colorectal cancer screening - Last colonoscopy: 5yrs ago had stool test   - Starting age 45 in high risk populations. pt average risk  Lung cancer screening - LDCT: NA  - Risk factors: age 50 to 80 who have 20-pack year smoking hx and currently smoke or have quit within the last 15 years  DEXA - Last scan: NA   - A 65, or younger if risk factors. Use FRAX or SCORE  AAA screening - Last US:NA   - Male age 65-75 with hx of smoking or family hx      Review of Systems  Objective      Body mass index is 52.32 kg/m².  Vitals:    06/10/25 1013   BP: 138/78   Patient Position: Sitting   Pulse: 100   Resp: 20   SpO2: 97%   Weight: 125.6 kg (276 lb 14.4 oz)   Height: 5' 1" (1.549 m)      Physical Exam  Vitals reviewed.   Constitutional:       General: She is not in acute distress.     Appearance: Normal appearance. She is not ill-appearing, toxic-appearing or diaphoretic.   HENT:      Head: Normocephalic and atraumatic.   Pulmonary:      Effort: No respiratory distress.   Neurological:      Mental Status: She is alert.         Procedures         Assessment & Plan   Encounter to establish care  The patient is here today to establish care to a PCP closer to home for her    Controlled type 2 diabetes mellitus without complication, without long-term current use of insulin  - A1c was 5.6 from 12/2025 - she is currently on jardiance 25mg, tirzepatide 12.5mg weekly   -     Ambulatory referral/consult to Ophthalmology; Future; Expected date: 06/17/2025  -     Hemoglobin A1C; Future; Expected date: 06/10/2025  -     Lipid Panel; Future; Expected date: 06/10/2025    Essential hypertension  - BP today is 138/78 - she is currently on losartan 25mg (she has it written twice but she isnt " sure if 25mg or 50mg)  -     Lipid Panel; Future; Expected date: 06/10/2025    Encounter for screening mammogram for malignant neoplasm of breast  - Never had one   -     Mammo Digital Screening Bilat w/ [employer] (XPD); Future; Expected date: 06/10/2025    Colon cancer screening  - 5yrs ago with stool test - would like to do it again   -     Cologuard Screening (Multitarget Stool DNA); Future; Expected date: 06/10/2025    Cervicalgia of qovktcek-lrunwlm-xdgzn region  - She is followed by neurology - at this time she reports flare of her symptoms. Will try ibuprofen 800mg PRN. She has tried naproxen, mobic, tylenol extra strength and ASA 325mg  -     ibuprofen (ADVIL,MOTRIN) 800 MG tablet; Take 1 tablet (800 mg total) by mouth 3 (three) times daily as needed for Pain.  Dispense: 60 tablet; Refill: 0    Vitamin D insufficiency  -     Vitamin D; Future; Expected date: 06/10/2025            Patient informed of transferring medical history from prior provider to [facility].   Follow up in about 3 months (around 9/10/2025) for follow up.

## 2025-06-11 ENCOUNTER — RESULTS FOLLOW-UP (OUTPATIENT)
Dept: FAMILY MEDICINE | Facility: CLINIC | Age: 65
End: 2025-06-11
Payer: COMMERCIAL

## 2025-06-11 ENCOUNTER — PATIENT OUTREACH (OUTPATIENT)
Dept: ADMINISTRATIVE | Facility: HOSPITAL | Age: 65
End: 2025-06-11
Payer: COMMERCIAL

## 2025-06-11 ENCOUNTER — TELEPHONE (OUTPATIENT)
Dept: FAMILY MEDICINE | Facility: CLINIC | Age: 65
End: 2025-06-11
Payer: COMMERCIAL

## 2025-06-11 DIAGNOSIS — Z12.31 SCREENING MAMMOGRAM FOR BREAST CANCER: Primary | ICD-10-CM

## 2025-06-11 NOTE — PROGRESS NOTES
06/11/2025  VB chart audit performed. Care Everywhere updates requested and reviewed  Overdue HM topic chart audit and/or requested. LINKS triggered and reconciled. Media reviewed Lvm/portal sent regarding overdue health topics

## 2025-06-13 ENCOUNTER — PATIENT MESSAGE (OUTPATIENT)
Facility: CLINIC | Age: 65
End: 2025-06-13
Payer: COMMERCIAL

## 2025-06-16 ENCOUNTER — PATIENT MESSAGE (OUTPATIENT)
Facility: CLINIC | Age: 65
End: 2025-06-16
Payer: COMMERCIAL

## 2025-06-16 NOTE — TELEPHONE ENCOUNTER
SW spoke with pt, confirmed that Short Term Disability paperwork will be sent to [facility] neurology fax [phone] from [employer]. SW sending pt SW email just in case that is easier.     Disabling Diagnoses and Work Concerns  Pt is  at [employer] in [employer].   Occipital neuralgia, cervical dystonia causing disability.   Pain, light sensitive, ambulation or any movement that require turning head can be debilitating, medication makes her fall asleep at desk, unable to drive because of fatigue, mental fog, communication deficits and concentration issues.   [employer] (mediation especially). Causes brain fog and memory loss.   Communication issues, train of thought will abruptly stop. Very difficult to verbalize instructions to the employees under her.     Pt plans to return to work after a break (on short-term disability) to focus on treatments like PT.     Plan:  SW discussed process of applying for SSDI should she need to stop working due to disability before 68 y/o.   SW discussed FMLA for schedule adjustments for therapy treatments.   SW sending summary of discussion in portal msg and email.     Holley Stauffer LCSW    Neurology ? Movement Disorders  [facility] [place]    [address] [place] [id number]   P: [phone]  F: [phone]

## 2025-06-17 DIAGNOSIS — G24.3 CERVICAL DYSTONIA: Primary | ICD-10-CM

## 2025-06-17 NOTE — TELEPHONE ENCOUNTER
LANIE assisted Dr. Huitron with Short term disability paperwork. Pt requesting 1 month off to focus on treatment plan.    LANIE assisted Kaylyn Ko PA-C with letter requesting time off, shorter work days until leave begins.     LANIE requested PT order from PCP per request of MARINE Ko.    Holley Stauffer [facility]    Neurology ? Movement Disorders  [facility] ? Main [address] [address]   P: [phone]  F: [phone]

## 2025-06-25 ENCOUNTER — CLINICAL SUPPORT (OUTPATIENT)
Dept: REHABILITATION | Facility: HOSPITAL | Age: 65
End: 2025-06-25
Attending: STUDENT IN AN ORGANIZED HEALTH CARE EDUCATION/TRAINING PROGRAM
Payer: COMMERCIAL

## 2025-06-25 DIAGNOSIS — R29.898 ABNORMAL MUSCLE TONE: ICD-10-CM

## 2025-06-25 DIAGNOSIS — M53.82 DECREASED ROM OF INTERVERTEBRAL DISCS OF CERVICAL SPINE: Primary | ICD-10-CM

## 2025-06-25 DIAGNOSIS — R29.898 DECREASED ROM OF NECK: ICD-10-CM

## 2025-06-25 DIAGNOSIS — R29.898 IMPAIRED FLEXIBILITY OF UPPER EXTREMITY: ICD-10-CM

## 2025-06-25 PROCEDURE — 97161 PT EVAL LOW COMPLEX 20 MIN: CPT | Mod: PO

## 2025-06-25 PROCEDURE — 97530 THERAPEUTIC ACTIVITIES: CPT | Mod: PO

## 2025-06-27 PROBLEM — M53.82 DECREASED ROM OF INTERVERTEBRAL DISCS OF CERVICAL SPINE: Status: ACTIVE | Noted: 2025-06-27

## 2025-06-27 PROBLEM — R29.898 DECREASED ROM OF NECK: Status: ACTIVE | Noted: 2025-06-27

## 2025-06-27 PROBLEM — R29.898 IMPAIRED FLEXIBILITY OF UPPER EXTREMITY: Status: ACTIVE | Noted: 2025-06-27

## 2025-06-27 PROBLEM — R29.898 ABNORMAL MUSCLE TONE: Status: ACTIVE | Noted: 2025-06-27

## 2025-06-27 NOTE — PROGRESS NOTES
Outpatient Rehab    Physical Therapy Evaluation    Patient Name: Tawny Valerio  MRN: [id number]  YOB: 1960  Encounter Date: 6/25/2025    Therapy Diagnosis:   Encounter Diagnoses   Name Primary?    Decreased ROM of intervertebral discs of cervical spine Yes    Decreased ROM of neck     Abnormal muscle tone     Impaired flexibility of upper extremity      Physician: Ernesto Edge MD    Physician Orders: Eval and Treat  Medical Diagnosis: Cervical dystonia  Surgical Diagnosis: Not applicable for this Episode   Surgical Date: Not applicable for this Episode  Days Since Last Surgery: Not applicable for this Episode    Visit # / Visits Authorized:  1 / 1  Insurance Authorization Period: 6/17/2025 to 12/31/2025  Date of Evaluation: 6/25/2025  Plan of Care Certification: 6/25/2025 to 6/25/2025 HEP only     Time In: 1500   Time Out: 1600  Total Time (in minutes): 60   Total Billable Time (in minutes): 60    Intake Outcome Measure for FOTO Survey    Therapist reviewed FOTO scores for Tawny Valerio on 6/25/2025.   FOTO report - see Media section or FOTO account episode details.     Intake Score: 34.8%    Precautions:       Subjective   History of Present Illness  Tawny is a 65 y.o. female who reports to physical therapy with a chief concern of decreased ROM of neck lacking rotation to the right (intermittently).     The patient reports a medical diagnosis of G24.3 (ICD-10-CM) - Cervical dystonia.    Diagnostic tests related to this condition: X-ray and MRI studies.   MRI Studies Details: of both CS and brain (see imaging notes)  X-Ray Details: of CS + for arthritis    History of Present Condition/Illness: Pt reports Jan of 2023 being in physical therapy at the [facility] in [place] for sciatica when her physical therapist noted she was hold her head funny. She had a process of follow ups with several neurologists ultimately leading her to Dr. Ko for movement disorder leading to diagnosis of  cervical dystonia. She is on several prescription medications to help, including baclofen. She reports at time during the day she can rotate her head to the right side and then other times she is unable to and finds her resting posture to include left cervical rotation and flexion, often using her hand underneath her chin to hold her head up. She had some suspected occipital neuralgia prior to this movement presentation that caused sharp pain to the L top of her head and fullness/swelling she reports to have been behind her left ear. She has tried several rounds of steriods and gabapentin, which she reports to have stopped working. She reports cracks and releases coming from her neck with complaints that the difficulty controlling rotation to the right is affecting her ability to drive as well as onset of some left sided neck pain (over the region of her scalene mm). She has had x-rays and MRIs of both cervical spine and brain. She has been recommended to try Botox, which she is asking our opinions of today, but stating she has read online that difficulty with swallowing may arise and that makes her nervous to try. She was in PT early this year at another location and has continued doing exercises, including postural strengthening, ROM, and stretches.     Pain          Pain Qualities: Tightness, Other (Comment)  Other Pain Qualities: involuntary control of head position  Pain-Relieving Factors: Ice, Heat, Medications - prescription         Living Arrangements  Living Situation  Living Arrangements: Spouse/significant other          Past Medical History/Physical Systems Review:   Tawny Valerio  has a past medical history of Allergy, Anxiety, Diabetes mellitus, type 2, Endometrial ca, Essential hypertension, Hyperlipidemia LDL goal <70, Keloid cicatrix, Simple endometrial hyperplasia, and Uterine polyp.    Tawny Valerio  has a past surgical history that includes lipoma removed; Dilation and curettage of  uterus (2019); Hysteroscopy with dilation and curettage of uterus (N/A, 2019);  section (1995); Robot-assisted laparoscopic salpingo-oophorectomy using da Yaya Xi (Bilateral, 2019); Robot-assisted laparoscopic abdominal hysterectomy using da Yaya Xi (N/A, 2019); Robot-assisted laparoscopic lysis of adhesions using da Yaya Xi (N/A, 2019); Hysterectomy (19); Laparoscopic appendectomy (N/A, 2024); Oophorectomy (19); and Abdominal surgery (2019).    Tawny has a current medication list which includes the following prescription(s): acetaminophen, aspirin, azelastine, baclofen, blood-glucose meter, bupropion, capsaicin, carbidopa-levodopa  mg, clindamycin, empagliflozin, ergocalciferol, ezetimibe, fluticasone propionate, gabapentin, ibuprofen, levocetirizine, losartan, losartan, meclizine, naproxen, tirzepatide, tretinoin, and trihexyphenidyl.    Review of patient's allergies indicates:   Allergen Reactions    Celebrex [celecoxib] Anaphylaxis     Is able to tolerate Mobic, Aspirin, other NSAIDs.    Codeine Hives     Does not know what she can take -usually just takes tylenol or ibuprofen    Lidocaine Hives    Vicodin [hydrocodone-acetaminophen] Hives and Swelling        Objective   Posture  Patient presents with a Rotated left, Tilted left, and Forward head position. Increased thoracic kyphosis is observed.            Resting seated posture during FOTO questionnaire included neutral CS position with chin resting on closed fist; posture during assessment of ROM included flexion, left rotation, right side bend, varying in severity from measurement to measurement    Spinal Mobility  Cervical Mobility Details: Left first rib joint + to spring testing with slight elevation compared to R    Spinal Muscle Palpation  Right Spinal Muscle Palpation  Unremarkable: Cervical/Thoracic     Attempted palpation of R SCM with minor if any increased tone near  "neutral position                Subcranial Range of Motion   Active Restricted? Passive Restricted? Pain   Flexion         Protraction         Retraction           Cervical Range of Motion   Active (deg) Passive (deg) Pain   Flexion 50       Extension 40       Right Lateral Flexion 40       Right Rotation 32       Left Lateral Flexion 33       Left Rotation 70         Lateral flexion varied from measure 1 to 3: 23/30/33 deg respectively; R lateral flexion varied 36/40 deg. During "spasm" pts resting position is at 50 deg of L cervical rotation from 0 with R rotation from this "new netural" to 32 being 82 deg arc of motion. Unable to get full true measurement of passive right rotation due to fluctuating tone of L cervical rotators            Treatment:  Therapeutic Activity  TA 1: Anterior and Middle (left) scalene stretch 3 x 30"  TA 2: R SCM stretch 3 x 30"  TA 3: Continue supine chin tucks 20 x 5"  TA 4: suboccipital release with tennis balls x 3'  TA 5: R cervical rotation isometrics 15 x 8" holds    Time Entry(in minutes):  PT Evaluation (Low) Time Entry: 40  Therapeutic Activity Time Entry: 20    Assessment & Plan   Assessment  Tawny presents with a condition of Low complexity.   Presentation of Symptoms: Stable  Will Comorbidities Impact Care: No       Functional Limitations: Activity tolerance, Driving, Functional mobility, Fine motor coordination, Range of motion  Impairments: Lack of appropriate home exercise program, Abnormal muscle tone, Abnormal muscle firing, Abnormal or restricted range of motion  Personal Factors Affecting Prognosis: Fear/anxiety    Patient Goal for Therapy (PT): Pt would like to improve R ROtation of her neck for driving.  Prognosis: Guarded  Assessment Details: Pt was seen today for complaints of fluctuating spacticity that is limiting her ability to control right rotation of her head and neck and is impacting her resting posture. ROM was unable to be reproducible during exam to " varying reports of spacticity. Due to her completing quite some time in PT for this condition with reported worsening, questions regarding Botox, and contraindications to manual therapy due to abnormal muscle tone and fluctuating spacticity, HEP recommended for patient to continue with postural strengthening, CS stretching to tolerance after use of head to help decrease abnormal muscle tone. She is already consistent in DNF and postural strengthening, which were encouraged to be continued. Pt recommended to return to neurologist to further discuss Botox offered to her that she has previously denied. She will continue independently with HEP at this time.     Plan  From a physical therapy perspective, the patient would not benefit from: Skilled Rehab Services           The patient's spiritual, cultural, and educational needs were considered, and the patient is agreeable to the plan of care and goals.           Goals:   Active       Short Term Goal       Pt will demonstrate proper perform of HEP in order to continue on her own.       Start:  06/27/25    Expected End:  06/27/25                Darling Suarez PT

## 2025-06-27 NOTE — PATIENT INSTRUCTIONS
"     HOME EXERCISE PROGRAM  Created by Darling Dawson  Jasson 25th, 2025  View videos at [email or web address]        Scalene stretch    Scalene Stretch    Grab counter top or chair with over hand . Bend neck away from side you are holding counter top. You will feel a good stretch on the side of the neck. Repeat 3 Times   Hold 30 Seconds   Complete 2 Sets   Perform 3 Times a Day          Scalene Stretch    While seated, or standing, rest your hand in the small of your back, comfortably. Rotate your head towards that arm and up towards the ceiling until a stretch is felt in the neck. Repeat 3 Times   Hold 30 Seconds   Perform 3 Times a Day          SCM/Scalene stretch    To stretch right side:  - Rotate neck right  - Side bend neck left  - Look upward    To stretch Left side:  - Rotate neck left  - Side bend right  - Look upward Repeat 3 Times   Hold 30 Seconds   Perform 3 Times a Day          Suboccipital Release with balls    Take 2 tennis balls (can also try other balls) and tape them together, or alternatively place in a pillow-case or sock.    Laying on your back on a firm surface (does not work as well in bed), place the two tennis balls at the base of your skull right where the bone of your skull meets the muscles. Relax your head over the balls, your head should be in a "chin-tuck" position as opposed to extended backwards. You should be able to relax your head and not have to work at maintaining your head in that position, if the balls feel like they're sliding down just place the balls slightly higher.    This activity can work wonders for people with classic tension headache issues as well as a variety of other cervical ailments. Hold 3 Minutes   Perform 1 Times a Day          CHIN TUCK - SUPINE    While lying on your back, tuck your chin towards your chest and press the back of your head into the table.    Maintain contact of the back of your head with the surface you are lying on the entire time.    Video # " [id number] Hold 1 Second   Complete 1 Set   Perform 1 Times a Day          CERVICAL ROTATION ISOMETRIC - HAND    Place your hand on your cheek bone and gently push it. Use your neck muscles to prevent any movement. Hold, relax and repeat.    Video # [id number] Repeat 15 Times   Hold 10 Seconds   Complete 2 Sets   Perform 2 Times a Day

## 2025-07-01 ENCOUNTER — PATIENT MESSAGE (OUTPATIENT)
Dept: FAMILY MEDICINE | Facility: CLINIC | Age: 65
End: 2025-07-01
Payer: COMMERCIAL

## 2025-07-01 DIAGNOSIS — G24.3 CERVICAL DYSTONIA: Primary | ICD-10-CM

## 2025-07-02 ENCOUNTER — OFFICE VISIT (OUTPATIENT)
Dept: OPTOMETRY | Facility: CLINIC | Age: 65
End: 2025-07-02
Payer: COMMERCIAL

## 2025-07-02 ENCOUNTER — TELEPHONE (OUTPATIENT)
Facility: CLINIC | Age: 65
End: 2025-07-02
Payer: COMMERCIAL

## 2025-07-02 DIAGNOSIS — E11.9 TYPE 2 DIABETES MELLITUS WITHOUT RETINOPATHY: ICD-10-CM

## 2025-07-02 DIAGNOSIS — H52.4 HYPEROPIA WITH ASTIGMATISM AND PRESBYOPIA, BILATERAL: Primary | ICD-10-CM

## 2025-07-02 DIAGNOSIS — H52.203 HYPEROPIA WITH ASTIGMATISM AND PRESBYOPIA, BILATERAL: Primary | ICD-10-CM

## 2025-07-02 DIAGNOSIS — H52.03 HYPEROPIA WITH ASTIGMATISM AND PRESBYOPIA, BILATERAL: Primary | ICD-10-CM

## 2025-07-02 DIAGNOSIS — H25.13 AGE-RELATED NUCLEAR CATARACT, BILATERAL: ICD-10-CM

## 2025-07-02 PROCEDURE — 99999 PR PBB SHADOW E&M-EST. PATIENT-LVL III: CPT | Mod: PBBFAC,,,

## 2025-07-02 PROCEDURE — 3044F HG A1C LEVEL LT 7.0%: CPT | Mod: CPTII,S$GLB,,

## 2025-07-02 PROCEDURE — 1159F MED LIST DOCD IN RCRD: CPT | Mod: CPTII,S$GLB,,

## 2025-07-02 PROCEDURE — 2023F DILAT RTA XM W/O RTNOPTHY: CPT | Mod: CPTII,S$GLB,,

## 2025-07-02 PROCEDURE — 92015 DETERMINE REFRACTIVE STATE: CPT | Mod: S$GLB,,,

## 2025-07-02 PROCEDURE — 4010F ACE/ARB THERAPY RXD/TAKEN: CPT | Mod: CPTII,S$GLB,,

## 2025-07-02 PROCEDURE — 92004 COMPRE OPH EXAM NEW PT 1/>: CPT | Mod: S$GLB,,,

## 2025-07-02 NOTE — PROGRESS NOTES
HPI    DM eye exam [id number]  (unknown)    Pt failed DMV drivers test, needs updated spec Rx. Pt wears otc readers to   aid. Pt denies flashes but noticed occasional floaters. DM controlled w   aid and diet. Due to certain neck spasms causing difficulty focusing   straight, pt dislikes  specs (not present) stating blurred spot in   middle of specs.   Gtts: visine prn      Hemoglobin A1c       Date                     Value               Ref Range             Status                06/10/2025               5.6                 4.0 - 5.6 %           Final                 12/10/2024               5.6                 4.0 - 5.6 %           Final                    2024               5.7 (H)             4.0 - 5.6 %           Final               Last edited by Natalie Grider, OD on 2025  4:22 PM.            Assessment /Plan     For exam results, see Encounter Report.    Hyperopia with astigmatism and presbyopia, bilateral    Type 2 diabetes mellitus without retinopathy    Age-related nuclear cataract, bilateral      Discussed spectacle options with pt and released final spec rx, . Ok to continue with OTC readers for now. Discussed importance of wearing spec rx when driving. Ed pt on change in rx and adaptation.  No diabetic retinopathy or macular edema evident on today's exam OD, OS. Reviewed findings with pt and ed pt on importance of maintaining strict blood sugar control to prevent visual fluctuations and/or vision loss. Monitor yearly for changes with repeat DFE, sooner prn.  Mild, not yet visually significant. Surgery not recommended at this time. Ed pt on symptoms of worsening cataracts including reduced BCVA and glare. Monitor yearly for changes.    RTC: 1 year for comprehensive exam or sooner prn

## 2025-07-16 ENCOUNTER — TELEPHONE (OUTPATIENT)
Facility: CLINIC | Age: 65
End: 2025-07-16
Payer: COMMERCIAL

## 2025-07-16 NOTE — TELEPHONE ENCOUNTER
Called Pt today to schedule a virtual visit. Pt did not  left a message. Called spouse, spouse did not  left message.

## 2025-07-17 ENCOUNTER — PATIENT MESSAGE (OUTPATIENT)
Dept: ADMINISTRATIVE | Facility: HOSPITAL | Age: 65
End: 2025-07-17
Payer: COMMERCIAL

## 2025-07-22 DIAGNOSIS — M54.2 CERVICALGIA OF OCCIPITO-ATLANTO-AXIAL REGION: ICD-10-CM

## 2025-07-22 DIAGNOSIS — E11.9 CONTROLLED TYPE 2 DIABETES MELLITUS WITHOUT COMPLICATION, WITHOUT LONG-TERM CURRENT USE OF INSULIN: ICD-10-CM

## 2025-07-23 RX ORDER — IBUPROFEN 800 MG/1
800 TABLET, FILM COATED ORAL 3 TIMES DAILY PRN
Qty: 60 TABLET | Refills: 0 | Status: SHIPPED | OUTPATIENT
Start: 2025-07-23

## 2025-07-23 NOTE — TELEPHONE ENCOUNTER
Care Due:                  Date            Visit Type   Department     Provider  --------------------------------------------------------------------------------                                EP -                              PRIMARY      [facility] FAMILY  Last Visit: 06-      CARE ([facility])   MEDICINE       Ernesto Edge  Next Visit: None Scheduled  None         None Found                                                            Last  Test          Frequency    Reason                     Performed    Due Date  --------------------------------------------------------------------------------    CBC.........  12 months..  naproxen.................  09- 09-    [facility] Embedded Care Due Messages. Reference number: [id number].   7/22/2025 7:33:35 PM CDT

## 2025-07-28 ENCOUNTER — OFFICE VISIT (OUTPATIENT)
Facility: CLINIC | Age: 65
End: 2025-07-28
Payer: COMMERCIAL

## 2025-07-28 VITALS
HEIGHT: 61 IN | DIASTOLIC BLOOD PRESSURE: 82 MMHG | BODY MASS INDEX: 51.28 KG/M2 | HEART RATE: 73 BPM | SYSTOLIC BLOOD PRESSURE: 132 MMHG | WEIGHT: 271.63 LBS

## 2025-07-28 DIAGNOSIS — G24.3 CERVICAL DYSTONIA: Primary | ICD-10-CM

## 2025-07-28 PROCEDURE — 3008F BODY MASS INDEX DOCD: CPT | Mod: CPTII,S$GLB,, | Performed by: PSYCHIATRY & NEUROLOGY

## 2025-07-28 PROCEDURE — 1159F MED LIST DOCD IN RCRD: CPT | Mod: CPTII,S$GLB,, | Performed by: PSYCHIATRY & NEUROLOGY

## 2025-07-28 PROCEDURE — 3075F SYST BP GE 130 - 139MM HG: CPT | Mod: CPTII,S$GLB,, | Performed by: PSYCHIATRY & NEUROLOGY

## 2025-07-28 PROCEDURE — 99999 PR PBB SHADOW E&M-EST. PATIENT-LVL IV: CPT | Mod: PBBFAC,,, | Performed by: PSYCHIATRY & NEUROLOGY

## 2025-07-28 PROCEDURE — G2211 COMPLEX E/M VISIT ADD ON: HCPCS | Mod: S$GLB,,, | Performed by: PSYCHIATRY & NEUROLOGY

## 2025-07-28 PROCEDURE — 3079F DIAST BP 80-89 MM HG: CPT | Mod: CPTII,S$GLB,, | Performed by: PSYCHIATRY & NEUROLOGY

## 2025-07-28 PROCEDURE — 4010F ACE/ARB THERAPY RXD/TAKEN: CPT | Mod: CPTII,S$GLB,, | Performed by: PSYCHIATRY & NEUROLOGY

## 2025-07-28 PROCEDURE — 99215 OFFICE O/P EST HI 40 MIN: CPT | Mod: S$GLB,,, | Performed by: PSYCHIATRY & NEUROLOGY

## 2025-07-28 PROCEDURE — 3044F HG A1C LEVEL LT 7.0%: CPT | Mod: CPTII,S$GLB,, | Performed by: PSYCHIATRY & NEUROLOGY

## 2025-07-28 NOTE — PROGRESS NOTES
"MOVEMENT DISORDERS CLINIC FOLLOW UP NOTE    PCP/Referring Provider:   No referring provider defined for this encounter.  Date of Service: 7/28/2025    Chief Complaint: dystonia    HPI: Tawny Valerio is a 65 y.o. female with PMH most notable for CD , presenting for evaluation.    Describes that she first had symptoms in 2023. Notes that she has always had "tightness," but that year she developed abnormal postures. Developed an acute neck pain on the left side. Was doing PT for sciatic pain around that time and the PT noted she was using her hand to hold up her chin. Was seeing Dr. Andrade and tried a course of steroids for pain and later was on gabapentin but noticed her head was still leaning to the left.     Ultimately saw Mrs. Ko who started her on Artane, modest benefit but also gets benefit from Benadryl. Also added C/L at her last visit and it was extremely helpful.     Relief with Sinemet is nearly 90%.     Interval:     Patient presents for follow-up of cervical dystonia and to discuss the possibility of Botox injections as a treatment option.    Patient was previously diagnosed with cervical dystonia and has been taking Sinemet (levodopa) for treatment, which initially provided about 90% improvement. She reports fluctuations in symptom control, with some days being significantly better than others. On one exceptionally good day, she only needed her morning medication and could move her head freely without issues for the rest of the day. Attempts to replicate this on subsequent days were unsuccessful.    Primary symptoms include difficulty turning her head to the right and an upward tilt. She describes constant and persistent pain and tension in the neck, with occasional mornings where she feels good initially but later finds her head tilted uncomfortably. To manage symptoms, she performs exercises learned in physical therapy and uses a self-developed technique to manipulate her neck, which " provides some relief.    She has noticed changes in her neck appearance, feeling that it looks shorter than it did a few years ago. She also reports swallowing difficulties and hoarseness, which may be a side effect of her medication. Her symptoms can be particularly severe just before her medication takes effect, describing a recent incident where symptoms worsened during a car ride before improving about an hour later.    Current treatment includes levodopa, baclofen, and Artane. She takes her medication first thing in the morning on an empty stomach, followed by breakfast about 30 minutes later. She has not been avoiding protein intake with her medication as previously recommended.    She denies any respiratory issues or cardiac problems related to her condition or medications.      ROS:  ENT: +hoarseness  Gastrointestinal: +difficulty swallowing  Musculoskeletal: +neck pain  Neurological: +memory loss, +involuntary movements, +balance issues, +memory problems       Current Medications:  Encounter Medications[1]    Past Medical History:  Problem List[2]    Past Surgical History:  Past Surgical History:   Procedure Laterality Date    ABDOMINAL SURGERY  2019    Hysterectomy     SECTION  4/12/1995    x 1    DILATION AND CURETTAGE OF UTERUS  2019    HYSTERECTOMY  19    HYSTEROSCOPY WITH DILATION AND CURETTAGE OF UTERUS N/A 2019    Procedure: HYSTEROSCOPY, WITH DILATION AND CURETTAGE OF UTERUS;  Surgeon: Bobby Frazier Jr., MD;  Location: [facility];  Service: OB/GYN;  Laterality: N/A;    LAPAROSCOPIC APPENDECTOMY N/A 2024    Procedure: APPENDECTOMY, LAPAROSCOPIC;  Surgeon: Hector Harrell MD;  Location: [facility] OR [address] [place] [address];  Service: General;  Laterality: N/A;    lipoma removed      OOPHORECTOMY  19    ROBOT-ASSISTED LAPAROSCOPIC ABDOMINAL HYSTERECTOMY USING DA AWAIS XI N/A 2019    Procedure: XI ROBOTIC HYSTERECTOMY;  Surgeon: Aneudy Almeida MD;  Location: [facility] OR [address] [place] [address];   Service: OB/GYN;  Laterality: N/A;    ROBOT-ASSISTED LAPAROSCOPIC LYSIS OF ADHESIONS USING DA AWAIS XI N/A 08/26/2019    Procedure: XI ROBOTIC LYSIS, ADHESIONS;  Surgeon: Aneudy Almeida MD;  [facility];  Service: OB/GYN;  Laterality: N/A;  Omental    ROBOT-ASSISTED LAPAROSCOPIC SALPINGO-OOPHORECTOMY USING DA AWAIS XI Bilateral 08/26/2019    Procedure: XI ROBOTIC SALPINGO-OOPHORECTOMY;  Surgeon: Aneudy Almeida MD;  [facility];  Service: OB/GYN;  Laterality: Bilateral;  Wt 299lbs       Social:  Social History[3]    Family History:  Family History   Problem Relation Name Age of Onset    Cataracts Mother Pura García     Arthritis Mother Pura García     Diabetes Father Luan Rodríguezkford     Stroke Father Luan Rodríguezkford     Hypertension Father Luan Rodríguezkford     Depression Father Luan Rodríguezkford     Early death Father Luan Rodríguezkford     Heart disease Father Luan Rodríguezkford     Vision loss Father Luan Rodríguezkford     Cataracts Sister Apolonia Rodríguezkford     Depression Sister Apolonia Rodríguezkford     Diabetes Sister Apolonia Rodríguezkford     Early death Sister Apolonia Rodríguezkford     Heart disease Sister Apolonia García     Hypertension Sister Apolonia Rodríguezkford     Heart disease Brother Luan R. García     Heart attack Brother Luan LONG. García     Hypertension Brother Luan R. García     Early death Brother Luan Rodríguezkford     Heart disease Maternal Grandmother Talya Prince Grapuso     Heart disease Maternal Grandfather Simon Pascualford     Heart disease Paternal Grandfather Nikolas Prince     Breast cancer Other mat great GM     Ovarian cancer Neg Hx      Uterine cancer Neg Hx      Colon cancer Neg Hx      Heart failure Neg Hx      Hyperlipidemia Neg Hx      Glaucoma Neg Hx      Macular degeneration Neg Hx      Retinal detachment Neg Hx      Blindness Neg Hx      Amblyopia Neg Hx         PHYSICAL:  Vitals:    07/28/25 1353   BP: 132/82   Pulse: 73         General Medical  Examination:  General: Good hygiene, appropriate appearance.  HEENT: Normocephalic, atraumatic.   Chest: Unlabored breathing.   Ext: No clubbing, cyanosis, or edema.     Mental Status:  Mood/Affect: Appropriate/congruent.  Level of consciousness: Awake, alert.  Orientation: Oriented to person, place, time and situation.  Language: Normal fluency, able to name, repeat, and follow complex multi-step commands    Cranial nerves:  III, IV, VI: EOMI with conjugate gaze and no nystagmus on end gaze  VII: Facial muscle activation intact and symmetric over the bilateral upper and lower face    Motor:   BUE without drift  Bradykinesia: none  Moderate left turn, mild right tilt    Gait:  -Arises from chair without use of hands.  Normal gait    Laboratory Data:    Lab Results   Component Value Date    TSH 0.636 06/14/2023    I1TTQVE 101 01/26/2023    FREET4 0.82 01/26/2023     Lab Results   Component Value Date    BXOXOANM92 525 02/12/2020         Imaging:  No results found. However, due to the size of the patient record, not all encounters were searched. Please check Results Review for a complete set of results.    Assessment//Plan:   Problem List Items Addressed This Visit          Neuro    Cervical dystonia - Primary    Overview   >>OVERVIEW FOR NECK PAIN WRITTEN ON 6/13/2023  8:43 AM BY FELIX GALARZA MD    Significant abnormalities of cervical stenosis and foraminal stenosis on recent MRI         Relevant Medications    abobotulinumtoxinA (DYSPORT) 500 unit SolR       Patient with CD. Here to discuss botulinum. She has noted significant improvement with Sinemet and artane but continues to feel significant pain, tightness, loss of range of motion.     Assessment & Plan    CERVICAL DYSTONIA:  - Cervical dystonia, previously treated with levodopa/carbidopa (Sinemet) with 90% improvement but inconsistent response.  - Considered botulinum injections as next treatment step due to variable medication efficacy.  - Plan initial  botulinum dose of around 300 units abobotulinum toxin, targeting splenius muscles (more on left), left semispinalis, left levator scapulae, and bilateral trapezius muscles.  - Aimed to gradually increase toxin dosage over subsequent visits if beneficial  - Intended to simplify medication regimen by potentially discontinuing other medications (e.g., Artane) once Botox efficacy is established.  - Discussed injection sites, potential benefits, and expected outcomes.  - Clarified toxin primarily affects injected areas with minimal risk of systemic spread.  - Informed about initial adjustment period (about a week) as toxin takes effect.  - Explained typical treatment course involves chronic botulinum injections for most patients.  - Continued levodopa/carbidopa (Sinemet) and Artane; take Sinemet on empty stomach, avoiding protein 30 minutes before and 1 hour after dose for better absorption.  - Ordered abobotulinum injection, 500 units, to be administered in future visit (pending insurance approval).  - Contact the office for scheduling injection appointment after insurance approval (estimated 3-4 weeks).  - Follow up for potential future treatments, with recommendation to try at least 3 rounds before deciding on long-term efficacy.    Injection plan:      Left splenius capitis 75 units  Right splenius capitis 45 units  Left semispinalis capitis 45 units  Left levator scapulae 60 units  Left trapezius 45  Right trapezius 30    TOTAL 300 units AbobotulinumA (Dysport)    To Whom It May Concern,    I am the treating neurologist for this patient and this letter accompanies any other notes requested from clinical evaluations.  It is my understanding that Prior Authorization for this medically necessary injection procedure may require additional information as provided below.  This is NOT being used for cosmetic purposes.     This patient is NOT a good candidate for oral medications alone for cervical dystonia given the  potential side effects of anticholinergics (sicca, blurry vision, constipation, urinary retention, confusion, falls), antispasmodics (confusion, fatigue, sleepiness, weakness) and benzodiazepiness (age, falls,confusion).      This patient is an excellent candidate for botulinum toxin injections.  Such injections are medically necessary.    For cervical dystonia, we will plan to inject up to 300 units each into the cervical musculature.    Patient will need up to 300 units for the first injection.  Ongoing botulinum toxin injections are titratable to patient need, efficacy, and side effect profile.    Please contact my team with any additional questions.    This visit supports a Level 5 follow-up (70474) due to high-complexity decision making in managing cervical dystonia with fluctuating response to multiple medications. The patient reported persistent pain, abnormal posturing, and decreased range of motion despite treatment with Sinemet, Artane, and baclofen. A new treatment plan was developed involving botulinum toxin injections targeting multiple cervical muscles, with a detailed discussion of expected benefits, risks, and titration strategy. Medication timing and protein intake were also reviewed to optimize levodopa efficacy. The visit involved counseling on chronic treatment planning, functional impact, and coordination of insurance approval.  is justified for ongoing, condition-focused management of a progressive neurologic disorder requiring individualized care and longitudinal follow-up.    Carlos Huitron MD  [facility]  Department of Neurology  Movement Disorders             [1]   Outpatient Encounter Medications as of 7/28/2025   Medication Sig Dispense Refill    acetaminophen (TYLENOL) 500 MG tablet Take 1 tablet (500 mg total) by mouth every 8 (eight) hours.      aspirin 81 MG Chew Take 81 mg by mouth once daily.      azelastine (ASTELIN) 137 mcg (0.1 %) nasal spray 1 spray (137 mcg total)  by Nasal route 2 (two) times daily. 90 mL 3    baclofen (LIORESAL) 10 MG tablet Take 1 tablet (10 mg total) by mouth 3 (three) times daily. 90 tablet 11    blood-glucose meter kit Use as instructed 1 each 0    buPROPion (WELLBUTRIN SR) 150 MG TBSR 12 hr tablet Take 1 tablet (150 mg total) by mouth 2 (two) times daily. 180 tablet 3    capsaicin 0.1 % Crea Apply topically three times a day as needed 42.5 g 11    carbidopa-levodopa  mg (SINEMET)  mg per tablet Take 1/2-1 tablet two to three times daily. 270 tablet 3    clindamycin (CLEOCIN T) 1 % lotion Apply topically 2 (two) times daily. 60 mL 0    empagliflozin (JARDIANCE) 25 mg tablet Take 1 tablet (25 mg total) by mouth once daily. 90 tablet 3    ergocalciferol (ERGOCALCIFEROL) 50,000 unit Cap Take 1 capsule (50,000 Units total) by mouth every 7 days. 12 capsule 3    fluticasone propionate (FLONASE) 50 mcg/actuation nasal spray 1 spray (50 mcg total) by Each Nostril route once daily. 32 g 11    gabapentin (NEURONTIN) 300 MG capsule Take 600 mg (2 caps) QAM and 300 mg (1 cap) in afternoon and 300 mg (1 cap)  capsule 11    ibuprofen (ADVIL,MOTRIN) 800 MG tablet Take 1 tablet (800 mg total) by mouth 3 (three) times daily as needed for Pain. 60 tablet 0    losartan (COZAAR) 25 MG tablet Take 1 tablet (25 mg total) by mouth once daily. 90 tablet 3    losartan (COZAAR) 25 MG tablet Take 1 tablet (25 mg total) by mouth once daily. 90 tablet 3    meclizine (ANTIVERT) 12.5 mg tablet Take 1 tablet (12.5 mg total) by mouth 2 (two) times daily as needed for Dizziness. 30 tablet 3    naproxen (NAPROSYN) 500 MG tablet Take 1 tablet (500 mg total) by mouth 2 (two) times daily with meals. 180 tablet 1    tirzepatide 12.5 mg/0.5 mL PnIj Inject 12.5 mg into the skin every 7 days. 2 mL 11    tretinoin (RETIN-A) 0.025 % cream APPLY TOPICALLY EVERY EVENING. 20 g 6    trihexyphenidyL (ARTANE) 2 MG tablet Take 1.5 tablets (3 mg total) by mouth 2 (two) times a day. 90  tablet 11    abobotulinumtoxinA (DYSPORT) 500 unit SolR Inject 1 each (500 Units total) into the muscle every 3 (three) months. 2 each 0    ezetimibe (ZETIA) 10 mg tablet Take 1 tablet (10 mg total) by mouth once daily. 90 tablet 3    levocetirizine (XYZAL) 5 MG tablet Take 1 tablet (5 mg total) by mouth every evening. 30 tablet 0    [DISCONTINUED] ibuprofen (ADVIL,MOTRIN) 800 MG tablet Take 1 tablet (800 mg total) by mouth 3 (three) times daily as needed for Pain. 60 tablet 0     No facility-administered encounter medications on file as of 7/28/2025.   [2]   Patient Active Problem List  Diagnosis    Controlled type 2 diabetes mellitus without complication, without long-term current use of insulin    Vitamin D insufficiency    BMI 50.0-59.9, adult    Edema leg    ASHLI (obstructive sleep apnea)    Essential hypertension    s/p RALH/BSO 8/26/2019    Cervicalgia of frooylzl-yjxbpyx-ahphr region    Statin myopathy    Hyperlipidemia LDL goal <70    Cervical dystonia    History of endometrial cancer    Allergic rhinitis    Generalized anxiety disorder    Balance problem    Decreased ROM of intervertebral discs of cervical spine    Decreased ROM of neck    Abnormal muscle tone    Impaired flexibility of upper extremity   [3]   Social History  Socioeconomic History    Marital status:     Number of children: 2   Occupational History     Employer: [employer]      Occupation:    Tobacco Use    Smoking status: Never     Passive exposure: Never    Smokeless tobacco: Never   Substance and Sexual Activity    Alcohol use: Never    Drug use: No    Sexual activity: Yes     Partners: Male     Birth control/protection: Post-menopausal, None     Comment: Hysterectomy   Social History Narrative    Lives with       Social Drivers of Health     Financial Resource Strain: Low Risk  (6/6/2025)    Overall Financial Resource Strain (CARDIA)     Difficulty of Paying Living Expenses: Not very hard   Food  Insecurity: No Food Insecurity (6/6/2025)    Hunger Vital Sign     Worried About Running Out of Food in the Last Year: Never true     Ran Out of Food in the Last Year: Never true   Transportation Needs: No Transportation Needs (6/6/2025)    PRAPARE - Transportation     Lack of Transportation (Medical): No     Lack of Transportation (Non-Medical): No   Physical Activity: Insufficiently Active (6/6/2025)    Exercise Vital Sign     Days of Exercise per Week: 2 days     Minutes of Exercise per Session: 30 min   Stress: Stress Concern Present (6/6/2025)    [employer] [place] [employer] - Occupational Stress Questionnaire     Feeling of Stress : Very much   Housing Stability: Low Risk  (6/6/2025)    Housing Stability Vital Sign     Unable to Pay for Housing in the Last Year: No     Number of Times Moved in the Last Year: 0     Homeless in the Last Year: No

## 2025-07-28 NOTE — Clinical Note
Another Dysport new start, 300 units, but I ordered 500 units; I was not prompted to do PA if we could please complete that part

## 2025-07-29 ENCOUNTER — PATIENT MESSAGE (OUTPATIENT)
Facility: CLINIC | Age: 65
End: 2025-07-29
Payer: COMMERCIAL

## 2025-07-30 ENCOUNTER — TELEPHONE (OUTPATIENT)
Facility: CLINIC | Age: 65
End: 2025-07-30
Payer: COMMERCIAL

## 2025-07-30 NOTE — TELEPHONE ENCOUNTER
Placed outgoing call to patient regarding dysport appt and [employer] message.   left voicemail to call us back to scheduled first dysport appt.      Will ask pt when her last appt was?    Responding to [employer] msg as well,

## 2025-07-31 ENCOUNTER — TELEPHONE (OUTPATIENT)
Facility: CLINIC | Age: 65
End: 2025-07-31
Payer: COMMERCIAL

## 2025-07-31 DIAGNOSIS — G24.3 CERVICAL DYSTONIA: Primary | ICD-10-CM

## 2025-07-31 NOTE — TELEPHONE ENCOUNTER
[employer] message sent to pt:    I have scheduled the botox (Dysport) appt for 8.29 at 3PM. This is the earliest available appt.   Please let us know if that date/time won't work for you.  The botox that went to your pharmacy was sent in error.   Sorry for the confusion. [facility] will verify Miss Hector's benefits   Approx. 1 week before her appt. We will supply the dysport.

## 2025-07-31 NOTE — TELEPHONE ENCOUNTER
Placed outgoing call to patient regarding Dysport appt scheduling.    left voicemail with appt date 8.29  Advised to call us back with any questions or concerns.

## 2025-08-26 ENCOUNTER — PATIENT MESSAGE (OUTPATIENT)
Facility: CLINIC | Age: 65
End: 2025-08-26
Payer: COMMERCIAL

## (undated) DEVICE — TRAY FOLEY 16FR INFECTION CONT

## (undated) DEVICE — OBTURATOR BLADELESS 8MM XI

## (undated) DEVICE — PORT ACCESS 8MM W/120MM LOW

## (undated) DEVICE — SUT CTD VICRYL 0 UND BR

## (undated) DEVICE — SET INFLOW TUBE HYSTER

## (undated) DEVICE — SOL IRR NACL .9% 3000ML

## (undated) DEVICE — DEVICE TRUECLEAR INCISOR 2.9

## (undated) DEVICE — COVER LIGHT HANDLE 80/CA

## (undated) DEVICE — LUBRICANT SURGILUBE 2 OZ

## (undated) DEVICE — ELECTRODE REM PLYHSV RETURN 9

## (undated) DEVICE — SEE MEDLINE ITEM 157181

## (undated) DEVICE — Device

## (undated) DEVICE — NDL INSUF ULTRA VERESS 120MM

## (undated) DEVICE — GOWN SURGICAL X-LARGE

## (undated) DEVICE — SET BASIN 48X48IN 6000ML RING

## (undated) DEVICE — SUT 0 VICRYL / UR6 (J603)

## (undated) DEVICE — HYSTEROSCOPE TRUCLEAR ELITE

## (undated) DEVICE — DRAPE COLUMN DAVINCI XI

## (undated) DEVICE — PAD ABD COMB CELOS 8X10 STRL

## (undated) DEVICE — SET TRI-LUMEN FILTERED TUBE

## (undated) DEVICE — APPLICATOR HEMOBLAST LAPSCP

## (undated) DEVICE — CLOSURE SKIN STERI STRIP 1/2X4

## (undated) DEVICE — TRAY MINOR GEN SURG OMC

## (undated) DEVICE — DRAPE ABDOMINAL TIBURON 14X11

## (undated) DEVICE — SEE MEDLINE ITEM 152622

## (undated) DEVICE — SOL WATER STRL IRR 1000ML

## (undated) DEVICE — PAD ABD 8X10 STERILE

## (undated) DEVICE — SYR 50CC LL

## (undated) DEVICE — BELLOW CANN HEMOBLAST 1.65GR

## (undated) DEVICE — TUBING HF INSUFFLATION W/ FLTR

## (undated) DEVICE — SOL NS 1000CC

## (undated) DEVICE — SOL CLEARIFY VISUALIZATION LAP

## (undated) DEVICE — ADHESIVE DERMABOND ADVANCED

## (undated) DEVICE — LEGGINGS 48X31 INCH

## (undated) DEVICE — TROCAR ENDOPATH XCEL 5MM 7.5CM

## (undated) DEVICE — KIT ANTIFOG W/SPONG & FLUID

## (undated) DEVICE — SEALER MARYLAND 1 STEP 37CM

## (undated) DEVICE — SOL ELECTROLUBE ANTI-STIC

## (undated) DEVICE — POWDER ARISTA AH 3G

## (undated) DEVICE — DRAPE ARM DAVINCI XI

## (undated) DEVICE — PAD PREP 50/CA

## (undated) DEVICE — DRAPE SCOPE PILLOW WARMER

## (undated) DEVICE — TRAY MINOR GEN SURG

## (undated) DEVICE — SEE MEDLINE ITEM 146313

## (undated) DEVICE — IRRIGATOR ENDOSCOPY DISP.

## (undated) DEVICE — SEAL UNIVERSAL 5MM-8MM XI

## (undated) DEVICE — GLOVE ENCORE ORTHO PWDR BRN 7

## (undated) DEVICE — SOL 9P NACL IRR PIC IL

## (undated) DEVICE — COVER LIGHT HANDLE

## (undated) DEVICE — SEE MEDLINE ITEM 154981

## (undated) DEVICE — COVER TIP CURVED SCISSORS XI

## (undated) DEVICE — SUT MCRYL PLUS 4-0 PS2 27IN

## (undated) DEVICE — MANIPULATOR VCARE PLUS 34MM